# Patient Record
Sex: FEMALE | Race: WHITE | NOT HISPANIC OR LATINO | Employment: OTHER | URBAN - NONMETROPOLITAN AREA
[De-identification: names, ages, dates, MRNs, and addresses within clinical notes are randomized per-mention and may not be internally consistent; named-entity substitution may affect disease eponyms.]

---

## 2017-04-12 ENCOUNTER — TRANSCRIBE ORDERS (OUTPATIENT)
Dept: LAB | Facility: CLINIC | Age: 72
End: 2017-04-12

## 2017-04-12 ENCOUNTER — LAB (OUTPATIENT)
Dept: LAB | Facility: CLINIC | Age: 72
End: 2017-04-12
Payer: MEDICARE

## 2017-04-12 DIAGNOSIS — E55.9 UNSPECIFIED VITAMIN D DEFICIENCY: ICD-10-CM

## 2017-04-12 DIAGNOSIS — I25.10 UNSPECIFIED CARDIOVASCULAR DISEASE: ICD-10-CM

## 2017-04-12 DIAGNOSIS — I25.10 UNSPECIFIED CARDIOVASCULAR DISEASE: Primary | ICD-10-CM

## 2017-04-12 LAB
25(OH)D3 SERPL-MCNC: 7.6 NG/ML (ref 30–100)
ALBUMIN SERPL BCP-MCNC: 3.9 G/DL (ref 3.5–5)
ALP SERPL-CCNC: 88 U/L (ref 46–116)
ALT SERPL W P-5'-P-CCNC: 17 U/L (ref 12–78)
ANION GAP SERPL CALCULATED.3IONS-SCNC: 8 MMOL/L (ref 4–13)
AST SERPL W P-5'-P-CCNC: 14 U/L (ref 5–45)
BILIRUB SERPL-MCNC: 0.51 MG/DL (ref 0.2–1)
BUN SERPL-MCNC: 12 MG/DL (ref 5–25)
CALCIUM SERPL-MCNC: 9.2 MG/DL (ref 8.3–10.1)
CHLORIDE SERPL-SCNC: 107 MMOL/L (ref 100–108)
CHOLEST SERPL-MCNC: 185 MG/DL (ref 50–200)
CO2 SERPL-SCNC: 25 MMOL/L (ref 21–32)
CREAT SERPL-MCNC: 0.88 MG/DL (ref 0.6–1.3)
ERYTHROCYTE [DISTWIDTH] IN BLOOD BY AUTOMATED COUNT: 13.5 % (ref 11.6–15.1)
GFR SERPL CREATININE-BSD FRML MDRD: >60 ML/MIN/1.73SQ M
GLUCOSE P FAST SERPL-MCNC: 121 MG/DL (ref 65–99)
HCT VFR BLD AUTO: 40.9 % (ref 34.8–46.1)
HDLC SERPL-MCNC: 44 MG/DL (ref 40–60)
HGB BLD-MCNC: 13.5 G/DL (ref 11.5–15.4)
LDLC SERPL CALC-MCNC: 111 MG/DL (ref 0–100)
LDLC SERPL DIRECT ASSAY-MCNC: 117 MG/DL (ref 0–100)
MCH RBC QN AUTO: 29.2 PG (ref 26.8–34.3)
MCHC RBC AUTO-ENTMCNC: 33 G/DL (ref 31.4–37.4)
MCV RBC AUTO: 88 FL (ref 82–98)
PLATELET # BLD AUTO: 117 THOUSANDS/UL (ref 149–390)
PMV BLD AUTO: 11.7 FL (ref 8.9–12.7)
POTASSIUM SERPL-SCNC: 4.3 MMOL/L (ref 3.5–5.3)
PROT SERPL-MCNC: 7 G/DL (ref 6.4–8.2)
RBC # BLD AUTO: 4.63 MILLION/UL (ref 3.81–5.12)
SODIUM SERPL-SCNC: 140 MMOL/L (ref 136–145)
TRIGL SERPL-MCNC: 151 MG/DL
WBC # BLD AUTO: 4.96 THOUSAND/UL (ref 4.31–10.16)

## 2017-04-12 PROCEDURE — 82306 VITAMIN D 25 HYDROXY: CPT

## 2017-04-12 PROCEDURE — 80053 COMPREHEN METABOLIC PANEL: CPT

## 2017-04-12 PROCEDURE — 80061 LIPID PANEL: CPT

## 2017-04-12 PROCEDURE — 83036 HEMOGLOBIN GLYCOSYLATED A1C: CPT

## 2017-04-12 PROCEDURE — 36415 COLL VENOUS BLD VENIPUNCTURE: CPT

## 2017-04-12 PROCEDURE — 85027 COMPLETE CBC AUTOMATED: CPT

## 2017-04-12 PROCEDURE — 83721 ASSAY OF BLOOD LIPOPROTEIN: CPT

## 2017-04-13 LAB
EST. AVERAGE GLUCOSE BLD GHB EST-MCNC: 143 MG/DL
HBA1C MFR BLD: 6.6 % (ref 4.2–6.3)

## 2017-08-30 ENCOUNTER — APPOINTMENT (OUTPATIENT)
Dept: LAB | Facility: CLINIC | Age: 72
End: 2017-08-30
Payer: MEDICARE

## 2017-08-30 ENCOUNTER — TRANSCRIBE ORDERS (OUTPATIENT)
Dept: LAB | Facility: CLINIC | Age: 72
End: 2017-08-30

## 2017-08-30 DIAGNOSIS — E55.9 UNSPECIFIED VITAMIN D DEFICIENCY: ICD-10-CM

## 2017-08-30 DIAGNOSIS — E55.9 UNSPECIFIED VITAMIN D DEFICIENCY: Primary | ICD-10-CM

## 2017-08-30 PROCEDURE — 36415 COLL VENOUS BLD VENIPUNCTURE: CPT

## 2017-09-01 ENCOUNTER — APPOINTMENT (OUTPATIENT)
Dept: LAB | Facility: CLINIC | Age: 72
End: 2017-09-01
Payer: MEDICARE

## 2017-09-01 ENCOUNTER — TRANSCRIBE ORDERS (OUTPATIENT)
Dept: LAB | Facility: CLINIC | Age: 72
End: 2017-09-01

## 2017-09-01 DIAGNOSIS — E55.9 UNSPECIFIED VITAMIN D DEFICIENCY: ICD-10-CM

## 2017-09-01 DIAGNOSIS — E55.9 UNSPECIFIED VITAMIN D DEFICIENCY: Primary | ICD-10-CM

## 2017-09-01 LAB — 25(OH)D3 SERPL-MCNC: 91.8 NG/ML (ref 30–100)

## 2017-09-01 PROCEDURE — 36415 COLL VENOUS BLD VENIPUNCTURE: CPT

## 2017-09-01 PROCEDURE — 82306 VITAMIN D 25 HYDROXY: CPT

## 2017-10-20 ENCOUNTER — HOSPITAL ENCOUNTER (EMERGENCY)
Facility: HOSPITAL | Age: 72
Discharge: HOME | End: 2017-10-20
Admitting: EMERGENCY MEDICINE
Payer: MEDICARE

## 2017-10-20 ENCOUNTER — TRANSCRIBE ORDERS (OUTPATIENT)
Dept: URGENT CARE | Facility: CLINIC | Age: 72
End: 2017-10-20

## 2017-10-20 ENCOUNTER — APPOINTMENT (EMERGENCY)
Dept: CT IMAGING | Facility: HOSPITAL | Age: 72
End: 2017-10-20
Payer: MEDICARE

## 2017-10-20 ENCOUNTER — OFFICE VISIT (OUTPATIENT)
Dept: URGENT CARE | Facility: CLINIC | Age: 72
End: 2017-10-20
Payer: MEDICARE

## 2017-10-20 ENCOUNTER — APPOINTMENT (EMERGENCY)
Dept: RADIOLOGY | Facility: HOSPITAL | Age: 72
End: 2017-10-20
Payer: MEDICARE

## 2017-10-20 VITALS
OXYGEN SATURATION: 96 % | HEART RATE: 96 BPM | RESPIRATION RATE: 20 BRPM | DIASTOLIC BLOOD PRESSURE: 68 MMHG | WEIGHT: 115.9 LBS | TEMPERATURE: 98.8 F | SYSTOLIC BLOOD PRESSURE: 161 MMHG

## 2017-10-20 DIAGNOSIS — J21.9 BRONCHIOLITIS, ACUTE: Primary | ICD-10-CM

## 2017-10-20 DIAGNOSIS — J18.9 COMMUNITY ACQUIRED PNEUMONIA OF LEFT LOWER LOBE OF LUNG: ICD-10-CM

## 2017-10-20 DIAGNOSIS — R07.9 CHEST PAIN, UNSPECIFIED TYPE: ICD-10-CM

## 2017-10-20 DIAGNOSIS — R07.9 CHEST PAIN, UNSPECIFIED TYPE: Primary | ICD-10-CM

## 2017-10-20 LAB
ALBUMIN SERPL BCP-MCNC: 3.8 G/DL (ref 3.5–5)
ALP SERPL-CCNC: 86 U/L (ref 46–116)
ALT SERPL W P-5'-P-CCNC: 18 U/L (ref 12–78)
ANION GAP SERPL CALCULATED.3IONS-SCNC: 13 MMOL/L (ref 4–13)
APTT PPP: 28 SECONDS (ref 23–35)
AST SERPL W P-5'-P-CCNC: 14 U/L (ref 5–45)
BASOPHILS # BLD AUTO: 0.01 THOUSANDS/ΜL (ref 0–0.1)
BASOPHILS NFR BLD AUTO: 0 % (ref 0–1)
BILIRUB SERPL-MCNC: 0.7 MG/DL (ref 0.2–1)
BUN SERPL-MCNC: 7 MG/DL (ref 5–25)
CALCIUM SERPL-MCNC: 9.3 MG/DL (ref 8.3–10.1)
CHLORIDE SERPL-SCNC: 99 MMOL/L (ref 100–108)
CO2 SERPL-SCNC: 25 MMOL/L (ref 21–32)
CREAT SERPL-MCNC: 0.86 MG/DL (ref 0.6–1.3)
EOSINOPHIL # BLD AUTO: 0.05 THOUSAND/ΜL (ref 0–0.61)
EOSINOPHIL NFR BLD AUTO: 1 % (ref 0–6)
ERYTHROCYTE [DISTWIDTH] IN BLOOD BY AUTOMATED COUNT: 13.2 % (ref 11.6–15.1)
GFR SERPL CREATININE-BSD FRML MDRD: 67 ML/MIN/1.73SQ M
GLUCOSE SERPL-MCNC: 161 MG/DL (ref 65–140)
HCT VFR BLD AUTO: 39.7 % (ref 34.8–46.1)
HGB BLD-MCNC: 13.9 G/DL (ref 11.5–15.4)
INR PPP: 1.04 (ref 0.86–1.16)
LIPASE SERPL-CCNC: 183 U/L (ref 73–393)
LYMPHOCYTES # BLD AUTO: 1.03 THOUSANDS/ΜL (ref 0.6–4.47)
LYMPHOCYTES NFR BLD AUTO: 15 % (ref 14–44)
MAGNESIUM SERPL-MCNC: 1.5 MG/DL (ref 1.6–2.6)
MCH RBC QN AUTO: 29.5 PG (ref 26.8–34.3)
MCHC RBC AUTO-ENTMCNC: 35 G/DL (ref 31.4–37.4)
MCV RBC AUTO: 84 FL (ref 82–98)
MONOCYTES # BLD AUTO: 0.72 THOUSAND/ΜL (ref 0.17–1.22)
MONOCYTES NFR BLD AUTO: 10 % (ref 4–12)
NEUTROPHILS # BLD AUTO: 5.31 THOUSANDS/ΜL (ref 1.85–7.62)
NEUTS SEG NFR BLD AUTO: 74 % (ref 43–75)
NT-PROBNP SERPL-MCNC: 502 PG/ML
PLATELET # BLD AUTO: 131 THOUSANDS/UL (ref 149–390)
PMV BLD AUTO: 10.6 FL (ref 8.9–12.7)
POTASSIUM SERPL-SCNC: 3.7 MMOL/L (ref 3.5–5.3)
PROT SERPL-MCNC: 7.5 G/DL (ref 6.4–8.2)
PROTHROMBIN TIME: 13.5 SECONDS (ref 12.1–14.4)
RBC # BLD AUTO: 4.71 MILLION/UL (ref 3.81–5.12)
SODIUM SERPL-SCNC: 137 MMOL/L (ref 136–145)
TROPONIN I SERPL-MCNC: <0.02 NG/ML
WBC # BLD AUTO: 7.12 THOUSAND/UL (ref 4.31–10.16)

## 2017-10-20 PROCEDURE — 85025 COMPLETE CBC W/AUTO DIFF WBC: CPT | Performed by: PHYSICIAN ASSISTANT

## 2017-10-20 PROCEDURE — 85610 PROTHROMBIN TIME: CPT | Performed by: PHYSICIAN ASSISTANT

## 2017-10-20 PROCEDURE — 85730 THROMBOPLASTIN TIME PARTIAL: CPT | Performed by: PHYSICIAN ASSISTANT

## 2017-10-20 PROCEDURE — 83690 ASSAY OF LIPASE: CPT | Performed by: PHYSICIAN ASSISTANT

## 2017-10-20 PROCEDURE — 93005 ELECTROCARDIOGRAM TRACING: CPT

## 2017-10-20 PROCEDURE — 83880 ASSAY OF NATRIURETIC PEPTIDE: CPT | Performed by: PHYSICIAN ASSISTANT

## 2017-10-20 PROCEDURE — 36415 COLL VENOUS BLD VENIPUNCTURE: CPT | Performed by: PHYSICIAN ASSISTANT

## 2017-10-20 PROCEDURE — 71275 CT ANGIOGRAPHY CHEST: CPT

## 2017-10-20 PROCEDURE — 94640 AIRWAY INHALATION TREATMENT: CPT

## 2017-10-20 PROCEDURE — 80053 COMPREHEN METABOLIC PANEL: CPT | Performed by: PHYSICIAN ASSISTANT

## 2017-10-20 PROCEDURE — 99285 EMERGENCY DEPT VISIT HI MDM: CPT

## 2017-10-20 PROCEDURE — 99214 OFFICE O/P EST MOD 30 MIN: CPT

## 2017-10-20 PROCEDURE — 83735 ASSAY OF MAGNESIUM: CPT | Performed by: PHYSICIAN ASSISTANT

## 2017-10-20 PROCEDURE — 71010 HB CHEST X-RAY 1 VIEW FRONTAL (PORTABLE): CPT

## 2017-10-20 PROCEDURE — 84484 ASSAY OF TROPONIN QUANT: CPT | Performed by: PHYSICIAN ASSISTANT

## 2017-10-20 PROCEDURE — G0463 HOSPITAL OUTPT CLINIC VISIT: HCPCS

## 2017-10-20 RX ORDER — LISINOPRIL 10 MG/1
10 TABLET ORAL DAILY
COMMUNITY
End: 2022-03-18 | Stop reason: SDUPTHER

## 2017-10-20 RX ORDER — IPRATROPIUM BROMIDE AND ALBUTEROL SULFATE 2.5; .5 MG/3ML; MG/3ML
3 SOLUTION RESPIRATORY (INHALATION) ONCE
Status: COMPLETED | OUTPATIENT
Start: 2017-10-20 | End: 2017-10-20

## 2017-10-20 RX ORDER — ASPIRIN 81 MG/1
81 TABLET ORAL DAILY
Status: ON HOLD | COMMUNITY
End: 2022-03-08 | Stop reason: SDUPTHER

## 2017-10-20 RX ORDER — ALBUTEROL SULFATE 90 UG/1
2 AEROSOL, METERED RESPIRATORY (INHALATION) EVERY 6 HOURS PRN
Qty: 1 INHALER | Refills: 0 | Status: SHIPPED | OUTPATIENT
Start: 2017-10-20 | End: 2022-03-08 | Stop reason: HOSPADM

## 2017-10-20 RX ORDER — DOXYCYCLINE 100 MG/1
100 CAPSULE ORAL 2 TIMES DAILY
Qty: 20 CAPSULE | Refills: 0 | Status: SHIPPED | OUTPATIENT
Start: 2017-10-20 | End: 2017-10-30

## 2017-10-20 RX ORDER — SIMVASTATIN 10 MG
10 TABLET ORAL
COMMUNITY
End: 2022-03-08 | Stop reason: HOSPADM

## 2017-10-20 RX ADMIN — IOHEXOL 85 ML: 350 INJECTION, SOLUTION INTRAVENOUS at 16:16

## 2017-10-20 RX ADMIN — IPRATROPIUM BROMIDE AND ALBUTEROL SULFATE 3 ML: .5; 3 SOLUTION RESPIRATORY (INHALATION) at 15:38

## 2017-10-20 NOTE — ED PROVIDER NOTES
History  Chief Complaint   Patient presents with    Shortness of Breath     SOB,cough, weakness, and chest "burning" x3 weeks      74 yr female multiple complaints was sent from urgent care for sob/cough/palpitations/htn/cp  Prior to arrival /87  Just returned from [place] (via airplane) 3 weeks ago shortly after developed burning and aching in both feet, went away with walking and has since resolved, then 2 day s of diarrhea resolved with immodium  On third day developed chest burning "on fire" and coughing clear phlegm  Hurts to breath, initially thick yellow mucus is now white and feels like it is "settling in my trachea"  Exertion makes her very generally weak and tired  She takes allegra-d which lessens the mucus production  Worse to breathe with laying flat- sleeps on side and uses 3 pillows  Also decreased appetite and 15 lb weight loss in 1 year  No f/c  No n/v/d  No abdominal pain  Normal urination  No swelling in legs  Prior smoker, quit years ago  Has PMH htn, hld, cad s/p PCI  Allergic to flu vaccine  History provided by:  Patient      Prior to Admission Medications   Prescriptions Last Dose Informant Patient Reported? Taking?   aspirin (ECOTRIN LOW STRENGTH) 81 mg EC tablet   Yes Yes   Sig: Take 81 mg by mouth daily   lisinopril (ZESTRIL) 10 mg tablet   Yes Yes   Sig: Take 10 mg by mouth daily   simvastatin (ZOCOR) 10 mg tablet   Yes Yes   Sig: Take 10 mg by mouth daily at bedtime      Facility-Administered Medications: None       Past Medical History:   Diagnosis Date    Cardiac disease     Hypercholesteremia     Hyperlipidemia     Hypertension     Renal disorder        Past Surgical History:   Procedure Laterality Date    CORONARY ANGIOPLASTY WITH STENT PLACEMENT      WRIST SURGERY         History reviewed  No pertinent family history  I have reviewed and agree with the history as documented      Social History   Substance Use Topics    Smoking status: Former Smoker    Smokeless tobacco: Never Used    Alcohol use No        Review of Systems   Constitutional: Positive for activity change and fatigue  Negative for appetite change, chills, diaphoresis, fever and unexpected weight change  HENT: Negative for congestion, ear pain, postnasal drip, rhinorrhea, sinus pressure and sore throat  Eyes: Negative for pain, discharge and redness  Respiratory: Positive for cough and shortness of breath  Negative for chest tightness  Cardiovascular: Positive for chest pain  Negative for palpitations and leg swelling  Gastrointestinal: Negative for abdominal pain, constipation, diarrhea, nausea and vomiting  Genitourinary: Negative for difficulty urinating, dysuria, flank pain, frequency, hematuria and urgency  Musculoskeletal: Negative for arthralgias, back pain and myalgias  Skin: Negative for color change, rash and wound  Allergic/Immunologic: Negative for immunocompromised state  Neurological: Negative for dizziness, tremors, syncope, weakness, numbness and headaches  Physical Exam  ED Triage Vitals   Temperature Pulse Respirations Blood Pressure SpO2   10/20/17 1447 10/20/17 1445 10/20/17 1447 10/20/17 1445 10/20/17 1445   98 8 °F (37 1 °C) (!) 106 20 (!) 182/91 95 %      Temp Source Heart Rate Source Patient Position - Orthostatic VS BP Location FiO2 (%)   10/20/17 1447 10/20/17 1447 10/20/17 1447 10/20/17 1447 --   Temporal Monitor Lying Left arm       Pain Score       10/20/17 1447       No Pain           Physical Exam   Constitutional: Vital signs are normal  She appears well-developed and well-nourished  Non-toxic appearance  No distress  HENT:   Head: Normocephalic and atraumatic  Right Ear: Tympanic membrane and external ear normal    Left Ear: Tympanic membrane and external ear normal    Nose: No rhinorrhea     Mouth/Throat: Uvula is midline and oropharynx is clear and moist    Eyes: Conjunctivae and lids are normal  Pupils are equal, round, and reactive to light  Neck: Full passive range of motion without pain  Neck supple  Cardiovascular: Normal rate, regular rhythm, normal heart sounds and intact distal pulses  Pulmonary/Chest: Effort normal and breath sounds normal  No accessory muscle usage  No tachypnea  No respiratory distress  Abdominal: Soft  Normal appearance and bowel sounds are normal  There is no hepatosplenomegaly  There is no tenderness  There is no CVA tenderness  No hernia  Musculoskeletal: Normal range of motion  Neurological: She is alert  Skin: Skin is warm, dry and intact  No rash noted  She is not diaphoretic  Psychiatric: She has a normal mood and affect  Her speech is normal and behavior is normal    Nursing note and vitals reviewed        ED Medications  Medications   ipratropium-albuterol (DUO-NEB) 0 5-2 5 mg/mL inhalation solution 3 mL (3 mL Nebulization Given 10/20/17 1538)   iohexol (OMNIPAQUE) 350 MG/ML injection (SINGLE-DOSE) 85 mL (85 mL Intravenous Given 10/20/17 1616)       Diagnostic Studies  Labs Reviewed   CBC AND DIFFERENTIAL - Abnormal        Result Value Ref Range Status    Platelets 257 (*) 295 - 390 Thousands/uL Final    WBC 7 12  4 31 - 10 16 Thousand/uL Final    RBC 4 71  3 81 - 5 12 Million/uL Final    Hemoglobin 13 9  11 5 - 15 4 g/dL Final    Hematocrit 39 7  34 8 - 46 1 % Final    MCV 84  82 - 98 fL Final    MCH 29 5  26 8 - 34 3 pg Final    MCHC 35 0  31 4 - 37 4 g/dL Final    RDW 13 2  11 6 - 15 1 % Final    MPV 10 6  8 9 - 12 7 fL Final    Neutrophils Relative 74  43 - 75 % Final    Lymphocytes Relative 15  14 - 44 % Final    Monocytes Relative 10  4 - 12 % Final    Eosinophils Relative 1  0 - 6 % Final    Basophils Relative 0  0 - 1 % Final    Neutrophils Absolute 5 31  1 85 - 7 62 Thousands/µL Final    Lymphocytes Absolute 1 03  0 60 - 4 47 Thousands/µL Final    Monocytes Absolute 0 72  0 17 - 1 22 Thousand/µL Final    Eosinophils Absolute 0 05  0 00 - 0 61 Thousand/µL Final Basophils Absolute 0 01  0 00 - 0 10 Thousands/µL Final   COMPREHENSIVE METABOLIC PANEL - Abnormal     Chloride 99 (*) 100 - 108 mmol/L Final    Glucose 161 (*) 65 - 140 mg/dL Final    Comment:   If the patient is fasting, the ADA then defines impaired fasting glucose as > 100 mg/dL and diabetes as > or equal to 123 mg/dL  Specimen collection should occur prior to Sulfasalazine administration due to the potential for falsely depressed results  Specimen collection should occur prior to Sulfapyridine administration due to the potential for falsely elevated results  Sodium 137  136 - 145 mmol/L Final    Potassium 3 7  3 5 - 5 3 mmol/L Final    CO2 25  21 - 32 mmol/L Final    Anion Gap 13  4 - 13 mmol/L Final    BUN 7  5 - 25 mg/dL Final    Creatinine 0 86  0 60 - 1 30 mg/dL Final    Comment: Standardized to IDMS reference method    Calcium 9 3  8 3 - 10 1 mg/dL Final    AST 14  5 - 45 U/L Final    Comment:   Specimen collection should occur prior to Sulfasalazine administration due to the potential for falsely depressed results  ALT 18  12 - 78 U/L Final    Comment:   Specimen collection should occur prior to Sulfasalazine administration due to the potential for falsely depressed results  Alkaline Phosphatase 86  46 - 116 U/L Final    Total Protein 7 5  6 4 - 8 2 g/dL Final    Albumin 3 8  3 5 - 5 0 g/dL Final    Total Bilirubin 0 70  0 20 - 1 00 mg/dL Final    eGFR 67  ml/min/1 73sq m Final    Narrative:     National Kidney Disease Education Program recommendations are as follows:  GFR calculation is accurate only with a steady state creatinine  Chronic Kidney disease less than 60 ml/min/1 73 sq  meters  Kidney failure less than 15 ml/min/1 73 sq  meters     MAGNESIUM - Abnormal     Magnesium 1 5 (*) 1 6 - 2 6 mg/dL Final   NT-BNP PRO (BRAIN NATRIURETIC PEPTIDE) - Abnormal     NT-proBNP 502 (*) <125 pg/mL Final   PROTIME-INR - Normal    Protime 13 5  12 1 - 14 4 seconds Final    INR 1 04  0 86 - 1 16 Final APTT - Normal    PTT 28  23 - 35 seconds Final    Narrative: Therapeutic Heparin Range = 60-90 seconds   LIPASE - Normal    Lipase 183  73 - 393 u/L Final   TROPONIN I - Normal    Troponin I <0 02  <=0 04 ng/mL Final    Comment: 3Autovalidation override    Narrative:     [employer] Chemistry analyzer 99% cutoff is > 0 04 ng/mL in network labs    o cTnI 99% cutoff is useful only when applied to patients in the clinical setting of myocardial ischemia  o cTnI 99% cutoff should be interpreted in the context of clinical history, ECG findings and possibly cardiac imaging to establish correct diagnosis  o cTnI 99% cutoff may be suggestive but clearly not indicative of a coronary event without the clinical setting of myocardial ischemia  UA W REFLEX TO MICROSCOPIC WITH REFLEX TO CULTURE       CTA ED chest PE study   Final Result      No intraluminal filling defect to suggest a pulmonary embolus  Left lower lobe peribronchial thickening and tree-in-bud opacities suspicious for an infectious or inflammatory bronchiolitis  Follow-up could be performed to assess for complete resolution  Mild diffuse emphysematous lung changes  Partially visualized moderately atrophic right kidney  Workstation performed: [id number]         XR chest 1 view portable   Final Result      No active pulmonary disease           Workstation performed: [id number]             Procedures  ECG 12 Lead Documentation  Date/Time: 10/20/2017 2:52 PM  Performed by: Gary Wbeb  Authorized by: Gary Webb     Indications / Diagnosis:  Sob  ECG reviewed by me, the ED Provider: yes    Patient location:  ED  Rate:     ECG rate:  104  Rhythm:     Rhythm: sinus tachycardia    Ectopy:     Ectopy: none    QRS:     QRS axis:  Normal  Conduction:     Conduction: normal    ST segments:     ST segments:  Normal  T waves:     T waves: inverted      Inverted:  V3, V4 and V5  Other findings:     Other findings: LAE Phone Contacts  ED Phone Contact    ED Course  ED Course as of Oct 20 1712   Fri Oct 20, 2017   1516 WBC: 7 12   1516 Hemoglobin: 13 9   1516 Hematocrit: 39 7   1516 Platelets: (!) 161   1533 NT-proBNP: (!) 502   1533 Magnesium: (!) 1 5   1533 Sodium: 137   1533 Potassium: 3 7   1533 Chloride: (!) 99   1533 CO2: 25   1533 Anion Gap: 13   1533 Creatinine: 0 86   1533 Glucose: (!) 161   1533 AST: 14   1533 Alkaline Phosphatase: 86   1533 Albumin: 3 8   1533 eGFR: 67   1533 Troponin I: <0 02   1533 Lipase: 183   1534 PTT: 28   1534 Protime: 13 5   1542 nad XR chest 1 view portable   1705 Reassess patient resting comfortably- reviewed all results with her at bedside including labs, ekg, and ct findings  No sirs/sepsis or evidence of overwhelming infection or respiratory compromise  Is feeling well and agreeable to outpatient treatment- doxy, mucinex, albuterol  Has appt with pcp on 10/22/17 will keep as f/u  MDM  Number of Diagnoses or Management Options  Bronchiolitis, acute: new and requires workup  Community acquired pneumonia of left lower lobe of lung [facility]): new and requires workup  Diagnosis management comments: 74 yr female with respiratory symptoms upon return from a vacation 3 weeks ago  Productive cough chest burning malaise sob x 2 weeks, sx gradually improving but not resolved  Will evaluate with labs, cxr, cta to evaluate ddx to include not limited to: bronchitis, pneumonia, pneumonitis (viral/fungal/inflammatory), pulmonary embolism, atypical acs, metabolic derangement  Treat supportively and according to results         Amount and/or Complexity of Data Reviewed  Clinical lab tests: ordered and reviewed  Tests in the radiology section of CPT®: ordered and reviewed  Discuss the patient with other providers: yes  Independent visualization of images, tracings, or specimens: yes    Patient Progress  Patient progress: stable    CritCare Time    Disposition  Final diagnoses:   Bronchiolitis, acute   Community acquired pneumonia of left lower lobe of lung [facility])     ED Disposition     ED Disposition Condition Comment    Discharge  Senait Savage discharge to home/self care  Condition at discharge: Good        Follow-up Information     Follow up With Specialties Details Why [address] Emergency Department Emergency Medicine Go to As needed, If symptoms worsen [address] [place] [facility], [place] [address], [place], [place], [address] [facility], [facility] Medicine  ER followup as scheduled for 10/22/17 [address] [facility] [address]  [phone]           Patient's Medications   Discharge Prescriptions    ALBUTEROL (PROVENTIL HFA,VENTOLIN HFA) 90 MCG/ACT INHALER    Inhale 2 puffs every 6 (six) hours as needed for wheezing       Start Date: 10/20/2017End Date: --       Order Dose: 2 puffs       Quantity: 1 Inhaler    Refills: 0    DEXTROMETHORPHAN-GUAIFENESIN (MUCINEX DM)  MG PER 12 HR TABLET    Take 1 tablet by mouth every 12 (twelve) hours for 7 days       Start Date: 10/20/2017End Date: 10/27/2017       Order Dose: 1 tablet       Quantity: 14 tablet    Refills: 0    DOXYCYCLINE MONOHYDRATE (MONODOX) 100 MG CAPSULE    Take 1 capsule by mouth 2 (two) times a day for 10 days       Start Date: 10/20/2017End Date: 10/30/2017       Order Dose: 100 mg       Quantity: 20 capsule    Refills: 0     No discharge procedures on file      ED Provider  Electronically Signed by       Mattihas Doll PA-C  10/20/17 8427

## 2017-10-20 NOTE — DISCHARGE INSTRUCTIONS
Community Acquired Pneumonia   WHAT YOU NEED TO KNOW:   Community-acquired pneumonia (CAP) is a lung infection that you get outside of a hospital or nursing home setting  Your lungs become inflamed and cannot work well  CAP may be caused by bacteria, viruses, or fungi  DISCHARGE INSTRUCTIONS:   Return to the emergency department if:   · You are confused and cannot think clearly  · You have increased trouble breathing  · Your lips or fingernails turn gray or blue  Contact your healthcare provider if:   · Your symptoms do not get better, or they get worse  · You are urinating less, or not at all  · You have questions or concerns about your condition or care  Medicines:   · Medicines  may be given to treat a bacterial, viral, or fungal infection  You may also be given medicines to dilate your bronchial tubes to help you breathe more easily  · Take your medicine as directed  Contact your healthcare provider if you think your medicine is not helping or if you have side effects  Tell him or her if you are allergic to any medicine  Keep a list of the medicines, vitamins, and herbs you take  Include the amounts, and when and why you take them  Bring the list or the pill bottles to follow-up visits  Carry your medicine list with you in case of an emergency  Follow up with your healthcare provider within 3 days or as directed: You may need another x-ray  Write down your questions so you remember to ask them during your visits  Deep breathing and coughing:  Deep breathing helps open the air passages in your lungs  Coughing helps bring up mucus from your lungs  Take a deep breath and hold the breath as long as you can  Then push the air out of your lungs with a deep, strong cough  Spit out any mucus you have coughed up  Take 10 deep breaths in a row every hour that you are awake  Remember to follow each deep breath with a cough     Do not smoke or allow others to smoke around you:  Nicotine and other chemicals in cigarettes and cigars can cause lung damage  Ask your healthcare provider for information if you currently smoke and need help to quit  E-cigarettes or smokeless tobacco still contain nicotine  Talk to your healthcare provider before you use these products  Manage CAP at home:   · Breathe warm, moist air  This helps loosen mucus  Loosely place a warm, wet washcloth over your nose and mouth  A room humidifier may also help make the air moist     · Drink liquids as directed  Ask your healthcare provider how much liquid to drink each day and which liquids to drink  Liquids help make mucus thin and easier to get out of your body  · Gently tap your chest   This helps loosen mucus so it is easier to cough  Lie with your head lower than your chest several times a day and tap your chest      · Get plenty of rest   Rest helps your body heal   Prevent CAP:   · Wash your hands often with soap and water  Carry germ-killing hand gel with you  You can use the gel to clean your hands when soap and water are not available  Do not touch your eyes, nose, or mouth unless you have washed your hands first      · Clean surfaces often  Clean doorknobs, countertops, cell phones, and other surfaces that are touched often  · Always cover your mouth when you cough  Cough into a tissue or your shirtsleeve so you do not spread germs from your hands  · Try to avoid people who have a cold or the flu  If you are sick, stay away from others as much as possible  · Ask about vaccines  You may need a vaccine to help prevent pneumonia  Get an influenza (flu) vaccine every year as soon as it becomes available  © 2017 [address] [employer] Information is for End User's use only and may not be sold, redistributed or otherwise used for commercial purposes  All illustrations and images included in CareNotes® are the copyrighted property of [employer]  or [employer]    The above information is an  only  It is not intended as medical advice for individual conditions or treatments  Talk to your doctor, nurse or pharmacist before following any medical regimen to see if it is safe and effective for you  How Your Lungs Work   WHAT YOU NEED TO KNOW:   Your lungs are part of the respiratory system  The respiratory system contains organs and tissues that help you breathe  When you breathe in (inhale), your lungs remove oxygen from the air  The oxygen is moved into your blood and goes into your heart  Your heart then pumps the oxygen to the rest of your body  When you breathe out (exhale), your lungs remove waste gas (carbon dioxide) from your body  DISCHARGE INSTRUCTIONS:   Parts of the lung:   · Bronchial tubes  branch from your windpipe into your left and right lungs  Bronchial tubes branch into smaller tubes (bronchioles) and end as alveoli  · Alveoli  are small air sacs that have capillaries within their walls  Capillaries are small blood vessels where the exchange of oxygen and carbon dioxide happen  · Lobes  are the sections of your lungs  Your right lung has 3 lobes and your left lung has 2 lobes  Your left lung is smaller to make room for your heart  · Pleura  is the membrane that covers your lungs and keeps them from touching your chest wall  · Cilia  are very small hair-like tissues that line the bronchial tubes  They wave back and forth and carry mucus up out of your lungs into your throat  Once in your throat, the mucus can be coughed out or swallowed  Other organs and tissues used in breathing:   · Air comes into your body through your nose or mouth and travels down your windpipe  · The muscles between your ribs allow your ribs to expand and contract slightly  This gives your lungs room to fill with air and deflate  · Your diaphragm is a muscle that separates your chest and abdominal cavity  Your diaphragm is just below your lungs   As your diaphragm moves down, your lungs expand  · Abdominal muscles help you breathe out when you are breathing fast  During exercise, your abdominal muscles push your diaphragm against your lungs more often  This pushes air out of your lungs faster and more often  · Neck and shoulder muscles may help expand the lungs if you have a lung condition, such as emphysema  Other muscles have to help in emphysema because the diaphragm does not work properly  Decrease your risk for lung problems:   · Do not smoke  Smoking can make your airways narrow  Narrow airways can make breathing difficult  Smoking can cause long-term swelling of your lungs and destroy lung tissue  Smoking also increases your risk for cancer  If you smoke, it is never too late to quit  Ask for information if you need help quitting  E-cigarettes or smokeless tobacco still contain nicotine  Talk to your healthcare provider before you use these products  · Avoid risks of toxins in the air  Toxins include secondhand smoke, air pollution, chemicals, and radon  Toxins can cause lung disease or make your lung disease worse  Test your house for radon  Make your house and car smoke-free areas  Do not exercise outside on bad air days  · Prevent infection  Respiratory infections and colds may become serious for a person who has a lung condition  Wash your hands often with soap and water  Avoid crowds during cold and flu seasons  Get a yearly flu vaccine  Ask your healthcare provider if you need a pneumonia vaccine  · Follow up with your healthcare provider regularly  Your healthcare provider will listen to your lungs  Regular follow-up visits can help your healthcare provider find a lung disease before it becomes serious  © 2017 [address] [employer] Information is for End User's use only and may not be sold, redistributed or otherwise used for commercial purposes   All illustrations and images included in CareNotes® are the copyrighted property of FELISA PEREA [employer]  or [employer]  The above information is an  only  It is not intended as medical advice for individual conditions or treatments  Talk to your doctor, nurse or pharmacist before following any medical regimen to see if it is safe and effective for you

## 2017-10-20 NOTE — ED NOTES
Pt provided with water per request and approval from [facility]     Karin Osei, [address] [facility]  10/20/17 7074

## 2017-10-23 LAB
ATRIAL RATE: 104 BPM
ATRIAL RATE: 95 BPM
P AXIS: 78 DEGREES
P AXIS: 78 DEGREES
PR INTERVAL: 108 MS
PR INTERVAL: 126 MS
QRS AXIS: 57 DEGREES
QRS AXIS: 61 DEGREES
QRSD INTERVAL: 78 MS
QRSD INTERVAL: 82 MS
QT INTERVAL: 350 MS
QT INTERVAL: 356 MS
QTC INTERVAL: 447 MS
QTC INTERVAL: 460 MS
T WAVE AXIS: 40 DEGREES
T WAVE AXIS: 58 DEGREES
VENTRICULAR RATE: 104 BPM
VENTRICULAR RATE: 95 BPM

## 2017-10-24 NOTE — PROGRESS NOTES
Assessment  1  Chest pain (786 50) (R07 9)    Plan  Palpitations    · EKG/ECG- POC; Status:Active - Perform Order,Retrospective By Protocol Authorization; Requested OCY:[id number];     Discussion/Summary  Discussion Summary:   Discussed with patient due to chest pain with palpations elevate BP recommend further eval at ER, patient is refusing ambulance signed AMA but states she will go to ER at [facility] called report to ER at this time  Medication Side Effects Reviewed: Possible side effects of new medications were reviewed with the patient/guardian today  Understands and agrees with treatment plan: The treatment plan was reviewed with the patient/guardian  The patient/guardian understands and agrees with the treatment plan   Counseling Documentation With Imm: The patient was counseled regarding instructions for management,-- patient and family education,-- importance of compliance with treatment  total time of encounter was 25 minutes-- and-- 10 minutes was spent counseling  Follow Up Instructions:   proceed to er for eval    If your symptoms worsen, go to the nearest [place] [address] Emergency Department  Chief Complaint  1  Cold Symptoms  Chief Complaint Free Text Note Form: C/o productive cough for 3 weeks and body aches  History of Present Illness  HPI: 74year old female at urgent care today with chief complaint of cough SOB, then also states chest pain palpation elevated BP generalized weakness and poor appetitie  She has not used any OTC medications at this time  Hospital Based Practices Required Assessment:   Pain Assessment   the patient states they have pain  The pain is located in the chest  The patient describes the pain as aching  (on a scale of 0 to 10, the patient rates the pain at 2 )   Abuse And Domestic Violence Screen    Yes, the patient is safe at home  -- The patient states no one is hurting them  Depression And Suicide Screen   No, the patient has not had thoughts of hurting themself  No, the patient has not felt depressed in the past 7 days  Prefered Language is  [place]  Primary Language is  English  Readiness To Learn: denial    Barriers To Learning: none  Preferred Learning: verbal   Education Completed: disease/condition,-- medications-- and-- further treatment/follow-up   Teaching Method: verbal   Person Taught: patient   Evaluation Of Learning: verbalized/demonstrated understanding   Cold Symptoms: Magalie Barrow presents with complaints of cold symptoms  Associated symptoms include dry cough,-- wheezing,-- shortness of breath,-- fatigue-- and-- weakness, but-- no sneezing,-- no nasal congestion,-- no runny nose,-- no post nasal drainage,-- no scratchy throat,-- no sore throat,-- no hoarseness,-- no productive cough,-- no facial pressure,-- no facial pain,-- no headache,-- no plugged ear(s),-- no ear pain,-- no swollen lymph nodes,-- no vomiting,-- no diarrhea,-- no fever-- and-- no chills  Review of Systems  Focused-Female:   Constitutional: feeling poorly-- and-- feeling tired, but-- as noted in HPI  Cardiovascular: chest pain,-- fast heart rate-- and-- palpitations, but-- as noted in HPI  Respiratory: shortness of breath,-- wheezing-- and-- shortness of breath during exertion, but-- as noted in HPI  Breasts: no complaints of breast pain, breast lump or nipple discharge  Gastrointestinal: no complaints of abdominal pain, no constipation, no nausea or diarrhea, no vomiting, no bloody stools  Genitourinary: no complaints of dysuria, no incontinence, no pelvic pain, no dysmenorrhea, no vaginal discharge or abnormal vaginal bleeding  Musculoskeletal: no complaints of arthralgia, no myalgia, no joint swelling or stiffness, no limb pain or swelling  Integumentary: no complaints of skin rash or lesion, no itching or dry skin, no skin wounds  Neurological: no complaints of headache, no confusion, no numbness or tingling, no dizziness or fainting     ROS Reviewed:   ROS reviewed  Active Problems  1  Urinary tract infection (599 0) (N39 0)    Past Medical History  1  History of Coronary Artery Disease (V12 59)   2  History of hyperlipidemia (V12 29) (Z86 39)   3  History of hypertension (V12 59) (Z86 79)  Active Problems And Past Medical History Reviewed: The active problems and past medical history were reviewed and updated today  Family History  Mother    1  Family history of Colon Cancer (V16 0)   2  Family history of Diabetes Mellitus (V18 0)   3  Family history of Heart Disease (V17 49)  Brother    4  Family history of Diabetes Mellitus (V18 0)   5  Family history of Gout (V18 19)  Family History Reviewed: The family history was reviewed and updated today  Social History   · Former smoker (F59 79) (S28 205)   · Marital History - Currently   Social History Reviewed: The social history was reviewed and updated today  The social history was reviewed and is unchanged  Surgical History  1  History of Cath Placement Of Stent 1   2  History of Hysterectomy  Surgical History Reviewed: The surgical history was reviewed and updated today  Current Meds   1  Amlodipine Besy-Benazepril HCl - 5-10 MG Oral Capsule; Therapy: (Recorded:[id number]) to Recorded   2  Aspirin 81 MG TABS Recorded   3  Tim-Citrate Plus Vitamin D TABS Recorded   4  Simvastatin 80 MG Oral Tablet; Therapy: [id number] to (Last Rx:13Lid1573)  Requested for: [id number] Ordered  Medication List Reviewed: The medication list was reviewed and updated today  Allergies  1  No Known Drug Allergies  2  No Known Environmental Allergies   3   No Known Food Allergies    Vitals  Signs   Recorded: 65SPJ4282 01:46PM   Temperature: 99 1 F  Heart Rate: 88  Respiration: 20  Systolic: 375  Diastolic: 88  Height: 5 ft 3 in  Weight: 114 lb 2 oz  BMI Calculated: 20 22  BSA Calculated: 1 52  O2 Saturation: 96    Physical Exam    Constitutional   General appearance: No acute distress, well appearing and well nourished  Eyes   Conjunctiva and lids: No swelling, erythema or discharge  Pupils and irises: Equal, round and reactive to light  Ears, Nose, Mouth, and Throat   External inspection of ears and nose: Normal     Otoscopic examination: Tympanic membranes translucent with normal light reflex  Canals patent without erythema  Nasal mucosa, septum, and turbinates: Normal without edema or erythema  Oropharynx: Normal with no erythema, edema, exudate or lesions  Pulmonary   Respiratory effort: No increased work of breathing or signs of respiratory distress  Auscultation of lungs: Abnormal   Auscultation of the lungs revealed expiratory wheezing  no rales or crackles were heard bilaterally  no rhonchi  no friction rub  diffuse wheezing bilaterally  Cardiovascular   Palpation of heart: Normal PMI, no thrills  Auscultation of heart: Normal rate and rhythm, normal S1 and S2, without murmurs  Examination of extremities for edema and/or varicosities: Normal     Lymphatic   Palpation of lymph nodes in neck: No lymphadenopathy  Musculoskeletal   Gait and station: Normal     Psychiatric   Orientation to person, place, and time: Normal     Mood and affect: Normal        Results/Data  ECG:   Rhythm and rate: sinus tachycardia        Signatures   Electronically signed by : Leny Varner NP; Oct 20 2017  2:08PM EST                       (Author)    Electronically signed by : ZAIRA Campbell ; Oct 23 2017  4:52PM EST                       (Co-author)

## 2017-11-30 ENCOUNTER — APPOINTMENT (EMERGENCY)
Dept: RADIOLOGY | Facility: HOSPITAL | Age: 72
End: 2017-11-30
Payer: MEDICARE

## 2017-11-30 ENCOUNTER — HOSPITAL ENCOUNTER (EMERGENCY)
Facility: HOSPITAL | Age: 72
Discharge: HOME | End: 2017-11-30
Attending: EMERGENCY MEDICINE | Admitting: EMERGENCY MEDICINE
Payer: MEDICARE

## 2017-11-30 VITALS
WEIGHT: 100.4 LBS | HEART RATE: 77 BPM | RESPIRATION RATE: 18 BRPM | HEIGHT: 63 IN | DIASTOLIC BLOOD PRESSURE: 75 MMHG | SYSTOLIC BLOOD PRESSURE: 176 MMHG | OXYGEN SATURATION: 95 % | TEMPERATURE: 98.9 F | BODY MASS INDEX: 17.79 KG/M2

## 2017-11-30 DIAGNOSIS — S82.63XA: Primary | ICD-10-CM

## 2017-11-30 PROCEDURE — 73610 X-RAY EXAM OF ANKLE: CPT

## 2017-11-30 PROCEDURE — 97116 GAIT TRAINING THERAPY: CPT | Performed by: PHYSICAL THERAPIST

## 2017-11-30 PROCEDURE — 73630 X-RAY EXAM OF FOOT: CPT

## 2017-11-30 PROCEDURE — 73564 X-RAY EXAM KNEE 4 OR MORE: CPT

## 2017-11-30 PROCEDURE — 97163 PT EVAL HIGH COMPLEX 45 MIN: CPT | Performed by: PHYSICAL THERAPIST

## 2017-11-30 PROCEDURE — G8979 MOBILITY GOAL STATUS: HCPCS | Performed by: PHYSICAL THERAPIST

## 2017-11-30 PROCEDURE — 99284 EMERGENCY DEPT VISIT MOD MDM: CPT

## 2017-11-30 PROCEDURE — G8978 MOBILITY CURRENT STATUS: HCPCS | Performed by: PHYSICAL THERAPIST

## 2017-11-30 PROCEDURE — G8980 MOBILITY D/C STATUS: HCPCS | Performed by: PHYSICAL THERAPIST

## 2017-11-30 RX ORDER — IBUPROFEN 400 MG/1
400 TABLET ORAL EVERY 6 HOURS PRN
Qty: 30 TABLET | Refills: 0 | Status: SHIPPED | OUTPATIENT
Start: 2017-11-30 | End: 2022-03-08 | Stop reason: HOSPADM

## 2017-11-30 RX ORDER — LISINOPRIL 5 MG/1
10 TABLET ORAL ONCE
Status: COMPLETED | OUTPATIENT
Start: 2017-11-30 | End: 2017-11-30

## 2017-11-30 RX ORDER — IBUPROFEN 400 MG/1
400 TABLET ORAL ONCE
Status: COMPLETED | OUTPATIENT
Start: 2017-11-30 | End: 2017-11-30

## 2017-11-30 RX ORDER — ACETAMINOPHEN 325 MG/1
650 TABLET ORAL EVERY 6 HOURS PRN
Qty: 1 BOTTLE | Refills: 0 | Status: SHIPPED | OUTPATIENT
Start: 2017-11-30 | End: 2022-03-08 | Stop reason: HOSPADM

## 2017-11-30 RX ORDER — ACETAMINOPHEN 325 MG/1
650 TABLET ORAL ONCE
Status: COMPLETED | OUTPATIENT
Start: 2017-11-30 | End: 2017-11-30

## 2017-11-30 RX ADMIN — ACETAMINOPHEN 650 MG: 325 TABLET, FILM COATED ORAL at 09:04

## 2017-11-30 RX ADMIN — IBUPROFEN 400 MG: 400 TABLET, FILM COATED ORAL at 09:04

## 2017-11-30 RX ADMIN — LISINOPRIL 10 MG: 5 TABLET ORAL at 09:04

## 2017-11-30 NOTE — DISCHARGE INSTRUCTIONS
Ankle Fracture   WHAT YOU NEED TO KNOW:   An ankle fracture is a break in 1 or more of the bones in your ankle  DISCHARGE INSTRUCTIONS:   Call 911 for any of the following:   · You feel lightheaded, short of breath, and have chest pain  · You cough up blood  Return to the emergency department if:   · Your leg feels warm, tender, and painful  It may look swollen and red  · Blood soaks through your bandage  · You have severe pain in your ankle  · Your cast feels too tight  · Your foot or toes are cold or numb  · Your foot or toenails turn blue or gray  Contact your healthcare provider if:   · Your splint feels too tight  · Your swelling has increased or returned  · You have a fever  · Your pain does not go away, even after treatment  · You have questions or concerns about your condition or care  Medicines: You may need any of the following:  · Acetaminophen  decreases pain and fever  It is available without a doctor's order  Ask how much to take and how often to take it  Follow directions  Acetaminophen can cause liver damage if not taken correctly  · NSAIDs , such as ibuprofen, help decrease swelling, pain, and fever  This medicine is available with or without a doctor's order  NSAIDs can cause stomach bleeding or kidney problems in certain people  If you take blood thinner medicine, always ask your healthcare provider if NSAIDs are safe for you  Always read the medicine label and follow directions  · Prescription pain medicine  may be given  Ask your healthcare provider how to take this medicine safely  · Take your medicine as directed  Contact your healthcare provider if you think your medicine is not helping or if you have side effects  Tell him or her if you are allergic to any medicine  Keep a list of the medicines, vitamins, and herbs you take  Include the amounts, and when and why you take them  Bring the list or the pill bottles to follow-up visits   Carry your medicine list with you in case of an emergency  Follow up with your healthcare provider in 1 to 2 days: Your fracture may need to be reduced (bones pushed back into place) or you may need surgery  Write down your questions so you remember to ask them during your visits  Support devices: You will be given a brace, cast, or splint to limit your movement and protect your ankle  You may need to use crutches to protect your ankle and decrease your pain as you move around  Do not remove your device and do not put weight on your injured ankle  Splint and cast care:  Cover the splint or cast before you bathe so it does not get wet  Tape 2 plastic trash bags to your skin above the cast  Try to keep your ankle out of the water as much as possible  Rest:  Rest your ankle so that it can heal  Return to normal activities as directed  Ice:  Apply ice on your ankle for 15 to 20 minutes every hour or as directed  Use an ice pack, or put crushed ice in a plastic bag  Cover it with a towel  Ice helps prevent tissue damage and decreases swelling and pain  Elevate:  Elevate your ankle above the level of your heart as often as you can  This will help decrease swelling and pain  Prop your ankle on pillows or blankets to keep it elevated comfortably  © 2017 [employer]  Information is for End User's use only and may not be sold, redistributed or otherwise used for commercial purposes  All illustrations and images included in CareNotes® are the copyrighted property of A D A M , Inc  or [employer]  The above information is an  only  It is not intended as medical advice for individual conditions or treatments  Talk to your doctor, nurse or pharmacist before following any medical regimen to see if it is safe and effective for you

## 2017-11-30 NOTE — PHYSICAL THERAPY NOTE
PT Evaluation     11/30/17 2436   Note Type   Note type Eval only   Pain Assessment   Pain Score 7  (no pain at rest until attempting to weightbear)   Pain Location Ankle   Pain Orientation Right   Home Living   Type of [address] One level;Stairs to enter without rails; Able to live on main level with bedroom/bathroom; Performs ADLs on one level  (1 ADONIS)   Bathroom Shower/Tub Tub/shower unit   Bathroom Toilet Standard   Bathroom Accessibility Accessible   Prior Function   Level of [place] Independent with ADLs and functional mobility  ((I) ambulation no A  D )   Lives With Spouse   ADL Assistance Independent   IADLs Independent   Comments (I) with driving   Restrictions/Precautions   Other Precautions Fall Risk   General   Family/Caregiver Present Yes  (spouse)   Cognition   Arousal/Participation Alert   Orientation Level Oriented X4   Following Commands Follows all commands and directions without difficulty   RLE Assessment   RLE Assessment X  (hip and knee WFL, ankle not tested in air cast)   LLE Assessment   LLE Assessment WFL  (4 to 4+/5)   Coordination   Sensation WFL   Light Touch   RLE Light Touch Grossly intact   LLE Light Touch Grossly intact   Bed Mobility   Supine to Sit 5  Supervision   Sit to Supine 5  Supervision   Additional Comments Instructed pt and spouse in donning/doffing of air cast  Pt verbalized understanding  Pt unable to weightbear through R LE during transfers and ambulation  Transfers   Sit to Stand 5  Supervision   Additional items (with RW)   Stand to Sit 5  Supervision   Additional items (with RW)   Stand pivot 5  Supervision   Additional items (with RW)   Toilet transfer 5  Supervision   Additional items (with RW, CGA for balance during clothing management)   Additional Comments Pt with fair static standing balance with RW  Pt unable to put weight on R LE in standing or during ambulation   Pt impulsive at times with 2 episodes of near LOB due to pt rushing while in bathroom and managing clothing  Pt will require assist from spouse at home for balance and safety  Ambulation/Elevation   Gait pattern (unable to bear weight on R LE)   Gait Assistance (CGA)   Assistive Device Rolling walker   Distance 20ft with RW NWB R LE hopping on L LE with fair stability  No LOB with straight plance ambulation  Pt unsteady with near LOB when reaching for objects and attempting clothing management in standing   Number of Stairs (instructed pt and spouse in step negotiation w/ RW)   Ramp Technique (pt and spouse verbalized understanding)   Balance   Static Sitting Good   Dynamic Sitting Good   Static Standing Fair +  (with RW)   Dynamic Standing Fair -  (with RW)   Ambulatory Fair -  (with RW)   Higher level balance (decreased balance in L SLS)   Endurance Deficit   Endurance Deficit Yes   Endurance Deficit Description limited ambulation tolerance due to fatigue pain and NWB R LE   Activity Tolerance   Activity Tolerance Patient limited by pain; Patient limited by fatigue   Assessment   Prognosis Good   Problem List Decreased strength;Decreased range of motion;Decreased endurance; Impaired balance;Decreased mobility;Pain;Decreased safety awareness   Assessment Pt is a 74year old female s/p fall presenting with R lateral malleolus fx with inability to tolerate weight R LE  Pt able to hop with RW on L LE at a CGA to (S) level  Pt will require assistance from spouse at home for ADL's and IADL's due to unsteadiness and near LOB with dynamic standing activities  Pt was provided with a RW adjusted to proper height for home use  Pt would benefit from home health care PT to improve strength balance endurance safety ambulation and stair negotiation  Pt will be safe to return home with assist of spouse  Goals   Patient Goals To return home   Plan   Treatment/Interventions Functional transfer training;Elevations; Endurance training;Patient/family training;Bed mobility;Gait training   PT Frequency One time visit   Recommendation   Recommendation Home PT   PT - OK to Discharge Yes   pt seen in emergency department and being discharged home   Recommend home health care PT

## 2017-12-01 NOTE — ED PROVIDER NOTES
History  Chief Complaint   Patient presents with    Fall     Mechanical fall yesterday around 1800, no complaints of right leg pain and swelling  Denies LOC denies hitting head     Patient: Luis Fernando Mccabe  20 y o /female  YOB: 1945  MRN: [id number]  PCP: Mike Nye DO  Date of evaluation: 12/01/17    (N B  Voice-recognition software may have been used in the preparation of this document )    Chief complaint:  Right ankle pain  She fell the previous evening at about 18:00  It was a mechanical fall  Since then she has had pain on the lateral aspect of her ankle, associated with bruising, swelling, and difficulty bearing weight  She denied other injuries  Specifically she denied head impact loss of consciousness neck pain back pain chest pain abdominal pain other extremity pain  History provided by:  Patient and relative  Fall   Associated symptoms: no abdominal pain, no back pain, no chest pain, no hearing loss, no nausea, no neck pain, no seizures and no vomiting        Prior to Admission Medications   Prescriptions Last Dose Informant Patient Reported? Taking? albuterol (PROVENTIL HFA,VENTOLIN HFA) 90 mcg/act inhaler   No No   Sig: Inhale 2 puffs every 6 (six) hours as needed for wheezing   aspirin (ECOTRIN LOW STRENGTH) 81 mg EC tablet   Yes No   Sig: Take 81 mg by mouth daily   lisinopril (ZESTRIL) 10 mg tablet   Yes No   Sig: Take 10 mg by mouth daily   simvastatin (ZOCOR) 10 mg tablet   Yes No   Sig: Take 10 mg by mouth daily at bedtime      Facility-Administered Medications: None       Past Medical History:   Diagnosis Date    CAD (coronary artery disease)     Cardiac disease     Hypercholesteremia     Hyperlipidemia     Hypertension     Hypertension     Renal disorder        Past Surgical History:   Procedure Laterality Date    CORONARY ANGIOPLASTY WITH STENT PLACEMENT      WRIST SURGERY         History reviewed  No pertinent family history    I have reviewed and agree with the history as documented  Social History   Substance Use Topics    Smoking status: Former Smoker    Smokeless tobacco: Never Used    Alcohol use No        Review of Systems   Constitutional: Negative for chills and fever  HENT: Negative for hearing loss, trouble swallowing and voice change  Eyes: Negative for pain, redness and visual disturbance  Respiratory: Negative for cough, chest tightness and shortness of breath  Cardiovascular: Negative for chest pain, palpitations and leg swelling  Gastrointestinal: Negative for abdominal pain, constipation, diarrhea, nausea and vomiting  Genitourinary: Negative for dysuria, hematuria, vaginal bleeding and vaginal discharge  Musculoskeletal: Negative for back pain, gait problem and neck pain  Skin: Negative for color change and rash  Neurological: Negative for dizziness, tremors, seizures, syncope, speech difficulty, weakness, light-headedness and numbness  Psychiatric/Behavioral: Negative for confusion and decreased concentration  The patient is not nervous/anxious  All other systems reviewed and are negative  Physical Exam  ED Triage Vitals [11/30/17 0853]   Temperature Pulse Respirations Blood Pressure SpO2   98 9 °F (37 2 °C) 104 18 (!) 213/86 97 %      Temp Source Heart Rate Source Patient Position - Orthostatic VS BP Location FiO2 (%)   Temporal Monitor Sitting Left arm --      Pain Score       Worst Possible Pain           Orthostatic Vital Signs  Vitals:    11/30/17 1045 11/30/17 1100 11/30/17 1115 11/30/17 1130   BP: (!) 171/72 164/72 (!) 176/72 (!) 176/75   Pulse: 79 78 88 77   Patient Position - Orthostatic VS:           Physical Exam   Constitutional: She is oriented to person, place, and time  She appears well-developed and well-nourished  HENT:   Mouth/Throat: Oropharynx is clear and moist and mucous membranes are normal    Voice normal   Eyes: EOM are normal  Pupils are equal, round, and reactive to light  Cardiovascular: Normal rate and regular rhythm  Pulmonary/Chest: Effort normal    Abdominal: Soft  Bowel sounds are normal    Musculoskeletal:        Legs:       Right foot: There is tenderness and swelling  There is no deformity and no laceration  Feet:    Normal DP pulse, cap refill, mvt and sensation distally   Neurological: She is alert and oriented to person, place, and time  GCS eye subscore is 4  GCS verbal subscore is 5  GCS motor subscore is 6  Skin: Skin is warm and dry  Psychiatric: She has a normal mood and affect  Her speech is normal and behavior is normal    Nursing note and vitals reviewed  ED Medications  Medications   acetaminophen (TYLENOL) tablet 650 mg (650 mg Oral Given 11/30/17 0904)   ibuprofen (MOTRIN) tablet 400 mg (400 mg Oral Given 11/30/17 0904)   lisinopril (ZESTRIL) tablet 10 mg (10 mg Oral Given 11/30/17 0904)       Diagnostic Studies  Results Reviewed     None                 XR knee 4+ vw right injury   Final Result by Aquiles Aly DO (11/30 8648)      No acute osseous abnormality  Workstation performed: [id number]         XR ankle 3+ vw right   Final Result by Aquiles Aly DO (11/30 7844)      Nondisplaced fracture lateral malleolus  Small tibiotalar joint effusion  Workstation performed: [id number]         XR foot 3+ vw right   Final Result by Aquiles Aly DO (11/30 6034)      No convincing fracture  Suspected os naviculare  If the patient has point tenderness over the navicular, CT imaging may be considered           Workstation performed: [id number]                    Procedures  Procedures       Phone Contacts  ED Phone Contact    ED Course  ED Course as of Dec 01 1832   Thu Nov 30, 2017   1147 PT to bebo                                [facility]  CritCare Time    Disposition  Final diagnoses:   Fractured lateral malleolus     Time reflects when diagnosis was documented in both MDM as applicable and the Disposition within this note Time User Action Codes Description Comment    11/30/2017  1:21 PM Keegan Lester Add [M02 09IB] Fractured lateral malleolus       ED Disposition     ED Disposition Condition Comment    Discharge  Betty Fede discharge to home/self care  Condition at discharge: Good        Follow-up Information     Follow up With Specialties Details Why Contact Info    Sadie Martin MD Orthopedic Surgery Call in 1 day Say you are following up from an ER visit  [address] [place] [address]  [phone]          Discharge Medication List as of 11/30/2017  1:24 PM      START taking these medications    Details   acetaminophen (TYLENOL) 325 mg tablet Take 2 tablets by mouth every 6 (six) hours as needed (pain), Starting Thu 11/30/2017, Print      ibuprofen (MOTRIN) 400 mg tablet Take 1 tablet by mouth every 6 (six) hours as needed (pain), Starting Thu 11/30/2017, Print         CONTINUE these medications which have NOT CHANGED    Details   albuterol (PROVENTIL HFA,VENTOLIN HFA) 90 mcg/act inhaler Inhale 2 puffs every 6 (six) hours as needed for wheezing, Starting Fri 10/20/2017, Print      aspirin (ECOTRIN LOW STRENGTH) 81 mg EC tablet Take 81 mg by mouth daily, Historical Med      lisinopril (ZESTRIL) 10 mg tablet Take 10 mg by mouth daily, Historical Med      simvastatin (ZOCOR) 10 mg tablet Take 10 mg by mouth daily at bedtime, Historical Med             Outpatient Discharge Orders  [place] Raisin         ED Provider  Electronically Signed by           Volodymyr Nicole MD  12/01/17 1832       Volodymyr Nicole MD  12/01/17 3659

## 2017-12-06 ENCOUNTER — ALLSCRIPTS OFFICE VISIT (OUTPATIENT)
Dept: OTHER | Facility: OTHER | Age: 72
End: 2017-12-06

## 2017-12-06 DIAGNOSIS — S82.301D: ICD-10-CM

## 2017-12-06 DIAGNOSIS — S82.831D OTHER FRACTURE OF UPPER AND LOWER END OF RIGHT FIBULA, SUBSEQUENT ENCOUNTER FOR CLOSED FRACTURE WITH ROUTINE HEALING: ICD-10-CM

## 2017-12-07 NOTE — PROGRESS NOTES
Assessment    1  Closed nondisplaced fracture of lateral malleolus of right fibula, initial encounter (824 2) (S82 [id number])    Plan  Closed fracture of distal end of right fibula and tibia with routine healing    · Follow-up Visit in 4 Weeks Evaluation and Treatment  Follow-up  Status: Hold For -Scheduling  Requested for: [id number]   · *1 - [facility]  *  Status: Active  Requested for: [id number]  Care Summary provided  : Yes   · Gustavo Kitchen, CAM Style; Status:Complete;   Done: [id number] 01:44PM    Discussion/Summary    Right ankle pain swelling and discoloration due to nondisplaced distal fibular fracture  No distal deficits  Calf soft nontender  No evidence of compartment syndrome  Discussed treatment with the patient and her son  Right ankle placed in a Cam boot weight-bearing as tolerated  Patient will start physical therapy for modalities and T job to walk with a Cam boot with a walker  Follow-up in 4 weeks  If the redness and discoloration spreads patient is aware that she needs to come back to the office or go to the emergency room for possible antibiotics if neededright ankle at next visit  Chief Complaint    1  Ankle Pain  Right ankle pain      History of Present Illness  HPI: [de-identified] year lady slipped and fell on the steps at home  Patient twisted her right ankle  The injury happened November 29, 2017  Patient developed pain and swelling in the right ankle on the lateral aspect  Patient was seen and treated in the emergency room by Dr David Griffin  Patient has diagnosed her nondisplaced right lateral fibular fracture distal  Patient is treated in an echo asked splint  Patient presents today for evaluation  Pain has decreased  Patient complains of swelling and discoloration of the sole of the foot and the toes      Review of Systems   Constitutional: No fever, no chills, feels well, no tiredness, no recent weight gain or loss  Eyes: No complaints of eyesight problems, no red eyes  ENT: no loss of hearing, no nosebleeds, no sore throat  Cardiovascular: No complaints of chest pain, no palpitations, no leg claudication or lower extremity edema  Respiratory: no compliants of shortness of breath, no wheezing, no cough  Gastrointestinal: heartburn  Genitourinary: no complaints of dysuria, no incontinence  Musculoskeletal: as noted in HPI  Integumentary: no complaints of skin rash or lesion, no itching or dry skin, no skin wounds  Neurological: no complaints of headache, no confusion, no numbness or tingling, no dizziness  Endocrine: No complaints of muscle weakness, no feelings of weakness, no frequent urination, no excessive thirst   Psychiatric: No suicidal thoughts, no anxiety, no feelings of depression  Active Problems  1  Chest pain (786 50) (R07 9)   2  Palpitations (785 1) (R00 2)   3  Urinary tract infection (599 0) (N39 0)    Past Medical History   · History of Coronary Artery Disease (V12 59)   · History of hyperlipidemia (V12 29) (Z86 39)   · History of hypertension (V12 59) (Z86 79)    Surgical History   · History of Cath Placement Of Stent 1   · History of Hysterectomy    Family History  Mother    · Family history of Colon Cancer (V16 0)   · Family history of Diabetes Mellitus (V18 0)   · Family history of Heart Disease (V17 49)  Brother    · Family history of Diabetes Mellitus (V18 0)   · Family history of Gout (V18 19)    Social History     · Former smoker (V15 82) (J46 977)   · Marital History - Currently     Current Meds   1  Aspirin 81 MG TABS Recorded   2  Benazepril HCl - 10 MG Oral Tablet; Therapy: 14CCU8820 to (Evaluate:06Mar2018) Recorded   3  Tim-Citrate Plus Vitamin D TABS Recorded   4  Ibuprofen 200 MG Oral Capsule; TAKE 1 CAPSULE EVERY 4 TO 6 HOURS Recorded   5  Simvastatin 80 MG Oral Tablet; Therapy: [id number] to (Last Rx:[id number])  Requested for: 20WMX8253 Ordered    Allergies  1  No Known Drug Allergies  2  No Known Environmental Allergies   3   No Known Food Allergies    Vitals  Signs   Heart Rate: 87  Systolic: 211  Diastolic: 93    Height: 5 ft 3 in  Weight: 114 lb 2 08 oz  BMI Calculated: 20 22  BSA Calculated: 1 53    Physical Exam    Right Ankle: Appearance: Normal except ecchymosis,-- swelling laterally-- and-- swelling medially  Tenderness: None except the lateral malleolus  ROM: Full except as noted: Motor: Normal except as noted: Special Tests: Negative except        Signatures   Electronically signed by : ELIAZAR Salinas ; Dec  6 2017  1:45PM EST                       (Author)

## 2017-12-11 ENCOUNTER — APPOINTMENT (OUTPATIENT)
Dept: PHYSICAL THERAPY | Facility: HOME HEALTHCARE | Age: 72
End: 2017-12-11
Payer: MEDICARE

## 2017-12-11 ENCOUNTER — GENERIC CONVERSION - ENCOUNTER (OUTPATIENT)
Dept: OBGYN CLINIC | Facility: CLINIC | Age: 72
End: 2017-12-11

## 2017-12-11 PROCEDURE — 97535 SELF CARE MNGMENT TRAINING: CPT

## 2017-12-11 PROCEDURE — 97161 PT EVAL LOW COMPLEX 20 MIN: CPT

## 2017-12-11 PROCEDURE — G8979 MOBILITY GOAL STATUS: HCPCS

## 2017-12-11 PROCEDURE — G8978 MOBILITY CURRENT STATUS: HCPCS

## 2017-12-11 PROCEDURE — 97110 THERAPEUTIC EXERCISES: CPT

## 2017-12-14 ENCOUNTER — APPOINTMENT (OUTPATIENT)
Dept: PHYSICAL THERAPY | Facility: HOME HEALTHCARE | Age: 72
End: 2017-12-14
Payer: MEDICARE

## 2017-12-14 PROCEDURE — 97140 MANUAL THERAPY 1/> REGIONS: CPT

## 2017-12-14 PROCEDURE — 97110 THERAPEUTIC EXERCISES: CPT

## 2017-12-19 ENCOUNTER — APPOINTMENT (OUTPATIENT)
Dept: PHYSICAL THERAPY | Facility: HOME HEALTHCARE | Age: 72
End: 2017-12-19
Payer: MEDICARE

## 2017-12-19 PROCEDURE — 97110 THERAPEUTIC EXERCISES: CPT

## 2017-12-19 PROCEDURE — 97140 MANUAL THERAPY 1/> REGIONS: CPT

## 2017-12-21 ENCOUNTER — APPOINTMENT (OUTPATIENT)
Dept: PHYSICAL THERAPY | Facility: HOME HEALTHCARE | Age: 72
End: 2017-12-21
Payer: MEDICARE

## 2017-12-21 PROCEDURE — 97150 GROUP THERAPEUTIC PROCEDURES: CPT

## 2017-12-21 PROCEDURE — 97140 MANUAL THERAPY 1/> REGIONS: CPT

## 2017-12-27 ENCOUNTER — APPOINTMENT (OUTPATIENT)
Dept: PHYSICAL THERAPY | Facility: HOME HEALTHCARE | Age: 72
End: 2017-12-27
Payer: MEDICARE

## 2017-12-27 PROCEDURE — 97110 THERAPEUTIC EXERCISES: CPT

## 2017-12-27 PROCEDURE — 97140 MANUAL THERAPY 1/> REGIONS: CPT

## 2017-12-28 ENCOUNTER — APPOINTMENT (OUTPATIENT)
Dept: PHYSICAL THERAPY | Facility: HOME HEALTHCARE | Age: 72
End: 2017-12-28
Payer: MEDICARE

## 2017-12-28 PROCEDURE — 97140 MANUAL THERAPY 1/> REGIONS: CPT

## 2017-12-28 PROCEDURE — 97150 GROUP THERAPEUTIC PROCEDURES: CPT

## 2018-01-02 ENCOUNTER — GENERIC CONVERSION - ENCOUNTER (OUTPATIENT)
Dept: OTHER | Facility: OTHER | Age: 73
End: 2018-01-02

## 2018-01-02 ENCOUNTER — APPOINTMENT (OUTPATIENT)
Dept: RADIOLOGY | Facility: MEDICAL CENTER | Age: 73
End: 2018-01-02
Payer: MEDICARE

## 2018-01-02 DIAGNOSIS — S82.831D OTHER FRACTURE OF UPPER AND LOWER END OF RIGHT FIBULA, SUBSEQUENT ENCOUNTER FOR CLOSED FRACTURE WITH ROUTINE HEALING: ICD-10-CM

## 2018-01-02 DIAGNOSIS — S82.301D: ICD-10-CM

## 2018-01-02 PROCEDURE — 73610 X-RAY EXAM OF ANKLE: CPT

## 2018-01-03 ENCOUNTER — APPOINTMENT (OUTPATIENT)
Dept: PHYSICAL THERAPY | Facility: HOME HEALTHCARE | Age: 73
End: 2018-01-03
Payer: MEDICARE

## 2018-01-03 PROCEDURE — 97110 THERAPEUTIC EXERCISES: CPT

## 2018-01-03 PROCEDURE — 97140 MANUAL THERAPY 1/> REGIONS: CPT

## 2018-01-05 ENCOUNTER — APPOINTMENT (OUTPATIENT)
Dept: PHYSICAL THERAPY | Facility: HOME HEALTHCARE | Age: 73
End: 2018-01-05
Payer: MEDICARE

## 2018-01-05 PROCEDURE — 97110 THERAPEUTIC EXERCISES: CPT

## 2018-01-05 PROCEDURE — 97140 MANUAL THERAPY 1/> REGIONS: CPT

## 2018-01-09 ENCOUNTER — APPOINTMENT (OUTPATIENT)
Dept: PHYSICAL THERAPY | Facility: HOME HEALTHCARE | Age: 73
End: 2018-01-09
Payer: MEDICARE

## 2018-01-09 PROCEDURE — 97150 GROUP THERAPEUTIC PROCEDURES: CPT

## 2018-01-09 PROCEDURE — 97140 MANUAL THERAPY 1/> REGIONS: CPT

## 2018-01-12 ENCOUNTER — APPOINTMENT (OUTPATIENT)
Dept: PHYSICAL THERAPY | Facility: HOME HEALTHCARE | Age: 73
End: 2018-01-12
Payer: MEDICARE

## 2018-01-12 ENCOUNTER — GENERIC CONVERSION - ENCOUNTER (OUTPATIENT)
Dept: OTHER | Facility: OTHER | Age: 73
End: 2018-01-12

## 2018-01-12 PROCEDURE — 97164 PT RE-EVAL EST PLAN CARE: CPT

## 2018-01-12 PROCEDURE — G8979 MOBILITY GOAL STATUS: HCPCS

## 2018-01-12 PROCEDURE — 97110 THERAPEUTIC EXERCISES: CPT

## 2018-01-12 PROCEDURE — G8980 MOBILITY D/C STATUS: HCPCS

## 2018-01-22 VITALS
BODY MASS INDEX: 20.22 KG/M2 | DIASTOLIC BLOOD PRESSURE: 93 MMHG | HEIGHT: 63 IN | WEIGHT: 114.13 LBS | HEART RATE: 87 BPM | SYSTOLIC BLOOD PRESSURE: 174 MMHG

## 2018-01-24 VITALS
DIASTOLIC BLOOD PRESSURE: 83 MMHG | BODY MASS INDEX: 20.22 KG/M2 | HEIGHT: 63 IN | WEIGHT: 114.13 LBS | RESPIRATION RATE: 18 BRPM | HEART RATE: 79 BPM | SYSTOLIC BLOOD PRESSURE: 147 MMHG

## 2018-02-13 ENCOUNTER — APPOINTMENT (OUTPATIENT)
Dept: RADIOLOGY | Facility: MEDICAL CENTER | Age: 73
End: 2018-02-13
Payer: MEDICARE

## 2018-02-13 ENCOUNTER — OFFICE VISIT (OUTPATIENT)
Dept: OBGYN CLINIC | Facility: CLINIC | Age: 73
End: 2018-02-13

## 2018-02-13 VITALS
HEART RATE: 71 BPM | RESPIRATION RATE: 14 BRPM | SYSTOLIC BLOOD PRESSURE: 166 MMHG | WEIGHT: 114.5 LBS | HEIGHT: 62 IN | BODY MASS INDEX: 21.07 KG/M2 | DIASTOLIC BLOOD PRESSURE: 87 MMHG

## 2018-02-13 DIAGNOSIS — S82.64XD CLOSED NONDISP FX OF RIGHT LATERAL MALLEOLUS WITH ROUTINE HEALING: ICD-10-CM

## 2018-02-13 PROCEDURE — 99024 POSTOP FOLLOW-UP VISIT: CPT | Performed by: ORTHOPAEDIC SURGERY

## 2018-02-13 PROCEDURE — 73610 X-RAY EXAM OF ANKLE: CPT

## 2018-02-13 RX ORDER — FERROUS SULFATE 325(65) MG
325 TABLET ORAL
COMMUNITY
End: 2022-03-23

## 2018-02-13 RX ORDER — PHENOL 1.4 %
600 AEROSOL, SPRAY (ML) MUCOUS MEMBRANE DAILY
COMMUNITY
End: 2022-07-19

## 2018-02-13 NOTE — PATIENT INSTRUCTIONS
Calcium and Osteoporosis   WHAT YOU NEED TO KNOW:   Calcium is important for osteoporosis because calcium helps build bone mass  Osteoporosis is a long-term medical condition that causes your body to break down more bone than it makes  Your bones become weak, brittle, and more likely to fracture  DISCHARGE INSTRUCTIONS:   Follow up with your healthcare provider or dietitian as directed:  Write down your questions so you remember to ask them during your visits  Your calcium needs:   · Women:      ¨ 19 to 50 years: 1,000 mg    ¨ Over 50: 1,200 mg    ¨ Pregnant or breastfeeding, 19 years to 50 years: 1,000 mg    · Men:      ¨ 19 to 70: 1,000 mg    ¨ Over 70: 1,200 mg  Foods that are high in calcium: The following list shows the number of calcium milligrams (mg) per serving  Your dietitian or healthcare provider can help you create a balanced meal plan for your calcium needs  · Dairy:      ¨ 1 cup of low-fat plain yogurt (415 mg) or low-fat fruit yogurt (245 to 384 mg)    ¨ 1½ ounces of shredded cheddar cheese (306 mg) or part skim mozzarella cheese (275 mg)    ¨ 1 cup of skim, 2%, or whole milk (300 mg)    ¨ 1 cup of cottage cheese made with 2% milk fat (138 mg)    ¨ ½ cup of frozen yogurt (103 mg)    · Other foods:      ¨ 1 cup of calcium-fortified orange juice (300 mg)    ¨ ½ cup of cooked thania greens (220 mg)    ¨ 4 canned sardines, with bones (242 mg)    ¨ ½ cup of tofu (with added calcium) (204 mg)  How to get extra calcium:   · Add powdered milk to puddings, cocoa, custard, or hot cereal     · Sift powdered milk into flour when you make cakes, cookies, or breads  · Use low-fat or fat-free milk instead of water in pancake mix, mashed potatoes, pudding, or hot breakfast cereal     · Add low-fat or fat-free cheese to salad, soup, or pasta  · Add tofu (with added calcium) to vegetable stir-orozco  · Take calcium supplements if you cannot get enough calcium from the foods you eat   Your body can absorb the most calcium from supplements when you take 500 mg or less at one time  Do not take more than 2,500 mg of calcium supplements each day  © 2017 [address] Information is for End User's use only and may not be sold, redistributed or otherwise used for commercial purposes  All illustrations and images included in CareNotes® are the copyrighted property of [employer]  or [employer]  The above information is an  only  It is not intended as medical advice for individual conditions or treatments  Talk to your doctor, nurse or pharmacist before following any medical regimen to see if it is safe and effective for you

## 2018-02-13 NOTE — PROGRESS NOTES
Chief Complaint  Status post right distal fibula fracture November 2017    History Of Presenting Illness  Patient sustained a nondisplaced fracture of her right distal fibula  This was treated non operatively  Patient presents for evaluation with radiographs  Right ankle is asymptomatic      Current Medications  Current Outpatient Prescriptions   Medication Sig Dispense Refill    aspirin (ECOTRIN LOW STRENGTH) 81 mg EC tablet Take 81 mg by mouth daily      calcium carbonate (OS-MIGUELINA) 600 MG tablet Take 600 mg by mouth daily      ferrous sulfate 325 (65 Fe) mg tablet Take 325 mg by mouth daily with breakfast      lisinopril (ZESTRIL) 10 mg tablet Take 10 mg by mouth daily      simvastatin (ZOCOR) 10 mg tablet Take 10 mg by mouth daily at bedtime      acetaminophen (TYLENOL) 325 mg tablet Take 2 tablets by mouth every 6 (six) hours as needed (pain) 1 Bottle 0    albuterol (PROVENTIL HFA,VENTOLIN HFA) 90 mcg/act inhaler Inhale 2 puffs every 6 (six) hours as needed for wheezing 1 Inhaler 0    ibuprofen (MOTRIN) 400 mg tablet Take 1 tablet by mouth every 6 (six) hours as needed (pain) 30 tablet 0     No current facility-administered medications for this visit  Current Problems    There is no problem list on file for this patient  Problems Updated:  No problems updated  Extended with your Priority: There are no active problems to display for this patient            Review Of Systems  Review Of Systems:   · Skin: Normal  · Neuro: See HPI  · Musculoskeletal: See HPI  · 14 point review of systems negative except as stated above     Past Medical History:   Past Medical History:   Diagnosis Date    CAD (coronary artery disease)     Cardiac disease     Hypercholesteremia     Hyperlipidemia     Hypertension     Hypertension     Renal disorder        Past Surgical History:   Past Surgical History:   Procedure Laterality Date    CORONARY ANGIOPLASTY WITH STENT PLACEMENT      WRIST SURGERY         Family History:  Family history reviewed and non-contributory  History reviewed  No pertinent family history  Social History:  Social History     Social History    Marital status: /Civil Union     Spouse name: N/A    Number of children: N/A    Years of education: N/A     Social History Main Topics    Smoking status: Former Smoker    Smokeless tobacco: Never Used    Alcohol use No    Drug use: No    Sexual activity: Not Asked     Other Topics Concern    None     Social History Narrative    None       Allergies: Allergies   Allergen Reactions    Flu Virus Vaccine            Physical ExaminationBP 166/87 (BP Location: Right arm, Patient Position: Sitting, Cuff Size: Standard)   Pulse 71   Resp 14   Ht 5' 2" (1 575 m)   Wt 51 9 kg (114 lb 8 oz)   BMI 20 94 kg/m²   Gen: Alert and oriented to person, place, time  HEENT: EOMI, eyes clear, moist mucus membranes, hearing intact  Respiratory: Bilateral chest rise  No audible wheezing found  Cardiovascular: Regular Rate and Rhythm  Abdomen: soft nontender/nondistended    Orthopedic Exam  Right ankle  Mild swelling laterally  No tenderness  Excellent range of pain-free motion  Radiographs show fracture consolidation          Impression    Healed fracture right distal fibula clinically and radiologically      Patient allowed all activities as tolerated with no restrictions  Follow-up p r n  Plan    Weightbear as tolerated  Follow-up p r n

## 2018-04-30 ENCOUNTER — APPOINTMENT (OUTPATIENT)
Dept: LAB | Facility: MEDICAL CENTER | Age: 73
End: 2018-04-30
Payer: MEDICARE

## 2018-04-30 ENCOUNTER — TRANSCRIBE ORDERS (OUTPATIENT)
Dept: LAB | Facility: MEDICAL CENTER | Age: 73
End: 2018-04-30

## 2018-04-30 DIAGNOSIS — I10 ESSENTIAL HYPERTENSION, BENIGN: ICD-10-CM

## 2018-04-30 DIAGNOSIS — I25.10 DISEASE OF CARDIOVASCULAR SYSTEM: ICD-10-CM

## 2018-04-30 DIAGNOSIS — E55.9 VITAMIN D DEFICIENCY: Primary | ICD-10-CM

## 2018-04-30 DIAGNOSIS — E55.9 VITAMIN D DEFICIENCY: ICD-10-CM

## 2018-04-30 LAB
25(OH)D3 SERPL-MCNC: 16.9 NG/ML (ref 30–100)
ALBUMIN SERPL BCP-MCNC: 4 G/DL (ref 3.5–5)
ALP SERPL-CCNC: 91 U/L (ref 46–116)
ALT SERPL W P-5'-P-CCNC: 12 U/L (ref 12–78)
ANION GAP SERPL CALCULATED.3IONS-SCNC: 8 MMOL/L (ref 4–13)
AST SERPL W P-5'-P-CCNC: 12 U/L (ref 5–45)
BILIRUB SERPL-MCNC: 0.57 MG/DL (ref 0.2–1)
BUN SERPL-MCNC: 12 MG/DL (ref 5–25)
CALCIUM SERPL-MCNC: 9.2 MG/DL (ref 8.3–10.1)
CHLORIDE SERPL-SCNC: 105 MMOL/L (ref 100–108)
CHOLEST SERPL-MCNC: 164 MG/DL (ref 50–200)
CO2 SERPL-SCNC: 28 MMOL/L (ref 21–32)
CREAT SERPL-MCNC: 0.91 MG/DL (ref 0.6–1.3)
ERYTHROCYTE [DISTWIDTH] IN BLOOD BY AUTOMATED COUNT: 13.7 % (ref 11.6–15.1)
FERRITIN SERPL-MCNC: 38 NG/ML (ref 8–388)
GFR SERPL CREATININE-BSD FRML MDRD: 62 ML/MIN/1.73SQ M
GLUCOSE P FAST SERPL-MCNC: 117 MG/DL (ref 65–99)
HCT VFR BLD AUTO: 40.6 % (ref 34.8–46.1)
HDLC SERPL-MCNC: 44 MG/DL (ref 40–60)
HGB BLD-MCNC: 13.9 G/DL (ref 11.5–15.4)
IRON SATN MFR SERPL: 21 %
IRON SERPL-MCNC: 81 UG/DL (ref 50–170)
LDLC SERPL CALC-MCNC: 90 MG/DL (ref 0–100)
LDLC SERPL DIRECT ASSAY-MCNC: 111 MG/DL (ref 0–100)
MCH RBC QN AUTO: 29.5 PG (ref 26.8–34.3)
MCHC RBC AUTO-ENTMCNC: 34.2 G/DL (ref 31.4–37.4)
MCV RBC AUTO: 86 FL (ref 82–98)
NONHDLC SERPL-MCNC: 120 MG/DL
PLATELET # BLD AUTO: 115 THOUSANDS/UL (ref 149–390)
PMV BLD AUTO: 11.5 FL (ref 8.9–12.7)
POTASSIUM SERPL-SCNC: 3.8 MMOL/L (ref 3.5–5.3)
PROT SERPL-MCNC: 7.5 G/DL (ref 6.4–8.2)
RBC # BLD AUTO: 4.71 MILLION/UL (ref 3.81–5.12)
SODIUM SERPL-SCNC: 141 MMOL/L (ref 136–145)
TIBC SERPL-MCNC: 388 UG/DL (ref 250–450)
TRIGL SERPL-MCNC: 149 MG/DL
WBC # BLD AUTO: 5.77 THOUSAND/UL (ref 4.31–10.16)

## 2018-04-30 PROCEDURE — 36415 COLL VENOUS BLD VENIPUNCTURE: CPT

## 2018-04-30 PROCEDURE — 83550 IRON BINDING TEST: CPT

## 2018-04-30 PROCEDURE — 80053 COMPREHEN METABOLIC PANEL: CPT

## 2018-04-30 PROCEDURE — 83721 ASSAY OF BLOOD LIPOPROTEIN: CPT

## 2018-04-30 PROCEDURE — 83540 ASSAY OF IRON: CPT

## 2018-04-30 PROCEDURE — 82306 VITAMIN D 25 HYDROXY: CPT

## 2018-04-30 PROCEDURE — 82728 ASSAY OF FERRITIN: CPT

## 2018-04-30 PROCEDURE — 80061 LIPID PANEL: CPT

## 2018-04-30 PROCEDURE — 85027 COMPLETE CBC AUTOMATED: CPT

## 2018-08-27 ENCOUNTER — APPOINTMENT (OUTPATIENT)
Dept: LAB | Facility: CLINIC | Age: 73
End: 2018-08-27
Payer: MEDICARE

## 2018-08-27 ENCOUNTER — TRANSCRIBE ORDERS (OUTPATIENT)
Dept: LAB | Facility: CLINIC | Age: 73
End: 2018-08-27

## 2018-08-27 DIAGNOSIS — E55.9 VITAMIN D DEFICIENCY: ICD-10-CM

## 2018-08-27 DIAGNOSIS — R79.9 ABNORMAL BLOOD CHEMISTRY: Primary | ICD-10-CM

## 2018-08-27 DIAGNOSIS — R79.9 ABNORMAL BLOOD CHEMISTRY: ICD-10-CM

## 2018-08-27 LAB
25(OH)D3 SERPL-MCNC: 48.7 NG/ML (ref 30–100)
ERYTHROCYTE [DISTWIDTH] IN BLOOD BY AUTOMATED COUNT: 13.1 % (ref 11.6–15.1)
HCT VFR BLD AUTO: 40.4 % (ref 34.8–46.1)
HGB BLD-MCNC: 13.8 G/DL (ref 11.5–15.4)
MCH RBC QN AUTO: 30.1 PG (ref 26.8–34.3)
MCHC RBC AUTO-ENTMCNC: 34.2 G/DL (ref 31.4–37.4)
MCV RBC AUTO: 88 FL (ref 82–98)
PLATELET # BLD AUTO: 101 THOUSANDS/UL (ref 149–390)
PMV BLD AUTO: 11 FL (ref 8.9–12.7)
RBC # BLD AUTO: 4.59 MILLION/UL (ref 3.81–5.12)
WBC # BLD AUTO: 4.63 THOUSAND/UL (ref 4.31–10.16)

## 2018-08-27 PROCEDURE — 36415 COLL VENOUS BLD VENIPUNCTURE: CPT

## 2018-08-27 PROCEDURE — 82306 VITAMIN D 25 HYDROXY: CPT

## 2018-08-27 PROCEDURE — 85027 COMPLETE CBC AUTOMATED: CPT

## 2019-10-15 ENCOUNTER — APPOINTMENT (OUTPATIENT)
Dept: LAB | Facility: CLINIC | Age: 74
End: 2019-10-15
Payer: MEDICARE

## 2019-10-15 ENCOUNTER — TRANSCRIBE ORDERS (OUTPATIENT)
Dept: LAB | Facility: CLINIC | Age: 74
End: 2019-10-15

## 2019-10-15 DIAGNOSIS — E55.9 VITAMIN D DEFICIENCY: ICD-10-CM

## 2019-10-15 DIAGNOSIS — I25.119 ATHEROSCLEROSIS OF NATIVE CORONARY ARTERY WITH ANGINA PECTORIS, UNSPECIFIED WHETHER NATIVE OR TRANSPLANTED HEART (HCC): ICD-10-CM

## 2019-10-15 DIAGNOSIS — I25.119 ATHEROSCLEROSIS OF NATIVE CORONARY ARTERY WITH ANGINA PECTORIS, UNSPECIFIED WHETHER NATIVE OR TRANSPLANTED HEART (HCC): Primary | ICD-10-CM

## 2019-10-15 LAB
25(OH)D3 SERPL-MCNC: 24.3 NG/ML (ref 30–100)
ALBUMIN SERPL BCP-MCNC: 4 G/DL (ref 3.5–5)
ALP SERPL-CCNC: 87 U/L (ref 46–116)
ALT SERPL W P-5'-P-CCNC: 13 U/L (ref 12–78)
ANION GAP SERPL CALCULATED.3IONS-SCNC: 6 MMOL/L (ref 4–13)
AST SERPL W P-5'-P-CCNC: 8 U/L (ref 5–45)
BILIRUB SERPL-MCNC: 0.55 MG/DL (ref 0.2–1)
BUN SERPL-MCNC: 12 MG/DL (ref 5–25)
CALCIUM SERPL-MCNC: 9.2 MG/DL (ref 8.3–10.1)
CHLORIDE SERPL-SCNC: 109 MMOL/L (ref 100–108)
CHOLEST SERPL-MCNC: 172 MG/DL (ref 50–200)
CO2 SERPL-SCNC: 27 MMOL/L (ref 21–32)
CREAT SERPL-MCNC: 0.88 MG/DL (ref 0.6–1.3)
ERYTHROCYTE [DISTWIDTH] IN BLOOD BY AUTOMATED COUNT: 13 % (ref 11.6–15.1)
GFR SERPL CREATININE-BSD FRML MDRD: 64 ML/MIN/1.73SQ M
GLUCOSE P FAST SERPL-MCNC: 111 MG/DL (ref 65–99)
HCT VFR BLD AUTO: 41 % (ref 34.8–46.1)
HDLC SERPL-MCNC: 43 MG/DL (ref 40–60)
HGB BLD-MCNC: 13.6 G/DL (ref 11.5–15.4)
LDLC SERPL CALC-MCNC: 95 MG/DL (ref 0–100)
MCH RBC QN AUTO: 29.5 PG (ref 26.8–34.3)
MCHC RBC AUTO-ENTMCNC: 33.2 G/DL (ref 31.4–37.4)
MCV RBC AUTO: 89 FL (ref 82–98)
PLATELET # BLD AUTO: 110 THOUSANDS/UL (ref 149–390)
PMV BLD AUTO: 11.8 FL (ref 8.9–12.7)
POTASSIUM SERPL-SCNC: 3.7 MMOL/L (ref 3.5–5.3)
PROT SERPL-MCNC: 7.1 G/DL (ref 6.4–8.2)
RBC # BLD AUTO: 4.61 MILLION/UL (ref 3.81–5.12)
SODIUM SERPL-SCNC: 142 MMOL/L (ref 136–145)
TRIGL SERPL-MCNC: 169 MG/DL
WBC # BLD AUTO: 5.3 THOUSAND/UL (ref 4.31–10.16)

## 2019-10-15 PROCEDURE — 85027 COMPLETE CBC AUTOMATED: CPT

## 2019-10-15 PROCEDURE — 80061 LIPID PANEL: CPT

## 2019-10-15 PROCEDURE — 80053 COMPREHEN METABOLIC PANEL: CPT

## 2019-10-15 PROCEDURE — 36415 COLL VENOUS BLD VENIPUNCTURE: CPT

## 2019-10-15 PROCEDURE — 82306 VITAMIN D 25 HYDROXY: CPT

## 2019-10-25 ENCOUNTER — TRANSCRIBE ORDERS (OUTPATIENT)
Dept: ADMINISTRATIVE | Facility: HOSPITAL | Age: 74
End: 2019-10-25

## 2019-10-25 ENCOUNTER — APPOINTMENT (OUTPATIENT)
Dept: LAB | Facility: MEDICAL CENTER | Age: 74
End: 2019-10-25
Payer: MEDICARE

## 2019-10-25 DIAGNOSIS — R63.4 LOSS OF WEIGHT: ICD-10-CM

## 2019-10-25 DIAGNOSIS — R63.4 LOSS OF WEIGHT: Primary | ICD-10-CM

## 2019-10-25 LAB
CEA SERPL-MCNC: 0.9 NG/ML (ref 0–3)
TSH SERPL DL<=0.05 MIU/L-ACNC: 1.14 UIU/ML (ref 0.36–3.74)

## 2019-10-25 PROCEDURE — 36415 COLL VENOUS BLD VENIPUNCTURE: CPT

## 2019-10-25 PROCEDURE — 82378 CARCINOEMBRYONIC ANTIGEN: CPT

## 2019-10-25 PROCEDURE — 84443 ASSAY THYROID STIM HORMONE: CPT

## 2020-05-06 ENCOUNTER — APPOINTMENT (OUTPATIENT)
Dept: LAB | Facility: HOSPITAL | Age: 75
End: 2020-05-06
Payer: MEDICARE

## 2020-05-06 ENCOUNTER — TRANSCRIBE ORDERS (OUTPATIENT)
Dept: ADMINISTRATIVE | Facility: HOSPITAL | Age: 75
End: 2020-05-06

## 2020-05-06 DIAGNOSIS — E78.5 HYPERLIPIDEMIA, UNSPECIFIED HYPERLIPIDEMIA TYPE: ICD-10-CM

## 2020-05-06 DIAGNOSIS — I25.119 ATHEROSCLEROSIS OF NATIVE CORONARY ARTERY WITH ANGINA PECTORIS, UNSPECIFIED WHETHER NATIVE OR TRANSPLANTED HEART (HCC): Primary | ICD-10-CM

## 2020-05-06 DIAGNOSIS — I25.119 ATHEROSCLEROSIS OF NATIVE CORONARY ARTERY WITH ANGINA PECTORIS, UNSPECIFIED WHETHER NATIVE OR TRANSPLANTED HEART (HCC): ICD-10-CM

## 2020-05-06 LAB
CHOLEST SERPL-MCNC: 133 MG/DL (ref 50–200)
CK SERPL-CCNC: 17 U/L (ref 26–192)
HDLC SERPL-MCNC: 43 MG/DL
LDLC SERPL CALC-MCNC: 66 MG/DL (ref 0–100)
NONHDLC SERPL-MCNC: 90 MG/DL
TRIGL SERPL-MCNC: 122 MG/DL

## 2020-05-06 PROCEDURE — 82550 ASSAY OF CK (CPK): CPT

## 2020-05-06 PROCEDURE — 36415 COLL VENOUS BLD VENIPUNCTURE: CPT

## 2020-05-06 PROCEDURE — 80061 LIPID PANEL: CPT

## 2021-01-22 ENCOUNTER — IMMUNIZATIONS (OUTPATIENT)
Dept: FAMILY MEDICINE CLINIC | Facility: HOSPITAL | Age: 76
End: 2021-01-22

## 2021-01-22 DIAGNOSIS — Z23 ENCOUNTER FOR IMMUNIZATION: Primary | ICD-10-CM

## 2021-01-22 PROCEDURE — 91301 SARS-COV-2 / COVID-19 MRNA VACCINE (MODERNA) 100 MCG: CPT

## 2021-01-22 PROCEDURE — 0011A SARS-COV-2 / COVID-19 MRNA VACCINE (MODERNA) 100 MCG: CPT

## 2021-02-24 ENCOUNTER — IMMUNIZATIONS (OUTPATIENT)
Dept: FAMILY MEDICINE CLINIC | Facility: HOSPITAL | Age: 76
End: 2021-02-24

## 2021-02-24 DIAGNOSIS — Z23 ENCOUNTER FOR IMMUNIZATION: Primary | ICD-10-CM

## 2021-02-24 PROCEDURE — 91301 SARS-COV-2 / COVID-19 MRNA VACCINE (MODERNA) 100 MCG: CPT

## 2021-02-24 PROCEDURE — 0012A SARS-COV-2 / COVID-19 MRNA VACCINE (MODERNA) 100 MCG: CPT

## 2021-04-15 ENCOUNTER — TRANSCRIBE ORDERS (OUTPATIENT)
Dept: ADMINISTRATIVE | Facility: HOSPITAL | Age: 76
End: 2021-04-15

## 2021-04-15 ENCOUNTER — HOSPITAL ENCOUNTER (OUTPATIENT)
Dept: RADIOLOGY | Facility: HOSPITAL | Age: 76
Discharge: HOME | End: 2021-04-15
Attending: FAMILY MEDICINE
Payer: MEDICARE

## 2021-04-15 DIAGNOSIS — R07.9 CHEST PAIN, UNSPECIFIED TYPE: Primary | ICD-10-CM

## 2021-04-15 DIAGNOSIS — R06.02 SOB (SHORTNESS OF BREATH): ICD-10-CM

## 2021-04-15 DIAGNOSIS — R07.9 CHEST PAIN, UNSPECIFIED TYPE: ICD-10-CM

## 2021-04-15 PROCEDURE — 71100 X-RAY EXAM RIBS UNI 2 VIEWS: CPT

## 2021-04-15 PROCEDURE — 71046 X-RAY EXAM CHEST 2 VIEWS: CPT

## 2021-05-28 ENCOUNTER — APPOINTMENT (OUTPATIENT)
Dept: LAB | Facility: MEDICAL CENTER | Age: 76
End: 2021-05-28
Payer: MEDICARE

## 2021-05-28 ENCOUNTER — TRANSCRIBE ORDERS (OUTPATIENT)
Dept: LAB | Facility: MEDICAL CENTER | Age: 76
End: 2021-05-28

## 2021-05-28 DIAGNOSIS — Z13.220 SCREENING FOR LIPOID DISORDERS: ICD-10-CM

## 2021-05-28 DIAGNOSIS — E55.9 AVITAMINOSIS D: ICD-10-CM

## 2021-05-28 DIAGNOSIS — I10 ESSENTIAL HYPERTENSION, MALIGNANT: Primary | ICD-10-CM

## 2021-05-28 DIAGNOSIS — I10 ESSENTIAL HYPERTENSION, MALIGNANT: ICD-10-CM

## 2021-05-28 LAB
25(OH)D3 SERPL-MCNC: 13.1 NG/ML (ref 30–100)
ALBUMIN SERPL BCP-MCNC: 4 G/DL (ref 3.5–5)
ALP SERPL-CCNC: 100 U/L (ref 46–116)
ALT SERPL W P-5'-P-CCNC: 13 U/L (ref 12–78)
ANION GAP SERPL CALCULATED.3IONS-SCNC: 4 MMOL/L (ref 4–13)
AST SERPL W P-5'-P-CCNC: 12 U/L (ref 5–45)
BILIRUB SERPL-MCNC: 0.57 MG/DL (ref 0.2–1)
BUN SERPL-MCNC: 11 MG/DL (ref 5–25)
CALCIUM SERPL-MCNC: 9.1 MG/DL (ref 8.3–10.1)
CHLORIDE SERPL-SCNC: 109 MMOL/L (ref 100–108)
CHOLEST SERPL-MCNC: 169 MG/DL (ref 50–200)
CO2 SERPL-SCNC: 28 MMOL/L (ref 21–32)
CREAT SERPL-MCNC: 0.92 MG/DL (ref 0.6–1.3)
ERYTHROCYTE [DISTWIDTH] IN BLOOD BY AUTOMATED COUNT: 13 % (ref 11.6–15.1)
GFR SERPL CREATININE-BSD FRML MDRD: 60 ML/MIN/1.73SQ M
GLUCOSE P FAST SERPL-MCNC: 120 MG/DL (ref 65–99)
HCT VFR BLD AUTO: 42.6 % (ref 34.8–46.1)
HDLC SERPL-MCNC: 50 MG/DL
HGB BLD-MCNC: 14.3 G/DL (ref 11.5–15.4)
LDLC SERPL CALC-MCNC: 91 MG/DL (ref 0–100)
MCH RBC QN AUTO: 29.8 PG (ref 26.8–34.3)
MCHC RBC AUTO-ENTMCNC: 33.6 G/DL (ref 31.4–37.4)
MCV RBC AUTO: 89 FL (ref 82–98)
NONHDLC SERPL-MCNC: 119 MG/DL
PLATELET # BLD AUTO: 127 THOUSANDS/UL (ref 149–390)
PMV BLD AUTO: 10.8 FL (ref 8.9–12.7)
POTASSIUM SERPL-SCNC: 3.7 MMOL/L (ref 3.5–5.3)
PROT SERPL-MCNC: 7.5 G/DL (ref 6.4–8.2)
RBC # BLD AUTO: 4.8 MILLION/UL (ref 3.81–5.12)
SODIUM SERPL-SCNC: 141 MMOL/L (ref 136–145)
TRIGL SERPL-MCNC: 138 MG/DL
WBC # BLD AUTO: 4.54 THOUSAND/UL (ref 4.31–10.16)

## 2021-05-28 PROCEDURE — 80053 COMPREHEN METABOLIC PANEL: CPT

## 2021-05-28 PROCEDURE — 82306 VITAMIN D 25 HYDROXY: CPT

## 2021-05-28 PROCEDURE — 80061 LIPID PANEL: CPT

## 2021-05-28 PROCEDURE — 85027 COMPLETE CBC AUTOMATED: CPT

## 2021-05-28 PROCEDURE — 36415 COLL VENOUS BLD VENIPUNCTURE: CPT

## 2021-08-25 ENCOUNTER — APPOINTMENT (OUTPATIENT)
Dept: LAB | Facility: MEDICAL CENTER | Age: 76
End: 2021-08-25
Payer: MEDICARE

## 2021-08-25 DIAGNOSIS — Z13.220 SCREENING FOR LIPOID DISORDERS: ICD-10-CM

## 2021-08-25 DIAGNOSIS — R35.89 POLYURIA: ICD-10-CM

## 2021-08-25 LAB
ALBUMIN SERPL BCP-MCNC: 3.8 G/DL (ref 3.5–5)
ALP SERPL-CCNC: 90 U/L (ref 46–116)
ALT SERPL W P-5'-P-CCNC: 14 U/L (ref 12–78)
ANION GAP SERPL CALCULATED.3IONS-SCNC: 5 MMOL/L (ref 4–13)
AST SERPL W P-5'-P-CCNC: 14 U/L (ref 5–45)
BILIRUB SERPL-MCNC: 0.59 MG/DL (ref 0.2–1)
BUN SERPL-MCNC: 12 MG/DL (ref 5–25)
CALCIUM SERPL-MCNC: 9.2 MG/DL (ref 8.3–10.1)
CHLORIDE SERPL-SCNC: 107 MMOL/L (ref 100–108)
CHOLEST SERPL-MCNC: 180 MG/DL (ref 50–200)
CO2 SERPL-SCNC: 27 MMOL/L (ref 21–32)
CREAT SERPL-MCNC: 0.95 MG/DL (ref 0.6–1.3)
ERYTHROCYTE [DISTWIDTH] IN BLOOD BY AUTOMATED COUNT: 13 % (ref 11.6–15.1)
GFR SERPL CREATININE-BSD FRML MDRD: 58 ML/MIN/1.73SQ M
GLUCOSE P FAST SERPL-MCNC: 113 MG/DL (ref 65–99)
HCT VFR BLD AUTO: 40.9 % (ref 34.8–46.1)
HDLC SERPL-MCNC: 43 MG/DL
HGB BLD-MCNC: 13.8 G/DL (ref 11.5–15.4)
LDLC SERPL CALC-MCNC: 113 MG/DL (ref 0–100)
MCH RBC QN AUTO: 29.9 PG (ref 26.8–34.3)
MCHC RBC AUTO-ENTMCNC: 33.7 G/DL (ref 31.4–37.4)
MCV RBC AUTO: 89 FL (ref 82–98)
NONHDLC SERPL-MCNC: 137 MG/DL
PLATELET # BLD AUTO: 142 THOUSANDS/UL (ref 149–390)
PMV BLD AUTO: 11.4 FL (ref 8.9–12.7)
POTASSIUM SERPL-SCNC: 3.7 MMOL/L (ref 3.5–5.3)
PROT SERPL-MCNC: 7.1 G/DL (ref 6.4–8.2)
RBC # BLD AUTO: 4.61 MILLION/UL (ref 3.81–5.12)
SODIUM SERPL-SCNC: 139 MMOL/L (ref 136–145)
TRIGL SERPL-MCNC: 122 MG/DL
WBC # BLD AUTO: 4.16 THOUSAND/UL (ref 4.31–10.16)

## 2021-08-25 PROCEDURE — 80061 LIPID PANEL: CPT

## 2021-08-25 PROCEDURE — 80053 COMPREHEN METABOLIC PANEL: CPT

## 2021-08-25 PROCEDURE — 85027 COMPLETE CBC AUTOMATED: CPT

## 2021-08-25 PROCEDURE — 36415 COLL VENOUS BLD VENIPUNCTURE: CPT

## 2021-11-08 ENCOUNTER — IMMUNIZATIONS (OUTPATIENT)
Dept: FAMILY MEDICINE CLINIC | Facility: HOSPITAL | Age: 76
End: 2021-11-08

## 2021-11-08 DIAGNOSIS — Z23 ENCOUNTER FOR IMMUNIZATION: Primary | ICD-10-CM

## 2021-11-08 PROCEDURE — 0013A COVID-19 MODERNA VACC 0.25 ML BOOSTER: CPT

## 2021-11-08 PROCEDURE — 91306 COVID-19 MODERNA VACC 0.25 ML BOOSTER: CPT

## 2021-12-10 ENCOUNTER — APPOINTMENT (OUTPATIENT)
Dept: LAB | Facility: MEDICAL CENTER | Age: 76
End: 2021-12-10
Payer: MEDICARE

## 2021-12-10 DIAGNOSIS — Z13.220 SCREENING FOR LIPOID DISORDERS: ICD-10-CM

## 2021-12-10 DIAGNOSIS — E55.9 AVITAMINOSIS D: ICD-10-CM

## 2021-12-10 DIAGNOSIS — E73.9 INTESTINAL DISACCHARIDASE DEFICIENCIES AND DISACCHARIDE MALABSORPTION: ICD-10-CM

## 2021-12-10 LAB
25(OH)D3 SERPL-MCNC: 19.6 NG/ML (ref 30–100)
ALBUMIN SERPL BCP-MCNC: 3.9 G/DL (ref 3.5–5)
ALP SERPL-CCNC: 82 U/L (ref 46–116)
ALT SERPL W P-5'-P-CCNC: 14 U/L (ref 12–78)
ANION GAP SERPL CALCULATED.3IONS-SCNC: 8 MMOL/L (ref 4–13)
AST SERPL W P-5'-P-CCNC: 13 U/L (ref 5–45)
BILIRUB SERPL-MCNC: 0.85 MG/DL (ref 0.2–1)
BUN SERPL-MCNC: 15 MG/DL (ref 5–25)
CALCIUM SERPL-MCNC: 9.4 MG/DL (ref 8.3–10.1)
CHLORIDE SERPL-SCNC: 108 MMOL/L (ref 100–108)
CHOLEST SERPL-MCNC: 143 MG/DL
CO2 SERPL-SCNC: 24 MMOL/L (ref 21–32)
CREAT SERPL-MCNC: 1.04 MG/DL (ref 0.6–1.3)
ERYTHROCYTE [DISTWIDTH] IN BLOOD BY AUTOMATED COUNT: 13.2 % (ref 11.6–15.1)
GFR SERPL CREATININE-BSD FRML MDRD: 52 ML/MIN/1.73SQ M
GLUCOSE P FAST SERPL-MCNC: 113 MG/DL (ref 65–99)
HCT VFR BLD AUTO: 42 % (ref 34.8–46.1)
HDLC SERPL-MCNC: 50 MG/DL
HGB BLD-MCNC: 13.9 G/DL (ref 11.5–15.4)
LDLC SERPL CALC-MCNC: 71 MG/DL (ref 0–100)
MCH RBC QN AUTO: 29.5 PG (ref 26.8–34.3)
MCHC RBC AUTO-ENTMCNC: 33.1 G/DL (ref 31.4–37.4)
MCV RBC AUTO: 89 FL (ref 82–98)
NONHDLC SERPL-MCNC: 93 MG/DL
PLATELET # BLD AUTO: 122 THOUSANDS/UL (ref 149–390)
PMV BLD AUTO: 11.5 FL (ref 8.9–12.7)
POTASSIUM SERPL-SCNC: 3.9 MMOL/L (ref 3.5–5.3)
PROT SERPL-MCNC: 6.9 G/DL (ref 6.4–8.2)
RBC # BLD AUTO: 4.71 MILLION/UL (ref 3.81–5.12)
SODIUM SERPL-SCNC: 140 MMOL/L (ref 136–145)
TRIGL SERPL-MCNC: 111 MG/DL
WBC # BLD AUTO: 4.45 THOUSAND/UL (ref 4.31–10.16)

## 2021-12-10 PROCEDURE — 82306 VITAMIN D 25 HYDROXY: CPT

## 2021-12-10 PROCEDURE — 36415 COLL VENOUS BLD VENIPUNCTURE: CPT

## 2021-12-10 PROCEDURE — 85027 COMPLETE CBC AUTOMATED: CPT

## 2021-12-10 PROCEDURE — 80053 COMPREHEN METABOLIC PANEL: CPT

## 2021-12-10 PROCEDURE — 80061 LIPID PANEL: CPT

## 2022-03-07 ENCOUNTER — HOSPITAL ENCOUNTER (INPATIENT)
Facility: HOSPITAL | Age: 77
LOS: 1 days | Discharge: HOME | DRG: 065 | End: 2022-03-08
Attending: EMERGENCY MEDICINE | Admitting: INTERNAL MEDICINE
Payer: MEDICARE

## 2022-03-07 ENCOUNTER — APPOINTMENT (EMERGENCY)
Dept: RADIOLOGY | Facility: HOSPITAL | Age: 77
DRG: 065 | End: 2022-03-07
Payer: MEDICARE

## 2022-03-07 ENCOUNTER — OFFICE VISIT (OUTPATIENT)
Dept: URGENT CARE | Facility: MEDICAL CENTER | Age: 77
End: 2022-03-07
Payer: MEDICARE

## 2022-03-07 ENCOUNTER — APPOINTMENT (EMERGENCY)
Dept: CT IMAGING | Facility: HOSPITAL | Age: 77
DRG: 065 | End: 2022-03-07
Payer: MEDICARE

## 2022-03-07 VITALS
HEIGHT: 60 IN | RESPIRATION RATE: 20 BRPM | TEMPERATURE: 97.4 F | SYSTOLIC BLOOD PRESSURE: 213 MMHG | DIASTOLIC BLOOD PRESSURE: 120 MMHG | OXYGEN SATURATION: 95 % | BODY MASS INDEX: 21.6 KG/M2 | WEIGHT: 110 LBS | HEART RATE: 67 BPM

## 2022-03-07 DIAGNOSIS — I67.2 INTRACRANIAL ATHEROSCLEROSIS: ICD-10-CM

## 2022-03-07 DIAGNOSIS — I63.9 ACUTE CVA (CEREBROVASCULAR ACCIDENT) (HCC): ICD-10-CM

## 2022-03-07 DIAGNOSIS — E46 MALNUTRITION, UNSPECIFIED TYPE (HCC): ICD-10-CM

## 2022-03-07 DIAGNOSIS — D72.819 LEUKOPENIA, UNSPECIFIED TYPE: ICD-10-CM

## 2022-03-07 DIAGNOSIS — G47.34 NOCTURNAL HYPOXIA: ICD-10-CM

## 2022-03-07 DIAGNOSIS — I63.9 CVA (CEREBRAL VASCULAR ACCIDENT) (HCC): Primary | ICD-10-CM

## 2022-03-07 DIAGNOSIS — R20.0 NUMBNESS: ICD-10-CM

## 2022-03-07 DIAGNOSIS — I16.1 HYPERTENSIVE EMERGENCY: ICD-10-CM

## 2022-03-07 DIAGNOSIS — E83.39 HYPERPHOSPHATEMIA: ICD-10-CM

## 2022-03-07 DIAGNOSIS — I65.29 STENOSIS OF CAROTID ARTERY, UNSPECIFIED LATERALITY: ICD-10-CM

## 2022-03-07 DIAGNOSIS — D70.9 NEUTROPENIA, UNSPECIFIED TYPE (HCC): ICD-10-CM

## 2022-03-07 DIAGNOSIS — R53.1 WEAKNESS: Primary | ICD-10-CM

## 2022-03-07 DIAGNOSIS — I49.1 PREMATURE ATRIAL COMPLEXES: ICD-10-CM

## 2022-03-07 DIAGNOSIS — N30.00 ACUTE CYSTITIS: ICD-10-CM

## 2022-03-07 DIAGNOSIS — D69.6 THROMBOCYTOPENIA (HCC): ICD-10-CM

## 2022-03-07 PROBLEM — Z91.81: Status: ACTIVE | Noted: 2022-03-07

## 2022-03-07 LAB
2HR DELTA HS TROPONIN: 0 NG/L
ALBUMIN SERPL BCP-MCNC: 3.7 G/DL (ref 3.5–5)
ALP SERPL-CCNC: 91 U/L (ref 46–116)
ALT SERPL W P-5'-P-CCNC: 9 U/L (ref 12–78)
ANION GAP SERPL CALCULATED.3IONS-SCNC: 9 MMOL/L (ref 4–13)
APTT PPP: 24 SECONDS (ref 23–37)
AST SERPL W P-5'-P-CCNC: 12 U/L (ref 5–45)
BACTERIA UR QL AUTO: NORMAL /HPF
BASOPHILS # BLD AUTO: 0.04 THOUSANDS/ΜL (ref 0–0.1)
BASOPHILS NFR BLD AUTO: 1 % (ref 0–1)
BILIRUB SERPL-MCNC: 0.41 MG/DL (ref 0.2–1)
BILIRUB UR QL STRIP: NEGATIVE
BUN SERPL-MCNC: 14 MG/DL (ref 5–25)
CALCIUM SERPL-MCNC: 8.9 MG/DL (ref 8.3–10.1)
CARDIAC TROPONIN I PNL SERPL HS: 6 NG/L
CARDIAC TROPONIN I PNL SERPL HS: 6 NG/L
CHLORIDE SERPL-SCNC: 105 MMOL/L (ref 100–108)
CLARITY UR: CLEAR
CO2 SERPL-SCNC: 26 MMOL/L (ref 21–32)
COLOR UR: YELLOW
CREAT SERPL-MCNC: 1.13 MG/DL (ref 0.6–1.3)
EOSINOPHIL # BLD AUTO: 0.08 THOUSAND/ΜL (ref 0–0.61)
EOSINOPHIL NFR BLD AUTO: 2 % (ref 0–6)
ERYTHROCYTE [DISTWIDTH] IN BLOOD BY AUTOMATED COUNT: 12.6 % (ref 11.6–15.1)
FLUAV RNA RESP QL NAA+PROBE: NEGATIVE
FLUBV RNA RESP QL NAA+PROBE: NEGATIVE
GFR SERPL CREATININE-BSD FRML MDRD: 46 ML/MIN/1.73SQ M
GLUCOSE SERPL-MCNC: 133 MG/DL (ref 65–140)
GLUCOSE UR STRIP-MCNC: NEGATIVE MG/DL
HCT VFR BLD AUTO: 39.8 % (ref 34.8–46.1)
HGB BLD-MCNC: 14 G/DL (ref 11.5–15.4)
HGB UR QL STRIP.AUTO: NEGATIVE
IMM GRANULOCYTES # BLD AUTO: 0.01 THOUSAND/UL (ref 0–0.2)
IMM GRANULOCYTES NFR BLD AUTO: 0 % (ref 0–2)
INR PPP: 0.95 (ref 0.84–1.19)
KETONES UR STRIP-MCNC: NEGATIVE MG/DL
LEUKOCYTE ESTERASE UR QL STRIP: ABNORMAL
LYMPHOCYTES # BLD AUTO: 1.17 THOUSANDS/ΜL (ref 0.6–4.47)
LYMPHOCYTES NFR BLD AUTO: 25 % (ref 14–44)
MCH RBC QN AUTO: 29.5 PG (ref 26.8–34.3)
MCHC RBC AUTO-ENTMCNC: 35.2 G/DL (ref 31.4–37.4)
MCV RBC AUTO: 84 FL (ref 82–98)
MONOCYTES # BLD AUTO: 0.41 THOUSAND/ΜL (ref 0.17–1.22)
MONOCYTES NFR BLD AUTO: 9 % (ref 4–12)
NEUTROPHILS # BLD AUTO: 3.03 THOUSANDS/ΜL (ref 1.85–7.62)
NEUTS SEG NFR BLD AUTO: 63 % (ref 43–75)
NITRITE UR QL STRIP: NEGATIVE
NON-SQ EPI CELLS URNS QL MICRO: NORMAL /HPF
NRBC BLD AUTO-RTO: 0 /100 WBCS
NT-PROBNP SERPL-MCNC: 1545 PG/ML
PH UR STRIP.AUTO: 6.5 [PH]
PLATELET # BLD AUTO: 121 THOUSANDS/UL (ref 149–390)
PMV BLD AUTO: 10.9 FL (ref 8.9–12.7)
POTASSIUM SERPL-SCNC: 3.7 MMOL/L (ref 3.5–5.3)
PROT SERPL-MCNC: 7.1 G/DL (ref 6.4–8.2)
PROT UR STRIP-MCNC: NEGATIVE MG/DL
PROTHROMBIN TIME: 12.2 SECONDS (ref 11.6–14.5)
RBC # BLD AUTO: 4.74 MILLION/UL (ref 3.81–5.12)
RBC #/AREA URNS AUTO: NORMAL /HPF
RSV RNA RESP QL NAA+PROBE: NEGATIVE
SARS-COV-2 RNA RESP QL NAA+PROBE: NEGATIVE
SL AMB POCT GLUCOSE BLD: 154
SODIUM SERPL-SCNC: 140 MMOL/L (ref 136–145)
SP GR UR STRIP.AUTO: 1.01 (ref 1–1.03)
TSH SERPL DL<=0.05 MIU/L-ACNC: 1.52 UIU/ML (ref 0.36–3.74)
UROBILINOGEN UR QL STRIP.AUTO: 0.2 E.U./DL
WBC # BLD AUTO: 4.74 THOUSAND/UL (ref 4.31–10.16)
WBC #/AREA URNS AUTO: NORMAL /HPF

## 2022-03-07 PROCEDURE — 85025 COMPLETE CBC W/AUTO DIFF WBC: CPT | Performed by: EMERGENCY MEDICINE

## 2022-03-07 PROCEDURE — 99285 EMERGENCY DEPT VISIT HI MDM: CPT

## 2022-03-07 PROCEDURE — 80053 COMPREHEN METABOLIC PANEL: CPT | Performed by: EMERGENCY MEDICINE

## 2022-03-07 PROCEDURE — 87086 URINE CULTURE/COLONY COUNT: CPT

## 2022-03-07 PROCEDURE — 84443 ASSAY THYROID STIM HORMONE: CPT

## 2022-03-07 PROCEDURE — 81001 URINALYSIS AUTO W/SCOPE: CPT | Performed by: EMERGENCY MEDICINE

## 2022-03-07 PROCEDURE — 0241U HB NFCT DS VIR RESP RNA 4 TRGT: CPT

## 2022-03-07 PROCEDURE — G0463 HOSPITAL OUTPT CLINIC VISIT: HCPCS | Performed by: PHYSICIAN ASSISTANT

## 2022-03-07 PROCEDURE — 85610 PROTHROMBIN TIME: CPT | Performed by: EMERGENCY MEDICINE

## 2022-03-07 PROCEDURE — 36415 COLL VENOUS BLD VENIPUNCTURE: CPT | Performed by: EMERGENCY MEDICINE

## 2022-03-07 PROCEDURE — 99291 CRITICAL CARE FIRST HOUR: CPT | Performed by: EMERGENCY MEDICINE

## 2022-03-07 PROCEDURE — G0426 INPT/ED TELECONSULT50: HCPCS | Performed by: PSYCHIATRY & NEUROLOGY

## 2022-03-07 PROCEDURE — 83880 ASSAY OF NATRIURETIC PEPTIDE: CPT | Performed by: EMERGENCY MEDICINE

## 2022-03-07 PROCEDURE — 85730 THROMBOPLASTIN TIME PARTIAL: CPT | Performed by: EMERGENCY MEDICINE

## 2022-03-07 PROCEDURE — 82948 REAGENT STRIP/BLOOD GLUCOSE: CPT | Performed by: PHYSICIAN ASSISTANT

## 2022-03-07 PROCEDURE — 70498 CT ANGIOGRAPHY NECK: CPT

## 2022-03-07 PROCEDURE — 71045 X-RAY EXAM CHEST 1 VIEW: CPT

## 2022-03-07 PROCEDURE — 70496 CT ANGIOGRAPHY HEAD: CPT

## 2022-03-07 PROCEDURE — NC001 PR NO CHARGE: Performed by: PSYCHIATRY & NEUROLOGY

## 2022-03-07 PROCEDURE — 99223 1ST HOSP IP/OBS HIGH 75: CPT | Performed by: INTERNAL MEDICINE

## 2022-03-07 PROCEDURE — 93005 ELECTROCARDIOGRAM TRACING: CPT

## 2022-03-07 PROCEDURE — 84484 ASSAY OF TROPONIN QUANT: CPT

## 2022-03-07 PROCEDURE — 99214 OFFICE O/P EST MOD 30 MIN: CPT | Performed by: PHYSICIAN ASSISTANT

## 2022-03-07 PROCEDURE — 96374 THER/PROPH/DIAG INJ IV PUSH: CPT

## 2022-03-07 PROCEDURE — 84484 ASSAY OF TROPONIN QUANT: CPT | Performed by: EMERGENCY MEDICINE

## 2022-03-07 PROCEDURE — 1123F ACP DISCUSS/DSCN MKR DOCD: CPT | Performed by: PSYCHIATRY & NEUROLOGY

## 2022-03-07 PROCEDURE — 87147 CULTURE TYPE IMMUNOLOGIC: CPT

## 2022-03-07 RX ORDER — ACETAMINOPHEN 325 MG/1
650 TABLET ORAL EVERY 4 HOURS PRN
Status: DISCONTINUED | OUTPATIENT
Start: 2022-03-07 | End: 2022-03-07

## 2022-03-07 RX ORDER — CLOPIDOGREL BISULFATE 75 MG/1
300 TABLET ORAL ONCE
Status: COMPLETED | OUTPATIENT
Start: 2022-03-07 | End: 2022-03-07

## 2022-03-07 RX ORDER — DOCUSATE SODIUM 100 MG/1
100 CAPSULE, LIQUID FILLED ORAL 2 TIMES DAILY
Status: DISCONTINUED | OUTPATIENT
Start: 2022-03-07 | End: 2022-03-08 | Stop reason: HOSPADM

## 2022-03-07 RX ORDER — ASPIRIN 81 MG/1
81 TABLET ORAL DAILY
Status: DISCONTINUED | OUTPATIENT
Start: 2022-03-07 | End: 2022-03-08 | Stop reason: HOSPADM

## 2022-03-07 RX ORDER — LABETALOL 20 MG/4 ML (5 MG/ML) INTRAVENOUS SYRINGE
20 ONCE
Status: DISCONTINUED | OUTPATIENT
Start: 2022-03-07 | End: 2022-03-07

## 2022-03-07 RX ORDER — CLOPIDOGREL BISULFATE 75 MG/1
75 TABLET ORAL DAILY
Status: DISCONTINUED | OUTPATIENT
Start: 2022-03-08 | End: 2022-03-08 | Stop reason: HOSPADM

## 2022-03-07 RX ORDER — CLOPIDOGREL BISULFATE 75 MG/1
300 TABLET ORAL ONCE
Status: DISCONTINUED | OUTPATIENT
Start: 2022-03-07 | End: 2022-03-07

## 2022-03-07 RX ORDER — LABETALOL 20 MG/4 ML (5 MG/ML) INTRAVENOUS SYRINGE
20 ONCE
Status: COMPLETED | OUTPATIENT
Start: 2022-03-07 | End: 2022-03-07

## 2022-03-07 RX ORDER — LABETALOL 20 MG/4 ML (5 MG/ML) INTRAVENOUS SYRINGE
10 EVERY 4 HOURS PRN
Status: DISCONTINUED | OUTPATIENT
Start: 2022-03-07 | End: 2022-03-08 | Stop reason: HOSPADM

## 2022-03-07 RX ORDER — ATORVASTATIN CALCIUM 40 MG/1
40 TABLET, FILM COATED ORAL
Status: DISCONTINUED | OUTPATIENT
Start: 2022-03-07 | End: 2022-03-07

## 2022-03-07 RX ORDER — ONDANSETRON 2 MG/ML
4 INJECTION INTRAMUSCULAR; INTRAVENOUS EVERY 6 HOURS PRN
Status: DISCONTINUED | OUTPATIENT
Start: 2022-03-07 | End: 2022-03-08 | Stop reason: HOSPADM

## 2022-03-07 RX ORDER — ALBUTEROL SULFATE 90 UG/1
2 AEROSOL, METERED RESPIRATORY (INHALATION) EVERY 6 HOURS PRN
Status: DISCONTINUED | OUTPATIENT
Start: 2022-03-07 | End: 2022-03-08 | Stop reason: HOSPADM

## 2022-03-07 RX ORDER — LISINOPRIL 10 MG/1
10 TABLET ORAL DAILY
Status: DISCONTINUED | OUTPATIENT
Start: 2022-03-07 | End: 2022-03-08 | Stop reason: HOSPADM

## 2022-03-07 RX ORDER — HEPARIN SODIUM 5000 [USP'U]/ML
5000 INJECTION, SOLUTION INTRAVENOUS; SUBCUTANEOUS EVERY 8 HOURS SCHEDULED
Status: DISCONTINUED | OUTPATIENT
Start: 2022-03-07 | End: 2022-03-08 | Stop reason: HOSPADM

## 2022-03-07 RX ORDER — MAGNESIUM HYDROXIDE/ALUMINUM HYDROXICE/SIMETHICONE 120; 1200; 1200 MG/30ML; MG/30ML; MG/30ML
30 SUSPENSION ORAL EVERY 6 HOURS PRN
Status: DISCONTINUED | OUTPATIENT
Start: 2022-03-07 | End: 2022-03-08 | Stop reason: HOSPADM

## 2022-03-07 RX ORDER — ATORVASTATIN CALCIUM 40 MG/1
40 TABLET, FILM COATED ORAL EVERY EVENING
Status: DISCONTINUED | OUTPATIENT
Start: 2022-03-07 | End: 2022-03-08 | Stop reason: HOSPADM

## 2022-03-07 RX ORDER — ACETAMINOPHEN 325 MG/1
650 TABLET ORAL EVERY 6 HOURS PRN
Status: DISCONTINUED | OUTPATIENT
Start: 2022-03-07 | End: 2022-03-08 | Stop reason: HOSPADM

## 2022-03-07 RX ADMIN — HEPARIN SODIUM 5000 UNITS: 5000 INJECTION INTRAVENOUS; SUBCUTANEOUS at 21:24

## 2022-03-07 RX ADMIN — IOHEXOL 100 ML: 350 INJECTION, SOLUTION INTRAVENOUS at 13:46

## 2022-03-07 RX ADMIN — CLOPIDOGREL BISULFATE 300 MG: 75 TABLET ORAL at 14:25

## 2022-03-07 RX ADMIN — LISINOPRIL 10 MG: 10 TABLET ORAL at 15:51

## 2022-03-07 RX ADMIN — ASPIRIN 81 MG: 81 TABLET, COATED ORAL at 15:51

## 2022-03-07 RX ADMIN — HEPARIN SODIUM 5000 UNITS: 5000 INJECTION INTRAVENOUS; SUBCUTANEOUS at 15:53

## 2022-03-07 RX ADMIN — LABETALOL HYDROCHLORIDE 20 MG: 5 INJECTION, SOLUTION INTRAVENOUS at 13:46

## 2022-03-07 RX ADMIN — ATORVASTATIN CALCIUM 40 MG: 40 TABLET, FILM COATED ORAL at 17:14

## 2022-03-07 NOTE — ASSESSMENT & PLAN NOTE
· Patient presented to ED with elevated blood pressure 237/107mmHg with symptoms concerning for stroke  · Home medication, lisinopril 10 mg  · Received a dose of labetalol 20 mg in the ED on arrival  · Neurology on consult    Plan:  · Permissive HTN up to 220/110 for now; if MRI negative or symptoms resolve can make goal normotension and gradually reduce  · Continue lisinopril 10 mg  · Labetalol 10 mg p r n   For SBP >200 or DBP >110  · Continue to follow blood pressure trend  · Continue clinical monitoring

## 2022-03-07 NOTE — ASSESSMENT & PLAN NOTE
· Patient presented to ED with numbness in left forearm &hand and left leg and foot associated with dizziness    · Reported imbalance to walk  · See above for plan

## 2022-03-07 NOTE — PLAN OF CARE
Problem: MOBILITY - ADULT  Goal: Maintain or return to baseline ADL function  Description: INTERVENTIONS:  -  Assess patient's ability to carry out ADLs; assess patient's baseline for ADL function and identify physical deficits which impact ability to perform ADLs (bathing, care of mouth/teeth, toileting, grooming, dressing, etc )  - Assess/evaluate cause of self-care deficits   - Assess range of motion  - Assess patient's mobility; develop plan if impaired  - Assess patient's need for assistive devices and provide as appropriate  - Encourage maximum independence but intervene and supervise when necessary  - Involve family in performance of ADLs  - Assess for home care needs following discharge   - Consider OT consult to assist with ADL evaluation and planning for discharge  - Provide patient education as appropriate  Outcome: Progressing  Goal: Maintains/Returns to pre admission functional level  Description: INTERVENTIONS:  - Perform BMAT or MOVE assessment daily    - Set and communicate daily mobility goal to care team and patient/family/caregiver  - Collaborate with rehabilitation services on mobility goals if consulted  - Perform Range of Motion 3 times a day    - Dangle patient 3 times a day  - Stand patient 3 times a day  - Ambulate patient 3 times a day  - Out of bed to chair 3 times a day   - Out of bed for meals 3 times a day  - Out of bed for toileting  - Record patient progress and toleration of activity level   Outcome: Progressing     Problem: Potential for Falls  Goal: Patient will remain free of falls  Description: INTERVENTIONS:  - Educate patient/family on patient safety including physical limitations  - Instruct patient to call for assistance with activity   - Consult OT/PT to assist with strengthening/mobility   - Keep Call bell within reach  - Keep bed low and locked with side rails adjusted as appropriate  - Keep care items and personal belongings within reach  - Initiate and maintain comfort rounds  - Make Fall Risk Sign visible to staff  - Offer Toileting every 2 Hours, in advance of need  - Initiate/Maintain bed alarm  - Apply yellow socks and bracelet for high fall risk patients  - Consider moving patient to room near nurses station  Outcome: Progressing     Problem: Nutrition/Hydration-ADULT  Goal: Nutrient/Hydration intake appropriate for improving, restoring or maintaining nutritional needs  Description: Monitor and assess patient's nutrition/hydration status for malnutrition  Collaborate with interdisciplinary team and initiate plan and interventions as ordered  Monitor patient's weight and dietary intake as ordered or per policy  Utilize nutrition screening tool and intervene as necessary  Determine patient's food preferences and provide high-protein, high-caloric foods as appropriate       INTERVENTIONS:  - Monitor oral intake, urinary output, labs, and treatment plans  - Assess nutrition and hydration status and recommend course of action  - Evaluate amount of meals eaten  - Assist patient with eating if necessary   - Allow adequate time for meals  - Recommend/ encourage appropriate diets, oral nutritional supplements, and vitamin/mineral supplements  - Order, calculate, and assess calorie counts as needed  - Recommend, monitor, and adjust tube feedings and TPN/PPN based on assessed needs  - Assess need for intravenous fluids  - Provide specific nutrition/hydration education as appropriate  - Include patient/family/caregiver in decisions related to nutrition  Outcome: Progressing     Problem: NEUROSENSORY - ADULT  Goal: Achieves stable or improved neurological status  Description: INTERVENTIONS  - Monitor and report changes in neurological status  - Monitor vital signs such as temperature, blood pressure, glucose, and any other labs ordered   - Initiate measures to prevent increased intracranial pressure  - Monitor for seizure activity and implement precautions if appropriate Outcome: Progressing     Problem: CARDIOVASCULAR - ADULT  Goal: Maintains optimal cardiac output and hemodynamic stability  Description: INTERVENTIONS:  - Monitor I/O, vital signs and rhythm  - Monitor for S/S and trends of decreased cardiac output  - Administer and titrate ordered vasoactive medications to optimize hemodynamic stability  - Assess quality of pulses, skin color and temperature  - Assess for signs of decreased coronary artery perfusion  - Instruct patient to report change in severity of symptoms  Outcome: Progressing

## 2022-03-07 NOTE — ED PROVIDER NOTES
History  Chief Complaint   Patient presents with    Numbness     pt states lastnight at 2100 she started with left arm numbness and tingling, weakness, left leg tingling radiating down into foot     Patient is a 49-year-old female  She has a history of coronary artery disease  She is a former smoker  She has hypercholesterolemia, hypertension and kidney disease  Last night around 9:00 p m  She developed left-sided numbness  She reports that it feels as if that side fell asleep in will wake up  She does feel slightly improved this morning but the symptoms persist   In addition she is experiencing disequilibrium  With ambulation she feels she needs to grab onto things  She denies any vertigo  She is nauseated  In addition she feels that she is slower to answer questions than usual   No dysarthria  No visual complaints  No headache  Symptoms are moderate in severity  Again they are improving spontaneously  No aggravating factors  Prior to Admission Medications   Prescriptions Last Dose Informant Patient Reported?  Taking?   acetaminophen (TYLENOL) 325 mg tablet   No No   Sig: Take 2 tablets by mouth every 6 (six) hours as needed (pain)   albuterol (PROVENTIL HFA,VENTOLIN HFA) 90 mcg/act inhaler   No No   Sig: Inhale 2 puffs every 6 (six) hours as needed for wheezing   aspirin (ECOTRIN LOW STRENGTH) 81 mg EC tablet   Yes No   Sig: Take 81 mg by mouth daily   calcium carbonate (OS-MIGUELINA) 600 MG tablet  Self Yes No   Sig: Take 600 mg by mouth daily   ferrous sulfate 325 (65 Fe) mg tablet  Self Yes No   Sig: Take 325 mg by mouth daily with breakfast   ibuprofen (MOTRIN) 400 mg tablet   No No   Sig: Take 1 tablet by mouth every 6 (six) hours as needed (pain)   lisinopril (ZESTRIL) 10 mg tablet   Yes No   Sig: Take 10 mg by mouth daily   simvastatin (ZOCOR) 10 mg tablet   Yes No   Sig: Take 10 mg by mouth daily at bedtime      Facility-Administered Medications: None       Past Medical History: Diagnosis Date    CAD (coronary artery disease)     Cardiac disease     Hypercholesteremia     Hyperlipidemia     Hypertension     Hypertension     Renal disorder        Past Surgical History:   Procedure Laterality Date    CORONARY ANGIOPLASTY WITH STENT PLACEMENT      WRIST SURGERY         History reviewed  No pertinent family history  I have reviewed and agree with the history as documented  E-Cigarette/Vaping    E-Cigarette Use Never User      E-Cigarette/Vaping Substances    Nicotine No     THC No     CBD No     Flavoring No      Social History     Tobacco Use    Smoking status: Former Smoker    Smokeless tobacco: Never Used   Vaping Use    Vaping Use: Never used   Substance Use Topics    Alcohol use: No    Drug use: No       Review of Systems   Constitutional: Negative for chills and fever  HENT: Negative for rhinorrhea and sore throat  Eyes: Negative for pain, redness and visual disturbance  Respiratory: Negative for cough and shortness of breath  Cardiovascular: Negative for chest pain and leg swelling  Gastrointestinal: Negative for abdominal pain, diarrhea and vomiting  Endocrine: Negative for polydipsia and polyuria  Genitourinary: Negative for dysuria, frequency, hematuria, vaginal bleeding and vaginal discharge  Musculoskeletal: Negative for back pain and neck pain  Skin: Negative for rash and wound  Allergic/Immunologic: Negative for immunocompromised state  Neurological: Positive for dizziness and numbness  Negative for weakness and headaches  Hematological: Does not bruise/bleed easily  Psychiatric/Behavioral: Negative for hallucinations and suicidal ideas  All other systems reviewed and are negative  Physical Exam  Physical Exam  Vitals reviewed  Constitutional:       General: She is not in acute distress  HENT:      Head: Normocephalic and atraumatic        Nose: Nose normal       Mouth/Throat:      Mouth: Mucous membranes are moist  Eyes:      General:         Right eye: No discharge  Left eye: No discharge  Conjunctiva/sclera: Conjunctivae normal    Cardiovascular:      Rate and Rhythm: Normal rate and regular rhythm  Pulses: Normal pulses  Heart sounds: Normal heart sounds  No murmur heard  No friction rub  No gallop  Pulmonary:      Effort: Pulmonary effort is normal  No respiratory distress  Breath sounds: Normal breath sounds  No stridor  No wheezing, rhonchi or rales  Abdominal:      General: Bowel sounds are normal  There is no distension  Palpations: Abdomen is soft  Tenderness: There is no abdominal tenderness  There is no right CVA tenderness, left CVA tenderness, guarding or rebound  Musculoskeletal:         General: No swelling, tenderness, deformity or signs of injury  Normal range of motion  Cervical back: Normal range of motion and neck supple  No rigidity  Right lower leg: No edema  Left lower leg: No edema  Comments: No calf tenderness or unilateral leg swelling  Skin:     General: Skin is warm and dry  Coloration: Skin is not jaundiced  Findings: No rash  Neurological:      General: No focal deficit present  Mental Status: She is alert and oriented to person, place, and time  GCS: GCS eye subscore is 4  GCS verbal subscore is 5  GCS motor subscore is 6  Cranial Nerves: Cranial nerves are intact  No cranial nerve deficit  Sensory: Sensation is intact  No sensory deficit  Motor: Motor function is intact  No weakness  Coordination: Coordination is intact   Finger-Nose-Finger Test and Heel to [facility] Test normal    Psychiatric:         Mood and Affect: Mood normal          Behavior: Behavior normal          Vital Signs  ED Triage Vitals   Temperature Pulse Respirations Blood Pressure SpO2   03/07/22 1314 03/07/22 1312 03/07/22 1312 03/07/22 1312 03/07/22 1312   (!) 97 1 °F (36 2 °C) 69 18 (!) 237/107 95 %      Temp Source Heart Rate Source Patient Position - Orthostatic VS BP Location FiO2 (%)   03/07/22 1314 03/07/22 1312 03/07/22 1312 03/07/22 1312 --   Tympanic Monitor Sitting Right arm       Pain Score       03/07/22 1310       No Pain           Vitals:    03/07/22 1312 03/07/22 1320 03/07/22 1335 03/07/22 1350   BP: (!) 237/107 (!) 237/107 (!) 236/101 (!) 176/75   Pulse: 69 69 86 60   Patient Position - Orthostatic VS: Sitting Lying Lying Lying         Visual Acuity  Visual Acuity      Most Recent Value   L Pupil Size (mm) 2   R Pupil Size (mm) 3          ED Medications  Medications   clopidogrel (PLAVIX) tablet 300 mg (has no administration in time range)   Labetalol HCl (NORMODYNE) injection 20 mg (20 mg Intravenous Given 3/7/22 1346)   iohexol (OMNIPAQUE) 350 MG/ML injection (SINGLE-DOSE) 100 mL (100 mL Intravenous Given 3/7/22 1346)       Diagnostic Studies  Results Reviewed     Procedure Component Value Units Date/Time    Urine Microscopic [[id number]]  (Normal) Collected: 03/07/22 1346    Lab Status: Final result Specimen: Urine, Clean Catch Updated: 03/07/22 1417     RBC, UA 1-2 /hpf      WBC, UA 1-2 /hpf      Epithelial Cells Occasional /hpf      Bacteria, UA Occasional /hpf     UA (URINE) with reflex to Scope [[id number]]  (Abnormal) Collected: 03/07/22 1346    Lab Status: Final result Specimen: Urine, Clean Catch Updated: 03/07/22 1359     Color, UA Yellow     Clarity, UA Clear     Specific Gravity, UA 1 010     pH, UA 6 5     Leukocytes, UA Small     Nitrite, UA Negative     Protein, UA Negative mg/dl      Glucose, UA Negative mg/dl      Ketones, UA Negative mg/dl      Urobilinogen, UA 0 2 E U /dl      Bilirubin, UA Negative     Blood, UA Negative    NT-BNP PRO [[id number]]  (Abnormal) Collected: 03/07/22 1321    Lab Status: Final result Specimen: Blood from Arm, Right Updated: 03/07/22 1358     NT-proBNP 1,545 pg/mL     Comprehensive metabolic panel [[id number]]  (Abnormal) Collected: 03/07/22 1321    Lab Status: Final result Specimen: Blood from Arm, Right Updated: 03/07/22 1354     Sodium 140 mmol/L      Potassium 3 7 mmol/L      Chloride 105 mmol/L      CO2 26 mmol/L      ANION GAP 9 mmol/L      BUN 14 mg/dL      Creatinine 1 13 mg/dL      Glucose 133 mg/dL      Calcium 8 9 mg/dL      AST 12 U/L      ALT 9 U/L      Alkaline Phosphatase 91 U/L      Total Protein 7 1 g/dL      Albumin 3 7 g/dL      Total Bilirubin 0 41 mg/dL      eGFR 46 ml/min/1 73sq m     Narrative:      [place] guidelines for Chronic Kidney Disease (CKD):     Stage 1 with normal or high GFR (GFR > 90 mL/min/1 73 square meters)    Stage 2 Mild CKD (GFR = 60-89 mL/min/1 73 square meters)    Stage 3A Moderate CKD (GFR = 45-59 mL/min/1 73 square meters)    Stage 3B Moderate CKD (GFR = 30-44 mL/min/1 73 square meters)    Stage 4 Severe CKD (GFR = 15-29 mL/min/1 73 square meters)    Stage 5 End Stage CKD (GFR <15 mL/min/1 73 square meters)  Note: GFR calculation is accurate only with a steady state creatinine    HS Troponin I 2hr [[id number]]     Lab Status: No result Specimen: Blood     HS Troponin 0hr (reflex protocol) [[id number]]  (Normal) Collected: 03/07/22 1321    Lab Status: Final result Specimen: Blood from Arm, Right Updated: 03/07/22 1353     hs TnI 0hr 6 ng/L     Protime-INR [[id number]]  (Normal) Collected: 03/07/22 1321    Lab Status: Final result Specimen: Blood from Arm, Right Updated: 03/07/22 1341     Protime 12 2 seconds      INR 0 95    APTT [[id number]]  (Normal) Collected: 03/07/22 1321    Lab Status: Final result Specimen: Blood from Arm, Right Updated: 03/07/22 1341     PTT 24 seconds     CBC and differential [[id number]]  (Abnormal) Collected: 03/07/22 1321    Lab Status: Final result Specimen: Blood from Arm, Right Updated: 03/07/22 1334     WBC 4 74 Thousand/uL      RBC 4 74 Million/uL      Hemoglobin 14 0 g/dL      Hematocrit 39 8 %      MCV 84 fL      MCH 29 5 pg      MCHC 35 2 g/dL      RDW 12 6 %      MPV 10 9 fL      Platelets 887 Thousands/uL      nRBC 0 /100 WBCs      Neutrophils Relative 63 %      Immat GRANS % 0 %      Lymphocytes Relative 25 %      Monocytes Relative 9 %      Eosinophils Relative 2 %      Basophils Relative 1 %      Neutrophils Absolute 3 03 Thousands/µL      Immature Grans Absolute 0 01 Thousand/uL      Lymphocytes Absolute 1 17 Thousands/µL      Monocytes Absolute 0 41 Thousand/µL      Eosinophils Absolute 0 08 Thousand/µL      Basophils Absolute 0 04 Thousands/µL                  XR chest 1 view portable   ED Interpretation by Macrina Quiros MD (03/07 1416)   No infiltrates  No CHF  No widened mediastinum  CTA stroke alert (head/neck)   Final Result by [facility] (03/07 1403)      No large vessel occlusion  Posterior calcification along the aortic arch with atheromatous plaque at the origin of left subclavian artery results in mild left subclavian stenosis  There is mild stenosis origin of the right vertebral artery with    additional smaller areas of mild to moderate vertebral artery stenosis without occlusion  3 mm outpouching communicating segment of the left internal carotid artery potentially infundibular origin of a small left posterior communicating artery versus small P-comm aneurysm  Moderate to severe stenosis origin of the left PICA  Atherosclerotic calcification results in mild cavernous ICA stenosis bilaterally  Findings were directly discussed with [employer] at 1:47 PM                         Workstation performed: [id number]         CT stroke alert brain   Final Result by [facility] (03/07 1346)      No acute intracranial abnormality  Microangiopathic changes  Chronic lacunar infarct left thalamus        Findings were directly discussed with [employer] at 1:44PM       Workstation performed: [id number]                    Procedures  ECG 12 Lead Documentation Only    Date/Time: 3/7/2022 1:23 PM  Performed by: Leydi Brunner MD  Authorized by: Leydi Brunner MD     ECG reviewed by me, the ED Provider: yes    Patient location:  ED  Interpretation:     Interpretation: abnormal    Comments:      Normal sinus rhythm with premature atrial contraction  Nonspecific ST wave and T-wave abnormality  CriticalCare Time  Performed by: Leydi Brunner MD  Authorized by: Leydi Brunner MD     Critical care provider statement:     Critical care time (minutes):  60    Critical care time was exclusive of:  Separately billable procedures and treating other patients    Critical care was necessary to treat or prevent imminent or life-threatening deterioration of the following conditions:  CNS failure or compromise    Critical care was time spent personally by me on the following activities:  Obtaining history from patient or surrogate, development of treatment plan with patient or surrogate, discussions with consultants, evaluation of patient's response to treatment, examination of patient, ordering and performing treatments and interventions, ordering and review of laboratory studies, ordering and review of radiographic studies and re-evaluation of patient's condition    I assumed direction of critical care for this patient from another provider in my specialty: no               ED Course                  Stroke Assessment     Row Name 03/07/22 1323             NIH Stroke Scale    Interval Baseline      Level of Consciousness (1a ) 0      LOC Questions (1b ) 0      LOC Commands (1c ) 0      Best Gaze (2 ) 0      Visual (3 ) 0      Facial Palsy (4 ) 0      Motor Arm, Left (5a ) 0      Motor Arm, Right (5b ) 0      Motor Leg, Left (6a ) 0      Motor Leg, Right (6b ) 0      Limb Ataxia (7 ) 0      Sensory (8 ) 0      Best Language (9 ) 0      Dysarthria (10 ) 0      Extinction and Inattention (11 ) (Formerly Neglect) 0      Total 0                Most Recent Value   TPA Decision Options    TPA Decision Patient not a TPA candidate  Patient is not a candidate options Unclear time of onset outside appropriate time window  MDM  Number of Diagnoses or Management Options  CVA (cerebral vascular accident) [facility])  Diagnosis management comments: Patient was not a tPA candidate  Patient was markedly hypertensive  She was outside of the tPA window  Furthermore on her examination or NIH stroke scale was 0  Did consult with Neurology  A stroke alert was called  CT head was negative for intracranial hemorrhage  There was a chronic lacunar infarct of the thalamus  There was small vessel disease  CTA of the head neck showed nonspecific atherosclerotic changes  No retrievable clot  Blood pressure did respond to labetalol  Neurology is proceeding with a tele neurology consult  In the meantime they recommended loading patient with Plavix  Consulted with hospitalist for admission  Amount and/or Complexity of Data Reviewed  Clinical lab tests: ordered and reviewed  Tests in the radiology section of CPT®: reviewed and ordered  Discuss the patient with other providers: yes  Independent visualization of images, tracings, or specimens: yes        Disposition  Final diagnoses:   CVA (cerebral vascular accident) [facility])   Hypertensive emergency     Time reflects when diagnosis was documented in both MDM as applicable and the Disposition within this note     Time User Action Codes Description Comment    3/7/2022  2:02 PM Kenna León Add [I63 9] CVA (cerebral vascular accident) ([facility] [address] )     3/7/2022  2:20 PM Kenna León Add [I16 1] Hypertensive emergency       ED Disposition     ED Disposition Condition Date/Time Comment    Admit Stable Mon Mar 7, 2022  2:20 PM         Follow-up Information    None         Patient's Medications   Discharge Prescriptions    No medications on file       No discharge procedures on file      PDMP Review     None          ED Provider  Electronically Signed by           Darrell Banegas Timi Richards MD  03/07/22 7809

## 2022-03-07 NOTE — PROGRESS NOTES
[facility]        NAME: Angela Braswell is a 76 y o  female  : 1945    MRN: [id number]  DATE: 2022  TIME: 2:08 PM    Assessment and Plan   Weakness [R53 1]  1  Weakness  Transfer to other facility    POCT blood glucose   2  Numbness  Transfer to other facility    POCT blood glucose     Patient declined ambulance  Discussed risk including worsening of condition that may lead to death  Patient verbalized understanding  She is being driven by family member to ER  B    Patient Instructions       Follow up with PCP in 3-5 days  Proceed to  ER     Chief Complaint     Chief Complaint   Patient presents with    Numbness     numbness to left arm and leg since  last evening, "off balance", no slurred speech noted, no faical droop, GCS 15, FSBS 154 in triage, no prior hx of CA, denies headache despite /120, deneis chest pain, denies blurred vision, decreased sensation to left arm and leg         History of Present Illness       Patient reports onset of L sided UE and LE tingling, relative weakness, and dizziness worse with head movement x 9:30pm last night  Denies facial droop, slurred speech, CP, heart palpations, nausea, vomiting, abdomina pain, HA, or blurred vision  No injury  Denies PMH of CVA or vertigo  Patient took her ASA and lisinopril this morning  Review of Systems   Review of Systems   Eyes: Negative for visual disturbance  Cardiovascular: Negative for chest pain and palpitations  Gastrointestinal: Negative for abdominal pain, nausea and vomiting  Musculoskeletal: Negative for gait problem  Neurological: Positive for dizziness, weakness and numbness  Negative for headaches  Psychiatric/Behavioral: Negative for confusion  Current Medications     No current facility-administered medications for this visit      Current Outpatient Medications:     aspirin (ECOTRIN LOW STRENGTH) 81 mg EC tablet, Take 81 mg by mouth daily, Disp: , Rfl:    lisinopril (ZESTRIL) 10 mg tablet, Take 10 mg by mouth daily, Disp: , Rfl:     acetaminophen (TYLENOL) 325 mg tablet, Take 2 tablets by mouth every 6 (six) hours as needed (pain), Disp: 1 Bottle, Rfl: 0    albuterol (PROVENTIL HFA,VENTOLIN HFA) 90 mcg/act inhaler, Inhale 2 puffs every 6 (six) hours as needed for wheezing, Disp: 1 Inhaler, Rfl: 0    calcium carbonate (OS-MIGUELINA) 600 MG tablet, Take 600 mg by mouth daily, Disp: , Rfl:     ferrous sulfate 325 (65 Fe) mg tablet, Take 325 mg by mouth daily with breakfast, Disp: , Rfl:     ibuprofen (MOTRIN) 400 mg tablet, Take 1 tablet by mouth every 6 (six) hours as needed (pain), Disp: 30 tablet, Rfl: 0    simvastatin (ZOCOR) 10 mg tablet, Take 10 mg by mouth daily at bedtime, Disp: , Rfl:     Facility-Administered Medications Ordered in Other Visits:     clopidogrel (PLAVIX) tablet 300 mg, 300 mg, Oral, Once, Abdelrahman Hunter MD    Current Allergies     Allergies as of 03/07/2022 - Reviewed 03/07/2022   Allergen Reaction Noted    Influenza virus vaccine  10/20/2017            The following portions of the patient's history were reviewed and updated as appropriate: allergies, current medications, past family history, past medical history, past social history, past surgical history and problem list      Past Medical History:   Diagnosis Date    CAD (coronary artery disease)     Cardiac disease     Hypercholesteremia     Hyperlipidemia     Hypertension     Hypertension     Renal disorder        Past Surgical History:   Procedure Laterality Date    CORONARY ANGIOPLASTY WITH STENT PLACEMENT      WRIST SURGERY         History reviewed  No pertinent family history  Medications have been verified  Objective   BP (!) 213/120   Pulse 67   Temp (!) 97 4 °F (36 3 °C)   Resp 20   Ht 5' (1 524 m)   Wt 49 9 kg (110 lb)   SpO2 95%   BMI 21 48 kg/m²   No LMP recorded  Patient has had a hysterectomy         Physical Exam     Physical Exam  Constitutional:       General: She is not in acute distress  Appearance: She is well-developed  HENT:      Head: Normocephalic and atraumatic  Mouth/Throat:      Mouth: Mucous membranes are moist    Eyes:      Extraocular Movements: Extraocular movements intact  Pupils: Pupils are equal, round, and reactive to light  Neck:      Comments: Negative Kernig's and Brudzinski  Cardiovascular:      Rate and Rhythm: Normal rate and regular rhythm  Heart sounds: Normal heart sounds  No murmur heard  No friction rub  No gallop  Pulmonary:      Effort: Pulmonary effort is normal  No respiratory distress  Breath sounds: Normal breath sounds  No wheezing or rales  Chest:      Chest wall: No tenderness  Musculoskeletal:         General: Normal range of motion  Cervical back: Normal range of motion  Comments: L sided UE and LE MS 4/5  R sided UE/LE MS 5/5  Skin:     General: Skin is warm and dry  Neurological:      Mental Status: She is alert and oriented to person, place, and time  Cranial Nerves: No cranial nerve deficit  Sensory: Sensory deficit present  Motor: No pronator drift  Comments: Decreased L sided UE sharp sensation  UE/LE light touch sensation intact b/l  Psychiatric:         Behavior: Behavior normal          Thought Content:  Thought content normal          Judgment: Judgment normal

## 2022-03-07 NOTE — ASSESSMENT & PLAN NOTE
Malnutrition Findings:      · Thin Cachectic appearance, looks older than her age  BMI Findings:     · Body mass index is 19 2 kg/m²       Plan:  · Nutrition consult

## 2022-03-07 NOTE — TELEMEDICINE
TeleConsultation - Stroke   Josue Francois 76 y o  female MRN: [id number]  Unit/Bed#: RM07 Encounter: [id number]    REQUIRED DOCUMENTATION:     1  This service was provided via Telemedicine  2  Provider located at [facility]  3  TeleMed provider: Pj Sheikh DO   4  Identify all parties in room with patient during tele consult:  None  5  Patient was then informed that this was a Telemedicine visit and that the exam was being conducted confidentially over secure lines  My office door was closed  No one else was in the room  Patient acknowledged consent and understanding of privacy and security of the Telemedicine visit, and gave us permission to have the assistant stay in the room in order to assist with the history and to conduct the exam   I informed the patient that I have reviewed their record in Epic and presented the opportunity for them to ask any questions regarding the visit today  The patient agreed to participate  Assessment/Plan   Assessment:   Acute onset of L sided numbness described as pins and needles in LUE and LLE and imbalance, now improved  Higher suspicion for minor stroke with perhaps component of hypertensive encephalopathy  TPA Decision: Patient not a TPA candidate  Unclear time of onset outside appropriate time window      Plan:   -continue neurochecks; notify with changes  -HCT reviewed - no acute ischemia/hemorrhage; chronic L thalamic infarct  -CTA reviewed - diffuse atherosclerosis however no significant vascular stenosis  -obtain MRI brain w/o contrast  -check TTE  -telemetry  -check lipid panel/A1c  -continue home ASA 81mg daily  -load with 300mg Plavix; continue 75mg daily  -plan for DAPT x 3 weeks - if MRI is negative, can remain on ASA monotherapy  -Atorvastatin 40mg daily  -permissive HTN up to 220/110 for now; if MRI negative or symptoms resolve can make goal normotension and gradually reduce  -PT/OT evals  -will follow results    Josue Francois will need follow up in in 6 weeks with neurovascular attending or advance practitioner  She will not require outpatient neurological testing  Reason for Consult / Principal Problem: stroke alert  Hx and PE limited by: N/A  Patient last known well: 9pm  Stroke alert called: 1:22pm  Neurology time of arrival: immediate, by phone    HPI: Angela Braswell is a 76 y  o female with HTN, HLD, and CAD who presents with onset of imbalance and L sided numbness  Pt was in her usual state of health until 9pm the day prior when she began to feel numbness on the L and was having difficulty ambulating  This improved overnight but she still felt it this morning and decided to come to an urgent care for evaluation  She was then sent to the ED, where a stroke alert was called  NIHSS 0 on exam with no deficits noted  HCT and CTA were personally reviewed and were both unremarkable for any significant acute abnormalities  She was not a tPA candidate as she was outside of the window  She was subsequently admitted to the stroke pathway  Consult to neurology  Consult performed by: Kimo Street DO  Consult ordered by: Josue Humphreys MD          Review of Systems   Musculoskeletal: Positive for gait problem  Neurological: Positive for light-headedness and numbness  All other systems reviewed and are negative        Historical Information   Past Medical History:   Diagnosis Date    CAD (coronary artery disease)     Cardiac disease     Hypercholesteremia     Hyperlipidemia     Hypertension     Hypertension     Renal disorder      Past Surgical History:   Procedure Laterality Date    CORONARY ANGIOPLASTY WITH STENT PLACEMENT      WRIST SURGERY       Social History   Social History     Substance and Sexual Activity   Alcohol Use No     Social History     Substance and Sexual Activity   Drug Use No     E-Cigarette/Vaping    E-Cigarette Use Never User      E-Cigarette/Vaping Substances    Nicotine No     THC No     CBD No     Flavoring No Social History     Tobacco Use   Smoking Status Former Smoker   Smokeless Tobacco Never Used     Family History: History reviewed  No pertinent family history  Review of previous medical records was completed  Meds/Allergies   all current active meds have been reviewed, current meds:   Current Facility-Administered Medications   Medication Dose Route Frequency    clopidogrel (PLAVIX) tablet 300 mg  300 mg Oral Once    and PTA meds:   Prior to Admission Medications   Prescriptions Last Dose Informant Patient Reported? Taking?   acetaminophen (TYLENOL) 325 mg tablet   No No   Sig: Take 2 tablets by mouth every 6 (six) hours as needed (pain)   albuterol (PROVENTIL HFA,VENTOLIN HFA) 90 mcg/act inhaler   No No   Sig: Inhale 2 puffs every 6 (six) hours as needed for wheezing   aspirin (ECOTRIN LOW STRENGTH) 81 mg EC tablet   Yes No   Sig: Take 81 mg by mouth daily   calcium carbonate (OS-MIGUELINA) 600 MG tablet  Self Yes No   Sig: Take 600 mg by mouth daily   ferrous sulfate 325 (65 Fe) mg tablet  Self Yes No   Sig: Take 325 mg by mouth daily with breakfast   ibuprofen (MOTRIN) 400 mg tablet   No No   Sig: Take 1 tablet by mouth every 6 (six) hours as needed (pain)   lisinopril (ZESTRIL) 10 mg tablet   Yes No   Sig: Take 10 mg by mouth daily   simvastatin (ZOCOR) 10 mg tablet   Yes No   Sig: Take 10 mg by mouth daily at bedtime      Facility-Administered Medications: None       Allergies   Allergen Reactions    Influenza Virus Vaccine        Objective   Vitals:Blood pressure (!) 237/107, pulse 69, temperature (!) 97 1 °F (36 2 °C), temperature source Tympanic, resp  rate 18, SpO2 95 %  ,There is no height or weight on file to calculate BMI  No intake or output data in the 24 hours ending 03/07/22 1179    Invasive Devices:    Invasive Devices  Report    Peripheral Intravenous Line            Peripheral IV 03/07/22 Right Antecubital <1 day                Physical Exam  Constitutional:       Appearance: She is well-developed  HENT:      Head: Normocephalic and atraumatic  Eyes:      Extraocular Movements: EOM normal       Conjunctiva/sclera: Conjunctivae normal    Pulmonary:      Effort: No respiratory distress  Abdominal:      General: There is no distension  Musculoskeletal:         General: No swelling  Cervical back: No rigidity  Skin:     Coloration: Skin is not jaundiced  Neurological:      Mental Status: She is alert and oriented to person, place, and time  Coordination: Finger-Nose-Finger Test and Heel to NIX [facility] Test normal       Deep Tendon Reflexes: Strength normal    Psychiatric:         Speech: Speech normal        Neurologic Exam     Mental Status   Oriented to person, place, and time  Attention: normal  Concentration: normal    Speech: speech is normal   Level of consciousness: alert  Able to name object  Able to repeat  Normal comprehension  Cranial Nerves     CN III, IV, VI   Extraocular motions are normal      CN V   Facial sensation intact  CN VII   Facial expression full, symmetric  CN VIII   Hearing: intact    CN XII   Tongue deviation: none    Motor Exam     Strength   Strength 5/5 throughout  Sensory Exam   Light touch normal    Reports pins and needles sensation in LUE and LLE but reports normal sensation to LT     Gait, Coordination, and Reflexes     Coordination   Finger to nose coordination: normal  Heel to shin coordination: normal      NIHSS:  1a Level of Consciousness: 0 = Alert   1b  LOC Questions: 0 = Answers both correctly   1c  LOC Commands: 0 = Obeys both correctly   2  Best Gaze: 0 = Normal   3  Visual: 0 = No visual field loss   4  Facial Palsy: 0=Normal symmetric movement   5a  Motor Right Arm: 0=No drift, limb holds 90 (or 45) degrees for full 10 seconds   5b  Motor Left Arm: 0=No drift, limb holds 90 (or 45) degrees for full 10 seconds   6a  Motor Right Le=No drift, limb holds 90 (or 45) degrees for full 10 seconds   6b   Motor Left Le=No drift, limb holds 90 (or 45) degrees for full 10 seconds   7  Limb Ataxia:  0=Absent   8  Sensory: 0=Normal; no sensory loss   9  Best Language:  0=No aphasia, normal   10  Dysarthria: 0=Normal articulation   11  Extinction and Inattention (formerly Neglect): 0=No abnormality   Total Score: 0     Time NIHSS was completed: 3/7 2:15pm    Modified Schley Score:  1 (No significant disability  Able to carry out all usual activities, despite some symptoms)    Lab Results:   CBC:   Results from last 7 days   Lab Units 03/07/22  1321   WBC Thousand/uL 4 74   RBC Million/uL 4 74   HEMOGLOBIN g/dL 14 0   HEMATOCRIT % 39 8   MCV fL 84   PLATELETS Thousands/uL 121*   , BMP/CMP:   Results from last 7 days   Lab Units 03/07/22  1321   SODIUM mmol/L 140   POTASSIUM mmol/L 3 7   CHLORIDE mmol/L 105   CO2 mmol/L 26   BUN mg/dL 14   CREATININE mg/dL 1 13   CALCIUM mg/dL 8 9   AST U/L 12   ALT U/L 9*   ALK PHOS U/L 91   EGFR ml/min/1 73sq m 46   , Coagulation:   Results from last 7 days   Lab Units 03/07/22  1321   INR  0 95     Imaging Studies: I have personally reviewed pertinent films in PACS with a Radiologist   EKG, Pathology, and Other Studies: I have personally reviewed pertinent reports  VTE Prophylaxis: Sequential compression device (Venodyne)     Counseling / Coordination of Care  Total Critical Care time spent 30 minutes excluding procedures, teaching and family updates

## 2022-03-07 NOTE — ASSESSMENT & PLAN NOTE
Patient presented to ED with numbness in the left forearm and hand along with left leg and foot  Started last night  Associated with nausea, dizziness  No associated syncope, headache, change in mental status, speech, vision  No weakness  Symptoms are most likely secondary to stroke vs   · Neurology consulted, initiated stroke pathway  · CTA head and neck: No large vessel occlusion  Posterior calcification along the aortic arch with atheromatous plaque at the origin of left subclavian artery results in mild left subclavian stenosis  There is mild stenosis origin of the right vertebral artery with additional smaller areas of mild to moderate vertebral artery stenosis without occlusion         -3 mm outpouching communicating segment of the left internal carotid artery potentially infundibular origin of a      small left posterior communicating artery versus small P-comm aneurysm  -Moderate to severe stenosis origin of the left PICA  - Atherosclerotic calcification results in mild cavernous ICA stenosis bilaterally  · CT brain: No acute intracranial abnormality  Microangiopathic changes  Chronic lacunar infarct left thalamus  · Patient is hypertensive at 237/107 on admission  · Patient is not a candidate for tPA therapy because of the timeline    · Started heparin Plavix in the ED with lowering dose of 300 mg      Plan  · MRA brain without contrast  · TTE  · Neurology consulted: appreciate recommendations  · 48 hour telemetry monitoring  · Plavix 70 mg daily x 3 weeks  · Aspirin 81 mg daily x 3 weeks  · If MRI negative patient will remain on aspirin monotherapy  · Permissive hypertension up to 220/110mmHg  · Atorvastatin 40 mg  · Lipid panel  · HbA1c  · PT/OT eval

## 2022-03-07 NOTE — H&P
[address] [place] [address] 1945, 76 y o  female MRN: [id number]  Unit/Bed#: 610-93 Encounter: [id number]  Primary Care Provider: HYACINTH Krause DO   Date and time admitted to hospital: 3/7/2022  1:07 PM    * Numbness of left hand and leg  Assessment & Plan  Patient presented to ED with numbness in the left forearm and hand along with left leg and foot  Started last night  Associated with nausea, dizziness  No associated syncope, headache, change in mental status, speech, vision  No weakness  Symptoms are most likely secondary to stroke vs   · Neurology consulted, initiated stroke pathway  · CTA head and neck: No large vessel occlusion  Posterior calcification along the aortic arch with atheromatous plaque at the origin of left subclavian artery results in mild left subclavian stenosis  There is mild stenosis origin of the right vertebral artery with additional smaller areas of mild to moderate vertebral artery stenosis without occlusion         -3 mm outpouching communicating segment of the left internal carotid artery potentially infundibular origin of a      small left posterior communicating artery versus small P-comm aneurysm  -Moderate to severe stenosis origin of the left PICA  - Atherosclerotic calcification results in mild cavernous ICA stenosis bilaterally  · CT brain: No acute intracranial abnormality  Microangiopathic changes  Chronic lacunar infarct left thalamus  · Patient is hypertensive at 237/107 on admission  · Patient is not a candidate for tPA therapy because of the timeline    · Started heparin Plavix in the ED with lowering dose of 300 mg      Plan  · MRA brain without contrast  · TTE  · Neurology consulted: appreciate recommendations  · 48 hour telemetry monitoring  · Plavix 70 mg daily x 3 weeks  · Aspirin 81 mg daily x 3 weeks  · If MRI negative patient will remain on aspirin monotherapy  · Permissive hypertension up to 220/110mmHg  · Atorvastatin 40 mg  · Lipid panel  · HbA1c  · PT/OT eval    Hypertensive emergency  Assessment & Plan  · Patient presented to ED with elevated blood pressure 237/107mmHg with symptoms concerning for stroke  · Home medication, lisinopril 10 mg  · Received a dose of labetalol 20 mg in the ED on arrival  · Neurology on consult    Plan:  · Permissive HTN up to 220/110 for now; if MRI negative or symptoms resolve can make goal normotension and gradually reduce  · Continue lisinopril 10 mg  · Labetalol 10 mg p r n  For SBP >200 or DBP >110  · Continue to follow blood pressure trend  · Continue clinical monitoring    Potential for falls  Assessment & Plan  · Patient presented to ED with numbness in left forearm &hand and left leg and foot associated with dizziness  · Reported imbalance to walk  · See above for plan      Malnutrition [facility])  Assessment & Plan  Malnutrition Findings:      · Thin Cachectic appearance, looks older than her age  BMI Findings:     · Body mass index is 19 2 kg/m²  Plan:  · Nutrition consult      VTE Prophylaxis: Heparin  / sequential compression device   Code Status:  Level 1 full code  POLST: There is no POLST form on file for this patient (pre-hospital)  Discussion with family:  Called and updated family  Anticipated Length of Stay:  Patient will be admitted on an Inpatient basis with an anticipated length of stay of  not more than 2 midnights  Justification for Hospital Stay:  Patient presented with left upper and lower extremity numbness and being evaluated for stroke  Total Time for Visit, including Counseling / Coordination of Care: 60 minutes  Greater than 50% of this total time spent on direct patient counseling and coordination of care  Chief Complaint:   Numbness in left arm lobe    History of Present Illness:    Velia Buerger is a 76 y o  female who presents with left-sided numbness in both upper and lower extremities    Symptoms started last night, patient slept on it, persistent symptoms in the morning frontal and her to visit ED  Patient did complain of associated dizziness/lightheadedness, nausea  Denies weakness, history of fall, syncope, headache, dysarthria, change in speech, vision, hearing, mental status  No change in memory  Review of Systems:    Review of Systems   Constitutional: Positive for activity change  Negative for chills, fatigue and fever  HENT: Negative for congestion, tinnitus, trouble swallowing and voice change  Eyes: Negative for visual disturbance  Respiratory: Positive for cough  Negative for chest tightness, shortness of breath and wheezing  Cardiovascular: Negative for chest pain, palpitations and leg swelling  Gastrointestinal: Negative for abdominal pain  Genitourinary: Negative for difficulty urinating  Skin: Negative for color change  Neurological: Positive for dizziness, light-headedness and numbness  Negative for syncope, facial asymmetry, speech difficulty, weakness and headaches  Psychiatric/Behavioral: Negative for agitation and confusion  The patient is not nervous/anxious  All other systems reviewed and are negative  Past Medical and Surgical History:     Past Medical History:   Diagnosis Date    CAD (coronary artery disease)     Cardiac disease     Hypercholesteremia     Hyperlipidemia     Hypertension     Hypertension     Renal disorder        Past Surgical History:   Procedure Laterality Date    CORONARY ANGIOPLASTY WITH STENT PLACEMENT      WRIST SURGERY         Meds/Allergies:    Prior to Admission medications    Medication Sig Start Date End Date Taking?  Authorizing Provider   aspirin (ECOTRIN LOW STRENGTH) 81 mg EC tablet Take 81 mg by mouth daily   Yes Historical Provider, MD   calcium carbonate (OS-MIGUELINA) 600 MG tablet Take 600 mg by mouth daily   Yes Historical Provider, MD   ferrous sulfate 325 (65 Fe) mg tablet Take 325 mg by mouth daily with breakfast   Yes Historical Provider, MD   lisinopril (ZESTRIL) 10 mg tablet Take 10 mg by mouth daily   Yes Historical Provider, MD   acetaminophen (TYLENOL) 325 mg tablet Take 2 tablets by mouth every 6 (six) hours as needed (pain)  Patient not taking: Reported on 3/7/2022  11/30/17   Lucia Dumont MD   albuterol (PROVENTIL HFA,VENTOLIN HFA) 90 mcg/act inhaler Inhale 2 puffs every 6 (six) hours as needed for wheezing  Patient not taking: Reported on 3/7/2022  10/20/17   Lucia Dumont MD   ibuprofen (MOTRIN) 400 mg tablet Take 1 tablet by mouth every 6 (six) hours as needed (pain)  Patient not taking: Reported on 3/7/2022  11/30/17   Lucia Dumont MD   simvastatin (ZOCOR) 10 mg tablet Take 10 mg by mouth daily at bedtime  Patient not taking: Reported on 3/7/2022     Historical Provider, MD     I have reviewed home medications with patient personally  Allergies: Allergies   Allergen Reactions    Influenza Virus Vaccine        Social History:     Marital Status: /Civil Union   Occupation:   Patient Pre-hospital Living Situation:  Home  Patient Pre-hospital Level of Mobility:  Ambulating  Patient Pre-hospital Diet Restrictions:  None  Substance Use History:   Social History     Substance and Sexual Activity   Alcohol Use No     Social History     Tobacco Use   Smoking Status Former Smoker   Smokeless Tobacco Never Used     Social History     Substance and Sexual Activity   Drug Use No       Family History:    History reviewed  No pertinent family history  Physical Exam:     Vitals:   Blood Pressure: (!) 190/89 (03/07/22 1605)  Pulse: 63 (03/07/22 1605)  Temperature: 98 1 °F (36 7 °C) (03/07/22 1605)  Temp Source: Tympanic (03/07/22 1320)  Respirations: 16 (03/07/22 1440)  Height: 5' (152 4 cm) (03/07/22 1510)  Weight - Scale: 44 6 kg (98 lb 5 2 oz) (03/07/22 1510)  SpO2: 94 % (03/07/22 1605)    Physical Exam  Vitals and nursing note reviewed  Constitutional:       General: She is not in acute distress    HENT: Head: Normocephalic and atraumatic  Right Ear: External ear normal       Left Ear: External ear normal       Nose: Nose normal       Mouth/Throat:      Mouth: Mucous membranes are moist       Pharynx: Oropharynx is clear  Eyes:      General: No visual field deficit or scleral icterus  Extraocular Movements: Extraocular movements intact  Conjunctiva/sclera: Conjunctivae normal       Pupils: Pupils are equal, round, and reactive to light  Cardiovascular:      Rate and Rhythm: Normal rate  Rhythm irregular  Heart sounds: Normal heart sounds  Pulmonary:      Effort: Pulmonary effort is normal       Breath sounds: Normal breath sounds  Abdominal:      General: Bowel sounds are normal       Palpations: Abdomen is soft  Tenderness: There is no abdominal tenderness  Musculoskeletal:         General: Normal range of motion  Cervical back: Neck supple  Right lower leg: No edema  Left lower leg: No edema  Skin:     General: Skin is warm  Capillary Refill: Capillary refill takes less than 2 seconds  Neurological:      General: No focal deficit present  Mental Status: She is alert and oriented to person, place, and time  Mental status is at baseline  Cranial Nerves: No cranial nerve deficit, dysarthria or facial asymmetry  Sensory: Sensation is intact  Motor: Motor function is intact  No weakness  Coordination: Coordination is intact  Coordination normal  Heel to Shin Test normal       Comments: Strength normal bilaterally in both upper and lower extremities   Psychiatric:         Behavior: Behavior normal          Additional Data:     Lab Results: I have personally reviewed pertinent reports        Results from last 7 days   Lab Units 03/07/22  1321   WBC Thousand/uL 4 74   HEMOGLOBIN g/dL 14 0   HEMATOCRIT % 39 8   PLATELETS Thousands/uL 121*   NEUTROS PCT % 63   LYMPHS PCT % 25   MONOS PCT % 9   EOS PCT % 2     Results from last 7 days Lab Units 03/07/22  1408 03/07/22  1321   SODIUM mmol/L  --  140   POTASSIUM mmol/L  --  3 7   CHLORIDE mmol/L  --  105   CO2 mmol/L  --  26   BUN mg/dL  --  14   CREATININE mg/dL  --  1 13   ANION GAP mmol/L  --  9   CALCIUM mg/dL  --  8 9   ALBUMIN g/dL  --  3 7   TOTAL BILIRUBIN mg/dL  --  0 41   ALK PHOS U/L  --  91   ALT U/L  --  9*   AST U/L  --  12   GLUCOSE RANDOM  154 133     Results from last 7 days   Lab Units 03/07/22  1321   INR  0 95                   Imaging: I have personally reviewed pertinent reports  XR chest 1 view portable   ED Interpretation by Grzegorz Strong MD (03/07 1416)   No infiltrates  No CHF  No widened mediastinum  Final Result by Ariadne Shelton MD (03/07 1548)      No acute cardiopulmonary disease  Findings are stable            Workstation performed: [id number]         CTA stroke alert (head/neck)   Final Result by Edison Ferguson DO (03/07 1403)      No large vessel occlusion  Posterior calcification along the aortic arch with atheromatous plaque at the origin of left subclavian artery results in mild left subclavian stenosis  There is mild stenosis origin of the right vertebral artery with    additional smaller areas of mild to moderate vertebral artery stenosis without occlusion  3 mm outpouching communicating segment of the left internal carotid artery potentially infundibular origin of a small left posterior communicating artery versus small P-comm aneurysm  Moderate to severe stenosis origin of the left PICA  Atherosclerotic calcification results in mild cavernous ICA stenosis bilaterally  Findings were directly discussed with Alesia Landeros at 1:47 PM                         Workstation performed: [id number]         CT stroke alert brain   Final Result by Edison Ferguson DO (03/07 1346)      No acute intracranial abnormality  Microangiopathic changes  Chronic lacunar infarct left thalamus        Findings were directly discussed with Shiela Benz at 1:44PM       Workstation performed: [id number]         MRI inpatient order    (Results Pending)       EKG, Pathology, and Other Studies Reviewed on Admission:   · EKG:  PACs with nonspecific ST-T changes    Allscripts / Epic Records Reviewed: Yes     ** Please Note: This note has been constructed using a voice recognition system   **

## 2022-03-08 ENCOUNTER — EPISODE CHANGES (OUTPATIENT)
Dept: CASE MANAGEMENT | Facility: OTHER | Age: 77
End: 2022-03-08

## 2022-03-08 ENCOUNTER — APPOINTMENT (INPATIENT)
Dept: NON INVASIVE DIAGNOSTICS | Facility: HOSPITAL | Age: 77
DRG: 065 | End: 2022-03-08
Payer: MEDICARE

## 2022-03-08 ENCOUNTER — APPOINTMENT (INPATIENT)
Dept: MRI IMAGING | Facility: HOSPITAL | Age: 77
DRG: 065 | End: 2022-03-08
Payer: MEDICARE

## 2022-03-08 VITALS
WEIGHT: 97 LBS | HEIGHT: 60 IN | SYSTOLIC BLOOD PRESSURE: 174 MMHG | DIASTOLIC BLOOD PRESSURE: 85 MMHG | OXYGEN SATURATION: 94 % | TEMPERATURE: 97.9 F | RESPIRATION RATE: 18 BRPM | HEART RATE: 67 BPM | BODY MASS INDEX: 19.04 KG/M2

## 2022-03-08 PROBLEM — Z91.81: Status: RESOLVED | Noted: 2022-03-07 | Resolved: 2022-03-08

## 2022-03-08 PROBLEM — D72.819 LEUKOPENIA: Status: ACTIVE | Noted: 2022-03-08

## 2022-03-08 PROBLEM — I63.9 ACUTE CVA (CEREBROVASCULAR ACCIDENT) (HCC): Status: ACTIVE | Noted: 2022-03-07

## 2022-03-08 PROBLEM — E78.00 HYPERCHOLESTEROLEMIA: Status: ACTIVE | Noted: 2022-03-08

## 2022-03-08 PROBLEM — I65.29 CAROTID ARTERY STENOSIS: Status: ACTIVE | Noted: 2022-03-08

## 2022-03-08 PROBLEM — E83.39 HYPERPHOSPHATEMIA: Status: ACTIVE | Noted: 2022-03-08

## 2022-03-08 PROBLEM — G47.34 NOCTURNAL HYPOXIA: Status: ACTIVE | Noted: 2022-03-08

## 2022-03-08 PROBLEM — D69.6 THROMBOCYTOPENIA (HCC): Status: ACTIVE | Noted: 2022-03-08

## 2022-03-08 PROBLEM — D70.9 NEUTROPENIA (HCC): Status: ACTIVE | Noted: 2022-03-08

## 2022-03-08 LAB
ALBUMIN SERPL BCP-MCNC: 3.4 G/DL (ref 3.5–5)
ALP SERPL-CCNC: 77 U/L (ref 46–116)
ALT SERPL W P-5'-P-CCNC: 6 U/L (ref 12–78)
ANION GAP SERPL CALCULATED.3IONS-SCNC: 11 MMOL/L (ref 4–13)
AORTIC ROOT: 2.8 CM
APICAL FOUR CHAMBER EJECTION FRACTION: 53 %
AST SERPL W P-5'-P-CCNC: 12 U/L (ref 5–45)
ATRIAL RATE: 76 BPM
BACTERIA UR CULT: ABNORMAL
BACTERIA UR CULT: ABNORMAL
BASOPHILS # BLD AUTO: 0.03 THOUSANDS/ΜL (ref 0–0.1)
BASOPHILS NFR BLD AUTO: 1 % (ref 0–1)
BILIRUB SERPL-MCNC: 0.46 MG/DL (ref 0.2–1)
BUN SERPL-MCNC: 12 MG/DL (ref 5–25)
CALCIUM ALBUM COR SERPL-MCNC: 9.1 MG/DL (ref 8.3–10.1)
CALCIUM SERPL-MCNC: 8.6 MG/DL (ref 8.3–10.1)
CARDIAC TROPONIN I PNL SERPL HS: 10 NG/L (ref 8–18)
CHLORIDE SERPL-SCNC: 106 MMOL/L (ref 100–108)
CHOLEST SERPL-MCNC: 211 MG/DL
CK SERPL-CCNC: 24 U/L (ref 26–192)
CO2 SERPL-SCNC: 23 MMOL/L (ref 21–32)
CREAT SERPL-MCNC: 0.95 MG/DL (ref 0.6–1.3)
DOP CALC LVOT AREA: 3.14 CM2
DOP CALC LVOT DIAMETER: 2 CM
E WAVE DECELERATION TIME: 275 MS
EOSINOPHIL # BLD AUTO: 0.09 THOUSAND/ΜL (ref 0–0.61)
EOSINOPHIL NFR BLD AUTO: 2 % (ref 0–6)
ERYTHROCYTE [DISTWIDTH] IN BLOOD BY AUTOMATED COUNT: 12.7 % (ref 11.6–15.1)
EST. AVERAGE GLUCOSE BLD GHB EST-MCNC: 131 MG/DL
FRACTIONAL SHORTENING: 39 % (ref 28–44)
GFR SERPL CREATININE-BSD FRML MDRD: 57 ML/MIN/1.73SQ M
GLUCOSE SERPL-MCNC: 154 MG/DL (ref 65–140)
GLUCOSE SERPL-MCNC: 95 MG/DL (ref 65–140)
HBA1C MFR BLD: 6.2 %
HCT VFR BLD AUTO: 34.8 % (ref 34.8–46.1)
HDLC SERPL-MCNC: 45 MG/DL
HGB BLD-MCNC: 12.5 G/DL (ref 11.5–15.4)
IMM GRANULOCYTES # BLD AUTO: 0.01 THOUSAND/UL (ref 0–0.2)
IMM GRANULOCYTES NFR BLD AUTO: 0 % (ref 0–2)
INTERVENTRICULAR SEPTUM IN DIASTOLE (PARASTERNAL SHORT AXIS VIEW): 1.4 CM
INTERVENTRICULAR SEPTUM: 1.4 CM (ref 0.46–0.85)
LACTATE SERPL-SCNC: 1 MMOL/L (ref 0.5–2)
LDLC SERPL CALC-MCNC: 144 MG/DL (ref 0–100)
LEFT ATRIUM AREA SYSTOLE SINGLE PLANE A4C: 11.8 CM2
LEFT ATRIUM SIZE: 3.3 CM
LEFT INTERNAL DIMENSION IN SYSTOLE: 2.5 CM (ref 2.18–3.3)
LEFT VENTRICULAR INTERNAL DIMENSION IN DIASTOLE: 4.1 CM (ref 3.54–5.27)
LEFT VENTRICULAR POSTERIOR WALL IN END DIASTOLE: 1.1 CM (ref 0.45–0.84)
LEFT VENTRICULAR STROKE VOLUME: 54 ML
LVSV (TEICH): 54 ML
LYMPHOCYTES # BLD AUTO: 1.44 THOUSANDS/ΜL (ref 0.6–4.47)
LYMPHOCYTES NFR BLD AUTO: 38 % (ref 14–44)
MAGNESIUM SERPL-MCNC: 2 MG/DL (ref 1.6–2.6)
MCH RBC QN AUTO: 29.8 PG (ref 26.8–34.3)
MCHC RBC AUTO-ENTMCNC: 35.9 G/DL (ref 31.4–37.4)
MCV RBC AUTO: 83 FL (ref 82–98)
MONOCYTES # BLD AUTO: 0.36 THOUSAND/ΜL (ref 0.17–1.22)
MONOCYTES NFR BLD AUTO: 10 % (ref 4–12)
MV E'TISSUE VEL-SEP: 6 CM/S
MV PEAK A VEL: 0.87 M/S
MV PEAK E VEL: 56 CM/S
NEUTROPHILS # BLD AUTO: 1.83 THOUSANDS/ΜL (ref 1.85–7.62)
NEUTS SEG NFR BLD AUTO: 49 % (ref 43–75)
NRBC BLD AUTO-RTO: 0 /100 WBCS
P AXIS: 84 DEGREES
PHOSPHATE SERPL-MCNC: 4.2 MG/DL (ref 2.3–4.1)
PLATELET # BLD AUTO: 109 THOUSANDS/UL (ref 149–390)
PMV BLD AUTO: 11.5 FL (ref 8.9–12.7)
POTASSIUM SERPL-SCNC: 3.8 MMOL/L (ref 3.5–5.3)
PR INTERVAL: 116 MS
PROCALCITONIN SERPL-MCNC: <0.05 NG/ML
PROT SERPL-MCNC: 6.3 G/DL (ref 6.4–8.2)
QRS AXIS: 69 DEGREES
QRSD INTERVAL: 80 MS
QT INTERVAL: 386 MS
QTC INTERVAL: 434 MS
RBC # BLD AUTO: 4.2 MILLION/UL (ref 3.81–5.12)
RIGHT ATRIUM AREA SYSTOLE A4C: 10.8 CM2
RIGHT VENTRICLE ID DIMENSION: 2 CM
SL CV LV EF: 60
SL CV PED ECHO LEFT VENTRICLE DIASTOLIC VOLUME (MOD BIPLANE) 2D: 75 ML
SL CV PED ECHO LEFT VENTRICLE SYSTOLIC VOLUME (MOD BIPLANE) 2D: 21 ML
SODIUM SERPL-SCNC: 140 MMOL/L (ref 136–145)
T WAVE AXIS: 61 DEGREES
TRICUSPID ANNULAR PLANE SYSTOLIC EXCURSION: 1.9 CM
TRICUSPID VALVE PEAK REGURGITATION VELOCITY: 1.91 M/S
TRIGL SERPL-MCNC: 109 MG/DL
VENTRICULAR RATE: 76 BPM
WBC # BLD AUTO: 3.76 THOUSAND/UL (ref 4.31–10.16)
Z-SCORE OF INTERVENTRICULAR SEPTUM IN END DIASTOLE: 7.31
Z-SCORE OF LEFT VENTRICULAR DIMENSION IN END DIASTOLE: -0.51
Z-SCORE OF LEFT VENTRICULAR DIMENSION IN END SYSTOLE: -0.55
Z-SCORE OF LEFT VENTRICULAR POSTERIOR WALL IN END DIASTOLE: 4.47

## 2022-03-08 PROCEDURE — 84100 ASSAY OF PHOSPHORUS: CPT

## 2022-03-08 PROCEDURE — 80053 COMPREHEN METABOLIC PANEL: CPT | Performed by: INTERNAL MEDICINE

## 2022-03-08 PROCEDURE — 93306 TTE W/DOPPLER COMPLETE: CPT | Performed by: INTERNAL MEDICINE

## 2022-03-08 PROCEDURE — 80061 LIPID PANEL: CPT | Performed by: INTERNAL MEDICINE

## 2022-03-08 PROCEDURE — G1004 CDSM NDSC: HCPCS

## 2022-03-08 PROCEDURE — 93010 ELECTROCARDIOGRAM REPORT: CPT | Performed by: INTERNAL MEDICINE

## 2022-03-08 PROCEDURE — 82550 ASSAY OF CK (CPK): CPT | Performed by: INTERNAL MEDICINE

## 2022-03-08 PROCEDURE — 85025 COMPLETE CBC W/AUTO DIFF WBC: CPT | Performed by: INTERNAL MEDICINE

## 2022-03-08 PROCEDURE — 84484 ASSAY OF TROPONIN QUANT: CPT | Performed by: INTERNAL MEDICINE

## 2022-03-08 PROCEDURE — 83735 ASSAY OF MAGNESIUM: CPT

## 2022-03-08 PROCEDURE — 93306 TTE W/DOPPLER COMPLETE: CPT

## 2022-03-08 PROCEDURE — 84145 PROCALCITONIN (PCT): CPT | Performed by: INTERNAL MEDICINE

## 2022-03-08 PROCEDURE — 83605 ASSAY OF LACTIC ACID: CPT | Performed by: INTERNAL MEDICINE

## 2022-03-08 PROCEDURE — 70551 MRI BRAIN STEM W/O DYE: CPT

## 2022-03-08 PROCEDURE — 97162 PT EVAL MOD COMPLEX 30 MIN: CPT

## 2022-03-08 PROCEDURE — 83036 HEMOGLOBIN GLYCOSYLATED A1C: CPT | Performed by: INTERNAL MEDICINE

## 2022-03-08 PROCEDURE — 97166 OT EVAL MOD COMPLEX 45 MIN: CPT

## 2022-03-08 PROCEDURE — 99239 HOSP IP/OBS DSCHRG MGMT >30: CPT | Performed by: INTERNAL MEDICINE

## 2022-03-08 RX ORDER — ASPIRIN 81 MG/1
81 TABLET ORAL DAILY
Refills: 0
Start: 2022-03-08

## 2022-03-08 RX ORDER — AMLODIPINE BESYLATE 5 MG/1
5 TABLET ORAL DAILY
Qty: 30 TABLET | Refills: 0 | Status: SHIPPED | OUTPATIENT
Start: 2022-03-08 | End: 2022-03-18 | Stop reason: SDUPTHER

## 2022-03-08 RX ORDER — CLOPIDOGREL BISULFATE 75 MG/1
75 TABLET ORAL DAILY
Qty: 30 TABLET | Refills: 0 | Status: SHIPPED | OUTPATIENT
Start: 2022-03-09 | End: 2022-04-04 | Stop reason: SDUPTHER

## 2022-03-08 RX ORDER — DOCUSATE SODIUM 100 MG/1
100 CAPSULE, LIQUID FILLED ORAL 2 TIMES DAILY PRN
Refills: 0
Start: 2022-03-08 | End: 2022-03-23

## 2022-03-08 RX ORDER — AMLODIPINE BESYLATE 5 MG/1
5 TABLET ORAL DAILY
Status: DISCONTINUED | OUTPATIENT
Start: 2022-03-08 | End: 2022-03-08 | Stop reason: HOSPADM

## 2022-03-08 RX ORDER — POTASSIUM CHLORIDE 20 MEQ/1
20 TABLET, EXTENDED RELEASE ORAL ONCE
Status: COMPLETED | OUTPATIENT
Start: 2022-03-08 | End: 2022-03-08

## 2022-03-08 RX ORDER — ATORVASTATIN CALCIUM 40 MG/1
40 TABLET, FILM COATED ORAL EVERY EVENING
Qty: 30 TABLET | Refills: 0 | Status: SHIPPED | OUTPATIENT
Start: 2022-03-08 | End: 2022-06-24 | Stop reason: SDUPTHER

## 2022-03-08 RX ADMIN — DOCUSATE SODIUM 100 MG: 100 CAPSULE ORAL at 08:03

## 2022-03-08 RX ADMIN — LISINOPRIL 10 MG: 10 TABLET ORAL at 08:03

## 2022-03-08 RX ADMIN — AMLODIPINE BESYLATE 5 MG: 5 TABLET ORAL at 11:30

## 2022-03-08 RX ADMIN — POTASSIUM CHLORIDE 20 MEQ: 1500 TABLET, EXTENDED RELEASE ORAL at 09:54

## 2022-03-08 RX ADMIN — HEPARIN SODIUM 5000 UNITS: 5000 INJECTION INTRAVENOUS; SUBCUTANEOUS at 05:43

## 2022-03-08 RX ADMIN — ASPIRIN 81 MG: 81 TABLET, COATED ORAL at 08:03

## 2022-03-08 RX ADMIN — CLOPIDOGREL BISULFATE 75 MG: 75 TABLET ORAL at 08:03

## 2022-03-08 NOTE — ASSESSMENT & PLAN NOTE
· Recheck a CBC with differential as an outpatient with her PCP  · If the neutropenia persists as an outpatient, she will need an outpatient Hematology/Oncology evaluation

## 2022-03-08 NOTE — ASSESSMENT & PLAN NOTE
Malnutrition Findings:      · Thin Cachectic appearance, looks older than her age  BMI Findings:     · Body mass index is 18 94 kg/m²       Plan:  · Nutrition consult

## 2022-03-08 NOTE — PLAN OF CARE
Problem: MOBILITY - ADULT  Goal: Maintain or return to baseline ADL function  Description: INTERVENTIONS:  -  Assess patient's ability to carry out ADLs; assess patient's baseline for ADL function and identify physical deficits which impact ability to perform ADLs (bathing, care of mouth/teeth, toileting, grooming, dressing, etc )  - Assess/evaluate cause of self-care deficits   - Assess range of motion  - Assess patient's mobility; develop plan if impaired  - Assess patient's need for assistive devices and provide as appropriate  - Encourage maximum independence but intervene and supervise when necessary  - Involve family in performance of ADLs  - Assess for home care needs following discharge   - Consider OT consult to assist with ADL evaluation and planning for discharge  - Provide patient education as appropriate  3/8/2022 1419 by Arun Combs RN  Outcome: Adequate for Discharge  3/8/2022 1054 by Arun Combs RN  Outcome: Progressing  Goal: Maintains/Returns to pre admission functional level  Description: INTERVENTIONS:  - Perform BMAT or MOVE assessment daily    - Set and communicate daily mobility goal to care team and patient/family/caregiver  - Collaborate with rehabilitation services on mobility goals if consulted  - Perform Range of Motion 3-4 times a day  - Reposition patient every 1-2 hours    - Dangle patient 3-4 times a day  - Stand patient 3-4 times a day  - Ambulate patient 3-4 times a day  - Out of bed to chair 3-4 times a day   - Out of bed for meals 3-4 times a day  - Out of bed for toileting  - Record patient progress and toleration of activity level   3/8/2022 1419 by Arun Combs RN  Outcome: Adequate for Discharge  3/8/2022 1054 by Arun Combs RN  Outcome: Progressing     Problem: Potential for Falls  Goal: Patient will remain free of falls  Description: INTERVENTIONS:  - Educate patient/family on patient safety including physical limitations  - Instruct patient to call for assistance with activity   - Consult OT/PT to assist with strengthening/mobility   - Keep Call bell within reach  - Keep bed low and locked with side rails adjusted as appropriate  - Keep care items and personal belongings within reach  - Initiate and maintain comfort rounds  - Make Fall Risk Sign visible to staff  - Offer Toileting every 1-2 Hours, in advance of need  - Initiate/Maintain bed and chair alarm  - Obtain necessary fall risk management equipment: fall socks  - Apply yellow socks and bracelet for high fall risk patients  - Consider moving patient to room near nurses station  3/8/2022 1419 by Teo Justice, RN  Outcome: Adequate for Discharge  3/8/2022 1054 by Teo Justice, RN  Outcome: Progressing     Problem: Nutrition/Hydration-ADULT  Goal: Nutrient/Hydration intake appropriate for improving, restoring or maintaining nutritional needs  Description: Monitor and assess patient's nutrition/hydration status for malnutrition  Collaborate with interdisciplinary team and initiate plan and interventions as ordered  Monitor patient's weight and dietary intake as ordered or per policy  Utilize nutrition screening tool and intervene as necessary  Determine patient's food preferences and provide high-protein, high-caloric foods as appropriate       INTERVENTIONS:  - Monitor oral intake, urinary output, labs, and treatment plans  - Assess nutrition and hydration status and recommend course of action  - Evaluate amount of meals eaten  - Assist patient with eating if necessary   - Allow adequate time for meals  - Recommend/ encourage appropriate diets, oral nutritional supplements, and vitamin/mineral supplements  - Order, calculate, and assess calorie counts as needed  - Recommend, monitor, and adjust tube feedings and TPN/PPN based on assessed needs  - Assess need for intravenous fluids  - Provide specific nutrition/hydration education as appropriate  - Include patient/family/caregiver in decisions related to nutrition  3/8/2022 1419 by Chary Tadeo RN  Outcome: Adequate for Discharge  3/8/2022 1054 by Chary Tadeo RN  Outcome: Progressing     Problem: NEUROSENSORY - ADULT  Goal: Achieves stable or improved neurological status  Description: INTERVENTIONS  - Monitor and report changes in neurological status  - Monitor vital signs such as temperature, blood pressure, glucose, and any other labs ordered   - Initiate measures to prevent increased intracranial pressure  - Monitor for seizure activity and implement precautions if appropriate      3/8/2022 1419 by Chary Tadeo RN  Outcome: Adequate for Discharge  3/8/2022 1054 by Chary Tadeo RN  Outcome: Progressing     Problem: CARDIOVASCULAR - ADULT  Goal: Maintains optimal cardiac output and hemodynamic stability  Description: INTERVENTIONS:  - Monitor I/O, vital signs and rhythm  - Monitor for S/S and trends of decreased cardiac output  - Administer and titrate ordered vasoactive medications to optimize hemodynamic stability  - Assess quality of pulses, skin color and temperature  - Assess for signs of decreased coronary artery perfusion  - Instruct patient to report change in severity of symptoms  3/8/2022 1419 by Chary Tadeo RN  Outcome: Adequate for Discharge  3/8/2022 1054 by Chary Tadeo RN  Outcome: Progressing     Problem: PAIN - ADULT  Goal: Verbalizes/displays adequate comfort level or baseline comfort level  Description: Interventions:  - Encourage patient to monitor pain and request assistance  - Assess pain using appropriate pain scale  - Administer analgesics based on type and severity of pain and evaluate response  - Implement non-pharmacological measures as appropriate and evaluate response  - Consider cultural and social influences on pain and pain management  - Notify physician/advanced practitioner if interventions unsuccessful or patient reports new pain  3/8/2022 1419 by Chary Tadeo RN  Outcome: Adequate for Discharge  3/8/2022 1054 by Kenyatta Bragg RN  Outcome: Progressing     Problem: INFECTION - ADULT  Goal: Absence or prevention of progression during hospitalization  Description: INTERVENTIONS:  - Assess and monitor for signs and symptoms of infection  - Monitor lab/diagnostic results  - Monitor all insertion sites, i e  indwelling lines, tubes, and drains  - Monitor endotracheal if appropriate and nasal secretions for changes in amount and color  - [place] appropriate cooling/warming therapies per order  - Administer medications as ordered  - Instruct and encourage patient and family to use good hand hygiene technique  - Identify and instruct in appropriate isolation precautions for identified infection/condition  3/8/2022 1419 by Kenyatta Bragg RN  Outcome: Adequate for Discharge  3/8/2022 1054 by Kenyatta Bragg RN  Outcome: Progressing     Problem: SAFETY ADULT  Goal: Maintain or return to baseline ADL function  Description: INTERVENTIONS:  -  Assess patient's ability to carry out ADLs; assess patient's baseline for ADL function and identify physical deficits which impact ability to perform ADLs (bathing, care of mouth/teeth, toileting, grooming, dressing, etc )  - Assess/evaluate cause of self-care deficits   - Assess range of motion  - Assess patient's mobility; develop plan if impaired  - Assess patient's need for assistive devices and provide as appropriate  - Encourage maximum independence but intervene and supervise when necessary  - Involve family in performance of ADLs  - Assess for home care needs following discharge   - Consider OT consult to assist with ADL evaluation and planning for discharge  - Provide patient education as appropriate  3/8/2022 1419 by Kenyatta Bragg RN  Outcome: Adequate for Discharge  3/8/2022 1054 by Kenyatta Bragg RN  Outcome: Progressing  Goal: Maintains/Returns to pre admission functional level  Description: INTERVENTIONS:  - Perform BMAT or MOVE assessment daily    - Set and communicate daily mobility goal to care team and patient/family/caregiver  - Collaborate with rehabilitation services on mobility goals if consulted  - Perform Range of Motion 3-4 times a day  - Reposition patient every 1-2 hours    - Dangle patient 3-4 times a day  - Stand patient 3-4 times a day  - Ambulate patient 3-4 times a day  - Out of bed to chair 3-4 times a day   - Out of bed for meals 3-4 times a day  - Out of bed for toileting  - Record patient progress and toleration of activity level   3/8/2022 1419 by Aline Ugarte RN  Outcome: Adequate for Discharge  3/8/2022 1054 by Aline Ugarte RN  Outcome: Progressing  Goal: Patient will remain free of falls  Description: INTERVENTIONS:  - Educate patient/family on patient safety including physical limitations  - Instruct patient to call for assistance with activity   - Consult OT/PT to assist with strengthening/mobility   - Keep Call bell within reach  - Keep bed low and locked with side rails adjusted as appropriate  - Keep care items and personal belongings within reach  - Initiate and maintain comfort rounds  - Make Fall Risk Sign visible to staff  - Offer Toileting every 1-2 Hours, in advance of need  - Initiate/Maintain bed and chair alarm  - Obtain necessary fall risk management equipment: fall socks  - Apply yellow socks and bracelet for high fall risk patients  - Consider moving patient to room near nurses station  3/8/2022 1419 by Aline Ugarte RN  Outcome: Adequate for Discharge  3/8/2022 1054 by Aline Ugarte RN  Outcome: Progressing     Problem: DISCHARGE PLANNING  Goal: Discharge to home or other facility with appropriate resources  Description: INTERVENTIONS:  - Identify barriers to discharge w/patient and caregiver  - Arrange for needed discharge resources and transportation as appropriate  - Identify discharge learning needs (meds, wound care, etc )  - Arrange for interpretive services to assist at discharge as needed  - Refer to Case Management Department for coordinating discharge planning if the patient needs post-hospital services based on physician/advanced practitioner order or complex needs related to functional status, cognitive ability, or social support system  3/8/2022 1419 by Sonia Pierce RN  Outcome: Adequate for Discharge  3/8/2022 1054 by Sonia Pierce RN  Outcome: Progressing     Problem: Knowledge Deficit  Goal: Patient/family/caregiver demonstrates understanding of disease process, treatment plan, medications, and discharge instructions  Description: Complete learning assessment and assess knowledge base    Interventions:  - Provide teaching at level of understanding  - Provide teaching via preferred learning methods  3/8/2022 1419 by Sonia Pierce RN  Outcome: Adequate for Discharge  3/8/2022 1054 by Sonia Pierce RN  Outcome: Progressing

## 2022-03-08 NOTE — PLAN OF CARE
Problem: OCCUPATIONAL THERAPY ADULT  Goal: Performs self-care activities at highest level of function for planned discharge setting  See evaluation for individualized goals  Description: Treatment Interventions: ADL retraining,Functional transfer training,UE strengthening/ROM,Endurance training,Patient/family training,Compensatory technique education,Energy conservation,Activityengagement          See flowsheet documentation for full assessment, interventions and recommendations  Note: Limitation: Decreased ADL status,Decreased UE strength,Decreased Safe judgement during ADL,Decreased cognition,Decreased endurance,Decreased self-care trans,Decreased high-level ADLs     Assessment: Pt is a 76 y o  female seen for OT evaluation s/p admit to [facility] on 3/7/2022 w/ Numbness  Comorbidities affecting pt's functional performance at time of assessment include: HTN, hyperlipidemia, cardiac disease, hypercholesteremia, renal disorder, CAD,  Personal factors affecting pt at time of IE include:steps to enter environment, difficulty performing ADLS, difficulty performing IADLS , limited insight into deficits, decreased initiation and engagement  and health management   Prior to admission, pt was I with ADLS and IADLS  Upon evaluation: Pt presents supine  Pt requires S- Min A, 2* the following deficits impacting occupational performance: weakness, decreased strength, decreased balance, decreased tolerance, impaired attention, impaired sequencing, impaired problem solving and decreased safety awareness  Pt resting in chair at end of session with all needs in reach, alarm on, all lines in place and SCD's on  Pt to benefit from continued skilled OT tx while in the hospital to address deficits as defined above and maximize level of functional independence w ADL's and functional mobility   Occupational Performance areas to address include: grooming, bathing/shower, toilet hygiene, dressing, health maintenance, functional mobility, community mobility and clothing management  The patient's raw score on the AM-PAC Daily Activity inpatient short form is 23, standardized score is 51 12, greater than 39 4  Patients at this level are likely to benefit from discharge to home  Please refer to the recommendation of the Occupational Therapist for safe discharge planning       OT Discharge Recommendation: Home with outpatient rehabilitation

## 2022-03-08 NOTE — PLAN OF CARE
Problem: MOBILITY - ADULT  Goal: Maintain or return to baseline ADL function  Description: INTERVENTIONS:  -  Assess patient's ability to carry out ADLs; assess patient's baseline for ADL function and identify physical deficits which impact ability to perform ADLs (bathing, care of mouth/teeth, toileting, grooming, dressing, etc )  - Assess/evaluate cause of self-care deficits   - Assess range of motion  - Assess patient's mobility; develop plan if impaired  - Assess patient's need for assistive devices and provide as appropriate  - Encourage maximum independence but intervene and supervise when necessary  - Involve family in performance of ADLs  - Assess for home care needs following discharge   - Consider OT consult to assist with ADL evaluation and planning for discharge  - Provide patient education as appropriate  Outcome: Progressing  Goal: Maintains/Returns to pre admission functional level  Description: INTERVENTIONS:  - Perform BMAT or MOVE assessment daily    - Set and communicate daily mobility goal to care team and patient/family/caregiver  - Collaborate with rehabilitation services on mobility goals if consulted  - Perform Range of Motion 3-4 times a day  - Reposition patient every 1-2 hours    - Dangle patient 3-4 times a day  - Stand patient 3-4 times a day  - Ambulate patient 3-4 times a day  - Out of bed to chair 3-4 times a day   - Out of bed for meals 3-4 times a day  - Out of bed for toileting  - Record patient progress and toleration of activity level   Outcome: Progressing     Problem: Potential for Falls  Goal: Patient will remain free of falls  Description: INTERVENTIONS:  - Educate patient/family on patient safety including physical limitations  - Instruct patient to call for assistance with activity   - Consult OT/PT to assist with strengthening/mobility   - Keep Call bell within reach  - Keep bed low and locked with side rails adjusted as appropriate  - Keep care items and personal belongings within reach  - Initiate and maintain comfort rounds  - Make Fall Risk Sign visible to staff  - Offer Toileting every 1-2 Hours, in advance of need  - Initiate/Maintain bed and chair alarm  - Obtain necessary fall risk management equipment: fall socks  - Apply yellow socks and bracelet for high fall risk patients  - Consider moving patient to room near nurses station  Outcome: Progressing     Problem: Nutrition/Hydration-ADULT  Goal: Nutrient/Hydration intake appropriate for improving, restoring or maintaining nutritional needs  Description: Monitor and assess patient's nutrition/hydration status for malnutrition  Collaborate with interdisciplinary team and initiate plan and interventions as ordered  Monitor patient's weight and dietary intake as ordered or per policy  Utilize nutrition screening tool and intervene as necessary  Determine patient's food preferences and provide high-protein, high-caloric foods as appropriate       INTERVENTIONS:  - Monitor oral intake, urinary output, labs, and treatment plans  - Assess nutrition and hydration status and recommend course of action  - Evaluate amount of meals eaten  - Assist patient with eating if necessary   - Allow adequate time for meals  - Recommend/ encourage appropriate diets, oral nutritional supplements, and vitamin/mineral supplements  - Order, calculate, and assess calorie counts as needed  - Recommend, monitor, and adjust tube feedings and TPN/PPN based on assessed needs  - Assess need for intravenous fluids  - Provide specific nutrition/hydration education as appropriate  - Include patient/family/caregiver in decisions related to nutrition  Outcome: Progressing     Problem: NEUROSENSORY - ADULT  Goal: Achieves stable or improved neurological status  Description: INTERVENTIONS  - Monitor and report changes in neurological status  - Monitor vital signs such as temperature, blood pressure, glucose, and any other labs ordered   - Initiate measures to prevent increased intracranial pressure  - Monitor for seizure activity and implement precautions if appropriate      Outcome: Progressing     Problem: CARDIOVASCULAR - ADULT  Goal: Maintains optimal cardiac output and hemodynamic stability  Description: INTERVENTIONS:  - Monitor I/O, vital signs and rhythm  - Monitor for S/S and trends of decreased cardiac output  - Administer and titrate ordered vasoactive medications to optimize hemodynamic stability  - Assess quality of pulses, skin color and temperature  - Assess for signs of decreased coronary artery perfusion  - Instruct patient to report change in severity of symptoms  Outcome: Progressing     Problem: PAIN - ADULT  Goal: Verbalizes/displays adequate comfort level or baseline comfort level  Description: Interventions:  - Encourage patient to monitor pain and request assistance  - Assess pain using appropriate pain scale  - Administer analgesics based on type and severity of pain and evaluate response  - Implement non-pharmacological measures as appropriate and evaluate response  - Consider cultural and social influences on pain and pain management  - Notify physician/advanced practitioner if interventions unsuccessful or patient reports new pain  Outcome: Progressing     Problem: INFECTION - ADULT  Goal: Absence or prevention of progression during hospitalization  Description: INTERVENTIONS:  - Assess and monitor for signs and symptoms of infection  - Monitor lab/diagnostic results  - Monitor all insertion sites, i e  indwelling lines, tubes, and drains  - Monitor endotracheal if appropriate and nasal secretions for changes in amount and color  - [place] appropriate cooling/warming therapies per order  - Administer medications as ordered  - Instruct and encourage patient and family to use good hand hygiene technique  - Identify and instruct in appropriate isolation precautions for identified infection/condition  Outcome: Progressing     Problem: SAFETY ADULT  Goal: Maintain or return to baseline ADL function  Description: INTERVENTIONS:  -  Assess patient's ability to carry out ADLs; assess patient's baseline for ADL function and identify physical deficits which impact ability to perform ADLs (bathing, care of mouth/teeth, toileting, grooming, dressing, etc )  - Assess/evaluate cause of self-care deficits   - Assess range of motion  - Assess patient's mobility; develop plan if impaired  - Assess patient's need for assistive devices and provide as appropriate  - Encourage maximum independence but intervene and supervise when necessary  - Involve family in performance of ADLs  - Assess for home care needs following discharge   - Consider OT consult to assist with ADL evaluation and planning for discharge  - Provide patient education as appropriate  Outcome: Progressing  Goal: Maintains/Returns to pre admission functional level  Description: INTERVENTIONS:  - Perform BMAT or MOVE assessment daily    - Set and communicate daily mobility goal to care team and patient/family/caregiver  - Collaborate with rehabilitation services on mobility goals if consulted  - Perform Range of Motion 3-4 times a day  - Reposition patient every 1-2 hours    - Dangle patient 3-4 times a day  - Stand patient 3-4 times a day  - Ambulate patient 3-4 times a day  - Out of bed to chair 3-4 times a day   - Out of bed for meals 3-4 times a day  - Out of bed for toileting  - Record patient progress and toleration of activity level   Outcome: Progressing  Goal: Patient will remain free of falls  Description: INTERVENTIONS:  - Educate patient/family on patient safety including physical limitations  - Instruct patient to call for assistance with activity   - Consult OT/PT to assist with strengthening/mobility   - Keep Call bell within reach  - Keep bed low and locked with side rails adjusted as appropriate  - Keep care items and personal belongings within reach  - Initiate and maintain comfort rounds  - Make Fall Risk Sign visible to staff  - Offer Toileting every 1-2 Hours, in advance of need  - Initiate/Maintain bed and chair alarm  - Obtain necessary fall risk management equipment: fall socks  - Apply yellow socks and bracelet for high fall risk patients  - Consider moving patient to room near nurses station  Outcome: Progressing     Problem: DISCHARGE PLANNING  Goal: Discharge to home or other facility with appropriate resources  Description: INTERVENTIONS:  - Identify barriers to discharge w/patient and caregiver  - Arrange for needed discharge resources and transportation as appropriate  - Identify discharge learning needs (meds, wound care, etc )  - Arrange for interpretive services to assist at discharge as needed  - Refer to Case Management Department for coordinating discharge planning if the patient needs post-hospital services based on physician/advanced practitioner order or complex needs related to functional status, cognitive ability, or social support system  Outcome: Progressing     Problem: Knowledge Deficit  Goal: Patient/family/caregiver demonstrates understanding of disease process, treatment plan, medications, and discharge instructions  Description: Complete learning assessment and assess knowledge base    Interventions:  - Provide teaching at level of understanding  - Provide teaching via preferred learning methods  Outcome: Progressing

## 2022-03-08 NOTE — OCCUPATIONAL THERAPY NOTE
Occupational Therapy Evaluation     Patient Name: Reinier Chairez  KSESW'P Date: 3/8/2022  Problem List  Principal Problem:    Numbness of left hand and leg  Active Problems:    Hypertensive emergency    Potential for falls    Malnutrition ([address] )    Premature atrial complexes    Thrombocytopenia (HCC)    Leukopenia    Neutropenia (HCC)    Hyperphosphatemia    Hypercholesterolemia    Past Medical History  Past Medical History:   Diagnosis Date    CAD (coronary artery disease)     Cardiac disease     Hypercholesteremia     Hyperlipidemia     Hypertension     Hypertension     Renal disorder      Past Surgical History  Past Surgical History:   Procedure Laterality Date    CORONARY ANGIOPLASTY WITH STENT PLACEMENT      WRIST SURGERY          03/08/22 1045   OT Last Visit   OT Visit Date 03/08/22   Note Type   Note type Evaluation   Restrictions/Precautions   Weight Bearing Precautions Per Order No   Other Precautions Fall Risk   Pain Assessment   Pain Score No Pain   Home Living   Type of [address] One level;Performs ADLs on one level;Stairs to enter without rails   Bathroom Shower/Tub Tub/shower unit   Bathroom Toilet Standard   Bathroom Accessibility Accessible   Additional Comments Pt has 1 ADONIS, denies use of DME PTA   Prior Function   Level of [place] Independent with ADLs and functional mobility   Lives With Spouse   Receives Help From Family   ADL Assistance Independent   IADLs Independent   Falls in the last 6 months 1 to 4   Vocational Retired   Comments Pt initially reports no falls however then reports " more like silly things" and states she has fallen while outside pulling weeds as well as in the kitchen from not turning on the light, denies injury with same  +    Psychosocial   Psychosocial (WDL) WDL   Subjective   Subjective "I'm not senile"    ADL   Where Assessed Other (Comment)  (toilet)   Grooming Assistance 5  Supervision/Setup   Grooming Deficit Wash/dry hands LB Dressing Assistance 4  Minimal Assistance   LB Dressing Deficit Pull up over hips   [address]  5  Supervision/Setup   Toileting Deficit Perineal hygiene   Additional Comments When washing hands pt states " theres no soap" when the location of the hand soap was pointed out pt states "oh well" and declines use following toileting  Pt also requiring assist to complete [facility] following toileting as pants were not pulled up all the way and pt was unaware of same    Bed Mobility   Supine to Sit 5  Supervision   Additional items Increased time required   Additional Comments OOB at end of session   Transfers   Sit to Stand 5  Supervision   Additional items Increased time required   Stand to Sit 5  Supervision   Additional items Increased time required   Stand pivot 5  Supervision   Additional items Increased time required   Toilet transfer 5  Supervision   Additional items Increased time required   Additional Comments no AD   Functional Mobility   Functional Mobility 5  Supervision   Additional Comments ~250 ft very close supervision no AD   Balance   Static Sitting Good   Dynamic Sitting Good   Static Standing Fair +   Dynamic Standing Fair   Ambulatory Fair   Activity Tolerance   Activity Tolerance Patient limited by fatigue   RUE Assessment   RUE Assessment WFL   LUE Assessment   LUE Assessment WFL   Hand Function   Gross Motor Coordination Functional   Fine Motor Coordination Functional   Sensation   Light Touch No apparent deficits   Sharp/Dull No apparent deficits   Cognition   Overall Cognitive Status Impaired   Arousal/Participation Alert   Attention Attends with cues to redirect   Orientation Level Oriented X4   Memory Decreased short term memory   Following Commands Follows multistep commands with increased time or repetition   Comments Questionable insight, judgment, and safety awareness  Assessment   Limitation Decreased ADL status; Decreased UE strength;Decreased Safe judgement during ADL;Decreased cognition;Decreased endurance;Decreased self-care trans;Decreased high-level ADLs   Assessment Pt is a 76 y o  female seen for OT evaluation s/p admit to [facility] on 3/7/2022 w/ Numbness  Comorbidities affecting pt's functional performance at time of assessment include: HTN, hyperlipidemia, cardiac disease, hypercholesteremia, renal disorder, CAD,  Personal factors affecting pt at time of IE include:steps to enter environment, difficulty performing ADLS, difficulty performing IADLS , limited insight into deficits, decreased initiation and engagement  and health management   Prior to admission, pt was I with ADLS and IADLS  Upon evaluation: Pt presents supine  Pt requires S- Min A, 2* the following deficits impacting occupational performance: weakness, decreased strength, decreased balance, decreased tolerance, impaired attention, impaired sequencing, impaired problem solving and decreased safety awareness  Pt resting in chair at end of session with all needs in reach, alarm on, all lines in place and SCD's on  Pt to benefit from continued skilled OT tx while in the hospital to address deficits as defined above and maximize level of functional independence w ADL's and functional mobility  Occupational Performance areas to address include: grooming, bathing/shower, toilet hygiene, dressing, health maintenance, functional mobility, community mobility and clothing management  The patient's raw score on the AM-PAC Daily Activity inpatient short form is 23, standardized score is 51 12, greater than 39 4  Patients at this level are likely to benefit from discharge to home  Please refer to the recommendation of the Occupational Therapist for safe discharge planning     Goals   Patient Goals to go home   Short Term Goal #1 Pt will increase activity tolerance to G for 30 min txment sessions   Short Term Goal #2 Pt will complete UB TE to increase strength in order to improve overall functional abilities    Short Term Goal  Pt will complete UB/LB self cares at MOD I level with AE PRN  Long Term Goal #1 Pt will be MOD I  with toileting with AE PRN   Long Term Goal #2 Pt will complete transfers/functional mobility at MOD I level with DME PRN and good safety awareness  Long Term Goal Pt will engage in ongoing cognitive assessment w/ G participation to assist w/ safe d/c planning   Plan   Treatment Interventions ADL retraining;Functional transfer training;UE strengthening/ROM; Endurance training;Patient/family training; Compensatory technique education; Energy conservation; Activityengagement   Goal Expiration Date 03/22/22   OT Frequency 3-5x/wk   Recommendation   OT Discharge Recommendation Home with outpatient rehabilitation   AM-PAC Daily Activity Inpatient   Lower Body Dressing 3   Bathing 4   Toileting 4   Upper Body Dressing 4   Grooming 4   Eating 4   Daily Activity Raw Score 23   Daily Activity Standardized Score (Calc for Raw Score >=11) 51 12   AM-PAC Applied Cognition Inpatient   Following a Speech/Presentation 4   Understanding Ordinary Conversation 4   Taking Medications 4   Remembering Where Things Are Placed or Put Away 3   Remembering List of 4-5 Errands 3   Taking Care of Complicated Tasks 2   Applied Cognition Raw Score 20   Applied Cognition Standardized Score 41 76

## 2022-03-08 NOTE — SPEECH THERAPY NOTE
Speech Language/Pathology    REQUIRED DOCUMENTATION:     1  This service was provided via Telemedicine  2  Provider located at [address] [place] [address]  3  TeleMed provider: ANDREW Cortez   4  Identify all parties in room with patient during tele consult:  Nura RN  5  Patient was then informed that this was a Telemedicine visit and that the exam was being conducted confidentially over secure lines  My office door was closed  No one else was in the room  Patient acknowledged consent and understanding of privacy and security of the Telemedicine visit, and gave us permission to have the assistant stay in the room in order to assist with the history and to conduct the exam   I informed the patient that I have reviewed their record in Epic and presented the opportunity for them to ask any questions regarding the visit today  The patient agreed to participate  Consult received  Records reviewed  Pt admitted c symptoms concerning for CVA  Pt passed RN Dysphagia Assessment and tolerated a regular texture breakfast with thin liquids  Communication deficits denied  CT negative,  MRI results pending  No additional inpatient Speech Pathology evaluation appears indicated at this time  Please re-consult if additional concerns arise  Thank you      Heidi Cortez Isaac 85 CCC-SLP  3/8/2022

## 2022-03-08 NOTE — ASSESSMENT & PLAN NOTE
· Patient presented to ED with elevated blood pressure 237/107mmHg with symptoms concerning for stroke  · Home medication, lisinopril 10 mg  · Received a dose of labetalol 20 mg in the ED on arrival  · Neurology on consult  · SBP ranging from 168 - 181, will bring back to goal bp with negative MRI  · Permissive hypertension 220/110 because of the ischemic stroke    Plan:  · Continue lisinopril 10 mg  · Amlodipine 5 mg  · Out patient follow up with Nephrology for blood pressure management

## 2022-03-08 NOTE — ASSESSMENT & PLAN NOTE
Patient presented to ED with numbness in the left forearm and hand along with left leg and foot  Started last night  Associated with nausea, dizziness  No associated syncope, headache, change in mental status, speech, vision  No weakness  Symptoms are most likely secondary to stroke vs   · Neurology consulted, initiated stroke pathway  · CTA head and neck: No large vessel occlusion  Posterior calcification along the aortic arch with atheromatous plaque at the origin of left subclavian artery results in mild left subclavian stenosis  There is mild stenosis origin of the right vertebral artery with additional smaller areas of mild to moderate vertebral artery stenosis without occlusion         -3 mm outpouching communicating segment of the left internal carotid artery potentially infundibular origin of a      small left posterior communicating artery versus small P-comm aneurysm  -Moderate to severe stenosis origin of the left PICA  - Atherosclerotic calcification results in mild cavernous ICA stenosis bilaterally  · CT brain: No acute intracranial abnormality  Microangiopathic changes  Chronic lacunar infarct left thalamus  · Patient is hypertensive at 237/107 on admission  · Patient is not a candidate for tPA therapy because of the timeline  · Started heparin Plavix in the ED with lowering dose of 300 mg  · Lipid panel abnormal with elevated LDL and cholesterol  · TTE:   Left Ventricle: Left ventricular cavity size is normal  Wall thickness is mildly increased  The left ventricular ejection fraction is 60%  Systolic function is normal  Diastolic function is mildly abnormal, consistent with grade I (abnormal) relaxation  There is moderate concentric hypertrophy  There is severe hypokinesis of the basal inferior, inferoseptal and basal to mid inferolateral walls    Right Ventricle: Right ventricular cavity size is normal  Systolic function is normal     · Telemetry monitor shows occasional PACs and PVCs  · MRI brain shows:   1  Acute posterior right pontine infarct  No significant edema  No hemorrhagic transformation  2   Chronic microangiopathic change  3   Old left thalamic infarct  Plan  · Outpatient follow-up with Neurology with in 3 weeks  · Plavix 70 mg daily x 3 weeks  · Aspirin 81 mg daily x 3 weeks  · Plavix monotherapy after 3 weeks  · Atorvastatin 40 mg  · Outpatient follow up with Neurovascular team for IC calcifications  · Out patient follow up with cardiology for zio patch monitoring

## 2022-03-08 NOTE — ASSESSMENT & PLAN NOTE
CTA brain on on 03/07/2022 shows: Atherosclerotic calcification results in mild cavernous ICA stenosis bilaterally      Out patient follow up with neurovascular team

## 2022-03-08 NOTE — PLAN OF CARE
Problem: PHYSICAL THERAPY ADULT  Goal: Performs mobility at highest level of function for planned discharge setting  See evaluation for individualized goals  Description: Treatment/Interventions: Functional transfer training,Elevations,Endurance training,Bed mobility,Gait training          See flowsheet documentation for full assessment, interventions and recommendations  Note: Prognosis: Good  Problem List: Decreased endurance,Impaired balance,Decreased mobility  Assessment: Patient is a 76 y o  female evaluated by Physical Therapy s/p admit to [address] [place] [address],[address] on 3/7/2022 with admitting diagnosis of: Numbness, CVA (cerebral vascular accident), Hypertensive emergency, and principal problem of: Numbness  PT was consulted to assess patient's functional mobility and discharge needs  Ordered are PT Evaluation and treatment with activity level of: up with assistance  Comorbidities affecting patient's physical performance at time of assessment include: HTN, HLD, CAD, HTN  Personal factors affecting the patient at time of IE include: step(s) to enter home, history of fall(s) and inability/difficulty performing IADLs  Please locate objective findings from PT assessment regarding body systems outlined above  Upon evaluation, pt able to perform all functional mobility with SUP, increased time, and occasional verbal cuing  Pt able to ambulate 250' before taking seated rest break  Moderate postural sway demonstrated throughout session, however no true LOB experienced  HR and SpO2 remained WFL on RA throughout  The patient's AM-PAC Basic Mobility Inpatient Short Form Raw Score is 20  A Raw score of greater than 16 suggests the patient may benefit from discharge to home  Please also refer to the recommendation of the Physical Therapist for safe discharge planning   Co treatment with OT secondary to complex medical condition of pt, possible A of 2 required to achieve and maintain transitional movements, requiring the need of skilled therapeutic intervention of 2 therapists to achieve delivery of services  Pt will benefit from continued PT intervention during LOS to address current deficits, increase LOF, and facilitate safe d/c to next level of care when medically appropriate  D/c recommendation at this time is home with outpatient rehabilitation  PT Discharge Recommendation: Home with outpatient rehabilitation          See flowsheet documentation for full assessment

## 2022-03-08 NOTE — ASSESSMENT & PLAN NOTE
· Monitor for any signs of bleeding  · Recheck a CBC with differential as an outpatient with her PCP  · If the thrombocytopenia persists as an outpatient, she will need an outpatient Hematology/Oncology evaluation

## 2022-03-08 NOTE — ASSESSMENT & PLAN NOTE
WBC count at below normal range on admission  Patient denies any complaints  Continue clinical monitoring

## 2022-03-08 NOTE — PHYSICAL THERAPY NOTE
Physical Therapy Evaluation    Patient Name: Minerva Whyte    XTLMG'M Date: 3/8/2022     Problem List  Principal Problem:    Numbness of left hand and leg  Active Problems:    Hypertensive emergency    Potential for falls    Malnutrition (HCC)    Premature atrial complexes    Thrombocytopenia (HCC)    Leukopenia    Neutropenia (HCC)    Hyperphosphatemia       Past Medical History  Past Medical History:   Diagnosis Date    CAD (coronary artery disease)     Cardiac disease     Hypercholesteremia     Hyperlipidemia     Hypertension     Hypertension     Renal disorder         Past Surgical History  Past Surgical History:   Procedure Laterality Date    CORONARY ANGIOPLASTY WITH STENT PLACEMENT      WRIST SURGERY             03/08/22 0946   PT Last Visit   PT Visit Date 03/08/22   Note Type   Note type Evaluation   Pain Assessment   Pain Assessment Tool 0-10   Pain Score No Pain   Restrictions/Precautions   Weight Bearing Precautions Per Order No   Other Precautions Fall Risk;Multiple lines;Telemetry   Home Living   Type of [address] [place] [address] One level;Stairs to enter without rails  (1 ADONIS)   Bathroom Shower/Tub Tub/shower unit   Bathroom Toilet Standard   Bathroom Accessibility Accessible   Home Equipment [employer]  (pt states it is from an elderly relative)   Additional Comments pt denies use of DME at baseline   Prior Function   Level of [place] Independent with ADLs and functional mobility   Lives With Spouse   ADL Assistance Independent   IADLs Independent   Falls in the last 6 months 1 to 4   Vocational Retired   Comments (+) driving   General   Family/Caregiver Present No   Cognition   Overall Cognitive Status WFL   Arousal/Participation Alert   Orientation Level Oriented X4   Following Commands Follows all commands and directions without difficulty   Subjective   Subjective "I'm not senile"   RLE Assessment   RLE Assessment WFL   LLE Assessment   LLE Assessment WFL   Bed Mobility   Supine to Sit 5  Supervision   Sit to Supine 5  Supervision   Transfers   Sit to Stand 5  Supervision   Stand to Sit 5  Supervision   Stand pivot 5  Supervision   Toilet transfer 5  Supervision   Additional items Standard toilet   Additional Comments no device used   Ambulation/Elevation   Gait pattern Short stride;Decreased foot clearance;Narrow ELA   Gait Assistance 5  Supervision   Additional items Verbal cues   Assistive Device None   Distance 250'   Balance   Static Sitting Good   Dynamic Sitting Good   Static Standing Fair +   Dynamic Standing Fair +   Ambulatory Fair   Endurance Deficit   Endurance Deficit Yes   Endurance Deficit Description pt becoming SOB with increased ambulation   Activity Tolerance   Activity Tolerance Patient limited by fatigue   Assessment   Prognosis Good   Problem List Decreased endurance; Impaired balance;Decreased mobility   Assessment Patient is a 76 y o  female evaluated by Physical Therapy s/p admit to [address] [place] [address],[address] on 3/7/2022 with admitting diagnosis of: Numbness, CVA (cerebral vascular accident), Hypertensive emergency, and principal problem of: Numbness  PT was consulted to assess patient's functional mobility and discharge needs  Ordered are PT Evaluation and treatment with activity level of: up with assistance  Comorbidities affecting patient's physical performance at time of assessment include: HTN, HLD, CAD, HTN  Personal factors affecting the patient at time of IE include: step(s) to enter home, history of fall(s) and inability/difficulty performing IADLs  Please locate objective findings from PT assessment regarding body systems outlined above  Upon evaluation, pt able to perform all functional mobility with SUP, increased time, and occasional verbal cuing  Pt able to ambulate 250' before taking seated rest break  Moderate postural sway demonstrated throughout session, however no true LOB experienced   HR and SpO2 remained WFL on RA throughout  The patient's AM-PAC Basic Mobility Inpatient Short Form Raw Score is 20  A Raw score of greater than 16 suggests the patient may benefit from discharge to home  Please also refer to the recommendation of the Physical Therapist for safe discharge planning  Co treatment with OT secondary to complex medical condition of pt, possible A of 2 required to achieve and maintain transitional movements, requiring the need of skilled therapeutic intervention of 2 therapists to achieve delivery of services  Pt will benefit from continued PT intervention during LOS to address current deficits, increase LOF, and facilitate safe d/c to next level of care when medically appropriate  D/c recommendation at this time is home with outpatient rehabilitation  Goals   Patient Goals to go home   LTG Expiration Date 03/22/22   Long Term Goal #1 Pt will perform all functional transfers and bed mobility mod(I) with good safety awareness  Pt will ambulate 450' mod(I) while maintaining good functional dynamic balance  Pt will ascend/descend 1 stair with SUP to reflect the ability to safely navigate her home   Plan   Treatment/Interventions Functional transfer training;Elevations; Endurance training;Bed mobility;Gait training   PT Frequency 3-5x/wk   Recommendation   PT Discharge Recommendation Home with outpatient rehabilitation   AM-PAC Basic Mobility Inpatient   Turning in Bed Without Bedrails 4   Lying on Back to Sitting on Edge of Flat Bed 4   Moving Bed to Chair 3   Standing Up From Chair 3   Walk in Room 3   Climb 3-5 Stairs 3   Basic Mobility Inpatient Raw Score 20   Basic Mobility Standardized Score 43 99   Highest Level Of Mobility   JH-HLM Goal 6: Walk 10 steps or more   JH-HLM Highest Level of Mobility 7: Walk 25 feet or more   JH-HLM Goal Achieved Yes   End of Consult   Patient Position at End of Consult Bedside chair; All needs within reach

## 2022-03-08 NOTE — ASSESSMENT & PLAN NOTE
· Recheck a CBC with differential as an outpatient with her PCP  · If the leukopenia persists as an outpatient, she will need an outpatient Hematology/Oncology evaluation      Results from last 7 days   Lab Units 03/08/22  0443 03/07/22  1321   WBC Thousand/uL 3 76* 4 74   HEMOGLOBIN g/dL 12 5 14 0   HEMATOCRIT % 34 8 39 8   PLATELETS Thousands/uL 109* 121*

## 2022-03-08 NOTE — PLAN OF CARE
Problem: MOBILITY - ADULT  Goal: Maintain or return to baseline ADL function  Description: INTERVENTIONS:  -  Assess patient's ability to carry out ADLs; assess patient's baseline for ADL function and identify physical deficits which impact ability to perform ADLs (bathing, care of mouth/teeth, toileting, grooming, dressing, etc )  - Assess/evaluate cause of self-care deficits   - Assess range of motion  - Assess patient's mobility; develop plan if impaired  - Assess patient's need for assistive devices and provide as appropriate  - Encourage maximum independence but intervene and supervise when necessary  - Involve family in performance of ADLs  - Assess for home care needs following discharge   - Consider OT consult to assist with ADL evaluation and planning for discharge  - Provide patient education as appropriate  Outcome: Progressing  Goal: Maintains/Returns to pre admission functional level  Description: INTERVENTIONS:  - Perform BMAT or MOVE assessment daily    - Set and communicate daily mobility goal to care team and patient/family/caregiver  - Collaborate with rehabilitation services on mobility goals if consulted  - Perform Range of Motion 2 times a day  - Reposition patient every 2 hours    - Dangle patient 2 times a day  - Stand patient 2 times a day  - Ambulate patient 2 times a day  - Out of bed to chair 2 times a day   - Out of bed for meals 2 times a day  - Out of bed for toileting  - Record patient progress and toleration of activity level   Outcome: Progressing     Problem: Potential for Falls  Goal: Patient will remain free of falls  Description: INTERVENTIONS:  - Educate patient/family on patient safety including physical limitations  - Instruct patient to call for assistance with activity   - Consult OT/PT to assist with strengthening/mobility   - Keep Call bell within reach  - Keep bed low and locked with side rails adjusted as appropriate  - Keep care items and personal belongings within reach  - Initiate and maintain comfort rounds  - Make Fall Risk Sign visible to staff  - Offer Toileting every 4 Hours, in advance of need  - Obtain necessary fall risk management equipment  - Apply yellow socks and bracelet for high fall risk patients  - Consider moving patient to room near nurses station  Outcome: Progressing     Problem: Nutrition/Hydration-ADULT  Goal: Nutrient/Hydration intake appropriate for improving, restoring or maintaining nutritional needs  Description: Monitor and assess patient's nutrition/hydration status for malnutrition  Collaborate with interdisciplinary team and initiate plan and interventions as ordered  Monitor patient's weight and dietary intake as ordered or per policy  Utilize nutrition screening tool and intervene as necessary  Determine patient's food preferences and provide high-protein, high-caloric foods as appropriate       INTERVENTIONS:  - Monitor oral intake, urinary output, labs, and treatment plans  - Assess nutrition and hydration status and recommend course of action  - Evaluate amount of meals eaten  - Assist patient with eating if necessary   - Allow adequate time for meals  - Recommend/ encourage appropriate diets, oral nutritional supplements, and vitamin/mineral supplements  - Order, calculate, and assess calorie counts as needed  - Recommend, monitor, and adjust tube feedings and TPN/PPN based on assessed needs  - Assess need for intravenous fluids  - Provide specific nutrition/hydration education as appropriate  - Include patient/family/caregiver in decisions related to nutrition  Outcome: Progressing     Problem: NEUROSENSORY - ADULT  Goal: Achieves stable or improved neurological status  Description: INTERVENTIONS  - Monitor and report changes in neurological status  - Monitor vital signs such as temperature, blood pressure, glucose, and any other labs ordered   - Initiate measures to prevent increased intracranial pressure  - Monitor for seizure activity and implement precautions if appropriate      Outcome: Progressing     Problem: CARDIOVASCULAR - ADULT  Goal: Maintains optimal cardiac output and hemodynamic stability  Description: INTERVENTIONS:  - Monitor I/O, vital signs and rhythm  - Monitor for S/S and trends of decreased cardiac output  - Administer and titrate ordered vasoactive medications to optimize hemodynamic stability  - Assess quality of pulses, skin color and temperature  - Assess for signs of decreased coronary artery perfusion  - Instruct patient to report change in severity of symptoms  Outcome: Progressing

## 2022-03-08 NOTE — ASSESSMENT & PLAN NOTE
· Patient presented to ED with elevated blood pressure 237/107mmHg with symptoms concerning for stroke  · Home medication, lisinopril 10 mg  · Received a dose of labetalol 20 mg in the ED on arrival  · Neurology on consult  · SBP ranging from 168 - 181, will bring back to goal bp with negative MRI  Plan:  · Permissive HTN up to 220/110 for now; if MRI negative or symptoms resolve can make goal normotension and gradually reduce  · Continue lisinopril 10 mg  · Labetalol 10 mg p r n   For SBP >200 or DBP >110  · Continue to follow blood pressure trend  · Continue clinical monitoring

## 2022-03-08 NOTE — DISCHARGE SUMMARY
[facility] [address]  Discharge- Padmini Dye 1945, 76 y o  female MRN: [id number]  Unit/Bed#: 157-69 Encounter: [id number]  Primary Care Provider: Lizzette Erazo DO   Date and time admitted to hospital: 3/7/2022  1:07 PM    * Acute CVA (cerebrovascular accident) [facility])  Assessment & Plan  Patient presented to ED with numbness in the left forearm and hand along with left leg and foot  Started last night  Associated with nausea, dizziness  No associated syncope, headache, change in mental status, speech, vision  No weakness  Symptoms are most likely secondary to stroke vs   · Neurology consulted, initiated stroke pathway  · CTA head and neck: No large vessel occlusion  Posterior calcification along the aortic arch with atheromatous plaque at the origin of left subclavian artery results in mild left subclavian stenosis  There is mild stenosis origin of the right vertebral artery with additional smaller areas of mild to moderate vertebral artery stenosis without occlusion         -3 mm outpouching communicating segment of the left internal carotid artery potentially infundibular origin of a      small left posterior communicating artery versus small P-comm aneurysm  -Moderate to severe stenosis origin of the left PICA  - Atherosclerotic calcification results in mild cavernous ICA stenosis bilaterally  · CT brain: No acute intracranial abnormality  Microangiopathic changes  Chronic lacunar infarct left thalamus  · Patient is hypertensive at 237/107 on admission  · Patient is not a candidate for tPA therapy because of the timeline  · Started heparin Plavix in the ED with lowering dose of 300 mg  · Lipid panel abnormal with elevated LDL and cholesterol  · TTE:   Left Ventricle: Left ventricular cavity size is normal  Wall thickness is mildly increased  The left ventricular ejection fraction is 60%   Systolic function is normal  Diastolic function is mildly abnormal, consistent with grade I (abnormal) relaxation  There is moderate concentric hypertrophy  There is severe hypokinesis of the basal inferior, inferoseptal and basal to mid inferolateral walls  Right Ventricle: Right ventricular cavity size is normal  Systolic function is normal     · Telemetry monitor shows occasional PACs and PVCs  · MRI brain shows:   1  Acute posterior right pontine infarct  No significant edema  No hemorrhagic transformation  2   Chronic microangiopathic change  3   Old left thalamic infarct  Plan  · Outpatient follow-up with Neurology with in 3 weeks  · Plavix 70 mg daily x 3 weeks  · Aspirin 81 mg daily x 3 weeks  · Plavix monotherapy after 3 weeks  · Atorvastatin 40 mg  · Outpatient follow up with Neurovascular team for IC calcifications  · Out patient follow up with cardiology for zio patch monitoring  Hypertensive emergency  Assessment & Plan  · Patient presented to ED with elevated blood pressure 237/107mmHg with symptoms concerning for stroke  · Home medication, lisinopril 10 mg  · Received a dose of labetalol 20 mg in the ED on arrival  · Neurology on consult  · SBP ranging from 168 - 181, will bring back to goal bp with negative MRI  · Permissive hypertension 220/110 because of the ischemic stroke    Plan:  · Continue lisinopril 10 mg  · Amlodipine 5 mg  · Out patient follow up with Nephrology for blood pressure management  Potential for falls-resolved as of 3/8/2022  Assessment & Plan  · Patient presented to ED with numbness in left forearm &hand and left leg and foot associated with dizziness  · Reported imbalance to walk        Nocturnal hypoxia  Assessment & Plan  Outpatient sleep study to check for obstructive sleep apnea    Carotid artery stenosis  Assessment & Plan  CTA brain on on 03/07/2022 shows: Atherosclerotic calcification results in mild cavernous ICA stenosis bilaterally      Out patient follow up with neurovascular team     Hypercholesterolemia  Assessment & Plan  High LDL on lipid profile  Continue Atorvastatin 40mg    Hyperphosphatemia  Assessment & Plan  Sr  Phosphate at 4 2  Clincially asymptomatic  Paln: Follow Sr  Phosphorous        Neutropenia ([address] )  Assessment & Plan  · Recheck a CBC with differential as an outpatient with her PCP  · If the neutropenia persists as an outpatient, she will need an outpatient Hematology/Oncology evaluation    Leukopenia  Assessment & Plan  · Recheck a CBC with differential as an outpatient with her PCP  · If the leukopenia persists as an outpatient, she will need an outpatient Hematology/Oncology evaluation  Results from last 7 days   Lab Units 03/08/22  0443 03/07/22  1321   WBC Thousand/uL 3 76* 4 74   HEMOGLOBIN g/dL 12 5 14 0   HEMATOCRIT % 34 8 39 8   PLATELETS Thousands/uL 109* 121*         Thrombocytopenia (HCC)  Assessment & Plan  · Monitor for any signs of bleeding  · Recheck a CBC with differential as an outpatient with her PCP  · If the thrombocytopenia persists as an outpatient, she will need an outpatient Hematology/Oncology evaluation  Premature atrial complexes  Assessment & Plan  · Telemetry monitoring  · Outpatient Cardiology evaluation for Zio patch monitoring    Malnutrition [facility])  Assessment & Plan  Malnutrition Findings:      · Thin Cachectic appearance, looks older than her age  BMI Findings:     · Body mass index is 18 94 kg/m²       Plan:  · Nutrition consult          Discharging Physician / Practitioner: Rekha Chang MD  PCP: Zoila Garcia DO  Admission Date:   Admission Orders (From admission, onward)     Ordered        03/07/22 1420  Inpatient Admission  Once                      Discharge Date: 03/08/22    Medical Problems             Resolved Problems  Never Reviewed          Resolved    Potential for falls 3/8/2022     Resolved by  Rekha Chang MD                [facility] Stay:  · neurology    Procedures Performed:   · none    Significant Findings / Test Results:   · CTA head and neck: No large vessel occlusion  Posterior calcification along the aortic arch with atheromatous plaque at the origin of left subclavian artery results in mild left subclavian stenosis  There is mild stenosis origin of the right vertebral artery with additional smaller areas of mild to moderate vertebral artery stenosis without occlusion         -3 mm outpouching communicating segment of the left internal carotid artery potentially infundibular origin of a      small left posterior communicating artery versus small P-comm aneurysm  -Moderate to severe stenosis origin of the left PICA  - Atherosclerotic calcification results in mild cavernous ICA stenosis bilaterally     · Lipid panel abnormal with elevated LDL and cholesterol  · MRI brain :   1  Acute posterior right pontine infarct  No significant edema  No hemorrhagic transformation  2   Chronic microangiopathic change  3   Old left thalamic infarct  · TTE:   Left Ventricle: Left ventricular cavity size is normal  Wall thickness is mildly increased  The left ventricular ejection fraction is 60%  Systolic function is normal  Diastolic function is mildly abnormal, consistent with grade I (abnormal) relaxation  There is moderate concentric hypertrophy  There is severe hypokinesis of the basal inferior, inferoseptal and basal to mid inferolateral walls  Right Ventricle: Right ventricular cavity size is normal  Systolic function is normal      Incidental Findings:   · CTA head and neck: No large vessel occlusion  Posterior calcification along the aortic arch with atheromatous plaque at the origin of left subclavian artery results in mild left subclavian stenosis    There is mild stenosis origin of the right vertebral artery with additional smaller areas of mild to moderate vertebral artery stenosis without occlusion         -3 mm outpouching communicating segment of the left internal carotid artery potentially infundibular origin of a      small left posterior communicating artery versus small P-comm aneurysm  -Moderate to severe stenosis origin of the left PICA  - Atherosclerotic calcification results in mild cavernous ICA stenosis bilaterally  ·     Test Results Pending at Discharge (will require follow up):   · none     Outpatient Tests Requested:  · none    Complications:  none    Reason for Admission: none    Hospital Course:     Padmini Brown is a 76 y o  female patient who originally presented to the hospital on 3/7/2022 due to left sided numbness x 1 day and admitted under stroke pathway  Neurology consulted  She was under telemetry monitoring which shows PACs and PVCs  She was hypertensive which was permissible according to neurology recommendations  MRI brain show acute right posterior pontine stroke with old left thalamic infarct and chronic microangiopathic changes  TTE shows wall motion abnormalities in left ventricle  She will follow up with Neurology, cardiology  Nephrology, Neurovascular and PCP after discharge  Patient does not have any residual numbness today  Denies any new complaints  She is clinically and hemodynamically stable for discharge  The patient, initially admitted to the hospital as inpatient, was discharged earlier than expected given the following: recovered from stroke and denies any residual symptoms  she will follow up with neurology on outpatient basis       Please see above list of diagnoses and related plan for additional information  Condition at Discharge: stable     Discharge Day Visit / Exam:     Subjective:  Feeling better  Denies numbness in left upper and lower extremity  No weakness       Vitals: Blood Pressure: (!) 174/85 (03/08/22 1156)  Pulse: 67 (03/08/22 1156)  Temperature: 97 9 °F (36 6 °C) (03/08/22 1156)  Temp Source: Oral (03/07/22 2156)  Respirations: 18 (03/08/22 1156)  Height: 5' (152 4 cm) (03/08/22 0636)  Weight - Scale: 44 kg (97 lb) (03/08/22 0636)  SpO2: 94 % (03/08/22 1156)  Exam:   Physical Exam  Constitutional:       General: She is not in acute distress  Appearance: Normal appearance  HENT:      Head: Normocephalic and atraumatic  Right Ear: External ear normal       Left Ear: External ear normal       Nose: Nose normal       Mouth/Throat:      Mouth: Mucous membranes are moist       Pharynx: Oropharynx is clear  Eyes:      General: No scleral icterus  Extraocular Movements: Extraocular movements intact  Conjunctiva/sclera: Conjunctivae normal       Pupils: Pupils are equal, round, and reactive to light  Cardiovascular:      Rate and Rhythm: Normal rate  Rhythm irregular  Pulses: Normal pulses  Heart sounds: Normal heart sounds  Pulmonary:      Effort: Pulmonary effort is normal       Breath sounds: Normal breath sounds  No wheezing or rales  Abdominal:      General: Bowel sounds are normal       Palpations: Abdomen is soft  Tenderness: There is no abdominal tenderness  Musculoskeletal:      Right lower leg: No edema  Left lower leg: No edema  Skin:     General: Skin is warm  Capillary Refill: Capillary refill takes less than 2 seconds  Findings: No rash  Neurological:      General: No focal deficit present  Mental Status: She is alert and oriented to person, place, and time  Cranial Nerves: No cranial nerve deficit  Sensory: No sensory deficit  Motor: No weakness  Psychiatric:         Mood and Affect: Mood normal          Discussion with Family: called spouse, did not answer the call, left voicemail with updates  Discharge instructions/Information to patient and family:   See after visit summary for information provided to patient and family        Provisions for Follow-Up Care:  See after visit summary for information related to follow-up care and any pertinent home health orders  Disposition:     Home    For Discharges to [facility]F:   · Not Applicable to this Patient - Not Applicable to this Patient    Planned Readmission: no     Discharge Statement:  I spent 60 minutes discharging the patient  This time was spent on the day of discharge  I had direct contact with the patient on the day of discharge  Greater than 50% of the total time was spent examining patient, answering all patient questions, arranging and discussing plan of care with patient as well as directly providing post-discharge instructions  Additional time then spent on discharge activities  Discharge Medications:  See after visit summary for reconciled discharge medications provided to patient and family        ** Please Note: This note has been constructed using a voice recognition system **

## 2022-03-08 NOTE — ASSESSMENT & PLAN NOTE
· Patient presented to ED with numbness in left forearm &hand and left leg and foot associated with dizziness    · Reported imbalance to walk

## 2022-03-08 NOTE — PROGRESS NOTES
[facility] [address]  Progress Note - Gearldean Grammes 1945, 76 y o  female MRN: [id number]  Unit/Bed#: 421-01 Encounter: [id number]  Primary Care Provider: Chantelle Jones DO   Date and time admitted to hospital: 3/7/2022  1:07 PM    * Numbness of left hand and leg  Assessment & Plan  Patient presented to ED with numbness in the left forearm and hand along with left leg and foot  Started last night  Associated with nausea, dizziness  No associated syncope, headache, change in mental status, speech, vision  No weakness  Symptoms are most likely secondary to stroke vs   · Neurology consulted, initiated stroke pathway  · CTA head and neck: No large vessel occlusion  Posterior calcification along the aortic arch with atheromatous plaque at the origin of left subclavian artery results in mild left subclavian stenosis  There is mild stenosis origin of the right vertebral artery with additional smaller areas of mild to moderate vertebral artery stenosis without occlusion         -3 mm outpouching communicating segment of the left internal carotid artery potentially infundibular origin of a      small left posterior communicating artery versus small P-comm aneurysm  -Moderate to severe stenosis origin of the left PICA  - Atherosclerotic calcification results in mild cavernous ICA stenosis bilaterally  · CT brain: No acute intracranial abnormality  Microangiopathic changes  Chronic lacunar infarct left thalamus  · Patient is hypertensive at 237/107 on admission  · Patient is not a candidate for tPA therapy because of the timeline  · Started heparin Plavix in the ED with lowering dose of 300 mg  · Lipid panel abnormal with elevated LDL and cholesterol  · TTE:   Left Ventricle: Left ventricular cavity size is normal  Wall thickness is mildly increased  The left ventricular ejection fraction is 60%   Systolic function is normal  Diastolic function is mildly abnormal, consistent with grade I (abnormal) relaxation  There is moderate concentric hypertrophy  There is severe hypokinesis of the basal inferior, inferoseptal and basal to mid inferolateral walls  Right Ventricle: Right ventricular cavity size is normal  Systolic function is normal     · Telemetry monitor shows occasional PACs and PVCs       Plan  · MRI brain without contrast  · Neurology consulted: appreciate recommendations  · Continue 48 hour telemetry monitoring  · Plavix 70 mg daily x 3 weeks  · Aspirin 81 mg daily x 3 weeks  · If MRI negative patient will remain on aspirin monotherapy  · Permissive hypertension up to 220/110mmHg  · Atorvastatin 40 mg  · HbA1c  · PT/OT eval  · Outpatient follow up with Neurovascular team for IC calcifications  Hypertensive emergency  Assessment & Plan  · Patient presented to ED with elevated blood pressure 237/107mmHg with symptoms concerning for stroke  · Home medication, lisinopril 10 mg  · Received a dose of labetalol 20 mg in the ED on arrival  · Neurology on consult  · SBP ranging from 168 - 181, will bring back to goal bp with negative MRI  Plan:  · Permissive HTN up to 220/110 for now; if MRI negative or symptoms resolve can make goal normotension and gradually reduce  · Continue lisinopril 10 mg  · Labetalol 10 mg p r n  For SBP >200 or DBP >110  · Continue to follow blood pressure trend  · Continue clinical monitoring    Potential for falls  Assessment & Plan  · Patient presented to ED with numbness in left forearm &hand and left leg and foot associated with dizziness  · Reported imbalance to walk  · See above for plan      Hyperphosphatemia  Assessment & Plan  Sr  Phosphate at 4 2  Clincially asymptomatic  Paln: Follow Sr  Phosphorous        Leukopenia  Assessment & Plan  WBC count at below normal range on admission  Patient denies any complaints  Continue clinical monitoring  Thrombocytopenia (HCC)  Assessment & Plan  Low platelets noted on admission     Clinically asymptomatic  Continue to monitor  Premature atrial complexes  Assessment & Plan  · Telemetry monitoring  · Outpatient Cardiology evaluation for Zio patch monitoring    Malnutrition [facility])  Assessment & Plan  Malnutrition Findings:      · Thin Cachectic appearance, looks older than her age  BMI Findings:     · Body mass index is 18 94 kg/m²  Plan:  · Nutrition consult        VTE Pharmacologic Prophylaxis:   Pharmacologic: Heparin  Mechanical VTE Prophylaxis in Place: Yes    Patient Centered Rounds: I have performed bedside rounds with nursing staff today  Discussions with Specialists or Other Care Team Provider: yes    Education and Discussions with Family / Patient: called and updated family    Time Spent for Care: 20 minutes  More than 50% of total time spent on counseling and coordination of care as described above  Current Length of Stay: 1 day(s)    Current Patient Status: Inpatient   Certification Statement: The patient will continue to require additional inpatient hospital stay due to on going care    Discharge Plan: to home    Code Status: Level 1 - Full Code      Subjective:   Seen and examined  The patient at bedside today  Not in distress  Feeling better  Her numbness improved  Able to walk with out dizziness  Denied gait problems  Objective:     Vitals:   Temp (24hrs), Av 5 °F (36 4 °C), Min:97 1 °F (36 2 °C), Max:98 1 °F (36 7 °C)    Temp:  [97 1 °F (36 2 °C)-98 1 °F (36 7 °C)] 97 5 °F (36 4 °C)  HR:  [58-86] 68  Resp:  [16-20] 18  BP: (119-237)/() 163/73  SpO2:  [87 %-96 %] 94 %  Body mass index is 18 94 kg/m²  Input and Output Summary (last 24 hours): Intake/Output Summary (Last 24 hours) at 3/8/2022 0920  Last data filed at 3/7/2022 1932  Gross per 24 hour   Intake 120 ml   Output 250 ml   Net -130 ml       Physical Exam:     Physical Exam  Vitals and nursing note reviewed  Constitutional:       Appearance: Normal appearance     HENT:      Head: Normocephalic and atraumatic  Right Ear: External ear normal       Left Ear: External ear normal       Nose: Nose normal       Mouth/Throat:      Mouth: Mucous membranes are moist       Pharynx: Oropharynx is clear  Eyes:      General: No scleral icterus  Extraocular Movements: Extraocular movements intact  Conjunctiva/sclera: Conjunctivae normal       Pupils: Pupils are equal, round, and reactive to light  Cardiovascular:      Rate and Rhythm: Normal rate  Rhythm irregular  Pulses: Normal pulses  Heart sounds: Normal heart sounds  Pulmonary:      Effort: Pulmonary effort is normal       Breath sounds: Normal breath sounds  Abdominal:      General: Bowel sounds are normal       Palpations: Abdomen is soft  Tenderness: There is no abdominal tenderness  Musculoskeletal:      Right lower leg: No edema  Left lower leg: No edema  Skin:     General: Skin is warm  Capillary Refill: Capillary refill takes less than 2 seconds  Neurological:      Mental Status: She is alert and oriented to person, place, and time  Cranial Nerves: No cranial nerve deficit  Motor: No weakness        Gait: Gait normal          Additional Data:     Labs:    Results from last 7 days   Lab Units 03/08/22  0443   WBC Thousand/uL 3 76*   HEMOGLOBIN g/dL 12 5   HEMATOCRIT % 34 8   PLATELETS Thousands/uL 109*   NEUTROS PCT % 49   LYMPHS PCT % 38   MONOS PCT % 10   EOS PCT % 2     Results from last 7 days   Lab Units 03/08/22  0443   SODIUM mmol/L 140   POTASSIUM mmol/L 3 8   CHLORIDE mmol/L 106   CO2 mmol/L 23   BUN mg/dL 12   CREATININE mg/dL 0 95   ANION GAP mmol/L 11   CALCIUM mg/dL 8 6   ALBUMIN g/dL 3 4*   TOTAL BILIRUBIN mg/dL 0 46   ALK PHOS U/L 77   ALT U/L 6*   AST U/L 12   GLUCOSE RANDOM mg/dL 95     Results from last 7 days   Lab Units 03/07/22  1321   INR  0 95     Results from last 7 days   Lab Units 03/07/22  1242   POC GLUCOSE mg/dl 154*         Results from last 7 days   Lab Units 03/08/22  0445 03/08/22  0443   LACTIC ACID mmol/L  --  1 0   PROCALCITONIN ng/ml <0 05  --          * I Have Reviewed All Lab Data Listed Above  * Additional Pertinent Lab Tests Reviewed: Suzy 64 Admission Reviewed    Imaging:    Imaging Reports Reviewed Today Include:   TTE: Left Ventricle: Left ventricular cavity size is normal  Wall thickness is mildly increased  The left ventricular ejection fraction is 60%  Systolic function is normal  Diastolic function is mildly abnormal, consistent with grade I (abnormal) relaxation  There is moderate concentric hypertrophy  There is severe hypokinesis of the basal inferior, inferoseptal and basal to mid inferolateral walls    Right Ventricle: Right ventricular cavity size is normal  Systolic function is normal      Imaging Personally Reviewed by Myself Includes:  none    Recent Cultures (last 7 days):           Last 24 Hours Medication List:   Current Facility-Administered Medications   Medication Dose Route Frequency Provider Last Rate    acetaminophen  650 mg Oral Q6H PRN Dayanna Rubio,       albuterol  2 puff Inhalation Q6H PRN Liz Allen MD      aluminum-magnesium hydroxide-simethicone  30 mL Oral Q6H PRN Liz Allen MD      aspirin  81 mg Oral Daily Liz Allen MD      atorvastatin  40 mg Oral QPM Dayanna Rubio,       clopidogrel  75 mg Oral Daily Sammy Rubio DO      docusate sodium  100 mg Oral BID Liz Allen MD      heparin (porcine)  5,000 Units Subcutaneous Q8H [address] Liz Allen MD      Labetalol HCl  10 mg Intravenous Q4H PRN Dayanna Rubio,       lisinopril  10 mg Oral Daily Liz Allen MD      ondansetron  4 mg Intravenous Q6H PRN Liz Allen MD      potassium chloride  20 mEq Oral Once Lawernce Lesches, DO          Today, Patient Was Seen By: Liz Allen MD    ** Please Note: Dictation voice to text software may have been used in the creation of this document   **

## 2022-03-08 NOTE — CASE MANAGEMENT
Case Management Assessment & Discharge Planning Note    Patient name Rohan Brain  Location /[id number] MRN [id number]  : 1945 Date 3/8/2022       Current Admission Date: 3/7/2022  Current Admission Diagnosis:Acute CVA (cerebrovascular accident) [facility])   Patient Active Problem List    Diagnosis Date Noted    Thrombocytopenia ([facility] ) 2022    Leukopenia 2022    Neutropenia ([facility] ) 2022    Hyperphosphatemia 2022    Hypercholesterolemia 2022    Carotid artery stenosis 2022    Nocturnal hypoxia 2022    Acute CVA (cerebrovascular accident) ([facility] [facility] ) 2022    Hypertensive emergency 2022    [facility] 2022    Premature atrial complexes 2022      LOS (days): 1  Geometric Mean LOS (GMLOS) (days): 2 90  Days to GMLOS:1 9     OBJECTIVE:    Risk of Unplanned Readmission Score: 7         Current admission status: Inpatient       [facility]acy:   [facility] [id number]  Phone: [phone] Fax: [phone]    Primary Care Provider: Brent Hoyos DO    Primary Insurance: MEDICARE  [facility]    ASSESSMENT:  Jodee Kim Proxies    There are no active Health Care Proxies on file  Readmission Root Cause  30 Day Readmission: No    Patient Information  Admitted from[de-identified] Home  Mental Status: Alert  During Assessment patient was accompanied by: Not accompanied during assessment  Assessment information provided by[de-identified] Patient  Primary Caregiver: Self  Support Systems: Spouse/significant other  [place] of Residence: [facility] do you live in?: [address] entry access options  Select all that apply : Stairs  Number of steps to enter home  : 1  Do the steps have railings?: No  Type of Current Residence: [place] (Pt has 13 steps to Basement )  In the last 12 months, was there a time when you were not able to pay the mortgage or rent on time?: No  In the last 12 months, how many places have you lived?: 1  In the last 12 months, was there a time when you did not have a steady place to sleep or slept in a shelter (including now)?: No  Homeless/housing insecurity resource given?: N/A  Living Arrangements: Lives w/ Spouse/significant other  Is patient a ?: No    Activities of Daily Living Prior to Admission  Functional Status: Independent  Completes ADLs independently?: Yes  Ambulates independently?: Yes  Does patient use assisted devices?: Yes  Assisted Devices (DME) used: Straight Cane,Walker  Does patient currently own DME?: Yes  What DME does the patient currently own?: Straight Cane,Walker (Pt does not use any device )  Does patient have a history of Outpatient Therapy (PT/OT)?: Yes ([facility]  )  Does the patient have a history of Short-Term Rehab?: No         Patient Information Continued  Income Source: Pension/correction  Does patient have prescription coverage?: Yes  Within the past 12 months, you worried that your food would run out before you got the money to buy more : Never true  Within the past 12 months, the food you bought just didnt last and you didnt have money to get more : Never true  Food insecurity resource given?: N/A  Does patient receive dialysis treatments?: No  Does patient have a history of substance abuse?: No  Does patient have a history of Mental Health Diagnosis?: No         Means of Transportation  Means of Transport to Appts[de-identified] Drives Self (Pt and her  both drive   Pt's  will give the patient a ride home  )  In the past 12 months, has lack of transportation kept you from medical appointments or from getting medications?: No  In the past 12 months, has lack of transportation kept you from meetings, work, or from getting things needed for daily living?: No  Was application for public transport provided?: N/A        DISCHARGE DETAILS:    Discharge planning discussed with[de-identified] Pt  Freedom of Choice: Yes     CM contacted family/caregiver?: No- see comments (Pt is alert and oriented x3 )     Did patient/caregiver verbalize understanding of patient care needs?: Yes  Were patient/caregiver advised of the risks associated with not following Treatment Team discharge recommendations?: Yes       Other Referral/Resources/Interventions Provided:  Interventions: Outpatient PT          I will continue to follow for any Case Management needs

## 2022-03-08 NOTE — CASE MANAGEMENT
Case Management Discharge Planning Note    Patient name Linda Gunter  Location /603-67 MRN [id number]  : 1945 Date 3/8/2022       Current Admission Date: 3/7/2022  Current Admission Diagnosis:Acute CVA (cerebrovascular accident) [facility])   Patient Active Problem List    Diagnosis Date Noted    Thrombocytopenia ([facility] [facility] ) 2022    Leukopenia 2022    Neutropenia ([facility] [address] [facility] ) 2022    Hyperphosphatemia 2022    Hypercholesterolemia 2022    Carotid artery stenosis 2022    Nocturnal hypoxia 2022    Acute CVA [facility]ce[facility] accident) ([facility] ) 2022    Hypertensive emergency 2022    Malnutrition ([place] [address] ) 2022    Premature atrial complexes 2022      LOS (days): 1  Geometric Mean LOS (GMLOS) (days): 2 90  Days to GMLOS:1 9     OBJECTIVE:  Risk of Unplanned Readmission Score: 7         Current admission status: Inpatient   Pref[facility] Pharmacy:   [facility] [id number]  Phone: [phone] Fax: [phone]    Primary Care Provider: Lili Madrid DO    Primary Insurance: MEDICARE  Secondary Insurance: [facility]    DISCHARGE DETAILS:    Discharge planning discussed with[de-identified] Pt  Freedom of Choice: Yes     CM contacted family/caregiver?: No- see comments (Pt is alert and oriented x3 )     Did patient/caregiver verbalize understanding of patient care needs?: Yes  Were patient/caregiver advised of the risks associated with not following Treatment Team discharge recommendations?: Yes                   Other Referral/Resources/Interventions Provided:  Interventions: Outpatient PT         Treatment Team Recommendation: Home  Discharge Destination Plan[de-identified] Home  Transport at Discharge : Automobile,Family      Pt will need outpatient PT and OT  Pt has been to [facility] and will go there again   I in basket messaged neurology and Cardiology to call patient with a follow up appointment

## 2022-03-08 NOTE — ASSESSMENT & PLAN NOTE
Patient presented to ED with numbness in the left forearm and hand along with left leg and foot  Started last night  Associated with nausea, dizziness  No associated syncope, headache, change in mental status, speech, vision  No weakness  Symptoms are most likely secondary to stroke vs   · Neurology consulted, initiated stroke pathway  · CTA head and neck: No large vessel occlusion  Posterior calcification along the aortic arch with atheromatous plaque at the origin of left subclavian artery results in mild left subclavian stenosis  There is mild stenosis origin of the right vertebral artery with additional smaller areas of mild to moderate vertebral artery stenosis without occlusion         -3 mm outpouching communicating segment of the left internal carotid artery potentially infundibular origin of a      small left posterior communicating artery versus small P-comm aneurysm  -Moderate to severe stenosis origin of the left PICA  - Atherosclerotic calcification results in mild cavernous ICA stenosis bilaterally  · CT brain: No acute intracranial abnormality  Microangiopathic changes  Chronic lacunar infarct left thalamus  · Patient is hypertensive at 237/107 on admission  · Patient is not a candidate for tPA therapy because of the timeline  · Started heparin Plavix in the ED with lowering dose of 300 mg  · Lipid panel abnormal with elevated LDL and cholesterol  · TTE:   Left Ventricle: Left ventricular cavity size is normal  Wall thickness is mildly increased  The left ventricular ejection fraction is 60%  Systolic function is normal  Diastolic function is mildly abnormal, consistent with grade I (abnormal) relaxation  There is moderate concentric hypertrophy  There is severe hypokinesis of the basal inferior, inferoseptal and basal to mid inferolateral walls    Right Ventricle: Right ventricular cavity size is normal  Systolic function is normal     · Telemetry monitor shows occasional PACs and PVCs       Plan  · MRA brain without contrast  · Neurology consulted: appreciate recommendations  · Continue 48 hour telemetry monitoring  · Plavix 70 mg daily x 3 weeks  · Aspirin 81 mg daily x 3 weeks  · If MRI negative patient will remain on aspirin monotherapy  · Permissive hypertension up to 220/110mmHg  · Atorvastatin 40 mg  · HbA1c  · PT/OT eval  · Outpatient follow up with Neurovascular team for IC calcifications

## 2022-03-09 ENCOUNTER — PATIENT OUTREACH (OUTPATIENT)
Dept: CASE MANAGEMENT | Facility: OTHER | Age: 77
End: 2022-03-09

## 2022-03-09 ENCOUNTER — TELEPHONE (OUTPATIENT)
Dept: NEUROLOGY | Facility: CLINIC | Age: 77
End: 2022-03-09

## 2022-03-09 NOTE — PROGRESS NOTES
Outreach TC to patient for initial care management assessment  Patient reports that she is feeling well  Patient denies any  s/sx of CVA including unilateral weakness, confusion, changes to vision, severe dizziness, headaches or speech/swallowing difficulties  Patient does report that she continues to have some numbness in the L forearm and hand as well as L lateral leg and foot  Patient reports that she has normal function in the LUE and the LLE  Patient reports that she is independent with  ADL's and with  IADLs  Patient resides with spouse who assists as needed  Patient did complete her BALDO appointment but has scheduled a f/u with neurology  Patient also states that she will not be following up with outpatient PT/OT as she feels that she does not need skilled services  I reviewed medications including dose, schedule, purpose & side effects of clopidogrel, amlodipine, low dose ASA, atorvastatin, Colace, calcium carbonate, iron and lisinopril with patient who verbalizes understanding  Reviewed future appointments, s/sx to report and how/when to report symptoms to provider vs  911  Patient verbalizes understanding  Patient educated on role of CM, contact information for CM and BPCI program  Patient declines  additional calls

## 2022-03-09 NOTE — TELEPHONE ENCOUNTER
Spoke with Pt's  sched HFU appt with Robel Viera on 4/22/22 at 12:30 noon  HFU/SLMI/ATT/AP/Left sided numbness/DC3/8    Note from Chart:        Star Pavon will need follow up in in 6 weeks with neurovascular attending or advance practitioner  She will not require outpatient neurological testing     ----- Message from William Yanez RN sent at 3/8/2022  2:45 PM EST -----  Regarding: FW: follow up appt    ----- Message -----  From: Yusuf Flores RN  Sent: 3/8/2022   2:27 PM EST  To: Neurology [place] [address] Team 2  Subject: follow up appt                                   Pt is being discharged from [facility] today  Please call patient to schedule a follow up appointment                                                Thanks                                           Case Management

## 2022-03-10 ENCOUNTER — DOCUMENTATION (OUTPATIENT)
Dept: OTHER | Facility: HOSPITAL | Age: 77
End: 2022-03-10

## 2022-03-10 RX ORDER — CEPHALEXIN 500 MG/1
500 CAPSULE ORAL EVERY 12 HOURS SCHEDULED
Qty: 6 CAPSULE | Refills: 0 | Status: SHIPPED | OUTPATIENT
Start: 2022-03-10 | End: 2022-03-13

## 2022-03-10 NOTE — PROGRESS NOTES
Patient's urine culture positive for beta hemolytic streptococci  Called patient and discussed the results  Patient is afebrile and denies any symptoms  Antibiotic scripts sent to the pharmacy

## 2022-03-18 ENCOUNTER — CONSULT (OUTPATIENT)
Dept: CARDIOLOGY CLINIC | Facility: HOSPITAL | Age: 77
End: 2022-03-18
Attending: INTERNAL MEDICINE
Payer: MEDICARE

## 2022-03-18 VITALS
WEIGHT: 99.4 LBS | BODY MASS INDEX: 19.52 KG/M2 | DIASTOLIC BLOOD PRESSURE: 84 MMHG | HEART RATE: 80 BPM | HEIGHT: 60 IN | SYSTOLIC BLOOD PRESSURE: 162 MMHG

## 2022-03-18 DIAGNOSIS — R06.00 DOE (DYSPNEA ON EXERTION): ICD-10-CM

## 2022-03-18 DIAGNOSIS — I16.1 HYPERTENSIVE EMERGENCY: ICD-10-CM

## 2022-03-18 DIAGNOSIS — I63.9 CVA (CEREBRAL VASCULAR ACCIDENT) (HCC): ICD-10-CM

## 2022-03-18 DIAGNOSIS — I49.1 PREMATURE ATRIAL COMPLEXES: ICD-10-CM

## 2022-03-18 DIAGNOSIS — E78.5 HYPERLIPIDEMIA, UNSPECIFIED HYPERLIPIDEMIA TYPE: Primary | ICD-10-CM

## 2022-03-18 DIAGNOSIS — E78.00 HYPERCHOLESTEROLEMIA: ICD-10-CM

## 2022-03-18 DIAGNOSIS — R93.1 ABNORMAL ECHOCARDIOGRAM: ICD-10-CM

## 2022-03-18 PROBLEM — R06.09 DOE (DYSPNEA ON EXERTION): Status: ACTIVE | Noted: 2022-03-18

## 2022-03-18 PROCEDURE — 93000 ELECTROCARDIOGRAM COMPLETE: CPT | Performed by: INTERNAL MEDICINE

## 2022-03-18 PROCEDURE — 99214 OFFICE O/P EST MOD 30 MIN: CPT | Performed by: INTERNAL MEDICINE

## 2022-03-18 RX ORDER — LISINOPRIL 10 MG/1
10 TABLET ORAL DAILY
Qty: 90 TABLET | Refills: 3 | Status: SHIPPED | OUTPATIENT
Start: 2022-03-18

## 2022-03-18 RX ORDER — AMLODIPINE BESYLATE 10 MG/1
10 TABLET ORAL DAILY
Qty: 90 TABLET | Refills: 3 | Status: SHIPPED | OUTPATIENT
Start: 2022-03-18 | End: 2022-07-19

## 2022-03-18 NOTE — PROGRESS NOTES
Cardiology Consultation     Anthony Tellez  [id number]  1945  [address] [place] [address] [facility] [address] [address]      Diagnoses and all orders for this visit:    CVA (cerebral vascular accident) [facility])  -     Ambulatory Referral to Cardiology    Premature atrial complexes  -     Ambulatory Referral to Cardiology    I had the pleasure of seeing Anthony Tellez for a consultation regarding abnormal echo requested by Dr Rubio, DO    History of the Presenting Illness, Discussion/Summary and my Plan are as follows:::    Ema is a pleasant 58-year-old lady with history of hypertension, dyslipidemia, prior heavy tobacco use, STEMI in 2008, status post RCA stenting, admitted with stroke-like symptoms-left hemiparesis-in March 2022 with a right pontine infarct on brain imaging, with moderate to severe stenosis of the origin of the left PICA, mild left subclavian origin, mild right vertebral artery stenosis and mild-to-moderate vertebral artery stenosis without occlusion  She is also going to be seeing  neurology as an outpatient  In addition, she was also advised to be evaluated for obstructive sleep apnea  She underwent an echocardiogram that showed inferior/inferolateral wall motion abnormalities prompting this consultation  In the past she had followed at [facility] with [address] and was last evaluated in September 2021 at which time she was stable  Cardiac exam is unremarkable except for irregular heart rates  Plan:    History of coronary artery disease:  STEMI-inferior -2008, status post RCA stenting, subsequent nuclear stress test-2011 without any infarction or ischemia  Considering the wall motion abnormalities, will check a pharmacologic stress test to rule out infarct/ischemia    Continue current medications with changes-see below    Dyslipidemia:  Went off of her simvastatin plus ezetimibe about a month prior to her recent admission, now on atorvastatin, recheck in 3 months  Hypertension:  Markedly [id number] systolic at admission, now on amlodipine-was not sent to the pharmacy-will increase to 10 mg and prescribed, as well as prescribe lisinopril 10 mg, was on benazepril at baseline  Might benefit from evaluation for sleep apnea  Stroke:  No large vessel vascular disease to explain her stroke, await Neurology evaluation, will defer to them as to any candidacy for implantable loop recorder  ECG today shows    Follow-up in about three months  Results for Damian Haney (MRN [id number]) as of 3/18/2022 13:41   Ref  Range 12/10/2021 09:26 3/8/2022 04:43   Cholesterol Latest Ref Range: See Comment mg/dL 143 211 (H)   Triglycerides Latest Ref Range: See Comment mg/dL 111 109   HDL Latest Ref Range: >=50 mg/dL 50 45 (L)   Non-HDL Cholesterol Latest Units: mg/dl 93    LDL Calculated Latest Ref Range: 0 - 100 mg/dL 71 144 (H)       Results for Damian Haney (MRN [id number]) as of 3/18/2022 13:41   Ref  Range 3/8/2022 04:43   Hemoglobin A1C  6 2 (H)   eAG, EST AVG Glucose Latest Units: mg/dl 131     [facility] records:    STEMI 06/09/2008  Cardiac cath 06/09/2008: LM ok, LAD OK, LCX mild CAD and %  Taxus 2 5X16 placed to RCA  Stress echocardiogram 01/21/2009: Within normal limits  Stress echocardiogram 01/21/2009: Within normal limits  MIBI 10/18/2011: No scar  No ischemia  EF 74%  1  CVA (cerebral vascular accident) [facility])  Ambulatory Referral to Cardiology   2   Premature atrial complexes  Ambulatory Referral to Cardiology     Patient Active Problem List   Diagnosis    [facility]bro[facility] [address] [facility]    Hypertensive emergency    Malnutrition ([facility] [address] )    Premature atrial complexes    Thrombocytopenia (HCC)    Leukopenia    Neutropenia (HCC)    Hyperphosphatemia    Hypercholesterolemia    Carotid artery stenosis    Nocturnal hypoxia     Past Medical History:   Diagnosis Date    CAD (coronary artery disease)     Cardiac disease     Hypercholesteremia     Hyperlipidemia     Hypertension     Hypertension     Renal disorder      Social History     Socioeconomic History    Marital status: /Civil Union     Spouse name: Not on file    Number of children: Not on file    Years of education: Not on file    Highest education level: Not on file   Occupational History    Not on file   Tobacco Use    Smoking status: Former Smoker    Smokeless tobacco: Never Used   Vaping Use    Vaping Use: Never used   Substance and Sexual Activity    Alcohol use: Never    Drug use: No    Sexual activity: Not on file   Other Topics Concern    Not on file   Social History Narrative    Not on file     Social Determinants of Health     Financial Resource Strain: Not on file   Food Insecurity: No Food Insecurity    Worried About [address] in the Last Year: Never true    [address] in the Last Year: Never true   Transportation Needs: No Transportation Needs    Lack of Transportation (Medical): No    Lack of Transportation (Non-Medical): No   Physical Activity: Not on file   Stress: Not on file   Social Connections: Not on file   Intimate Partner Violence: Not on file   Housing Stability: Low Risk     Unable to Pay for Housing in the Last Year: No    Number of Places Lived in the Last Year: 1    Unstable Housing in the Last Year: No      History reviewed  No pertinent family history    Past Surgical History:   Procedure Laterality Date    CORONARY ANGIOPLASTY WITH STENT PLACEMENT      WRIST SURGERY         Current Outpatient Medications:     amLODIPine (NORVASC) 5 mg tablet, Take 1 tablet (5 mg total) by mouth daily, Disp: 30 tablet, Rfl: 0    aspirin (ECOTRIN LOW STRENGTH) 81 mg EC tablet, Take 1 tablet (81 mg total) by mouth daily Please take this for 3 weeks with plavix then just do plavix alone , Disp: , Rfl: 0    atorvastatin (LIPITOR) 40 mg tablet, Take 1 tablet (40 mg total) by mouth every evening Take this in place of simvastatin  It will better prevent heart attack and stroke, Disp: 30 tablet, Rfl: 0    calcium carbonate (OS-MIGUELINA) 600 MG tablet, Take 600 mg by mouth daily, Disp: , Rfl:     clopidogrel (PLAVIX) 75 mg tablet, Take 1 tablet (75 mg total) by mouth daily To prevent stroke and heart attack, Disp: 30 tablet, Rfl: 0    docusate sodium (COLACE) 100 mg capsule, Take 1 capsule (100 mg total) by mouth 2 (two) times a day as needed for constipation (Patient not taking: Reported on 3/18/2022 ), Disp: , Rfl: 0    ferrous sulfate 325 (65 Fe) mg tablet, Take 325 mg by mouth daily with breakfast (Patient not taking: Reported on 3/18/2022 ), Disp: , Rfl:     lisinopril (ZESTRIL) 10 mg tablet, Take 10 mg by mouth daily (Patient not taking: Reported on 3/18/2022 ), Disp: , Rfl:   Allergies   Allergen Reactions    Influenza Virus Vaccine      Vitals:    03/18/22 1300   BP: 162/84   Pulse: 80   Weight: 45 1 kg (99 lb 6 4 oz)   Height: 5' (1 524 m)         Imaging: XR chest 1 view portable    Result Date: 3/7/2022  Narrative: CHEST INDICATION:   CVA  COMPARISON:  4/15/2021 EXAM PERFORMED/VIEWS:  XR CHEST PORTABLE Single view FINDINGS: Cardiomediastinal silhouette appears unremarkable  The lungs are clear  No pneumothorax or pleural effusion  Osseous structures appear within normal limits for patient age  Impression: No acute cardiopulmonary disease  Findings are stable Workstation performed: [id number]     MRI brain wo contrast    Result Date: 3/8/2022  Narrative: MRI BRAIN WITHOUT CONTRAST INDICATION: stroke  COMPARISON:   CTA head and neck 3/7/2022 TECHNIQUE:  Sagittal T1, axial T2, axial FLAIR, axial T1, axial [place] and axial diffusion imaging  IMAGE QUALITY:  Diagnostic  FINDINGS: BRAIN PARENCHYMA: Focus of acute infarct in the posterior right harvey  Associated FLAIR hyperintensity without significant edema or mass effect   Cerebral atrophy  Nonspecific  foci of T2/FLAIR hyperintensities involving periventricular and subcortical white matter, most compatible with moderate to advanced microangiopathic change  Old left thalamic infarct  There is no discrete mass, mass effect or midline shift  There is no intracranial hemorrhage  VENTRICLES:  Normal for the patient's age  SELLA AND PITUITARY GLAND:  Normal  ORBITS:  Normal  PARANASAL SINUSES:  Normal  VASCULATURE:  Evaluation of the major intracranial vasculature demonstrates appropriate flow voids  CALVARIUM AND SKULL BASE:  Normal  EXTRACRANIAL SOFT TISSUES:  Normal      Impression: 1  Acute posterior right pontine infarct  No significant edema  No hemorrhagic transformation  2   Chronic microangiopathic change  3   Old left thalamic infarct  Workstation performed: [id number]     CT stroke alert brain    Result Date: 3/7/2022  Narrative: CT BRAIN - STROKE ALERT PROTOCOL INDICATION:   cva l sided weakness  Imbalance  COMPARISON:  None  TECHNIQUE:  CT examination of the brain was performed  In addition to axial images, coronal reformatted images were created and submitted for interpretation  Radiation dose length product (DLP) for this visit:  835 17 mGy-cm   This examination, like all CT scans performed in the [facility], was performed utilizing techniques to minimize radiation dose exposure, including the use of iterative  reconstruction and automated exposure control  IMAGE QUALITY:  Diagnostic  FINDINGS:  PARENCHYMA: Decreased attenuation is noted in periventricular and subcortical white matter demonstrating an appearance that is statistically most likely to represent moderate microangiopathic change  Chronic lacunar infarct left thalamus  No CT signs of acute infarction  No intracranial mass, mass effect or midline shift  No acute parenchymal hemorrhage  Normal intracranial vasculature  VENTRICLES AND EXTRA-AXIAL SPACES:  Normal for patient's age   VISUALIZED ORBITS AND PARANASAL SINUSES:  Unremarkable  CALVARIUM AND EXTRACRANIAL SOFT TISSUES:   Normal      Impression: No acute intracranial abnormality  Microangiopathic changes  Chronic lacunar infarct left thalamus  Findings were directly discussed with Danae Ralph at 1:44PM  Workstation performed: [id number]     CTA stroke alert (head/neck)    Result Date: 3/7/2022  Narrative: CTA NECK AND BRAIN WITH CONTRAST INDICATION: CVA l sided numbness COMPARISON:   None  TECHNIQUE:   Post contrast imaging was performed after administration of iodinated contrast through the neck and brain  Post contrast axial 0 625 mm images timed to opacify the arterial system  3D rendering was performed on an independent workstation  MIP reconstructions performed  Coronal reconstructions were performed of the noncontrast portion of the brain  Radiation dose length product (DLP) for this visit:  346 74 mGy-cm   This examination, like all CT scans performed in the [facility], was performed utilizing techniques to minimize radiation dose exposure, including the use of iterative  reconstruction and automated exposure control  IV Contrast:  100 mL of iohexol (OMNIPAQUE)  IMAGE QUALITY:   Diagnostic FINDINGS: CERVICAL VASCULATURE AORTIC ARCH AND GREAT VESSELS:  Mild atherosclerotic disease of the arch, proximal great vessels and visualized subclavian vessels  No significant stenosis  RIGHT VERTEBRAL ARTERY CERVICAL SEGMENT:  Mild stenosis at the origin  The right vertebral artery is patent with areas of mild to moderate stenosis along its course in the neck  LEFT VERTEBRAL ARTERY CERVICAL SEGMENT:  Normal origin  The vessel is normal in caliber throughout the neck  RIGHT EXTRACRANIAL CAROTID SEGMENT:  Mild atherosclerotic disease of the distal common carotid artery and proximal cervical internal carotid artery without significant stenosis compared to the more distal ICA   LEFT EXTRACRANIAL CAROTID SEGMENT:  Mild atherosclerotic disease of the distal common carotid artery and proximal cervical internal carotid artery without significant stenosis compared to the more distal ICA  NASCET criteria was used to determine the degree of internal carotid artery diameter stenosis  INTRACRANIAL VASCULATURE INTERNAL CAROTID ARTERIES:  Atherosclerotic calcification along the cavernous portion of the internal carotid artery bilaterally with mild stenosis  ANTERIOR CIRCULATION:  Symmetric A1 segments and anterior cerebral arteries with normal enhancement  Normal anterior communicating artery  MIDDLE CEREBRAL ARTERY CIRCULATION:  M1 segment and middle cerebral artery branches demonstrate normal enhancement bilaterally  DISTAL VERTEBRAL ARTERIES:  There is moderate stenosis of the left PICA just beyond its origin  Mild stenosis V4 segment of the right vertebral artery  BASILAR ARTERY:  Basilar artery is normal in caliber  Normal superior cerebellar arteries  POSTERIOR CEREBRAL ARTERIES: Both posterior cerebral arteries arises from the basilar tip  Both arteries demonstrate normal enhancement  Focal outpouching communicating segment of the left internal carotid artery potentially a 3 mm P-comm aneurysm versus infundibular origin of small vessel  VENOUS STRUCTURES:  Normal  NON VASCULAR ANATOMY BONY STRUCTURES:  No acute osseous abnormality  SOFT TISSUES OF THE NECK:  Normal  THORACIC INLET:  Unremarkable  Impression: No large vessel occlusion  Posterior calcification along the aortic arch with atheromatous plaque at the origin of left subclavian artery results in mild left subclavian stenosis  There is mild stenosis origin of the right vertebral artery with additional smaller areas of mild to moderate vertebral artery stenosis without occlusion  3 mm outpouching communicating segment of the left internal carotid artery potentially infundibular origin of a small left posterior communicating artery versus small P-comm aneurysm  Moderate to severe stenosis origin of the left PICA  Atherosclerotic calcification results in mild cavernous ICA stenosis bilaterally  Findings were directly discussed with Serafin Gardner at 1:47 PM  Workstation performed: [id number]     Echo complete w/ contrast if indicated    Result Date: 3/8/2022  Narrative: Sadie Quiroga  Left Ventricle: Left ventricular cavity size is normal  Wall thickness is mildly increased  The left ventricular ejection fraction is 60%  Systolic function is normal  Diastolic function is mildly abnormal, consistent with grade I (abnormal) relaxation  There is moderate concentric hypertrophy  There is severe hypokinesis of the basal inferior, inferoseptal and basal to mid inferolateral walls    Right Ventricle: Right ventricular cavity size is normal  Systolic function is normal        Review of Systems:  Review of Systems   Constitutional: Negative  HENT: Negative  Eyes: Negative  Respiratory: Negative  Cardiovascular: Negative  Endocrine: Negative  Musculoskeletal: Negative  Allergic/Immunologic: Negative  Neurological: Positive for weakness  Physical Exam:    /84   Pulse 80   Ht 5' (1 524 m)   Wt 45 1 kg (99 lb 6 4 oz)   BMI 19 41 kg/m²   Physical Exam  Constitutional:       General: She is not in acute distress  Appearance: Normal appearance  She is not ill-appearing  HENT:      Nose: No congestion or rhinorrhea  Mouth/Throat:      Mouth: Mucous membranes are moist       Pharynx: Oropharynx is clear  No oropharyngeal exudate or posterior oropharyngeal erythema  Eyes:      General:         Right eye: No discharge  Left eye: No discharge  Conjunctiva/sclera: Conjunctivae normal       Pupils: Pupils are equal, round, and reactive to light  Neck:      Vascular: No carotid bruit  Cardiovascular:      Rate and Rhythm: Regular rhythm  Tachycardia present  Pulses: Normal pulses  Heart sounds: No murmur heard  No friction rub   No gallop  Pulmonary:      Effort: Pulmonary effort is normal  No respiratory distress  Breath sounds: No stridor  Abdominal:      General: Abdomen is flat  Bowel sounds are normal  There is no distension  Palpations: There is no mass  Tenderness: There is no abdominal tenderness  Hernia: No hernia is present  Musculoskeletal:         General: No swelling, tenderness, deformity or signs of injury  Normal range of motion  Cervical back: Normal range of motion  No rigidity or tenderness  Lymphadenopathy:      Cervical: No cervical adenopathy  Skin:     General: Skin is warm  Coloration: Skin is not jaundiced or pale  Findings: No bruising or erythema  Neurological:      Mental Status: She is alert  This note was completed in part utilizing [employer] direct voice recognition software  Grammatical errors, random word insertion, spelling mistakes, occasional wrong word or "sound-alike" substitutions and incomplete sentences may be an occasional consequence of the system secondary to software limitations, ambient noise and hardware issues  At the time of dictation, efforts were made to edit, clarify and /or correct errors  Please read the chart carefully and recognize, using context, where substitutions have occurred  If you have any questions or concerns about the context, text or information contained within the body of this dictation, please contact myself, the provider, for further clarification

## 2022-03-22 ENCOUNTER — TELEPHONE (OUTPATIENT)
Dept: NEPHROLOGY | Facility: CLINIC | Age: 77
End: 2022-03-22

## 2022-03-22 NOTE — TELEPHONE ENCOUNTER
Appointment Confirmation   Person confirmed appointment with  If not patient, name of the person Patient    Date and time of appointment 03/23/22 @ 2pm    Patient acknowledged and will be at appointment? yes    Did you advise the patient that they will need a urine sample if they are a new patient?  Yes    Did you advise the patient to bring their current medications for verification? (including any OTC) Yes    Additional Information

## 2022-03-23 ENCOUNTER — CONSULT (OUTPATIENT)
Dept: NEPHROLOGY | Facility: CLINIC | Age: 77
End: 2022-03-23
Payer: MEDICARE

## 2022-03-23 ENCOUNTER — APPOINTMENT (OUTPATIENT)
Dept: LAB | Facility: MEDICAL CENTER | Age: 77
End: 2022-03-23
Payer: MEDICARE

## 2022-03-23 VITALS
WEIGHT: 100.2 LBS | HEART RATE: 80 BPM | SYSTOLIC BLOOD PRESSURE: 140 MMHG | BODY MASS INDEX: 19.57 KG/M2 | DIASTOLIC BLOOD PRESSURE: 80 MMHG

## 2022-03-23 DIAGNOSIS — I16.1 HYPERTENSIVE EMERGENCY: ICD-10-CM

## 2022-03-23 DIAGNOSIS — N39.0 RECURRENT URINARY TRACT INFECTION: ICD-10-CM

## 2022-03-23 DIAGNOSIS — N18.31 STAGE 3A CHRONIC KIDNEY DISEASE (HCC): Primary | ICD-10-CM

## 2022-03-23 DIAGNOSIS — N18.31 STAGE 3A CHRONIC KIDNEY DISEASE (HCC): ICD-10-CM

## 2022-03-23 DIAGNOSIS — N28.89 RENAL VASCULAR DISEASE: ICD-10-CM

## 2022-03-23 DIAGNOSIS — N26.1 ATROPHY OF RIGHT KIDNEY: ICD-10-CM

## 2022-03-23 DIAGNOSIS — E55.9 VITAMIN D DEFICIENCY: ICD-10-CM

## 2022-03-23 LAB
MAGNESIUM SERPL-MCNC: 2.3 MG/DL (ref 1.6–2.6)
PHOSPHATE SERPL-MCNC: 3.3 MG/DL (ref 2.3–4.1)
PTH-INTACT SERPL-MCNC: 81.4 PG/ML (ref 18.4–80.1)

## 2022-03-23 PROCEDURE — 36415 COLL VENOUS BLD VENIPUNCTURE: CPT

## 2022-03-23 PROCEDURE — 83970 ASSAY OF PARATHORMONE: CPT

## 2022-03-23 PROCEDURE — 83735 ASSAY OF MAGNESIUM: CPT

## 2022-03-23 PROCEDURE — 84100 ASSAY OF PHOSPHORUS: CPT

## 2022-03-23 PROCEDURE — 99204 OFFICE O/P NEW MOD 45 MIN: CPT | Performed by: INTERNAL MEDICINE

## 2022-03-23 RX ORDER — ERGOCALCIFEROL (VITAMIN D2) 1250 MCG
50000 CAPSULE ORAL WEEKLY
Qty: 12 CAPSULE | Refills: 1 | Status: SHIPPED | OUTPATIENT
Start: 2022-03-23 | End: 2022-07-19

## 2022-03-23 NOTE — PHYSICIAN ADVISOR
Current patient class: Inpatient  The patient is currently on Hospital Day: 2 at [address]      The patient was admitted to the hospital at  2:20 PM on 3/7/22 for the following diagnosis:  Numbness [R20 0]  CVA (cerebral vascular accident) [facility]) [I63 9]  Hypertensive emergency [I16 1]       There was documentation in the medical record of an expected length of stay of at least 2 midnights  The patient was therefore expected to satisfy the 2 midnight benchmark and given the 2 midnight presumption was appropriate for INPATIENT ADMISSION  Given this expectation of a satisfying stay, CMS instructs us that the patient is most often appropriate for inpatient admission under part A provided medical necessity is documented in the chart  After review of the relevant documentation, labs, vital signs and test results, the patient is appropriate for INPATIENT ADMISSION  Admission to the hospital as an inpatient is a complex decision making process which requires the practitioner to consider the patients presenting complaint, history and physical examination and all relevant testing  With this in mind, in this case, the patient was deemed appropriate for INPATIENT ADMISSION  After review of the documentation and testing available at the time of the admission, I concur with this clinical determination of medical necessity  For the reason noted, the patient was discharged before reaching 2 midnights as an inpatient  Rationale is as follows: The patient is a 76 yrs old Female who presented to the ED at 3/7/2022  1:07 PM with a chief complaint of Numbness (pt states lastnight at 2100 she started with left arm numbness and tingling, weakness, left leg tingling radiating down into foot)  Patient had significant hypertensive urgency on admission  Patient was put on the stroke pathway    MRI was checked and the patient was put on aspirin as well as a high intensity statin with telemetry monitoring  Patient was also given a loading dose of Plavix at 300 mg and continued with Plavix 75 mg daily  Given the overall presenting symptoms for the patient patient was recently placed in inpatient status with expectation of greater than 2 midnights  Patient was also noted on further workup to have an acute stroke on MRI  However the next day it was noted that she had improvement in her stroke symptoms without any residual affects and earlier than anticipated recovery and was discharged sooner than anticipated  This would be an unexpected early recovery  The patients vitals on arrival were   ED Triage Vitals   Temperature Pulse Respirations Blood Pressure SpO2   03/07/22 1314 03/07/22 1312 03/07/22 1312 03/07/22 1312 03/07/22 1312   (!) 97 1 °F (36 2 °C) 69 18 (!) 237/107 95 %      Temp Source Heart Rate Source Patient Position - Orthostatic VS BP Location FiO2 (%)   03/07/22 1314 03/07/22 1312 03/07/22 1312 03/07/22 1312 --   Tympanic Monitor Sitting Right arm       Pain Score       03/07/22 1310       No Pain           Past Medical History:   Diagnosis Date    CAD (coronary artery disease)     Cardiac disease     Hypercholesteremia     Hyperlipidemia     Hypertension     Hypertension     Renal disorder      Past Surgical History:   Procedure Laterality Date    CORONARY ANGIOPLASTY WITH STENT PLACEMENT      WRIST SURGERY             Consults have been placed to:   IP CONSULT TO NEUROLOGY  IP CONSULT TO NEUROLOGY    Vitals:    03/08/22 0807 03/08/22 1130 03/08/22 1147 03/08/22 1156   BP:  (!) 172/85 (!) 172/85 (!) 174/85   Pulse:   68 67   Resp:    18   Temp:    97 9 °F (36 6 °C)   TempSrc:       SpO2: 94%  94% 94%   Weight:       Height:           Most recent labs:    No results for input(s): WBC, HGB, HCT, PLT, K, NA, CALCIUM, BUN, CREATININE, LIPASE, AMYLASE, INR, TROPONINI, CKTOTAL, AST, ALT, ALKPHOS, BILITOT in the last 72 hours      Scheduled Meds:  Continuous Infusions:No current facility-administered medications for this encounter  PRN Meds:      Surgical procedures (if appropriate):

## 2022-03-23 NOTE — PROGRESS NOTES
[facility] [place], [place]    Name: Padmini Brown  YOB: 1945      Assessment/Plan:    Stage 3a chronic kidney disease 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  Patient also has a history of recurrent urinary tract infections, unclear if this may also be check imaging as well as other labs which are part of the regular workup in care for the patient  Renal vascular disease  CT scan of the chest reviewed personally, kidneys are not specifically identified in the scan as it was for the chest, however I was able to identify the right kidney which is significantly atrophied, and there appears to be a proximal vascular narrowing  Will confirm with a renal artery Doppler  Atrophy of right kidney  Most likely due to renovascular disease  Will confirm with renal artery Doppler  Recurrent urinary tract infection  Patient has a history of recurring urinary tract infections  He has been several years since she has had her last episode  She was treated with nitrofurantoin for 3 months which appears to have improved this issue  Patient has known renal bladder prolapse which is likely contributing to this predisposition  Problem List Items Addressed This Visit        Cardiovascular and Mediastinum    Hypertensive emergency    Renal vascular disease     CT scan of the chest reviewed personally, kidneys are not specifically identified in the scan as it was for the chest, however I was able to identify the right kidney which is significantly atrophied, and there appears to be a proximal vascular narrowing  Will confirm with a renal artery Doppler  Genitourinary    Recurrent urinary tract infection     Patient has a history of recurring urinary tract infections  He has been several years since she has had her last episode  She was treated with nitrofurantoin for 3 months which appears to have improved this issue  Patient has known renal bladder prolapse which is likely contributing to this predisposition  Atrophy of right kidney     Most likely due to renovascular disease  Will confirm with renal artery Doppler  Relevant Orders    VAS renal artery complete    Stage 3a chronic kidney disease ([facility] [address] ) - Primary     Lab Results   Component Value Date    EGFR 57 03/08/2022    EGFR 46 03/07/2022    EGFR 52 12/10/2021    CREATININE 0 95 03/08/2022    CREATININE 1 13 03/07/2022    CREATININE 1 04 12/10/2021     Etiology of chronic kidney disease likely multifactorial including renovascular disease with component of hypertensive nephrosclerosis  Patient also has a history of recurrent urinary tract infections, unclear if this may also be check imaging as well as other labs which are part of the regular workup in care for the patient  Relevant Orders    Microalbumin / creatinine urine ratio    Urinalysis with microscopic    Magnesium    Phosphorus    PTH, intact      Other Visit Diagnoses     Vitamin D deficiency        Relevant Medications    ergocalciferol (ERGOCALCIFEROL) 1 25 MG (33665 UT) capsule          Thank you for allowing us to participate in the care of your patient  At this point, blood pressures are only slightly elevated and I asked her to keep an eye on them at home  Will continue the diagnostic process to identify etiology of the patient's underlying chronic kidney disease and hypertension  Most likely etiology is renal vascular disease  Continue medical management at this time, continue to focus on low-sodium diet  Subjective:      Patient ID: Yuniel Sierra is a 76 y o  female      Patient presents for evaluation regarding HTN  She has had a history of elevated BPs for about 15 years  She has been mostly well controlled  We were able to find a CTA of her chest from several years ago to review kidney anatomy  The patient claims that she has an atrophic right kidney, I was able to confirm this with a CT scan, it also appears that she has a proximal right renal artery stenosis  She also has a history of recurrent UTIs, was placed on a 3 month course of nitrofurantoin which seemed to have resolved this issue  Hypertension  This is a chronic problem  The current episode started more than 1 year ago  The problem is unchanged  The problem is controlled  Pertinent negatives include no chest pain, orthopnea or peripheral edema  There are no associated agents to hypertension  Risk factors for coronary artery disease include post-menopausal state  Past treatments include calcium channel blockers, lifestyle changes, ACE inhibitors and diuretics  Compliance problems include diet  Hypertensive end-organ damage includes kidney disease and CAD/MI  There is no history of heart failure  Identifiable causes of hypertension include chronic renal disease and renovascular disease  The following portions of the patient's history were reviewed and updated as appropriate: allergies, current medications, past family history, past medical history, past social history, past surgical history and problem list     Review of Systems   Cardiovascular: Negative for chest pain and orthopnea  All other systems reviewed and are negative          Social History     Socioeconomic History    Marital status: /Civil Union     Spouse name: None    Number of children: None    Years of education: None    Highest education level: None   Occupational History    None   Tobacco Use    Smoking status: Former Smoker    Smokeless tobacco: Never Used   Vaping Use    Vaping Use: Never used   Substance and Sexual Activity    Alcohol use: Never    Drug use: No    Sexual activity: None   Other Topics Concern    None   Social History Narrative    None     Social Determinants of Health     Financial Resource Strain: Not on file   Food Insecurity: No Food Insecurity    Worried About Running Out of Food in the Last Year: Never true    Nidia of Food in the Last Year: Never true   Transportation Needs: No Transportation Needs    Lack of Transportation (Medical): No    Lack of Transportation (Non-Medical): No   Physical Activity: Not on file   Stress: Not on file   Social Connections: Not on file   Intimate Partner Violence: Not on file   Housing Stability: Low Risk     Unable to Pay for Housing in the Last Year: No    Number of Places Lived in the Last Year: 1    Unstable Housing in the Last Year: No     Past Medical History:   Diagnosis Date    CAD (coronary artery disease)     Cardiac disease     Hypercholesteremia     Hyperlipidemia     Hypertension     Hypertension     Renal disorder      Past Surgical History:   Procedure Laterality Date    CORONARY ANGIOPLASTY WITH STENT PLACEMENT      WRIST SURGERY         Current Outpatient Medications:     amLODIPine (NORVASC) 10 mg tablet, Take 1 tablet (10 mg total) by mouth daily, Disp: 90 tablet, Rfl: 3    aspirin (ECOTRIN LOW STRENGTH) 81 mg EC tablet, Take 1 tablet (81 mg total) by mouth daily Please take this for 3 weeks with plavix then just do plavix alone , Disp: , Rfl: 0    atorvastatin (LIPITOR) 40 mg tablet, Take 1 tablet (40 mg total) by mouth every evening Take this in place of simvastatin    It will better prevent heart attack and stroke, Disp: 30 tablet, Rfl: 0    calcium carbonate (OS-MIGUELINA) 600 MG tablet, Take 600 mg by mouth daily, Disp: , Rfl:     clopidogrel (PLAVIX) 75 mg tablet, Take 1 tablet (75 mg total) by mouth daily To prevent stroke and heart attack, Disp: 30 tablet, Rfl: 0    lisinopril (ZESTRIL) 10 mg tablet, Take 1 tablet (10 mg total) by mouth daily, Disp: 90 tablet, Rfl: 3    ergocalciferol (ERGOCALCIFEROL) 1 25 MG (30000 UT) capsule, Take 1 capsule (50,000 Units total) by mouth once a week, Disp: 12 capsule, Rfl: 1    Lab Results   Component Value Date    SODIUM 140 03/08/2022    K 3 8 03/08/2022     03/08/2022    CO2 23 03/08/2022    AGAP 11 03/08/2022    BUN 12 03/08/2022    CREATININE 0 95 03/08/2022    GLUC 95 03/08/2022    GLUF 113 (H) 12/10/2021    CALCIUM 8 6 03/08/2022    AST 12 03/08/2022    ALT 6 (L) 03/08/2022    ALKPHOS 77 03/08/2022    TP 6 3 (L) 03/08/2022    TBILI 0 46 03/08/2022    EGFR 57 03/08/2022     Lab Results   Component Value Date    WBC 3 76 (L) 03/08/2022    HGB 12 5 03/08/2022    HCT 34 8 03/08/2022    MCV 83 03/08/2022     (L) 03/08/2022     Lab Results   Component Value Date    CHOLESTEROL 211 (H) 03/08/2022    CHOLESTEROL 143 12/10/2021    CHOLESTEROL 180 08/25/2021     Lab Results   Component Value Date    HDL 45 (L) 03/08/2022    HDL 50 12/10/2021    HDL 43 08/25/2021     Lab Results   Component Value Date    LDLCALC 144 (H) 03/08/2022    LDLCALC 71 12/10/2021    LDLCALC 113 (H) 08/25/2021     Lab Results   Component Value Date    TRIG 109 03/08/2022    TRIG 111 12/10/2021    TRIG 122 08/25/2021     No results found for: [place], [place]  Lab Results   Component Value Date    PBA5DMIMBTHH 1 521 03/07/2022     Lab Results   Component Value Date    CALCIUM 8 6 03/08/2022    PHOS 4 2 (H) 03/08/2022     No results found for: SPEP, UPEP  No results found for: KRISTIEALBUR, [id number]        Objective:      /80   Pulse 80   Wt 45 5 kg (100 lb 3 2 oz)   BMI 19 57 kg/m²          Physical Exam  Vitals reviewed  Constitutional:       General: She is not in acute distress  Appearance: She is well-developed  HENT:      Head: Normocephalic and atraumatic  Eyes:      Conjunctiva/sclera: Conjunctivae normal    Cardiovascular:      Rate and Rhythm: Normal rate and regular rhythm     Pulmonary:      Effort: Pulmonary effort is normal       Breath sounds: Wheezing and rhonchi present  Abdominal:      Palpations: Abdomen is soft  Musculoskeletal:      Cervical back: Neck supple  Skin:     General: Skin is warm  Findings: No rash  Neurological:      Mental Status: She is alert and oriented to person, place, and time  Cranial Nerves: No cranial nerve deficit     Psychiatric:         Behavior: Behavior normal

## 2022-03-23 NOTE — ASSESSMENT & PLAN NOTE
Patient has a history of recurring urinary tract infections  He has been several years since she has had her last episode  She was treated with nitrofurantoin for 3 months which appears to have improved this issue  Patient has known renal bladder prolapse which is likely contributing to this predisposition

## 2022-03-23 NOTE — ASSESSMENT & PLAN NOTE
CT scan of the chest reviewed personally, kidneys are not specifically identified in the scan as it was for the chest, however I was able to identify the right kidney which is significantly atrophied, and there appears to be a proximal vascular narrowing  Will confirm with a renal artery Doppler

## 2022-03-23 NOTE — ASSESSMENT & PLAN NOTE
Lab Results   Component Value Date    EGFR 57 03/08/2022    EGFR 46 03/07/2022    EGFR 52 12/10/2021    CREATININE 0 95 03/08/2022    CREATININE 1 13 03/07/2022    CREATININE 1 04 12/10/2021     Etiology of chronic kidney disease likely multifactorial including renovascular disease with component of hypertensive nephrosclerosis  Patient also has a history of recurrent urinary tract infections, unclear if this may also be check imaging as well as other labs which are part of the regular workup in care for the patient

## 2022-03-24 ENCOUNTER — EVALUATION (OUTPATIENT)
Dept: PHYSICAL THERAPY | Facility: CLINIC | Age: 77
End: 2022-03-24
Payer: MEDICARE

## 2022-03-24 ENCOUNTER — APPOINTMENT (OUTPATIENT)
Dept: LAB | Facility: MEDICAL CENTER | Age: 77
End: 2022-03-24
Payer: MEDICARE

## 2022-03-24 DIAGNOSIS — I63.9 CVA (CEREBRAL VASCULAR ACCIDENT) (HCC): ICD-10-CM

## 2022-03-24 DIAGNOSIS — R53.1 WEAKNESS: Primary | ICD-10-CM

## 2022-03-24 LAB
BACTERIA UR QL AUTO: ABNORMAL /HPF
BILIRUB UR QL STRIP: NEGATIVE
CLARITY UR: CLEAR
COLOR UR: ABNORMAL
CREAT UR-MCNC: 54.6 MG/DL
GLUCOSE UR STRIP-MCNC: NEGATIVE MG/DL
HGB UR QL STRIP.AUTO: NEGATIVE
KETONES UR STRIP-MCNC: NEGATIVE MG/DL
LEUKOCYTE ESTERASE UR QL STRIP: ABNORMAL
MICROALBUMIN UR-MCNC: 70.8 MG/L (ref 0–20)
MICROALBUMIN/CREAT 24H UR: 130 MG/G CREATININE (ref 0–30)
NITRITE UR QL STRIP: NEGATIVE
NON-SQ EPI CELLS URNS QL MICRO: ABNORMAL /HPF
PH UR STRIP.AUTO: 7 [PH]
PROT UR STRIP-MCNC: ABNORMAL MG/DL
RBC #/AREA URNS AUTO: ABNORMAL /HPF
SP GR UR STRIP.AUTO: 1.01 (ref 1–1.03)
TRANS CELLS #/AREA URNS HPF: PRESENT /[HPF]
UROBILINOGEN UR STRIP-ACNC: <2 MG/DL
WBC #/AREA URNS AUTO: ABNORMAL /HPF

## 2022-03-24 PROCEDURE — 97161 PT EVAL LOW COMPLEX 20 MIN: CPT | Performed by: PHYSICAL THERAPIST

## 2022-03-24 PROCEDURE — 81001 URINALYSIS AUTO W/SCOPE: CPT

## 2022-03-24 PROCEDURE — 82570 ASSAY OF URINE CREATININE: CPT

## 2022-03-24 PROCEDURE — 82043 UR ALBUMIN QUANTITATIVE: CPT

## 2022-03-24 NOTE — PROGRESS NOTES
PT Evaluation     Today's date: 3/24/2022  Patient name: Dilcia Boston  : 1945  MRN: [id number]  Referring provider: Salvador Lira DO  Dx:   Encounter Diagnosis     ICD-10-CM    1  Weakness  R53 1    2  CVA (cerebral vascular accident) [facility])  I63 9 Ambulatory Referral to Physical Therapy       Start Time: 2551  Stop Time: 1350  Total time in clinic (min): 45 minutes    Assessment  Assessment details: Patient presents to PT s/p CVFELISA 3/7  She has no noted limitations now, just feels numbness and tingling into her left hand with activity  She tested good with balance and strength overall except slight weakness left dorsiflexion and left shoulder  She does not want therapy for these as she notes she is active with housework, stairs, yardwork and is only concerned about her hand  As she does not want to work her strength/balance, I spoke to her about basic balance at home and will refer her to our hand specialist  She is in agreement  Impairments: impaired physical strength  Functional limitations: none  Symptom irritability: lowUnderstanding of Dx/Px/POC: good   Prognosis: fair    Goals  Will not doing formal therapy  Referred to hand specialist    Plan  Plan details: Patient referred to hand therapist  Treatment plan discussed with: patient        Subjective Evaluation    History of Present Illness  Mechanism of injury: Pt presents to PT s/p CVA 3/7  She was sleeping and woke with tingling/numbness in the L forearm and hand and knee to ankle   took her to ER where they found very high BP and stroke  Currently she has no limitations but complaints of residual numbness/tingling in both the left forearm and leg  She can walk normally, do stairs, dress herself but continues to have this numbness     Pain  Current pain ratin          Objective     General Comments:      Lumbar Comments  Upper body    Cervical ROM functional    Shoulder mobility: symmetrical, functional, pain free    strnegth  L shld flex/abd: 3+/5  L shld ER 3+/5  L Bicep/tricep: 4/5    Wrist/hand intrinsics: symmetrical    Tinel (-) ulnar at cubital tunnel and median    Lower body    Lumbar screen: mobility pain free    LE strength  L DF: 4/5  R hip abd: 3-/5, L hip abd 3+/5  All other testing strong    Gait: occasional cross over but no issues of balance noted and good speed  Turns: under 4 seconds, no LOB    Tandem and NBOS EO/EC: all 30+ seconds without assistances    Sit-stand: no UE required    5x sit-stand: 12 seconds             Precautions: recent CVA

## 2022-03-28 NOTE — PROGRESS NOTES
PT Evaluation     Today's date: 3/28/2022  Patient name: Cherlyn Sicard  : 1945  MRN: [id number]  Referring provider: Annie Burk DO  Dx:   Encounter Diagnosis     ICD-10-CM    1  Weakness status post cerebrovascular accident  I69 398     R53 1                   Assessment  Assessment details: Pt is a 75 YO female presenting to PT with pain, decreased AROM, strength and tolerance to activity  Pt would benefit from skilled intervention to address these issues and maximize overall function  Occupation- homemaker  Dominant- right; Involved- left      Goals  ST  Increase AROM to NET[facility]Weekend-[facility]-gogo [facility] in 6-8 weeks            2  Sensory re-education for safety  LT  Increase functional motion and strength for independence with ADL and self care by DC            2  Ability to RT recreational activity by DC    Plan  Patient would benefit from: skilled physical therapy  Planned therapy interventions: activity modification, manual therapy, neuromuscular re-education, strengthening, stretching, therapeutic activities, therapeutic exercise and home exercise program  Frequency: 2x week  Duration in weeks: 4  Treatment plan discussed with: patient        Subjective Evaluation    History of Present Illness  Mechanism of injury: Pt presents to PT s/p CVA 3/7  She was sleeping and woke with tingling/numbness in the L forearm and hand and knee to ankle   took her to ER where they found very high BP and stroke       Currently she has no limitations but complaints of residual numbness/tingling in the left forearm   Pain  No pain reported    Hand dominance: right    Treatments  Current treatment: physical therapy  Patient Goals  Patient goals for therapy: increased motion, increased strength, return to sport/leisure activities and independence with ADLs/IADLs          Objective     General Comments:      Wrist/Hand Comments  AROM left wrist E/F- 75/50   II- L/R- 25/40; 3 SHAWANDA- 2/6; Key-   [place]- 3 61 all  Pt notes sensory deficit with FMS and general loss of strength left hand             Precautions: monitor activities for safety in relationship to strength/sensation      Manuals 3/29                                      HEP TP Griffith                         Neuro Re-Ed                                       Ther Ex             TP  T 2'            TP 5 finger T 2/10            TP nibs T 12            Tim 20 2/10            Blue IV 2/10            Flexbar Y 10/10                                                                             Modalities

## 2022-03-29 ENCOUNTER — EVALUATION (OUTPATIENT)
Dept: PHYSICAL THERAPY | Facility: CLINIC | Age: 77
End: 2022-03-29
Payer: MEDICARE

## 2022-03-29 DIAGNOSIS — I69.398 WEAKNESS STATUS POST CEREBROVASCULAR ACCIDENT: Primary | ICD-10-CM

## 2022-03-29 DIAGNOSIS — R53.1 WEAKNESS STATUS POST CEREBROVASCULAR ACCIDENT: Primary | ICD-10-CM

## 2022-03-29 PROCEDURE — 97161 PT EVAL LOW COMPLEX 20 MIN: CPT | Performed by: PHYSICAL THERAPIST

## 2022-03-29 PROCEDURE — 97110 THERAPEUTIC EXERCISES: CPT | Performed by: PHYSICAL THERAPIST

## 2022-03-30 NOTE — PROGRESS NOTES
PT Discharge    Today's date: 3/30/2022   Patient name: Berta Young   : 1945   MRN: [id number]   Referring provider: Fay Dumont DO   Dx:         Encounter Diagnosis     ICD-10-CM    1  Weakness  R53 1    2  CVA (cerebral vascular accident) [facility])  I63 9 Ambulatory Referral to Physical Therapy         Assessment   Assessment details: Patient presents to PT s/p CVA 3/7  She has no noted limitations now, just feels numbness and tingling into her left hand with activity  She tested good with balance and strength overall except slight weakness left dorsiflexion and left shoulder  She does not want therapy for these as she notes she is active with housework, stairs, yardwork and is only concerned about her hand  As she does not want to work her strength/balance, I spoke to her about basic balance at home and will refer her to our hand specialist  She is in agreement  Impairments: impaired physical strength  Functional limitations: none  Symptom irritability: lowUnderstanding of Dx/Px/POC: good   Prognosis: fair  Goals   Will not doing formal therapy  Referred to hand specialist  Plan   Plan details: Patient referred to hand therapist  Treatment plan discussed with: patient    Subjective Evaluation   History of Present Illness   Mechanism of injury: Pt presents to PT s/p CVA 3/7  She was sleeping and woke with tingling/numbness in the L forearm and hand and knee to ankle   took her to ER where they found very high BP and stroke  Currently she has no limitations but complaints of residual numbness/tingling in both the left forearm and leg  She can walk normally, do stairs, dress herself but continues to have this numbness     Pain   Current pain ratin    Objective   General Comments:   Lumbar Comments   Upper body   Cervical ROM functional   Shoulder mobility: symmetrical, functional, pain free   strnegth   L shld flex/abd: 3+/5   L shld ER 3+/5   L Bicep/tricep: 4/5   Wrist/hand intrinsics: symmetrical   Tinel (-) ulnar at cubital tunnel and median   Lower body   Lumbar screen: mobility pain free   LE strength   L DF: 4/5   R hip abd: 3-/5, L hip abd 3+/5   All other testing strong   Gait: occasional cross over but no issues of balance noted and good speed   Turns: under 4 seconds, no LOB   Tandem and NBOS EO/EC: all 30+ seconds without assistances   Sit-stand: no UE required   5x sit-stand: 12 seconds         Precautions: recent CVA

## 2022-03-31 ENCOUNTER — OFFICE VISIT (OUTPATIENT)
Dept: PHYSICAL THERAPY | Facility: CLINIC | Age: 77
End: 2022-03-31
Payer: MEDICARE

## 2022-03-31 DIAGNOSIS — R53.1 WEAKNESS STATUS POST CEREBROVASCULAR ACCIDENT: Primary | ICD-10-CM

## 2022-03-31 DIAGNOSIS — I69.398 WEAKNESS STATUS POST CEREBROVASCULAR ACCIDENT: Primary | ICD-10-CM

## 2022-03-31 PROCEDURE — 97110 THERAPEUTIC EXERCISES: CPT | Performed by: PHYSICAL THERAPIST

## 2022-03-31 PROCEDURE — 97140 MANUAL THERAPY 1/> REGIONS: CPT | Performed by: PHYSICAL THERAPIST

## 2022-03-31 NOTE — PROGRESS NOTES
Daily Note     Today's date: 3/31/2022  Patient name: Angely Valverde  : 1945  MRN: [id number]  Referring provider: Malia Sharpe DO  Dx:   Encounter Diagnosis     ICD-10-CM    1  Weakness status post cerebrovascular accident  I69 398     R53 1                   Subjective: pt able to move more freely with less hypersensitivity in her palm      Objective: See treatment diary below    AROM left wrist E/F- 75/50   II- L/R- 25/40; 3 SHAWANDA- ; Key-   [place]- 3 61 all  Pt notes sensory deficit with FMS and general loss of strength left hand    Assessment: Tolerated treatment well  Patient would benefit from continued PT      Plan: Continue per plan of care        Precautions: monitor activities for safety in relationship to strength/sensation      Manuals 3/29 3/31           STM/palmar stretch  15                        HEP TP Griffith                         Neuro Re-Ed                                       Ther Ex             TP  T 2' T 2'           TP 5 finger T 2/10 T 2'           TP nibs T 12 ---->           Tim 20 2/10 20 2/10           Blue IV 2/10 IV 2/10           Flexbar Y 10/10 R 10 all           Digiflex  Y 1'           DB E/F  4 2/10                                                  Modalities             HP post  10

## 2022-04-04 DIAGNOSIS — I63.9 CVA (CEREBRAL VASCULAR ACCIDENT) (HCC): ICD-10-CM

## 2022-04-04 DIAGNOSIS — I67.2 INTRACRANIAL ATHEROSCLEROSIS: ICD-10-CM

## 2022-04-04 RX ORDER — CLOPIDOGREL BISULFATE 75 MG/1
75 TABLET ORAL DAILY
Qty: 90 TABLET | Refills: 0 | Status: SHIPPED | OUTPATIENT
Start: 2022-04-04 | End: 2022-06-24 | Stop reason: SDUPTHER

## 2022-04-04 NOTE — TELEPHONE ENCOUNTER
Patient called in regarding Plavix script and if she should be continuing this  I reviewed the following with her from hospitalization (when the Plavix was started) -     · Outpatient follow-up with Neurology with in 3 weeks  · Plavix 70 mg daily x 3 weeks  · Aspirin 81 mg daily x 3 weeks  · Plavix monotherapy after 3 weeks    Patient verbalizes understanding  She has indeed discontinued the aspirin appropriately and would like a refill of Plavix to go to [facility] in [place] - she is almost out of this  Chucho Ramsey, patient scheduled with you 4/22 - confirmed appt - she would like this filled prior, please  Script pended for review and signature  Thanks!

## 2022-04-04 NOTE — TELEPHONE ENCOUNTER
Called patient and informed her of Angeles's message  She verbalizes understanding  She will follow up with PCP for future refills

## 2022-04-05 ENCOUNTER — OFFICE VISIT (OUTPATIENT)
Dept: PHYSICAL THERAPY | Facility: CLINIC | Age: 77
End: 2022-04-05
Payer: MEDICARE

## 2022-04-05 DIAGNOSIS — I69.398 WEAKNESS STATUS POST CEREBROVASCULAR ACCIDENT: ICD-10-CM

## 2022-04-05 DIAGNOSIS — R53.1 WEAKNESS: Primary | ICD-10-CM

## 2022-04-05 DIAGNOSIS — R53.1 WEAKNESS STATUS POST CEREBROVASCULAR ACCIDENT: ICD-10-CM

## 2022-04-05 PROCEDURE — 97140 MANUAL THERAPY 1/> REGIONS: CPT

## 2022-04-05 PROCEDURE — 97110 THERAPEUTIC EXERCISES: CPT

## 2022-04-05 NOTE — PROGRESS NOTES
Daily Note     Today's date: 2022  Patient name: Atul Osborne  : 1945  MRN: [id number]  Referring provider: Celestine Bundy DO  Dx:   Encounter Diagnosis     ICD-10-CM    1  Weakness  R53 1    2  Weakness status post cerebrovascular accident  I69 398     R53 1                   Subjective: Pt reports she gets "pins and needles less than she used too  "I am doing more with my hand "    Objective: See treatment diary below    AROM left wrist E/F- 75/50   II- L/R- ; 3 SHAWANDA- ; Key-   [place]- 3 61 all  Pt notes sensory deficit with FMS and general loss of strength left hand    Assessment: Tolerated treatment well today  Continues to progress as tolerated  Plan: Progress treatment as tolerated         Precautions: monitor activities for safety in relationship to strength/sensation      Manuals 3/29 3/31 4/5          STM/palmar stretch  15 15                       HEP TP Griffith                         Neuro Re-Ed                                       Ther Ex             TP  T 2' T 2' T 2'          TP 5 finger T /10 T 2' T 2'          TP nibs T 12 ----> T12x          Tim 20 2/10 20 2/10 20 2/10          Blue IV 2/10 IV 2/10 IV 2/10          Flexbar Y 10/10 R 10 all R 2/10          Digiflex  Y 1' Y1'          DB E/F  4 2/10 4 2/10          S/P    0" 2/10                                    Modalities             HP post  10 10

## 2022-04-06 ENCOUNTER — HOSPITAL ENCOUNTER (OUTPATIENT)
Dept: NUCLEAR MEDICINE | Facility: HOSPITAL | Age: 77
Discharge: HOME | End: 2022-04-06
Attending: INTERNAL MEDICINE
Payer: MEDICARE

## 2022-04-06 DIAGNOSIS — R06.00 DOE (DYSPNEA ON EXERTION): ICD-10-CM

## 2022-04-06 DIAGNOSIS — R93.1 ABNORMAL ECHOCARDIOGRAM: ICD-10-CM

## 2022-04-06 LAB
MAX HR: 141 BPM
RATE PRESSURE PRODUCT: NORMAL
SL CV REST NUCLEAR ISOTOPE DOSE: 10.8 MCI
SL CV STRESS NUCLEAR ISOTOPE DOSE: 32.1 MCI
STRESS BASELINE HR: 76 BPM
STRESS PEAK HR: 137 BPM
STRESS POST PEAK BP: 138 MMHG
STRESS/REST PERFUSION RATIO: 0.72

## 2022-04-06 PROCEDURE — 78452 HT MUSCLE IMAGE SPECT MULT: CPT

## 2022-04-06 PROCEDURE — 93016 CV STRESS TEST SUPVJ ONLY: CPT | Performed by: INTERNAL MEDICINE

## 2022-04-06 PROCEDURE — 93017 CV STRESS TEST TRACING ONLY: CPT

## 2022-04-06 PROCEDURE — A9502 TC99M TETROFOSMIN: HCPCS

## 2022-04-06 PROCEDURE — 93018 CV STRESS TEST I&R ONLY: CPT | Performed by: INTERNAL MEDICINE

## 2022-04-06 PROCEDURE — 78452 HT MUSCLE IMAGE SPECT MULT: CPT | Performed by: INTERNAL MEDICINE

## 2022-04-06 RX ORDER — ALBUTEROL SULFATE 2.5 MG/3ML
2.5 SOLUTION RESPIRATORY (INHALATION) ONCE
Status: CANCELLED | OUTPATIENT
Start: 2022-04-06

## 2022-04-06 RX ADMIN — REGADENOSON 0.4 MG: 0.08 INJECTION, SOLUTION INTRAVENOUS at 10:25

## 2022-04-07 ENCOUNTER — OFFICE VISIT (OUTPATIENT)
Dept: PHYSICAL THERAPY | Facility: CLINIC | Age: 77
End: 2022-04-07
Payer: MEDICARE

## 2022-04-07 DIAGNOSIS — I69.398 WEAKNESS STATUS POST CEREBROVASCULAR ACCIDENT: ICD-10-CM

## 2022-04-07 DIAGNOSIS — R53.1 WEAKNESS: Primary | ICD-10-CM

## 2022-04-07 DIAGNOSIS — R53.1 WEAKNESS STATUS POST CEREBROVASCULAR ACCIDENT: ICD-10-CM

## 2022-04-07 PROCEDURE — 97140 MANUAL THERAPY 1/> REGIONS: CPT

## 2022-04-07 PROCEDURE — 97535 SELF CARE MNGMENT TRAINING: CPT

## 2022-04-07 PROCEDURE — 97110 THERAPEUTIC EXERCISES: CPT

## 2022-04-07 NOTE — PROGRESS NOTES
Daily Note     Today's date: 2022  Patient name: Bhaskar Sherman  : 1945  MRN: [id number]  Referring provider: Khoi Madrigal DO  Dx:   Encounter Diagnosis     ICD-10-CM    1  Weakness  R53 1    2  Weakness status post cerebrovascular accident  I69 398     R53 1                   Subjective: Pt reports she feels confident to transition to HEP  "I am getting stronger but still have pins and needles in my hand/fingers  Objective: See treatment diary below    AROM left wrist E/F- /   II- L/R- ; 3 SHAWANDA- ; Key-   [place]- 3 61 all  Pt notes sensory deficit with FMS and general loss of strength left hand    Assessment: Tolerated treatment well today  Pt to transition to HEP  Pt responding well to program as strength gains observed  Plan: Progress treatment as tolerated         Precautions: monitor activities for safety in relationship to strength/sensation      Manuals 3/29 3/31 4/5 4/7         STM/palmar stretch  15 15 15                      HEP TP Griffith                         Neuro Re-Ed                                       Ther Ex             TP  T 2' T 2' T 2' T 2'         TP 5 finger T /10 T 2' T 2' T2'         TP nibs T 12 ----> T12x T12x         Tim 20 2/10 20 2/10 20 2/10 20 2/10         Blue IV 2/10 IV 2/10 IV 2/10 IV 2/10         Flexbar Y 10/10 R 10 all R 2/10 R 2/10         Digiflex  Y 1' Y1' Y1'         DB E/F  4 2/10 4 2/10 4 2/10         S/P    0" /10 0# 2/10                                   Modalities             HP post  10 10 DEF

## 2022-04-08 ENCOUNTER — PATIENT OUTREACH (OUTPATIENT)
Dept: CASE MANAGEMENT | Facility: OTHER | Age: 77
End: 2022-04-08

## 2022-04-08 NOTE — PROGRESS NOTES
Chart review reveals that patient saw cardiology on 3/18/22  Patient was prescribed amlodipine 10 mg po daily and lisinopril 10 mg po daily for HTN  BP in the office was 162/84  Patient is to RTO in 3 months  Patient also saw nephrology on 3/23/22  Patient was prescribed ergocalciferol 1 25 mg daily for a Vitamin D deficiency Patient was also ordered a renal artery doppler exam which has not been scheduled  It was noted incidentally on a CT of the chest that patient's R kidney was atrophied and a vascular narrowing was visualized  Patient did complete her nuclear stress test as ordered by cardiology  Patient was noted to have wheezing in all lung fields prior to start of the test  Patient received an albuterol neb by respiratory therapy  The stress EKG did note ischemia  Patient was also symptomatic during the stress test with dizziness, nausea, headache and  chest tightness  Symptoms resolved during recovery phase  Patient is continuing her PT as an outpatient s/p CVA in March  Patient has declined additional calls from RN care manager so chart review will continue until end of episode

## 2022-04-12 NOTE — PROGRESS NOTES
PT Discharge    Today's date: 2022  Patient name: Denver Crofts  : 1945  MRN: [id number]  Referring provider: Mike Gil DO  Dx:   Encounter Diagnosis     ICD-10-CM    1  Weakness  R53 1    2  Weakness status post cerebrovascular accident  I69 398     R53 1        Start Time: 1400  Stop Time: 1515  Total time in clinic (min): 75 minutes    Assessment  Assessment details: Pt is a 75 YO female presenting to PT with pain, decreased AROM, strength and tolerance to activity  Pt would benefit from skilled intervention to address these issues and maximize overall function  Occupation- homemaker  Dominant- right; Involved- left  Pt feels she has gain more control and strength in therapy and will cont on a HEP      Goals  ST  Increase AROM to [employer] [facility] in 6-8 weeks            2  Sensory re-education for safety  LT  Increase functional motion and strength for independence with ADL and self care by DC            2  Ability to RT recreational activity by DC  Goals met    Plan  Patient would benefit from: skilled physical therapy  Planned therapy interventions: activity modification, manual therapy, neuromuscular re-education, strengthening, stretching, therapeutic activities, therapeutic exercise and home exercise program  Frequency: 2x week  Duration in weeks: 4  Treatment plan discussed with: patient        Subjective Evaluation    History of Present Illness  Mechanism of injury: Pt presents to PT s/p CVA 3/7  She was sleeping and woke with tingling/numbness in the L forearm and hand and knee to ankle   took her to ER where they found very high BP and stroke       Currently she has no limitations but complaints of residual numbness/tingling in the left forearm   Pain  No pain reported    Hand dominance: right    Treatments  Current treatment: physical therapy  Patient Goals  Patient goals for therapy: increased motion, increased strength, return to sport/leisure activities and independence with ADLs/IADLs          Objective     General Comments:      Wrist/Hand Comments  AROM left wrist E/F- 75/50   II- L/R- 25/40; 3 [place]- 2/6; [place]- 6/8  [place]- 3 61 all  Pt notes sensory deficit with FMS and general loss of strength left hand      Flowsheet Rows      Most Recent Value   PT/OT G-Codes    Current Score 67   Projected Score 63             Precautions: monitor activities for safety in relationship to strength/sensation      Manuals 3/29 3/31 4/5 4/7               STM/palmar stretch   15 15 15                                       HEP TP Griffith                                             Neuro Re-Ed                                                                       Ther Ex                       TP  T 2' T 2' T 2' T 2'               TP 5 finger T 2/10 T 2' T 2' T2'               TP nibs T 12 ----> T12x T12x               Tim 20 2/10 20 2/10 20 2/10 20 2/10               Blue IV 2/10 IV 2/10 IV 2/10 IV 2/10               Flexbar Y 10/10 R 10 all R 2/10 R 2/10               Digiflex   Y 1' Y1' Y1'               DB E/F   4 2/10 4 2/10 4 2/10               S/P      0" 2/10 0# 2/10                                                               Modalities                       HP post   10 10 DEF

## 2022-04-19 LAB
CHEST PAIN STATEMENT: NORMAL
MAX DIASTOLIC BP: 76 MMHG
MAX HEART RATE: 137 BPM
MAX PREDICTED HEART RATE: 141 BPM
MAX. SYSTOLIC BP: 138 MMHG
PROTOCOL NAME: NORMAL
REASON FOR TERMINATION: NORMAL
TARGET HR FORMULA: NORMAL
TEST INDICATION: NORMAL
TIME IN EXERCISE PHASE: NORMAL

## 2022-04-21 NOTE — PROGRESS NOTES
[address] [facility]  PATIENT:  Hammad Taylor  MRN:  [id number]  :  1945  DATE OF SERVICE:  2022      Assessment/Plan:        Problem List Items Addressed This Visit        Respiratory    Nocturnal hypoxia     Discussed that untreated sleep apnea is risk factor for future strokes  Men with moderate-severe sleep apnea are 3x greater risk compared to men without or mild sleep apnea  · Overtime untreated sleep apnea can lead to systemic problems with uncontrolled blood pressure and atrial fibrillation  · Devin Hobson does not feel she needs to see a sleep specialist              Cardiovascular and Mediastinum    Carotid artery stenosis     · Following with vascular          CVA (cerebral vascular accident) ([facility] [address] ) - Primary    Hypertension     · Has a wrist BP cuff, monitoring once a week  · At home cuff generally reads 160's  · Goal <140/80 avoiding hypotension         Intracranial atherosclerosis       Other    History of CVA (cerebrovascular accident)     · Presented with tingling in left arm/leg  · CTA showed no LVO, dissections  Noted posterior calcification along the aortic arch, mild stenosis of right vertebral artery and moderate-severe stenosis of the left PICA  · MRI showed acute right pontine infarct  · Etiology most consistent with small vessel  · Reviewed vascular risk factors  · At the time of the event was on aspirin 81mg  She completed 21 days of DAPT then transitioned to plavix 75mg monotherapy   · BP goal <140/80; avoiding hypotension  · LDL goal <70, currently on atorvastatin 40mg daily  Recommend repeat lipid panel in 2-3 months  If it remains elevated would defer to PCP about zetia or repatha   · Low salt diet or mediterranean diet have been studied in secondary stroke prevention  · Reviewed stroke symptoms for which she should immediately seek medication attention      Follow up in 6 months              Hypercholesterolemia     · LDL goal <70 due to intracranial stenosis  · Most recent lipid panel with   · Recommend repeat in 2-3 months  If it still remains elevated would defer to PCP about addition of Repatha versus Zetia  · Continue atorvastatin 40mg at this time  History of Present Illness:   Denver Crofts is a 76 y o  female PMH HTN, HLD, CAD /p RCA stent, tobacco use who presents for follow up in regard to her recent stroke  Jennifer Smith presented to [facility] on 03/07/22 with symptoms of acute onset of left sided numbness, described as pins and needled in left upper from elbow down to wrist and lower extremities from knee down to ankle, imbalance  BP on arrival 237/107  NIHSS of 0  CTH showed chronic lacunar infarct in the left thalamus, no acute intracranial abnormality  CTA H/N LVO  Noted posterior calcification along the aortic arch with atheromatous plaque at the origin of the left subclavian resulting in mild stenosis  Mild stenosis of the right vertebral artery  Moderate-severe stenosis of the left PICA, atherosclerotic calcification in bilateral ICA  3mm outpouching communicating segment of the left ICA potentially infundibular versus aneurysms  MRI brain demonstrated acute posterior right pontine infarct  Chronic microangiopathic changes  Old left thalamic infarct  Etiology for stroke felt to be: small vessel  Residual symptoms of: with certain movements of hand or foot will have tingling  She denies any pain  Therapies: went to 4 physical therapy but then stopped  Jennifer Smith was on DAPT for 21 days with plans to transition to aspirin monotherapy  has been on plavix and atorvastatin 40mg for secondary stroke prevention  She is compliant and tolerating the medication without significant side effects  No reported new TIA/stroke like symptoms  Sleep:  Get up around 10am then will nap in the afternoon 1pm, napping from hours  She will again take another nap later in the evening until 10pm off and on   She will be up from 10pm-1am  She no longer shares a bedroom with her , unknown     Social:  Former smoker - quit after her heart attack  Smoked since she was 13years old  ETOH - none  Illicit drug use - no    Results:     Stroke risk factors were evaluated including:   Lab Results   Component Value Date/Time    CHOLESTEROL 211 (H) 03/08/2022 04:43 AM     Lab Results   Component Value Date/Time    TRIG 109 03/08/2022 04:43 AM     Lab Results   Component Value Date/Time    HDL 45 (L) 03/08/2022 04:43 AM     Lab Results   Component Value Date/Time    LDLCALC 144 (H) 03/08/2022 04:43 AM       Lab Results   Component Value Date/Time    HGBA1C 6 2 (H) 03/08/2022 04:43 AM     Lab Results   Component Value Date/Time     03/08/2022 04:43 AM       Imaging:     Results for orders placed during the hospital encounter of 03/07/22    MRI brain wo contrast    Narrative  MRI BRAIN WITHOUT CONTRAST    INDICATION: stroke  COMPARISON:   CTA head and neck 3/7/2022    TECHNIQUE:  Sagittal T1, axial T2, axial FLAIR, axial T1, axial [place] and axial diffusion imaging  IMAGE QUALITY:  Diagnostic  FINDINGS:    BRAIN PARENCHYMA:    Focus of acute infarct in the posterior right harvey  Associated FLAIR hyperintensity without significant edema or mass effect  Cerebral atrophy  Nonspecific  foci of T2/FLAIR hyperintensities involving periventricular and subcortical white matter, most compatible with moderate to advanced microangiopathic change  Old left thalamic infarct  There is no discrete mass, mass effect or midline shift  There is no intracranial hemorrhage  VENTRICLES:  Normal for the patient's age  SELLA AND PITUITARY GLAND:  Normal     ORBITS:  Normal     PARANASAL SINUSES:  Normal     VASCULATURE:  Evaluation of the major intracranial vasculature demonstrates appropriate flow voids  CALVARIUM AND SKULL BASE:  Normal     EXTRACRANIAL SOFT TISSUES:  Normal     Impression  1  Acute posterior right pontine infarct    No significant edema  No hemorrhagic transformation  2   Chronic microangiopathic change  3   Old left thalamic infarct  Workstation performed: [id number]    Results for orders placed during the hospital encounter of 03/07/22    CTA stroke alert (head/neck)    Narrative  CTA NECK AND BRAIN WITH CONTRAST    INDICATION: CVA l sided numbness    COMPARISON:   None  TECHNIQUE:   Post contrast imaging was performed after administration of iodinated contrast through the neck and brain  Post contrast axial 0 625 mm images timed to opacify the arterial system  3D rendering was performed on an independent workstation  MIP reconstructions performed  Coronal reconstructions were performed of the noncontrast portion of the brain  Radiation dose length product (DLP) for this visit:  346 74 mGy-cm   This examination, like all CT scans performed in the [facility], was performed utilizing techniques to minimize radiation dose exposure, including the use of iterative  reconstruction and automated exposure control  IV Contrast:  100 mL of iohexol (OMNIPAQUE)    IMAGE QUALITY:   Diagnostic    FINDINGS:    CERVICAL VASCULATURE  AORTIC ARCH AND GREAT VESSELS:  Mild atherosclerotic disease of the arch, proximal great vessels and visualized subclavian vessels  No significant stenosis  RIGHT VERTEBRAL ARTERY CERVICAL SEGMENT:  Mild stenosis at the origin  The right vertebral artery is patent with areas of mild to moderate stenosis along its course in the neck  LEFT VERTEBRAL ARTERY CERVICAL SEGMENT:  Normal origin  The vessel is normal in caliber throughout the neck  RIGHT EXTRACRANIAL CAROTID SEGMENT:  Mild atherosclerotic disease of the distal common carotid artery and proximal cervical internal carotid artery without significant stenosis compared to the more distal ICA      LEFT EXTRACRANIAL CAROTID SEGMENT:  Mild atherosclerotic disease of the distal common carotid artery and proximal cervical internal carotid artery without significant stenosis compared to the more distal ICA  NASCET criteria was used to determine the degree of internal carotid artery diameter stenosis  INTRACRANIAL VASCULATURE  INTERNAL CAROTID ARTERIES:  Atherosclerotic calcification along the cavernous portion of the internal carotid artery bilaterally with mild stenosis  ANTERIOR CIRCULATION:  Symmetric A1 segments and anterior cerebral arteries with normal enhancement  Normal anterior communicating artery  MIDDLE CEREBRAL ARTERY CIRCULATION:  M1 segment and middle cerebral artery branches demonstrate normal enhancement bilaterally  DISTAL VERTEBRAL ARTERIES:  There is moderate stenosis of the left PICA just beyond its origin  Mild stenosis V4 segment of the right vertebral artery  BASILAR ARTERY:  Basilar artery is normal in caliber  Normal superior cerebellar arteries  POSTERIOR CEREBRAL ARTERIES: Both posterior cerebral arteries arises from the basilar tip  Both arteries demonstrate normal enhancement  Focal outpouching communicating segment of the left internal carotid artery potentially a 3 mm P-comm aneurysm  versus infundibular origin of small vessel  VENOUS STRUCTURES:  Normal       NON VASCULAR ANATOMY  BONY STRUCTURES:  No acute osseous abnormality  SOFT TISSUES OF THE NECK:  Normal     THORACIC INLET:  Unremarkable  Impression  No large vessel occlusion  Posterior calcification along the aortic arch with atheromatous plaque at the origin of left subclavian artery results in mild left subclavian stenosis  There is mild stenosis origin of the right vertebral artery with  additional smaller areas of mild to moderate vertebral artery stenosis without occlusion  3 mm outpouching communicating segment of the left internal carotid artery potentially infundibular origin of a small left posterior communicating artery versus small P-comm aneurysm      Moderate to severe stenosis origin of the left PICA  Atherosclerotic calcification results in mild cavernous ICA stenosis bilaterally  Findings were directly discussed with Ashley Nguyễn at 1:47 PM                 Workstation performed: [id number]    No results found for this or any previous visit  No results found for this or any previous visit  No results found for this or any previous visit  No results found for this or any previous visit  Past Medical History:     Past Medical History:   Diagnosis Date    CAD (coronary artery disease)     Cardiac disease     Hypercholesteremia     Hyperlipidemia     Hypertension     Hypertension     Renal disorder        Patient Active Problem List   Diagnosis    Acute CVA (cerebrovascular accident) ([place] [address] )    Hypertensive emergency    Malnutrition ([place] [address] )    Premature atrial complexes    Thrombocytopenia (HCC)    Leukopenia    Neutropenia (HCC)    Hyperphosphatemia    Hypercholesterolemia    Carotid artery stenosis    Nocturnal hypoxia    PINZON (dyspnea on exertion)    Abnormal echocardiogram    CVA (cerebral vascular accident) ([place] [address] )    Recurrent urinary tract infection    Atrophy of right kidney    Stage 3a chronic kidney disease (HCC)    Renal vascular disease    Hypertension       Medications:      Current Outpatient Medications   Medication Sig Dispense Refill    amLODIPine (NORVASC) 10 mg tablet Take 1 tablet (10 mg total) by mouth daily 90 tablet 3    atorvastatin (LIPITOR) 40 mg tablet Take 1 tablet (40 mg total) by mouth every evening Take this in place of simvastatin    It will better prevent heart attack and stroke 30 tablet 0    clopidogrel (PLAVIX) 75 mg tablet Take 1 tablet (75 mg total) by mouth daily To prevent stroke and heart attack 90 tablet 0    ergocalciferol (ERGOCALCIFEROL) 1 25 MG (96552 UT) capsule Take 1 capsule (50,000 Units total) by mouth once a week 12 capsule 1    lisinopril (ZESTRIL) 10 mg tablet Take 1 tablet (10 mg total) by mouth daily 90 tablet 3    aspirin (ECOTRIN LOW STRENGTH) 81 mg EC tablet Take 1 tablet (81 mg total) by mouth daily Please take this for 3 weeks with plavix then just do plavix alone  (Patient not taking: Reported on 4/22/2022 )  0    calcium carbonate (OS-MIGUELINA) 600 MG tablet Take 600 mg by mouth daily (Patient not taking: Reported on 4/22/2022 )       No current facility-administered medications for this visit  Allergies: Allergies   Allergen Reactions    Influenza Virus Vaccine        Family History:     History reviewed  No pertinent family history  Social History:     Social History     Socioeconomic History    Marital status: /Civil Union     Spouse name: Not on file    Number of children: Not on file    Years of education: Not on file    Highest education level: Not on file   Occupational History    Not on file   Tobacco Use    Smoking status: Former Smoker    Smokeless tobacco: Never Used   Vaping Use    Vaping Use: Never used   Substance and Sexual Activity    Alcohol use: Never    Drug use: No    Sexual activity: Not on file   Other Topics Concern    Not on file   Social History Narrative    Not on file     Social Determinants of Health     Financial Resource Strain: Not on file   Food Insecurity: No Food Insecurity    Worried About [address] in the Last Year: Never true    [address] in the Last Year: Never true   Transportation Needs: No Transportation Needs    Lack of Transportation (Medical): No    Lack of Transportation (Non-Medical): No   Physical Activity: Not on file   Stress: Not on file   Social Connections: Not on file   Intimate Partner Violence: Not on file   Housing Stability: Low Risk     Unable to Pay for Housing in the Last Year: No    Number of Places Lived in the Last Year: 1    Unstable Housing in the Last Year: No         ROS:     Review of Systems   Constitutional: Positive for fatigue (Constant fatigue and always wants to sleep)  Negative for appetite change and fever  HENT: Negative  Negative for hearing loss, tinnitus, trouble swallowing and voice change  Eyes: Negative  Negative for photophobia and pain  Respiratory: Positive for cough  Negative for shortness of breath  Cardiovascular: Negative  Negative for palpitations  Gastrointestinal: Negative  Negative for nausea and vomiting  Endocrine: Negative  Negative for cold intolerance  Genitourinary: Negative  Negative for dysuria, frequency and urgency  Musculoskeletal: Negative for myalgias and neck pain  Sometimes feels off balance if standing up too quick     Skin: Negative  Negative for rash  Allergic/Immunologic: Negative  Neurological: Positive for weakness, light-headedness (after meds) and numbness (From elbow down to hand and knee down to foot on Left side  )  Negative for dizziness, tremors, seizures, syncope, facial asymmetry, speech difficulty and headaches  Hematological: Negative  Does not bruise/bleed easily  Psychiatric/Behavioral: Negative  Negative for confusion, hallucinations and sleep disturbance  All other systems reviewed and are negative      ROS reviewed personally and edited as needed  Objective:   Physical Exam:    /62 (BP Location: Right arm, Patient Position: Sitting, Cuff Size: Standard)   Pulse 70   Wt 45 4 kg (100 lb)   BMI 19 53 kg/m²     Physical Exam  Constitutional:       Appearance: She is underweight  HENT:      Head: Normocephalic  Eyes:      Extraocular Movements: EOM normal       Pupils: Pupils are equal, round, and reactive to light  Neurological:      Mental Status: She is alert and oriented to person, place, and time  Coordination: Finger-Nose-Finger Test normal       Gait: Gait is intact  Deep Tendon Reflexes: Strength normal       Reflex Scores:       Bicep reflexes are 1+ on the right side and 1+ on the left side         Brachioradialis reflexes are 1+ on the right side and 1+ on the left side  Patellar reflexes are 1+ on the right side and 1+ on the left side  Achilles reflexes are 1+ on the right side and 1+ on the left side  Psychiatric:         Speech: Speech normal         Neurologic Exam     Mental Status   Oriented to person, place, and time  Follows 2 step commands  Attention: normal  Concentration: normal    Speech: speech is normal   Level of consciousness: alert  Knowledge: good  Normal comprehension  Cranial Nerves     CN III, IV, VI   Pupils are equal, round, and reactive to light  Extraocular motions are normal    Right pupil: Size: 3 mm  Shape: regular  Reactivity: brisk  Left pupil: Size: 3 mm  Shape: regular  Reactivity: brisk  CN III: no CN III palsy  CN VI: no CN VI palsy  Nystagmus: none   Diplopia: none  Ophthalmoparesis: none  Upgaze: normal  Downgaze: normal  Conjugate gaze: present    CN V   Facial sensation intact  CN VII   Facial expression full, symmetric  CN VIII   Hearing: intact    CN IX, X   Palate: symmetric    CN XI   Right trapezius strength: normal  Left trapezius strength: normal    CN XII   Tongue: not atrophic  Fasciculations: absent  Tongue deviation: none    Motor Exam   Right arm pronator drift: absent  Left arm pronator drift: absent    Strength   Strength 5/5 throughout  Sensory Exam   Light touch normal    Vibration normal      Increased temperature sensation overlying left upper and lower extremities compared to right        Gait, Coordination, and Reflexes     Gait  Gait: normal    Coordination   Finger to nose coordination: normal    Reflexes   Right brachioradialis: 1+  Left brachioradialis: 1+  Right biceps: 1+  Left biceps: 1+  Right patellar: 1+  Left patellar: 1+  Right achilles: 1+  Left achilles: 1+  Right ankle clonus: absent  Left ankle clonus: absent        I have spent 45 minutes with Patient  today in which greater than 50% of this time was spent in counseling/coordination of care regarding Diagnostic results, Prognosis, Risks and benefits of tx options, Intructions for management, Patient and family education, Importance of tx compliance, Risk factor reductions, Impressions and Plan of care as above

## 2022-04-22 ENCOUNTER — OFFICE VISIT (OUTPATIENT)
Dept: NEUROLOGY | Facility: CLINIC | Age: 77
End: 2022-04-22
Payer: MEDICARE

## 2022-04-22 VITALS
BODY MASS INDEX: 19.53 KG/M2 | DIASTOLIC BLOOD PRESSURE: 62 MMHG | WEIGHT: 100 LBS | SYSTOLIC BLOOD PRESSURE: 132 MMHG | HEART RATE: 70 BPM

## 2022-04-22 DIAGNOSIS — I63.9 CVA (CEREBRAL VASCULAR ACCIDENT) (HCC): Primary | ICD-10-CM

## 2022-04-22 DIAGNOSIS — I10 HYPERTENSION: ICD-10-CM

## 2022-04-22 DIAGNOSIS — G47.34 NOCTURNAL HYPOXIA: ICD-10-CM

## 2022-04-22 DIAGNOSIS — I67.2 INTRACRANIAL ATHEROSCLEROSIS: ICD-10-CM

## 2022-04-22 DIAGNOSIS — E78.00 HYPERCHOLESTEROLEMIA: ICD-10-CM

## 2022-04-22 DIAGNOSIS — I65.29 STENOSIS OF CAROTID ARTERY, UNSPECIFIED LATERALITY: ICD-10-CM

## 2022-04-22 DIAGNOSIS — Z86.73 HISTORY OF CVA (CEREBROVASCULAR ACCIDENT): ICD-10-CM

## 2022-04-22 PROCEDURE — 99215 OFFICE O/P EST HI 40 MIN: CPT | Performed by: PHYSICIAN ASSISTANT

## 2022-04-22 NOTE — ASSESSMENT & PLAN NOTE
· LDL goal <70 due to intracranial stenosis  · Most recent lipid panel with   · Recommend repeat in 2-3 months  If it still remains elevated would defer to PCP about addition of Repatha versus Zetia  · Continue atorvastatin 40mg at this time

## 2022-04-22 NOTE — ASSESSMENT & PLAN NOTE
Discussed that untreated sleep apnea is risk factor for future strokes  Men with moderate-severe sleep apnea are 3x greater risk compared to men without or mild sleep apnea  · Overtime untreated sleep apnea can lead to systemic problems with uncontrolled blood pressure and atrial fibrillation     · Miranda Redmond does not feel she needs to see a sleep specialist

## 2022-04-22 NOTE — ASSESSMENT & PLAN NOTE
· Has a wrist BP cuff, monitoring once a week  · At home cuff generally reads 160's    · Goal <140/80 avoiding hypotension

## 2022-04-22 NOTE — PROGRESS NOTES
Review of Systems   Constitutional: Positive for fatigue (Constant fatigue and always wants to sleep)  Negative for appetite change and fever  HENT: Negative  Negative for hearing loss, tinnitus, trouble swallowing and voice change  Eyes: Negative  Negative for photophobia and pain  Respiratory: Positive for cough  Negative for shortness of breath  Cardiovascular: Negative  Negative for palpitations  Gastrointestinal: Negative  Negative for nausea and vomiting  Endocrine: Negative  Negative for cold intolerance  Genitourinary: Negative  Negative for dysuria, frequency and urgency  Musculoskeletal: Negative for myalgias and neck pain  Sometimes feels off balance if standing up too quick     Skin: Negative  Negative for rash  Allergic/Immunologic: Negative  Neurological: Positive for weakness, light-headedness (after meds) and numbness (From elbow down to hand and knee down to foot on Left side  )  Negative for dizziness, tremors, seizures, syncope, facial asymmetry, speech difficulty and headaches  Hematological: Negative  Does not bruise/bleed easily  Psychiatric/Behavioral: Negative  Negative for confusion, hallucinations and sleep disturbance  All other systems reviewed and are negative

## 2022-04-22 NOTE — ASSESSMENT & PLAN NOTE
· Presented with tingling in left arm/leg  · CTA showed no LVO, dissections  Noted posterior calcification along the aortic arch, mild stenosis of right vertebral artery and moderate-severe stenosis of the left PICA  · MRI showed acute right pontine infarct  · Etiology most consistent with small vessel  · Reviewed vascular risk factors  · At the time of the event was on aspirin 81mg  She completed 21 days of DAPT then transitioned to plavix 75mg monotherapy   · BP goal <140/80; avoiding hypotension  · LDL goal <70, currently on atorvastatin 40mg daily  Recommend repeat lipid panel in 2-3 months  If it remains elevated would defer to PCP about zetia or repatha   · Low salt diet or mediterranean diet have been studied in secondary stroke prevention  · Reviewed stroke symptoms for which she should immediately seek medication attention      Follow up in 6 months

## 2022-04-22 NOTE — PATIENT INSTRUCTIONS
 Continue with combination of plavix 75mg daily and atorvastatin 40mg daily for secondary stroke prevention   BP goal < 140/80   LDL goal <70 ()   Defer to PCP regarding glycemic and blood pressure management   Recommend speaking to your PCP about your fatigue, may be related to your medications  Counseling   Discussed that untreated sleep apnea is risk factor for future strokes  Men with moderate-severe sleep apnea are 3x greater risk compared to men without or mild sleep apnea   Overtime untreated sleep apnea can lead to systemic problems with uncontrolled blood pressure and atrial fibrillation   I advised patient to avoid using NSAIDs for headaches or other pain and to stick to tylenol if needed   Recommend mediterranean diet & regular exercise regimen atleast 4-5 times a week for 20-30 minutes   Educated patient/family regarding medication compliance    Recommend follow up 6 months  I would be happy to see the patient sooner if any new questions/concerns arise  Patient/Guardian was advised to the call the office if they have any questions and concerns in the meantime  Patient/Guardian does understand that if they have any new stroke like symptoms such as facial droop on one side, weakness/paralysis on either side, speech trouble, numbness on one side, balance issues, any vision changes, extreme dizziness or any new headache, to call 9-1-1 immediately or to proceed to the nearest ER immediately

## 2022-04-25 ENCOUNTER — CONSULT (OUTPATIENT)
Dept: VASCULAR SURGERY | Facility: HOSPITAL | Age: 77
End: 2022-04-25
Attending: INTERNAL MEDICINE
Payer: MEDICARE

## 2022-04-25 VITALS
HEIGHT: 60 IN | TEMPERATURE: 98.3 F | DIASTOLIC BLOOD PRESSURE: 70 MMHG | SYSTOLIC BLOOD PRESSURE: 136 MMHG | WEIGHT: 99 LBS | HEART RATE: 73 BPM | BODY MASS INDEX: 19.44 KG/M2

## 2022-04-25 DIAGNOSIS — I67.2 INTRACRANIAL ATHEROSCLEROSIS: Primary | ICD-10-CM

## 2022-04-25 DIAGNOSIS — I65.29 STENOSIS OF CAROTID ARTERY, UNSPECIFIED LATERALITY: ICD-10-CM

## 2022-04-25 DIAGNOSIS — I63.9 CEREBROVASCULAR ACCIDENT (CVA), UNSPECIFIED MECHANISM (HCC): ICD-10-CM

## 2022-04-25 PROCEDURE — 99203 OFFICE O/P NEW LOW 30 MIN: CPT | Performed by: SURGERY

## 2022-04-25 NOTE — PROGRESS NOTES
Assessment/Plan:    CVA (cerebral vascular accident) ([facility] [address] )  Recent right pontine infarct  Patient on Plavix and atorvastatin    patient ultimately discharged with instructions to follow-up vascular surgery  Patient with no evidence of hemodynamically significant extracranial carotid artery stenosis  No additional vascular workup/interventions warranted/indicated at this time  Patient may follow-up PCP and Neurology as needed  Diagnoses and all orders for this visit:    Intracranial atherosclerosis    Stenosis of carotid artery, unspecified laterality  -     Ambulatory Referral to Vascular Surgery    Cerebrovascular accident (CVA), unspecified mechanism ([facility] [address] )          Subjective:      Patient ID: Kianna Smith is a 76 y o  female  Pt is [facility] f/u for CVA on 3/7/22  Pt is here to rev CTA of stroke alert done on 3/7/22  Pt reports L sided tingling but denies other TIA or stroke like symptoms  Pt is taking atorvastatin and Plavix  Pt is a former smoker  Ema is a pleasant 64-year-old former smoker who occasionally sneaks 1-2 cigarettes who recently woke up with left arm and lower leg feeling asleep  She was noted to have extremely elevated blood pressure  She presented to hospital for further workup  She was worked up and noted to have a right posterior pontine infarct  Her imaging studies have been reviewed  There is no evidence of significant extracranial carotid artery occlusive disease  She does have intracranial disease  The numbness of the left forearm and lower leg persists  She does not appear to have any gross motor deficits  The following portions of the patient's history were reviewed and updated as appropriate: allergies, current medications, past family history, past medical history, past social history, past surgical history and problem list     Review of Systems   Constitutional: Positive for fatigue  HENT: Negative  Eyes: Negative      Respiratory: Negative  Cardiovascular: Negative  Gastrointestinal: Negative  Endocrine: Negative  Genitourinary: Negative  Musculoskeletal: Negative  Skin: Negative  Allergic/Immunologic: Negative  Neurological: Negative  Hematological: Negative  Psychiatric/Behavioral: Negative  I have personally reviewed the ROS entered by MA and agree as documented  Objective:      /70 (BP Location: Left arm, Patient Position: Sitting, Cuff Size: Standard)   Pulse 73   Temp 98 3 °F (36 8 °C) (Tympanic)   Ht 5' (1 524 m)   Wt 44 9 kg (99 lb)   BMI 19 33 kg/m²          Physical Exam  Constitutional:       General: She is not in acute distress  Appearance: She is well-developed  HENT:      Head: Normocephalic and atraumatic  Eyes:      General: No scleral icterus  Conjunctiva/sclera: Conjunctivae normal    Neck:      Trachea: No tracheal deviation  Cardiovascular:      Rate and Rhythm: Normal rate and regular rhythm  Heart sounds: Normal heart sounds  Pulmonary:      Effort: Pulmonary effort is normal       Breath sounds: Normal breath sounds  Abdominal:      General: There is no distension  Palpations: Abdomen is soft  There is no mass (no appreciable aortic pulsation/aneurysm)  Tenderness: There is no abdominal tenderness  There is no guarding or rebound  Musculoskeletal:         General: Normal range of motion  Cervical back: Normal range of motion and neck supple  Skin:     General: Skin is warm and dry  Neurological:      Mental Status: She is alert and oriented to person, place, and time  Psychiatric:         Mood and Affect: Mood normal          Behavior: Behavior normal          Thought Content: Thought content normal          Judgment: Judgment normal        CTA neck:     IMPRESSION:     No large vessel occlusion    Posterior calcification along the aortic arch with atheromatous plaque at the origin of left subclavian artery results in mild left subclavian stenosis  There is mild stenosis origin of the right vertebral artery with   additional smaller areas of mild to moderate vertebral artery stenosis without occlusion      3 mm outpouching communicating segment of the left internal carotid artery potentially infundibular origin of a small left posterior communicating artery versus small P-comm aneurysm      Moderate to severe stenosis origin of the left PICA      Atherosclerotic calcification results in mild cavernous ICA stenosis bilaterally

## 2022-04-25 NOTE — ASSESSMENT & PLAN NOTE
Recent right pontine infarct  Patient on Plavix and atorvastatin    patient ultimately discharged with instructions to follow-up vascular surgery  Patient with no evidence of hemodynamically significant extracranial carotid artery stenosis  No additional vascular workup/interventions warranted/indicated at this time  Patient may follow-up PCP and Neurology as needed

## 2022-05-05 ENCOUNTER — PATIENT OUTREACH (OUTPATIENT)
Dept: CASE MANAGEMENT | Facility: OTHER | Age: 77
End: 2022-05-05

## 2022-05-05 NOTE — PROGRESS NOTES
Chart review reveals that patient did complete an OV with neurology on 4/22/22  No changes to medications or to plan of care  Patient is to RTO in 6 months  Patient was counseled on treating her sleep apnea  Patient also had a consult visit with vascular surgery on 4/25/22  Vascular surgery felt that patient had no significant carotid artery stenosis and so vascular surgery interventions were warranted  Patient is scheduled for a renal artery vascular study on 5/6/22

## 2022-05-06 ENCOUNTER — HOSPITAL ENCOUNTER (OUTPATIENT)
Dept: NON INVASIVE DIAGNOSTICS | Facility: HOSPITAL | Age: 77
Discharge: HOME | End: 2022-05-06
Attending: INTERNAL MEDICINE
Payer: MEDICARE

## 2022-05-06 DIAGNOSIS — N26.1 ATROPHY OF RIGHT KIDNEY: ICD-10-CM

## 2022-05-06 PROCEDURE — 93975 VASCULAR STUDY: CPT

## 2022-05-07 PROCEDURE — 93975 VASCULAR STUDY: CPT | Performed by: SURGERY

## 2022-05-09 ENCOUNTER — TELEPHONE (OUTPATIENT)
Dept: OTHER | Facility: OTHER | Age: 77
End: 2022-05-09

## 2022-05-09 NOTE — TELEPHONE ENCOUNTER
Dante Tapia called saying that she is returning a phone that she had got for the office regarding her teste result and is asking if the office can give her back a call

## 2022-05-23 ENCOUNTER — HOSPITAL ENCOUNTER (OUTPATIENT)
Dept: MRI IMAGING | Facility: HOSPITAL | Age: 77
Discharge: HOME | End: 2022-05-23
Attending: INTERNAL MEDICINE
Payer: MEDICARE

## 2022-05-23 DIAGNOSIS — I77.1 CELIAC ARTERY STENOSIS (HCC): ICD-10-CM

## 2022-05-23 DIAGNOSIS — N28.89 RENAL VASCULAR DISEASE: ICD-10-CM

## 2022-05-23 DIAGNOSIS — K55.1 SUPERIOR MESENTERIC ARTERY STENOSIS (HCC): ICD-10-CM

## 2022-05-23 PROCEDURE — G1004 CDSM NDSC: HCPCS

## 2022-05-23 PROCEDURE — A9585 GADOBUTROL INJECTION: HCPCS | Performed by: INTERNAL MEDICINE

## 2022-05-23 PROCEDURE — C8902 MRA W/O FOL W/CONT, ABD: HCPCS

## 2022-05-23 RX ADMIN — GADOBUTROL 5 ML: 604.72 INJECTION INTRAVENOUS at 17:56

## 2022-06-06 ENCOUNTER — PATIENT OUTREACH (OUTPATIENT)
Dept: CASE MANAGEMENT | Facility: OTHER | Age: 77
End: 2022-06-06

## 2022-06-06 NOTE — PROGRESS NOTES
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

## 2022-06-20 ENCOUNTER — APPOINTMENT (OUTPATIENT)
Dept: LAB | Facility: MEDICAL CENTER | Age: 77
End: 2022-06-20
Payer: MEDICARE

## 2022-06-20 DIAGNOSIS — E46 MALNUTRITION, UNSPECIFIED TYPE (HCC): ICD-10-CM

## 2022-06-20 DIAGNOSIS — D70.9 NEUTROPENIA, UNSPECIFIED TYPE (HCC): ICD-10-CM

## 2022-06-20 DIAGNOSIS — E83.39 HYPERPHOSPHATEMIA: ICD-10-CM

## 2022-06-20 DIAGNOSIS — I63.9 ACUTE CVA (CEREBROVASCULAR ACCIDENT) (HCC): ICD-10-CM

## 2022-06-20 DIAGNOSIS — D69.6 THROMBOCYTOPENIA (HCC): ICD-10-CM

## 2022-06-20 DIAGNOSIS — D72.819 LEUKOPENIA, UNSPECIFIED TYPE: ICD-10-CM

## 2022-06-20 DIAGNOSIS — E78.5 HYPERLIPIDEMIA, UNSPECIFIED HYPERLIPIDEMIA TYPE: ICD-10-CM

## 2022-06-20 LAB
ALBUMIN SERPL BCP-MCNC: 3.9 G/DL (ref 3.5–5)
ALP SERPL-CCNC: 91 U/L (ref 46–116)
ALT SERPL W P-5'-P-CCNC: 16 U/L (ref 12–78)
ANION GAP SERPL CALCULATED.3IONS-SCNC: 7 MMOL/L (ref 4–13)
AST SERPL W P-5'-P-CCNC: 18 U/L (ref 5–45)
BASOPHILS # BLD AUTO: 0.03 THOUSANDS/ΜL (ref 0–0.1)
BASOPHILS NFR BLD AUTO: 1 % (ref 0–1)
BILIRUB SERPL-MCNC: 0.54 MG/DL (ref 0.2–1)
BUN SERPL-MCNC: 8 MG/DL (ref 5–25)
CALCIUM SERPL-MCNC: 9.3 MG/DL (ref 8.3–10.1)
CHLORIDE SERPL-SCNC: 109 MMOL/L (ref 100–108)
CHOLEST SERPL-MCNC: 124 MG/DL
CO2 SERPL-SCNC: 23 MMOL/L (ref 21–32)
CREAT SERPL-MCNC: 0.93 MG/DL (ref 0.6–1.3)
EOSINOPHIL # BLD AUTO: 0.1 THOUSAND/ΜL (ref 0–0.61)
EOSINOPHIL NFR BLD AUTO: 2 % (ref 0–6)
ERYTHROCYTE [DISTWIDTH] IN BLOOD BY AUTOMATED COUNT: 13 % (ref 11.6–15.1)
GFR SERPL CREATININE-BSD FRML MDRD: 58 ML/MIN/1.73SQ M
GLUCOSE P FAST SERPL-MCNC: 104 MG/DL (ref 65–99)
HCT VFR BLD AUTO: 41.2 % (ref 34.8–46.1)
HDLC SERPL-MCNC: 40 MG/DL
HGB BLD-MCNC: 14 G/DL (ref 11.5–15.4)
IMM GRANULOCYTES # BLD AUTO: 0.02 THOUSAND/UL (ref 0–0.2)
IMM GRANULOCYTES NFR BLD AUTO: 1 % (ref 0–2)
LDLC SERPL CALC-MCNC: 61 MG/DL (ref 0–100)
LYMPHOCYTES # BLD AUTO: 1.19 THOUSANDS/ΜL (ref 0.6–4.47)
LYMPHOCYTES NFR BLD AUTO: 28 % (ref 14–44)
MAGNESIUM SERPL-MCNC: 1.9 MG/DL (ref 1.6–2.6)
MCH RBC QN AUTO: 30.1 PG (ref 26.8–34.3)
MCHC RBC AUTO-ENTMCNC: 34 G/DL (ref 31.4–37.4)
MCV RBC AUTO: 89 FL (ref 82–98)
MONOCYTES # BLD AUTO: 0.37 THOUSAND/ΜL (ref 0.17–1.22)
MONOCYTES NFR BLD AUTO: 9 % (ref 4–12)
NEUTROPHILS # BLD AUTO: 2.54 THOUSANDS/ΜL (ref 1.85–7.62)
NEUTS SEG NFR BLD AUTO: 59 % (ref 43–75)
NRBC BLD AUTO-RTO: 0 /100 WBCS
PHOSPHATE SERPL-MCNC: 3 MG/DL (ref 2.3–4.1)
PLATELET # BLD AUTO: 144 THOUSANDS/UL (ref 149–390)
PMV BLD AUTO: 11.2 FL (ref 8.9–12.7)
POTASSIUM SERPL-SCNC: 4 MMOL/L (ref 3.5–5.3)
PROT SERPL-MCNC: 7.1 G/DL (ref 6.4–8.2)
RBC # BLD AUTO: 4.65 MILLION/UL (ref 3.81–5.12)
SODIUM SERPL-SCNC: 139 MMOL/L (ref 136–145)
TRIGL SERPL-MCNC: 113 MG/DL
WBC # BLD AUTO: 4.25 THOUSAND/UL (ref 4.31–10.16)

## 2022-06-20 PROCEDURE — 83735 ASSAY OF MAGNESIUM: CPT

## 2022-06-20 PROCEDURE — 80053 COMPREHEN METABOLIC PANEL: CPT

## 2022-06-20 PROCEDURE — 36415 COLL VENOUS BLD VENIPUNCTURE: CPT

## 2022-06-20 PROCEDURE — 80061 LIPID PANEL: CPT

## 2022-06-20 PROCEDURE — 85025 COMPLETE CBC W/AUTO DIFF WBC: CPT

## 2022-06-20 PROCEDURE — 84100 ASSAY OF PHOSPHORUS: CPT

## 2022-06-24 DIAGNOSIS — I67.2 INTRACRANIAL ATHEROSCLEROSIS: ICD-10-CM

## 2022-06-24 DIAGNOSIS — I63.9 CVA (CEREBRAL VASCULAR ACCIDENT) (HCC): ICD-10-CM

## 2022-06-24 RX ORDER — ATORVASTATIN CALCIUM 40 MG/1
40 TABLET, FILM COATED ORAL EVERY EVENING
Qty: 90 TABLET | Refills: 3 | Status: SHIPPED | OUTPATIENT
Start: 2022-06-24

## 2022-06-24 RX ORDER — CLOPIDOGREL BISULFATE 75 MG/1
75 TABLET ORAL DAILY
Qty: 90 TABLET | Refills: 3 | Status: SHIPPED | OUTPATIENT
Start: 2022-06-24

## 2022-06-24 NOTE — TELEPHONE ENCOUNTER
Requested medication(s) are due for refill today: yes  Patient has already received a courtesy refill: no   Other reason request has been forwarded to provider: pt is on the wait list

## 2022-07-12 ENCOUNTER — APPOINTMENT (OUTPATIENT)
Dept: LAB | Facility: MEDICAL CENTER | Age: 77
End: 2022-07-12
Payer: MEDICARE

## 2022-07-12 DIAGNOSIS — E55.9 AVITAMINOSIS D: ICD-10-CM

## 2022-07-12 DIAGNOSIS — N18.31 STAGE 3A CHRONIC KIDNEY DISEASE (HCC): ICD-10-CM

## 2022-07-12 LAB
25(OH)D3 SERPL-MCNC: 85.3 NG/ML (ref 30–100)
ALBUMIN SERPL BCP-MCNC: 3.8 G/DL (ref 3.5–5)
ALP SERPL-CCNC: 92 U/L (ref 46–116)
ALT SERPL W P-5'-P-CCNC: 16 U/L (ref 12–78)
ANION GAP SERPL CALCULATED.3IONS-SCNC: 7 MMOL/L (ref 4–13)
AST SERPL W P-5'-P-CCNC: 14 U/L (ref 5–45)
BACTERIA UR QL AUTO: ABNORMAL /HPF
BASOPHILS # BLD AUTO: 0.02 THOUSANDS/ΜL (ref 0–0.1)
BASOPHILS NFR BLD AUTO: 1 % (ref 0–1)
BILIRUB SERPL-MCNC: 0.61 MG/DL (ref 0.2–1)
BILIRUB UR QL STRIP: NEGATIVE
BUN SERPL-MCNC: 9 MG/DL (ref 5–25)
CALCIUM SERPL-MCNC: 9.2 MG/DL (ref 8.3–10.1)
CHLORIDE SERPL-SCNC: 106 MMOL/L (ref 100–108)
CLARITY UR: CLEAR
CO2 SERPL-SCNC: 24 MMOL/L (ref 21–32)
COLOR UR: YELLOW
CREAT SERPL-MCNC: 0.96 MG/DL (ref 0.6–1.3)
CREAT UR-MCNC: 119 MG/DL
EOSINOPHIL # BLD AUTO: 0.1 THOUSAND/ΜL (ref 0–0.61)
EOSINOPHIL NFR BLD AUTO: 3 % (ref 0–6)
ERYTHROCYTE [DISTWIDTH] IN BLOOD BY AUTOMATED COUNT: 12.9 % (ref 11.6–15.1)
GFR SERPL CREATININE-BSD FRML MDRD: 56 ML/MIN/1.73SQ M
GLUCOSE P FAST SERPL-MCNC: 143 MG/DL (ref 65–99)
GLUCOSE UR STRIP-MCNC: NEGATIVE MG/DL
HCT VFR BLD AUTO: 38.9 % (ref 34.8–46.1)
HGB BLD-MCNC: 13.3 G/DL (ref 11.5–15.4)
HGB UR QL STRIP.AUTO: NEGATIVE
IMM GRANULOCYTES # BLD AUTO: 0.01 THOUSAND/UL (ref 0–0.2)
IMM GRANULOCYTES NFR BLD AUTO: 0 % (ref 0–2)
KETONES UR STRIP-MCNC: NEGATIVE MG/DL
LEUKOCYTE ESTERASE UR QL STRIP: ABNORMAL
LYMPHOCYTES # BLD AUTO: 1.12 THOUSANDS/ΜL (ref 0.6–4.47)
LYMPHOCYTES NFR BLD AUTO: 28 % (ref 14–44)
MCH RBC QN AUTO: 30.3 PG (ref 26.8–34.3)
MCHC RBC AUTO-ENTMCNC: 34.2 G/DL (ref 31.4–37.4)
MCV RBC AUTO: 89 FL (ref 82–98)
MICROALBUMIN UR-MCNC: 61 MG/L (ref 0–20)
MICROALBUMIN/CREAT 24H UR: 51 MG/G CREATININE (ref 0–30)
MONOCYTES # BLD AUTO: 0.41 THOUSAND/ΜL (ref 0.17–1.22)
MONOCYTES NFR BLD AUTO: 10 % (ref 4–12)
NEUTROPHILS # BLD AUTO: 2.41 THOUSANDS/ΜL (ref 1.85–7.62)
NEUTS SEG NFR BLD AUTO: 58 % (ref 43–75)
NITRITE UR QL STRIP: NEGATIVE
NON-SQ EPI CELLS URNS QL MICRO: ABNORMAL /HPF
NRBC BLD AUTO-RTO: 0 /100 WBCS
PH UR STRIP.AUTO: 6 [PH]
PLATELET # BLD AUTO: 156 THOUSANDS/UL (ref 149–390)
PMV BLD AUTO: 11.2 FL (ref 8.9–12.7)
POTASSIUM SERPL-SCNC: 4.2 MMOL/L (ref 3.5–5.3)
PROT SERPL-MCNC: 7 G/DL (ref 6.4–8.2)
PROT UR STRIP-MCNC: ABNORMAL MG/DL
PTH-INTACT SERPL-MCNC: 63.2 PG/ML (ref 18.4–80.1)
RBC # BLD AUTO: 4.39 MILLION/UL (ref 3.81–5.12)
RBC #/AREA URNS AUTO: ABNORMAL /HPF
SODIUM SERPL-SCNC: 137 MMOL/L (ref 136–145)
SP GR UR STRIP.AUTO: 1.01 (ref 1–1.03)
UROBILINOGEN UR STRIP-ACNC: <2 MG/DL
WBC # BLD AUTO: 4.07 THOUSAND/UL (ref 4.31–10.16)
WBC #/AREA URNS AUTO: ABNORMAL /HPF

## 2022-07-12 PROCEDURE — 84165 PROTEIN E-PHORESIS SERUM: CPT | Performed by: PATHOLOGY

## 2022-07-12 PROCEDURE — 82043 UR ALBUMIN QUANTITATIVE: CPT

## 2022-07-12 PROCEDURE — 82306 VITAMIN D 25 HYDROXY: CPT

## 2022-07-12 PROCEDURE — 84165 PROTEIN E-PHORESIS SERUM: CPT

## 2022-07-12 PROCEDURE — 80053 COMPREHEN METABOLIC PANEL: CPT

## 2022-07-12 PROCEDURE — 36415 COLL VENOUS BLD VENIPUNCTURE: CPT

## 2022-07-12 PROCEDURE — 85025 COMPLETE CBC W/AUTO DIFF WBC: CPT

## 2022-07-12 PROCEDURE — 83970 ASSAY OF PARATHORMONE: CPT

## 2022-07-12 PROCEDURE — 82570 ASSAY OF URINE CREATININE: CPT

## 2022-07-12 PROCEDURE — 81001 URINALYSIS AUTO W/SCOPE: CPT

## 2022-07-12 PROCEDURE — 84166 PROTEIN E-PHORESIS/URINE/CSF: CPT

## 2022-07-12 PROCEDURE — 84166 PROTEIN E-PHORESIS/URINE/CSF: CPT | Performed by: PATHOLOGY

## 2022-07-14 LAB
ALBUMIN SERPL ELPH-MCNC: 4.14 G/DL (ref 3.5–5)
ALBUMIN SERPL ELPH-MCNC: 60 % (ref 52–65)
ALBUMIN UR ELPH-MCNC: 100 %
ALPHA1 GLOB MFR UR ELPH: 0 %
ALPHA1 GLOB SERPL ELPH-MCNC: 0.37 G/DL (ref 0.1–0.4)
ALPHA1 GLOB SERPL ELPH-MCNC: 5.4 % (ref 2.5–5)
ALPHA2 GLOB MFR UR ELPH: 0 %
ALPHA2 GLOB SERPL ELPH-MCNC: 0.81 G/DL (ref 0.4–1.2)
ALPHA2 GLOB SERPL ELPH-MCNC: 11.8 % (ref 7–13)
B-GLOBULIN MFR UR ELPH: 0 %
BETA GLOB ABNORMAL SERPL ELPH-MCNC: 0.45 G/DL (ref 0.4–0.8)
BETA1 GLOB SERPL ELPH-MCNC: 6.5 % (ref 5–13)
BETA2 GLOB SERPL ELPH-MCNC: 4.5 % (ref 2–8)
BETA2+GAMMA GLOB SERPL ELPH-MCNC: 0.31 G/DL (ref 0.2–0.5)
GAMMA GLOB ABNORMAL SERPL ELPH-MCNC: 0.81 G/DL (ref 0.5–1.6)
GAMMA GLOB MFR UR ELPH: 0 %
GAMMA GLOB SERPL ELPH-MCNC: 11.8 % (ref 12–22)
IGG/ALB SER: 1.5 {RATIO} (ref 1.1–1.8)
PROT PATTERN SERPL ELPH-IMP: ABNORMAL
PROT PATTERN UR ELPH-IMP: NORMAL
PROT SERPL-MCNC: 6.9 G/DL (ref 6.4–8.2)
PROT UR-MCNC: 16 MG/DL

## 2022-07-19 ENCOUNTER — OFFICE VISIT (OUTPATIENT)
Dept: NEPHROLOGY | Facility: CLINIC | Age: 77
End: 2022-07-19
Payer: MEDICARE

## 2022-07-19 VITALS
HEIGHT: 60 IN | DIASTOLIC BLOOD PRESSURE: 68 MMHG | WEIGHT: 95 LBS | HEART RATE: 95 BPM | SYSTOLIC BLOOD PRESSURE: 122 MMHG | OXYGEN SATURATION: 98 % | BODY MASS INDEX: 18.65 KG/M2

## 2022-07-19 DIAGNOSIS — N28.89 RENAL VASCULAR DISEASE: ICD-10-CM

## 2022-07-19 DIAGNOSIS — I63.9 CEREBROVASCULAR ACCIDENT (CVA), UNSPECIFIED MECHANISM (HCC): ICD-10-CM

## 2022-07-19 DIAGNOSIS — N18.31 STAGE 3A CHRONIC KIDNEY DISEASE (HCC): Primary | ICD-10-CM

## 2022-07-19 DIAGNOSIS — E67.3 HYPERVITAMINOSIS D: ICD-10-CM

## 2022-07-19 DIAGNOSIS — R63.4 WEIGHT LOSS, NON-INTENTIONAL: ICD-10-CM

## 2022-07-19 DIAGNOSIS — N26.1 ATROPHY OF RIGHT KIDNEY: ICD-10-CM

## 2022-07-19 DIAGNOSIS — I10 HYPERTENSION, UNSPECIFIED TYPE: ICD-10-CM

## 2022-07-19 PROCEDURE — 99214 OFFICE O/P EST MOD 30 MIN: CPT | Performed by: PHYSICIAN ASSISTANT

## 2022-07-19 RX ORDER — AMLODIPINE BESYLATE 5 MG/1
5 TABLET ORAL DAILY
Qty: 90 TABLET | Refills: 0 | Status: SHIPPED | OUTPATIENT
Start: 2022-07-19 | End: 2022-10-19 | Stop reason: SDUPTHER

## 2022-07-19 NOTE — ASSESSMENT & PLAN NOTE
Has had about 25 lb weight loss in perhaps the last year  A company with fatigue  She admits to being a picky eater and having a poor appetite  She will eat only intermittently only sweets or fast food  She reports she rarely eats meat  In addition, she smokes cigarettes  She does not wish to start an appetite stimulant  There was some SMA stenosis noted on imaging on 5/23/22  Consider GI evaluation

## 2022-07-19 NOTE — PROGRESS NOTES
Assessment & Plan:    1  Stage 3a chronic kidney disease [facility])  Assessment & Plan:  Lab Results   Component Value Date    EGFR 56 07/12/2022    EGFR 58 06/20/2022    EGFR 57 03/08/2022    CREATININE 0 96 07/12/2022    CREATININE 0 93 06/20/2022    CREATININE 0 95 03/08/2022   Renal function is stable  Continue lisinopril 10 mg daily  Would recommend discontinuation of cigarettes  Her weight loss is problematic  She has declined appetite stimulant and nutritional supplement  Recommend further workup per primary care physician  Will insure blood pressure is not overly well controlled given body mass loss  Return to clinic in 4 months with labs prior  Orders:  -     CBC and differential; Future; Expected date: 11/14/2022  -     Comprehensive metabolic panel; Future; Expected date: 11/14/2022  -     Magnesium; Future; Expected date: 11/14/2022  -     Microalbumin / creatinine urine ratio; Future; Expected date: 11/14/2022  -     Urinalysis with microscopic; Future; Expected date: 11/14/2022  -     PTH, intact; Future; Expected date: 11/14/2022  -     Protein / creatinine ratio, urine; Future; Expected date: 11/14/2022  -     Phosphorus; Future; Expected date: 11/14/2022  -     Vitamin D 25 hydroxy; Future; Expected date: 11/14/2022    2  Atrophy of right kidney    3  Hypertension, unspecified type  Assessment & Plan:  Blood pressure at goal in office, however given symptomatology, will reduce antihypertensive regimen  Reduce amlodipine from 10 mg daily to 5 mg daily  Continue lisinopril 10 mg daily  Orders:  -     amLODIPine (NORVASC) 5 mg tablet; Take 1 tablet (5 mg total) by mouth daily    4  Renal vascular disease    5  Cerebrovascular accident (CVA), unspecified mechanism ([facility] [address] )  Assessment & Plan:  Patient reports she has not been feeling well since her stroke      6  Hypervitaminosis D  Assessment & Plan:  Discontinue ergocalciferol    Orders:  -     PTH, intact;  Future; Expected date: 11/14/2022  - Phosphorus; Future; Expected date: 11/14/2022  -     Vitamin D 25 hydroxy; Future; Expected date: 11/14/2022    7  Weight loss, non-intentional  Assessment & Plan:  Has had about 25 lb weight loss in perhaps the last year  A company with fatigue  She admits to being a picky eater and having a poor appetite  She will eat only intermittently only sweets or fast food  She reports she rarely eats meat  In addition, she smokes cigarettes  She does not wish to start an appetite stimulant  There was some SMA stenosis noted on imaging on 5/23/22  Consider GI evaluation  The benefits, risks and alternatives to the treatment plan were discussed at this visit  Patient was advised of common adverse effects of any medical therapies prescribed  All questions were answered and discussed with the patient and any accompanying family members or caretakers  Subjective:      Patient ID: Nicole Daniel is a 76 y o  female seen in the [place] office  Patient is followed by Dr Bipin Mir for management 3A chronic kidney disease  She was last seen 03/23/2022, which time patient was asked to focus on low-sodium diet  HPI     Today, patient presents for routine follow-up of stage 3 chronic kidney disease  She feels "lousy, very weak, can't breathe " She believes it started after her stroke four months ago  She sleeps a lot  She has had about 25 lb weight loss  Has poor appetite, eats sweets, rarely meat  She does not wish to start an appetite stimulant  Blood pressure is 122/68 with a heart rate of 95  She feels tired after taking antihypertensives at home  Amlodipine 10 mg daily and lisinopril 10 mg daily  124/68 standing    Discuss reviewed the results of labs performed 07/12/2022 which revealed that renal function stable with creatinine 0 96 mg/dL estimated GFR 56 mL/min  She had no monoclonal bands to serum and urine electrophoresis      PTH 63 2 pg/mL, twenty-five hydroxy vitamin-D is 85 3 ng/mL, calcium 9 2  History is obtained from patient  The following portions of the patient's history were reviewed and updated as appropriate: allergies, current medications, past family history, past medical history, past social history, past surgical history, and problem list     Review of Systems   Constitutional: Positive for activity change, appetite change (poor), fatigue and unexpected weight change (weight loss)  Negative for chills and fever  Respiratory: Positive for shortness of breath  Negative for apnea and cough  Cardiovascular: Negative for chest pain, palpitations and leg swelling  Gastrointestinal: Positive for diarrhea (once weekly)  Negative for abdominal pain, blood in stool, constipation, nausea and vomiting  Genitourinary: Positive for frequency  Negative for decreased urine volume, difficulty urinating, dysuria, flank pain, hematuria and urgency  Musculoskeletal: Positive for back pain  Negative for arthralgias, joint swelling and myalgias  Skin: Negative for color change and rash  Neurological: Negative for dizziness, seizures, syncope, weakness, light-headedness, numbness and headaches  Hematological: Negative for adenopathy  Does not bruise/bleed easily  All other systems reviewed and are negative  Objective:      /68   Pulse 95   Ht 5' (1 524 m)   Wt 43 1 kg (95 lb)   SpO2 98%   BMI 18 55 kg/m²          Physical Exam  Vitals and nursing note reviewed  Exam conducted with a chaperone present  Constitutional:       General: She is not in acute distress  Appearance: Normal appearance  She is underweight  She is not ill-appearing or toxic-appearing  HENT:      Head: Normocephalic and atraumatic  Nose: Nose normal  No congestion or rhinorrhea  Mouth/Throat:      Mouth: Mucous membranes are moist       Pharynx: Oropharynx is clear  Eyes:      General: No scleral icterus  Right eye: No discharge           Left eye: No discharge  Extraocular Movements: Extraocular movements intact  Conjunctiva/sclera: Conjunctivae normal       Pupils: Pupils are equal, round, and reactive to light  Cardiovascular:      Rate and Rhythm: Normal rate and regular rhythm  Pulses: Normal pulses  Heart sounds: Normal heart sounds  No murmur heard  Pulmonary:      Effort: Pulmonary effort is normal  No respiratory distress  Breath sounds: Normal breath sounds  Decreased air movement present  No wheezing  Comments: Coarse breath sounds  Abdominal:      General: Abdomen is flat  Bowel sounds are normal  There is no distension  Palpations: Abdomen is soft  There is no mass  Tenderness: There is no abdominal tenderness  Musculoskeletal:         General: No swelling or deformity  Normal range of motion  Cervical back: Normal range of motion and neck supple  No rigidity or tenderness  Skin:     General: Skin is warm and dry  Coloration: Skin is not jaundiced or pale  Findings: No bruising  Neurological:      General: No focal deficit present  Mental Status: She is alert and oriented to person, place, and time  Mental status is at baseline  Psychiatric:         Mood and Affect: Mood normal          Behavior: Behavior normal          Thought Content:  Thought content normal          Judgment: Judgment normal              Lab Results   Component Value Date    SODIUM 137 07/12/2022    K 4 2 07/12/2022     07/12/2022    CO2 24 07/12/2022    AGAP 7 07/12/2022    BUN 9 07/12/2022    CREATININE 0 96 07/12/2022    GLUC 95 03/08/2022    GLUF 143 (H) 07/12/2022    CALCIUM 9 2 07/12/2022    AST 14 07/12/2022    ALT 16 07/12/2022    ALKPHOS 92 07/12/2022    TP 7 0 07/12/2022    TP 6 9 07/12/2022    TBILI 0 61 07/12/2022    EGFR 56 07/12/2022      Lab Results   Component Value Date    CREATININE 0 96 07/12/2022    CREATININE 0 93 06/20/2022    CREATININE 0 95 03/08/2022    CREATININE 1 13 03/07/2022 CREATININE 1 04 12/10/2021    CREATININE 0 95 08/25/2021    CREATININE 0 92 05/28/2021    CREATININE 0 88 10/15/2019    CREATININE 0 91 04/30/2018    CREATININE 0 86 10/20/2017    CREATININE 0 88 04/12/2017      Lab Results   Component Value Date    COLORU Yellow 07/12/2022    CLARITYU Clear 07/12/2022    SPECGRAV 1 012 07/12/2022    PHUR 6 0 07/12/2022    LEUKOCYTESUR Trace (A) 07/12/2022    NITRITE Negative 07/12/2022    PROTEIN UA Trace (A) 07/12/2022    GLUCOSEU Negative 07/12/2022    KETONESU Negative 07/12/2022    UROBILINOGEN <2 0 07/12/2022    BILIRUBINUR Negative 07/12/2022    BLOODU Negative 07/12/2022    RBCUA 1-2 07/12/2022    WBCUA 1-2 07/12/2022    EPIS Occasional 07/12/2022    BACTERIA Occasional 07/12/2022      No results found for: LABPROT  No results found for: Karina Belch Results   Component Value Date    WBC 4 07 (L) 07/12/2022    HGB 13 3 07/12/2022    HCT 38 9 07/12/2022    MCV 89 07/12/2022     07/12/2022      Lab Results   Component Value Date    HGB 13 3 07/12/2022    HGB 14 0 06/20/2022    HGB 12 5 03/08/2022    HGB 14 0 03/07/2022    HGB 13 9 12/10/2021      Lab Results   Component Value Date    IRON 81 04/30/2018    TIBC 388 04/30/2018    FERRITIN 38 04/30/2018      No results found for: PTHCALCIUM, [id number], PHOSPHORUS   Lab Results   Component Value Date    CHOLESTEROL 124 06/20/2022    HDL 40 (L) 06/20/2022    LDLCALC 61 06/20/2022    TRIG 113 06/20/2022      No results found for: URICACID   Lab Results   Component Value Date    HGBA1C 6 2 (H) 03/08/2022      No results found for: TSHANTIBODY, K0UGKEB, FREET4   No results found for: KIRSTY, DSDNAAB, RFIGM   Lab Results   Component Value Date    UPEP See Comment 07/12/2022        Portions of the record may have been created with voice recognition software  Occasional wrong word or "sound a like" substitutions may have occurred due to the inherent limitations of voice recognition software   Read the chart carefully and recognize, using context, where substitutions have occurred  If you have any questions, please contact the dictating provider

## 2022-07-19 NOTE — ASSESSMENT & PLAN NOTE
Lab Results   Component Value Date    EGFR 56 07/12/2022    EGFR 58 06/20/2022    EGFR 57 03/08/2022    CREATININE 0 96 07/12/2022    CREATININE 0 93 06/20/2022    CREATININE 0 95 03/08/2022   Renal function is stable  Continue lisinopril 10 mg daily  Would recommend discontinuation of cigarettes  Her weight loss is problematic  She has declined appetite stimulant and nutritional supplement  Recommend further workup per primary care physician  Will insure blood pressure is not overly well controlled given body mass loss  Return to clinic in 4 months with labs prior

## 2022-07-19 NOTE — ASSESSMENT & PLAN NOTE
Blood pressure at goal in office, however given symptomatology, will reduce antihypertensive regimen  Reduce amlodipine from 10 mg daily to 5 mg daily  Continue lisinopril 10 mg daily

## 2022-07-19 NOTE — PATIENT INSTRUCTIONS
Stop ergocalciferol  Reduce amlodipine to 5 mg daily  Follow up with primary care regarding the weight loss

## 2022-08-10 ENCOUNTER — OFFICE VISIT (OUTPATIENT)
Dept: CARDIOLOGY CLINIC | Facility: HOSPITAL | Age: 77
End: 2022-08-10
Payer: MEDICARE

## 2022-08-10 VITALS
HEART RATE: 66 BPM | BODY MASS INDEX: 18.61 KG/M2 | SYSTOLIC BLOOD PRESSURE: 140 MMHG | WEIGHT: 94.8 LBS | HEIGHT: 60 IN | DIASTOLIC BLOOD PRESSURE: 67 MMHG

## 2022-08-10 DIAGNOSIS — I63.9 CEREBROVASCULAR ACCIDENT (CVA), UNSPECIFIED MECHANISM (HCC): ICD-10-CM

## 2022-08-10 DIAGNOSIS — R93.1 ABNORMAL ECHOCARDIOGRAM: ICD-10-CM

## 2022-08-10 DIAGNOSIS — I25.10 CAD (CORONARY ARTERY DISEASE): ICD-10-CM

## 2022-08-10 DIAGNOSIS — I10 HYPERTENSION, UNSPECIFIED TYPE: Primary | ICD-10-CM

## 2022-08-10 DIAGNOSIS — E78.00 HYPERCHOLESTEROLEMIA: ICD-10-CM

## 2022-08-10 PROCEDURE — 99214 OFFICE O/P EST MOD 30 MIN: CPT | Performed by: INTERNAL MEDICINE

## 2022-08-10 NOTE — PROGRESS NOTES
Cardiology Follow Up    Star Pavon  1945  [id number]  CARDIOVASC PHYSICIAN  [address] [address]  [phone]  [phone]    No diagnosis found  There are no diagnoses linked to this encounter  I had the pleasure of seeing Star Pavon for a follow up visit  INTERVAL HISTORY: see below    History of the presenting illness, Discussion/Summary and My Plan are as follows:::    Ema is a pleasant 64-year-old lady with history of hypertension, dyslipidemia, prior heavy tobacco use, STEMI in 2008, status post RCA stenting, admitted with stroke-like symptoms-left hemiparesis-in March 2022 with a right pontine infarct on brain imaging, with moderate to severe stenosis of the origin of the left PICA, mild left subclavian origin, mild right vertebral artery stenosis and mild-to-moderate vertebral artery stenosis without occlusion      She is seeing neurology as an outpatient  In addition, she was also advised to be evaluated for obstructive sleep apnea  She underwent an echocardiogram that showed inferior/inferolateral wall motion abnormalities prompting her initial consultation followed by a pharmacologic stress test that did not show any ischemia-2022      In the past she had followed at [facility] with [address] and was last evaluated in September 2021 at which time she was stable      Cardiac exam is unremarkable except for irregular heart rates  Recently, her amlodipine dose was decreased to 5 mg due to profound fatigue as well as orthostatics symptoms as per the patient  Blood pressure is mildly elevated now but recently was well controlled        Plan:     History of coronary artery disease:  STEMI-inferior -2008, status post RCA stenting, subsequent nuclear stress test-2011 without any infarction or ischemia  Negative pharmacologic stress test for infarct/ischemia April 2022    Continue current medications      Dyslipidemia:  Jose off of her simvastatin plus ezetimibe about a month prior to her recent admission, now on atorvastatin, now controlled, see below     Hypertension: Mildly elevated, she will continue to follow at home, will not make any changes considering her recent symptoms leading to decrease in dose of amlodipine to 5 mg with which she feels great  Might benefit from evaluation for sleep apnea      Stroke:  No large vessel vascular disease to explain her stroke, also sees Neurology, will defer to them as to any candidacy for implantable loop recorder    ECG at the last visit showed sinus rhythm with PACs    Smoking cessation    Follow-up in 6 months     Latest Reference Range & Units Most Recent   Cholesterol See Comment mg/dL 124  06/20/22 09:42   Triglycerides See Comment mg/dL 113  06/20/22 09:42   HDL >=50 mg/dL 40 (L)  06/20/22 09:42   LDL Calculated 0 - 100 mg/dL 61  06/20/22 09:42   (L): Data is abnormally low    Patient Active Problem List   Diagnosis    History of CVA (cerebrovascular accident)    Hypertensive emergency    Malnutrition ([facility] [address] )    Premature atrial complexes    Thrombocytopenia (HCC)    Leukopenia    Neutropenia (HCC)    Hyperphosphatemia    Hypercholesterolemia    Carotid artery stenosis    Nocturnal hypoxia    PINZON (dyspnea on exertion)    Abnormal echocardiogram    CVA (cerebral vascular accident) ([facility] [address] )    Recurrent urinary tract infection    Atrophy of right kidney    Stage 3a chronic kidney disease ([facility] [address] )    Renal vascular disease    Hypertension    Intracranial atherosclerosis    Weight loss, non-intentional    Hypervitaminosis D     Past Medical History:   Diagnosis Date    CAD (coronary artery disease)     Cardiac disease     Hypercholesteremia     Hyperlipidemia     Hypertension     Hypertension     Renal disorder      Social History     Socioeconomic History    Marital status: /Civil Union     Spouse name: Not on file    Number of children: Not on file    Years of education: Not on file    Highest education level: Not on file   Occupational History    Not on file   Tobacco Use    Smoking status: Former Smoker    Smokeless tobacco: Never Used   Vaping Use    Vaping Use: Never used   Substance and Sexual Activity    Alcohol use: Never    Drug use: No    Sexual activity: Not on file   Other Topics Concern    Not on file   Social History Narrative    Not on file     Social Determinants of Health     Financial Resource Strain: Not on file   Food Insecurity: No Food Insecurity    Worried About Running Out of Food in the Last Year: Never true    Nidia of Food in the Last Year: Never true   Transportation Needs: No Transportation Needs    Lack of Transportation (Medical): No    Lack of Transportation (Non-Medical): No   Physical Activity: Not on file   Stress: Not on file   Social Connections: Not on file   Intimate Partner Violence: Not on file   Housing Stability: Low Risk     Unable to Pay for Housing in the Last Year: No    Number of Places Lived in the Last Year: 1    Unstable Housing in the Last Year: No      History reviewed  No pertinent family history  Past Surgical History:   Procedure Laterality Date    CORONARY ANGIOPLASTY WITH STENT PLACEMENT      WRIST SURGERY         Current Outpatient Medications:     amLODIPine (NORVASC) 5 mg tablet, Take 1 tablet (5 mg total) by mouth daily, Disp: 90 tablet, Rfl: 0    atorvastatin (LIPITOR) 40 mg tablet, Take 1 tablet (40 mg total) by mouth every evening Take this in place of simvastatin    It will better prevent heart attack and stroke, Disp: 90 tablet, Rfl: 3    clopidogrel (PLAVIX) 75 mg tablet, Take 1 tablet (75 mg total) by mouth daily To prevent stroke and heart attack, Disp: 90 tablet, Rfl: 3    lisinopril (ZESTRIL) 10 mg tablet, Take 1 tablet (10 mg total) by mouth daily, Disp: 90 tablet, Rfl: 3    aspirin (ECOTRIN LOW STRENGTH) 81 mg EC tablet, Take 1 tablet (81 mg total) by mouth daily Please take this for 3 weeks with plavix then just do plavix alone  (Patient not taking: No sig reported), Disp: , Rfl: 0  Allergies   Allergen Reactions    Influenza Virus Vaccine        Imaging: No results found  Review of Systems:  Review of Systems   Constitutional: Negative  HENT: Negative  Eyes: Negative  Respiratory: Negative  Cardiovascular: Negative  Endocrine: Negative  Musculoskeletal: Negative  Physical Exam:  /67   Pulse 66   Ht 5' (1 524 m)   Wt 43 kg (94 lb 12 8 oz)   BMI 18 51 kg/m²   Physical Exam  Constitutional:       General: She is not in acute distress  Appearance: Normal appearance  She is not ill-appearing  HENT:      Nose: Nose normal  No congestion or rhinorrhea  Neck:      Vascular: No carotid bruit  Cardiovascular:      Rate and Rhythm: Normal rate  Heart sounds: No murmur heard  No friction rub  No gallop  Pulmonary:      Effort: Pulmonary effort is normal  No respiratory distress  Breath sounds: No stridor  No wheezing or rhonchi  Musculoskeletal:         General: No swelling, tenderness, deformity or signs of injury  Normal range of motion  Cervical back: Normal range of motion  No rigidity or tenderness  Lymphadenopathy:      Cervical: No cervical adenopathy  Skin:     General: Skin is warm  Coloration: Skin is not jaundiced or pale  Findings: No bruising or erythema  Neurological:      Mental Status: She is alert  This note was completed in part utilizing Music[employer] direct voice recognition software  Grammatical errors, random word insertion, spelling mistakes, occasional wrong word or "sound-alike" substitutions and incomplete sentences may be an occasional consequence of the system secondary to software limitations, ambient noise and hardware issues  At the time of dictation, efforts were made to edit, clarify and /or correct errors    Please read the chart carefully and recognize, using context, where substitutions have occurred  If you have any questions or concerns about the context, text or information contained within the body of this dictation, please contact myself, the provider, for further clarification

## 2022-10-19 ENCOUNTER — OFFICE VISIT (OUTPATIENT)
Dept: NEUROLOGY | Facility: CLINIC | Age: 77
End: 2022-10-19

## 2022-10-19 VITALS
WEIGHT: 95.6 LBS | DIASTOLIC BLOOD PRESSURE: 70 MMHG | SYSTOLIC BLOOD PRESSURE: 140 MMHG | OXYGEN SATURATION: 97 % | HEART RATE: 72 BPM | TEMPERATURE: 97.3 F | BODY MASS INDEX: 18.77 KG/M2 | HEIGHT: 60 IN

## 2022-10-19 DIAGNOSIS — I10 HYPERTENSION, UNSPECIFIED TYPE: ICD-10-CM

## 2022-10-19 DIAGNOSIS — I63.9 CEREBROVASCULAR ACCIDENT (CVA), UNSPECIFIED MECHANISM (HCC): Primary | ICD-10-CM

## 2022-10-19 RX ORDER — AMLODIPINE BESYLATE 5 MG/1
5 TABLET ORAL DAILY
Qty: 90 TABLET | Refills: 0 | Status: SHIPPED | OUTPATIENT
Start: 2022-10-19

## 2022-10-19 NOTE — PROGRESS NOTES
Patient ID: Velia Buerger is a 76 y o  female  Assessment/Plan:    CVA (cerebral vascular accident) [facility])   Patient with posterior right pontine stroke in 3/2022  CTA revealed moderate to severe stenosis of the origin of the left PICA, mild left subclavian origin, mild right vertebral artery stenosis and mild-to-moderate vertebral artery stenosis without occlusion  Although she does have intracranial stenosis this is most prominent in the left PICA which would not explain her right sided stroke  At the time of her stroke she was on ASA and is now on Plavix monotherapy  She takes her medications as prescribed  No issues with bleeding or bruising  She had been on simvastatin plus ezetimibe a few months prior to the stroke before stopping, she is now on atorvastatin  Stroke preventions discussed including:  - Remaining on Plavix and Lipitor  - BP goal < 130/80  BP was a little elevated in the office today however she states it is normally 120s/70s  - LDL goal less then 70  Most recent LDL was 61    - Will defer to PCP regarding management and monitoring of blood pressure, cholesterol, diabetes  - Patient does smoke an occasional cigarette  Discussed the importance of continued smoking cessation    - Patient and significant other deny any history of snoring  No apneic episodes  She has no concerns for sleep apnea  It was recommended that she have a sleep study in the past however she declined  She is aware that if she has sleep apnea that is untreated it can put her at risk for further strokes  - She has never had any monitoring for atrial fibrillation  She is adamant that she does NOT wish to have any monitoring at this time including a holter or loop recorder  She is aware that if she were to have atrial fibrillation which is not treated this would put her at risk for further stroke as well     - Advised patient to avoid using NSAIDs (Motrin, Ibuprofen) for headaches or other pain mild pain and to use Tylenol instead  - Recommend mediterranean diet & regular exercise at least 4-5 times a week for 20-30 minutes  If the patient experiences any new stroke like symptoms such as facial droop on one side, weakness/paralysis on one side, speech trouble, numbness on one side, balance issues, any vision changes, extreme dizziness or any new headache, to call 9-1-1 immediately or to proceed to the nearest ER immediately  She would prefer to follow up with her PCP and Nephrologist at this time  She does not wish to follow with up with our office at this time  If needed we would be happy to see her back  Subjective:    Otis Almanza is a 76 y o  female with HTN, HLD, CAD s/p RCA stent, tobacco use who presents for follow up in regards to her prior stroke  To review, she presented to the [address] [place] [address] Avenue on 03/07/22 with symptoms of acute onset of left sided numbness, described as pins and needled in left upper from elbow down to wrist and lower extremities from knee down to ankle, along with imbalance  BP on arrival 237/107  NIHSS of 0  CTH showed chronic lacunar infarct in the left thalamus, no acute intracranial abnormality  CTA H/N LVO  Noted posterior calcification along the aortic arch with atheromatous plaque at the origin of the left subclavian resulting in mild stenosis  Mild stenosis of the right vertebral artery  Moderate-severe stenosis of the left PICA, atherosclerotic calcification in bilateral ICA  3mm outpouching communicating segment of the left ICA potentially infundibular versus aneurysms  MRI brain demonstrated acute posterior right pontine infarct  Chronic microangiopathic changes  Old left thalamic infarct  Etiology for stroke felt to be small vessel disease  She was placed on DAPT for 21 days and then Plavix monotherapy  She followed with Vascular outpatient and per their note "Patient with no evidence of hemodynamically significant extracranial carotid artery stenosis    No additional vascular workup/interventions warranted/indicated at this time "     At her last visit no changes were made   She was overall doing well  INTERVAL HISTORY:  She feels that she is overall doing well  For the past few weeks she feels like she is doing better, she had been feeling very weak in the months prior   She felt better once they changed her Amolodpine from 10mg to 5mg   She resides with    She can perform all of her ADLs on her own   She has lost weight, she has a very small appetite   She feels that she sleeps very well, she always feel "lousy" in the AM however she perks back up in the afternoon   She does not snore at night, no apneic events     Former smoker - quit after her heart attack  Will still have one every week or so  Smoked since she was 13years old  ETOH - none  Illicit drug use - no      I personally reviewed and updated the ROS  Objective:    Blood pressure 140/70, pulse 72, temperature (!) 97 3 °F (36 3 °C), temperature source Temporal, height 5' (1 524 m), weight 43 4 kg (95 lb 9 6 oz), SpO2 97 %  Physical Exam  Constitutional:       Appearance: Normal appearance  HENT:      Right Ear: Hearing normal       Left Ear: Hearing normal    Eyes:      General: Lids are normal       Extraocular Movements: Extraocular movements intact  Pupils: Pupils are equal, round, and reactive to light  Pulmonary:      Effort: Pulmonary effort is normal    Neurological:      Mental Status: She is alert  Deep Tendon Reflexes: Strength normal    Psychiatric:         Speech: Speech normal          Neurological Exam  Mental Status  Alert  Oriented to person, place and time  Speech is normal     Cranial Nerves  CN III, IV, VI: Extraocular movements intact bilaterally  Normal lids and orbits bilaterally  Pupils equal round and reactive to light bilaterally  CN V:  Right: Facial sensation is normal   Left: Facial sensation is normal on the left    CN VII: Full and symmetric facial movement  CN VIII:  Right: Hearing is normal   Left: Hearing is normal   CN IX, X: Palate elevates symmetrically  CN XI: Shoulder shrug strength is normal   CN XII: Tongue midline without atrophy or fasciculations  Motor   Strength is 5/5 throughout all four extremities  Sensory  Light touch is normal in upper and lower extremities  Coordination  Right: Finger-to-nose normal Left: Finger-to-nose normal     Gait  Casual gait is normal including stance, stride, and arm swing  ROS:    Review of Systems   Constitutional: Positive for fatigue  Negative for appetite change and fever  HENT: Negative  Negative for hearing loss, tinnitus, trouble swallowing and voice change  Eyes: Negative  Negative for photophobia, pain and visual disturbance  Respiratory: Negative  Negative for shortness of breath  Cardiovascular: Negative  Negative for palpitations  Gastrointestinal: Negative  Negative for nausea and vomiting  Endocrine: Negative  Negative for cold intolerance  Genitourinary: Negative  Negative for dysuria, frequency and urgency  Musculoskeletal: Negative  Negative for gait problem, myalgias and neck pain  Skin: Negative  Negative for rash  Allergic/Immunologic: Negative  Neurological: Positive for weakness  Negative for dizziness, tremors, seizures, syncope, facial asymmetry, speech difficulty, light-headedness, numbness and headaches  Hematological: Negative  Does not bruise/bleed easily  Psychiatric/Behavioral: Negative  Negative for confusion, hallucinations and sleep disturbance

## 2022-10-19 NOTE — PATIENT INSTRUCTIONS
Patient with posterior right pontine stroke in 3/2022  CTA revealed moderate to severe stenosis of the origin of the left PICA, mild left subclavian origin, mild right vertebral artery stenosis and mild-to-moderate vertebral artery stenosis without occlusion  Although she does have intracranial stenosis this is most prominent in the left PICA which would not explain her right sided stroke  At the time of her stroke she was on ASA and is now on Plavix monotherapy  She takes her medications as prescribed  No issues with bleeding or bruising  She had been on simvastatin plus ezetimibe a few months prior to the stroke before stopping, she is now on atorvastatin  Stroke preventions discussed including:  - Remaining on Plavix and Lipitor  - BP goal < 130/80  BP was a little elevated in the office today however she states it is normally 120s/70s  - LDL goal less then 70  Most recent LDL was 61    - Will defer to PCP regarding management and monitoring of blood pressure, cholesterol, diabetes  - Patient does smoke an occasional cigarette  Discussed the importance of continued smoking cessation    - Patient and significant other deny any history of snoring  No apneic episodes  She has no concerns for sleep apnea  It was recommended that she have a sleep study in the past however she declined  She is aware that if she has sleep apnea that is untreated it can put her at risk for further strokes  - She has never had any monitoring for atrial fibrillation  She is adamant that she does NOT wish to have any monitoring at this time including a holter or loop recorder  She is aware that if she were to have atrial fibrillation which is not treated this would put her at risk for further stroke as well  - Advised patient to avoid using NSAIDs (Motrin, Ibuprofen) for headaches or other pain mild pain and to use Tylenol instead     - Recommend mediterranean diet & regular exercise at least 4-5 times a week for 20-30 minutes  If the patient experiences any new stroke like symptoms such as facial droop on one side, weakness/paralysis on one side, speech trouble, numbness on one side, balance issues, any vision changes, extreme dizziness or any new headache, to call 9-1-1 immediately or to proceed to the nearest ER immediately  She would prefer to follow up with her PCP and Nephrologist at this time  She does not wish to follow with up with our office at this time  If needed we would be happy to see her back

## 2022-11-14 ENCOUNTER — APPOINTMENT (OUTPATIENT)
Dept: LAB | Facility: HOSPITAL | Age: 77
End: 2022-11-14

## 2022-11-14 DIAGNOSIS — R63.4 LOSS OF WEIGHT: ICD-10-CM

## 2022-11-14 DIAGNOSIS — N18.31 STAGE 3A CHRONIC KIDNEY DISEASE (HCC): ICD-10-CM

## 2022-11-14 DIAGNOSIS — R79.9 ABNORMAL BLOOD FINDINGS: ICD-10-CM

## 2022-11-14 DIAGNOSIS — E67.3 HYPERVITAMINOSIS D: ICD-10-CM

## 2022-11-14 LAB
25(OH)D3 SERPL-MCNC: 31.5 NG/ML (ref 30–100)
ALBUMIN SERPL BCP-MCNC: 3.7 G/DL (ref 3.5–5)
ALP SERPL-CCNC: 105 U/L (ref 46–116)
ALT SERPL W P-5'-P-CCNC: 13 U/L (ref 12–78)
ANION GAP SERPL CALCULATED.3IONS-SCNC: 6 MMOL/L (ref 4–13)
AST SERPL W P-5'-P-CCNC: 12 U/L (ref 5–45)
BACTERIA UR QL AUTO: ABNORMAL /HPF
BASOPHILS # BLD AUTO: 0.04 THOUSANDS/ÂΜL (ref 0–0.1)
BASOPHILS NFR BLD AUTO: 1 % (ref 0–1)
BILIRUB SERPL-MCNC: 0.62 MG/DL (ref 0.2–1)
BILIRUB UR QL STRIP: NEGATIVE
BUN SERPL-MCNC: 10 MG/DL (ref 5–25)
CALCIUM SERPL-MCNC: 9.4 MG/DL (ref 8.3–10.1)
CHLORIDE SERPL-SCNC: 107 MMOL/L (ref 96–108)
CLARITY UR: CLEAR
CO2 SERPL-SCNC: 26 MMOL/L (ref 21–32)
COLOR UR: ABNORMAL
CREAT SERPL-MCNC: 0.92 MG/DL (ref 0.6–1.3)
CREAT UR-MCNC: 52.6 MG/DL
CREAT UR-MCNC: 52.6 MG/DL
EOSINOPHIL # BLD AUTO: 0.12 THOUSAND/ÂΜL (ref 0–0.61)
EOSINOPHIL NFR BLD AUTO: 3 % (ref 0–6)
ERYTHROCYTE [DISTWIDTH] IN BLOOD BY AUTOMATED COUNT: 12.4 % (ref 11.6–15.1)
EST. AVERAGE GLUCOSE BLD GHB EST-MCNC: 126 MG/DL
GFR SERPL CREATININE-BSD FRML MDRD: 59 ML/MIN/1.73SQ M
GLUCOSE P FAST SERPL-MCNC: 135 MG/DL (ref 65–99)
GLUCOSE UR STRIP-MCNC: NEGATIVE MG/DL
HBA1C MFR BLD: 6 %
HCT VFR BLD AUTO: 42.4 % (ref 34.8–46.1)
HGB BLD-MCNC: 14.2 G/DL (ref 11.5–15.4)
HGB UR QL STRIP.AUTO: NEGATIVE
IMM GRANULOCYTES # BLD AUTO: 0.01 THOUSAND/UL (ref 0–0.2)
IMM GRANULOCYTES NFR BLD AUTO: 0 % (ref 0–2)
KETONES UR STRIP-MCNC: NEGATIVE MG/DL
LEUKOCYTE ESTERASE UR QL STRIP: NEGATIVE
LYMPHOCYTES # BLD AUTO: 1.4 THOUSANDS/ÂΜL (ref 0.6–4.47)
LYMPHOCYTES NFR BLD AUTO: 30 % (ref 14–44)
MAGNESIUM SERPL-MCNC: 1.9 MG/DL (ref 1.6–2.6)
MCH RBC QN AUTO: 29.5 PG (ref 26.8–34.3)
MCHC RBC AUTO-ENTMCNC: 33.5 G/DL (ref 31.4–37.4)
MCV RBC AUTO: 88 FL (ref 82–98)
MICROALBUMIN UR-MCNC: 52.7 MG/L (ref 0–20)
MICROALBUMIN/CREAT 24H UR: 100 MG/G CREATININE (ref 0–30)
MONOCYTES # BLD AUTO: 0.39 THOUSAND/ÂΜL (ref 0.17–1.22)
MONOCYTES NFR BLD AUTO: 8 % (ref 4–12)
NEUTROPHILS # BLD AUTO: 2.69 THOUSANDS/ÂΜL (ref 1.85–7.62)
NEUTS SEG NFR BLD AUTO: 58 % (ref 43–75)
NITRITE UR QL STRIP: NEGATIVE
NON-SQ EPI CELLS URNS QL MICRO: ABNORMAL /HPF
NRBC BLD AUTO-RTO: 0 /100 WBCS
PH UR STRIP.AUTO: 6 [PH]
PHOSPHATE SERPL-MCNC: 3.8 MG/DL (ref 2.3–4.1)
PLATELET # BLD AUTO: 152 THOUSANDS/UL (ref 149–390)
PMV BLD AUTO: 10.4 FL (ref 8.9–12.7)
POTASSIUM SERPL-SCNC: 3.8 MMOL/L (ref 3.5–5.3)
PROT SERPL-MCNC: 7.4 G/DL (ref 6.4–8.4)
PROT UR STRIP-MCNC: NEGATIVE MG/DL
PROT UR-MCNC: 14 MG/DL
PROT/CREAT UR: 0.27 MG/G{CREAT} (ref 0–0.1)
PTH-INTACT SERPL-MCNC: 79.6 PG/ML (ref 18.4–80.1)
RBC # BLD AUTO: 4.82 MILLION/UL (ref 3.81–5.12)
RBC #/AREA URNS AUTO: ABNORMAL /HPF
SODIUM SERPL-SCNC: 139 MMOL/L (ref 135–147)
SP GR UR STRIP.AUTO: 1.01 (ref 1–1.03)
TSH SERPL DL<=0.05 MIU/L-ACNC: 2.87 UIU/ML (ref 0.45–4.5)
UROBILINOGEN UR QL STRIP.AUTO: 0.2 E.U./DL
WBC # BLD AUTO: 4.65 THOUSAND/UL (ref 4.31–10.16)
WBC #/AREA URNS AUTO: ABNORMAL /HPF

## 2023-01-31 ENCOUNTER — TELEPHONE (OUTPATIENT)
Dept: NEPHROLOGY | Facility: CLINIC | Age: 78
End: 2023-01-31

## 2023-01-31 DIAGNOSIS — N18.31 STAGE 3A CHRONIC KIDNEY DISEASE (HCC): Primary | ICD-10-CM

## 2023-02-01 ENCOUNTER — APPOINTMENT (OUTPATIENT)
Dept: LAB | Facility: MEDICAL CENTER | Age: 78
End: 2023-02-01

## 2023-02-01 DIAGNOSIS — N18.31 STAGE 3A CHRONIC KIDNEY DISEASE (HCC): ICD-10-CM

## 2023-02-01 LAB
ALBUMIN SERPL BCP-MCNC: 3.6 G/DL (ref 3.5–5)
ALP SERPL-CCNC: 92 U/L (ref 46–116)
ALT SERPL W P-5'-P-CCNC: 13 U/L (ref 12–78)
ANION GAP SERPL CALCULATED.3IONS-SCNC: 6 MMOL/L (ref 4–13)
AST SERPL W P-5'-P-CCNC: 10 U/L (ref 5–45)
BILIRUB SERPL-MCNC: 0.57 MG/DL (ref 0.2–1)
BUN SERPL-MCNC: 13 MG/DL (ref 5–25)
CALCIUM SERPL-MCNC: 9.3 MG/DL (ref 8.3–10.1)
CHLORIDE SERPL-SCNC: 107 MMOL/L (ref 96–108)
CO2 SERPL-SCNC: 25 MMOL/L (ref 21–32)
CREAT SERPL-MCNC: 0.89 MG/DL (ref 0.6–1.3)
GFR SERPL CREATININE-BSD FRML MDRD: 61 ML/MIN/1.73SQ M
GLUCOSE P FAST SERPL-MCNC: 128 MG/DL (ref 65–99)
POTASSIUM SERPL-SCNC: 3.5 MMOL/L (ref 3.5–5.3)
PROT SERPL-MCNC: 6.8 G/DL (ref 6.4–8.4)
SODIUM SERPL-SCNC: 138 MMOL/L (ref 135–147)

## 2023-02-06 ENCOUNTER — OFFICE VISIT (OUTPATIENT)
Dept: NEPHROLOGY | Facility: CLINIC | Age: 78
End: 2023-02-06

## 2023-02-06 VITALS
WEIGHT: 94 LBS | DIASTOLIC BLOOD PRESSURE: 72 MMHG | HEART RATE: 76 BPM | OXYGEN SATURATION: 96 % | SYSTOLIC BLOOD PRESSURE: 126 MMHG | BODY MASS INDEX: 18.46 KG/M2 | HEIGHT: 60 IN

## 2023-02-06 DIAGNOSIS — I10 HYPERTENSION, UNSPECIFIED TYPE: ICD-10-CM

## 2023-02-06 DIAGNOSIS — N26.1 ATROPHY OF RIGHT KIDNEY: ICD-10-CM

## 2023-02-06 DIAGNOSIS — E46 MALNUTRITION, UNSPECIFIED TYPE (HCC): ICD-10-CM

## 2023-02-06 DIAGNOSIS — N18.31 STAGE 3A CHRONIC KIDNEY DISEASE (HCC): Primary | ICD-10-CM

## 2023-02-06 NOTE — ASSESSMENT & PLAN NOTE
Well-controlled  Continue 2 g dietary sodium restriction lisinopril 10 mg daily and amlodipine 5 mg daily

## 2023-02-06 NOTE — PATIENT INSTRUCTIONS
Your kidney function is excellent around 61%  This puts you actually in stage II chronic kidney disease  Borderline stage III chronic kidney disease  The kidney function has been getting better when compared with March around the time of your stroke when your kidney function was down to 46%  To put this into perspective, people do not need dialysis until that number drops below 10, so the kidneys are doing very well  Your blood pressure looks well controlled  We can continue lisinopril and amlodipine  Keep an eye on it and if you notice the top number gets less than 110 or you feel lightheaded or dizzy, we will plan to stop the amlodipine  You might try to have a protein shake if you do not enjoy eating meat  We will plan to have you follow-up with Dr Luis Angel Red about 6 months with labs a week I her to keep an eye on the kidney function  Please do not hesitate to call us if you have any problems or concerns prior to that time

## 2023-02-06 NOTE — ASSESSMENT & PLAN NOTE
Lab Results   Component Value Date    EGFR 61 02/01/2023    EGFR 59 11/14/2022    EGFR 56 07/12/2022    CREATININE 0 89 02/01/2023    CREATININE 0 92 11/14/2022    CREATININE 0 96 07/12/2022   Renal function continuing to improve from creatinine 1 1 mg/dL estimated GFR 46 mL/min on 3/7/2022 needs at time of her CVA  We will continue lisinopril 10 mg daily for renal protection  She denies any hypotension  Her renal function may be overestimated given her reduced muscle mass  If the more accurate measurement is required, may need Cystatin C ±24-hour urine creatinine collection  Continue to avoid nephrotoxins, overall optimize blood pressure control, would recommend smoking cessation and improved dietary intake  Return to clinic with Dr Linda Card in 6 months with labs prior

## 2023-02-06 NOTE — PROGRESS NOTES
Assessment & Plan:    1  Stage 3a chronic kidney disease [facility])  Assessment & Plan:  Lab Results   Component Value Date    EGFR 61 02/01/2023    EGFR 59 11/14/2022    EGFR 56 07/12/2022    CREATININE 0 89 02/01/2023    CREATININE 0 92 11/14/2022    CREATININE 0 96 07/12/2022   Renal function continuing to improve from creatinine 1 1 mg/dL estimated GFR 46 mL/min on 3/7/2022 needs at time of her CVA  We will continue lisinopril 10 mg daily for renal protection  She denies any hypotension  Her renal function may be overestimated given her reduced muscle mass  If the more accurate measurement is required, may need Cystatin C ±24-hour urine creatinine collection  Continue to avoid nephrotoxins, overall optimize blood pressure control, would recommend smoking cessation and improved dietary intake  Return to clinic with Dr Edwige Headley in 6 months with labs prior  Orders:  -     CBC and differential; Future; Expected date: 08/01/2023  -     Comprehensive metabolic panel; Future; Expected date: 08/01/2023  -     Magnesium; Future; Expected date: 08/01/2023  -     Microalbumin / creatinine urine ratio; Future; Expected date: 08/01/2023  -     Phosphorus; Future; Expected date: 08/01/2023  -     Protein / creatinine ratio, urine; Future; Expected date: 08/01/2023  -     PTH, intact; Future; Expected date: 08/01/2023  -     Urinalysis with microscopic; Future; Expected date: 08/01/2023    2  Atrophy of right kidney  Assessment & Plan:  Aware  3  Hypertension, unspecified type  Assessment & Plan:  Well-controlled  Continue 2 g dietary sodium restriction lisinopril 10 mg daily and amlodipine 5 mg daily  4  Malnutrition, unspecified type [facility])  Assessment & Plan:  Malnutrition Findings:                                 BMI Findings: Body mass index is 18 36 kg/m²  She reports that her appetite may have improved slightly, but she reports being very picky with foods             The benefits, risks and alternatives to the treatment plan were discussed at this visit  Patient was advised of common adverse effects of any medical therapies prescribed  All questions were answered and discussed with the patient and any accompanying family members or caretakers  Subjective:      Patient ID: Meg Boudreaux is a [de-identified] y o  female [facility]  Patient is followed by Dr Stacey Sarmiento for management of stage IIIa chronic kidney disease  I last saw patient on 7/19/2022, at which time renal function was stable  She reported nonintentional weight loss  She declined appetite stimulant and nutritional supplement  Recommended further work-up per primary care physician and evaluation of SMA stenosis per PCP, consideration of GI referral     In the interim since her last visit, she was seen by cardiology Dr Bruce Jiménez on 8/10/2022, at which time was noted that her amlodipine was recently decreased to 5 mg due to fatigue and orthostatic symptoms  Patient was seen by neurology Mauricio Varner PA-C, for evaluation of 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  At that time, was noted that patient declined loop recorder and Holter monitor  She also declined sleep study  She was advised regarding stroke risk of untreated sleep apnea or possible atrial fibrillation  HPI     Today, patient presents for routine follow-up without acute complaints relating to blood pressure, edema or nephrological concerns  She has been feeling tired lately after her stroke March 2022  She believes her appetite has gotten a bit better  Her weight has remained stable at 94 pounds  Blood pressure is 126/72 HR 76  Denies headaches, lightheadedness, dizziness  Patient reports adherence with antihypertensive regimen and denies adverse effects: Lisinopril 10 mg daily, amlodipine 5 mg daily  Patient denies lower extremity swelling      Reviewed and discussed the results of labs performed 2/1/2023 which revealed that renal function maintained with a creatinine of 0 89 mg/dL with eGFR 61 mL/min  Improved prior creatinine 1 1 mg/dL estimated GFR 46 mL/min on 3/7/2022 near time of her CVA  History is obtained from patient  The following portions of the patient's history were reviewed and updated as appropriate: allergies, current medications, past family history, past medical history, past social history, past surgical history, and problem list     Review of Systems   Constitutional: Negative for activity change, appetite change, chills, fatigue, fever and unexpected weight change  Respiratory: Positive for shortness of breath (smoking hx, cardiac hx)  Negative for apnea and cough  Cardiovascular: Negative for chest pain, palpitations and leg swelling  Gastrointestinal: Negative for abdominal pain, blood in stool, constipation, diarrhea, nausea and vomiting  Genitourinary: Negative for decreased urine volume, difficulty urinating, dysuria, flank pain, frequency, hematuria and urgency  Musculoskeletal: Negative for arthralgias, back pain, joint swelling and myalgias  Skin: Negative for color change and rash  Allergic/Immunologic: Negative for immunocompromised state  Neurological: Negative for dizziness, seizures, syncope, weakness, light-headedness, numbness and headaches  Hematological: Negative for adenopathy  Does not bruise/bleed easily  Psychiatric/Behavioral: Negative for agitation, behavioral problems, confusion, decreased concentration, dysphoric mood and hallucinations  The patient is not nervous/anxious and is not hyperactive  All other systems reviewed and are negative  Objective:      /72 (BP Location: Left arm, Patient Position: Sitting, Cuff Size: Standard)   Pulse 76   Ht 5' (1 524 m)   Wt 42 6 kg (94 lb)   SpO2 96%   BMI 18 36 kg/m²          Physical Exam  Vitals and nursing note reviewed  Exam conducted with a chaperone present  Constitutional:       General: She is not in acute distress  Appearance: Normal appearance  She is normal weight  She is not ill-appearing or toxic-appearing  Comments: Smells of cigarette smoke   HENT:      Head: Normocephalic and atraumatic  Nose: Nose normal  No congestion or rhinorrhea  Mouth/Throat:      Mouth: Mucous membranes are moist       Pharynx: Oropharynx is clear  Eyes:      General: No scleral icterus  Right eye: No discharge  Left eye: No discharge  Extraocular Movements: Extraocular movements intact  Conjunctiva/sclera: Conjunctivae normal       Pupils: Pupils are equal, round, and reactive to light  Cardiovascular:      Rate and Rhythm: Normal rate and regular rhythm  Pulses: Normal pulses  Heart sounds: Normal heart sounds  No murmur heard  Pulmonary:      Effort: Pulmonary effort is normal  No respiratory distress  Breath sounds: Normal breath sounds  No wheezing  Abdominal:      General: Abdomen is flat  Bowel sounds are normal  There is no distension  Palpations: Abdomen is soft  There is no mass  Tenderness: There is no abdominal tenderness  Musculoskeletal:         General: No swelling or deformity  Normal range of motion  Cervical back: Normal range of motion and neck supple  No rigidity or tenderness  Skin:     General: Skin is warm and dry  Coloration: Skin is not jaundiced or pale  Findings: No bruising  Comments: Significant rhytids of face   Neurological:      General: No focal deficit present  Mental Status: She is alert and oriented to person, place, and time  Mental status is at baseline  Psychiatric:         Mood and Affect: Mood normal          Behavior: Behavior normal          Thought Content:  Thought content normal          Judgment: Judgment normal              Lab Results   Component Value Date    SODIUM 138 02/01/2023    K 3 5 02/01/2023     02/01/2023    CO2 25 02/01/2023    AGAP 6 02/01/2023    BUN 13 02/01/2023 CREATININE 0 89 02/01/2023    GLUC 95 03/08/2022    GLUF 128 (H) 02/01/2023    CALCIUM 9 3 02/01/2023    AST 10 02/01/2023    ALT 13 02/01/2023    ALKPHOS 92 02/01/2023    TP 6 8 02/01/2023    TBILI 0 57 02/01/2023    EGFR 61 02/01/2023      Lab Results   Component Value Date    CREATININE 0 89 02/01/2023    CREATININE 0 92 11/14/2022    CREATININE 0 96 07/12/2022    CREATININE 0 93 06/20/2022    CREATININE 0 95 03/08/2022    CREATININE 1 13 03/07/2022    CREATININE 1 04 12/10/2021    CREATININE 0 95 08/25/2021    CREATININE 0 92 05/28/2021    CREATININE 0 88 10/15/2019    CREATININE 0 91 04/30/2018    CREATININE 0 86 10/20/2017    CREATININE 0 88 04/12/2017      Lab Results   Component Value Date    COLORU Light Yellow 11/14/2022    CLARITYU Clear 11/14/2022    SPECGRAV 1 010 11/14/2022    PHUR 6 0 11/14/2022    LEUKOCYTESUR Negative 11/14/2022    NITRITE Negative 11/14/2022    PROTEIN UA Negative 11/14/2022    GLUCOSEU Negative 11/14/2022    KETONESU Negative 11/14/2022    UROBILINOGEN 0 2 11/14/2022    BILIRUBINUR Negative 11/14/2022    BLOODU Negative 11/14/2022    RBCUA None Seen 11/14/2022    WBCUA 0-1 (A) 11/14/2022    EPIS Occasional 11/14/2022    BACTERIA Occasional 11/14/2022      No results found for: LABPROT  No results found for: Dayanara Irby  Lab Results   Component Value Date    WBC 4 65 11/14/2022    HGB 14 2 11/14/2022    HCT 42 4 11/14/2022    MCV 88 11/14/2022     11/14/2022      Lab Results   Component Value Date    HGB 14 2 11/14/2022    HGB 13 3 07/12/2022    HGB 14 0 06/20/2022    HGB 12 5 03/08/2022    HGB 14 0 03/07/2022      Lab Results   Component Value Date    IRON 81 04/30/2018    TIBC 388 04/30/2018    FERRITIN 38 04/30/2018      No results found for: PTHCALCIUM, [id number], PHOSPHORUS   Lab Results   Component Value Date    CHOLESTEROL 124 06/20/2022    HDL 40 (L) 06/20/2022    LDLCALC 61 06/20/2022    TRIG 113 06/20/2022      No results found for: URICACID   Lab Results   Component Value Date    HGBA1C 6 0 (H) 11/14/2022      No results found for: TSHANTIBODY, [id number], FREET4   No results found for: KIRSTY, DSDNAAB, RFIGM   Lab Results   Component Value Date    UPEP See Comment 07/12/2022        Portions of the record may have been created with voice recognition software  Occasional wrong word or "sound a like" substitutions may have occurred due to the inherent limitations of voice recognition software  Read the chart carefully and recognize, using context, where substitutions have occurred  If you have any questions, please contact the dictating provider  Visit summary provided: Your kidney function is excellent around 61%  This puts you actually in stage II chronic kidney disease  Borderline stage III chronic kidney disease  The kidney function has been getting better when compared with March around the time of your stroke when your kidney function was down to 46%  To put this into perspective, people do not need dialysis until that number drops below 10, so the kidneys are doing very well  Your blood pressure looks well controlled  We can continue lisinopril and amlodipine  Keep an eye on it and if you notice the top number gets less than 110 or you feel lightheaded or dizzy, we will plan to stop the amlodipine  You might try to have a protein shake if you do not enjoy eating meat  We will plan to have you follow-up with Dr Willa Orozco about 6 months with labs a week I her to keep an eye on the kidney function  Please do not hesitate to call us if you have any problems or concerns prior to that time

## 2023-02-06 NOTE — ASSESSMENT & PLAN NOTE
Malnutrition Findings:                                 BMI Findings: Body mass index is 18 36 kg/m²  She reports that her appetite may have improved slightly, but she reports being very picky with foods

## 2023-03-23 ENCOUNTER — OFFICE VISIT (OUTPATIENT)
Dept: CARDIOLOGY CLINIC | Facility: HOSPITAL | Age: 78
End: 2023-03-23

## 2023-03-23 VITALS
HEIGHT: 60 IN | SYSTOLIC BLOOD PRESSURE: 140 MMHG | WEIGHT: 95 LBS | DIASTOLIC BLOOD PRESSURE: 86 MMHG | BODY MASS INDEX: 18.65 KG/M2 | HEART RATE: 71 BPM

## 2023-03-23 DIAGNOSIS — I77.1 CELIAC ARTERY STENOSIS (HCC): ICD-10-CM

## 2023-03-23 DIAGNOSIS — R53.83 OTHER FATIGUE: ICD-10-CM

## 2023-03-23 DIAGNOSIS — I10 HYPERTENSION, UNSPECIFIED TYPE: ICD-10-CM

## 2023-03-23 DIAGNOSIS — I65.29 STENOSIS OF CAROTID ARTERY, UNSPECIFIED LATERALITY: ICD-10-CM

## 2023-03-23 DIAGNOSIS — I25.10 CORONARY ARTERY DISEASE INVOLVING NATIVE HEART WITHOUT ANGINA PECTORIS, UNSPECIFIED VESSEL OR LESION TYPE: Primary | ICD-10-CM

## 2023-03-23 PROBLEM — I77.4 CELIAC ARTERY STENOSIS (HCC): Status: ACTIVE | Noted: 2023-03-23

## 2023-03-23 NOTE — PROGRESS NOTES
Cardiology Follow Up    Jamal Chase  1945  [id number]  [address] [id number]  [phone]  [phone]    1  Coronary artery disease involving native heart without angina pectoris, unspecified vessel or lesion type        2  Stenosis of carotid artery, unspecified laterality        3  Hypertension, unspecified type            Diagnoses and all orders for this visit:    Coronary artery disease involving native heart without angina pectoris, unspecified vessel or lesion type    Stenosis of carotid artery, unspecified laterality    Hypertension, unspecified type      I had the pleasure of seeing Jamal Chase for a follow up visit  INTERVAL HISTORY: see below    History of the presenting illness, Discussion/Summary and My Plan are as follows:::    Ema is a pleasant 66-year-old lady with history of hypertension, dyslipidemia, prior heavy tobacco use, STEMI in 2008, status post RCA stenting, admitted with stroke-like symptoms-left hemiparesis-in March 2022 with a right pontine infarct on brain imaging, with moderate to severe stenosis of the origin of the left PICA, mild left subclavian origin, mild right vertebral artery origin stenosis and further mild-to-moderate vertebral artery stenosis without occlusion  Normal left vertebral      She continues to see neurology as an outpatient    In addition, she was also advised to be evaluated for obstructive sleep apnea,  Had refused consideration for loop recorder  She underwent an echocardiogram that showed inferior/inferolateral wall motion abnormalities prompting her initial consultation followed by a pharmacologic stress test that did not show any ischemia or infarction--April 2022      In the past she had followed at [facility] with [address] and was last evaluated in September 2021 at which time she was stable      Cardiac exam is unremarkable except for irregular heart rates     Her only complaint is profound fatigue during the morning hours when she wakes up around 9:30 AM, the only good time she has is from 9:30 PM to 2 AM when she falls asleep  Catnaps during the day          Plan:     History of coronary artery disease:  STEMI-inferior -2008, status post RCA stenting, subsequent nuclear stress test-2011 without any infarction or ischemia  Negative pharmacologic stress test for infarct/ischemia April 2022  Continue current medications      Dyslipidemia:  Went off of her simvastatin plus ezetimibe about a month prior to her recent admission, now on atorvastatin, now controlled, see below     Hypertension: Mildly elevated, she will continue to follow at home, will not make any changes considering her recent symptoms leading to decrease in dose of amlodipine to 5 mg with which she feels great  Might benefit from evaluation for sleep apnea  Has bilateral renal artery disease - May 2022     Stroke:  No large vessel vascular disease to explain her stroke, also sees Neurology, will defer to them as to any candidacy for implantable loop recorder  She is currently on Plavix 70 mg daily monotherapy, was on aspirin prior to the stroke  ECGs have shown sinus rhythm with PACs  Smoking cessation - 'multi' vascular disease    Fatigue: Started after her stroke  Atypical in that she feels well from 9 PM-2 AM and fatigued even after she wakes up  During her pharmacologic stress test in April 2022, heart rate peaked at 120 bpm - 85% of max and adequate heart rate, unlikely to be chronotropic incompetence, normal hemoglobin, TSH, renal function, preserved EF on echo-March 2022  Not sure if she snores, does not eval for CARINE, no recent colonoscopy  Weight loss of 5 lb in 1 year only  ?  Age  Advised to check BP bid and send log    Follow-up in 6 months     Latest Reference Range & Units 12/10/21 09:26 03/08/22 04:43 06/20/22 09:42   Cholesterol See Comment mg/dL 143 211 (H) 124 Triglycerides See Comment mg/dL 111 109 113   HDL >=50 mg/dL 50 45 (L) 40 (L)   Non-HDL Cholesterol mg/dl 93     LDL Calculated 0 - 100 mg/dL 71 144 (H) 61   (H): Data is abnormally high  (L): Data is abnormally low     Latest Reference Range & Units Most Recent   CO2 21 - 32 mmol/L 25  2/1/23 10:21   BUN 5 - 25 mg/dL 13  2/1/23 10:21   Creatinine 0 60 - 1 30 mg/dL 0 89  2/1/23 10:21   Hemoglobin 11 5 - 15 4 g/dL 14 2  11/14/22 09:03   TSH 3RD GENERATON 0 450 - 4 500 uIU/mL 2 869  11/14/22 09:03       Patient Active Problem List   Diagnosis   • History of CVA (cerebrovascular accident)   • Hypertensive emergency   • Malnutrition ([place] [address] )   • Premature atrial complexes   • Thrombocytopenia (HCC)   • Leukopenia   • Neutropenia (HCC)   • Hyperphosphatemia   • Hypercholesterolemia   • Carotid artery stenosis   • Nocturnal hypoxia   • PINZON (dyspnea on exertion)   • Abnormal echocardiogram   • CVA (cerebral vascular accident) ([facility] )   • Recurrent urinary tract infection   • Atrophy of right kidney   • Stage 3a chronic kidney disease ([place] [address] )   • Renal vascular disease   • Hypertension   • Intracranial atherosclerosis   • Weight loss, non-intentional   • Hypervitaminosis D   • CAD (coronary artery disease)     Past Medical History:   Diagnosis Date   • CAD (coronary artery disease)    • Cardiac disease    • Hypercholesteremia    • Hyperlipidemia    • Hypertension    • Hypertension    • Renal disorder      Social History     Socioeconomic History   • Marital status: /Civil Union     Spouse name: Not on file   • Number of children: Not on file   • Years of education: Not on file   • Highest education level: Not on file   Occupational History   • Not on file   Tobacco Use   • Smoking status: Former   • Smokeless tobacco: Never   Vaping Use   • Vaping Use: Never used   Substance and Sexual Activity   • Alcohol use: Never   • Drug use: No   • Sexual activity: Not on file   Other Topics Concern   • Not on file   Social History Narrative   • Not on file     Social Determinants of Health     Financial Resource Strain: Not on file   Food Insecurity: Not on file   Transportation Needs: Not on file   Physical Activity: Not on file   Stress: Not on file   Social Connections: Not on file   Intimate Partner Violence: Not on file   Housing Stability: Not on file      History reviewed  No pertinent family history  Past Surgical History:   Procedure Laterality Date   • CORONARY ANGIOPLASTY WITH STENT PLACEMENT     • WRIST SURGERY         Current Outpatient Medications:   •  amLODIPine (NORVASC) 5 mg tablet, Take 1 tablet (5 mg total) by mouth daily, Disp: 90 tablet, Rfl: 0  •  atorvastatin (LIPITOR) 40 mg tablet, Take 1 tablet (40 mg total) by mouth every evening Take this in place of simvastatin  It will better prevent heart attack and stroke, Disp: 90 tablet, Rfl: 3  •  clopidogrel (PLAVIX) 75 mg tablet, Take 1 tablet (75 mg total) by mouth daily To prevent stroke and heart attack, Disp: 90 tablet, Rfl: 3  •  lisinopril (ZESTRIL) 10 mg tablet, Take 1 tablet (10 mg total) by mouth daily, Disp: 90 tablet, Rfl: 3  Allergies   Allergen Reactions   • Influenza Virus Vaccine        Imaging: No results found  Review of Systems:  Review of Systems   Constitutional: Negative  HENT: Negative  Eyes: Negative  Respiratory: Negative  Cardiovascular: Negative  Endocrine: Negative  Musculoskeletal: Negative  Physical Exam:  /86   Pulse 71   Ht 5' (1 524 m)   Wt 43 1 kg (95 lb)   BMI 18 55 kg/m²   Physical Exam  Constitutional:       General: She is not in acute distress  Appearance: Normal appearance  She is not ill-appearing  HENT:      Nose: Nose normal  No congestion or rhinorrhea  Neck:      Vascular: No carotid bruit  Cardiovascular:      Rate and Rhythm: Normal rate  Heart sounds: No murmur heard  No friction rub  No gallop     Pulmonary:      Effort: Pulmonary effort is normal  No respiratory distress  Breath sounds: No stridor  No wheezing or rhonchi  Musculoskeletal:         General: No swelling, tenderness, deformity or signs of injury  Normal range of motion  Cervical back: Normal range of motion  No rigidity or tenderness  Lymphadenopathy:      Cervical: No cervical adenopathy  Skin:     General: Skin is warm  Coloration: Skin is not jaundiced or pale  Findings: No bruising or erythema  Neurological:      Mental Status: She is alert  This note was completed in part utilizing [employer] direct voice recognition software  Grammatical errors, random word insertion, spelling mistakes, occasional wrong word or "sound-alike" substitutions and incomplete sentences may be an occasional consequence of the system secondary to software limitations, ambient noise and hardware issues  At the time of dictation, efforts were made to edit, clarify and /or correct errors  Please read the chart carefully and recognize, using context, where substitutions have occurred  If you have any questions or concerns about the context, text or information contained within the body of this dictation, please contact myself, the provider, for further clarification

## 2023-03-27 ENCOUNTER — APPOINTMENT (EMERGENCY)
Dept: RADIOLOGY | Facility: HOSPITAL | Age: 78
End: 2023-03-27

## 2023-03-27 ENCOUNTER — APPOINTMENT (EMERGENCY)
Dept: CT IMAGING | Facility: HOSPITAL | Age: 78
End: 2023-03-27

## 2023-03-27 ENCOUNTER — HOSPITAL ENCOUNTER (EMERGENCY)
Facility: HOSPITAL | Age: 78
Discharge: HOME | End: 2023-03-28
Attending: EMERGENCY MEDICINE

## 2023-03-27 VITALS
RESPIRATION RATE: 18 BRPM | HEIGHT: 60 IN | BODY MASS INDEX: 18.65 KG/M2 | OXYGEN SATURATION: 92 % | SYSTOLIC BLOOD PRESSURE: 150 MMHG | DIASTOLIC BLOOD PRESSURE: 70 MMHG | WEIGHT: 95 LBS | TEMPERATURE: 97.6 F | HEART RATE: 77 BPM

## 2023-03-27 DIAGNOSIS — M79.672 ACUTE FOOT PAIN, LEFT: ICD-10-CM
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
## 2023-03-27 RX ORDER — ACETAMINOPHEN 325 MG/1
650 TABLET ORAL ONCE
Status: COMPLETED | OUTPATIENT
Start: 2023-03-27 | End: 2023-03-27

## 2023-03-27 RX ADMIN — Medication 2.5 MG: at 22:09

## 2023-03-27 RX ADMIN — ACETAMINOPHEN 325MG 650 MG: 325 TABLET ORAL at 22:09

## 2023-03-28 RX ORDER — OXYCODONE AND ACETAMINOPHEN 2.5; 325 MG/1; MG/1
1 TABLET ORAL EVERY 6 HOURS PRN
Qty: 10 TABLET | Refills: 0 | Status: SHIPPED | OUTPATIENT
Start: 2023-03-28

## 2023-03-28 RX ORDER — OXYCODONE AND ACETAMINOPHEN 2.5; 325 MG/1; MG/1
1 TABLET ORAL EVERY 6 HOURS PRN
Qty: 10 TABLET | Refills: 0 | Status: SHIPPED | OUTPATIENT
Start: 2023-03-28 | End: 2023-03-28 | Stop reason: SDUPTHER

## 2023-03-28 RX ADMIN — Medication 2.5 MG: at 00:26

## 2023-03-28 NOTE — ED PROVIDER NOTES
History  Chief Complaint   Patient presents with   • Fall     Patient reports falling around 4pm today and hurting her left foot  Patient states that her pain since migrated up her leg to her hip  Patient did not hit her head but is on thinners  HPI  This is an 78-year-old female with a past medical history significant for CAD, HTN, HLD, is on aspirin and Plavix she fell earlier today mechanical trip and fall after walking outside on wet leaves  She landed on her right side did not strike her head not lose consciousness when not having any prodrome or any severe symptoms prior to the injury  She noted cute onset of pain to the left dorsal midfoot region with some intermittent radiation up the leg  Denies any low back pain denies any numbness tingling or weakness  Denies any deformity  Denies any significant soft tissue swelling  She reports difficulty moving at the foot and ankle secondary to pain reports intact sensation  Denies history of prior significant injury to the area  She presented here several hours after the incident as her symptoms not improve and she was concerned about the pain in the foot  Prior to Admission Medications   Prescriptions Last Dose Informant Patient Reported? Taking? amLODIPine (NORVASC) 5 mg tablet   No No   Sig: Take 1 tablet (5 mg total) by mouth daily   atorvastatin (LIPITOR) 40 mg tablet   No No   Sig: Take 1 tablet (40 mg total) by mouth every evening Take this in place of simvastatin    It will better prevent heart attack and stroke   clopidogrel (PLAVIX) 75 mg tablet   No No   Sig: Take 1 tablet (75 mg total) by mouth daily To prevent stroke and heart attack   lisinopril (ZESTRIL) 10 mg tablet   No No   Sig: Take 1 tablet (10 mg total) by mouth daily      Facility-Administered Medications: None       Past Medical History:   Diagnosis Date   • CAD (coronary artery disease)    • Cardiac disease    • Hypercholesteremia    • Hyperlipidemia    • Hypertension    • Hypertension    • Renal disorder        Past Surgical History:   Procedure Laterality Date   • CORONARY ANGIOPLASTY WITH STENT PLACEMENT     • WRIST SURGERY         History reviewed  No pertinent family history  I have reviewed and agree with the history as documented  E-Cigarette/Vaping   • E-Cigarette Use Never User      E-Cigarette/Vaping Substances   • Nicotine No    • THC No    • CBD No    • Flavoring No      Social History     Tobacco Use   • Smoking status: Former   • Smokeless tobacco: Never   Vaping Use   • Vaping Use: Never used   Substance Use Topics   • Alcohol use: Never   • Drug use: No       Review of Systems   Constitutional: Negative for chills and fever  HENT: Negative for ear pain and sore throat  Eyes: Negative for pain and visual disturbance  Respiratory: Negative for cough and shortness of breath  Cardiovascular: Negative for chest pain, palpitations and leg swelling  Gastrointestinal: Negative for abdominal pain, nausea and vomiting  Genitourinary: Negative for dysuria and hematuria  Musculoskeletal: Positive for gait problem  Negative for arthralgias, back pain, joint swelling, myalgias, neck pain and neck stiffness  Left foot pain   Skin: Negative for color change and rash  Neurological: Negative for dizziness, seizures, syncope, weakness, light-headedness, numbness and headaches  All other systems reviewed and are negative  Physical Exam  Physical Exam  Vitals and nursing note reviewed  Exam conducted with a chaperone present  Constitutional:       General: She is not in acute distress  Appearance: Normal appearance  She is well-developed  Comments: Patient appears uncomfortable due to pain but is not in distress  HENT:      Head: Normocephalic and atraumatic        Right Ear: External ear normal       Left Ear: External ear normal       Nose: Nose normal       Mouth/Throat:      Mouth: Mucous membranes are moist       Pharynx: Oropharynx is clear    Eyes:      Conjunctiva/sclera: Conjunctivae normal       Pupils: Pupils are equal, round, and reactive to light  Cardiovascular:      Rate and Rhythm: Normal rate and regular rhythm  Heart sounds: No murmur heard  Pulmonary:      Effort: Pulmonary effort is normal  No respiratory distress  Breath sounds: Normal breath sounds  Abdominal:      Palpations: Abdomen is soft  Tenderness: There is no abdominal tenderness  There is no guarding or rebound  Musculoskeletal:         General: Tenderness present  No swelling  Cervical back: Neck supple  Feet:       Comments: No midline posterior spinal tenderness step-offs or deformities to palpation  All major joints palpated without any tenderness or range of motion difficulties normal range of motion of both hips with FLORIAN technique  Normal hip roll  No trochanteric tenderness  Patient is able to push down with both wrists on the bed lifting her own body weight with no apparent pain in the upper extremities  Chest no tenderness  Pelvis stable  No tenderness  No focal tenderness along the entire length of the femur or the tib-fib region patient has focal pain over the dorsal lateral midfoot on the left no other findings  There is a chronic bunion and varus deformity of the first great toe which is chronic and unchanged today  Left foot no tenderness over the base of the fifth metatarsal   No MTP tenderness  No medial or lateral malleoli or tenderness  Skin:     General: Skin is warm and dry  Capillary Refill: Capillary refill takes less than 2 seconds  Findings: Bruising present  Comments: Some very mild ecchymosis/bruising over the dorsal left foot  No significant soft tissue swelling in the left lower extremity  Neurological:      General: No focal deficit present  Mental Status: She is alert  Mental status is at baseline  Comments: Sensation intact throughout the left lower extremity    No foot drop  Intact dorsi and plantarflexion  Intact movement of all toes  Normal muscle movement at the ankle knee hip  Psychiatric:         Mood and Affect: Mood normal          Vital Signs  ED Triage Vitals [03/27/23 2123]   Temperature Pulse Respirations Blood Pressure SpO2   97 6 °F (36 4 °C) 94 18 (!) 193/79 94 %      Temp Source Heart Rate Source Patient Position - Orthostatic VS BP Location FiO2 (%)   Temporal Monitor Lying Left arm --      Pain Score       9           Vitals:    03/27/23 2123 03/27/23 2230 03/27/23 2300   BP: (!) 193/79 (!) 176/78 150/70   Pulse: 94 87 77   Patient Position - Orthostatic VS: Lying           Visual Acuity  Visual Acuity    Flowsheet Row Most Recent Value   L Pupil Size (mm) 3   R Pupil Size (mm) 3          ED Medications  Medications   oxyCODONE (ROXICODONE) split tablet 2 5 mg (has no administration in time range)   oxyCODONE (ROXICODONE) split tablet 2 5 mg (2 5 mg Oral Given 3/27/23 2209)   acetaminophen (TYLENOL) tablet 650 mg (650 mg Oral Given 3/27/23 2209)       Diagnostic Studies  Results Reviewed     None                 CT lower extremity wo contrast left   Final Result by Nate De La Torre DO (03/27 2347)      No acute osseous abnormality identified  If symptoms persist, MRI can be offered  Workstation performed: [id number]         XR foot 3+ views LEFT    (Results Pending)   XR tibia fibula 2 views LEFT    (Results Pending)              Procedures  Procedures         ED Course  ED Course as of 03/28/23 0017   Mon Mar 27, 2023   2302 Blood Pressure(!): 176/78   2302 Blood Pressure(!): 193/79  Due to acute pain   Tue Mar 28, 2023   0003 CT scan results cussed with patient    She is offered admission for pain control, MRI, PT also discussed other treatment options including discharge with splint/immobilization she does have a walker at home and could provide pain medication for discharge and follow-up with podiatry and she can return here if his recent home does not work out  After discussing risk and benefits of both options patient is agreeable to go home and this is her preference  She feels improved with the pain medication and is able at this time to move the foot and ankle much more significantly than she was on my initial assessment and does note improvement  Patient agrees to return if inability to care for self at home severe pain inability to follow-up or other concerns  Patient was provided follow-up with podiatry in [facility]  Prescription sent to pharmacy  Will place in posterior short leg static splint  46  can help care for patient at home she has advised nonweightbearing status due to severe pain but can increase weightbearing as tolerated  SBIRT 22yo+    Flowsheet Row Most Recent Value   SBIRT (25 yo +)    In order to provide better care to our patients, we are screening all of our patients for alcohol and drug use  Would it be okay to ask you these screening questions? Yes Filed at: 03/27/2023 2146   Initial Alcohol Screen: US AUDIT-C     1  How often do you have a drink containing alcohol? 0 Filed at: 03/27/2023 2146   2  How many drinks containing alcohol do you have on a typical day you are drinking? 0 Filed at: 03/27/2023 2146   3a  Male UNDER 65: How often do you have five or more drinks on one occasion? 0 Filed at: 03/27/2023 2146   3b  FEMALE Any Age, or MALE 65+: How often do you have 4 or more drinks on one occassion? 0 Filed at: 03/27/2023 2146   Audit-C Score 0 Filed at: 03/27/2023 2146   JIA: How many times in the past year have you    Used an illegal drug or used a prescription medication for non-medical reasons? Never Filed at: 03/27/2023 2146                    Medical Decision Making  59-year-old female presenting for acute left foot pain after fall several hours ago  No other significant injuries  No concerns for back injury or spinal cord injury    Patient without any physical exam findings suggest pain or injury or neurologic deficits with the exception of pain and reproducible tenderness and pain with motion involving the left dorsal midfoot region  Screening x-rays do not reveal any evidence of fracture  Given concern for stress fracture or occult fracture and a CT scan of the distal left lower extremity was performed and the patient was frequently reassessed with attention paid to the possibility of an occult hip pelvis or low back injury which did not clinically present itself  CT scan was unremarkable and the patient was reassessed she was having improvement with time and pain management she strongly wished to go home  She was offered admission, MRI, PT, pain control but elected for outpatient management with trial of pain medication at home follow-up with podiatry Will place in posterior splint she has a walker at home  cannot care for her at home for the time being  They will return here at any signs of difficulty trouble or new symptoms she is counseled on other etiologies strict strict return to ER precautions he is agreeable to plan  Referral placed for podiatry  Acute foot pain, left: acute illness or injury  Fall from standing, initial encounter: acute illness or injury  Amount and/or Complexity of Data Reviewed  Radiology: ordered  Risk  OTC drugs  Prescription drug management  Disposition  Final diagnoses:   Fall from standing, initial encounter   Acute foot pain, left     Time reflects when diagnosis was documented in both MDM as applicable and the Disposition within this note     Time User Action Codes Description Comment    3/27/2023 11:07 PM Melissa Marie  XXXA] Fall from standing, initial encounter     3/27/2023 11:07 PM Melissa Lopes [[id number]] Acute foot pain, left       ED Disposition     ED Disposition   Discharge    Condition   Stable    Date/Time   Tue Mar 28, 2023 12:05 AM    Comment   Ted Harry discharge to home/self care                 Follow-up Information     Follow up With Specialties Details Why Contact Info    Lucio Michaud DPM Podiatry, Wound Care Schedule an appointment as soon as possible for a visit   [address] [facility] [address] [phone]            Patient's Medications   Discharge Prescriptions    OXYCODONE-ACETAMINOPHEN (PERCOCET) 2 5-325 MG PER TABLET    Take 1 tablet by mouth every 6 (six) hours as needed for moderate pain or severe pain for up to 10 doses Max Daily Amount: 4 tablets       Start Date: 3/28/2023 End Date: --       Order Dose: 1 tablet       Quantity: 10 tablet    Refills: 0           PDMP Review     None          ED Provider  Electronically Signed by           German Choudhary DO  03/28/23 0018

## 2023-03-28 NOTE — DISCHARGE INSTRUCTIONS
No fracture seen  May be bad sprain, occult fracture (stress fracture, bone bruise)  Do not suspect primary issue with blood vessels or nerves  We are going to place in a splint you should only bear weight as tolerated  Use a walker  Follow-up with podiatry if symptoms do not improve over the next day  Take pain medication as directed  Do not drive or drink alcohol while taking this medication  Ice frequently  Return here for severe symptoms inability to care for self at home severe pain numbness tingling weakness severe swelling new rash pain in other locations or any other concerns

## 2023-06-01 DIAGNOSIS — I63.9 CVA (CEREBRAL VASCULAR ACCIDENT) (HCC): ICD-10-CM

## 2023-06-01 DIAGNOSIS — I67.2 INTRACRANIAL ATHEROSCLEROSIS: ICD-10-CM

## 2023-06-01 RX ORDER — ATORVASTATIN CALCIUM 40 MG/1
TABLET, FILM COATED ORAL
Qty: 90 TABLET | Refills: 0 | Status: SHIPPED | OUTPATIENT
Start: 2023-06-01

## 2023-08-01 ENCOUNTER — APPOINTMENT (OUTPATIENT)
Dept: LAB | Facility: MEDICAL CENTER | Age: 78
End: 2023-08-01
Payer: MEDICARE

## 2023-08-01 DIAGNOSIS — N18.31 STAGE 3A CHRONIC KIDNEY DISEASE (HCC): ICD-10-CM

## 2023-08-01 LAB
ALBUMIN SERPL BCP-MCNC: 3.9 G/DL (ref 3.5–5)
ALP SERPL-CCNC: 115 U/L (ref 46–116)
ALT SERPL W P-5'-P-CCNC: 16 U/L (ref 12–78)
ANION GAP SERPL CALCULATED.3IONS-SCNC: 5 MMOL/L
AST SERPL W P-5'-P-CCNC: 15 U/L (ref 5–45)
BACTERIA UR QL AUTO: NORMAL /HPF
BASOPHILS # BLD AUTO: 0.03 THOUSANDS/ÂΜL (ref 0–0.1)
BASOPHILS NFR BLD AUTO: 1 % (ref 0–1)
BILIRUB SERPL-MCNC: 0.61 MG/DL (ref 0.2–1)
BILIRUB UR QL STRIP: NEGATIVE
BUN SERPL-MCNC: 10 MG/DL (ref 5–25)
CALCIUM SERPL-MCNC: 9.3 MG/DL (ref 8.3–10.1)
CHLORIDE SERPL-SCNC: 109 MMOL/L (ref 96–108)
CLARITY UR: CLEAR
CO2 SERPL-SCNC: 24 MMOL/L (ref 21–32)
COLOR UR: NORMAL
CREAT SERPL-MCNC: 0.98 MG/DL (ref 0.6–1.3)
EOSINOPHIL # BLD AUTO: 0.08 THOUSAND/ÂΜL (ref 0–0.61)
EOSINOPHIL NFR BLD AUTO: 2 % (ref 0–6)
ERYTHROCYTE [DISTWIDTH] IN BLOOD BY AUTOMATED COUNT: 12.8 % (ref 11.6–15.1)
GFR SERPL CREATININE-BSD FRML MDRD: 54 ML/MIN/1.73SQ M
GLUCOSE P FAST SERPL-MCNC: 115 MG/DL (ref 65–99)
GLUCOSE UR STRIP-MCNC: NEGATIVE MG/DL
HCT VFR BLD AUTO: 41.6 % (ref 34.8–46.1)
HGB BLD-MCNC: 14 G/DL (ref 11.5–15.4)
HGB UR QL STRIP.AUTO: NEGATIVE
IMM GRANULOCYTES # BLD AUTO: 0.01 THOUSAND/UL (ref 0–0.2)
IMM GRANULOCYTES NFR BLD AUTO: 0 % (ref 0–2)
KETONES UR STRIP-MCNC: NEGATIVE MG/DL
LEUKOCYTE ESTERASE UR QL STRIP: NEGATIVE
LYMPHOCYTES # BLD AUTO: 1.18 THOUSANDS/ÂΜL (ref 0.6–4.47)
LYMPHOCYTES NFR BLD AUTO: 32 % (ref 14–44)
MAGNESIUM SERPL-MCNC: 2.3 MG/DL (ref 1.6–2.6)
MCH RBC QN AUTO: 29.8 PG (ref 26.8–34.3)
MCHC RBC AUTO-ENTMCNC: 33.7 G/DL (ref 31.4–37.4)
MCV RBC AUTO: 89 FL (ref 82–98)
MONOCYTES # BLD AUTO: 0.34 THOUSAND/ÂΜL (ref 0.17–1.22)
MONOCYTES NFR BLD AUTO: 9 % (ref 4–12)
NEUTROPHILS # BLD AUTO: 2.02 THOUSANDS/ÂΜL (ref 1.85–7.62)
NEUTS SEG NFR BLD AUTO: 56 % (ref 43–75)
NITRITE UR QL STRIP: NEGATIVE
NON-SQ EPI CELLS URNS QL MICRO: NORMAL /HPF
NRBC BLD AUTO-RTO: 0 /100 WBCS
PH UR STRIP.AUTO: 6 [PH]
PHOSPHATE SERPL-MCNC: 3.8 MG/DL (ref 2.3–4.1)
PLATELET # BLD AUTO: 138 THOUSANDS/UL (ref 149–390)
PMV BLD AUTO: 11.2 FL (ref 8.9–12.7)
POTASSIUM SERPL-SCNC: 3.7 MMOL/L (ref 3.5–5.3)
PROT SERPL-MCNC: 7.2 G/DL (ref 6.4–8.4)
PROT UR STRIP-MCNC: NEGATIVE MG/DL
PTH-INTACT SERPL-MCNC: 98.1 PG/ML (ref 12–88)
RBC # BLD AUTO: 4.7 MILLION/UL (ref 3.81–5.12)
RBC #/AREA URNS AUTO: NORMAL /HPF
SODIUM SERPL-SCNC: 138 MMOL/L (ref 135–147)
SP GR UR STRIP.AUTO: 1.01 (ref 1–1.03)
UROBILINOGEN UR STRIP-ACNC: <2 MG/DL
WBC # BLD AUTO: 3.66 THOUSAND/UL (ref 4.31–10.16)
WBC #/AREA URNS AUTO: NORMAL /HPF

## 2023-08-01 PROCEDURE — 84100 ASSAY OF PHOSPHORUS: CPT

## 2023-08-01 PROCEDURE — 83970 ASSAY OF PARATHORMONE: CPT

## 2023-08-01 PROCEDURE — 81001 URINALYSIS AUTO W/SCOPE: CPT

## 2023-08-01 PROCEDURE — 82043 UR ALBUMIN QUANTITATIVE: CPT

## 2023-08-01 PROCEDURE — 85025 COMPLETE CBC W/AUTO DIFF WBC: CPT

## 2023-08-01 PROCEDURE — 36415 COLL VENOUS BLD VENIPUNCTURE: CPT

## 2023-08-01 PROCEDURE — 82570 ASSAY OF URINE CREATININE: CPT

## 2023-08-01 PROCEDURE — 83735 ASSAY OF MAGNESIUM: CPT

## 2023-08-01 PROCEDURE — 84156 ASSAY OF PROTEIN URINE: CPT

## 2023-08-01 PROCEDURE — 80053 COMPREHEN METABOLIC PANEL: CPT

## 2023-08-02 LAB
CREAT UR-MCNC: 38.5 MG/DL
PROT UR-MCNC: 7 MG/DL
PROT/CREAT UR: 0.18 MG/G{CREAT} (ref 0–0.1)

## 2023-08-08 ENCOUNTER — OFFICE VISIT (OUTPATIENT)
Dept: NEPHROLOGY | Facility: CLINIC | Age: 78
End: 2023-08-08

## 2023-08-08 VITALS
BODY MASS INDEX: 18.53 KG/M2 | DIASTOLIC BLOOD PRESSURE: 58 MMHG | SYSTOLIC BLOOD PRESSURE: 122 MMHG | OXYGEN SATURATION: 94 % | HEIGHT: 60 IN | WEIGHT: 94.4 LBS | HEART RATE: 76 BPM

## 2023-08-08 DIAGNOSIS — I63.9 CVA (CEREBRAL VASCULAR ACCIDENT) (HCC): ICD-10-CM

## 2023-08-08 DIAGNOSIS — I49.1 PREMATURE ATRIAL COMPLEXES: ICD-10-CM

## 2023-08-08 DIAGNOSIS — I10 PRIMARY HYPERTENSION: ICD-10-CM

## 2023-08-08 DIAGNOSIS — I67.2 INTRACRANIAL ATHEROSCLEROSIS: ICD-10-CM

## 2023-08-08 DIAGNOSIS — N18.31 STAGE 3A CHRONIC KIDNEY DISEASE (HCC): Primary | ICD-10-CM

## 2023-08-08 DIAGNOSIS — I16.1 HYPERTENSIVE EMERGENCY: ICD-10-CM

## 2023-08-08 DIAGNOSIS — N26.1 ATROPHY OF RIGHT KIDNEY: ICD-10-CM

## 2023-08-08 RX ORDER — CLOPIDOGREL BISULFATE 75 MG/1
75 TABLET ORAL DAILY
Qty: 90 TABLET | Refills: 3 | Status: SHIPPED | OUTPATIENT
Start: 2023-08-08

## 2023-08-08 RX ORDER — LISINOPRIL 10 MG/1
10 TABLET ORAL DAILY
Qty: 90 TABLET | Refills: 3 | Status: SHIPPED | OUTPATIENT
Start: 2023-08-08

## 2023-08-08 NOTE — ASSESSMENT & PLAN NOTE
Patient most likely has renovascular disease. No further work-up indicated at this time due to stable blood pressures.

## 2023-08-08 NOTE — PROGRESS NOTES
[facility] of [address] [place] [address]    Name: Nelta Hodgkins  YOB: 1945      Assessment/Plan:    Stage 3a chronic kidney disease [facility])  Lab Results   Component Value Date    EGFR 54 08/01/2023    EGFR 61 02/01/2023    EGFR 59 11/14/2022    CREATININE 0.98 08/01/2023    CREATININE 0.89 02/01/2023    CREATININE 0.92 11/14/2022     Kidney function slightly lower than typical, continue to monitor for now. Continue to avoid potential nephrotoxins. Potentially due to low blood pressure, please refer below. Hypertension  We will discontinue amlodipine at this time, continue with lisinopril for now. Patient to keep an eye on her blood pressures at home, goal blood pressure is 130/70 mmHg. Continue to encourage low sodium diet. Atrophy of right kidney  Patient most likely has renovascular disease. No further work-up indicated at this time due to stable blood pressures. Problem List Items Addressed This Visit        Cardiovascular and Mediastinum    Hypertensive emergency    Relevant Medications    lisinopril (ZESTRIL) 10 mg tablet    Premature atrial complexes    Relevant Medications    lisinopril (ZESTRIL) 10 mg tablet    clopidogrel (PLAVIX) 75 mg tablet    CVA (cerebral vascular accident) ([address])    Relevant Medications    lisinopril (ZESTRIL) 10 mg tablet    clopidogrel (PLAVIX) 75 mg tablet    Hypertension     We will discontinue amlodipine at this time, continue with lisinopril for now. Patient to keep an eye on her blood pressures at home, goal blood pressure is 130/70 mmHg. Continue to encourage low sodium diet. Relevant Medications    lisinopril (ZESTRIL) 10 mg tablet    Intracranial atherosclerosis    Relevant Medications    clopidogrel (PLAVIX) 75 mg tablet       Genitourinary    Atrophy of right kidney     Patient most likely has renovascular disease. No further work-up indicated at this time due to stable blood pressures. Stage 3a chronic kidney disease [facility]) - Primary     Lab Results   Component Value Date    EGFR 54 08/01/2023    EGFR 61 02/01/2023    EGFR 59 11/14/2022    CREATININE 0.98 08/01/2023    CREATININE 0.89 02/01/2023    CREATININE 0.92 11/14/2022     Kidney function slightly lower than typical, continue to monitor for now. Continue to avoid potential nephrotoxins. Potentially due to low blood pressure, please refer below. Relevant Orders    Albumin / creatinine urine ratio    Urinalysis with microscopic    Comprehensive metabolic panel    CBC and differential    Magnesium    Phosphorus    PTH, intact    Vitamin D 25 hydroxy       Patient is stable from renal standpoint, we will see her back for regular appointment in about 6 months with blood work to be done prior to that appointment provided during this visit. Medications discontinued during this visit included amlodipine. Patient check blood pressures at home and then give us a call with update. If not at goal, will likely increase lisinopril as opposed to reinitiating amlodipine. Subjective:      Patient ID: Messi Field is a 78 y.o. female. Patient presents for follow up. Labs reviewed, creatinine slightly higher than usual at around 1 mg/dL, there were no significant electrolyte abnormalities noted. Patient denies use of nonsteroid anti-inflammatory medications. She is complaining of lightheadedness especially with changing position. She does not have blood pressures at home, however blood pressure in the office was 122/58 mmHg. She is taking all medications as prescribed. Hypertension  This is a chronic problem. The current episode started more than 1 year ago. The problem is unchanged. The problem is controlled. Pertinent negatives include no chest pain, orthopnea or peripheral edema. There are no associated agents to hypertension.  Risk factors for coronary artery disease include post-menopausal state, sedentary lifestyle and smoking/tobacco exposure. Past treatments include lifestyle changes, calcium channel blockers and ACE inhibitors. There are no compliance problems. Hypertensive end-organ damage includes kidney disease. Identifiable causes of hypertension include chronic renal disease. The following portions of the patient's history were reviewed and updated as appropriate: allergies, current medications, past family history, past medical history, past social history, past surgical history and problem list.    Review of Systems   Cardiovascular: Negative for chest pain and orthopnea. All other systems reviewed and are negative. Social History     Socioeconomic History   • Marital status: /Civil Union     Spouse name: None   • Number of children: None   • Years of education: None   • Highest education level: None   Occupational History   • None   Tobacco Use   • Smoking status: Former   • Smokeless tobacco: Never   Vaping Use   • Vaping Use: Never used   Substance and Sexual Activity   • Alcohol use: Never   • Drug use: No   • Sexual activity: None   Other Topics Concern   • None   Social History Narrative   • None     Social Determinants of Health     Financial Resource Strain: Not on file   Food Insecurity: No Food Insecurity (3/8/2022)    Hunger Vital Sign    • Worried About Running Out of Food in the Last Year: Never true    • Ran Out of Food in the Last Year: Never true   Transportation Needs: No Transportation Needs (3/8/2022)    PRAPARE - Transportation    • Lack of Transportation (Medical): No    • Lack of Transportation (Non-Medical):  No   Physical Activity: Not on file   Stress: Not on file   Social Connections: Not on file   Intimate Partner Violence: Not on file   Housing Stability: Low Risk  (3/8/2022)    Housing Stability Vital Sign    • Unable to Pay for Housing in the Last Year: No    • Number of Places Lived in the Last Year: 1    • Unstable Housing in the Last Year: No     Past Medical History:   Diagnosis Date   • CAD (coronary artery disease)    • Cardiac disease    • Hypercholesteremia    • Hyperlipidemia    • Hypertension    • Hypertension    • Renal disorder      Past Surgical History:   Procedure Laterality Date   • CORONARY ANGIOPLASTY WITH STENT PLACEMENT     • WRIST SURGERY         Current Outpatient Medications:   •  atorvastatin (LIPITOR) 40 mg tablet, TAKE 1 TABLET (40MG TOTAL) BY MOUTH EVERY EVENING.  TAKE IN PLACE OF SIMVASTATIN, IT WILL BETTER PREVENT HEART ATTACK AND STROKE, Disp: 90 tablet, Rfl: 0  •  clopidogrel (PLAVIX) 75 mg tablet, Take 1 tablet (75 mg total) by mouth daily To prevent stroke and heart attack, Disp: 90 tablet, Rfl: 3  •  lisinopril (ZESTRIL) 10 mg tablet, Take 1 tablet (10 mg total) by mouth daily, Disp: 90 tablet, Rfl: 3    Lab Results   Component Value Date    SODIUM 138 08/01/2023    K 3.7 08/01/2023     (H) 08/01/2023    CO2 24 08/01/2023    AGAP 5 08/01/2023    BUN 10 08/01/2023    CREATININE 0.98 08/01/2023    GLUC 95 03/08/2022    GLUF 115 (H) 08/01/2023    CALCIUM 9.3 08/01/2023    AST 15 08/01/2023    ALT 16 08/01/2023    ALKPHOS 115 08/01/2023    TP 7.2 08/01/2023    TBILI 0.61 08/01/2023    EGFR 54 08/01/2023     Lab Results   Component Value Date    WBC 3.66 (L) 08/01/2023    HGB 14.0 08/01/2023    HCT 41.6 08/01/2023    MCV 89 08/01/2023     (L) 08/01/2023     Lab Results   Component Value Date    CHOLESTEROL 124 06/20/2022    CHOLESTEROL 211 (H) 03/08/2022    CHOLESTEROL 143 12/10/2021     Lab Results   Component Value Date    HDL 40 (L) 06/20/2022    HDL 45 (L) 03/08/2022    HDL 50 12/10/2021     Lab Results   Component Value Date    LDLCALC 61 06/20/2022    LDLCALC 144 (H) 03/08/2022    LDLCALC 71 12/10/2021     Lab Results   Component Value Date    TRIG 113 06/20/2022    TRIG 109 03/08/2022    TRIG 111 12/10/2021     No results found for: "CHOLHDL"  Lab Results   Component Value Date    ZEY6UHIHVIOA 2.869 11/14/2022     Lab Results Component Value Date    PTH 98.1 (H) 08/01/2023    CALCIUM 9.3 08/01/2023    PHOS 3.8 08/01/2023     Lab Results   Component Value Date    SPEP See Comment 07/12/2022    UPEP See Comment 07/12/2022     No results found for: "Lottie Patch", "[id number]"        Objective:      /58 (BP Location: Left arm, Patient Position: Sitting, Cuff Size: Standard)   Pulse 76   Ht 5' (1.524 m)   Wt 42.8 kg (94 lb 6.4 oz)   SpO2 94%   BMI 18.44 kg/m²          Physical Exam  Vitals reviewed. Constitutional:       General: She is not in acute distress. Appearance: She is well-developed. HENT:      Head: Normocephalic and atraumatic. Eyes:      Conjunctiva/sclera: Conjunctivae normal.   Cardiovascular:      Rate and Rhythm: Normal rate and regular rhythm. Pulmonary:      Effort: Pulmonary effort is normal.      Breath sounds: Rhonchi present. Abdominal:      Palpations: Abdomen is soft. Musculoskeletal:      Cervical back: Neck supple. Skin:     General: Skin is warm. Findings: No rash. Neurological:      Mental Status: She is alert and oriented to person, place, and time. Cranial Nerves: No cranial nerve deficit.    Psychiatric:         Behavior: Behavior normal.

## 2023-08-08 NOTE — ASSESSMENT & PLAN NOTE
We will discontinue amlodipine at this time, continue with lisinopril for now. Patient to keep an eye on her blood pressures at home, goal blood pressure is 130/70 mmHg. Continue to encourage low sodium diet.

## 2023-08-08 NOTE — ASSESSMENT & PLAN NOTE
Lab Results   Component Value Date    EGFR 54 08/01/2023    EGFR 61 02/01/2023    EGFR 59 11/14/2022    CREATININE 0.98 08/01/2023    CREATININE 0.89 02/01/2023    CREATININE 0.92 11/14/2022     Kidney function slightly lower than typical, continue to monitor for now. Continue to avoid potential nephrotoxins. Potentially due to low blood pressure, please refer below.

## 2023-09-27 ENCOUNTER — OFFICE VISIT (OUTPATIENT)
Dept: CARDIOLOGY CLINIC | Facility: HOSPITAL | Age: 78
End: 2023-09-27
Payer: MEDICARE

## 2023-09-27 ENCOUNTER — HOSPITAL ENCOUNTER (OUTPATIENT)
Dept: NON INVASIVE DIAGNOSTICS | Facility: HOSPITAL | Age: 78
Discharge: HOME | End: 2023-09-27
Payer: MEDICARE

## 2023-09-27 VITALS
HEART RATE: 82 BPM | BODY MASS INDEX: 18.26 KG/M2 | OXYGEN SATURATION: 89 % | DIASTOLIC BLOOD PRESSURE: 88 MMHG | WEIGHT: 93 LBS | SYSTOLIC BLOOD PRESSURE: 132 MMHG | HEIGHT: 60 IN

## 2023-09-27 DIAGNOSIS — I63.9 CEREBRAL INFARCTION, UNSPECIFIED MECHANISM (HCC): ICD-10-CM

## 2023-09-27 DIAGNOSIS — R53.83 OTHER FATIGUE: ICD-10-CM

## 2023-09-27 DIAGNOSIS — I10 PRIMARY HYPERTENSION: ICD-10-CM

## 2023-09-27 DIAGNOSIS — Z86.73 HISTORY OF CVA (CEREBROVASCULAR ACCIDENT): ICD-10-CM

## 2023-09-27 DIAGNOSIS — I63.9 CEREBROVASCULAR ACCIDENT (CVA), UNSPECIFIED MECHANISM (HCC): ICD-10-CM

## 2023-09-27 DIAGNOSIS — I25.10 CORONARY ARTERY DISEASE INVOLVING NATIVE HEART WITHOUT ANGINA PECTORIS, UNSPECIFIED VESSEL OR LESION TYPE: Primary | ICD-10-CM

## 2023-09-27 PROCEDURE — 93225 XTRNL ECG REC<48 HRS REC: CPT

## 2023-09-27 PROCEDURE — 93226 XTRNL ECG REC<48 HR SCAN A/R: CPT

## 2023-09-27 PROCEDURE — 99214 OFFICE O/P EST MOD 30 MIN: CPT | Performed by: INTERNAL MEDICINE

## 2023-09-27 NOTE — PROGRESS NOTES
Cardiology Follow Up    Charlene Villagran  1945  [id number]  CARDIOVASC PHYSICIAN  [place] [address]  [phone]  [phone]    No diagnosis found. There are no diagnoses linked to this encounter. I had the pleasure of seeing Charlene Villagran for a follow up visit. INTERVAL HISTORY: see below    History of the presenting illness, Discussion/Summary and My Plan are as follows:::    Ema is a pleasant 78-year-old lady with history of hypertension, dyslipidemia, prior heavy tobacco use, STEMI in 2008, status post RCA stenting, admitted with stroke-like symptoms-left hemiparesis-in March 2022 with a right pontine infarct on brain imaging, with moderate to severe stenosis of the origin of the left PICA, mild left subclavian origin, mild right vertebral artery origin stenosis and further mild-to-moderate vertebral artery stenosis without occlusion. Normal left vertebral.     She continues to see neurology as an outpatient (last in Oct 2022). In addition, she was also advised to be evaluated for obstructive sleep apnea- refused,  Had refused consideration for loop recorder  She underwent an echocardiogram that showed inferior/inferolateral wall motion abnormalities prompting her initial consultation followed by a pharmacologic stress test that did not show any ischemia or infarction--April 2022.     In the past she had [facility] and was last evaluated in September 2021 at which time she was stable.     Cardiac exam is unremarkable except for irregular heart rates. Her only persistent complaint has been profound fatigue during the morning hours when she wakes up around 9:30 AM (sleeps 2:30-9:30 and naps), she feels better in the evenings between 7 PM to 12 MN. when she falls asleep.   See below       Plan:     History of coronary artery disease:  STEMI-inferior -2008, status post RCA stenting, subsequent nuclear stress test-2011 without any infarction or ischemia. Negative pharmacologic stress test for infarct/ischemia April 2022. Continue current medications      Dyslipidemia:  Went off of her simvastatin plus ezetimibe about a month prior to her recent admission, now on atorvastatin, now controlled, see below     Hypertension: Mildly elevated, she will continue to follow at home, will not make any changes   Amlodipine DC'd recently by her Nephrologist.  Might benefit from evaluation for sleep apnea. Has bilateral renal artery disease - May 2022     Stroke:  No large vessel vascular disease to explain her stroke, also sees Neurology, will defer to them as to any candidacy for implantable loop recorder. She is currently on Plavix 70 mg daily monotherapy, was on aspirin prior to the stroke. ECGs have shown sinus rhythm with PACs. Smoking cessation - 'multi' vascular disease    Fatigue: Started after her stroke  Atypical in that she feels well in the evening from 7 PM-12 midnight and fatigued even after she wakes up in the morning. Does not drink water, drinks a lot of soda and ice tea, urine is clear to light yellow  During her pharmacologic stress test in April 2022, heart rate peaked at 120 bpm - 85% of max   normal hemoglobin, TSH, renal function, preserved EF on echo-March 2022. Does not want eval for CARINE, does not snore,  has sleep apnea and sleeps in separate, no recent colonoscopy. Weight loss of 8 lb in 2 years    48 hr holter to assess HRs and treadmill stress test to assess chronotropy if holter is inconclusive    ?  Age  Might benefit from evaluation for sleep apnea if all evaluation is negative    Follow-up in 6 months       Latest Reference Range & Units 12/10/21 09:26 03/08/22 04:43 06/20/22 09:42   Cholesterol See Comment mg/dL 143 211 (H) 124   Triglycerides See Comment mg/dL 111 109 113   HDL >=50 mg/dL 50 45 (L) 40 (L)   Non-HDL Cholesterol mg/dl 93     LDL Calculated 0 - 100 mg/dL 71 144 (H) 61 (H): Data is abnormally high  (L): Data is abnormally low       Latest Reference Range & Units 08/01/23 09:38   BUN 5 - 25 mg/dL 10   Creatinine 0.60 - 1.30 mg/dL 0.98   AST 5 - 45 U/L 15   ALT 12 - 78 U/L 16   WBC 4.31 - 10.16 Thousand/uL 3.66 (L)   Hemoglobin 11.5 - 15.4 g/dL 14.0   (L): Data is abnormally low     Latest Reference Range & Units 03/07/22 13:21 11/14/22 09:03   TSH 3RD GENERATON 0.450 - 4.500 uIU/mL 1.521 2.869             Patient Active Problem List   Diagnosis   • History of CVA (cerebrovascular accident)   • Hypertensive emergency   • Malnutrition ([address])   • Premature atrial complexes   • Thrombocytopenia (HCC)   • Leukopenia   • Neutropenia (HCC)   • Hyperphosphatemia   • Hypercholesterolemia   • Carotid artery stenosis   • Nocturnal hypoxia   • PINZON (dyspnea on exertion)   • Abnormal echocardiogram   • CVA (cerebral vascular accident) ([address])   • Recurrent urinary tract infection   • Atrophy of right kidney   • Stage 3a chronic kidney disease (HCC)   • Renal vascular disease   • Hypertension   • Intracranial atherosclerosis   • Weight loss, non-intentional   • Hypervitaminosis D   • CAD (coronary artery disease)   • Celiac artery stenosis (HCC)     Past Medical History:   Diagnosis Date   • CAD (coronary artery disease)    • Cardiac disease    • Hypercholesteremia    • Hyperlipidemia    • Hypertension    • Hypertension    • Renal disorder      Social History     Socioeconomic History   • Marital status: /Civil Union     Spouse name: Not on file   • Number of children: Not on file   • Years of education: Not on file   • Highest education level: Not on file   Occupational History   • Not on file   Tobacco Use   • Smoking status: Former   • Smokeless tobacco: Never   Vaping Use   • Vaping Use: Never used   Substance and Sexual Activity   • Alcohol use: Never   • Drug use: No   • Sexual activity: Not on file   Other Topics Concern   • Not on file   Social History Narrative   • Not on file Social Determinants of Health     Financial Resource Strain: Not on file   Food Insecurity: No Food Insecurity (3/8/2022)    Hunger Vital Sign    • Worried About Running Out of Food in the Last Year: Never true    • Ran Out of Food in the Last Year: Never true   Transportation Needs: No Transportation Needs (3/8/2022)    PRAPARE - Transportation    • Lack of Transportation (Medical): No    • Lack of Transportation (Non-Medical): No   Physical Activity: Not on file   Stress: Not on file   Social Connections: Not on file   Intimate Partner Violence: Not on file   Housing Stability: Low Risk  (3/8/2022)    Housing Stability Vital Sign    • Unable to Pay for Housing in the Last Year: No    • Number of Places Lived in the Last Year: 1    • Unstable Housing in the Last Year: No      History reviewed. No pertinent family history. Past Surgical History:   Procedure Laterality Date   • CORONARY ANGIOPLASTY WITH STENT PLACEMENT     • WRIST SURGERY         Current Outpatient Medications:   •  atorvastatin (LIPITOR) 40 mg tablet, TAKE 1 TABLET (40MG TOTAL) BY MOUTH EVERY EVENING. TAKE IN PLACE OF SIMVASTATIN, IT WILL BETTER PREVENT HEART ATTACK AND STROKE, Disp: 90 tablet, Rfl: 0  •  clopidogrel (PLAVIX) 75 mg tablet, Take 1 tablet (75 mg total) by mouth daily To prevent stroke and heart attack, Disp: 90 tablet, Rfl: 3  •  lisinopril (ZESTRIL) 10 mg tablet, Take 1 tablet (10 mg total) by mouth daily, Disp: 90 tablet, Rfl: 3  Allergies   Allergen Reactions   • Influenza Virus Vaccine        Imaging: No results found. Review of Systems:  Review of Systems   Constitutional: Negative. HENT: Negative. Eyes: Negative. Respiratory: Negative. Cardiovascular: Negative. Endocrine: Negative. Musculoskeletal: Negative.         Physical Exam:  /88   Pulse 82   Ht 5' (1.524 m)   Wt 42.2 kg (93 lb)   SpO2 (!) 89%   BMI 18.16 kg/m²   Physical Exam  Constitutional:       General: She is not in acute distress. Appearance: Normal appearance. She is not ill-appearing. HENT:      Nose: Nose normal. No congestion or rhinorrhea. Neck:      Vascular: No carotid bruit. Cardiovascular:      Rate and Rhythm: Normal rate. Rhythm irregular. Heart sounds: No murmur heard. No friction rub. No gallop. Pulmonary:      Effort: Pulmonary effort is normal. No respiratory distress. Breath sounds: No stridor. No wheezing or rhonchi. Musculoskeletal:         General: No swelling, tenderness, deformity or signs of injury. Normal range of motion. Cervical back: Normal range of motion. No rigidity or tenderness. Lymphadenopathy:      Cervical: No cervical adenopathy. Skin:     General: Skin is warm. Coloration: Skin is not jaundiced or pale. Findings: No bruising or erythema. Neurological:      Mental Status: She is alert. This note was completed in part utilizing Easel direct voice recognition software. Grammatical errors, random word insertion, spelling mistakes, occasional wrong word or "sound-alike" substitutions and incomplete sentences may be an occasional consequence of the system secondary to software limitations, ambient noise and hardware issues. At the time of dictation, efforts were made to edit, clarify and /or correct errors. Please read the chart carefully and recognize, using context, where substitutions have occurred. If you have any questions or concerns about the context, text or information contained within the body of this dictation, please contact myself, the provider, for further clarification.

## 2023-10-02 DIAGNOSIS — I63.9 CVA (CEREBRAL VASCULAR ACCIDENT) (HCC): ICD-10-CM

## 2023-10-02 DIAGNOSIS — I67.2 INTRACRANIAL ATHEROSCLEROSIS: ICD-10-CM

## 2023-10-02 PROCEDURE — 93227 XTRNL ECG REC<48 HR R&I: CPT | Performed by: INTERNAL MEDICINE

## 2023-10-02 RX ORDER — ATORVASTATIN CALCIUM 40 MG/1
TABLET, FILM COATED ORAL
Qty: 90 TABLET | Refills: 3 | Status: SHIPPED | OUTPATIENT
Start: 2023-10-02

## 2023-10-03 NOTE — RESULT ENCOUNTER NOTE
Holter results-unremarkable except for high burden of PACs/supraventricular ectopy  Surprisingly PACs persisted during her usual good period from 7 P to 12 MN, hence might not even be symptomatic  No chronotropic incompetence  To complete stress test as well      Please let the patient know that  Holter results were okay,     She did have some extra beats (PACs) while she was awake  I would not add any medications which can make her feel even more fatigued   To complete the stress test as ordered

## 2023-10-05 DIAGNOSIS — R93.1 ABNORMAL FINDINGS ON DIAGNOSTIC IMAGING OF HEART AND CORONARY CIRCULATION: ICD-10-CM

## 2023-10-05 DIAGNOSIS — R53.83 OTHER FATIGUE: Primary | ICD-10-CM

## 2023-10-17 ENCOUNTER — HOSPITAL ENCOUNTER (OUTPATIENT)
Dept: NON INVASIVE DIAGNOSTICS | Facility: HOSPITAL | Age: 78
Discharge: HOME | End: 2023-10-17
Attending: INTERNAL MEDICINE
Payer: MEDICARE

## 2023-10-17 VITALS
SYSTOLIC BLOOD PRESSURE: 180 MMHG | DIASTOLIC BLOOD PRESSURE: 88 MMHG | HEART RATE: 78 BPM | BODY MASS INDEX: 18.26 KG/M2 | HEIGHT: 60 IN | WEIGHT: 93 LBS

## 2023-10-17 DIAGNOSIS — R93.1 ABNORMAL FINDINGS ON DIAGNOSTIC IMAGING OF HEART AND CORONARY CIRCULATION: ICD-10-CM

## 2023-10-17 DIAGNOSIS — R53.83 OTHER FATIGUE: ICD-10-CM

## 2023-10-17 LAB
CHEST PAIN STATEMENT: NORMAL
MAX DIASTOLIC BP: 90 MMHG
MAX HEART RATE: 103 BPM
MAX HR PERCENT: 73 %
MAX HR: 103 BPM
MAX PREDICTED HEART RATE: 140 BPM
MAX. SYSTOLIC BP: 190 MMHG
PROTOCOL NAME: NORMAL
RATE PRESSURE PRODUCT: NORMAL
REASON FOR TERMINATION: NORMAL
SL CV STRESS RECOVERY BP: NORMAL MMHG
SL CV STRESS RECOVERY HR: 77 BPM
SL CV STRESS RECOVERY O2 SAT: 98 %
SL CV STRESS STAGE REACHED: 2
STRESS ANGINA INDEX: 0
STRESS BASELINE BP: NORMAL MMHG
STRESS BASELINE HR: 78 BPM
STRESS O2 SAT REST: 78 %
STRESS PEAK HR: 103 BPM
STRESS POST ESTIMATED WORKLOAD: 2.6 METS
STRESS POST EXERCISE DUR MIN: 4 MIN
STRESS POST EXERCISE DUR SEC: 4 SEC
STRESS POST O2 SAT PEAK: 98 %
STRESS POST PEAK BP: 190 MMHG
TARGET HR FORMULA: NORMAL
TEST INDICATION: NORMAL
TIME IN EXERCISE PHASE: NORMAL

## 2023-10-17 PROCEDURE — 93018 CV STRESS TEST I&R ONLY: CPT | Performed by: INTERNAL MEDICINE

## 2023-10-17 PROCEDURE — 93017 CV STRESS TEST TRACING ONLY: CPT

## 2023-10-17 PROCEDURE — 93016 CV STRESS TEST SUPVJ ONLY: CPT | Performed by: INTERNAL MEDICINE

## 2023-10-18 NOTE — RESULT ENCOUNTER NOTE
Test results are okay, unlikely that she has a blockage, no change to management, please let her know      Stress test does not suggest any coronary artery disease-while she did not achieve target heart rate-only achieved 73%-on Holter monitoring September 2023 her sinus tachycardia up to 132 bpm indicates NO chronotropic incompetence  In addition her nuclear stress test in April 2022 did not suggest any ischemia    Her only complaint is fatigue but she is also 78 and not in great health.   No further changes to management at this time, no testing at this time

## 2023-10-23 LAB
CHEST PAIN STATEMENT: NORMAL
MAX DIASTOLIC BP: 90 MMHG
MAX HEART RATE: 103 BPM
MAX PREDICTED HEART RATE: 140 BPM
MAX. SYSTOLIC BP: 190 MMHG
PROTOCOL NAME: NORMAL
REASON FOR TERMINATION: NORMAL
TARGET HR FORMULA: NORMAL
TEST INDICATION: NORMAL
TIME IN EXERCISE PHASE: NORMAL

## 2024-01-01 ENCOUNTER — APPOINTMENT (EMERGENCY)
Dept: RADIOLOGY | Facility: HOSPITAL | Age: 79
DRG: 064 | End: 2024-01-01
Payer: MEDICARE

## 2024-01-01 ENCOUNTER — APPOINTMENT (INPATIENT)
Dept: RADIOLOGY | Facility: HOSPITAL | Age: 79
DRG: 064 | End: 2024-01-01
Payer: MEDICARE

## 2024-01-01 ENCOUNTER — HOSPITAL ENCOUNTER (INPATIENT)
Facility: HOSPITAL | Age: 79
LOS: 1 days | DRG: 064 | End: 2024-12-08
Attending: EMERGENCY MEDICINE | Admitting: ANESTHESIOLOGY
Payer: MEDICARE

## 2024-01-01 VITALS
HEART RATE: 96 BPM | WEIGHT: 103.62 LBS | DIASTOLIC BLOOD PRESSURE: 64 MMHG | BODY MASS INDEX: 21.75 KG/M2 | TEMPERATURE: 102.6 F | HEIGHT: 58 IN | SYSTOLIC BLOOD PRESSURE: 136 MMHG | OXYGEN SATURATION: 99 %

## 2024-01-01 DIAGNOSIS — R29.90 STROKE-LIKE SYMPTOMS: ICD-10-CM

## 2024-01-01 DIAGNOSIS — I61.9 INTRACEREBRAL HEMORRHAGE (HCC): Primary | ICD-10-CM

## 2024-01-01 LAB
2HR DELTA HS TROPONIN: 310 NG/L
4HR DELTA HS TROPONIN: 475 NG/L
ABO GROUP BLD: NORMAL
ALBUMIN SERPL BCG-MCNC: 3.4 G/DL (ref 3.5–5)
ALBUMIN SERPL BCG-MCNC: 3.6 G/DL (ref 3.5–5)
ALP SERPL-CCNC: 72 U/L (ref 34–104)
ALP SERPL-CCNC: 79 U/L (ref 34–104)
ALT SERPL W P-5'-P-CCNC: 7 U/L (ref 7–52)
ALT SERPL W P-5'-P-CCNC: 7 U/L (ref 7–52)
ANION GAP SERPL CALCULATED.3IONS-SCNC: 10 MMOL/L (ref 4–13)
ANION GAP SERPL CALCULATED.3IONS-SCNC: 9 MMOL/L (ref 4–13)
ANION GAP SERPL CALCULATED.3IONS-SCNC: 9 MMOL/L (ref 4–13)
APTT PPP: 23 SECONDS (ref 23–34)
ARTERIAL PATENCY WRIST A: YES
AST SERPL W P-5'-P-CCNC: 23 U/L (ref 13–39)
AST SERPL W P-5'-P-CCNC: 29 U/L (ref 13–39)
ATRIAL RATE: 62 BPM
ATRIAL RATE: 75 BPM
ATRIAL RATE: 93 BPM
ATRIAL RATE: 97 BPM
BASE EXCESS BLDA CALC-SCNC: -1 MMOL/L (ref -2–3)
BASE EXCESS BLDA CALC-SCNC: -2 MMOL/L (ref -2–3)
BASE EXCESS BLDA CALC-SCNC: -3 MMOL/L
BASE EXCESS BLDA CALC-SCNC: -4 MMOL/L
BASE EXCESS BLDA CALC-SCNC: -4 MMOL/L (ref -2–3)
BASOPHILS # BLD AUTO: 0.04 THOUSANDS/ÂΜL (ref 0–0.1)
BASOPHILS # BLD AUTO: 0.05 THOUSANDS/ÂΜL (ref 0–0.1)
BASOPHILS NFR BLD AUTO: 0 % (ref 0–1)
BASOPHILS NFR BLD AUTO: 0 % (ref 0–1)
BILIRUB DIRECT SERPL-MCNC: 0.15 MG/DL (ref 0–0.2)
BILIRUB DIRECT SERPL-MCNC: 0.35 MG/DL (ref 0–0.2)
BILIRUB SERPL-MCNC: 0.84 MG/DL (ref 0.2–1)
BILIRUB SERPL-MCNC: 1.04 MG/DL (ref 0.2–1)
BLD GP AB SCN SERPL QL: NEGATIVE
BODY TEMPERATURE: 102.6 DEGREES FEHRENHEIT
BUN SERPL-MCNC: 17 MG/DL (ref 5–25)
BUN SERPL-MCNC: 23 MG/DL (ref 5–25)
BUN SERPL-MCNC: 24 MG/DL (ref 5–25)
CA-I BLD-SCNC: 1.03 MMOL/L (ref 1.12–1.32)
CA-I BLD-SCNC: 1.13 MMOL/L (ref 1.12–1.32)
CA-I BLD-SCNC: 1.15 MMOL/L (ref 1.12–1.32)
CA-I BLD-SCNC: 1.21 MMOL/L (ref 1.12–1.32)
CALCIUM SERPL-MCNC: 7.9 MG/DL (ref 8.4–10.2)
CALCIUM SERPL-MCNC: 7.9 MG/DL (ref 8.4–10.2)
CALCIUM SERPL-MCNC: 8.6 MG/DL (ref 8.4–10.2)
CARDIAC TROPONIN I PNL SERPL HS: 378 NG/L (ref ?–50)
CARDIAC TROPONIN I PNL SERPL HS: 543 NG/L (ref ?–50)
CARDIAC TROPONIN I PNL SERPL HS: 68 NG/L (ref ?–50)
CHLORIDE SERPL-SCNC: 106 MMOL/L (ref 96–108)
CHLORIDE SERPL-SCNC: 109 MMOL/L (ref 96–108)
CHLORIDE SERPL-SCNC: 110 MMOL/L (ref 96–108)
CO2 SERPL-SCNC: 22 MMOL/L (ref 21–32)
CO2 SERPL-SCNC: 24 MMOL/L (ref 21–32)
CO2 SERPL-SCNC: 25 MMOL/L (ref 21–32)
CREAT SERPL-MCNC: 0.92 MG/DL (ref 0.6–1.3)
CREAT SERPL-MCNC: 1.48 MG/DL (ref 0.6–1.3)
CREAT SERPL-MCNC: 1.57 MG/DL (ref 0.6–1.3)
EOSINOPHIL # BLD AUTO: 0.02 THOUSAND/ÂΜL (ref 0–0.61)
EOSINOPHIL # BLD AUTO: 0.04 THOUSAND/ÂΜL (ref 0–0.61)
EOSINOPHIL NFR BLD AUTO: 0 % (ref 0–6)
EOSINOPHIL NFR BLD AUTO: 0 % (ref 0–6)
ERYTHROCYTE [DISTWIDTH] IN BLOOD BY AUTOMATED COUNT: 13.2 % (ref 11.6–15.1)
ERYTHROCYTE [DISTWIDTH] IN BLOOD BY AUTOMATED COUNT: 13.4 % (ref 11.6–15.1)
ERYTHROCYTE [DISTWIDTH] IN BLOOD BY AUTOMATED COUNT: 13.5 % (ref 11.6–15.1)
FIO2 GAS DIL.REBREATH: 100 L
GFR SERPL CREATININE-BSD FRML MDRD: 30 ML/MIN/1.73SQ M
GFR SERPL CREATININE-BSD FRML MDRD: 32 ML/MIN/1.73SQ M
GFR SERPL CREATININE-BSD FRML MDRD: 58 ML/MIN/1.73SQ M
GLUCOSE SERPL-MCNC: 157 MG/DL (ref 65–140)
GLUCOSE SERPL-MCNC: 163 MG/DL (ref 65–140)
GLUCOSE SERPL-MCNC: 168 MG/DL (ref 65–140)
GLUCOSE SERPL-MCNC: 171 MG/DL (ref 65–140)
GLUCOSE SERPL-MCNC: 190 MG/DL (ref 65–140)
GLUCOSE SERPL-MCNC: 257 MG/DL (ref 65–140)
GLUCOSE SERPL-MCNC: 265 MG/DL (ref 65–140)
HCO3 BLDA-SCNC: 20 MMOL/L (ref 22–28)
HCO3 BLDA-SCNC: 23.6 MMOL/L (ref 22–28)
HCO3 BLDA-SCNC: 24.6 MMOL/L (ref 22–28)
HCO3 BLDA-SCNC: 25.4 MMOL/L (ref 22–28)
HCO3 BLDA-SCNC: 26.9 MMOL/L (ref 22–28)
HCT VFR BLD AUTO: 30.8 % (ref 34.8–46.1)
HCT VFR BLD AUTO: 31 % (ref 34.8–46.1)
HCT VFR BLD AUTO: 37.5 % (ref 34.8–46.1)
HCT VFR BLD CALC: 28 % (ref 34.8–46.1)
HCT VFR BLD CALC: 30 % (ref 34.8–46.1)
HCT VFR BLD CALC: 30 % (ref 34.8–46.1)
HGB BLD-MCNC: 10.4 G/DL (ref 11.5–15.4)
HGB BLD-MCNC: 10.6 G/DL (ref 11.5–15.4)
HGB BLD-MCNC: 12.6 G/DL (ref 11.5–15.4)
HGB BLDA-MCNC: 10.2 G/DL (ref 11.5–15.4)
HGB BLDA-MCNC: 10.2 G/DL (ref 11.5–15.4)
HGB BLDA-MCNC: 9.5 G/DL (ref 11.5–15.4)
HOROWITZ INDEX BLDA+IHG-RTO: 100 MM[HG]
IMM GRANULOCYTES # BLD AUTO: 0.05 THOUSAND/UL (ref 0–0.2)
IMM GRANULOCYTES # BLD AUTO: 0.08 THOUSAND/UL (ref 0–0.2)
IMM GRANULOCYTES NFR BLD AUTO: 0 % (ref 0–2)
IMM GRANULOCYTES NFR BLD AUTO: 1 % (ref 0–2)
INR PPP: 1.04 (ref 0.85–1.19)
INR PPP: 1.24 (ref 0.85–1.19)
LYMPHOCYTES # BLD AUTO: 2.07 THOUSANDS/ÂΜL (ref 0.6–4.47)
LYMPHOCYTES # BLD AUTO: 2.16 THOUSANDS/ÂΜL (ref 0.6–4.47)
LYMPHOCYTES NFR BLD AUTO: 15 % (ref 14–44)
LYMPHOCYTES NFR BLD AUTO: 16 % (ref 14–44)
MAGNESIUM SERPL-MCNC: 1.6 MG/DL (ref 1.9–2.7)
MAGNESIUM SERPL-MCNC: 4.6 MG/DL (ref 1.9–2.7)
MCH RBC QN AUTO: 29.9 PG (ref 26.8–34.3)
MCH RBC QN AUTO: 30 PG (ref 26.8–34.3)
MCH RBC QN AUTO: 30.2 PG (ref 26.8–34.3)
MCHC RBC AUTO-ENTMCNC: 33.6 G/DL (ref 31.4–37.4)
MCHC RBC AUTO-ENTMCNC: 33.8 G/DL (ref 31.4–37.4)
MCHC RBC AUTO-ENTMCNC: 34.2 G/DL (ref 31.4–37.4)
MCV RBC AUTO: 88 FL (ref 82–98)
MCV RBC AUTO: 89 FL (ref 82–98)
MCV RBC AUTO: 90 FL (ref 82–98)
MONOCYTES # BLD AUTO: 1.84 THOUSAND/ÂΜL (ref 0.17–1.22)
MONOCYTES # BLD AUTO: 1.85 THOUSAND/ÂΜL (ref 0.17–1.22)
MONOCYTES NFR BLD AUTO: 13 % (ref 4–12)
MONOCYTES NFR BLD AUTO: 14 % (ref 4–12)
NEUTROPHILS # BLD AUTO: 10.45 THOUSANDS/ÂΜL (ref 1.85–7.62)
NEUTROPHILS # BLD AUTO: 8.94 THOUSANDS/ÂΜL (ref 1.85–7.62)
NEUTS SEG NFR BLD AUTO: 70 % (ref 43–75)
NEUTS SEG NFR BLD AUTO: 71 % (ref 43–75)
NRBC BLD AUTO-RTO: 0 /100 WBCS
NRBC BLD AUTO-RTO: 0 /100 WBCS
O2 CT BLDA-SCNC: 15.3 ML/DL (ref 16–23)
O2 CT BLDA-SCNC: 15.7 ML/DL (ref 16–23)
OXYHGB MFR BLDA: 97.8 % (ref 94–97)
OXYHGB MFR BLDA: 98.5 % (ref 94–97)
P AXIS: 270 DEGREES
P AXIS: 65 DEGREES
P AXIS: 80 DEGREES
P AXIS: 81 DEGREES
PCO2 BLD: 25 MMOL/L (ref 21–32)
PCO2 BLD: 27 MMOL/L (ref 21–32)
PCO2 BLD: 29 MMOL/L (ref 21–32)
PCO2 BLD: 42.5 MM HG (ref 36–44)
PCO2 BLD: 48.9 MM HG (ref 36–44)
PCO2 BLD: 85.9 MM HG (ref 36–44)
PCO2 BLDA: 32.9 MM HG (ref 36–44)
PCO2 BLDA: 56.9 MM HG (ref 36–44)
PCO2 TEMP ADJ BLDA: 62.6 MM HG (ref 36–44)
PEEP RESPIRATORY: 6 CM[H2O]
PH BLD: 7.1 [PH] (ref 7.35–7.45)
PH BLD: 7.22 [PH] (ref 7.35–7.45)
PH BLD: 7.32 [PH] (ref 7.35–7.45)
PH BLD: 7.35 [PH] (ref 7.35–7.45)
PH BLDA: 7.25 [PH] (ref 7.35–7.45)
PH BLDA: 7.4 [PH] (ref 7.35–7.45)
PHOSPHATE SERPL-MCNC: 3.6 MG/DL (ref 2.3–4.1)
PHOSPHATE SERPL-MCNC: 4.5 MG/DL (ref 2.3–4.1)
PLATELET # BLD AUTO: 167 THOUSANDS/UL (ref 149–390)
PLATELET # BLD AUTO: 169 THOUSANDS/UL (ref 149–390)
PLATELET # BLD AUTO: 200 THOUSANDS/UL (ref 149–390)
PMV BLD AUTO: 10.9 FL (ref 8.9–12.7)
PMV BLD AUTO: 11.3 FL (ref 8.9–12.7)
PMV BLD AUTO: 11.4 FL (ref 8.9–12.7)
PO2 BLD: 220 MM HG (ref 75–129)
PO2 BLD: 366 MM HG (ref 75–129)
PO2 BLD: >400 MM HG (ref 75–129)
PO2 BLD: >400 MM HG (ref 75–129)
PO2 BLDA: 162.5 MM HG (ref 75–129)
PO2 BLDA: 208.6 MM HG (ref 75–129)
POTASSIUM BLD-SCNC: 4.1 MMOL/L (ref 3.5–5.3)
POTASSIUM BLD-SCNC: 4.3 MMOL/L (ref 3.5–5.3)
POTASSIUM BLD-SCNC: 4.4 MMOL/L (ref 3.5–5.3)
POTASSIUM SERPL-SCNC: 3.7 MMOL/L (ref 3.5–5.3)
POTASSIUM SERPL-SCNC: 3.9 MMOL/L (ref 3.5–5.3)
POTASSIUM SERPL-SCNC: 4.2 MMOL/L (ref 3.5–5.3)
PR INTERVAL: 106 MS
PR INTERVAL: 125 MS
PR INTERVAL: 88 MS
PR INTERVAL: 96 MS
PROT SERPL-MCNC: 4.8 G/DL (ref 6.4–8.4)
PROT SERPL-MCNC: 5.1 G/DL (ref 6.4–8.4)
PROTHROMBIN TIME: 13.9 SECONDS (ref 12.3–15)
PROTHROMBIN TIME: 15.9 SECONDS (ref 12.3–15)
QRS AXIS: 69 DEGREES
QRS AXIS: 75 DEGREES
QRS AXIS: 76 DEGREES
QRS AXIS: 77 DEGREES
QRSD INTERVAL: 67 MS
QRSD INTERVAL: 71 MS
QRSD INTERVAL: 71 MS
QRSD INTERVAL: 96 MS
QT INTERVAL: 350 MS
QT INTERVAL: 379 MS
QT INTERVAL: 429 MS
QT INTERVAL: 458 MS
QTC INTERVAL: 436 MS
QTC INTERVAL: 464 MS
QTC INTERVAL: 480 MS
QTC INTERVAL: 482 MS
RBC # BLD AUTO: 3.44 MILLION/UL (ref 3.81–5.12)
RBC # BLD AUTO: 3.53 MILLION/UL (ref 3.81–5.12)
RBC # BLD AUTO: 4.22 MILLION/UL (ref 3.81–5.12)
RH BLD: NEGATIVE
SAO2 % BLD FROM PO2: 100 % (ref 60–85)
SODIUM BLD-SCNC: 142 MMOL/L (ref 136–145)
SODIUM BLD-SCNC: 143 MMOL/L (ref 136–145)
SODIUM BLD-SCNC: 143 MMOL/L (ref 136–145)
SODIUM SERPL-SCNC: 138 MMOL/L (ref 135–147)
SODIUM SERPL-SCNC: 143 MMOL/L (ref 135–147)
SODIUM SERPL-SCNC: 143 MMOL/L (ref 135–147)
SPECIMEN EXPIRATION DATE: NORMAL
SPECIMEN SOURCE: ABNORMAL
T WAVE AXIS: 20 DEGREES
T WAVE AXIS: 21 DEGREES
T WAVE AXIS: 71 DEGREES
T WAVE AXIS: 9 DEGREES
VENT- AC: AC
VENTRICULAR RATE: 62 BPM
VENTRICULAR RATE: 75 BPM
VENTRICULAR RATE: 93 BPM
VENTRICULAR RATE: 97 BPM
VT SETTING VENT: 350 ML
WBC # BLD AUTO: 12.96 THOUSAND/UL (ref 4.31–10.16)
WBC # BLD AUTO: 14.63 THOUSAND/UL (ref 4.31–10.16)
WBC # BLD AUTO: 15.31 THOUSAND/UL (ref 4.31–10.16)

## 2024-01-01 PROCEDURE — 36556 INSERT NON-TUNNEL CV CATH: CPT | Performed by: EMERGENCY MEDICINE

## 2024-01-01 PROCEDURE — 85027 COMPLETE CBC AUTOMATED: CPT | Performed by: EMERGENCY MEDICINE

## 2024-01-01 PROCEDURE — 83735 ASSAY OF MAGNESIUM: CPT | Performed by: REGISTERED NURSE

## 2024-01-01 PROCEDURE — 99291 CRITICAL CARE FIRST HOUR: CPT | Performed by: ANESTHESIOLOGY

## 2024-01-01 PROCEDURE — 82948 REAGENT STRIP/BLOOD GLUCOSE: CPT

## 2024-01-01 PROCEDURE — 93005 ELECTROCARDIOGRAM TRACING: CPT

## 2024-01-01 PROCEDURE — 36620 INSERTION CATHETER ARTERY: CPT | Performed by: EMERGENCY MEDICINE

## 2024-01-01 PROCEDURE — 4A133J1 MONITORING OF ARTERIAL PULSE, PERIPHERAL, PERCUTANEOUS APPROACH: ICD-10-PCS | Performed by: EMERGENCY MEDICINE

## 2024-01-01 PROCEDURE — 82805 BLOOD GASES W/O2 SATURATION: CPT

## 2024-01-01 PROCEDURE — 71045 X-RAY EXAM CHEST 1 VIEW: CPT

## 2024-01-01 PROCEDURE — 85610 PROTHROMBIN TIME: CPT | Performed by: EMERGENCY MEDICINE

## 2024-01-01 PROCEDURE — 82803 BLOOD GASES ANY COMBINATION: CPT

## 2024-01-01 PROCEDURE — NC001 PR NO CHARGE

## 2024-01-01 PROCEDURE — 84295 ASSAY OF SERUM SODIUM: CPT

## 2024-01-01 PROCEDURE — 84100 ASSAY OF PHOSPHORUS: CPT

## 2024-01-01 PROCEDURE — 02HV33Z INSERTION OF INFUSION DEVICE INTO SUPERIOR VENA CAVA, PERCUTANEOUS APPROACH: ICD-10-PCS | Performed by: EMERGENCY MEDICINE

## 2024-01-01 PROCEDURE — 80076 HEPATIC FUNCTION PANEL: CPT | Performed by: REGISTERED NURSE

## 2024-01-01 PROCEDURE — 93010 ELECTROCARDIOGRAM REPORT: CPT | Performed by: INTERNAL MEDICINE

## 2024-01-01 PROCEDURE — 94002 VENT MGMT INPAT INIT DAY: CPT

## 2024-01-01 PROCEDURE — 99291 CRITICAL CARE FIRST HOUR: CPT | Performed by: PSYCHIATRY & NEUROLOGY

## 2024-01-01 PROCEDURE — 36415 COLL VENOUS BLD VENIPUNCTURE: CPT | Performed by: EMERGENCY MEDICINE

## 2024-01-01 PROCEDURE — 86900 BLOOD TYPING SEROLOGIC ABO: CPT | Performed by: REGISTERED NURSE

## 2024-01-01 PROCEDURE — 82947 ASSAY GLUCOSE BLOOD QUANT: CPT

## 2024-01-01 PROCEDURE — 85610 PROTHROMBIN TIME: CPT | Performed by: REGISTERED NURSE

## 2024-01-01 PROCEDURE — 84484 ASSAY OF TROPONIN QUANT: CPT | Performed by: EMERGENCY MEDICINE

## 2024-01-01 PROCEDURE — 80048 BASIC METABOLIC PNL TOTAL CA: CPT | Performed by: REGISTERED NURSE

## 2024-01-01 PROCEDURE — 94760 N-INVAS EAR/PLS OXIMETRY 1: CPT

## 2024-01-01 PROCEDURE — 85025 COMPLETE CBC W/AUTO DIFF WBC: CPT

## 2024-01-01 PROCEDURE — 96374 THER/PROPH/DIAG INJ IV PUSH: CPT

## 2024-01-01 PROCEDURE — 80048 BASIC METABOLIC PNL TOTAL CA: CPT | Performed by: EMERGENCY MEDICINE

## 2024-01-01 PROCEDURE — 84132 ASSAY OF SERUM POTASSIUM: CPT

## 2024-01-01 PROCEDURE — 80048 BASIC METABOLIC PNL TOTAL CA: CPT

## 2024-01-01 PROCEDURE — 82330 ASSAY OF CALCIUM: CPT | Performed by: REGISTERED NURSE

## 2024-01-01 PROCEDURE — 94664 DEMO&/EVAL PT USE INHALER: CPT

## 2024-01-01 PROCEDURE — 03HY32Z INSERTION OF MONITORING DEVICE INTO UPPER ARTERY, PERCUTANEOUS APPROACH: ICD-10-PCS | Performed by: EMERGENCY MEDICINE

## 2024-01-01 PROCEDURE — 84100 ASSAY OF PHOSPHORUS: CPT | Performed by: REGISTERED NURSE

## 2024-01-01 PROCEDURE — 86901 BLOOD TYPING SEROLOGIC RH(D): CPT | Performed by: REGISTERED NURSE

## 2024-01-01 PROCEDURE — 85730 THROMBOPLASTIN TIME PARTIAL: CPT | Performed by: EMERGENCY MEDICINE

## 2024-01-01 PROCEDURE — NC001 PR NO CHARGE: Performed by: EMERGENCY MEDICINE

## 2024-01-01 PROCEDURE — 82330 ASSAY OF CALCIUM: CPT

## 2024-01-01 PROCEDURE — 85014 HEMATOCRIT: CPT

## 2024-01-01 PROCEDURE — 4A133B1 MONITORING OF ARTERIAL PRESSURE, PERIPHERAL, PERCUTANEOUS APPROACH: ICD-10-PCS | Performed by: EMERGENCY MEDICINE

## 2024-01-01 PROCEDURE — 85025 COMPLETE CBC W/AUTO DIFF WBC: CPT | Performed by: REGISTERED NURSE

## 2024-01-01 PROCEDURE — 80076 HEPATIC FUNCTION PANEL: CPT

## 2024-01-01 PROCEDURE — 99291 CRITICAL CARE FIRST HOUR: CPT | Performed by: EMERGENCY MEDICINE

## 2024-01-01 PROCEDURE — 86850 RBC ANTIBODY SCREEN: CPT | Performed by: REGISTERED NURSE

## 2024-01-01 PROCEDURE — 83735 ASSAY OF MAGNESIUM: CPT

## 2024-01-01 PROCEDURE — 99285 EMERGENCY DEPT VISIT HI MDM: CPT

## 2024-01-01 RX ORDER — PROPOFOL 10 MG/ML
5-50 INJECTION, EMULSION INTRAVENOUS
Status: DISCONTINUED | OUTPATIENT
Start: 2024-01-01 | End: 2024-01-01

## 2024-01-01 RX ORDER — NOREPINEPHRINE BITARTRATE 1 MG/ML
INJECTION, SOLUTION INTRAVENOUS
Status: COMPLETED
Start: 2024-01-01 | End: 2024-01-01

## 2024-01-01 RX ORDER — KETAMINE HCL IN NACL, ISO-OSM 100MG/10ML
SYRINGE (ML) INJECTION
Status: DISCONTINUED
Start: 2024-01-01 | End: 2024-01-01 | Stop reason: HOSPADM

## 2024-01-01 RX ORDER — FENTANYL CITRATE 50 UG/ML
25 INJECTION, SOLUTION INTRAMUSCULAR; INTRAVENOUS
Refills: 0 | Status: DISCONTINUED | OUTPATIENT
Start: 2024-01-01 | End: 2024-01-01 | Stop reason: HOSPADM

## 2024-01-01 RX ORDER — MAGNESIUM SULFATE HEPTAHYDRATE 40 MG/ML
2 INJECTION, SOLUTION INTRAVENOUS ONCE
Status: COMPLETED | OUTPATIENT
Start: 2024-01-01 | End: 2024-01-01

## 2024-01-01 RX ORDER — CHLORHEXIDINE GLUCONATE ORAL RINSE 1.2 MG/ML
15 SOLUTION DENTAL EVERY 12 HOURS SCHEDULED
Status: DISCONTINUED | OUTPATIENT
Start: 2024-01-01 | End: 2024-01-01

## 2024-01-01 RX ORDER — ACETAMINOPHEN 10 MG/ML
1000 INJECTION, SOLUTION INTRAVENOUS ONCE
Status: COMPLETED | OUTPATIENT
Start: 2024-01-01 | End: 2024-01-01

## 2024-01-01 RX ORDER — LABETALOL HYDROCHLORIDE 5 MG/ML
10 INJECTION, SOLUTION INTRAVENOUS ONCE
Status: COMPLETED | OUTPATIENT
Start: 2024-01-01 | End: 2024-01-01

## 2024-01-01 RX ORDER — SODIUM CHLORIDE, SODIUM GLUCONATE, SODIUM ACETATE, POTASSIUM CHLORIDE, MAGNESIUM CHLORIDE, SODIUM PHOSPHATE, DIBASIC, AND POTASSIUM PHOSPHATE .53; .5; .37; .037; .03; .012; .00082 G/100ML; G/100ML; G/100ML; G/100ML; G/100ML; G/100ML; G/100ML
75 INJECTION, SOLUTION INTRAVENOUS CONTINUOUS
Status: DISCONTINUED | OUTPATIENT
Start: 2024-01-01 | End: 2024-01-01 | Stop reason: HOSPADM

## 2024-01-01 RX ORDER — CHLORHEXIDINE GLUCONATE ORAL RINSE 1.2 MG/ML
15 SOLUTION DENTAL EVERY 12 HOURS SCHEDULED
Status: DISCONTINUED | OUTPATIENT
Start: 2024-01-01 | End: 2024-01-01 | Stop reason: HOSPADM

## 2024-01-01 RX ADMIN — NOREPINEPHRINE BITARTRATE 22 MCG/MIN: 1 INJECTION, SOLUTION, CONCENTRATE INTRAVENOUS at 06:44

## 2024-01-01 RX ADMIN — PROPOFOL 20 MCG/KG/MIN: 10 INJECTION, EMULSION INTRAVENOUS at 13:26

## 2024-01-01 RX ADMIN — CHLORHEXIDINE GLUCONATE 0.12% ORAL RINSE 15 ML: 1.2 LIQUID ORAL at 08:58

## 2024-01-01 RX ADMIN — ACETAMINOPHEN 1000 MG: 10 INJECTION INTRAVENOUS at 13:36

## 2024-01-01 RX ADMIN — VASOPRESSIN 0.04 UNITS/MIN: 20 INJECTION INTRAVENOUS at 13:37

## 2024-01-01 RX ADMIN — SODIUM CHLORIDE 500 ML: 0.9 INJECTION, SOLUTION INTRAVENOUS at 02:43

## 2024-01-01 RX ADMIN — VASOPRESSIN 0.04 UNITS/MIN: 20 INJECTION INTRAVENOUS at 07:37

## 2024-01-01 RX ADMIN — NICARDIPINE HYDROCHLORIDE 2.5 MG/HR: 25 INJECTION, SOLUTION INTRAVENOUS at 15:04

## 2024-01-01 RX ADMIN — LABETALOL HYDROCHLORIDE 10 MG: 5 INJECTION, SOLUTION INTRAVENOUS at 13:59

## 2024-01-01 RX ADMIN — NICARDIPINE HYDROCHLORIDE 5 MG/HR: 25 INJECTION, SOLUTION INTRAVENOUS at 13:47

## 2024-01-01 RX ADMIN — SODIUM CHLORIDE 1000 ML: 0.9 INJECTION, SOLUTION INTRAVENOUS at 21:59

## 2024-01-01 RX ADMIN — NOREPINEPHRINE BITARTRATE 19 MCG/MIN: 1 INJECTION, SOLUTION, CONCENTRATE INTRAVENOUS at 09:50

## 2024-01-01 RX ADMIN — CHLORHEXIDINE GLUCONATE 0.12% ORAL RINSE 15 ML: 1.2 LIQUID ORAL at 20:25

## 2024-01-01 RX ADMIN — NICARDIPINE HYDROCHLORIDE 10 MG/HR: 25 INJECTION, SOLUTION INTRAVENOUS at 17:11

## 2024-01-01 RX ADMIN — NOREPINEPHRINE BITARTRATE 4 MG: 1 INJECTION INTRAVENOUS at 22:13

## 2024-01-01 RX ADMIN — NOREPINEPHRINE BITARTRATE 4 MCG/MIN: 1 INJECTION, SOLUTION, CONCENTRATE INTRAVENOUS at 22:00

## 2024-01-01 RX ADMIN — NOREPINEPHRINE BITARTRATE 22 MCG/MIN: 1 INJECTION, SOLUTION, CONCENTRATE INTRAVENOUS at 12:49

## 2024-01-01 RX ADMIN — SODIUM CHLORIDE, SODIUM GLUCONATE, SODIUM ACETATE, POTASSIUM CHLORIDE, MAGNESIUM CHLORIDE, SODIUM PHOSPHATE, DIBASIC, AND POTASSIUM PHOSPHATE 75 ML/HR: .53; .5; .37; .037; .03; .012; .00082 INJECTION, SOLUTION INTRAVENOUS at 03:11

## 2024-01-01 RX ADMIN — CHLORHEXIDINE GLUCONATE 0.12% ORAL RINSE 15 ML: 1.2 LIQUID ORAL at 16:50

## 2024-01-01 RX ADMIN — MAGNESIUM SULFATE HEPTAHYDRATE 2 G: 40 INJECTION, SOLUTION INTRAVENOUS at 03:09

## 2024-01-01 RX ADMIN — FENTANYL CITRATE 25 MCG: 50 INJECTION INTRAMUSCULAR; INTRAVENOUS at 15:37

## 2024-01-09 PROBLEM — Z95.5 S/P RIGHT CORONARY ARTERY (RCA) STENT PLACEMENT: Status: ACTIVE | Noted: 2024-01-09

## 2024-01-09 NOTE — PROGRESS NOTES
Cardiology Follow Up    Laya Brooks  1945  [id number]  [facility] [address] [id number] [phone]    1. Coronary artery disease involving native coronary artery of native heart without angina pectoris  POCT ECG      2. S/P right coronary artery (RCA) stent placement        3. History of CVA (cerebrovascular accident)        4. Primary hypertension  amLODIPine (NORVASC) 5 mg tablet      5. Dyslipidemia        6. Tobacco use  Complete PFT with post bronchodilator      7. Wheezing  Complete PFT with post bronchodilator      8. Dyspnea on exertion  Complete PFT with post bronchodilator      9. Celiac artery stenosis (HCC)        10. Stage 3a chronic kidney disease (HCC)            Discussion/Summary:  Fatigue, dyspnea:  Patient has undergone extensive evaluation.  Recent Holter monitor was notable for frequent PACs-however, these extended throughout the entire day even during the evening hours which is the period of the day patient states she feels the best.  Exercise stress test did not show any chronotropic incompetence.  She does have a longstanding smoking history and has a daily cough consistent with COPD/emphysema.  I have ordered PFTs for further evaluation.  She is due for labs as ordered by her nephrologist.  A vitamin D level will be checked in that panel.  Of note: She has not seen her family doctor in approximately 2 years.  Discussed the importance of regular PCP follow-up at her age.    Coronary artery disease:  History of STEMI in 2008 status post stenting to the RCA.  Most recent pharmacologic nuclear stress test was negative for infarct/ischemia in April 2022.  On aspirin and statin.    Hypertension:  Blood pressure significantly elevated in the office today.   amlodipine was discontinued in the past by her nephrologist.  She does not report any change in symptomatology off  of the medication.  Thus I will resume it.  I have asked her to try to check her blood pressure daily at home.  We are unsure of her cuff accuracy.  She will return to the office in 1 to 2 weeks for a blood pressure check.  A low-sodium diet was discussed with the patient.  She will continue to follow with her nephrologist for her CKD/atrophic kidney which has been stable.    Dyslipidemia:  Most recent LDL 61, continue statin    Tobacco use:  Smoking 4-5 cigarettes daily, complete cessation encouraged.    She will return to the office in 4 months with Dr. Rivas or sooner if necessary.  She will call the office with any concerns in the interim.    Interval History:   Laya Brooks is a 78 y.o. female with coronary artery disease - prior STEMI in 2008 s/p RCA stenting, prior CVA, hypertension, dyslipidemia who presents to the office today for routine follow up.    After her last office visit she did undergo a Holter monitor which showed high burden PAC's but otherwise was unremarkable. She also had a stress test which did not suggest any chronotropic incompentence.     She reports feeling the same since her last office visit.  She continues to have extreme fatigue/weakness especially during the morning hours.  During the evening, especially after 6 PM, she states she feels great.  She feels extremely weak in the morning even prior to taking her a.m. medications.  She does have ongoing dyspnea on exertion.  She reports some morning cough productive of thin/clear mucus.  This has been ongoing for years.  She reportedly quit smoking after her MI in 2008 but states she has been smoking about 4 cigarettes daily still since then.  She denies any chest pain/pressure/discomfort.  Denies lower extremity edema.  Denies any falls or syncope.  She denies palpitations.    Medical Problems       Problem List       History of CVA (cerebrovascular accident)    Hypertensive emergency    Malnutrition (HCC)    Malnutrition Findings:                                  BMI Findings:           Body mass index is 17.97 kg/m².         Premature atrial complexes    Thrombocytopenia (HCC)    Leukopenia    Neutropenia (HCC)    Hyperphosphatemia    Hypercholesterolemia    Carotid artery stenosis    Nocturnal hypoxia    PINZON (dyspnea on exertion)    Abnormal echocardiogram    CVA (cerebral vascular accident) (HCC)    Recurrent urinary tract infection    Atrophy of right kidney    Stage 3a chronic kidney disease (HCC)    Lab Results   Component Value Date    EGFR 54 08/01/2023    EGFR 61 02/01/2023    EGFR 59 11/14/2022    CREATININE 0.98 08/01/2023    CREATININE 0.89 02/01/2023    CREATININE 0.92 11/14/2022         Renal vascular disease    Hypertension    Intracranial atherosclerosis    Weight loss, non-intentional    Hypervitaminosis D    CAD (coronary artery disease)    Celiac artery stenosis (HCC)        Past Medical History:   Diagnosis Date    CAD (coronary artery disease)     Cardiac disease     Hypercholesteremia     Hyperlipidemia     Hypertension     Hypertension     Renal disorder      Social History     Socioeconomic History    Marital status: /Civil Union     Spouse name: Not on file    Number of children: Not on file    Years of education: Not on file    Highest education level: Not on file   Occupational History    Not on file   Tobacco Use    Smoking status: Former    Smokeless tobacco: Never   Vaping Use    Vaping status: Never Used   Substance and Sexual Activity    Alcohol use: Never    Drug use: No    Sexual activity: Not on file   Other Topics Concern    Not on file   Social History Narrative    Not on file     Social Determinants of Health     Financial Resource Strain: Not on file   Food Insecurity: No Food Insecurity (3/8/2022)    Hunger Vital Sign     Worried About Running Out of Food in the Last Year: Never true     Ran Out of Food in the Last Year: Never true   Transportation Needs: No Transportation Needs (3/8/2022)     "PRAPARE - Transportation     Lack of Transportation (Medical): No     Lack of Transportation (Non-Medical): No   Physical Activity: Not on file   Stress: Not on file   Social Connections: Not on file   Intimate Partner Violence: Not on file   Housing Stability: Low Risk  (3/8/2022)    Housing Stability Vital Sign     Unable to Pay for Housing in the Last Year: No     Number of Places Lived in the Last Year: 1     Unstable Housing in the Last Year: No      History reviewed. No pertinent family history.  Past Surgical History:   Procedure Laterality Date    CORONARY ANGIOPLASTY WITH STENT PLACEMENT      WRIST SURGERY         Current Outpatient Medications:     amLODIPine (NORVASC) 5 mg tablet, Take 1 tablet (5 mg total) by mouth daily, Disp: 90 tablet, Rfl: 3    atorvastatin (LIPITOR) 40 mg tablet, TAKE 1 TABLET BY MOUTH ONCE DAILY IN THE EVENING. TAKE IN PLACE OF SIMVASTATIN, Disp: 90 tablet, Rfl: 3    clopidogrel (PLAVIX) 75 mg tablet, Take 1 tablet (75 mg total) by mouth daily To prevent stroke and heart attack, Disp: 90 tablet, Rfl: 3    lisinopril (ZESTRIL) 10 mg tablet, Take 1 tablet (10 mg total) by mouth daily, Disp: 90 tablet, Rfl: 3  Allergies   Allergen Reactions    Influenza Virus Vaccine        Labs:     Chemistry        Component Value Date/Time    K 3.7 08/01/2023 0938     (H) 08/01/2023 0938    CO2 24 08/01/2023 0938    BUN 10 08/01/2023 0938    CREATININE 0.98 08/01/2023 0938        Component Value Date/Time    CALCIUM 9.3 08/01/2023 0938    ALKPHOS 115 08/01/2023 0938    AST 15 08/01/2023 0938    ALT 16 08/01/2023 0938            No results found for: \"CHOL\"  Lab Results   Component Value Date    HDL 40 (L) 06/20/2022    HDL 45 (L) 03/08/2022    HDL 50 12/10/2021     Lab Results   Component Value Date    LDLCALC 61 06/20/2022    LDLCALC 144 (H) 03/08/2022    LDLCALC 71 12/10/2021     Lab Results   Component Value Date    TRIG 113 06/20/2022    TRIG 109 03/08/2022    TRIG 111 12/10/2021     No " "results found for: \"CHOLHDL\"    Imaging: No results found.    ECG: sinus rhythm with PAC's  LVH  Nonspecific T wave abnormality     Review of Systems   Constitutional: Positive for malaise/fatigue.   Cardiovascular:  Positive for dyspnea on exertion.   All other systems reviewed and are negative.      Vitals:    01/10/24 1417   BP: 170/90   Pulse: 81     Vitals:    01/10/24 1417   Weight: 41.7 kg (92 lb)     Height: 5' (152.4 cm)   Body mass index is 17.97 kg/m².    Physical Exam:  Physical Exam  Vitals reviewed.   Constitutional:       General: She is not in acute distress.     Appearance: She is underweight. She is not diaphoretic.   HENT:      Head: Normocephalic and atraumatic.   Eyes:      Pupils: Pupils are equal, round, and reactive to light.   Neck:      Vascular: No carotid bruit.   Cardiovascular:      Rate and Rhythm: Normal rate and regular rhythm.      Pulses:           Radial pulses are 2+ on the right side and 2+ on the left side.      Heart sounds: S1 normal and S2 normal. No murmur heard.  Pulmonary:      Effort: Pulmonary effort is normal. No respiratory distress.      Breath sounds: Normal breath sounds. No wheezing or rales.   Abdominal:      General: There is no distension.      Palpations: Abdomen is soft.      Tenderness: There is no abdominal tenderness.   Musculoskeletal:         General: No deformity. Normal range of motion.      Cervical back: Normal range of motion.      Right lower leg: No edema.      Left lower leg: No edema.   Skin:     General: Skin is warm and dry.      Findings: No erythema.   Neurological:      General: No focal deficit present.      Mental Status: She is alert and oriented to person, place, and time.   Psychiatric:         Mood and Affect: Mood normal.         Behavior: Behavior normal.         "

## 2024-01-10 ENCOUNTER — OFFICE VISIT (OUTPATIENT)
Dept: CARDIOLOGY CLINIC | Facility: HOSPITAL | Age: 79
End: 2024-01-10
Payer: MEDICARE

## 2024-01-10 VITALS
WEIGHT: 92 LBS | HEART RATE: 81 BPM | HEIGHT: 60 IN | DIASTOLIC BLOOD PRESSURE: 90 MMHG | BODY MASS INDEX: 18.06 KG/M2 | SYSTOLIC BLOOD PRESSURE: 170 MMHG

## 2024-01-10 DIAGNOSIS — E78.5 DYSLIPIDEMIA: ICD-10-CM

## 2024-01-10 DIAGNOSIS — Z72.0 TOBACCO USE: ICD-10-CM

## 2024-01-10 DIAGNOSIS — I25.10 CORONARY ARTERY DISEASE INVOLVING NATIVE CORONARY ARTERY OF NATIVE HEART WITHOUT ANGINA PECTORIS: Primary | ICD-10-CM

## 2024-01-10 DIAGNOSIS — R06.09 DYSPNEA ON EXERTION: ICD-10-CM

## 2024-01-10 DIAGNOSIS — N18.31 STAGE 3A CHRONIC KIDNEY DISEASE (HCC): ICD-10-CM

## 2024-01-10 DIAGNOSIS — I77.1 CELIAC ARTERY STENOSIS (HCC): ICD-10-CM

## 2024-01-10 DIAGNOSIS — Z95.5 S/P RIGHT CORONARY ARTERY (RCA) STENT PLACEMENT: ICD-10-CM

## 2024-01-10 DIAGNOSIS — I10 PRIMARY HYPERTENSION: ICD-10-CM

## 2024-01-10 DIAGNOSIS — Z86.73 HISTORY OF CVA (CEREBROVASCULAR ACCIDENT): ICD-10-CM

## 2024-01-10 DIAGNOSIS — R06.2 WHEEZING: ICD-10-CM

## 2024-01-10 PROCEDURE — 99214 OFFICE O/P EST MOD 30 MIN: CPT | Performed by: PHYSICIAN ASSISTANT

## 2024-01-10 PROCEDURE — 93000 ELECTROCARDIOGRAM COMPLETE: CPT | Performed by: PHYSICIAN ASSISTANT

## 2024-01-10 RX ORDER — AMLODIPINE BESYLATE 5 MG/1
5 TABLET ORAL DAILY
Qty: 90 TABLET | Refills: 3 | Status: SHIPPED | OUTPATIENT
Start: 2024-01-10

## 2024-01-10 NOTE — PATIENT INSTRUCTIONS
You will start a blood pressure medication called amlodipine (norvasc) 5 mg. You will take one tablet every morning. Continue your other medications.  2.   Start checking your blood pressure once daily. The best time to check your blood pressure is at least one hour after you take your blood pressure medication. Keep a log of your blood pressures. Return in 1-2 weeks for a blood pressure check.  3.   Try to cut back on any salt/sodium intake.   4.   I have ordered lung function tests for you (PFT's) to check for asthma or COPD.

## 2024-01-19 ENCOUNTER — CLINICAL SUPPORT (OUTPATIENT)
Dept: CARDIOLOGY CLINIC | Facility: HOSPITAL | Age: 79
End: 2024-01-19
Payer: MEDICARE

## 2024-01-19 VITALS
HEART RATE: 76 BPM | DIASTOLIC BLOOD PRESSURE: 82 MMHG | WEIGHT: 92 LBS | SYSTOLIC BLOOD PRESSURE: 140 MMHG | HEIGHT: 60 IN | BODY MASS INDEX: 18.06 KG/M2

## 2024-01-19 DIAGNOSIS — I10 HYPERTENSION, UNSPECIFIED TYPE: Primary | ICD-10-CM

## 2024-01-19 PROCEDURE — 99211 OFF/OP EST MAY X REQ PHY/QHP: CPT | Performed by: PHYSICIAN ASSISTANT

## 2024-01-19 NOTE — PROGRESS NOTES
Laya is here today under the direction of Ailyn Aranda. At her last visit on 1/10 her BP was elevated. Tia started her on amlodipine 5mg daily (she takes it in the am). Francia is also taking lisinopril 10mg daily which she also takes in the am. She states she feels better since being on the medication. She does check her BP at home with a wrist cuff but she states it is inaccurate. She did not bring the cuff with her today. Today in office her BP was 140/82  and her HR was 76

## 2024-01-29 ENCOUNTER — APPOINTMENT (OUTPATIENT)
Dept: RADIOLOGY | Facility: MEDICAL CENTER | Age: 79
End: 2024-01-29
Payer: MEDICARE

## 2024-01-29 ENCOUNTER — APPOINTMENT (OUTPATIENT)
Dept: LAB | Facility: MEDICAL CENTER | Age: 79
End: 2024-01-29
Payer: MEDICARE

## 2024-01-29 ENCOUNTER — TRANSCRIBE ORDERS (OUTPATIENT)
Dept: LAB | Facility: MEDICAL CENTER | Age: 79
End: 2024-01-29

## 2024-01-29 DIAGNOSIS — R05.9 COUGH, UNSPECIFIED TYPE: ICD-10-CM

## 2024-01-29 DIAGNOSIS — E78.5 HYPERLIPIDEMIA, UNSPECIFIED HYPERLIPIDEMIA TYPE: ICD-10-CM

## 2024-01-29 DIAGNOSIS — E55.9 AVITAMINOSIS D: ICD-10-CM

## 2024-01-29 DIAGNOSIS — N18.31 STAGE 3A CHRONIC KIDNEY DISEASE (HCC): ICD-10-CM

## 2024-01-29 DIAGNOSIS — R05.9 COUGH, UNSPECIFIED TYPE: Primary | ICD-10-CM

## 2024-01-29 LAB
25(OH)D3 SERPL-MCNC: 21.1 NG/ML (ref 30–100)
ALBUMIN SERPL BCP-MCNC: 3.9 G/DL (ref 3.5–5)
ALP SERPL-CCNC: 81 U/L (ref 34–104)
ALT SERPL W P-5'-P-CCNC: 7 U/L (ref 7–52)
ANION GAP SERPL CALCULATED.3IONS-SCNC: 11 MMOL/L
AST SERPL W P-5'-P-CCNC: 13 U/L (ref 13–39)
BACTERIA UR QL AUTO: ABNORMAL /HPF
BASOPHILS # BLD AUTO: 0.02 THOUSANDS/ÂΜL (ref 0–0.1)
BASOPHILS NFR BLD AUTO: 0 % (ref 0–1)
BILIRUB SERPL-MCNC: 0.89 MG/DL (ref 0.2–1)
BILIRUB UR QL STRIP: NEGATIVE
BNP SERPL-MCNC: 105 PG/ML (ref 0–100)
BUN SERPL-MCNC: 11 MG/DL (ref 5–25)
CALCIUM SERPL-MCNC: 8.6 MG/DL (ref 8.4–10.2)
CHLORIDE SERPL-SCNC: 101 MMOL/L (ref 96–108)
CHOLEST SERPL-MCNC: 123 MG/DL
CLARITY UR: CLEAR
CO2 SERPL-SCNC: 24 MMOL/L (ref 21–32)
COLOR UR: YELLOW
CREAT SERPL-MCNC: 0.96 MG/DL (ref 0.6–1.3)
CREAT UR-MCNC: 183 MG/DL
EOSINOPHIL # BLD AUTO: 0.02 THOUSAND/ÂΜL (ref 0–0.61)
EOSINOPHIL NFR BLD AUTO: 0 % (ref 0–6)
ERYTHROCYTE [DISTWIDTH] IN BLOOD BY AUTOMATED COUNT: 12.9 % (ref 11.6–15.1)
GFR SERPL CREATININE-BSD FRML MDRD: 55 ML/MIN/1.73SQ M
GLUCOSE P FAST SERPL-MCNC: 155 MG/DL (ref 65–99)
GLUCOSE UR STRIP-MCNC: NEGATIVE MG/DL
HCT VFR BLD AUTO: 40.4 % (ref 34.8–46.1)
HDLC SERPL-MCNC: 43 MG/DL
HGB BLD-MCNC: 13.6 G/DL (ref 11.5–15.4)
HGB UR QL STRIP.AUTO: NEGATIVE
HYALINE CASTS #/AREA URNS LPF: ABNORMAL /LPF
IMM GRANULOCYTES # BLD AUTO: 0.04 THOUSAND/UL (ref 0–0.2)
IMM GRANULOCYTES NFR BLD AUTO: 1 % (ref 0–2)
KETONES UR STRIP-MCNC: NEGATIVE MG/DL
LDLC SERPL CALC-MCNC: 62 MG/DL (ref 0–100)
LEUKOCYTE ESTERASE UR QL STRIP: ABNORMAL
LYMPHOCYTES # BLD AUTO: 0.98 THOUSANDS/ÂΜL (ref 0.6–4.47)
LYMPHOCYTES NFR BLD AUTO: 17 % (ref 14–44)
MAGNESIUM SERPL-MCNC: 1.9 MG/DL (ref 1.9–2.7)
MCH RBC QN AUTO: 29.6 PG (ref 26.8–34.3)
MCHC RBC AUTO-ENTMCNC: 33.7 G/DL (ref 31.4–37.4)
MCV RBC AUTO: 88 FL (ref 82–98)
MICROALBUMIN UR-MCNC: 183.7 MG/L
MICROALBUMIN/CREAT 24H UR: 100 MG/G CREATININE (ref 0–30)
MONOCYTES # BLD AUTO: 0.52 THOUSAND/ÂΜL (ref 0.17–1.22)
MONOCYTES NFR BLD AUTO: 9 % (ref 4–12)
MUCOUS THREADS UR QL AUTO: ABNORMAL
NEUTROPHILS # BLD AUTO: 4.27 THOUSANDS/ÂΜL (ref 1.85–7.62)
NEUTS SEG NFR BLD AUTO: 73 % (ref 43–75)
NITRITE UR QL STRIP: NEGATIVE
NON-SQ EPI CELLS URNS QL MICRO: ABNORMAL /HPF
NONHDLC SERPL-MCNC: 80 MG/DL
NRBC BLD AUTO-RTO: 0 /100 WBCS
PH UR STRIP.AUTO: 5.5 [PH]
PHOSPHATE SERPL-MCNC: 3.5 MG/DL (ref 2.3–4.1)
PLATELET # BLD AUTO: 118 THOUSANDS/UL (ref 149–390)
PMV BLD AUTO: 10.9 FL (ref 8.9–12.7)
POTASSIUM SERPL-SCNC: 3.3 MMOL/L (ref 3.5–5.3)
PROT SERPL-MCNC: 6.5 G/DL (ref 6.4–8.4)
PROT UR STRIP-MCNC: ABNORMAL MG/DL
PTH-INTACT SERPL-MCNC: 126.2 PG/ML (ref 12–88)
RBC # BLD AUTO: 4.59 MILLION/UL (ref 3.81–5.12)
RBC #/AREA URNS AUTO: ABNORMAL /HPF
SODIUM SERPL-SCNC: 136 MMOL/L (ref 135–147)
SP GR UR STRIP.AUTO: 1.02 (ref 1–1.03)
TRIGL SERPL-MCNC: 90 MG/DL
UROBILINOGEN UR STRIP-ACNC: <2 MG/DL
WBC # BLD AUTO: 5.85 THOUSAND/UL (ref 4.31–10.16)
WBC #/AREA URNS AUTO: ABNORMAL /HPF

## 2024-01-29 PROCEDURE — 85025 COMPLETE CBC W/AUTO DIFF WBC: CPT

## 2024-01-29 PROCEDURE — 80053 COMPREHEN METABOLIC PANEL: CPT

## 2024-01-29 PROCEDURE — 82043 UR ALBUMIN QUANTITATIVE: CPT

## 2024-01-29 PROCEDURE — 81001 URINALYSIS AUTO W/SCOPE: CPT

## 2024-01-29 PROCEDURE — 84100 ASSAY OF PHOSPHORUS: CPT

## 2024-01-29 PROCEDURE — 82570 ASSAY OF URINE CREATININE: CPT

## 2024-01-29 PROCEDURE — 71046 X-RAY EXAM CHEST 2 VIEWS: CPT

## 2024-01-29 PROCEDURE — 36415 COLL VENOUS BLD VENIPUNCTURE: CPT

## 2024-01-29 PROCEDURE — 83970 ASSAY OF PARATHORMONE: CPT

## 2024-01-29 PROCEDURE — 83735 ASSAY OF MAGNESIUM: CPT

## 2024-01-29 PROCEDURE — 82306 VITAMIN D 25 HYDROXY: CPT

## 2024-01-29 PROCEDURE — 83880 ASSAY OF NATRIURETIC PEPTIDE: CPT

## 2024-01-29 PROCEDURE — 80061 LIPID PANEL: CPT

## 2024-01-30 ENCOUNTER — TELEPHONE (OUTPATIENT)
Dept: NEPHROLOGY | Facility: CLINIC | Age: 79
End: 2024-01-30

## 2024-01-30 DIAGNOSIS — E55.9 VITAMIN D DEFICIENCY: Primary | ICD-10-CM

## 2024-01-30 RX ORDER — ERGOCALCIFEROL 1.25 MG/1
50000 CAPSULE ORAL WEEKLY
Qty: 12 CAPSULE | Refills: 1 | Status: SHIPPED | OUTPATIENT
Start: 2024-01-30

## 2024-01-30 NOTE — TELEPHONE ENCOUNTER
----- Message from Damian Lynne DO sent at 1/30/2024  7:37 AM EST -----  I called and left a message for patient to call back. Labs in general are ok, just 2 values to discuss.  Her VitD level is low,  I recommend starting a once weekly high-dose supplement, ergocalciferol, which she will stay on for the next 12 months.  Alternatively, she can take 10,000 units of vitamin D   Monday through Friday for total 50,000 units over the course of the week.    Potassium level is also slightly reduced, we will simply reassess this after her next visit with us in about 4-5 weeks

## 2024-01-30 NOTE — TELEPHONE ENCOUNTER
Patient returned call.  Aware of results, Labs in general are ok, just 2 values to discuss.     Her VitD level is low,  I recommend starting a once weekly high-dose supplement, ergocalciferol, which she will stay on for the next 12 months. Alternatively, she can take 10,000 units of vitamin D   Monday through Friday for total 50,000 units over the course of the week.     Potassium level is also slightly reduced, we will simply reassess this after her next visit with us in about 4-5 weeks.      Patient wishes to take once a week dose of ergocalciferol.  Please send rx to [facility] in [place].

## 2024-02-02 ENCOUNTER — HOSPITAL ENCOUNTER (OUTPATIENT)
Dept: PULMONOLOGY | Facility: HOSPITAL | Age: 79
End: 2024-02-02
Payer: MEDICARE

## 2024-02-02 DIAGNOSIS — R06.09 DYSPNEA ON EXERTION: ICD-10-CM

## 2024-02-02 DIAGNOSIS — Z72.0 TOBACCO USE: ICD-10-CM

## 2024-02-02 DIAGNOSIS — R06.2 WHEEZING: ICD-10-CM

## 2024-02-02 PROCEDURE — 94060 EVALUATION OF WHEEZING: CPT | Performed by: INTERNAL MEDICINE

## 2024-02-02 PROCEDURE — 94729 DIFFUSING CAPACITY: CPT

## 2024-02-02 PROCEDURE — 94726 PLETHYSMOGRAPHY LUNG VOLUMES: CPT | Performed by: INTERNAL MEDICINE

## 2024-02-02 PROCEDURE — 94060 EVALUATION OF WHEEZING: CPT

## 2024-02-02 PROCEDURE — 94726 PLETHYSMOGRAPHY LUNG VOLUMES: CPT

## 2024-02-02 PROCEDURE — 94760 N-INVAS EAR/PLS OXIMETRY 1: CPT

## 2024-02-02 PROCEDURE — 94729 DIFFUSING CAPACITY: CPT | Performed by: INTERNAL MEDICINE

## 2024-02-02 RX ORDER — ALBUTEROL SULFATE 2.5 MG/3ML
2.5 SOLUTION RESPIRATORY (INHALATION) ONCE AS NEEDED
Status: COMPLETED | OUTPATIENT
Start: 2024-02-02 | End: 2024-02-02

## 2024-02-02 RX ADMIN — ALBUTEROL SULFATE 2.5 MG: 2.5 SOLUTION RESPIRATORY (INHALATION) at 15:19

## 2024-02-05 DIAGNOSIS — J44.9 MODERATE COPD (CHRONIC OBSTRUCTIVE PULMONARY DISEASE) (HCC): Primary | ICD-10-CM

## 2024-02-21 PROBLEM — N39.0 RECURRENT URINARY TRACT INFECTION: Status: RESOLVED | Noted: 2022-03-23 | Resolved: 2024-02-21

## 2024-02-22 ENCOUNTER — CONSULT (OUTPATIENT)
Dept: PULMONOLOGY | Facility: CLINIC | Age: 79
End: 2024-02-22
Payer: MEDICARE

## 2024-02-22 VITALS
OXYGEN SATURATION: 94 % | WEIGHT: 90 LBS | BODY MASS INDEX: 17.67 KG/M2 | HEIGHT: 60 IN | DIASTOLIC BLOOD PRESSURE: 75 MMHG | HEART RATE: 79 BPM | TEMPERATURE: 97.9 F | SYSTOLIC BLOOD PRESSURE: 185 MMHG

## 2024-02-22 DIAGNOSIS — J44.9 MODERATE COPD (CHRONIC OBSTRUCTIVE PULMONARY DISEASE) (HCC): Primary | ICD-10-CM

## 2024-02-22 DIAGNOSIS — I10 PRIMARY HYPERTENSION: ICD-10-CM

## 2024-02-22 DIAGNOSIS — F17.200 TOBACCO USE DISORDER: ICD-10-CM

## 2024-02-22 DIAGNOSIS — R91.8 PULMONARY INFILTRATE: ICD-10-CM

## 2024-02-22 PROCEDURE — 99205 OFFICE O/P NEW HI 60 MIN: CPT

## 2024-02-22 PROCEDURE — 94664 DEMO&/EVAL PT USE INHALER: CPT

## 2024-02-22 RX ORDER — TIOTROPIUM BROMIDE AND OLODATEROL 3.124; 2.736 UG/1; UG/1
2 SPRAY, METERED RESPIRATORY (INHALATION) DAILY
Qty: 4 G | Refills: 2 | Status: SHIPPED | OUTPATIENT
Start: 2024-02-22

## 2024-02-22 RX ORDER — ALBUTEROL SULFATE 90 UG/1
2 AEROSOL, METERED RESPIRATORY (INHALATION) EVERY 6 HOURS PRN
COMMUNITY

## 2024-02-22 NOTE — Clinical Note
Helrisa, I saw Ema Brooks in the pulmonary office today.  Her blood pressure was elevated despite her denying missed doses or dietary indiscretion.  She was asymptomatic as well, thanks.

## 2024-02-22 NOTE — PATIENT INSTRUCTIONS
- Sample of stiolto to start taking 2 puffs once daily  - Prescription for stiolto sent to your pharmacy  - Continue taking albuterol as needed  - Chest x-ray  - Follow up in 10 weeks

## 2024-02-22 NOTE — PROGRESS NOTES
Consultation - Pulmonary Medicine   Laya Brooks 79 y.o. female MRN: [id number]    Physician Requesting Consult: Ailyn Aranda PA-C  Reason for Consult: COPD  Laya Brooks is a 79 y.o. female with PMHx of COPD, HTN, CAD, CVA, CKD 3 , HLD and tobacco use disorder who presents for pulmonary evaluation.    Moderate COPD (Chronic Obstructive Pulmonary Disease)  Pulmonary Infiltrate  - The patient has a significant smoking history, and most recently sent for PFTs due to dyspnea and wheezing.  PFTs showed moderate obstructive deficit, moderate DLCO reduction and evidence of hyperinflation and air trapping on lung volumes without a significant bronchodilator response.  This is a new diagnosis for the patient, she has never been on controller inhaler therapy before.  - Will start the patient on LAMA/LABA inhaler, provided with a Stiolto sample in the office today and a prescription was sent to her pharmacy.  We spent 10 minutes reviewing proper inhaler technique on a demo inhaler, and then the patient took her first dose on the sample.  She initially had some difficulty with the Respimat mechanism; however, she was able to do the last puff all on her own and reported feeling benefit.  - Continue with albuterol HFA as needed, refill prescribed  - Given question of LLL infiltrate on CXR from 1/29/2024 we will plan to repeat to assure no development of pneumonia.  She was treated with azithromycin, and does not note any infectious symptoms at this time.  - We discussed the importance of daily activity and maintaining exercise tolerance.  - Could consider referral to pulmonary rehab in the future.  - Follow-up in the office in 8 weeks.  -     Ambulatory referral to Pulmonology  -     tiotropium-olodaterol (Stiolto Respimat) 2.5-2.5 MCG/ACT inhaler; Inhale 2 puffs daily  -     XR chest pa & lateral; Future  -     albuterol (PROVENTIL HFA,VENTOLIN HFA) 90 mcg/act inhaler; Inhale 2 puffs every 6 (six) hours as needed for  wheezing    Essential Hypertension  - The patient's blood pressure was elevated to the 180s systolic during today's visit, repeat blood pressure was still at this level.  She denies missing medications for her blood pressure as well as symptoms.  Asked her to reach out to her cardiologist about her hypertension.  Will also send chart message.    Tobacco Use Disorder  - The patient is currently smoking 2 to 3 cigarettes a day down from 1 PPD max, and has a 56-year pack history.  - She is above the age limit for LDCT.    Thank you for allowing me to participate in the care of your patient.  If there are any questions regarding evaluation please feel free to reach out.     Return in about 8 weeks (around 4/18/2024).  ______________________________________________________________________    HPI:    Laya Brooks is a 79 y.o. female with PMHx of COPD, HTN, CAD, CVA, CKD 3 , HLD and tobacco use disorder who presents for pulmonary evaluation.  The patient was referred by her cardiologist due to PFTs which showed moderate obstruction with moderate DLCO reduction and evidence of hyperinflation and air trapping.  The patient reports she used to smoke ~1 PPD until 10 years ago when she dropped to a few cigarettes a day.  Currently she is smoking 2 to 3 cigarettes a day, and reports she is not interested in quitting at this time.  She has had dyspnea on exertion for few years, but notes she mostly ignored it.  More recently on 1/29/2024 she had a chest x-ray due to increased cough which questioned an early LLL infiltrate, was given azithromycin, Tessalon Perles and albuterol inhaler by her PCP on 2/1/2024.  She reports she finished the azithromycin, and the albuterol does not give her much relief with her dyspnea.  She currently feels at baseline with her breathing, but notes she can only walk 1-2 blocks or less than 1 flight of steps for needing to stop due to dyspnea.  She will note wheezing with activity at times, and has  an intermittent cough.  The cough is improved from end of January, but she still occasionally brings up clear sputum.  She reports overall she feels generally weaker these days than she has in the past, but denies jovanny chest pain, palpitations, pedal edema, hemoptysis and night sweats.  She does note she forgets to eat at times, and has lost some weight unintentionally.  She denies any hospitalizations or intubations for respiratory purposes, reports she was well from a respiratory standpoint as a child.    She denies any prior history of asthma or seasonal allergies, and states she is never been on a controller inhaler in the past.  She also denies snoring, witnessed apneas and states she usually sleeps through the night, but does have daytime sleepiness with napping throughout the day for up to 2 hours.  Her serum bicarb over the last year has been 24-25.    Her blood pressure in the office is elevated today, she denies headache, lightheadedness, dizziness and syncope in addition to the negative ROS as above.  She does states she took her amlodipine today, denies any dietary indiscretion.    Tobacco/Vaping: started ~11yo, max was 1PPD, currently smoking 2-3 cigarettes a day, she is not ready to quit completely at this time.  Work Hx: worked for [employer] then [employer]  Exposure Hx: none  Pets: none  Reflux Symptoms: not currently, has had in the past though  Travel Hx: has been to over 35 countries in the past, last was 3 years ago ([place]/multiple places in [place])  Family Pulmonary Hx: father had coal worker's pneumoconiosis    Review of Systems:  Review of Systems  10-point system review completed, all of which are negative except as mentioned above.    Current Medications:    Current Outpatient Medications:     albuterol (PROVENTIL HFA,VENTOLIN HFA) 90 mcg/act inhaler, Inhale 2 puffs every 6 (six) hours as needed for wheezing, Disp: , Rfl:     amLODIPine (NORVASC) 5 mg tablet, Take 1 tablet (5  mg total) by mouth daily, Disp: 90 tablet, Rfl: 3    atorvastatin (LIPITOR) 40 mg tablet, TAKE 1 TABLET BY MOUTH ONCE DAILY IN THE EVENING. TAKE IN PLACE OF SIMVASTATIN, Disp: 90 tablet, Rfl: 3    clopidogrel (PLAVIX) 75 mg tablet, Take 1 tablet (75 mg total) by mouth daily To prevent stroke and heart attack, Disp: 90 tablet, Rfl: 3    ergocalciferol (ERGOCALCIFEROL) 1.25 MG (21083 UT) capsule, Take 1 capsule (50,000 Units total) by mouth once a week, Disp: 12 capsule, Rfl: 1    lisinopril (ZESTRIL) 10 mg tablet, Take 1 tablet (10 mg total) by mouth daily, Disp: 90 tablet, Rfl: 3    tiotropium-olodaterol (Stiolto Respimat) 2.5-2.5 MCG/ACT inhaler, Inhale 2 puffs daily, Disp: 4 g, Rfl: 2    Historical Information   Past Medical History:   Diagnosis Date    CAD (coronary artery disease)     Cardiac disease     Hypercholesteremia     Hyperlipidemia     Hypertension     Hypertension     Renal disorder      Past Surgical History:   Procedure Laterality Date    CORONARY ANGIOPLASTY WITH STENT PLACEMENT      WRIST SURGERY     Social History   Social History     Tobacco Use   Smoking Status Former   Smokeless Tobacco Never     Family History:   History reviewed. No pertinent family history.    PhysicalExamination:  Vitals:   BP (!) 185/75   Pulse 79   Temp 97.9 °F (36.6 °C) (Tympanic)   Ht 5' (1.524 m)   Wt 40.8 kg (90 lb)   SpO2 94%   BMI 17.58 kg/m²   Body mass index is 17.58 kg/m².    Constitutional: NAD, frail appearing, no conversational dyspnea   Skin: Warm, dry, no rashes noted   Eyes: PERRL, normal conjunctiva  ENT: Nasal congestion absent, moist mucus membranes.  Neck: No JVD, trachea is midline, no adenopathy.  Resp: Distant breath sounds B/L, no W/R/R   Cardiac: RRR, +S1/S2, no M/R/G  Abdomen: Soft, NT/ND  Extremities: No digital clubbing or pedal edema  Neuro: AAOx3    Diagnostic Data:  Labs:  I personally reviewed the most recent laboratory data pertinent to today's visit    Lab Results   Component Value  "Date    WBC 5.85 01/29/2024    HGB 13.6 01/29/2024    HCT 40.4 01/29/2024    MCV 88 01/29/2024     (L) 01/29/2024     Lab Results   Component Value Date    CALCIUM 8.6 01/29/2024    K 3.3 (L) 01/29/2024    CO2 24 01/29/2024     01/29/2024    BUN 11 01/29/2024    CREATININE 0.96 01/29/2024     No results found for: \"IGE\"  Lab Results   Component Value Date    ALT 7 01/29/2024    AST 13 01/29/2024    ALKPHOS 81 01/29/2024     PFT results:  The most recent pulmonary function tests were reviewed.  PFTs -- 1/10/2024  FEV1/FVC Ratio: 56 %  FEV1: 1.02 L   61 % predicted  Forced Vital Capacity: 1.81 L           83 % predicted  After administration of bronchodilator: No change  Lung volumes: Total Lung Capacity 121 % predicted Residual volume 167 % predicted  DLCO corrected for patients hemoglobin level: 54 % predicted  Flow volume loop: Consistent with obstruction    Imaging:  I personally reviewed the images in PACS pertinent to today's visit:  CXR 2 View -- 1/29/2024  Question very subtle early infiltrate in the left lower lobe, although not well seen on the lateral projection. Recommend follow-up chest radiograph if clinical symptoms persist to evaluate for early pneumonia.  Hyperinflated lungs otherwise, which could indicate emphysema.    CTA PE Study -- 10/20/2017  No intraluminal filling defect to suggest a pulmonary embolus.  Left lower lobe peribronchial thickening and tree-in-bud opacities suspicious for an infectious or inflammatory bronchiolitis.  Follow-up could be performed to assess for complete resolution.  Mild diffuse emphysematous lung changes.  Partially visualized moderately atrophic right kidney.    Other studies:  2D Echo Complete -- 3/8/2022    Left Ventricle: Left ventricular cavity size is normal. Wall thickness is mildly increased. The left ventricular ejection fraction is 60%. Systolic function is normal. Diastolic function is mildly abnormal, consistent with grade I (abnormal) " "relaxation. There is moderate concentric hypertrophy. There is severe hypokinesis of the basal inferior, inferoseptal and basal to mid inferolateral walls.    Right Ventricle: Right ventricular cavity size is normal. Systolic function is normal.    I have spent a total time of 45 minutes on 02/22/24 in caring for this patient including Instructions for management, Patient and family education, Importance of tx compliance, Counseling / Coordination of care, Documenting in the medical record, Reviewing / ordering tests, medicine, procedures  , and Obtaining or reviewing history  .    Lisa Kidd MD  Pulmonary-Critical Care and Sleep Medicine  02/22/24    Portions of the record may have been created with voice recognition software. Occasional wrong word or \"sound a like\" substitutions may have occurred due to the inherent limitations of voice recognition software. Please read the chart carefully and recognize, using context, where substitutions have occurred.  "

## 2024-03-07 ENCOUNTER — APPOINTMENT (OUTPATIENT)
Dept: RADIOLOGY | Facility: MEDICAL CENTER | Age: 79
End: 2024-03-07
Payer: MEDICARE

## 2024-03-07 DIAGNOSIS — J44.9 MODERATE COPD (CHRONIC OBSTRUCTIVE PULMONARY DISEASE) (HCC): ICD-10-CM

## 2024-03-07 PROCEDURE — 71046 X-RAY EXAM CHEST 2 VIEWS: CPT

## 2024-03-11 ENCOUNTER — TELEPHONE (OUTPATIENT)
Dept: NEPHROLOGY | Facility: CLINIC | Age: 79
End: 2024-03-11

## 2024-03-11 DIAGNOSIS — N18.31 STAGE 3A CHRONIC KIDNEY DISEASE (HCC): Primary | ICD-10-CM

## 2024-03-11 NOTE — TELEPHONE ENCOUNTER
I called and spoke with Ema. She is aware to complete labs prior to her upcoming appt on 03/18/2024.

## 2024-03-13 ENCOUNTER — APPOINTMENT (OUTPATIENT)
Dept: LAB | Facility: MEDICAL CENTER | Age: 79
End: 2024-03-13
Payer: MEDICARE

## 2024-03-13 DIAGNOSIS — N18.31 STAGE 3A CHRONIC KIDNEY DISEASE (HCC): ICD-10-CM

## 2024-03-13 LAB
ANION GAP SERPL CALCULATED.3IONS-SCNC: 10 MMOL/L (ref 4–13)
BUN SERPL-MCNC: 14 MG/DL (ref 5–25)
CALCIUM SERPL-MCNC: 8.9 MG/DL (ref 8.4–10.2)
CHLORIDE SERPL-SCNC: 103 MMOL/L (ref 96–108)
CO2 SERPL-SCNC: 22 MMOL/L (ref 21–32)
CREAT SERPL-MCNC: 1.17 MG/DL (ref 0.6–1.3)
GFR SERPL CREATININE-BSD FRML MDRD: 43 ML/MIN/1.73SQ M
GLUCOSE P FAST SERPL-MCNC: 102 MG/DL (ref 65–99)
POTASSIUM SERPL-SCNC: 4 MMOL/L (ref 3.5–5.3)
SODIUM SERPL-SCNC: 135 MMOL/L (ref 135–147)

## 2024-03-13 PROCEDURE — 80048 BASIC METABOLIC PNL TOTAL CA: CPT

## 2024-03-13 PROCEDURE — 36415 COLL VENOUS BLD VENIPUNCTURE: CPT

## 2024-03-18 ENCOUNTER — OFFICE VISIT (OUTPATIENT)
Dept: NEPHROLOGY | Facility: CLINIC | Age: 79
End: 2024-03-18
Payer: MEDICARE

## 2024-03-18 VITALS
OXYGEN SATURATION: 97 % | WEIGHT: 95 LBS | DIASTOLIC BLOOD PRESSURE: 86 MMHG | HEIGHT: 60 IN | SYSTOLIC BLOOD PRESSURE: 138 MMHG | BODY MASS INDEX: 18.65 KG/M2 | HEART RATE: 84 BPM

## 2024-03-18 DIAGNOSIS — N25.81 SECONDARY HYPERPARATHYROIDISM (HCC): ICD-10-CM

## 2024-03-18 DIAGNOSIS — I10 PRIMARY HYPERTENSION: ICD-10-CM

## 2024-03-18 DIAGNOSIS — N18.31 STAGE 3A CHRONIC KIDNEY DISEASE (HCC): Primary | ICD-10-CM

## 2024-03-18 DIAGNOSIS — E55.9 VITAMIN D DEFICIENCY: ICD-10-CM

## 2024-03-18 DIAGNOSIS — N26.1 ATROPHY OF RIGHT KIDNEY: ICD-10-CM

## 2024-03-18 PROCEDURE — 99214 OFFICE O/P EST MOD 30 MIN: CPT | Performed by: NURSE PRACTITIONER

## 2024-03-18 NOTE — PATIENT INSTRUCTIONS
"As nice to see you today. Please drink more fluids throughout the day. Try adding water to your daily intake of fluids. Repeat labs in 4 months   You have swelling in your right foot and ankle. Recommend reducing salty foods throughout the day such as cheese, breads, rice, pasta, lunch meat, take out, etc  Add fresh fruits and vegetables to your diet. \"Farm to table\" is best   "

## 2024-03-18 NOTE — PROGRESS NOTES
"Nephrology   Office Follow-Up  Laya Brooks 79 y.o. female MRN: [id number]    Encounter: [id number]        Assessment & Plan    Laya Brooks was seen in the [place] office today. All diagnoses and orders for visit:     1. Stage 3a chronic kidney disease (HCC)  Baseline creatinine 0.9-1.0 mg/dL. Most recent creatinine 1.17 mg/dL. Etiology of CKD presumed HTN nephrosclerosis, renal senescence, right renal atrophy. 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 Blood pressure is well controlled. She continues to smoke. Enjoys a high sodium diet which will be reduced. Repeat labs in July  -     Comprehensive metabolic panel; Future; Expected date: 07/15/2024  -     CBC and differential; Future; Expected date: 07/15/2024  -     Magnesium; Future; Expected date: 07/15/2024  -     Urinalysis with microscopic; Future; Expected date: 07/15/2024  -     Albumin / creatinine urine ratio; Future; Expected date: 07/15/2024  -     Phosphorus; Future; Expected date: 07/15/2024  2. Primary hypertension  Right renal atrophy with right renal artery occlusion. Blood pressure 138/86 mmHg. No changes made today  3. Atrophy of right kidney  4. Vitamin D deficiency  -     Vitamin D 25 hydroxy; Future; Expected date: 07/15/2024  5. Secondary hyperparathyroidism (HCC)  Check vitamin D and PTH next labs  -     PTH, intact; Future; Expected date: 07/15/2024          HPI: Laya Brooks is a 79 y.o. female who is here for scheduled follow-up     Pertinent problems include CKD 3a, HTN, right renal atrophy, CAD s/p MAYE, CVA, HLD, 56 pack year smoker, moderate COPD, vocal cord polyp removal    Kidney function slightly decreased from typical. Has mild right foot edema. Does enjoy a high sodium diet particularly cheese and breads. Recommend reducing sodium intake. Repeat labs in July      ROS:   Review of Systems   Constitutional:  Negative for chills and fever.        \"Panting at night and nose feels like concrete\"   HENT:  " Negative for ear pain and sore throat.    Eyes:  Negative for pain and visual disturbance.   Respiratory:  Negative for cough and shortness of breath.         PINZON   Cardiovascular:  Positive for leg swelling. Negative for chest pain and palpitations.        Right foot swelling   Gastrointestinal:  Negative for abdominal pain and vomiting.   Genitourinary:  Negative for dysuria and hematuria.   Musculoskeletal:  Negative for arthralgias and back pain.   Skin:  Negative for color change and rash.   Neurological:  Negative for seizures and syncope.   All other systems reviewed and are negative.      Allergies: Influenza virus vaccine    Medications:   Current Outpatient Medications:     albuterol (PROVENTIL HFA,VENTOLIN HFA) 90 mcg/act inhaler, Inhale 2 puffs every 6 (six) hours as needed for wheezing, Disp: , Rfl:     amLODIPine (NORVASC) 5 mg tablet, Take 1 tablet (5 mg total) by mouth daily, Disp: 90 tablet, Rfl: 3    atorvastatin (LIPITOR) 40 mg tablet, TAKE 1 TABLET BY MOUTH ONCE DAILY IN THE EVENING. TAKE IN PLACE OF SIMVASTATIN, Disp: 90 tablet, Rfl: 3    clopidogrel (PLAVIX) 75 mg tablet, Take 1 tablet (75 mg total) by mouth daily To prevent stroke and heart attack, Disp: 90 tablet, Rfl: 3    ergocalciferol (ERGOCALCIFEROL) 1.25 MG (32243 UT) capsule, Take 1 capsule (50,000 Units total) by mouth once a week, Disp: 12 capsule, Rfl: 1    lisinopril (ZESTRIL) 10 mg tablet, Take 1 tablet (10 mg total) by mouth daily, Disp: 90 tablet, Rfl: 3    tiotropium-olodaterol (Stiolto Respimat) 2.5-2.5 MCG/ACT inhaler, Inhale 2 puffs daily, Disp: 4 g, Rfl: 2    Past Medical History:   Diagnosis Date    CAD (coronary artery disease)     Cardiac disease     Hypercholesteremia     Hyperlipidemia     Hypertension     Hypertension     Renal disorder      Past Surgical History:   Procedure Laterality Date    CORONARY ANGIOPLASTY WITH STENT PLACEMENT      WRIST SURGERY       History reviewed. No pertinent family history.   reports  "that she has quit smoking. She has never used smokeless tobacco. She reports that she does not drink alcohol and does not use drugs.      Physical Exam:   Vitals:    03/18/24 1303   BP: 138/86   BP Location: Left arm   Patient Position: Sitting   Pulse: 84   SpO2: 97%   Weight: 43.1 kg (95 lb)   Height: 5' (1.524 m)     Body mass index is 18.55 kg/m².    General: conscious, cooperative, in no acute distress, appears stated age  Eyes: conjunctivae pale, anicteric sclerae  ENT: lips and mucous membranes moist  Neck: supple, no JVD, no masses  Chest:  diminished breath sounds bilaterally, no crackles, ronchus or wheezings  CVS: S1 & S2, normal rate, regular rhythm  Abdomen: soft, non-tender, non-distended, normoactive bowel sounds, rounded  Extremities: right foot swelling  Skin: no rash   Neuro: awake, alert, oriented       Diagnostic Data:  Lab: I have personally reviewed pertinent lab results.,   CBC:       CMP: No results found for: \"SODIUM\", \"K\", \"CL\", \"CO2\", \"ANIONGAP\", \"BUN\", \"CREATININE\", \"GLUCOSE\", \"CALCIUM\", \"AST\", \"ALT\", \"ALKPHOS\", \"PROT\", \"BILITOT\", \"EGFR\",   PT/INR: No results found for: \"PT\", \"INR\",   Magnesium: No components found for: \"MAG\",  Phosphorous: No results found for: \"PHOS\"    Patient Instructions   As nice to see you today. Please drink more fluids throughout the day. Try adding water to your daily intake of fluids. Repeat labs in 4 months   You have swelling in your right foot and ankle. Recommend reducing salty foods throughout the day such as cheese, breads, rice, pasta, lunch meat, take out, etc  Add fresh fruits and vegetables to your diet. \"Farm to table\" is best     Portions of the record may have been created with voice recognition software. Occasional wrong word or \"sound a like\" substitutions may have occurred due to the inherent limitations of voice recognition software. Read the chart carefully and recognize, using context, where substitutions have occurred.    If you have any " questions, please contact the dictating provider.

## 2024-03-23 ENCOUNTER — APPOINTMENT (EMERGENCY)
Dept: RADIOLOGY | Facility: HOSPITAL | Age: 79
DRG: 682 | End: 2024-03-23
Payer: MEDICARE

## 2024-03-23 ENCOUNTER — HOSPITAL ENCOUNTER (INPATIENT)
Facility: HOSPITAL | Age: 79
LOS: 10 days | DRG: 682 | End: 2024-04-02
Attending: EMERGENCY MEDICINE | Admitting: INTERNAL MEDICINE
Payer: MEDICARE

## 2024-03-23 DIAGNOSIS — I10 HYPERTENSION: ICD-10-CM

## 2024-03-23 DIAGNOSIS — I50.9 OTHER CONGESTIVE HEART FAILURE (HCC): ICD-10-CM

## 2024-03-23 DIAGNOSIS — I50.9 CHF (CONGESTIVE HEART FAILURE) (HCC): ICD-10-CM

## 2024-03-23 DIAGNOSIS — N28.89 RENAL VASCULAR DISEASE: ICD-10-CM

## 2024-03-23 DIAGNOSIS — E46 MALNUTRITION, UNSPECIFIED TYPE (HCC): ICD-10-CM

## 2024-03-23 DIAGNOSIS — E87.1 HYPONATREMIA: ICD-10-CM

## 2024-03-23 DIAGNOSIS — D72.810 LYMPHOPENIA: ICD-10-CM

## 2024-03-23 DIAGNOSIS — N17.9 ACUTE RENAL FAILURE SUPERIMPOSED ON CHRONIC KIDNEY DISEASE, UNSPECIFIED ACUTE RENAL FAILURE TYPE, UNSPECIFIED CKD STAGE  (HCC): Primary | ICD-10-CM

## 2024-03-23 DIAGNOSIS — N18.9 ACUTE RENAL FAILURE SUPERIMPOSED ON CHRONIC KIDNEY DISEASE, UNSPECIFIED ACUTE RENAL FAILURE TYPE, UNSPECIFIED CKD STAGE  (HCC): Primary | ICD-10-CM

## 2024-03-23 DIAGNOSIS — J90 PLEURAL EFFUSION ON LEFT: ICD-10-CM

## 2024-03-23 DIAGNOSIS — N17.9 AKI (ACUTE KIDNEY INJURY) (HCC): ICD-10-CM

## 2024-03-23 DIAGNOSIS — R06.02 SHORTNESS OF BREATH: ICD-10-CM

## 2024-03-23 DIAGNOSIS — N18.31 STAGE 3A CHRONIC KIDNEY DISEASE (HCC): ICD-10-CM

## 2024-03-23 DIAGNOSIS — I50.31 ACUTE DIASTOLIC CONGESTIVE HEART FAILURE (HCC): ICD-10-CM

## 2024-03-23 DIAGNOSIS — I70.1 RENAL ARTERY STENOSIS (HCC): ICD-10-CM

## 2024-03-23 LAB
ALBUMIN SERPL BCP-MCNC: 4.5 G/DL (ref 3.5–5)
ALP SERPL-CCNC: 68 U/L (ref 34–104)
ALT SERPL W P-5'-P-CCNC: 19 U/L (ref 7–52)
ANION GAP SERPL CALCULATED.3IONS-SCNC: 15 MMOL/L (ref 4–13)
ANISOCYTOSIS BLD QL SMEAR: PRESENT
APTT PPP: 25 SECONDS (ref 23–37)
AST SERPL W P-5'-P-CCNC: 24 U/L (ref 13–39)
ATRIAL RATE: 92 BPM
BASOPHILS # BLD MANUAL: 0 THOUSAND/UL (ref 0–0.1)
BASOPHILS NFR MAR MANUAL: 0 % (ref 0–1)
BILIRUB SERPL-MCNC: 0.64 MG/DL (ref 0.2–1)
BNP SERPL-MCNC: 1126 PG/ML (ref 0–100)
BUN SERPL-MCNC: 54 MG/DL (ref 5–25)
BURR CELLS BLD QL SMEAR: PRESENT
CALCIUM SERPL-MCNC: 9.2 MG/DL (ref 8.4–10.2)
CARDIAC TROPONIN I PNL SERPL HS: 34 NG/L
CHLORIDE SERPL-SCNC: 96 MMOL/L (ref 96–108)
CO2 SERPL-SCNC: 15 MMOL/L (ref 21–32)
CREAT SERPL-MCNC: 2.86 MG/DL (ref 0.6–1.3)
EOSINOPHIL # BLD MANUAL: 0 THOUSAND/UL (ref 0–0.4)
EOSINOPHIL NFR BLD MANUAL: 0 % (ref 0–6)
ERYTHROCYTE [DISTWIDTH] IN BLOOD BY AUTOMATED COUNT: 14.2 % (ref 11.6–15.1)
FLUAV RNA RESP QL NAA+PROBE: NEGATIVE
FLUBV RNA RESP QL NAA+PROBE: NEGATIVE
GFR SERPL CREATININE-BSD FRML MDRD: 14 ML/MIN/1.73SQ M
GLUCOSE SERPL-MCNC: 179 MG/DL (ref 65–140)
HCT VFR BLD AUTO: 34 % (ref 34.8–46.1)
HGB BLD-MCNC: 11.5 G/DL (ref 11.5–15.4)
INR PPP: 1.04 (ref 0.84–1.19)
LACTATE SERPL-SCNC: 1.5 MMOL/L (ref 0.5–2)
LG PLATELETS BLD QL SMEAR: PRESENT
LYMPHOCYTES # BLD AUTO: 0.24 THOUSAND/UL (ref 0.6–4.47)
LYMPHOCYTES # BLD AUTO: 5 % (ref 14–44)
MCH RBC QN AUTO: 29 PG (ref 26.8–34.3)
MCHC RBC AUTO-ENTMCNC: 33.8 G/DL (ref 31.4–37.4)
MCV RBC AUTO: 86 FL (ref 82–98)
MONOCYTES # BLD AUTO: 0.05 THOUSAND/UL (ref 0–1.22)
MONOCYTES NFR BLD: 1 % (ref 4–12)
NEUTROPHILS # BLD MANUAL: 4.49 THOUSAND/UL (ref 1.85–7.62)
NEUTS HYPERSEG BLD QL SMEAR: PRESENT
NEUTS SEG NFR BLD AUTO: 94 % (ref 43–75)
P AXIS: 82 DEGREES
PLATELET # BLD AUTO: 196 THOUSANDS/UL (ref 149–390)
PLATELET BLD QL SMEAR: ADEQUATE
PMV BLD AUTO: 10.5 FL (ref 8.9–12.7)
POTASSIUM SERPL-SCNC: 5.2 MMOL/L (ref 3.5–5.3)
PR INTERVAL: 84 MS
PROCALCITONIN SERPL-MCNC: 0.12 NG/ML
PROT SERPL-MCNC: 6.7 G/DL (ref 6.4–8.4)
PROTHROMBIN TIME: 13.5 SECONDS (ref 11.6–14.5)
QRS AXIS: 85 DEGREES
QRSD INTERVAL: 80 MS
QT INTERVAL: 370 MS
QTC INTERVAL: 457 MS
RBC # BLD AUTO: 3.97 MILLION/UL (ref 3.81–5.12)
RBC MORPH BLD: PRESENT
RSV RNA RESP QL NAA+PROBE: NEGATIVE
SARS-COV-2 RNA RESP QL NAA+PROBE: NEGATIVE
SODIUM SERPL-SCNC: 126 MMOL/L (ref 135–147)
T WAVE AXIS: 27 DEGREES
VENTRICULAR RATE: 92 BPM
WBC # BLD AUTO: 4.78 THOUSAND/UL (ref 4.31–10.16)

## 2024-03-23 PROCEDURE — 85730 THROMBOPLASTIN TIME PARTIAL: CPT | Performed by: EMERGENCY MEDICINE

## 2024-03-23 PROCEDURE — 84484 ASSAY OF TROPONIN QUANT: CPT | Performed by: EMERGENCY MEDICINE

## 2024-03-23 PROCEDURE — 84145 PROCALCITONIN (PCT): CPT | Performed by: EMERGENCY MEDICINE

## 2024-03-23 PROCEDURE — 85610 PROTHROMBIN TIME: CPT | Performed by: EMERGENCY MEDICINE

## 2024-03-23 PROCEDURE — 71045 X-RAY EXAM CHEST 1 VIEW: CPT

## 2024-03-23 PROCEDURE — 94640 AIRWAY INHALATION TREATMENT: CPT

## 2024-03-23 PROCEDURE — 94760 N-INVAS EAR/PLS OXIMETRY 1: CPT

## 2024-03-23 PROCEDURE — 94664 DEMO&/EVAL PT USE INHALER: CPT

## 2024-03-23 PROCEDURE — 94644 CONT INHLJ TX 1ST HOUR: CPT

## 2024-03-23 PROCEDURE — 94002 VENT MGMT INPAT INIT DAY: CPT

## 2024-03-23 PROCEDURE — 83605 ASSAY OF LACTIC ACID: CPT | Performed by: EMERGENCY MEDICINE

## 2024-03-23 PROCEDURE — 99291 CRITICAL CARE FIRST HOUR: CPT | Performed by: EMERGENCY MEDICINE

## 2024-03-23 PROCEDURE — 80053 COMPREHEN METABOLIC PANEL: CPT | Performed by: EMERGENCY MEDICINE

## 2024-03-23 PROCEDURE — 85027 COMPLETE CBC AUTOMATED: CPT | Performed by: EMERGENCY MEDICINE

## 2024-03-23 PROCEDURE — 96374 THER/PROPH/DIAG INJ IV PUSH: CPT

## 2024-03-23 PROCEDURE — 0241U HB NFCT DS VIR RESP RNA 4 TRGT: CPT | Performed by: EMERGENCY MEDICINE

## 2024-03-23 PROCEDURE — 99285 EMERGENCY DEPT VISIT HI MDM: CPT

## 2024-03-23 PROCEDURE — 85007 BL SMEAR W/DIFF WBC COUNT: CPT | Performed by: EMERGENCY MEDICINE

## 2024-03-23 PROCEDURE — 36415 COLL VENOUS BLD VENIPUNCTURE: CPT | Performed by: EMERGENCY MEDICINE

## 2024-03-23 PROCEDURE — 93005 ELECTROCARDIOGRAM TRACING: CPT

## 2024-03-23 PROCEDURE — 83880 ASSAY OF NATRIURETIC PEPTIDE: CPT | Performed by: EMERGENCY MEDICINE

## 2024-03-23 RX ORDER — FUROSEMIDE 10 MG/ML
40 INJECTION INTRAMUSCULAR; INTRAVENOUS ONCE
Status: COMPLETED | OUTPATIENT
Start: 2024-03-23 | End: 2024-03-23

## 2024-03-23 RX ORDER — SODIUM CHLORIDE FOR INHALATION 0.9 %
12 VIAL, NEBULIZER (ML) INHALATION ONCE
Status: COMPLETED | OUTPATIENT
Start: 2024-03-23 | End: 2024-03-23

## 2024-03-23 RX ORDER — IPRATROPIUM BROMIDE AND ALBUTEROL SULFATE .5; 3 MG/3ML; MG/3ML
1 SOLUTION RESPIRATORY (INHALATION) ONCE
Status: COMPLETED | OUTPATIENT
Start: 2024-03-23 | End: 2024-03-23

## 2024-03-23 RX ORDER — CEFTRIAXONE 1 G/50ML
1000 INJECTION, SOLUTION INTRAVENOUS ONCE
Status: COMPLETED | OUTPATIENT
Start: 2024-03-23 | End: 2024-03-24

## 2024-03-23 RX ORDER — NITROGLYCERIN 0.4 MG/1
0.4 TABLET SUBLINGUAL ONCE
Status: COMPLETED | OUTPATIENT
Start: 2024-03-23 | End: 2024-03-23

## 2024-03-23 RX ADMIN — IPRATROPIUM BROMIDE 1 MG: 0.5 SOLUTION RESPIRATORY (INHALATION) at 22:03

## 2024-03-23 RX ADMIN — NITROGLYCERIN 0.4 MG: 0.4 TABLET SUBLINGUAL at 22:27

## 2024-03-23 RX ADMIN — CEFTRIAXONE 1000 MG: 1 INJECTION, SOLUTION INTRAVENOUS at 23:58

## 2024-03-23 RX ADMIN — ISODIUM CHLORIDE 12 ML: 0.03 SOLUTION RESPIRATORY (INHALATION) at 22:03

## 2024-03-23 RX ADMIN — FUROSEMIDE 40 MG: 10 INJECTION, SOLUTION INTRAMUSCULAR; INTRAVENOUS at 22:56

## 2024-03-23 RX ADMIN — ALBUTEROL SULFATE 10 MG: 2.5 SOLUTION RESPIRATORY (INHALATION) at 22:03

## 2024-03-24 ENCOUNTER — APPOINTMENT (INPATIENT)
Dept: CT IMAGING | Facility: HOSPITAL | Age: 79
DRG: 682 | End: 2024-03-24
Payer: MEDICARE

## 2024-03-24 PROBLEM — N17.9 AKI (ACUTE KIDNEY INJURY) (HCC): Status: ACTIVE | Noted: 2024-03-24

## 2024-03-24 PROBLEM — I50.9 CHF (CONGESTIVE HEART FAILURE) (HCC): Status: ACTIVE | Noted: 2024-03-24

## 2024-03-24 PROBLEM — E87.1 HYPONATREMIA: Status: ACTIVE | Noted: 2024-03-24

## 2024-03-24 PROBLEM — J44.1 CHRONIC OBSTRUCTIVE PULMONARY DISEASE WITH ACUTE EXACERBATION (HCC): Status: ACTIVE | Noted: 2024-03-24

## 2024-03-24 LAB
2HR DELTA HS TROPONIN: 4 NG/L
4HR DELTA HS TROPONIN: -2 NG/L
ARTERIAL PATENCY WRIST A: YES
B PARAP IS1001 DNA NPH QL NAA+NON-PROBE: NOT DETECTED
B PERT.PT PRMT NPH QL NAA+NON-PROBE: NOT DETECTED
BASE EXCESS BLDA CALC-SCNC: -7.3 MMOL/L
C PNEUM DNA NPH QL NAA+NON-PROBE: NOT DETECTED
CARDIAC TROPONIN I PNL SERPL HS: 32 NG/L
CARDIAC TROPONIN I PNL SERPL HS: 38 NG/L
CREAT UR-MCNC: 114.6 MG/DL
ERYTHROCYTE [DISTWIDTH] IN BLOOD BY AUTOMATED COUNT: 14.1 % (ref 11.6–15.1)
FLUAV RNA NPH QL NAA+NON-PROBE: NOT DETECTED
FLUBV RNA NPH QL NAA+NON-PROBE: NOT DETECTED
HADV DNA NPH QL NAA+NON-PROBE: NOT DETECTED
HCO3 BLDA-SCNC: 16.4 MMOL/L (ref 22–28)
HCOV 229E RNA NPH QL NAA+NON-PROBE: NOT DETECTED
HCOV HKU1 RNA NPH QL NAA+NON-PROBE: NOT DETECTED
HCOV NL63 RNA NPH QL NAA+NON-PROBE: NOT DETECTED
HCOV OC43 RNA NPH QL NAA+NON-PROBE: NOT DETECTED
HCT VFR BLD AUTO: 30.3 % (ref 34.8–46.1)
HGB BLD-MCNC: 10.1 G/DL (ref 11.5–15.4)
HMPV RNA NPH QL NAA+NON-PROBE: NOT DETECTED
HPIV1 RNA NPH QL NAA+NON-PROBE: NOT DETECTED
HPIV2 RNA NPH QL NAA+NON-PROBE: NOT DETECTED
HPIV3 RNA NPH QL NAA+NON-PROBE: NOT DETECTED
HPIV4 RNA NPH QL NAA+NON-PROBE: NOT DETECTED
IPAP: 16
M PNEUMO DNA NPH QL NAA+NON-PROBE: NOT DETECTED
MCH RBC QN AUTO: 29.1 PG (ref 26.8–34.3)
MCHC RBC AUTO-ENTMCNC: 33.3 G/DL (ref 31.4–37.4)
MCV RBC AUTO: 87 FL (ref 82–98)
NON VENT- BIPAP: ABNORMAL
NRBC BLD AUTO-RTO: 0 /100 WBCS
O2 CT BLDA-SCNC: 16 ML/DL (ref 16–23)
OXYHGB MFR BLDA: 97.2 % (ref 94–97)
PCO2 BLDA: 27.7 MM HG (ref 36–44)
PEEP MAX SETTING VENT: 6 CM[H2O]
PH BLDA: 7.39 [PH] (ref 7.35–7.45)
PLATELET # BLD AUTO: 122 THOUSANDS/UL (ref 149–390)
PMV BLD AUTO: 11.1 FL (ref 8.9–12.7)
PO2 BLDA: 115.9 MM HG (ref 75–129)
RBC # BLD AUTO: 3.47 MILLION/UL (ref 3.81–5.12)
RSV RNA NPH QL NAA+NON-PROBE: NOT DETECTED
RV+EV RNA NPH QL NAA+NON-PROBE: NOT DETECTED
SARS-COV-2 RNA NPH QL NAA+NON-PROBE: NOT DETECTED
SODIUM 24H UR-SCNC: 43 MOL/L
SPECIMEN SOURCE: ABNORMAL
UUN 24H UR-MCNC: 235 MG/DL
VENT BIPAP FIO2: 30 %
WBC # BLD AUTO: 3.84 THOUSAND/UL (ref 4.31–10.16)

## 2024-03-24 PROCEDURE — 82570 ASSAY OF URINE CREATININE: CPT | Performed by: FAMILY MEDICINE

## 2024-03-24 PROCEDURE — 0202U NFCT DS 22 TRGT SARS-COV-2: CPT | Performed by: FAMILY MEDICINE

## 2024-03-24 PROCEDURE — 82805 BLOOD GASES W/O2 SATURATION: CPT | Performed by: INTERNAL MEDICINE

## 2024-03-24 PROCEDURE — 99291 CRITICAL CARE FIRST HOUR: CPT | Performed by: INTERNAL MEDICINE

## 2024-03-24 PROCEDURE — 84300 ASSAY OF URINE SODIUM: CPT | Performed by: FAMILY MEDICINE

## 2024-03-24 PROCEDURE — 36600 WITHDRAWAL OF ARTERIAL BLOOD: CPT

## 2024-03-24 PROCEDURE — 94664 DEMO&/EVAL PT USE INHALER: CPT

## 2024-03-24 PROCEDURE — 85027 COMPLETE CBC AUTOMATED: CPT | Performed by: INTERNAL MEDICINE

## 2024-03-24 PROCEDURE — 94760 N-INVAS EAR/PLS OXIMETRY 1: CPT

## 2024-03-24 PROCEDURE — 71250 CT THORAX DX C-: CPT

## 2024-03-24 PROCEDURE — 84484 ASSAY OF TROPONIN QUANT: CPT | Performed by: EMERGENCY MEDICINE

## 2024-03-24 PROCEDURE — 94640 AIRWAY INHALATION TREATMENT: CPT

## 2024-03-24 PROCEDURE — 84540 ASSAY OF URINE/UREA-N: CPT | Performed by: FAMILY MEDICINE

## 2024-03-24 RX ORDER — HYDRALAZINE HYDROCHLORIDE 20 MG/ML
10 INJECTION INTRAMUSCULAR; INTRAVENOUS EVERY 6 HOURS SCHEDULED
Status: DISCONTINUED | OUTPATIENT
Start: 2024-03-24 | End: 2024-03-29

## 2024-03-24 RX ORDER — CLOPIDOGREL BISULFATE 75 MG/1
75 TABLET ORAL DAILY
Status: DISCONTINUED | OUTPATIENT
Start: 2024-03-24 | End: 2024-04-02 | Stop reason: HOSPADM

## 2024-03-24 RX ORDER — IPRATROPIUM BROMIDE AND ALBUTEROL SULFATE 2.5; .5 MG/3ML; MG/3ML
3 SOLUTION RESPIRATORY (INHALATION)
Status: DISCONTINUED | OUTPATIENT
Start: 2024-03-24 | End: 2024-03-24

## 2024-03-24 RX ORDER — LEVALBUTEROL INHALATION SOLUTION 1.25 MG/3ML
1.25 SOLUTION RESPIRATORY (INHALATION)
Status: DISCONTINUED | OUTPATIENT
Start: 2024-03-24 | End: 2024-04-02 | Stop reason: HOSPADM

## 2024-03-24 RX ORDER — HEPARIN SODIUM 5000 [USP'U]/ML
5000 INJECTION, SOLUTION INTRAVENOUS; SUBCUTANEOUS EVERY 8 HOURS SCHEDULED
Status: DISCONTINUED | OUTPATIENT
Start: 2024-03-24 | End: 2024-04-02 | Stop reason: HOSPADM

## 2024-03-24 RX ORDER — AMLODIPINE BESYLATE 5 MG/1
5 TABLET ORAL DAILY
Status: DISCONTINUED | OUTPATIENT
Start: 2024-03-24 | End: 2024-03-25

## 2024-03-24 RX ORDER — LORAZEPAM 2 MG/ML
1 INJECTION INTRAMUSCULAR EVERY 6 HOURS PRN
Status: DISCONTINUED | OUTPATIENT
Start: 2024-03-24 | End: 2024-03-30

## 2024-03-24 RX ORDER — HYDROMORPHONE HCL/PF 1 MG/ML
0.5 SYRINGE (ML) INJECTION ONCE
Status: COMPLETED | OUTPATIENT
Start: 2024-03-24 | End: 2024-03-24

## 2024-03-24 RX ORDER — LORAZEPAM 2 MG/ML
0.5 INJECTION INTRAMUSCULAR ONCE
Status: COMPLETED | OUTPATIENT
Start: 2024-03-24 | End: 2024-03-24

## 2024-03-24 RX ORDER — ATORVASTATIN CALCIUM 40 MG/1
40 TABLET, FILM COATED ORAL
Status: DISCONTINUED | OUTPATIENT
Start: 2024-03-24 | End: 2024-04-02 | Stop reason: HOSPADM

## 2024-03-24 RX ORDER — ACETAMINOPHEN 325 MG/1
650 TABLET ORAL EVERY 4 HOURS PRN
Status: DISCONTINUED | OUTPATIENT
Start: 2024-03-24 | End: 2024-04-02 | Stop reason: HOSPADM

## 2024-03-24 RX ORDER — FUROSEMIDE 10 MG/ML
40 INJECTION INTRAMUSCULAR; INTRAVENOUS 2 TIMES DAILY
Status: DISCONTINUED | OUTPATIENT
Start: 2024-03-24 | End: 2024-03-25

## 2024-03-24 RX ORDER — LORAZEPAM 2 MG/ML
0.5 INJECTION INTRAMUSCULAR EVERY 6 HOURS PRN
Status: DISCONTINUED | OUTPATIENT
Start: 2024-03-24 | End: 2024-03-24

## 2024-03-24 RX ORDER — METHYLPREDNISOLONE SODIUM SUCCINATE 125 MG/2ML
125 INJECTION, POWDER, LYOPHILIZED, FOR SOLUTION INTRAMUSCULAR; INTRAVENOUS ONCE
Status: COMPLETED | OUTPATIENT
Start: 2024-03-24 | End: 2024-03-24

## 2024-03-24 RX ORDER — METHYLPREDNISOLONE SODIUM SUCCINATE 40 MG/ML
40 INJECTION, POWDER, LYOPHILIZED, FOR SOLUTION INTRAMUSCULAR; INTRAVENOUS EVERY 6 HOURS SCHEDULED
Status: DISCONTINUED | OUTPATIENT
Start: 2024-03-24 | End: 2024-03-26

## 2024-03-24 RX ORDER — ONDANSETRON 2 MG/ML
4 INJECTION INTRAMUSCULAR; INTRAVENOUS EVERY 6 HOURS PRN
Status: DISCONTINUED | OUTPATIENT
Start: 2024-03-24 | End: 2024-04-02 | Stop reason: HOSPADM

## 2024-03-24 RX ORDER — ALBUTEROL SULFATE 90 UG/1
2 AEROSOL, METERED RESPIRATORY (INHALATION) EVERY 6 HOURS PRN
Status: DISCONTINUED | OUTPATIENT
Start: 2024-03-24 | End: 2024-03-24

## 2024-03-24 RX ORDER — ALBUTEROL SULFATE 2.5 MG/3ML
2.5 SOLUTION RESPIRATORY (INHALATION) EVERY 4 HOURS PRN
Status: DISCONTINUED | OUTPATIENT
Start: 2024-03-24 | End: 2024-04-02 | Stop reason: HOSPADM

## 2024-03-24 RX ADMIN — METHYLPREDNISOLONE SODIUM SUCCINATE 40 MG: 40 INJECTION, POWDER, FOR SOLUTION INTRAMUSCULAR; INTRAVENOUS at 16:10

## 2024-03-24 RX ADMIN — IPRATROPIUM BROMIDE 0.5 MG: 0.5 SOLUTION RESPIRATORY (INHALATION) at 19:25

## 2024-03-24 RX ADMIN — METHYLPREDNISOLONE SODIUM SUCCINATE 40 MG: 40 INJECTION, POWDER, FOR SOLUTION INTRAMUSCULAR; INTRAVENOUS at 09:50

## 2024-03-24 RX ADMIN — LEVALBUTEROL HYDROCHLORIDE 1.25 MG: 1.25 SOLUTION RESPIRATORY (INHALATION) at 06:57

## 2024-03-24 RX ADMIN — LEVALBUTEROL HYDROCHLORIDE 1.25 MG: 1.25 SOLUTION RESPIRATORY (INHALATION) at 19:25

## 2024-03-24 RX ADMIN — LEVALBUTEROL HYDROCHLORIDE 1.25 MG: 1.25 SOLUTION RESPIRATORY (INHALATION) at 14:04

## 2024-03-24 RX ADMIN — METHYLPREDNISOLONE SODIUM SUCCINATE 40 MG: 40 INJECTION, POWDER, FOR SOLUTION INTRAMUSCULAR; INTRAVENOUS at 21:21

## 2024-03-24 RX ADMIN — LORAZEPAM 1 MG: 2 INJECTION INTRAMUSCULAR; INTRAVENOUS at 16:09

## 2024-03-24 RX ADMIN — FUROSEMIDE 40 MG: 10 INJECTION, SOLUTION INTRAMUSCULAR; INTRAVENOUS at 17:46

## 2024-03-24 RX ADMIN — HEPARIN SODIUM 5000 UNITS: 5000 INJECTION, SOLUTION INTRAVENOUS; SUBCUTANEOUS at 21:21

## 2024-03-24 RX ADMIN — HYDROMORPHONE HYDROCHLORIDE 0.5 MG: 1 INJECTION, SOLUTION INTRAMUSCULAR; INTRAVENOUS; SUBCUTANEOUS at 11:42

## 2024-03-24 RX ADMIN — METHYLPREDNISOLONE SODIUM SUCCINATE 125 MG: 125 INJECTION, POWDER, FOR SOLUTION INTRAMUSCULAR; INTRAVENOUS at 01:52

## 2024-03-24 RX ADMIN — HYDRALAZINE HYDROCHLORIDE 10 MG: 20 INJECTION INTRAMUSCULAR; INTRAVENOUS at 11:09

## 2024-03-24 RX ADMIN — LORAZEPAM 0.5 MG: 2 INJECTION INTRAMUSCULAR; INTRAVENOUS at 11:11

## 2024-03-24 RX ADMIN — HYDRALAZINE HYDROCHLORIDE 10 MG: 20 INJECTION INTRAMUSCULAR; INTRAVENOUS at 17:46

## 2024-03-24 RX ADMIN — HEPARIN SODIUM 5000 UNITS: 5000 INJECTION, SOLUTION INTRAVENOUS; SUBCUTANEOUS at 09:50

## 2024-03-24 RX ADMIN — FUROSEMIDE 40 MG: 10 INJECTION, SOLUTION INTRAMUSCULAR; INTRAVENOUS at 09:50

## 2024-03-24 RX ADMIN — IPRATROPIUM BROMIDE 0.5 MG: 0.5 SOLUTION RESPIRATORY (INHALATION) at 14:04

## 2024-03-24 RX ADMIN — HEPARIN SODIUM 5000 UNITS: 5000 INJECTION, SOLUTION INTRAVENOUS; SUBCUTANEOUS at 13:59

## 2024-03-24 RX ADMIN — IPRATROPIUM BROMIDE 0.5 MG: 0.5 SOLUTION RESPIRATORY (INHALATION) at 06:57

## 2024-03-24 RX ADMIN — LORAZEPAM 0.5 MG: 2 INJECTION INTRAMUSCULAR; INTRAVENOUS at 01:56

## 2024-03-24 RX ADMIN — LORAZEPAM 1 MG: 2 INJECTION INTRAMUSCULAR; INTRAVENOUS at 23:03

## 2024-03-24 RX ADMIN — HEPARIN SODIUM 5000 UNITS: 5000 INJECTION, SOLUTION INTRAVENOUS; SUBCUTANEOUS at 01:52

## 2024-03-24 NOTE — ASSESSMENT & PLAN NOTE
Lab Results   Component Value Date    EGFR 14 03/23/2024    EGFR 43 03/13/2024    EGFR 55 01/29/2024    CREATININE 2.86 (H) 03/23/2024    CREATININE 1.17 03/13/2024    CREATININE 0.96 01/29/2024   Patient with acute on chronic kidney disease stage III  Continue to monitor I's and O's closely.  Will consult nephrology in the a.m. if no improvement

## 2024-03-24 NOTE — H&P
[facility]  H&P  Name: Laya Brooks 79 y.o. female I MRN: [id number]  Unit/Bed#:  I Date of Admission: 3/23/2024   Date of Service: 3/24/2024 I Hospital Day: 1      Assessment/Plan   * Chronic obstructive pulmonary disease with acute exacerbation (HCC)  Assessment & Plan  When patient presented to the ED she was noted to have significant audible wheezing with shortness of breath and had to be placed on BiPAP  Patient also recently had a PFT done which showed moderate obstructive deficit, moderate DLCO reduction and evidence of hyperinflation and air trapping on lung volumes without a significant bronchodilator response. This is said to be a new diagnosis for the patient, has she has never been on controller inhaler therapy before.  She was placed on LAMA/LABA inhalers  Patient states she has been using her bronchodilators with no significant improvement  In the ER she was placed on BiPAP and received an hour-long neb  However she still has significant shortness of breath with use of accessory muscles and conversational dyspnea  Have proceeded to start her on IV steroids.  She got initial dose of IV Solu-Medrol 125 mg and will continue with 40 mg every 6 hours.  ABG done showed she is not retaining CO2  Continue with bronchodilators with DuoNebs every 4 hours  No evidence of any superimposed bacterial infection.  Will hold off on antibiotic at this time  Procalcitonin level was 0.12.  WBCs 4.7    CHF (congestive heart failure) ([facility])  Assessment & Plan  Wt Readings from Last 3 Encounters:   03/24/24 45.5 kg (100 lb 5 oz)   03/18/24 43.1 kg (95 lb)   02/22/24 40.8 kg (90 lb)     Patient may also have superimposed CHF or possibly right heart failure  Will obtain a 2D echo in the a.m.  Continue with IV diuretics with Lasix  We will get cardiology consultation in the a.m.      SEEMA (acute kidney injury) ([facility])  Assessment & Plan  Patient noted with SEEMA.  Possible due to hypoperfusion from  CHF  Continue with IV diuretics for now.    If no improvement will consult nephrology   Will need to monitor I's and closely  Avoid nephrotoxic medication.  Hold her lisinopril      Stage 3a chronic kidney disease (HCC)  Assessment & Plan  Lab Results   Component Value Date    EGFR 14 03/23/2024    EGFR 43 03/13/2024    EGFR 55 01/29/2024    CREATININE 2.86 (H) 03/23/2024    CREATININE 1.17 03/13/2024    CREATININE 0.96 01/29/2024   Patient with acute on chronic kidney disease stage III  Continue to monitor I's and O's closely.  Will consult nephrology in the a.m. if no improvement    CAD (coronary artery disease)  Assessment & Plan  Patient with history of STEMI in 2008 status post RCA stenting  Continue with Plavix and statin    Hyponatremia  Assessment & Plan  Patient will be on fluid restriction  Monitor I's and O's closely    Hypercholesterolemia  Assessment & Plan  Continue with statin, Lipitor 40 mg nightly    CVA (cerebral vascular accident) (HCC)  Assessment & Plan  Continue with Plavix and statin    Hypertension  Assessment & Plan  Continue with Norvasc 5 mg daily  Holding lisinopril in view of the SEEMA  Continue with Lasix 40 mg every 12 hours         History of Present Illness     HPI:  Laya Brooks is a 79 y.o. female with pmhx of COPD, CAD prior STEMI s/p RCA stenting in 2008, CVA, CKD stage 3, HLD, who presents to the ED with worsening shortness of breath, wheezing for the last 3 days.  Patient also reports some leg swelling as well as orthopnea.  She was recently seen last month by the pulmonologist where she was prescribed  LAMA/LABA inhalers.  Her PFT showed moderate obstructive deficit, moderate DLCO reduction and evidence of hyperinflation and air trapping on lung volumes without a significant bronchodilator response. This is said to be a new diagnosis for the patient, has she has never been on controller inhaler therapy before.  Patient states she had used her home inhalers and nebulizers with  no improvement.  She states she is no longer smoking.  In the ER she was noted to have significant wheezing audibly.  She denies any chest pain.  No fevers or chills.  Her chest x-ray does show some left pleural effusion.  Her BNP was about 1000.  She was also noted to have SEEMA.  She was placed on BiPAP and received sublingual nitro and IV Lasix.   Initial troponin was 34 repeated 38 with a delta of 4.  Her lactic acid was 1.5.  Procalcitonin 0.12 and WBC 4.7.  COVID/flu/RSV screen was negative    Historical Information   Past Medical History:   Diagnosis Date    CAD (coronary artery disease)     Cardiac disease     Hypercholesteremia     Hyperlipidemia     Hypertension     Hypertension     Renal disorder      Patient Active Problem List   Diagnosis    History of CVA (cerebrovascular accident)    Hypertensive emergency    Malnutrition (HCC)    Premature atrial complexes    Thrombocytopenia (HCC)    Leukopenia    Neutropenia (HCC)    Hyperphosphatemia    Hypercholesterolemia    Carotid artery stenosis    Nocturnal hypoxia    Dyspnea on exertion    Abnormal echocardiogram    CVA (cerebral vascular accident) (HCC)    Atrophy of right kidney    Stage 3a chronic kidney disease (HCC)    Renal vascular disease    Hypertension    Intracranial atherosclerosis    Weight loss, non-intentional    Hypervitaminosis D    CAD (coronary artery disease)    Celiac artery stenosis (HCC)    S/P right coronary artery (RCA) stent placement    Tobacco use    Wheezing    Dyslipidemia    SEEMA (acute kidney injury) (HCC)    CHF (congestive heart failure) (HCC)    Hyponatremia    Chronic obstructive pulmonary disease with acute exacerbation (HCC)     Past Surgical History:   Procedure Laterality Date    CORONARY ANGIOPLASTY WITH STENT PLACEMENT      WRIST SURGERY         Social History   Social History     Substance and Sexual Activity   Alcohol Use Never     Social History     Substance and Sexual Activity   Drug Use No     Social History      Tobacco Use   Smoking Status Former   Smokeless Tobacco Never       Family History: History reviewed. No pertinent family history.    Meds/Allergies       Current Facility-Administered Medications:     acetaminophen (TYLENOL) tablet 650 mg, 650 mg, Oral, Q4H PRN, Eleanor Rodriguez MD    albuterol inhalation solution 2.5 mg, 2.5 mg, Nebulization, Q4H PRN, Eleanor Rodriguez MD    amLODIPine (NORVASC) tablet 5 mg, 5 mg, Oral, Daily, Eleanor Rodriguez MD    atorvastatin (LIPITOR) tablet 40 mg, 40 mg, Oral, Daily With Dinner, Eleanor Rodriguez MD    clopidogrel (PLAVIX) tablet 75 mg, 75 mg, Oral, Daily, Eleanor Rodriguez MD    furosemide (LASIX) injection 40 mg, 40 mg, Intravenous, BID, Eleanor Rodriguez MD    heparin (porcine) subcutaneous injection 5,000 Units, 5,000 Units, Subcutaneous, Q8H JHON, 5,000 Units at 03/24/24 0152 **AND** Platelet count, , , Once, Eleanor Rodriguez MD    ipratropium (ATROVENT) 0.02 % inhalation solution 0.5 mg, 0.5 mg, Nebulization, TID, Eleanor Rodriguez MD    levalbuterol (XOPENEX) inhalation solution 1.25 mg, 1.25 mg, Nebulization, TID, Eleanor Rodriguez MD    methylPREDNISolone sodium succinate (Solu-MEDROL) injection 40 mg, 40 mg, Intravenous, Q6H JHON, Eleanor Rodriguez MD    ondansetron (ZOFRAN) injection 4 mg, 4 mg, Intravenous, Q6H PRN, Eleanor Rodriguez MD    Allergies   Allergen Reactions    Influenza Virus Vaccine        Review of Systems  Pt on BIPAP, difficult to obtain      Objective   Vitals: Blood pressure (!) 184/86, pulse 88, temperature 97.7 °F (36.5 °C), temperature source Temporal, resp. rate 17, height 5' (1.524 m), weight 45.5 kg (100 lb 5 oz), SpO2 97%.    Physical Exam   General- Awake and alert, on BIPAP with noted respiratory distress and use of accessory muscles. Conversational dyspnea  HEENT: PERRLA, EOMI, sclera anicteric. Pt on BIPAP  Neck: supple, no JVD, lymphadenopathy, thyromegaly.  Heart: Regular rate and rhythm, S1S2 present.  No  "murmur, rub or gallop.    Lungs; Use of accessory muscles of respiration, pt in mod resp distress. On BIPAP with conversational dyspnea.Lungs with diminished BS and scattered rhonchi. No rales.   Abdomen: soft, non-tender, non-distended, NABS.  No guarding or rebound. No peritoneal sound or mass.  Extremities: Pt with +1 edema but no clubbing or cyanosis.  2+ pedal pulses bilaterally.  Full range of motion.  Neurologic:  Cranial nerves II-XII intact.  Strength and sensation globally intact. Speech fluent and goal directed.  Awake, alert and oriented x 3.  Skin: warm and dry. No petechiae, purpura or rash.    Lab Results:   Results from last 7 days   Lab Units 03/24/24  0434 03/23/24  2209   WBC Thousand/uL 3.84* 4.78   HEMOGLOBIN g/dL 10.1* 11.5   HEMATOCRIT % 30.3* 34.0*   PLATELETS Thousands/uL 122* 196     Results from last 7 days   Lab Units 03/23/24  2209   POTASSIUM mmol/L 5.2   CHLORIDE mmol/L 96   CO2 mmol/L 15*   BUN mg/dL 54*   CREATININE mg/dL 2.86*   CALCIUM mg/dL 9.2         Imaging:  XR chest portable   ED Interpretation by Garth Juarez MD (03/23 2303)   Left pleural effusion      CT chest wo contrast    (Results Pending)        EKG, Pathology, and Other Studies:      Code Status: Level 1 - Full Code      Counseling / Coordination of Care  Total floor / unit time spent today 75 minutes.  Greater than 50% of total time was spent with the patient and / or family counseling and / or coordination of care.     Portions of the record may have been created with voice recognition software. Occasional wrong word or \"sound a like\" substitutions may have occurred due to the inherent limitations of voice recognition software. Read the chart carefully and recognize, using context, where substitutions have occurred.  "

## 2024-03-24 NOTE — RESPIRATORY THERAPY NOTE
RT Protocol Note  Laya Brooks 79 y.o. female MRN: [id number]  Unit/Bed#:  Encounter: [id number]    Assessment    Principal Problem:    CHF (congestive heart failure) ([facility])  Active Problems:    Hypercholesterolemia    CVA (cerebral vascular accident) ([facility])    Stage 3a chronic kidney disease (HCC)    Hypertension    CAD (coronary artery disease)    SEEMA (acute kidney injury) ([facility])    Hyponatremia    Chronic obstructive pulmonary disease with acute exacerbation (HCC)      Home Pulmonary Medications:    Home Devices/Therapy: Other (Comment) (Patient denies PAP therapy for CARINE, denies any home O2.  She did state that she has Albuterol at home that she uses when she needs it.)    Past Medical History:   Diagnosis Date    CAD (coronary artery disease)     Cardiac disease     Hypercholesteremia     Hyperlipidemia     Hypertension     Hypertension     Renal disorder      Social History     Socioeconomic History    Marital status: /Civil Union     Spouse name: None    Number of children: None    Years of education: None    Highest education level: None   Occupational History    None   Tobacco Use    Smoking status: Former    Smokeless tobacco: Never   Vaping Use    Vaping status: Never Used   Substance and Sexual Activity    Alcohol use: Never    Drug use: No    Sexual activity: None   Other Topics Concern    None   Social History Narrative    None     Social Determinants of Health     Financial Resource Strain: Not on file   Food Insecurity: No Food Insecurity (3/8/2022)    Hunger Vital Sign     Worried About Running Out of Food in the Last Year: Never true     Ran Out of Food in the Last Year: Never true   Transportation Needs: No Transportation Needs (3/8/2022)    PRAPARE - Transportation     Lack of Transportation (Medical): No     Lack of Transportation (Non-Medical): No   Physical Activity: Not on file   Stress: Not on file   Social Connections: Not on file   Intimate Partner Violence: Not on file  "  Housing Stability: Low Risk  (3/8/2022)    Housing Stability Vital Sign     Unable to Pay for Housing in the Last Year: No     Number of Places Lived in the Last Year: 1     Unstable Housing in the Last Year: No       Subjective     Patient assessed per RT protocol.  The patient had come into the ER late last evening with increasing shortness of breath, in which she stated to me was noticeable over the past week, but became more noticeable over the last 3 days.  She does have a recent diagnosis of COPD this past February, confirmed by PFT testing at this facility.  Her breathing was labored and tachypneic when she came into the ER.  She did receive a nebulizer treatment en route to the hospital, and I also gave her an hour long when she arrived.  She stated that the treatment provide her some relief, but not very much. Her extremities also showed some swelling and elevated BNP, in which the physician was suspecting volume overload.  The decision was made to place her on BiPAP therapy.  CXR revealed pleural effusions as well.  She did have an episode upon arriving to the ICU, in which she was becoming restless and tachypneic again, and began to pull and pry at the BiPAP maske requesting to me \"please take this thing off, I don't want it anymore, and cannot breathe with this thing on!\"  Patient was agreeable to wear the machine until I obtained an ABG.  Abg was stable, and showed that the patient was hyperventilating herself.  I spoke with the provider, and I took her off the BiPAP therapy.  She was placed on 2 LPM NC, given Salumederol/Ativan by nursing, and the patient's breathing is now in a normal state, with no respiratory distress at this time.  The patient's lung sounds revealed diminished with coarseness and a scant wheeze.  She does have an Albuterol prescribed at home for when she needs it.  She denies being dependent on home O2.  Will move forward with TID Xopenex and Atrovent for wheezing and shortness " of breath, as well as weaning the O2 as the patient is able to tolerate.  Also monitor for any further acute changes and work of breathing that could possible result in further need for BiPAP therapy.      Objective    Physical Exam:   Assessment Type: Assess only  General Appearance: Sleeping, Eyes open/responds to voice, Awake  Respiratory Pattern: Normal  Chest Assessment: Chest expansion symmetrical  Bilateral Breath Sounds: Diminished, Coarse, Expiratory wheezes    Vitals:  Blood pressure 146/92, pulse 88, temperature 97.7 °F (36.5 °C), temperature source Temporal, resp. rate (!) 26, height 5' (1.524 m), weight 45.5 kg (100 lb 5 oz), SpO2 96%.    Results from last 7 days   Lab Units 03/24/24  0157   PH ART  7.389   PCO2 ART mm Hg 27.7*   PO2 ART mm Hg 115.9   HCO3 ART mmol/L 16.4*   BASE EXC ART mmol/L -7.3   O2 CONTENT ART mL/dL 16.0   O2 HGB, ARTERIAL % 97.2*   ABG SOURCE  Brachial, Right   MILES TEST  Yes       Imaging and other studies: I have personally reviewed pertinent reports.            Plan     TID Xopenex/Atrovent for wheezing and shortness of breath, monitor for any further acute changes in work of breathing, and wean the O2 as the patient is able to tolerate.         Resp Comments: Patient assessed per RT protocol

## 2024-03-24 NOTE — PLAN OF CARE
Problem: PAIN - ADULT  Goal: Verbalizes/displays adequate comfort level or baseline comfort level  Description: Interventions:  - Encourage patient to monitor pain and request assistance  - Assess pain using appropriate pain scale  - Administer analgesics based on type and severity of pain and evaluate response  - Implement non-pharmacological measures as appropriate and evaluate response  - Consider cultural and social influences on pain and pain management  - Notify physician/advanced practitioner if interventions unsuccessful or patient reports new pain  Outcome: Progressing     Problem: SAFETY ADULT  Goal: Patient will remain free of falls  Description: INTERVENTIONS:  - Educate patient/family on patient safety including physical limitations  - Instruct patient to call for assistance with activity   - Consult OT/PT to assist with strengthening/mobility   - Keep Call bell within reach  - Keep bed low and locked with side rails adjusted as appropriate  - Keep care items and personal belongings within reach  - Initiate and maintain comfort rounds  - Make Fall Risk Sign visible to staff  - Offer Toileting every 2 Hours, in advance of need  - Initiate/Maintain fall alarm  - Obtain necessary fall risk management equipment: alarm, nonskid footwear, yellow wristband  - Apply yellow socks and bracelet for high fall risk patients  - Consider moving patient to room near nurses station  Outcome: Progressing

## 2024-03-24 NOTE — ASSESSMENT & PLAN NOTE
Wt Readings from Last 3 Encounters:   03/24/24 45.5 kg (100 lb 5 oz)   03/18/24 43.1 kg (95 lb)   02/22/24 40.8 kg (90 lb)     Patient may also have superimposed CHF or possibly right heart failure  Will obtain a 2D echo in the a.m.  Continue with IV diuretics with Lasix  We will get cardiology consultation in the a.m.

## 2024-03-24 NOTE — ASSESSMENT & PLAN NOTE
When patient presented to the ED she was noted to have significant audible wheezing with shortness of breath and had to be placed on BiPAP  Patient also recently had a PFT done which showed moderate obstructive deficit, moderate DLCO reduction and evidence of hyperinflation and air trapping on lung volumes without a significant bronchodilator response. This is said to be a new diagnosis for the patient, has she has never been on controller inhaler therapy before.  She was placed on LAMA/LABA inhalers  Patient states she has been using her bronchodilators with no significant improvement  In the ER she was placed on BiPAP and received an hour-long neb  However she still has significant shortness of breath with use of accessory muscles and conversational dyspnea  Have proceeded to start her on IV steroids.  She got initial dose of IV Solu-Medrol 125 mg and will continue with 40 mg every 6 hours.  ABG done showed she is not retaining CO2  Continue with bronchodilators with DuoNebs every 4 hours  No evidence of any superimposed bacterial infection.  Will hold off on antibiotic at this time  Procalcitonin level was 0.12.  WBCs 4.7

## 2024-03-24 NOTE — ED PROVIDER NOTES
History  Chief Complaint   Patient presents with    Shortness of Breath     EMS arrival, sob, wheezing x 3 days, hx of COPD     79-year-old female with a past medical history of COPD, prior echo demonstrating grade 1 diastolic dysfunction, who presents for shortness of breath.  She reports that for the past.  She has had worsening shortness of breath, describes that she has noticed some minor leg swelling bilaterally in the past week or so, and does report orthopnea, worsening shortness of breath when lying flat.  She did receive a nebulizer treatment on the way here by ambulance, however is unsure if this helps.  She does have significant wheezing audible when talking with her.  She denies chest pain.  No fevers or chills.  Review of systems otherwise negative.        Prior to Admission Medications   Prescriptions Last Dose Informant Patient Reported? Taking?   albuterol (PROVENTIL HFA,VENTOLIN HFA) 90 mcg/act inhaler Unknown  Yes No   Sig: Inhale 2 puffs every 6 (six) hours as needed for wheezing   amLODIPine (NORVASC) 5 mg tablet Unknown  No No   Sig: Take 1 tablet (5 mg total) by mouth daily   atorvastatin (LIPITOR) 40 mg tablet Unknown  No No   Sig: TAKE 1 TABLET BY MOUTH ONCE DAILY IN THE EVENING. TAKE IN PLACE OF SIMVASTATIN   clopidogrel (PLAVIX) 75 mg tablet Unknown  No No   Sig: Take 1 tablet (75 mg total) by mouth daily To prevent stroke and heart attack   ergocalciferol (ERGOCALCIFEROL) 1.25 MG (30297 UT) capsule Unknown  No No   Sig: Take 1 capsule (50,000 Units total) by mouth once a week   lisinopril (ZESTRIL) 10 mg tablet Unknown  No No   Sig: Take 1 tablet (10 mg total) by mouth daily   tiotropium-olodaterol (Stiolto Respimat) 2.5-2.5 MCG/ACT inhaler Unknown  No No   Sig: Inhale 2 puffs daily      Facility-Administered Medications: None       Past Medical History:   Diagnosis Date    CAD (coronary artery disease)     Cardiac disease     Hypercholesteremia     Hyperlipidemia     Hypertension      Hypertension     Renal disorder        Past Surgical History:   Procedure Laterality Date    CORONARY ANGIOPLASTY WITH STENT PLACEMENT      IR TEMPORARY DIALYSIS CATHETER PLACEMENT  3/26/2024    WRIST SURGERY         History reviewed. No pertinent family history.  I have reviewed and agree with the history as documented.    E-Cigarette/Vaping    E-Cigarette Use Never User      E-Cigarette/Vaping Substances    Nicotine No     THC No     CBD No     Flavoring No      Social History     Tobacco Use    Smoking status: Former    Smokeless tobacco: Never   Vaping Use    Vaping status: Never Used   Substance Use Topics    Alcohol use: Never    Drug use: No       Review of Systems   Constitutional:  Negative for chills and fever.   HENT:  Negative for congestion, rhinorrhea and sore throat.    Respiratory:  Positive for shortness of breath. Negative for cough.    Cardiovascular:  Positive for leg swelling. Negative for chest pain and palpitations.   Gastrointestinal:  Negative for abdominal pain, constipation, diarrhea, nausea and vomiting.   Genitourinary:  Negative for difficulty urinating and flank pain.   Musculoskeletal:  Negative for arthralgias.   Neurological:  Negative for dizziness, weakness, light-headedness and headaches.   Psychiatric/Behavioral:  Negative for agitation, behavioral problems and confusion.    All other systems reviewed and are negative.      Physical Exam  Physical Exam  Constitutional:       Appearance: She is well-developed.   HENT:      Head: Normocephalic and atraumatic.   Cardiovascular:      Rate and Rhythm: Normal rate and regular rhythm.      Heart sounds: Normal heart sounds. No murmur heard.     No friction rub.   Pulmonary:      Effort: Tachypnea and accessory muscle usage present. No respiratory distress.      Breath sounds: Examination of the right-upper field reveals rhonchi. Examination of the left-upper field reveals rhonchi. Examination of the right-middle field reveals rhonchi.  Examination of the left-middle field reveals rhonchi. Examination of the right-lower field reveals rhonchi. Examination of the left-lower field reveals rhonchi. Rhonchi present. No decreased breath sounds, wheezing or rales.   Abdominal:      General: Bowel sounds are normal. There is no distension.      Palpations: Abdomen is soft.      Tenderness: There is no abdominal tenderness.   Musculoskeletal:         General: Normal range of motion.      Cervical back: Normal range of motion and neck supple.   Skin:     General: Skin is warm.   Neurological:      Mental Status: She is alert and oriented to person, place, and time.      Coordination: Coordination normal.   Psychiatric:         Behavior: Behavior normal.         Thought Content: Thought content normal.         Judgment: Judgment normal.         Vital Signs  ED Triage Vitals   Temperature Pulse Respirations Blood Pressure SpO2   03/23/24 2140 03/23/24 2140 03/23/24 2140 03/23/24 2140 03/23/24 2137   97.9 °F (36.6 °C) 98 (!) 31 (!) 187/85 97 %      Temp Source Heart Rate Source Patient Position - Orthostatic VS BP Location FiO2 (%)   03/23/24 2140 03/23/24 2140 03/23/24 2140 03/23/24 2140 03/24/24 0700   Temporal Monitor Sitting Left arm 30      Pain Score       03/23/24 2140       No Pain           Vitals:    04/02/24 0516 04/02/24 0735 04/02/24 1232 04/02/24 1504   BP: 159/67 (!) 190/84  167/72   Pulse: 74 74 83    Patient Position - Orthostatic VS:  Lying           Visual Acuity  Visual Acuity      Flowsheet Row Most Recent Value   L Pupil Size (mm) 2   R Pupil Size (mm) 2   L Pupil Shape Round   R Pupil Shape Round            ED Medications  Medications   lactated ringers infusion (75 mL/hr Intravenous New Bag 3/31/24 0010)   lactated ringers infusion (0 mL/hr Intravenous Stopped 4/1/24 1800)   ipratropium-albuterol (FOR EMS ONLY) (DUO-NEB) 0.5-2.5 mg/3 mL inhalation solution 3 mL (0 mL Does not apply Given to EMS 3/23/24 2145)   albuterol inhalation  solution 10 mg (10 mg Nebulization Given 3/23/24 2203)   ipratropium (ATROVENT) 0.02 % inhalation solution 1 mg (1 mg Nebulization Given 3/23/24 2203)   sodium chloride 0.9 % inhalation solution 12 mL (12 mL Nebulization Given 3/23/24 2203)   nitroglycerin (NITROSTAT) SL tablet 0.4 mg (0.4 mg Sublingual Given 3/23/24 2227)   furosemide (LASIX) injection 40 mg (40 mg Intravenous Given 3/23/24 2256)   cefTRIAXone (ROCEPHIN) IVPB (premix in dextrose) 1,000 mg 50 mL (0 mg Intravenous Stopped 3/24/24 0049)   methylPREDNISolone sodium succinate (Solu-MEDROL) injection 125 mg (125 mg Intravenous Given 3/24/24 0152)   LORazepam (ATIVAN) injection 0.5 mg (0.5 mg Intravenous Given 3/24/24 0156)   HYDROmorphone (DILAUDID) injection 0.5 mg (0.5 mg Intravenous Given 3/24/24 1142)   LORazepam (ATIVAN) injection 0.5 mg (0.5 mg Intravenous Given 3/25/24 0131)   LORazepam (ATIVAN) injection 0.5 mg (0.5 mg Intravenous Given 3/25/24 0639)   cefTRIAXone (ROCEPHIN) IVPB (premix in dextrose) 1,000 mg 50 mL (1,000 mg Intravenous New Bag 3/31/24 1439)   LORazepam (ATIVAN) injection 0.5 mg (0.5 mg Intravenous Given 3/25/24 1552)   lidocaine (PF) (XYLOCAINE-MPF) 1 % injection (10 mL Infiltration Given 3/26/24 0748)   furosemide (LASIX) injection 60 mg (60 mg Intravenous Given 3/29/24 1328)       Diagnostic Studies  Results Reviewed       Procedure Component Value Units Date/Time    HS Troponin I 4hr [[id number]]  (Normal) Collected: 03/24/24 0434    Lab Status: Final result Specimen: Blood from Arm, Left Updated: 03/24/24 0540     hs TnI 4hr 32 ng/L      Delta 4hr hsTnI -2 ng/L     HS Troponin I 2hr [[id number]]  (Normal) Collected: 03/24/24 0144    Lab Status: Final result Specimen: Blood from Arm, Right Updated: 03/24/24 0213     hs TnI 2hr 38 ng/L      Delta 2hr hsTnI 4 ng/L     RBC Morphology Reflex Test [[id number]] Collected: 03/23/24 2209    Lab Status: Final result Specimen: Blood from Arm, Left Updated: 03/23/24 2301    FLU/RSV/COVID -  if FLU/RSV clinically relevant [[id number]]  (Normal) Collected: 03/23/24 2209    Lab Status: Final result Specimen: Nares from Nose Updated: 03/23/24 2254     SARS-CoV-2 Negative     INFLUENZA A PCR Negative     INFLUENZA B PCR Negative     RSV PCR Negative    Narrative:      FOR PEDIATRIC PATIENTS - copy/paste COVID Guidelines URL to browser: [email or web address]    SARS-CoV-2 assay is a Nucleic Acid Amplification assay intended for the  qualitative detection of nucleic acid from SARS-CoV-2 in nasopharyngeal  swabs. Results are for the presumptive identification of SARS-CoV-2 RNA.    Positive results are indicative of infection with SARS-CoV-2, the virus  causing COVID-19, but do not rule out bacterial infection or co-infection  with other viruses. Laboratories within the [place] and its  territories are required to report all positive results to the appropriate  public health authorities. Negative results do not preclude SARS-CoV-2  infection and should not be used as the sole basis for treatment or other  patient management decisions. Negative results must be combined with  clinical observations, patient history, and epidemiological information.  This test has not been FDA cleared or approved.    This test has been authorized by FDA under an Emergency Use Authorization  (EUA). This test is only authorized for the duration of time the  declaration that circumstances exist justifying the authorization of the  emergency use of an in vitro diagnostic tests for detection of SARS-CoV-2  virus and/or diagnosis of COVID-19 infection under section 564(b)(1) of  the Act, 21 U.S.C. 360bbb-3(b)(1), unless the authorization is terminated  or revoked sooner. The test has been validated but independent review by FDA  and CLIA is pending.    Test performed using [employer]: This RT-PCR assay targets N2,  a region unique to SARS-CoV-2. A conserved region in the  E-gene was chosen  for pan-Sarbecovirus detection which includes SARS-CoV-2.    According to CMS-2020-01-R, this platform meets the definition of high-throughput technology.    CBC and differential [[id number]]  (Abnormal) Collected: 03/23/24 2209    Lab Status: Final result Specimen: Blood from Arm, Left Updated: 03/23/24 2252     WBC 4.78 Thousand/uL      RBC 3.97 Million/uL      Hemoglobin 11.5 g/dL      Hematocrit 34.0 %      MCV 86 fL      MCH 29.0 pg      MCHC 33.8 g/dL      RDW 14.2 %      MPV 10.5 fL      Platelets 196 Thousands/uL     Narrative:      This is an appended report.  These results have been appended to a previously verified report.    Manual Differential(PHLEBS Do Not Order) [[id number]]  (Abnormal) Collected: 03/23/24 2209    Lab Status: Final result Specimen: Blood from Arm, Left Updated: 03/23/24 2252     Segmented % 94 %      Lymphocytes % 5 %      Monocytes % 1 %      Eosinophils % 0 %      Basophils % 0 %      Absolute Neutrophils 4.49 Thousand/uL      Absolute Lymphocytes 0.24 Thousand/uL      Absolute Monocytes 0.05 Thousand/uL      Absolute Eosinophils 0.00 Thousand/uL      Absolute Basophils 0.00 Thousand/uL      Total Counted --     Hypersegmented Neutrophils Present     RBC Morphology Present     Platelet Estimate Adequate     Large Platelet Present     Anisocytosis Present     Marlena Cells Present    Procalcitonin [[id number]]  (Normal) Collected: 03/23/24 2209    Lab Status: Final result Specimen: Blood from Arm, Left Updated: 03/23/24 2247     Procalcitonin 0.12 ng/ml     HS Troponin 0hr (reflex protocol) [[id number]]  (Normal) Collected: 03/23/24 2209    Lab Status: Final result Specimen: Blood from Arm, Left Updated: 03/23/24 2243     hs TnI 0hr 34 ng/L     B-Type Natriuretic Peptide(BNP) [[id number]]  (Abnormal) Collected: 03/23/24 2209    Lab Status: Final result Specimen: Blood from Arm, Left Updated: 03/23/24 2243     BNP 1,126 pg/mL     Comprehensive metabolic panel [[id number]]   (Abnormal) Collected: 03/23/24 2209    Lab Status: Final result Specimen: Blood from Arm, Left Updated: 03/23/24 2238     Sodium 126 mmol/L      Potassium 5.2 mmol/L      Chloride 96 mmol/L      CO2 15 mmol/L      ANION GAP 15 mmol/L      BUN 54 mg/dL      Creatinine 2.86 mg/dL      Glucose 179 mg/dL      Calcium 9.2 mg/dL      AST 24 U/L      ALT 19 U/L      Alkaline Phosphatase 68 U/L      Total Protein 6.7 g/dL      Albumin 4.5 g/dL      Total Bilirubin 0.64 mg/dL      eGFR 14 ml/min/1.73sq m     Narrative:      National Kidney Disease Foundation guidelines for Chronic Kidney Disease (CKD):     Stage 1 with normal or high GFR (GFR > 90 mL/min/1.73 square meters)    Stage 2 Mild CKD (GFR = 60-89 mL/min/1.73 square meters)    Stage 3A Moderate CKD (GFR = 45-59 mL/min/1.73 square meters)    Stage 3B Moderate CKD (GFR = 30-44 mL/min/1.73 square meters)    Stage 4 Severe CKD (GFR = 15-29 mL/min/1.73 square meters)    Stage 5 End Stage CKD (GFR <15 mL/min/1.73 square meters)  Note: GFR calculation is accurate only with a steady state creatinine    Lactic acid [[id number]]  (Normal) Collected: 03/23/24 2209    Lab Status: Final result Specimen: Blood from Arm, Left Updated: 03/23/24 2236     LACTIC ACID 1.5 mmol/L     Narrative:      Result may be elevated if tourniquet was used during collection.    Protime-INR [[id number]]  (Normal) Collected: 03/23/24 2209    Lab Status: Final result Specimen: Blood from Arm, Left Updated: 03/23/24 2230     Protime 13.5 seconds      INR 1.04    APTT [[id number]]  (Normal) Collected: 03/23/24 2209    Lab Status: Final result Specimen: Blood from Arm, Left Updated: 03/23/24 2230     PTT 25 seconds                    CT abdomen wo contrast   Final Result by Luz Fuchs MD (04/02 1019)      Large bilateral pleural effusions, ascites and extensive flank edema. Findings suggesting fluid overload/third spacing.      Cholelithiasis without evidence for cholecystitis.       Significant renal cortical atrophy and cysts as as above. Left renal artery measurements provided as per history. Heavy calcified plaque within the aorta including at the level of the origin but not extending within the renal artery.      Asymmetric hypertrophy of the left liver and dilatation of the portal vein. Correlate for evidence of underlying liver disease and portal hypertension.      Workstation performed: [id number]         XR chest 1 view   Final Result by Hai Thompson DO (03/29 1752)      Small bilateral pleural effusions with left retrocardiac opacity, the latter possibly representing atelectasis or infiltrate.            Workstation performed: [id number]         VAS renal artery limited   Final Result by Juno Arnold MD (03/29 1216)       VAS VENOUS DUPLEX - LOWER LIMB BILATERAL   Final Result by Darya Castle MD (03/26 1634)      CT head wo contrast   Final Result by Alfredo Del Rosario MD (03/26 1435)      No acute intracranial abnormality.  Chronic microangiopathic changes.                  Workstation performed: [id number]         IR temporary dialysis catheter placement   Final Result by Antonino Stein MD (03/29 1612)   Technically successful ultrasound guided placement of central venous access catheter.                  This is the end of the clinically relevant portion of this report.  Subsequent information is for compliance, documentation, and coding purposes.      In the process of informed consent, information was communicated, verbally, to the patient regarding the procedure.  The patient was informed of; the name of the procedure, nature of the procedure, nature of the condition, risks, benefits, alternatives,    and potential complications, as well as the consequences of not having the procedure.  All the patient's questions were answered.  Informed consent was signed.  Quoted risks include bleeding, infection, inadvertent placement of a line tip outside of the    central  "system, and pneumothorax.      Chlorhexidine and alcohol was used for cleansing and sterile preparation of the skin.  This was allowed to dry prior to the application of sterile draping.      Timeout was performed, with all team members present, and in agreement.  Confirmation of patient, procedure, laterally, allergies, and all needed equipment was performed.      When ultrasound was used for needle entry guidance, into the vein, static and real-time images of needle entry are obtained, and archived.      PROCEDURE: Central line   PREPROCEDURE DIAGNOSIS: Please see \"history \", above   POSTPROCEDURE DIAGNOSIS: Same   ANTIBIOTICS: None   SPECIMEN: None   ESTIMATED BLOOD LOSS: None   ANESTHESIA: Local         Workstation performed: [id number]         XR chest portable   Final Result by Osmar Escamilla MD (03/26 1525)      No pneumothorax status post line placement.            Workstation performed: [id number]         XR chest portable ICU   Final Result by Jesus Yoder MD (03/26 1050)      Decreased size of the now small left pleural effusion.   No significant change in size of the small right pleural effusion.            Workstation performed: [id number]         US kidney and bladder   Final Result by Kevin Rodas MD (03/25 1140)      Chronically atrophic, echogenic right kidney. Normal left kidney. No calculi or hydronephrosis.               Workstation performed: [id number]         CT chest wo contrast   Final Result by Jose Luis Moyer MD (03/24 0908)      Small bilateral water density pleural effusions with compressive atelectasis of left lower lobe.      Moderate emphysema. No evidence of pneumonia.         Workstation performed: [id number]         XR chest portable   ED Interpretation by Garth Juarez MD (03/23 2303)   Left pleural effusion      Final Result by Russell Irizarry MD (03/24 0704)      New moderate left and small right pleural effusions with bibasilar atelectasis and " bilateral lower lobe hazy pulmonary infiltrates noted suggesting pneumonia.         Workstation performed: [id number]                    Procedures  CriticalCare Time    Date/Time: 3/23/2024 11:00 PM    Performed by: Garth Juarez MD  Authorized by: Garth Juarez MD    Critical care provider statement:     Critical care time (minutes):  35    Critical care start time:  3/23/2024 10:25 PM    Critical care end time:  3/23/2024 11:00 PM    Critical care time was exclusive of:  Teaching time and separately billable procedures and treating other patients    Critical care was necessary to treat or prevent imminent or life-threatening deterioration of the following conditions:  Respiratory failure, cardiac failure and renal failure    Critical care was time spent personally by me on the following activities:  Obtaining history from patient or surrogate, development of treatment plan with patient or surrogate, evaluation of patient's response to treatment, examination of patient, ordering and performing treatments and interventions, ordering and review of laboratory studies, ordering and review of radiographic studies, re-evaluation of patient's condition, ventilator management and review of old charts    I assumed direction of critical care for this patient from another provider in my specialty: no    ECG 12 Lead Documentation Only    Date/Time: 3/24/2024 1:19 AM    Performed by: Garth Juarez MD  Authorized by: Garth Juarez MD    Indications / Diagnosis:  Sob  ECG reviewed by me, the ED Provider: yes    Patient location:  ED  Previous ECG:     Previous ECG:  Compared to current    Similarity:  No change  Interpretation:     Interpretation: non-specific    Rate:     ECG rate:  92    ECG rate assessment: normal    Rhythm:     Rhythm: sinus rhythm    Ectopy:     Ectopy: none    QRS:     QRS axis:  Normal    QRS intervals:  Normal  Conduction:     Conduction: normal   "  ST segments:     ST segments:  Non-specific  T waves:     T waves: non-specific             ED Course  ED Course as of 04/03/24 0231   Sat Mar 23, 2024   2234 Will trial on bipap   2243 BNP(!): 1,126   2243 Creatinine(!): 2.86  SEEMA   2243 Sodium(!): 126  Hyponatremia   2243 BNP(!): 1,126  Elevated, concerning for CHF.                            Initial Sepsis Screening       Row Name 03/23/24 2234                Is the patient's history suggestive of a new or worsening infection? Yes (Proceed)  -BC        Suspected source of infection pneumonia  -BC        Indicate SIRS criteria Tachycardia > 90 bpm;Tachypnea > 20 resp per min  -BC        Are two or more of the above signs & symptoms of infection both present and new to the patient? Yes (Proceed)  -BC        Assess for evidence of organ dysfunction: Are any of the below criteria present within 6 hours of suspected infection and SIRS criteria that are NOT considered to be chronic conditions? New need for invasive/non-invasive ventilation  -BC        Date of presentation of severe sepsis 03/23/24  -BC        Time of presentation of severe sepsis 2334  -BC        Sepsis Note: Click \"NEXT\" below (NOT \"close\") to generate sepsis note based on above information. YES (proceed by clicking \"NEXT\")  -BC                  User Key  (r) = Recorded By, (t) = Taken By, (c) = Cosigned By      Initials Name Provider Type    BC Garth Juarez MD Physician                    SBIRT 20yo+      Flowsheet Row Most Recent Value   Initial Alcohol Screen: US AUDIT-C     1. How often do you have a drink containing alcohol? 0 Filed at: 03/23/2024 2143   2. How many drinks containing alcohol do you have on a typical day you are drinking?  0 Filed at: 03/23/2024 2143   3b. FEMALE Any Age, or MALE 65+: How often do you have 4 or more drinks on one occassion? 0 Filed at: 03/23/2024 2143   Audit-C Score 0 Filed at: 03/23/2024 2143   JIA: How many times in the past year have you...  "   Used an illegal drug or used a prescription medication for non-medical reasons? Never Filed at: 03/23/2024 2144                      Medical Decision Making  I reviewed the patient's medical chart, PMHx, prior encounters, medications.    My independent interpretation of ECG: NSR with rate of 92, no acute ischemic changes, short MT interval  My independent interpretation of CXR: left pleural effusion    My DDx includes: COPD exacerbation, CHF exacerbation, ACS, pneumothorax, PE, dissection, PTX, arrhythmia, electrolyte abnormality, costochondritis    Will perform cardiac workup, including ECG. troponin, imaging, reassess symptoms. Will provide heart nebulizer empirically, assuming that EKG does not show inferior changes will give a dose of nitro given elevated blood pressure of 180 systolic.    Patient had poor response to nebulizer treatment. Found to have elevated BNP, SEEMA, concerning for cardiorenal syndrome. Will give 40 mg IV lasix (she does only have one kidney, so will lightly diurese for now). Will place on bipap, provide SL nitroglycerin.    Patient had substantial improvement with this, breathing better on bipap, pressures improved to 130s/140s. Mostly suspect CHF.    Although I do not suspect infection at this time, will empirically give dose of ceftriaxone.    Patient admitted to [facility].     Amount and/or Complexity of Data Reviewed  Labs: ordered. Decision-making details documented in ED Course.  Radiology: ordered and independent interpretation performed.    Risk  Prescription drug management.  Decision regarding hospitalization.             Disposition  Final diagnoses:   CHF (congestive heart failure) (HCC)   Shortness of breath   SEEMA (acute kidney injury) (HCC)   Hyponatremia   Pleural effusion on left   Hypertension     Time reflects when diagnosis was documented in both MDM as applicable and the Disposition within this note       Time User Action Codes Description Comment    3/23/2024 11:26 PM  Garth Juarez Add [I50.9] CHF (congestive heart failure) ([facility])     3/23/2024 11:26 PM Garth Juarez Add [R06.02] Shortness of breath     3/23/2024 11:26 PM Garth Juarez Add [N17.9] SEEMA (acute kidney injury) ([facility])     3/23/2024 11:27 PM Garth Juarez Add [E87.1] Hyponatremia     3/23/2024 11:27 PM Garth Juarez Add [J90] Pleural effusion on left     3/23/2024 11:27 PM Garth Juarez Add [I10] Hypertension     3/24/2024  1:38 AM Eleanor Rodriguez Add [E46] Malnutrition, unspecified type ([facility])     3/25/2024  9:18 AM Malena Hearn Add [N18.31] Stage 3a chronic kidney disease ([facility])     3/25/2024  2:29 PM Bain, Ann Add [I50.31] Acute diastolic congestive heart failure ([facility])     3/25/2024  3:17 PM Gabby Craig Add [I50.9] Other congestive heart failure ([facility])     3/26/2024  8:28 AM Antonino Stein Modify [I50.9] CHF (congestive heart failure) ([facility])     3/26/2024  8:28 AM Antonino Stein Add [N17.9,  N18.9] Acute renal failure superimposed on chronic kidney disease, unspecified acute renal failure type, unspecified CKD stage  ([facility])     3/26/2024  4:30 PM Malena Hearn Add [D72.810] Lymphopenia     3/30/2024  1:15 PM Malena Hearn Add [I70.1] Renal artery stenosis ([facility])     4/1/2024  5:53 PM DoctorDarya Add [N28.89] Renal vascular disease           ED Disposition       ED Disposition   Admit    Condition   Stable    Date/Time   Sat Mar 23, 2024 1996    Comment   Case was discussed with SHAYLA and the patient's admission status was agreed to be Admission Status: inpatient status to the service of Dr. Rodriguez .               Follow-up Information    None         Discharge Medication List as of 4/2/2024  3:16 PM        CONTINUE these medications which have NOT CHANGED    Details   albuterol (PROVENTIL HFA,VENTOLIN HFA) 90 mcg/act inhaler Inhale 2 puffs every 6 (six) hours as needed for wheezing, Historical Med      amLODIPine (NORVASC) 5 mg tablet Take 1 tablet (5 mg total)  by mouth daily, Starting Wed 1/10/2024, Normal      atorvastatin (LIPITOR) 40 mg tablet TAKE 1 TABLET BY MOUTH ONCE DAILY IN THE EVENING. TAKE IN PLACE OF SIMVASTATIN, Normal      clopidogrel (PLAVIX) 75 mg tablet Take 1 tablet (75 mg total) by mouth daily To prevent stroke and heart attack, Starting Tue 8/8/2023, Normal      ergocalciferol (ERGOCALCIFEROL) 1.25 MG (70530 UT) capsule Take 1 capsule (50,000 Units total) by mouth once a week, Starting Tue 1/30/2024, Normal      lisinopril (ZESTRIL) 10 mg tablet Take 1 tablet (10 mg total) by mouth daily, Starting Tue 8/8/2023, Normal      tiotropium-olodaterol (Stiolto Respimat) 2.5-2.5 MCG/ACT inhaler Inhale 2 puffs daily, Starting Thu 2/22/2024, Print             No discharge procedures on file.    PDMP Review       None            ED Provider  Electronically Signed by             Garth Juarez MD  03/24/24 3418       Garth Juarez MD  04/03/24 5258

## 2024-03-24 NOTE — RESPIRATORY THERAPY NOTE
03/24/24 0700   Respiratory Assessment   Assessment Type During-treatment   General Appearance Awake   Respiratory Pattern Labored;Tachypneic   Chest Assessment Chest expansion symmetrical;Trachea midline   Bilateral Breath Sounds Diminished;Coarse   Resp Comments called by nursing that patient had just came back from CAT scan and she had an increase wob, placed patient back on the bipap therapy, changed some settings, patient now resting more comfortably   Non-Invasive Information   O2 Interface Device Full face mask   Non-Invasive Ventilation Mode BiPAP   SpO2 97 %   $ Pulse Oximetry Spot Check Charge Completed   Non-Invasive Settings   IPAP (cm) 12 cm   EPAP (cm) 6 cm   Rate (Set) 10   FiO2 (%) 30   Pressure Support (cm H2O) 6   Rise Time 3   Inspiratory Time (Set) 1.2   Non-Invasive Readings   Skin Intervention Mask rotated;Skin intact   Total Rate 17   Vt (mL) (Mech) 560   MV (Mech) 9.6   Non-Invasive Alarms   Insp Pressure High (cm H20) 20   Insp Pressure Low (cm H20) 5   Low Insp Pressure Time (sec) 20 sec   MV Low (L/min) 4   Vt High (mL) 1000   Vt Low (mL) 200   High Resp Rate (BPM) 40 BPM   Low Resp Rate (BPM) 8 BPM   Apnea Interval (sec) 20   Apnea Volume (mL) 10

## 2024-03-24 NOTE — QUICK NOTE
Met with patient's , endorses patient is full code    BP (!) 173/80   Pulse 88   Temp 98.8 °F (37.1 °C) (Temporal)   Resp 15   Ht 5' (1.524 m)   Wt 45.5 kg (100 lb 5 oz) Comment: too lethargic to stand  SpO2 98%   BMI 19.59 kg/m²   Results from last 7 days   Lab Units 03/23/24  2209   SODIUM mmol/L 126*   POTASSIUM mmol/L 5.2   CHLORIDE mmol/L 96   CO2 mmol/L 15*   BUN mg/dL 54*   CREATININE mg/dL 2.86*   CALCIUM mg/dL 9.2         Acute CHF  Acute Renal Failure      Lasix 40mg BID

## 2024-03-24 NOTE — ASSESSMENT & PLAN NOTE
Continue with Norvasc 5 mg daily  Holding lisinopril in view of the SEEMA  Continue with Lasix 40 mg every 12 hours

## 2024-03-24 NOTE — RESPIRATORY THERAPY NOTE
"   03/24/24 0210   Respiratory Assessment   Assessment Type Assess only   General Appearance Alert;Awake   Respiratory Pattern Tachypneic;Symmetrical   Chest Assessment Chest expansion symmetrical   Bilateral Breath Sounds Diminished;Coarse   Resp Comments (S)  Patient became restless, and was adamantly pulling and prying at the BiPAP mask and yelling \"I cannot breathe with this thing on, please take it off!\"  I was able to calm the patient.  Hospital provider came to see the patient at bedside and we agreed to draw an ABG.  ABG is stable, and the patient was hyperventilating herself.  Ativan and Salumederol was ordered by provider and given by nursing.  The patient is much more calm and relaxed at this time.  BiPAP removed, and patient placed on 2 LPM NC.  Will continue to monitor progress closely.   Oxygen Therapy/Pulse Ox   O2 Device (S)  Nasal cannula   O2 Therapy (S)  Oxygen   Nasal Cannula O2 Flow Rate (L/min) (S)  2 L/min   Calculated FIO2 (%) - Nasal Cannula 28   SpO2 96 %   SpO2 Activity At Rest   $ Pulse Oximetry Spot Check Charge Completed        "

## 2024-03-24 NOTE — RESPIRATORY THERAPY NOTE
03/23/24 2184   Respiratory Assessment   Assessment Type Assess only   General Appearance Alert;Awake   Respiratory Pattern Labored;Tachypneic;Symmetrical   Chest Assessment Chest expansion symmetrical   Bilateral Breath Sounds Diminished;Coarse   Resp Comments (S)  Patient placed on BiPAP therapy, due to increased work of breathing and suspect volume overload.   Non-Invasive Information   O2 Interface Device Full face mask   Non-Invasive Ventilation Mode BiPAP   $ Continous NIV Initial   SpO2 95 %   $ Pulse Oximetry Spot Check Charge Completed   Non-Invasive Settings   IPAP (cm) 12 cm   EPAP (cm) 6 cm   Rate (Set) 10   FiO2 (%) 30   Pressure Support (cm H2O) 6   Rise Time 3   Inspiratory Time (Set) 1.2   Non-Invasive Readings   Skin Intervention Mask rotated;Skin intact   Total Rate 30   MV (Mech) 17.1   Peak Pressure (Obs) 12   Spontaneous Vt (mL) 563   I/E Ratio (Obs) 1:4   Leak (lpm) 0   Non-Invasive Alarms   Insp Pressure High (cm H20) 20   Insp Pressure Low (cm H20) 5   MV Low (L/min) 4   Vt High (mL) 1   Vt Low (mL) 200   High Resp Rate (BPM) 40 BPM   Low Resp Rate (BPM) 8 BPM   Apnea Interval (sec) 20

## 2024-03-24 NOTE — RESPIRATORY THERAPY NOTE
03/24/24 1201   Respiratory Assessment   Assessment Type During-treatment   Resp Comments called by nursing to come to room. Found patient sitting up right, agitated and requesting a glass of water, nurses and doctor at bedside, patient was extremely tachypneic and labored. I did swab patients mouth with water, me and nurse was able to get patient repositioned back in bed, medications were given. Patient changed to a humidified bipap circuit to aide in comfort for patient, at this time patient appears much more comfortable   Non-Invasive Information   O2 Interface Device Full face mask   Non-Invasive Ventilation Mode BiPAP   SpO2 98 %   $ Pulse Oximetry Spot Check Charge Completed   Non-Invasive Settings   IPAP (cm) 12 cm   EPAP (cm) 6 cm   Rate (Set) 10   FiO2 (%) 30   Pressure Support (cm H2O) 6   Rise Time 3   Inspiratory Time (Set) 1.2   Humidification 36   Temperature (Set) 36   Non-Invasive Readings   Skin Intervention Mask rotated;Skin intact   Total Rate 12   Vt (mL) (Mech) 733   MV (Mech) 8.6   Non-Invasive Alarms   Insp Pressure High (cm H20) 20   Insp Pressure Low (cm H20) 4   Low Insp Pressure Time (sec) 20 sec   MV Low (L/min) 4   Vt High (mL) 1000   Vt Low (mL) 200   High Resp Rate (BPM) 40 BPM   Low Resp Rate (BPM) 8 BPM   Apnea Interval (sec) 20   Apnea Volume (mL) 10

## 2024-03-24 NOTE — PLAN OF CARE
Problem: PAIN - ADULT  Goal: Verbalizes/displays adequate comfort level or baseline comfort level  Description: Interventions:  - Encourage patient to monitor pain and request assistance  - Assess pain using appropriate pain scale  - Administer analgesics based on type and severity of pain and evaluate response  - Implement non-pharmacological measures as appropriate and evaluate response  - Consider cultural and social influences on pain and pain management  - Notify physician/advanced practitioner if interventions unsuccessful or patient reports new pain  Outcome: Progressing     Problem: SAFETY ADULT  Goal: Patient will remain free of falls  Description: INTERVENTIONS:  - Educate patient/family on patient safety including physical limitations  - Instruct patient to call for assistance with activity   - Consult OT/PT to assist with strengthening/mobility   - Keep Call bell within reach  - Keep bed low and locked with side rails adjusted as appropriate  - Keep care items and personal belongings within reach  - Initiate and maintain comfort rounds  - Make Fall Risk Sign visible to staff  - Offer Toileting every 2 Hours, in advance of need  - Initiate/Maintain fall alarm  - Obtain necessary fall risk management equipment: alarm, nonskid footwear, yellow wristband  - Apply yellow socks and bracelet for high fall risk patients  - Consider moving patient to room near nurses station  Outcome: Progressing  Goal: Maintain or return to baseline ADL function  Description: INTERVENTIONS:  -  Assess patient's ability to carry out ADLs; assess patient's baseline for ADL function and identify physical deficits which impact ability to perform ADLs (bathing, care of mouth/teeth, toileting, grooming, dressing, etc.)  - Assess/evaluate cause of self-care deficits   - Assess range of motion  - Assess patient's mobility; develop plan if impaired  - Assess patient's need for assistive devices and provide as appropriate  - Encourage  maximum independence but intervene and supervise when necessary  - Involve family in performance of ADLs  - Assess for home care needs following discharge   - Consider OT consult to assist with ADL evaluation and planning for discharge  - Provide patient education as appropriate  Outcome: Progressing  Goal: Maintains/Returns to pre admission functional level  Description: INTERVENTIONS:  - Perform AM-PAC 6 Click Basic Mobility/ Daily Activity assessment daily.  - Set and communicate daily mobility goal to care team and patient/family/caregiver.   - Collaborate with rehabilitation services on mobility goals if consulted  - Perform Range of Motion 4 times a day.  - Reposition patient every 2 hours.  - Dangle patient 3 times a day  - Stand patient 3 times a day  - Ambulate patient 3 times a day  - Out of bed to chair 3 times a day   - Out of bed for meals 3 times a day  - Out of bed for toileting  - Record patient progress and toleration of activity level   Outcome: Progressing     Problem: DISCHARGE PLANNING  Goal: Discharge to home or other facility with appropriate resources  Description: INTERVENTIONS:  - Identify barriers to discharge w/patient and caregiver  - Arrange for needed discharge resources and transportation as appropriate  - Identify discharge learning needs (meds, wound care, etc.)  - Arrange for interpretive services to assist at discharge as needed  - Refer to Case Management Department for coordinating discharge planning if the patient needs post-hospital services based on physician/advanced practitioner order or complex needs related to functional status, cognitive ability, or social support system  Outcome: Progressing     Problem: Knowledge Deficit  Goal: Patient/family/caregiver demonstrates understanding of disease process, treatment plan, medications, and discharge instructions  Description: Complete learning assessment and assess knowledge base.  Interventions:  - Provide teaching at level of  understanding  - Provide teaching via preferred learning methods  Outcome: Progressing     Problem: INFECTION - ADULT  Goal: Absence or prevention of progression during hospitalization  Description: INTERVENTIONS:  - Assess and monitor for signs and symptoms of infection  - Monitor lab/diagnostic results  - Monitor all insertion sites, i.e. indwelling lines, tubes, and drains  - Monitor endotracheal if appropriate and nasal secretions for changes in amount and color  - Mongo appropriate cooling/warming therapies per order  - Administer medications as ordered  - Instruct and encourage patient and family to use good hand hygiene technique  - Identify and instruct in appropriate isolation precautions for identified infection/condition  Outcome: Progressing     Problem: RESPIRATORY - ADULT  Goal: Achieves optimal ventilation and oxygenation  Description: INTERVENTIONS:  - Assess for changes in respiratory status  - Assess for changes in mentation and behavior  - Position to facilitate oxygenation and minimize respiratory effort  - Oxygen administered by appropriate delivery if ordered  - Initiate smoking cessation education as indicated  - Encourage broncho-pulmonary hygiene including cough, deep breathe, Incentive Spirometry  - Assess the need for suctioning and aspirate as needed  - Assess and instruct to report SOB or any respiratory difficulty  - Respiratory Therapy support as indicated  Outcome: Progressing     Problem: GENITOURINARY - ADULT  Goal: Maintains or returns to baseline urinary function  Description: INTERVENTIONS:  - Assess urinary function  - Encourage oral fluids to ensure adequate hydration if ordered  - Administer IV fluids as ordered to ensure adequate hydration  - Administer ordered medications as needed  - Offer frequent toileting  - Follow urinary retention protocol if ordered  Outcome: Progressing  Goal: Absence of urinary retention  Description: INTERVENTIONS:  - Assess patient’s ability  to void and empty bladder  - Monitor I/O  - Bladder scan as needed  - Discuss with physician/AP medications to alleviate retention as needed  - Discuss catheterization for long term situations as appropriate  Outcome: Progressing     Problem: METABOLIC, FLUID AND ELECTROLYTES - ADULT  Goal: Electrolytes maintained within normal limits  Description: INTERVENTIONS:  - Monitor labs and assess patient for signs and symptoms of electrolyte imbalances  - Administer electrolyte replacement as ordered  - Monitor response to electrolyte replacements, including repeat lab results as appropriate  - Instruct patient on fluid and nutrition as appropriate  Outcome: Progressing  Goal: Fluid balance maintained  Description: INTERVENTIONS:  - Monitor labs   - Monitor I/O and WT  - Instruct patient on fluid and nutrition as appropriate  - Assess for signs & symptoms of volume excess or deficit  Outcome: Progressing     Problem: CARDIOVASCULAR - ADULT  Goal: Maintains optimal cardiac output and hemodynamic stability  Description: INTERVENTIONS:  - Monitor I/O, vital signs and rhythm  - Monitor for S/S and trends of decreased cardiac output  - Administer and titrate ordered vasoactive medications to optimize hemodynamic stability  - Assess quality of pulses, skin color and temperature  - Assess for signs of decreased coronary artery perfusion  - Instruct patient to report change in severity of symptoms  Outcome: Progressing  Goal: Absence of cardiac dysrhythmias or at baseline rhythm  Description: INTERVENTIONS:  - Continuous cardiac monitoring, vital signs, obtain 12 lead EKG if ordered  - Administer antiarrhythmic and heart rate control medications as ordered  - Monitor electrolytes and administer replacement therapy as ordered  Outcome: Progressing

## 2024-03-24 NOTE — ASSESSMENT & PLAN NOTE
Patient noted with SEEMA.  Possible due to hypoperfusion from CHF  Continue with IV diuretics for now.    If no improvement will consult nephrology   Will need to monitor I's and closely  Avoid nephrotoxic medication.  Hold her lisinopril

## 2024-03-25 ENCOUNTER — APPOINTMENT (INPATIENT)
Dept: NON INVASIVE DIAGNOSTICS | Facility: HOSPITAL | Age: 79
DRG: 682 | End: 2024-03-25
Payer: MEDICARE

## 2024-03-25 ENCOUNTER — APPOINTMENT (INPATIENT)
Dept: ULTRASOUND IMAGING | Facility: HOSPITAL | Age: 79
DRG: 682 | End: 2024-03-25
Payer: MEDICARE

## 2024-03-25 ENCOUNTER — APPOINTMENT (INPATIENT)
Dept: RADIOLOGY | Facility: HOSPITAL | Age: 79
DRG: 682 | End: 2024-03-25
Payer: MEDICARE

## 2024-03-25 PROBLEM — J96.01 ACUTE RESPIRATORY FAILURE WITH HYPOXIA (HCC): Status: ACTIVE | Noted: 2024-03-25

## 2024-03-25 PROBLEM — G92.8 TOXIC METABOLIC ENCEPHALOPATHY: Status: ACTIVE | Noted: 2024-03-25

## 2024-03-25 PROBLEM — I50.31 ACUTE DIASTOLIC CONGESTIVE HEART FAILURE (HCC): Status: ACTIVE | Noted: 2024-03-24

## 2024-03-25 LAB
ALBUMIN SERPL BCP-MCNC: 3.8 G/DL (ref 3.5–5)
ALP SERPL-CCNC: 55 U/L (ref 34–104)
ALT SERPL W P-5'-P-CCNC: 13 U/L (ref 7–52)
ANION GAP SERPL CALCULATED.3IONS-SCNC: 15 MMOL/L (ref 4–13)
ANION GAP SERPL CALCULATED.3IONS-SCNC: 16 MMOL/L (ref 4–13)
ANION GAP SERPL CALCULATED.3IONS-SCNC: 16 MMOL/L (ref 4–13)
ANISOCYTOSIS BLD QL SMEAR: PRESENT
APICAL FOUR CHAMBER EJECTION FRACTION: 55 %
ARTERIAL PATENCY WRIST A: YES
AST SERPL W P-5'-P-CCNC: 12 U/L (ref 13–39)
ATRIAL RATE: 92 BPM
BACTERIA UR QL AUTO: ABNORMAL /HPF
BASE EX.OXY STD BLDV CALC-SCNC: 96.8 % (ref 60–80)
BASE EXCESS BLDA CALC-SCNC: -8.2 MMOL/L
BASE EXCESS BLDV CALC-SCNC: -8.9 MMOL/L
BASOPHILS # BLD AUTO: 0 THOUSANDS/ÂΜL (ref 0–0.1)
BASOPHILS NFR BLD AUTO: 0 % (ref 0–1)
BILIRUB SERPL-MCNC: 0.36 MG/DL (ref 0.2–1)
BILIRUB UR QL STRIP: NEGATIVE
BSA FOR ECHO PROCEDURE: 1.38 M2
BUN SERPL-MCNC: 75 MG/DL (ref 5–25)
BUN SERPL-MCNC: 81 MG/DL (ref 5–25)
BUN SERPL-MCNC: 85 MG/DL (ref 5–25)
CALCIUM SERPL-MCNC: 8.3 MG/DL (ref 8.4–10.2)
CALCIUM SERPL-MCNC: 8.6 MG/DL (ref 8.4–10.2)
CALCIUM SERPL-MCNC: 9 MG/DL (ref 8.4–10.2)
CHLORIDE SERPL-SCNC: 98 MMOL/L (ref 96–108)
CHLORIDE SERPL-SCNC: 99 MMOL/L (ref 96–108)
CHLORIDE SERPL-SCNC: 99 MMOL/L (ref 96–108)
CLARITY UR: ABNORMAL
CO2 SERPL-SCNC: 15 MMOL/L (ref 21–32)
CO2 SERPL-SCNC: 15 MMOL/L (ref 21–32)
CO2 SERPL-SCNC: 16 MMOL/L (ref 21–32)
COLOR UR: YELLOW
CREAT SERPL-MCNC: 4.65 MG/DL (ref 0.6–1.3)
CREAT SERPL-MCNC: 5.08 MG/DL (ref 0.6–1.3)
CREAT SERPL-MCNC: 5.68 MG/DL (ref 0.6–1.3)
CREAT UR-MCNC: 122.5 MG/DL
D DIMER PPP FEU-MCNC: 1.74 UG/ML FEU
E WAVE DECELERATION TIME: 172 MS
E/A RATIO: 1.07
EOSINOPHIL # BLD AUTO: 0 THOUSAND/ÂΜL (ref 0–0.61)
EOSINOPHIL NFR BLD AUTO: 0 % (ref 0–6)
ERYTHROCYTE [DISTWIDTH] IN BLOOD BY AUTOMATED COUNT: 14.6 % (ref 11.6–15.1)
GFR SERPL CREATININE-BSD FRML MDRD: 6 ML/MIN/1.73SQ M
GFR SERPL CREATININE-BSD FRML MDRD: 7 ML/MIN/1.73SQ M
GFR SERPL CREATININE-BSD FRML MDRD: 8 ML/MIN/1.73SQ M
GLUCOSE SERPL-MCNC: 133 MG/DL (ref 65–140)
GLUCOSE SERPL-MCNC: 135 MG/DL (ref 65–140)
GLUCOSE SERPL-MCNC: 146 MG/DL (ref 65–140)
GLUCOSE UR STRIP-MCNC: ABNORMAL MG/DL
HCO3 BLDA-SCNC: 15.3 MMOL/L (ref 22–28)
HCO3 BLDV-SCNC: 14.4 MMOL/L (ref 24–30)
HCT VFR BLD AUTO: 30.6 % (ref 34.8–46.1)
HGB BLD-MCNC: 10.3 G/DL (ref 11.5–15.4)
HGB UR QL STRIP.AUTO: ABNORMAL
IMM GRANULOCYTES # BLD AUTO: 0.02 THOUSAND/UL (ref 0–0.2)
IMM GRANULOCYTES NFR BLD AUTO: 1 % (ref 0–2)
IPAP: 12
IPAP: 12
KETONES UR STRIP-MCNC: ABNORMAL MG/DL
LAAS-AP2: 17.4 CM2
LAAS-AP4: 16.1 CM2
LACTATE SERPL-SCNC: 0.4 MMOL/L (ref 0.5–2)
LEFT ATRIUM VOLUME (MOD BIPLANE): 54 ML
LEFT ATRIUM VOLUME INDEX (MOD BIPLANE): 39.1 ML/M2
LEUKOCYTE ESTERASE UR QL STRIP: ABNORMAL
LYMPHOCYTES # BLD AUTO: 0.19 THOUSANDS/ÂΜL (ref 0.6–4.47)
LYMPHOCYTES NFR BLD AUTO: 4 % (ref 14–44)
MCH RBC QN AUTO: 29.4 PG (ref 26.8–34.3)
MCHC RBC AUTO-ENTMCNC: 33.7 G/DL (ref 31.4–37.4)
MCV RBC AUTO: 87 FL (ref 82–98)
MONOCYTES # BLD AUTO: 0.14 THOUSAND/ÂΜL (ref 0.17–1.22)
MONOCYTES NFR BLD AUTO: 3 % (ref 4–12)
MV E'TISSUE VEL-LAT: 7 CM/S
MV E'TISSUE VEL-SEP: 5 CM/S
MV PEAK A VEL: 0.92 M/S
MV PEAK E VEL: 98 CM/S
MV STENOSIS PRESSURE HALF TIME: 50 MS
MV VALVE AREA P 1/2 METHOD: 4.4
NEUTROPHILS # BLD AUTO: 3.97 THOUSANDS/ÂΜL (ref 1.85–7.62)
NEUTS SEG NFR BLD AUTO: 92 % (ref 43–75)
NITRITE UR QL STRIP: NEGATIVE
NON VENT- BIPAP: ABNORMAL
NON VENT- BIPAP: ABNORMAL
NON-SQ EPI CELLS URNS QL MICRO: ABNORMAL /HPF
NRBC BLD AUTO-RTO: 0 /100 WBCS
O2 CT BLDA-SCNC: 14.7 ML/DL (ref 16–23)
O2 CT BLDV-SCNC: 15 ML/DL
OTHER FIO2: ABNORMAL %
OXYHGB MFR BLDA: 97.3 % (ref 94–97)
P AXIS: 82 DEGREES
PCO2 BLDA: 25.5 MM HG (ref 36–44)
PCO2 BLDV: 24.2 MM HG (ref 42–50)
PEEP MAX SETTING VENT: 6 CM[H2O]
PEEP MAX SETTING VENT: 6 CM[H2O]
PH BLDA: 7.39 [PH] (ref 7.35–7.45)
PH BLDV: 7.39 [PH] (ref 7.3–7.4)
PH UR STRIP.AUTO: 6 [PH]
PLATELET # BLD AUTO: 143 THOUSANDS/UL (ref 149–390)
PLATELET BLD QL SMEAR: ADEQUATE
PMV BLD AUTO: 10.6 FL (ref 8.9–12.7)
PO2 BLDA: 104.2 MM HG (ref 75–129)
PO2 BLDV: 143.7 MM HG (ref 35–45)
POTASSIUM SERPL-SCNC: 5.1 MMOL/L (ref 3.5–5.3)
POTASSIUM SERPL-SCNC: 5.1 MMOL/L (ref 3.5–5.3)
POTASSIUM SERPL-SCNC: 5.2 MMOL/L (ref 3.5–5.3)
PR INTERVAL: 84 MS
PROCALCITONIN SERPL-MCNC: 0.19 NG/ML
PROT SERPL-MCNC: 5.7 G/DL (ref 6.4–8.4)
PROT UR STRIP-MCNC: >=300 MG/DL
PROT UR-MCNC: 170 MG/DL
PROT/CREAT UR: 1.39 MG/G{CREAT} (ref 0–0.1)
QRS AXIS: 85 DEGREES
QRSD INTERVAL: 80 MS
QT INTERVAL: 370 MS
QTC INTERVAL: 457 MS
RBC # BLD AUTO: 3.5 MILLION/UL (ref 3.81–5.12)
RBC #/AREA URNS AUTO: ABNORMAL /HPF
RBC MORPH BLD: PRESENT
RIGHT ATRIUM AREA SYSTOLE A4C: 13.5 CM2
RIGHT VENTRICLE ID DIMENSION: 2.2 CM
SL CV LEFT ATRIUM LENGTH A2C: 5.2 CM
SL CV LV EF: 55
SODIUM SERPL-SCNC: 129 MMOL/L (ref 135–147)
SODIUM SERPL-SCNC: 130 MMOL/L (ref 135–147)
SODIUM SERPL-SCNC: 130 MMOL/L (ref 135–147)
SP GR UR STRIP.AUTO: 1.02 (ref 1–1.03)
SPECIMEN SOURCE: ABNORMAL
T WAVE AXIS: 27 DEGREES
TRICUSPID ANNULAR PLANE SYSTOLIC EXCURSION: 1.7 CM
TRICUSPID VALVE PEAK E WAVE VELOCITY: 0.13 M/S
UROBILINOGEN UR QL STRIP.AUTO: 0.2 E.U./DL
VENT BIPAP FIO2: 30 %
VENT BIPAP FIO2: 30 %
VENTRICULAR RATE: 92 BPM
WBC # BLD AUTO: 4.32 THOUSAND/UL (ref 4.31–10.16)
WBC #/AREA URNS AUTO: ABNORMAL /HPF

## 2024-03-25 PROCEDURE — 82570 ASSAY OF URINE CREATININE: CPT | Performed by: NURSE PRACTITIONER

## 2024-03-25 PROCEDURE — 76775 US EXAM ABDO BACK WALL LIM: CPT

## 2024-03-25 PROCEDURE — 93010 ELECTROCARDIOGRAM REPORT: CPT | Performed by: INTERNAL MEDICINE

## 2024-03-25 PROCEDURE — 83605 ASSAY OF LACTIC ACID: CPT | Performed by: NURSE PRACTITIONER

## 2024-03-25 PROCEDURE — 99223 1ST HOSP IP/OBS HIGH 75: CPT | Performed by: NURSE PRACTITIONER

## 2024-03-25 PROCEDURE — 85025 COMPLETE CBC W/AUTO DIFF WBC: CPT | Performed by: FAMILY MEDICINE

## 2024-03-25 PROCEDURE — 84156 ASSAY OF PROTEIN URINE: CPT | Performed by: NURSE PRACTITIONER

## 2024-03-25 PROCEDURE — 71045 X-RAY EXAM CHEST 1 VIEW: CPT

## 2024-03-25 PROCEDURE — 94640 AIRWAY INHALATION TREATMENT: CPT

## 2024-03-25 PROCEDURE — 94760 N-INVAS EAR/PLS OXIMETRY 1: CPT

## 2024-03-25 PROCEDURE — 99223 1ST HOSP IP/OBS HIGH 75: CPT | Performed by: INTERNAL MEDICINE

## 2024-03-25 PROCEDURE — 99497 ADVNCD CARE PLAN 30 MIN: CPT | Performed by: FAMILY MEDICINE

## 2024-03-25 PROCEDURE — 85379 FIBRIN DEGRADATION QUANT: CPT | Performed by: FAMILY MEDICINE

## 2024-03-25 PROCEDURE — NC001 PR NO CHARGE: Performed by: RADIOLOGY

## 2024-03-25 PROCEDURE — 82805 BLOOD GASES W/O2 SATURATION: CPT | Performed by: NURSE PRACTITIONER

## 2024-03-25 PROCEDURE — NC001 PR NO CHARGE: Performed by: INTERNAL MEDICINE

## 2024-03-25 PROCEDURE — 82805 BLOOD GASES W/O2 SATURATION: CPT | Performed by: FAMILY MEDICINE

## 2024-03-25 PROCEDURE — 80048 BASIC METABOLIC PNL TOTAL CA: CPT | Performed by: NURSE PRACTITIONER

## 2024-03-25 PROCEDURE — 36600 WITHDRAWAL OF ARTERIAL BLOOD: CPT

## 2024-03-25 PROCEDURE — 93306 TTE W/DOPPLER COMPLETE: CPT

## 2024-03-25 PROCEDURE — 99223 1ST HOSP IP/OBS HIGH 75: CPT | Performed by: FAMILY MEDICINE

## 2024-03-25 PROCEDURE — 94003 VENT MGMT INPAT SUBQ DAY: CPT

## 2024-03-25 PROCEDURE — 84145 PROCALCITONIN (PCT): CPT | Performed by: FAMILY MEDICINE

## 2024-03-25 PROCEDURE — 94664 DEMO&/EVAL PT USE INHALER: CPT

## 2024-03-25 PROCEDURE — 93306 TTE W/DOPPLER COMPLETE: CPT | Performed by: INTERNAL MEDICINE

## 2024-03-25 PROCEDURE — 81001 URINALYSIS AUTO W/SCOPE: CPT | Performed by: NURSE PRACTITIONER

## 2024-03-25 PROCEDURE — 80053 COMPREHEN METABOLIC PANEL: CPT | Performed by: INTERNAL MEDICINE

## 2024-03-25 RX ORDER — CEFTRIAXONE 1 G/50ML
1000 INJECTION, SOLUTION INTRAVENOUS EVERY 24 HOURS
Status: COMPLETED | OUTPATIENT
Start: 2024-03-25 | End: 2024-03-31

## 2024-03-25 RX ORDER — LORAZEPAM 2 MG/ML
0.5 INJECTION INTRAMUSCULAR ONCE
Status: COMPLETED | OUTPATIENT
Start: 2024-03-25 | End: 2024-03-25

## 2024-03-25 RX ORDER — AMLODIPINE BESYLATE 5 MG/1
5 TABLET ORAL DAILY
Status: DISCONTINUED | OUTPATIENT
Start: 2024-03-26 | End: 2024-03-25

## 2024-03-25 RX ORDER — BUDESONIDE 0.5 MG/2ML
0.5 INHALANT ORAL
Status: DISCONTINUED | OUTPATIENT
Start: 2024-03-25 | End: 2024-04-02 | Stop reason: HOSPADM

## 2024-03-25 RX ORDER — FORMOTEROL FUMARATE DIHYDRATE 20 UG/2ML
20 SOLUTION RESPIRATORY (INHALATION)
Status: DISCONTINUED | OUTPATIENT
Start: 2024-03-25 | End: 2024-04-02 | Stop reason: HOSPADM

## 2024-03-25 RX ORDER — ASPIRIN 300 MG/1
150 SUPPOSITORY RECTAL DAILY
Status: DISCONTINUED | OUTPATIENT
Start: 2024-03-25 | End: 2024-03-27

## 2024-03-25 RX ORDER — DEXMEDETOMIDINE HYDROCHLORIDE 4 UG/ML
.1-.7 INJECTION, SOLUTION INTRAVENOUS
Status: DISCONTINUED | OUTPATIENT
Start: 2024-03-25 | End: 2024-03-27

## 2024-03-25 RX ADMIN — CEFTRIAXONE 1000 MG: 1 INJECTION, SOLUTION INTRAVENOUS at 14:46

## 2024-03-25 RX ADMIN — IPRATROPIUM BROMIDE 0.5 MG: 0.5 SOLUTION RESPIRATORY (INHALATION) at 15:02

## 2024-03-25 RX ADMIN — DEXMEDETOMIDINE HYDROCHLORIDE 0.1 MCG/KG/HR: 400 INJECTION INTRAVENOUS at 16:01

## 2024-03-25 RX ADMIN — BUDESONIDE 0.5 MG: 0.5 INHALANT RESPIRATORY (INHALATION) at 11:17

## 2024-03-25 RX ADMIN — LORAZEPAM 1 MG: 2 INJECTION INTRAMUSCULAR; INTRAVENOUS at 05:18

## 2024-03-25 RX ADMIN — LORAZEPAM 1 MG: 2 INJECTION INTRAMUSCULAR; INTRAVENOUS at 13:43

## 2024-03-25 RX ADMIN — LORAZEPAM 0.5 MG: 2 INJECTION INTRAMUSCULAR; INTRAVENOUS at 15:52

## 2024-03-25 RX ADMIN — METHYLPREDNISOLONE SODIUM SUCCINATE 40 MG: 40 INJECTION, POWDER, FOR SOLUTION INTRAMUSCULAR; INTRAVENOUS at 10:33

## 2024-03-25 RX ADMIN — LORAZEPAM 0.5 MG: 2 INJECTION INTRAMUSCULAR; INTRAVENOUS at 06:39

## 2024-03-25 RX ADMIN — HEPARIN SODIUM 5000 UNITS: 5000 INJECTION, SOLUTION INTRAVENOUS; SUBCUTANEOUS at 21:19

## 2024-03-25 RX ADMIN — BUDESONIDE 0.5 MG: 0.5 INHALANT RESPIRATORY (INHALATION) at 19:39

## 2024-03-25 RX ADMIN — LORAZEPAM 0.5 MG: 2 INJECTION INTRAMUSCULAR; INTRAVENOUS at 01:31

## 2024-03-25 RX ADMIN — LEVALBUTEROL HYDROCHLORIDE 1.25 MG: 1.25 SOLUTION RESPIRATORY (INHALATION) at 15:02

## 2024-03-25 RX ADMIN — IPRATROPIUM BROMIDE 0.5 MG: 0.5 SOLUTION RESPIRATORY (INHALATION) at 08:07

## 2024-03-25 RX ADMIN — FORMOTEROL FUMARATE DIHYDRATE 20 MCG: 20 SOLUTION RESPIRATORY (INHALATION) at 19:39

## 2024-03-25 RX ADMIN — HYDRALAZINE HYDROCHLORIDE 10 MG: 20 INJECTION INTRAMUSCULAR; INTRAVENOUS at 11:40

## 2024-03-25 RX ADMIN — HYDRALAZINE HYDROCHLORIDE 10 MG: 20 INJECTION INTRAMUSCULAR; INTRAVENOUS at 05:17

## 2024-03-25 RX ADMIN — LEVALBUTEROL HYDROCHLORIDE 1.25 MG: 1.25 SOLUTION RESPIRATORY (INHALATION) at 08:07

## 2024-03-25 RX ADMIN — LEVALBUTEROL HYDROCHLORIDE 1.25 MG: 1.25 SOLUTION RESPIRATORY (INHALATION) at 19:22

## 2024-03-25 RX ADMIN — FORMOTEROL FUMARATE DIHYDRATE 20 MCG: 20 SOLUTION RESPIRATORY (INHALATION) at 11:17

## 2024-03-25 RX ADMIN — HYDRALAZINE HYDROCHLORIDE 10 MG: 20 INJECTION INTRAMUSCULAR; INTRAVENOUS at 00:05

## 2024-03-25 RX ADMIN — METHYLPREDNISOLONE SODIUM SUCCINATE 40 MG: 40 INJECTION, POWDER, FOR SOLUTION INTRAMUSCULAR; INTRAVENOUS at 16:23

## 2024-03-25 RX ADMIN — METHYLPREDNISOLONE SODIUM SUCCINATE 40 MG: 40 INJECTION, POWDER, FOR SOLUTION INTRAMUSCULAR; INTRAVENOUS at 21:19

## 2024-03-25 RX ADMIN — IPRATROPIUM BROMIDE 0.5 MG: 0.5 SOLUTION RESPIRATORY (INHALATION) at 19:22

## 2024-03-25 RX ADMIN — HEPARIN SODIUM 5000 UNITS: 5000 INJECTION, SOLUTION INTRAVENOUS; SUBCUTANEOUS at 13:43

## 2024-03-25 RX ADMIN — METHYLPREDNISOLONE SODIUM SUCCINATE 40 MG: 40 INJECTION, POWDER, FOR SOLUTION INTRAMUSCULAR; INTRAVENOUS at 03:59

## 2024-03-25 RX ADMIN — SODIUM BICARBONATE 125 ML/HR: 84 INJECTION, SOLUTION INTRAVENOUS at 11:28

## 2024-03-25 RX ADMIN — HEPARIN SODIUM 5000 UNITS: 5000 INJECTION, SOLUTION INTRAVENOUS; SUBCUTANEOUS at 05:17

## 2024-03-25 NOTE — CONSULTS
CONSULTATION-NEPHROLOGY   Laya Brooks 79 y.o. female MRN: [id number]  Unit/Bed#:  Encounter: [id number]        Assessment and Plan:    1.  Severe oliguric acute kidney injury (POA) atop chronic kidney disease  Presenting creatinine 2.8-> 4.6 mg/dL today.  Is oliguric via indwelling urinary catheter.  Etiology presumed ATN from hemodynamic perturbations, failure to autoregulate from ACE inhibitor and volume depletion in setting of right renal atrophy.  Obstruction has not been ruled out  Check UA, lactic acid and VBG now.  Stat renal ultrasound.  Patient does appear volume contracted with little oral intake over the last few days, loose stools prior to admission, previously Lasix.  Will treat with isotonic fluids versus starting bicarbonate depending on results of lactic acid. Echo pending  Did discuss probability of requiring dialysis with patient's  who gave verbal consent and will be present at bedside later today to discuss more in sign consent form if needed.  IR aware may need to place urgent temp catheter.  2.  Stage IIIa chronic kidney disease with baseline creatinine around 0.9-1.0 mg/dL  Last visit was with me 3/18.  3.  Acute hypoxic respiratory failure  Initially felt to be combination of COPD exacerbation and acute CHF treated with Lasix.  Remains on BiPAP during exam  4.  COPD with acute exacerbation  Management per primary team/pulmonology  5.  AGMA  Check lactic acid and VBG now  6.  Encephalopathy  Multifactorial -underlying pulmonary pathology, potential uremia, sedating agents Dilaudid/Ativan in setting of severe SEEMA.  Sedating agents have been discontinued  7.  Right renal atrophy  Repeat ultrasound    HPI:    Laya Brooks is a 79 y.o. female with CKD 3A, HTN, right renal atrophy, CAD status post MAYE, CVA, HLD, COPD who presented to [facility] emergency department 3/24 with chief complaint shortness of breath, wheezing x 3 days.  Initially treated in ICU for COPD  exacerbation and CHF.  She was noted to have acute kidney injury present on admission which worsened today associated with oliguria and AGMA.  Nephrology was consulted for same.    Patient does not lend any information to [facility] due to encephalopathy.  I called patient's  Moe and per his report-patient hard time breathing.  She told him she felt that her throat was closing up.  She was having loose stools.  She was taking all of her medications as prescribed.  He called EMS.    From nephrology's perspective last office visit with me on 3/18 for stage IIIa chronic kidney disease.  Creatinine was slightly increased from typical 1.17 mg/dL.  She was in no acute distress during office visit    Reason for Consult: Severe oliguric acute kidney injury    Review of Systems:  Unable to obtain due to encephalopathy    Historical Information   Past Medical History:   Diagnosis Date    CAD (coronary artery disease)     Cardiac disease     Hypercholesteremia     Hyperlipidemia     Hypertension     Hypertension     Renal disorder      Past Surgical History:   Procedure Laterality Date    CORONARY ANGIOPLASTY WITH STENT PLACEMENT      WRIST SURGERY       Social History   Social History     Substance and Sexual Activity   Alcohol Use Never     Social History     Substance and Sexual Activity   Drug Use No     Social History     Tobacco Use   Smoking Status Former   Smokeless Tobacco Never       Family History:   History reviewed. No pertinent family history.    Medications:  Pertinent medications were reviewed  Current Facility-Administered Medications   Medication Dose Route Frequency Provider Last Rate    acetaminophen  650 mg Oral Q4H PRN Eleanor Rodriguez MD      albuterol  2.5 mg Nebulization Q4H PRN Eleanor Rodriguez MD      amLODIPine  5 mg Oral Daily Eleanor Rodriguez MD      atorvastatin  40 mg Oral Daily With Dinner Eleanor Rodriguez MD      budesonide  0.5 mg Nebulization Q12H Sincere Goldman PA-C       clopidogrel  75 mg Oral Daily Eleanor Rodriguez MD      formoterol  20 mcg Nebulization Q12H Sincere Goldman PA-C      heparin (porcine)  5,000 Units Subcutaneous Q8H [facility] Eleanor Rodriguez MD      hydrALAZINE  10 mg Intravenous Q6H [facility] Darryl Powell MD      ipratropium  0.5 mg Nebulization TID Eleanor Rodriguez MD      levalbuterol  1.25 mg Nebulization TID Eleanor Rodriguez MD      LORazepam  1 mg Intravenous Q6H PRN Darryl Powell MD      methylPREDNISolone sodium succinate  40 mg Intravenous Q6H [facility] Eleanor Rodriguez MD      ondansetron  4 mg Intravenous Q6H PRN Eleanor Rodriguez MD           Allergies   Allergen Reactions    Influenza Virus Vaccine          Vitals:   /62   Pulse 90   Temp 97.7 °F (36.5 °C) (Tympanic)   Resp 17   Ht 5' (1.524 m)   Wt 44.6 kg (98 lb 5.2 oz)   SpO2 96%   BMI 19.20 kg/m²   Body mass index is 19.2 kg/m².  SpO2: 96 %,   SpO2 Activity: At Rest,   O2 Device: BiPAP      Intake/Output Summary (Last 24 hours) at 3/25/2024 1035  Last data filed at 3/25/2024 0235  Gross per 24 hour   Intake --   Output 215 ml   Net -215 ml     Invasive Devices       Peripheral Intravenous Line  Duration             Peripheral IV 03/23/24 Left Antecubital 1 day              Drain  Duration             Urethral Catheter 16 Fr. <1 day                    Physical Exam:  General: Acutely ill-appearing female  Eyes: conjunctivae pink, anicteric sclerae  ENT: lips and mucous membranes dry  Neck: supple, no JVD, no masses  Chest: Very diminished breath sounds bilaterally, scattered rhonchi, on BiPAP  CVS: S1 & S2, tachycardic rate, regular rhythm  Abdomen: soft, non-tender, non-distended, normoactive bowel sounds  Extremities: mild edema of both legs  Skin: no rash  Neuro: lethargic, disoriented,       Diagnostic Data:  Lab: I have personally reviewed pertinent lab results.,   CBC:  Results from last 7 days   Lab Units 03/25/24  0504   WBC Thousand/uL 4.32   HEMOGLOBIN g/dL 10.3*  "  HEMATOCRIT % 30.6*   PLATELETS Thousands/uL 143*      CMP:   Lab Results   Component Value Date    SODIUM 129 (L) 03/25/2024    K 5.1 03/25/2024    CL 99 03/25/2024    CO2 15 (L) 03/25/2024    BUN 75 (H) 03/25/2024    CREATININE 4.65 (H) 03/25/2024    CALCIUM 9.0 03/25/2024    AST 12 (L) 03/25/2024    ALT 13 03/25/2024    ALKPHOS 55 03/25/2024    EGFR 8 03/25/2024   ,   PT/INR: No results found for: \"PT\", \"INR\",   Magnesium: No components found for: \"MAG\",  Phosphorous: No results found for: \"PHOS\"    Microbiology:  @LABRCNTIP,(urinecx:7)@        MANOHAR Mccarthy    Portions of the record may have been created with voice recognition software. Occasional wrong word or \"sound a like\" substitutions may have occurred due to the inherent limitations of voice recognition software. Read the chart carefully and recognize, using context, where substitutions have occurred.      "

## 2024-03-25 NOTE — PROGRESS NOTES
[facility]  Progress Note  Name: Laya Brooks I  MRN: [id number]  Unit/Bed#:  I Date of Admission: 3/23/2024   Date of Service: 3/25/2024 I Hospital Day: 2    Assessment/Plan   * Chronic obstructive pulmonary disease with acute exacerbation (HCC)  Assessment & Plan  This is an 79-year-old female patient with recently diagnosed COPD, history of CAD, CKD and hypertension who initially presented to the ED with shortness of breath, was noted to have significant audible wheezing with shortness of breath and had to be placed on BiPAP  Recent PFT showed moderate obstructive deficit, moderate DLCO reduction and evidence of hyperinflation and air trapping on lung volumes without a significant bronchodilator response. She was placed on LAMA/LABA inhalers  Per family, patient was using bronchodilators without significant improvement  In the ER she was placed on BiPAP and received an hour-long neb  Patient received initial dose of IV Solu-Medrol 125 mg in the ED and will continue with 40 mg every 6 hours.  ABG done showed she is not retaining CO2 -will repeat ABG with worsening respiratory failure  Continue with bronchodilators with DuoNebs every 4 hours  No evidence of any superimposed bacterial infection.  Will add IV ceftriaxone for COPD exacerbation      Toxic metabolic encephalopathy  Assessment & Plan  Worsening mental status in the setting of metabolic disturbances including severe SEEMA, respiratory failure; toxic component of patient having received benzodiazepines and opiate medications  Monitor mental status, treat underlying conditions  Family meeting held at patient's bedside    Acute respiratory failure with hypoxia (HCC)  Assessment & Plan  Appears multifactorial, secondary to COPD exacerbation, worsening SEEMA, acute diastolic CHF  Requiring continuous BiPAP therapy  Treating underlying conditions as above    Hyponatremia  Assessment & Plan  Initiated on IV fluids, nephrology  following    Acute diastolic congestive heart failure (HCC)  Assessment & Plan  Wt Readings from Last 3 Encounters:   03/25/24 44.6 kg (98 lb 5.2 oz)   03/18/24 43.1 kg (95 lb)   02/22/24 40.8 kg (90 lb)     Possible acute diastolic CHF with mild tricuspid regurgitation, worsening renal and respiratory failure  Will obtain a 2D echo in the a.m.  Cardiology input appreciated, further diuresis stopped due to severely worsening kidney injury  Monitor daily weights, I's and O's      SEEMA (acute kidney injury) ([facility])  Assessment & Plan  SEEMA superimposed on CKD stage IIIa, present on admission, severely worsening renal function with oliguria  Patient initially admitted with acute respiratory failure felt to be secondary to COPD and CHF exacerbation component, received diuresis with worsening renal function today, further diuretics now discontinued  Appreciate nephrology input, concern for need for dialysis, family in agreement to attempt dialysis treatment  IR consult for hemodialysis catheter placement and initiation of dialysis tomorrow  Trial of volume expansion with bicarbonate drip  Monitor renal function and urine output      CAD (coronary artery disease)  Assessment & Plan  Patient with history of STEMI in 2008 status post RCA stenting  Continue with Plavix and statin when able to take p.o. -for now we will proceed with rectal ASA    Hypertension  Assessment & Plan  On Norvasc 5 mg daily and lisinopril   Blood pressure medications held in setting of worsening SEEMA, respiratory failure currently n.p.o. requiring BiPAP    Stage 3a chronic kidney disease (HCC)  Assessment & Plan  Lab Results   Component Value Date    EGFR 8 03/25/2024    EGFR 14 03/23/2024    EGFR 43 03/13/2024    CREATININE 4.65 (H) 03/25/2024    CREATININE 2.86 (H) 03/23/2024    CREATININE 1.17 03/13/2024   Patient with worsening SEEMA, please refer to above  Plan to initiate hemodialysis tomorrow    CVA (cerebral vascular accident) ([facility])  Assessment &  Plan  History noted  Continue with Plavix and statin             VTE Pharmacologic Prophylaxis:    heparin SQ    Mobility:   Basic Mobility Inpatient Raw Score: 21  JH-HLM Goal: 6: Walk 10 steps or more  JH-HLM Achieved: 2: Bed activities/Dependent transfer  JH-HLM Goal NOT achieved. Continue with multidisciplinary rounding and encourage appropriate mobility to improve upon JH-HLM goals.    Patient Centered Rounds: I performed bedside rounds with nursing staff today.   Discussions with Specialists or Other Care Team Provider: Pulmonology, cardiology, Nephrology    Education and Discussions with Family / Patient: Updated  (, son, and daughter in law) at bedside.    Total Time Spent on Date of Encounter in care of patient: 75 mins. This time was spent on one or more of the following: performing physical exam; counseling and coordination of care; obtaining or reviewing history; documenting in the medical record; reviewing/ordering tests, medications or procedures; communicating with other healthcare professionals and discussing with patient's family/caregivers.    Current Length of Stay: 2 day(s)  Current Patient Status: Inpatient   Certification Statement: The patient will continue to require additional inpatient hospital stay due to respiratory support, IV treatments, potential need for dialysis  Discharge Plan: Anticipate discharge in >72 hrs to discharge location to be determined pending rehab evaluations.    Code Status: Level 1 - Full Code    Subjective:   Patient unable to provide meaningful history.  History is ill-appearing.    Objective:     Vitals:   Temp (24hrs), Av.1 °F (36.7 °C), Min:97.7 °F (36.5 °C), Max:98.6 °F (37 °C)    Temp:  [97.7 °F (36.5 °C)-98.6 °F (37 °C)] 97.8 °F (36.6 °C)  HR:  [] 91  Resp:  [14-32] 19  BP: (130-163)/(60-93) 140/93  SpO2:  [95 %-98 %] 97 %  Body mass index is 19.2 kg/m².     Input and Output Summary (last 24 hours):     Intake/Output Summary  (Last 24 hours) at 3/25/2024 1410  Last data filed at 3/25/2024 0235  Gross per 24 hour   Intake --   Output 90 ml   Net -90 ml       Physical Exam:   Physical Exam  Constitutional:       Appearance: She is ill-appearing.   HENT:      Head: Normocephalic and atraumatic.   Eyes:      Conjunctiva/sclera: Conjunctivae normal.   Cardiovascular:      Rate and Rhythm: Normal rate and regular rhythm.   Pulmonary:      Effort: No respiratory distress.      Breath sounds: Wheezing present.   Abdominal:      General: There is no distension.      Tenderness: There is no abdominal tenderness. There is no guarding.   Musculoskeletal:      Right lower leg: No edema.      Left lower leg: No edema.   Skin:     General: Skin is warm and dry.   Neurological:      Comments: somnolent          Additional Data:     Labs:  Results from last 7 days   Lab Units 03/25/24  0504   WBC Thousand/uL 4.32   HEMOGLOBIN g/dL 10.3*   HEMATOCRIT % 30.6*   PLATELETS Thousands/uL 143*   NEUTROS PCT % 92*   LYMPHS PCT % 4*   MONOS PCT % 3*   EOS PCT % 0     Results from last 7 days   Lab Units 03/25/24  0504   SODIUM mmol/L 129*   POTASSIUM mmol/L 5.1   CHLORIDE mmol/L 99   CO2 mmol/L 15*   BUN mg/dL 75*   CREATININE mg/dL 4.65*   ANION GAP mmol/L 15*   CALCIUM mg/dL 9.0   ALBUMIN g/dL 3.8   TOTAL BILIRUBIN mg/dL 0.36   ALK PHOS U/L 55   ALT U/L 13   AST U/L 12*   GLUCOSE RANDOM mg/dL 135     Results from last 7 days   Lab Units 03/23/24  2209   INR  1.04             Results from last 7 days   Lab Units 03/25/24  1029 03/25/24  0504 03/23/24  2209   LACTIC ACID mmol/L 0.4*  --  1.5   PROCALCITONIN ng/ml  --  0.19 0.12       Lines/Drains:  Invasive Devices       Peripheral Intravenous Line  Duration             Peripheral IV 03/23/24 Left Antecubital 1 day              Drain  Duration             Urethral Catheter 16 Fr. 1 day                  Urinary Catheter:  Goal for removal: Voiding trial when ambulation improves               Imaging: Reviewed  radiology reports from this admission including: chest CT scan and ultrasound(s) and Personally reviewed the following imaging: chest CT scan and ultrasound(s) renal    Recent Cultures (last 7 days):         Last 24 Hours Medication List:   Current Facility-Administered Medications   Medication Dose Route Frequency Provider Last Rate    acetaminophen  650 mg Oral Q4H PRN Eleanor Rodriguez MD      albuterol  2.5 mg Nebulization Q4H PRN Eleanor Rodriguez MD      aspirin  150 mg Rectal Daily Malena Hearn MD      atorvastatin  40 mg Oral Daily With Dinner Eleanor Rodriguez MD      budesonide  0.5 mg Nebulization Q12H Sincere Goldman PA-C      cefTRIAXone  1,000 mg Intravenous Q24H Malena eHarn MD      clopidogrel  75 mg Oral Daily Eleanor Rodriguez MD      formoterol  20 mcg Nebulization Q12H Sincere Goldman PA-C      heparin (porcine)  5,000 Units Subcutaneous Q8H [facility] Eleanor Rodriguez MD      hydrALAZINE  10 mg Intravenous Q6H [facility] Darryl Powell MD      ipratropium  0.5 mg Nebulization TID Eleanor Rodriguez MD      levalbuterol  1.25 mg Nebulization TID Eleanor Rodriguez MD      LORazepam  1 mg Intravenous Q6H PRN Darryl Powell MD      methylPREDNISolone sodium succinate  40 mg Intravenous Q6H [facility] Eleanor Rodriguez MD      ondansetron  4 mg Intravenous Q6H PRN Eleanor Rodriguez MD      sodium bicarbonate 75 mEq in sodium chloride 0.45 % 1,000 mL infusion  125 mL/hr Intravenous Continuous MANOHAR Mccarthy 125 mL/hr (03/25/24 1128)        Today, Patient Was Seen By: Malena Hearn MD    **Please Note: This note may have been constructed using a voice recognition system.**

## 2024-03-25 NOTE — ASSESSMENT & PLAN NOTE
Patient with history of STEMI in 2008 status post RCA stenting  Continue with Plavix and statin when able to take p.o. -for now we will proceed with rectal ASA

## 2024-03-25 NOTE — CONSULTS
Consultation - Pulmonary Medicine   Laya Brooks 79 y.o. female MRN: [id number]  Unit/Bed#:  Encounter: [id number]      Assessment/Plan:    Acute hypoxic respiratory failure  Moderate COPD with acute exacerbation  Anion gap metabolic acidosis  Acute metabolic encephalopathy  SEEMA/CKD    Pulmonary recommendations:    Currently on BiPAP, continue this now for work of breathing while patient continues to compensate for her AGMA.  Keep saturations greater than 88%.  Continue Solu-Medrol 40 mg IV every 6 hours  Continue Xopenex/Atrovent nebs 3 times daily  Add Pulmicort/Perforomist every 12 hours  Add Precedex for agitation  Stop bicarb gtt  Further treatment per primary team/nephrology. Planning on starting HD tomorrow after temp dialysis catheter placed    History of Present Illness   Physician Requesting Consult: Malena Hearn MD  Reason for Consult / Principal Problem: hypoxia  Hx and PE limited by: n/a  Chief Complaint: shortness of breath   HPI: Laya Brooks is a 79 y.o.  female who [facility] to [facility] with complaints of shortness of breath.  Patient has a past medical is positive for moderate COPD, CAD prior STEMI status post RCA stenting in 2008, CVA, CKD stage III, HLD.  Patient presents the ED yesterday with increased shortness of breath and wheezing for the last 3 days.  Also found to have orthopnea and lower extremity edema.  Laboratory workup revealed hyponatremia, elevated BUN/creatinine, hyperglycemia elevated anion gap with metabolic acidosis on blood gas and elevated BNP.  CT imaging with emphysema and left greater than right small bilateral pleural effusions.  Patient was admitted to ICU on BiPAP for increased work of breathing.  She also received IV Lasix which in turn caused worsening creatinine.  Pulmonary was asked to see patient for further management.  At the time of our encounter, patient was sleepy but did open eyes to physical touch.  She did not  offer any substantial history or follow commands.  On 1 L reassessment, patient was more interactive and able to follow commands but was still requiring BiPAP.    Inpatient consult to Pulmonology  Consult performed by: Sincere Goldman PA-C  Consult ordered by: Eleanor Rodriguez MD          Review of Systems   Unable to perform ROS: Mental status change   All other systems reviewed and are negative.      Historical Information   Past Medical History:   Diagnosis Date    CAD (coronary artery disease)     Cardiac disease     Hypercholesteremia     Hyperlipidemia     Hypertension     Hypertension     Renal disorder      Past Surgical History:   Procedure Laterality Date    CORONARY ANGIOPLASTY WITH STENT PLACEMENT      WRIST SURGERY       Social History   Social History     Substance and Sexual Activity   Alcohol Use Never     Social History     Substance and Sexual Activity   Drug Use No     Social History     Tobacco Use   Smoking Status Former   Smokeless Tobacco Never     E-Cigarette/Vaping    E-Cigarette Use Never User      E-Cigarette/Vaping Substances    Nicotine No     THC No     CBD No     Flavoring No      Occupational History: noncontributory    Family History: History reviewed. No pertinent family history.    Meds/Allergies   all current active meds have been reviewed, pertinent pulmonary meds have been reviewed, and current meds:   Current Facility-Administered Medications   Medication Dose Route Frequency    acetaminophen (TYLENOL) tablet 650 mg  650 mg Oral Q4H PRN    albuterol inhalation solution 2.5 mg  2.5 mg Nebulization Q4H PRN    aspirin rectal suppository 150 mg  150 mg Rectal Daily    atorvastatin (LIPITOR) tablet 40 mg  40 mg Oral Daily With Dinner    budesonide (PULMICORT) inhalation solution 0.5 mg  0.5 mg Nebulization Q12H    cefTRIAXone (ROCEPHIN) IVPB (premix in dextrose) 1,000 mg 50 mL  1,000 mg Intravenous Q24H    clopidogrel (PLAVIX) tablet 75 mg  75 mg Oral Daily    dexmedeTOMIDine  (Precedex) 400 mcg in sodium chloride 0.9% 100 mL  0.1-0.7 mcg/kg/hr Intravenous Titrated    formoterol (PERFOROMIST) nebulizer solution 20 mcg  20 mcg Nebulization Q12H    heparin (porcine) subcutaneous injection 5,000 Units  5,000 Units Subcutaneous Q8H [facility]    hydrALAZINE (APRESOLINE) injection 10 mg  10 mg Intravenous Q6H [facility]    ipratropium (ATROVENT) 0.02 % inhalation solution 0.5 mg  0.5 mg Nebulization TID    levalbuterol (XOPENEX) inhalation solution 1.25 mg  1.25 mg Nebulization TID    LORazepam (ATIVAN) injection 1 mg  1 mg Intravenous Q6H PRN    methylPREDNISolone sodium succinate (Solu-MEDROL) injection 40 mg  40 mg Intravenous Q6H [facility]ON    ondansetron (ZOFRAN) injection 4 mg  4 mg Intravenous Q6H PRN    sodium bicarbonate 150 mEq in dextrose 5 % 1,000 mL infusion  75 mL/hr Intravenous Continuous       Allergies   Allergen Reactions    Influenza Virus Vaccine        Objective   Vitals: Blood pressure 132/84, pulse (!) 119, temperature 97.7 °F (36.5 °C), temperature source Temporal, resp. rate (!) 32, height 5' (1.524 m), weight 44.5 kg (98 lb 1.7 oz), SpO2 98%.BiPAP,Body mass index is 19.16 kg/m².    Intake/Output Summary (Last 24 hours) at 3/26/2024 1109  Last data filed at 3/26/2024 0940  Gross per 24 hour   Intake 200 ml   Output 5 ml   Net 195 ml     Invasive Devices       Central Venous Catheter Line  Duration             CVC Central Lines 03/26/24 Double <1 day              Peripheral Intravenous Line  Duration             Peripheral IV 03/23/24 Left Antecubital 2 days    Peripheral IV 03/25/24 Dorsal (posterior);Left Forearm <1 day              Hemodialysis Catheter  Duration             HD Temporary Double Catheter <1 day              Drain  Duration             Urethral Catheter 16 Fr. 2 days                    Physical Exam  Vitals and nursing note reviewed.   Constitutional:       General: She is not in acute distress.     Appearance: Normal appearance.   HENT:      Head: Normocephalic and  "atraumatic.      Mouth/Throat:      Mouth: Mucous membranes are moist.      Pharynx: Oropharynx is clear.   Cardiovascular:      Rate and Rhythm: Normal rate and regular rhythm.      Heart sounds: No murmur heard.  Pulmonary:      Breath sounds: Wheezing present. No rhonchi.   Musculoskeletal:      Cervical back: Normal range of motion.   Skin:     General: Skin is warm and dry.   Neurological:      General: No focal deficit present.      Mental Status: She is alert. Mental status is at baseline.   Psychiatric:         Mood and Affect: Mood normal.         Behavior: Behavior normal.         Lab Results: I have personally reviewed pertinent lab results., ABG:   Lab Results   Component Value Date    PHART 7.395 03/25/2024    KYU0LIF 25.5 (L) 03/25/2024    PO2ART 104.2 03/25/2024    AUD5NOU 15.3 (L) 03/25/2024    BEART -8.2 03/25/2024    SOURCE Radial, Right 03/25/2024   , BNP: No results found for: \"BNP\", CBC:   Lab Results   Component Value Date    WBC 1.58 (L) 03/26/2024    HGB 9.2 (L) 03/26/2024    HCT 28.1 (L) 03/26/2024    MCV 89 03/26/2024     (L) 03/26/2024    RBC 3.17 (L) 03/26/2024    MCH 29.0 03/26/2024    MCHC 32.7 03/26/2024    RDW 14.6 03/26/2024    MPV 10.4 03/26/2024    NRBC 0 03/26/2024   , CMP:   Lab Results   Component Value Date    SODIUM 129 (L) 03/26/2024    K 5.4 (H) 03/26/2024    CL 99 03/26/2024    CO2 16 (L) 03/26/2024    BUN 96 (H) 03/26/2024    CREATININE 6.27 (H) 03/26/2024    CALCIUM 8.4 03/26/2024    AST 9 (L) 03/26/2024    ALT 11 03/26/2024    ALKPHOS 45 03/26/2024    EGFR 5 03/26/2024   , PT/INR: No results found for: \"PT\", \"INR\", Troponin: No results found for: \"TROPONINI\"      Imaging Studies: I have personally reviewed pertinent reports.   and I have personally reviewed pertinent films in PACS     CT chest without contrast 3/20/2024  Small bilateral pleural effusions.  Moderate emphysema.    EKG, Pathology, and Other Studies: I have personally reviewed pertinent reports.  " "     Echo today  EF 55%.  Diastolic function normal.    Pulmonary Results (PFTs, PSG): I have personally reviewed pertinent reports.       Complete PFT with bronchodilator 2/2/24  FEV1/FVC ratio 56%  FEV1 61% predicted  FVC 83% predicted  No change after bronchodilator  % predicted  % predicted  DLCO corrected recent hemoglobin 54% predicted    Code Status: Level 1 - Full Code    Portions of the record may have been created with voice recognition software.  Occasional wrong word or \"sound a like\" substitutions may have occurred due to the inherent limitations of voice recognition software.  Read the chart carefully and recognize, using context, where substitutions have occurred.    "

## 2024-03-25 NOTE — ASSESSMENT & PLAN NOTE
Appears multifactorial, secondary to COPD exacerbation, worsening SEEMA, acute diastolic CHF  Requiring continuous BiPAP therapy  Treating underlying conditions as above

## 2024-03-25 NOTE — RESPIRATORY THERAPY NOTE
Pulse ox 96%   03/25/24 1125   Respiratory Assessment   Resp Comments trialed pt off bipap and placed on 4 l/mnc, then decreased to 3 l/m. pt pulse ox was good but pt had increased WOB, pt had to be placed back on bipap   Oxygen Therapy/Pulse Ox   O2 Device Nasal cannula   Nasal Cannula O2 Flow Rate (L/min) 3 L/min   Calculated FIO2 (%) - Nasal Cannula 32   O2 Flow Rate (L/min) 3 L/min   SpO2 Activity At Rest

## 2024-03-25 NOTE — ASSESSMENT & PLAN NOTE
On Norvasc 5 mg daily and lisinopril   Blood pressure medications held in setting of worsening SEEMA, respiratory failure currently n.p.o. requiring BiPAP

## 2024-03-25 NOTE — ASSESSMENT & PLAN NOTE
This is an 79-year-old female patient with recently diagnosed COPD, history of CAD, CKD and hypertension who initially presented to the ED with shortness of breath, was noted to have significant audible wheezing with shortness of breath and had to be placed on BiPAP  Recent PFT showed moderate obstructive deficit, moderate DLCO reduction and evidence of hyperinflation and air trapping on lung volumes without a significant bronchodilator response. She was placed on LAMA/LABA inhalers  Per family, patient was using bronchodilators without significant improvement  In the ER she was placed on BiPAP and received an hour-long neb  Patient received initial dose of IV Solu-Medrol 125 mg in the ED and will continue with 40 mg every 6 hours.  ABG done showed she is not retaining CO2 -will repeat ABG with worsening respiratory failure  Continue with bronchodilators with DuoNebs every 4 hours  No evidence of any superimposed bacterial infection.  Will add IV ceftriaxone for COPD exacerbation

## 2024-03-25 NOTE — SEPSIS NOTE
"  Sepsis Note   Laya Brooks 79 y.o. female MRN: [id number]  Unit/Bed#:  Encounter: [id number]       Initial Sepsis Screening       Row Name 03/23/24 2234                Is the patient's history suggestive of a new or worsening infection? Yes (Proceed)  -BC        Suspected source of infection pneumonia  -BC        Indicate SIRS criteria Tachycardia > 90 bpm;Tachypnea > 20 resp per min  -BC        Are two or more of the above signs & symptoms of infection both present and new to the patient? Yes (Proceed)  -BC        Assess for evidence of organ dysfunction: Are any of the below criteria present within 6 hours of suspected infection and SIRS criteria that are NOT considered to be chronic conditions? New need for invasive/non-invasive ventilation  -BC        Date of presentation of severe sepsis 03/23/24  -BC        Time of presentation of severe sepsis 2334  -BC        Sepsis Note: Click \"NEXT\" below (NOT \"close\") to generate sepsis note based on above information. YES (proceed by clicking \"NEXT\")  -BC                  User Key  (r) = Recorded By, (t) = Taken By, (c) = Cosigned By      Initials Name Provider Type    BC Garth Juarez MD Physician                        Body mass index is 19.59 kg/m².  Wt Readings from Last 1 Encounters:   03/24/24 45.5 kg (100 lb 5 oz)     IBW (Ideal Body Weight): 45.5 kg    Ideal body weight: 45.5 kg (100 lb 4.9 oz)    "

## 2024-03-25 NOTE — ASSESSMENT & PLAN NOTE
Wt Readings from Last 3 Encounters:   03/25/24 44.6 kg (98 lb 5.2 oz)   03/18/24 43.1 kg (95 lb)   02/22/24 40.8 kg (90 lb)     Possible acute diastolic CHF with mild tricuspid regurgitation, worsening renal and respiratory failure  Will obtain a 2D echo in the a.m.  Cardiology input appreciated, further diuresis stopped due to severely worsening kidney injury  Monitor daily weights, I's and O's

## 2024-03-25 NOTE — RESPIRATORY THERAPY NOTE
03/25/24 0807   Inhalation Therapy Tx   $ Inhalation Therapy Performed Yes   $ Pulse Oximetry Spot Check Charge Completed   SpO2 96 %   Pre-Treatment Pulse 84   Pre-Treatment Respirations 17   Duration 15   Breath Sounds Pre-Treatment Bilateral Diminished   Breath Sounds Pre-Treatment Right Expiratory wheezes  (posteriorly)   Breath Sounds Post-Treatment Bilateral Diminished   Breath Sounds Post-Treatment Right Expiratory wheezes   Post-Treatment Pulse 92   Post-Treatment Respirations 17   Delivery Source Air;UDN   Position Semi Baker's   Treatment Tolerance Tolerated well   Resp Comments neb tx inline with bipap

## 2024-03-25 NOTE — ASSESSMENT & PLAN NOTE
Lab Results   Component Value Date    EGFR 8 03/25/2024    EGFR 14 03/23/2024    EGFR 43 03/13/2024    CREATININE 4.65 (H) 03/25/2024    CREATININE 2.86 (H) 03/23/2024    CREATININE 1.17 03/13/2024   Patient with worsening SEEMA, please refer to above  Plan to initiate hemodialysis tomorrow

## 2024-03-25 NOTE — NURSING NOTE
Around 0119 hours, patient became anxious and non-compliant with bipap. She kept tearing the mask off and hyperventilating. She was flailing around and difficult to keep in the bed. I notified respiratory and the provider. The provider ordered a one time dose of 0.5 mg of IV ativan. Her previous dose  of 1 mg was at 2303 and is ordered q 6 hours. Patient is currently resting comfortably and tolerating the bipap. So far since my shift began at 1900 hours,  she only has about 15 ml out of  yellow urine in the omledo bag.

## 2024-03-25 NOTE — CONSULTS
Interventional Radiology  Consultation 3/25/2024     Inpatient Consult to IR  Consult performed by: Antonino Stein MD  Consult ordered by: MANOHAR Mccarthy        Laya ROBERTO Todd   1945   [id number]      Assessment/Plan:  Inpatient with acute kidney injury and need for urgent dialysis  Plan is early start tomorrow, Tuesday, morning.  IJ access.    Medical Problems       Problem List       * (Principal) Chronic obstructive pulmonary disease with acute exacerbation (HCC)    History of CVA (cerebrovascular accident)    Hypertensive emergency    Malnutrition (HCC)    Malnutrition Findings:                                 BMI Findings:           Body mass index is 19.2 kg/m².         Premature atrial complexes    Thrombocytopenia (HCC)    Leukopenia    Neutropenia (HCC)    Hyperphosphatemia    Hypercholesterolemia    Carotid artery stenosis    Nocturnal hypoxia    Dyspnea on exertion    Abnormal echocardiogram    CVA (cerebral vascular accident) (HCC)    Atrophy of right kidney    Stage 3a chronic kidney disease (HCC)    Lab Results   Component Value Date    EGFR 7 03/25/2024    EGFR 8 03/25/2024    EGFR 14 03/23/2024    CREATININE 5.08 (H) 03/25/2024    CREATININE 4.65 (H) 03/25/2024    CREATININE 2.86 (H) 03/23/2024         Renal vascular disease    Hypertension    Intracranial atherosclerosis    Weight loss, non-intentional    Hypervitaminosis D    CAD (coronary artery disease)    Celiac artery stenosis (HCC)    S/P right coronary artery (RCA) stent placement    Tobacco use    Wheezing    Dyslipidemia    Overview Signed 2/22/2024  2:02 PM by Lisa Kidd MD     Formatting of this note might be different from the original.   On statin.         SEEMA (acute kidney injury) (HCC)    Acute diastolic congestive heart failure (HCC)    Wt Readings from Last 3 Encounters:   03/25/24 44.6 kg (98 lb 5.2 oz)   03/18/24 43.1 kg (95 lb)   02/22/24 40.8 kg (90 lb)                 Hyponatremia    Acute respiratory failure with  hypoxia (HCC)    Toxic metabolic encephalopathy            Subjective:     Patient ID: Laya Brooks is a 79 y.o. female.    History of Present Illness  And patient with chronic renal disease and acute kidney injury    Review of Systems      Past Medical History:   Diagnosis Date    CAD (coronary artery disease)     Cardiac disease     Hypercholesteremia     Hyperlipidemia     Hypertension     Hypertension     Renal disorder         Past Surgical History:   Procedure Laterality Date    CORONARY ANGIOPLASTY WITH STENT PLACEMENT      WRIST SURGERY          Social History     Tobacco Use   Smoking Status Former   Smokeless Tobacco Never        Social History     Substance and Sexual Activity   Alcohol Use Never        Social History     Substance and Sexual Activity   Drug Use No        Allergies   Allergen Reactions    Influenza Virus Vaccine        Current Facility-Administered Medications   Medication Dose Route Frequency Provider Last Rate Last Admin    acetaminophen (TYLENOL) tablet 650 mg  650 mg Oral Q4H PRN Eleanor Rodriguez MD        albuterol inhalation solution 2.5 mg  2.5 mg Nebulization Q4H PRN Eleanor Rodriguez MD        aspirin rectal suppository 150 mg  150 mg Rectal Daily Malena Hearn MD        atorvastatin (LIPITOR) tablet 40 mg  40 mg Oral Daily With Dinner Eleanor Rodriguez MD        budesonide (PULMICORT) inhalation solution 0.5 mg  0.5 mg Nebulization Q12H Sincere Goldman PA-C   0.5 mg at 03/25/24 1117    cefTRIAXone (ROCEPHIN) IVPB (premix in dextrose) 1,000 mg 50 mL  1,000 mg Intravenous Q24H Malena Hearn  mL/hr at 03/25/24 1446 1,000 mg at 03/25/24 1446    clopidogrel (PLAVIX) tablet 75 mg  75 mg Oral Daily Eleanor Rodriguez MD        formoterol (PERFOROMIST) nebulizer solution 20 mcg  20 mcg Nebulization Q12H Sincere Goldman PA-C   20 mcg at 03/25/24 1117    heparin (porcine) subcutaneous injection 5,000 Units  5,000 Units Subcutaneous Q8H [facility] Eleanor TAI  MD Michael   5,000 Units at 03/25/24 1343    hydrALAZINE (APRESOLINE) injection 10 mg  10 mg Intravenous Q6H [facility] Darryl Powell MD   10 mg at 03/25/24 1140    ipratropium (ATROVENT) 0.02 % inhalation solution 0.5 mg  0.5 mg Nebulization TID Eleanor Rodriguez MD   0.5 mg at 03/25/24 1502    levalbuterol (XOPENEX) inhalation solution 1.25 mg  1.25 mg Nebulization TID Eleanor Rodriguez MD   1.25 mg at 03/25/24 1502    LORazepam (ATIVAN) injection 0.5 mg  0.5 mg Intravenous Once Malena Hearn MD        LORazepam (ATIVAN) injection 1 mg  1 mg Intravenous Q6H PRN Darryl Powell MD   1 mg at 03/25/24 1343    methylPREDNISolone sodium succinate (Solu-MEDROL) injection 40 mg  40 mg Intravenous Q6H [facility] Eleanor Rodriguez MD   40 mg at 03/25/24 1033    ondansetron (ZOFRAN) injection 4 mg  4 mg Intravenous Q6H PRN Eleanor Rodriguez MD        sodium bicarbonate 150 mEq in dextrose 5 % 1,000 mL infusion  75 mL/hr Intravenous Continuous Anu MANOHAR Bain 75 mL/hr at 03/25/24 1447 75 mL/hr at 03/25/24 1447          Objective:    Vitals:    03/25/24 1300 03/25/24 1400 03/25/24 1500 03/25/24 1502   BP: 137/63 143/63 144/64    BP Location:       Pulse: 89 94 87    Resp: 19 22 22    Temp:   98.6 °F (37 °C)    TempSrc:   Temporal    SpO2: 98% 97% 97% 97%   Weight:       Height:            Physical Exam      Lab Results   Component Value Date    BNP 1,126 (H) 03/23/2024      Lab Results   Component Value Date    WBC 4.32 03/25/2024    HGB 10.3 (L) 03/25/2024    HCT 30.6 (L) 03/25/2024    MCV 87 03/25/2024     (L) 03/25/2024     Lab Results   Component Value Date    INR 1.04 03/23/2024    INR 0.95 03/07/2022    INR 1.04 10/20/2017    PROTIME 13.5 03/23/2024    PROTIME 12.2 03/07/2022    PROTIME 13.5 10/20/2017     Lab Results   Component Value Date    PTT 25 03/23/2024         I have personally reviewed pertinent imaging and laboratory results.     Code Status: Level 1 - Full Code  Advance Directive and Living Will:       Power of :    POLST:      IR has been consulted to evaluate the patient, determine the appropriate procedure, and whether or not a procedure can and should be performed.    Thank you for allowing me to participate in the care of Laya Brooks. Please don't hesitate to call, text, email, or TigerText with any questions.     This text is generated with voice recognition software. There may be translation, syntax,  or grammatical errors. If you have any questions, please contact the dictating provider.

## 2024-03-25 NOTE — ASSESSMENT & PLAN NOTE
Worsening mental status in the setting of metabolic disturbances including severe SEEMA, respiratory failure; toxic component of patient having received benzodiazepines and opiate medications  Monitor mental status, treat underlying conditions  Family meeting held at patient's bedside

## 2024-03-25 NOTE — RESPIRATORY THERAPY NOTE
03/25/24 1155   Respiratory Assessment   Resp Comments pt had increase WOB and accessory muscle use, had to be placed back on bipap although the pulse ox remained in the vacinity of 95-97%.  notified and RN aware in room   Non-Invasive Information   O2 Interface Device Full face mask   Non-Invasive Ventilation Mode BiPAP   Non-Invasive Settings   IPAP (cm) 12 cm   EPAP (cm) 6 cm   Rate (Set) 10   FiO2 (%) 30   Pressure Support (cm H2O) 6   Rise Time 2   Inspiratory Time (Set) 1   Non-Invasive Readings   Skin Intervention Skin intact   Total Rate 19   MV (Mech) 11.1   Peak Pressure (Obs) 12   Spontaneous Vt (mL) 582   Leak (lpm) 3   Non-Invasive Alarms   Insp Pressure High (cm H20) 20   Insp Pressure Low (cm H20) 4   Low Insp Pressure Time (sec) 20 sec   MV Low (L/min) 4   Vt High (mL) 1000   Vt Low (mL) 200   High Resp Rate (BPM) 40 BPM   Low Resp Rate (BPM) 8 BPM   Apnea Interval (sec) 20   Apnea Rate 8   Apnea Volume (mL) 10

## 2024-03-25 NOTE — PLAN OF CARE
Problem: PAIN - ADULT  Goal: Verbalizes/displays adequate comfort level or baseline comfort level  Description: Interventions:  - Encourage patient to monitor pain and request assistance  - Assess pain using appropriate pain scale  - Administer analgesics based on type and severity of pain and evaluate response  - Implement non-pharmacological measures as appropriate and evaluate response  - Consider cultural and social influences on pain and pain management  - Notify physician/advanced practitioner if interventions unsuccessful or patient reports new pain  Outcome: Progressing     Problem: SAFETY ADULT  Goal: Patient will remain free of falls  Description: INTERVENTIONS:  - Educate patient/family on patient safety including physical limitations  - Instruct patient to call for assistance with activity   - Consult OT/PT to assist with strengthening/mobility   - Keep Call bell within reach  - Keep bed low and locked with side rails adjusted as appropriate  - Keep care items and personal belongings within reach  - Initiate and maintain comfort rounds  - Make Fall Risk Sign visible to staff  - Offer Toileting every 2 Hours, in advance of need  - Initiate/Maintain fall alarm  - Obtain necessary fall risk management equipment: alarm, nonskid footwear, yellow wristband  - Apply yellow socks and bracelet for high fall risk patients  - Consider moving patient to room near nurses station  Outcome: Progressing  Goal: Maintain or return to baseline ADL function  Description: INTERVENTIONS:  -  Assess patient's ability to carry out ADLs; assess patient's baseline for ADL function and identify physical deficits which impact ability to perform ADLs (bathing, care of mouth/teeth, toileting, grooming, dressing, etc.)  - Assess/evaluate cause of self-care deficits   - Assess range of motion  - Assess patient's mobility; develop plan if impaired  - Assess patient's need for assistive devices and provide as appropriate  - Encourage  maximum independence but intervene and supervise when necessary  - Involve family in performance of ADLs  - Assess for home care needs following discharge   - Consider OT consult to assist with ADL evaluation and planning for discharge  - Provide patient education as appropriate  Outcome: Progressing  Goal: Maintains/Returns to pre admission functional level  Description: INTERVENTIONS:  - Perform AM-PAC 6 Click Basic Mobility/ Daily Activity assessment daily.  - Set and communicate daily mobility goal to care team and patient/family/caregiver.   - Collaborate with rehabilitation services on mobility goals if consulted  - Perform Range of Motion 4 times a day.  - Reposition patient every 2 hours.  - Dangle patient 3 times a day  - Stand patient 3 times a day  - Ambulate patient 3 times a day  - Out of bed to chair 3 times a day   - Out of bed for meals 3 times a day  - Out of bed for toileting  - Record patient progress and toleration of activity level   Outcome: Progressing     Problem: DISCHARGE PLANNING  Goal: Discharge to home or other facility with appropriate resources  Description: INTERVENTIONS:  - Identify barriers to discharge w/patient and caregiver  - Arrange for needed discharge resources and transportation as appropriate  - Identify discharge learning needs (meds, wound care, etc.)  - Arrange for interpretive services to assist at discharge as needed  - Refer to Case Management Department for coordinating discharge planning if the patient needs post-hospital services based on physician/advanced practitioner order or complex needs related to functional status, cognitive ability, or social support system  Outcome: Progressing     Problem: Knowledge Deficit  Goal: Patient/family/caregiver demonstrates understanding of disease process, treatment plan, medications, and discharge instructions  Description: Complete learning assessment and assess knowledge base.  Interventions:  - Provide teaching at level of  understanding  - Provide teaching via preferred learning methods  Outcome: Progressing     Problem: INFECTION - ADULT  Goal: Absence or prevention of progression during hospitalization  Description: INTERVENTIONS:  - Assess and monitor for signs and symptoms of infection  - Monitor lab/diagnostic results  - Monitor all insertion sites, i.e. indwelling lines, tubes, and drains  - Monitor endotracheal if appropriate and nasal secretions for changes in amount and color  - [place] appropriate cooling/warming therapies per order  - Administer medications as ordered  - Instruct and encourage patient and family to use good hand hygiene technique  - Identify and instruct in appropriate isolation precautions for identified infection/condition  Outcome: Progressing     Problem: RESPIRATORY - ADULT  Goal: Achieves optimal ventilation and oxygenation  Description: INTERVENTIONS:  - Assess for changes in respiratory status  - Assess for changes in mentation and behavior  - Position to facilitate oxygenation and minimize respiratory effort  - Oxygen administered by appropriate delivery if ordered  - Initiate smoking cessation education as indicated  - Encourage broncho-pulmonary hygiene including cough, deep breathe, Incentive Spirometry  - Assess the need for suctioning and aspirate as needed  - Assess and instruct to report SOB or any respiratory difficulty  - Respiratory Therapy support as indicated  Outcome: Progressing     Problem: GENITOURINARY - ADULT  Goal: Maintains or returns to baseline urinary function  Description: INTERVENTIONS:  - Assess urinary function  - Encourage oral fluids to ensure adequate hydration if ordered  - Administer IV fluids as ordered to ensure adequate hydration  - Administer ordered medications as needed  - Offer frequent toileting  - Follow urinary retention protocol if ordered  Outcome: Progressing  Goal: Absence of urinary retention  Description: INTERVENTIONS:  - Assess patient’s ability  to void and empty bladder  - Monitor I/O  - Bladder scan as needed  - Discuss with physician/AP medications to alleviate retention as needed  - Discuss catheterization for long term situations as appropriate  Outcome: Progressing     Problem: METABOLIC, FLUID AND ELECTROLYTES - ADULT  Goal: Electrolytes maintained within normal limits  Description: INTERVENTIONS:  - Monitor labs and assess patient for signs and symptoms of electrolyte imbalances  - Administer electrolyte replacement as ordered  - Monitor response to electrolyte replacements, including repeat lab results as appropriate  - Instruct patient on fluid and nutrition as appropriate  Outcome: Progressing  Goal: Fluid balance maintained  Description: INTERVENTIONS:  - Monitor labs   - Monitor I/O and WT  - Instruct patient on fluid and nutrition as appropriate  - Assess for signs & symptoms of volume excess or deficit  Outcome: Progressing     Problem: CARDIOVASCULAR - ADULT  Goal: Maintains optimal cardiac output and hemodynamic stability  Description: INTERVENTIONS:  - Monitor I/O, vital signs and rhythm  - Monitor for S/S and trends of decreased cardiac output  - Administer and titrate ordered vasoactive medications to optimize hemodynamic stability  - Assess quality of pulses, skin color and temperature  - Assess for signs of decreased coronary artery perfusion  - Instruct patient to report change in severity of symptoms  Outcome: Progressing  Goal: Absence of cardiac dysrhythmias or at baseline rhythm  Description: INTERVENTIONS:  - Continuous cardiac monitoring, vital signs, obtain 12 lead EKG if ordered  - Administer antiarrhythmic and heart rate control medications as ordered  - Monitor electrolytes and administer replacement therapy as ordered  Outcome: Progressing     Problem: Prexisting or High Potential for Compromised Skin Integrity  Goal: Skin integrity is maintained or improved  Description: INTERVENTIONS:  - Identify patients at risk for skin  breakdown  - Assess and monitor skin integrity  - Assess and monitor nutrition and hydration status  - Monitor labs   - Assess for incontinence   - Turn and reposition patient  - Assist with mobility/ambulation  - Relieve pressure over bony prominences  - Avoid friction and shearing  - Provide appropriate hygiene as needed including keeping skin clean and dry  - Evaluate need for skin moisturizer/barrier cream  - Collaborate with interdisciplinary team   - Patient/family teaching  - Consider wound care consult   Outcome: Progressing     Problem: Nutrition/Hydration-ADULT  Goal: Nutrient/Hydration intake appropriate for improving, restoring or maintaining nutritional needs  Description: Monitor and assess patient's nutrition/hydration status for malnutrition. Collaborate with interdisciplinary team and initiate plan and interventions as ordered.  Monitor patient's weight and dietary intake as ordered or per policy. Utilize nutrition screening tool and intervene as necessary. Determine patient's food preferences and provide high-protein, high-caloric foods as appropriate.     INTERVENTIONS:  - Monitor oral intake, urinary output, labs, and treatment plans  - Assess nutrition and hydration status and recommend course of action  - Evaluate amount of meals eaten  - Assist patient with eating if necessary   - Allow adequate time for meals  - Recommend/ encourage appropriate diets, oral nutritional supplements, and vitamin/mineral supplements  - Order, calculate, and assess calorie counts as needed  - Recommend, monitor, and adjust tube feedings and TPN/PPN based on assessed needs  - Assess need for intravenous fluids  - Provide specific nutrition/hydration education as appropriate  - Include patient/family/caregiver in decisions related to nutrition  Outcome: Progressing

## 2024-03-25 NOTE — CONSULTS
Consultation - Cardiology   Laya Brooks 79 y.o. female MRN: [id number]  Unit/Bed#:  Encounter: [id number]    Inpatient consult to Cardiology  Consult performed by: Ailyn Aranda PA-C  Consult ordered by: Darryl Powell MD          Physician Requesting Consult: Eleanor Rodriguez MD  Reason for Consult / Principal Problem: congestive heart failure    Assessment/Plan:  Acute respiratory failure  - patient has no oxygen requirements at baseline  - has been requiring NC along with intermittent biPAP  - respiratory failure thought to be secondary to #2 and #3    COPD with acute exacerbation  - patient was recently diagnosed with moderate COPD based on PFT's  - initiated on IV steroids by the primary team    Possible acute congestive heart failure   - patient presented with worsening shortness of breath and lower extremity edema per chart review. However, on exam she does not have any lower extremity edema  - CT chest: small bilateral pleural effusions  - BNP: 1,126, increased compared to prior although this was with a creatinine of 2.86  - she does not take any diuretics at baseline  - was initiated on IV lasix - given 40 mg IV BID yesterday. Minimal urinary output thus far per discussion with nursing. Bladder scan negative. Patient now has olmedo  - with worsening creatinine to 4.65 today. Patient does not appear total body volume overloaded on exam. Will discontinue diuretics for now until nephrology consultation.  - dry weight is unclear. Weight over the last few months at office visits have been between 90-95 lbs. Bed weight today 98 lbs.   - continue daily weights and strict I/O as able  - continue low sodium diet, fluid restriction    Acute kidney injury  - presented with a creatinine of 2.86, worsened to 4.65 today s/p IV diuretic therapy. Baseline creatinine around 1.  - for nephrology consultation. Discontinue diuretics for now until nephrology input.    Coronary artery disease  - with prior RCA  stenting in 2008.  - despite severe SEEMA and respiratory failure patient's hs troponin levels are normal.  - nuclear stress test in April 2022 was negative for ischemia/scar  - continue plavix, statin    Hypertension  - initially elevated, now better controlled.    Dyslipidemia    - continue statin therapy    History of Present Illness   HPI: Laya Brooks is a 79 y.o. year old female with coronary artery disease - prior STEMI in 2008 s/p RCA stenting, prior CVA, hypertension, dyslipidemia, moderate COPD with continued tobacco use who follows with Dr. Rivas.    The following history is taken mainly from chart review as the patient is currently on BiPAP and lethargic.  The patient presented to the emergency department on March 23 for the evaluation of shortness of breath.  There apparently was also concern for mild bilateral lower extremity edema per the ER documentation.  She was thought to have both a COPD exacerbation as well as possible congestive heart failure based on pleural effusions on CT imaging as well as elevated BNP.  She had an SEEMA on admission with a creatinine of 2.86, prior baseline around 1.0.  She was initiated on IV steroid therapy by the primary team.  She was also given IV diuretic therapy due to concern for volume overload.  Unfortunately this a.m. her creatinine has significantly worsened to 4.65 with a GFR of 8.  The patient has been producing minimal urine per conversation with nursing staff.  She has been intermittently requiring BiPAP.  An echocardiogram was ordered.  Cardiology was consulted for further evaluation and management of possible congestive heart failure.      Review of Systems   Unable to perform ROS: Acuity of condition     Historical Information   Past Medical History:   Diagnosis Date    CAD (coronary artery disease)     Cardiac disease     Hypercholesteremia     Hyperlipidemia     Hypertension     Hypertension     Renal disorder      Past Surgical History:   Procedure  Laterality Date    CORONARY ANGIOPLASTY WITH STENT PLACEMENT      WRIST SURGERY       Social History     Substance and Sexual Activity   Alcohol Use Never     Social History     Substance and Sexual Activity   Drug Use No     Social History     Tobacco Use   Smoking Status Former   Smokeless Tobacco Never     Family History: non-contributory    Meds/Allergies   all current active meds have been reviewed       Allergies   Allergen Reactions    Influenza Virus Vaccine        Objective   Vitals: Blood pressure 147/68, pulse 101, temperature 98.4 °F (36.9 °C), temperature source Temporal, resp. rate 17, height 5' (1.524 m), weight 44.6 kg (98 lb 5.2 oz), SpO2 95%., Body mass index is 19.2 kg/m².,   Orthostatic Blood Pressures      Flowsheet Row Most Recent Value   Blood Pressure 147/68 filed at 2024 0600   Patient Position - Orthostatic VS Lying filed at 2024 1900          Systolic (24hrs), Av , Min:134 , Max:189     Diastolic (24hrs), Av, Min:60, Max:92      Intake/Output Summary (Last 24 hours) at 3/25/2024 0807  Last data filed at 3/25/2024 0235  Gross per 24 hour   Intake --   Output 215 ml   Net -215 ml       Weight (last 2 days)       Date/Time Weight    24 0548 44.6 (98.33)    24 0600 45.5 (100.31)     Weight: too lethargic to stand at 24 0600    24 0128 45.5 (100.31)    24 2140 44.8 (98.77)            Invasive Devices       Peripheral Intravenous Line  Duration             Peripheral IV 24 Left Antecubital 1 day              Drain  Duration             Urethral Catheter 16 Fr. <1 day                      Physical Exam  Vitals reviewed.   Constitutional:       General: She is not in acute distress.     Appearance: She is not diaphoretic.      Comments: On biPAP   HENT:      Head: Normocephalic and atraumatic.   Eyes:      Pupils: Pupils are equal, round, and reactive to light.   Neck:      Vascular: No carotid bruit.   Cardiovascular:      Rate and Rhythm:  Normal rate and regular rhythm.      Pulses:           Radial pulses are 2+ on the right side and 2+ on the left side.      Heart sounds: S1 normal and S2 normal. No murmur heard.  Pulmonary:      Effort: Pulmonary effort is normal. No respiratory distress.      Breath sounds: Wheezing present. No rales.      Comments: On biPAP  Abdominal:      General: There is no distension.      Palpations: Abdomen is soft.      Tenderness: There is no abdominal tenderness.   Musculoskeletal:         General: No deformity. Normal range of motion.      Cervical back: Normal range of motion.      Right lower leg: No edema.      Left lower leg: No edema.   Skin:     General: Skin is warm and dry.      Findings: No erythema.   Neurological:      Mental Status: She is lethargic.             Laboratory Results:        CBC with diff:   Results from last 7 days   Lab Units 03/25/24  0504 03/24/24  0434 03/23/24 2209   WBC Thousand/uL 4.32 3.84* 4.78   HEMOGLOBIN g/dL 10.3* 10.1* 11.5   HEMATOCRIT % 30.6* 30.3* 34.0*   MCV fL 87 87 86   PLATELETS Thousands/uL 143* 122* 196   RBC Million/uL 3.50* 3.47* 3.97   MCH pg 29.4 29.1 29.0   MCHC g/dL 33.7 33.3 33.8   RDW % 14.6 14.1 14.2   MPV fL 10.6 11.1 10.5   NRBC AUTO /100 WBCs 0 0  --          CMP:  Results from last 7 days   Lab Units 03/23/24  2209   POTASSIUM mmol/L 5.2   CHLORIDE mmol/L 96   CO2 mmol/L 15*   BUN mg/dL 54*   CREATININE mg/dL 2.86*   CALCIUM mg/dL 9.2   AST U/L 24   ALT U/L 19   ALK PHOS U/L 68   EGFR ml/min/1.73sq m 14         BMP:  Results from last 7 days   Lab Units 03/23/24  2209   POTASSIUM mmol/L 5.2   CHLORIDE mmol/L 96   CO2 mmol/L 15*   BUN mg/dL 54*   CREATININE mg/dL 2.86*   CALCIUM mg/dL 9.2       BNP:    Recent Labs     03/23/24  2209   BNP 1,126*       Magnesium:       Coags:   Results from last 7 days   Lab Units 03/23/24  2209   PTT seconds 25   INR  1.04       TSH:       Hemoglobin A1C       Lipid Profile:         Cardiac testing:   No results found for  this or any previous visit.    No results found for this or any previous visit.    No results found for this or any previous visit.    No results found for this or any previous visit.        Imaging: I have personally reviewed pertinent reports.    CT chest wo contrast    Result Date: 3/24/2024  Narrative: CT CHEST WITHOUT IV CONTRAST INDICATION: shortness of breath. Patient is a former smoker. COMPARISON: Chest CT from 10/20/2017. TECHNIQUE: CT examination of the chest was performed without intravenous contrast. Multiplanar 2D reformatted images were created from the source data. This examination, like all CT scans performed in the [facility], was performed utilizing techniques to minimize radiation dose exposure, including the use of iterative reconstruction and automated exposure control. Radiation dose length product (DLP) for this visit: 164.3 mGy-cm FINDINGS: LUNGS: Moderate emphysema. No evidence of pneumonia. Compressive atelectasis of left lower lobe secondary to pleural effusion. There is no tracheal or endobronchial lesion. PLEURA: Small bilateral water density pleural effusions. HEART/GREAT VESSELS: Heart is unremarkable for patient's age. No thoracic aortic aneurysm. MEDIASTINUM AND RAINE: Unremarkable. CHEST WALL AND LOWER NECK: Unremarkable. VISUALIZED STRUCTURES IN THE UPPER ABDOMEN: Unremarkable. OSSEOUS STRUCTURES: No acute fracture or destructive osseous lesion.     Impression: Small bilateral water density pleural effusions with compressive atelectasis of left lower lobe. Moderate emphysema. No evidence of pneumonia. Workstation performed: [id number]     XR chest portable    Result Date: 3/24/2024  Narrative: XR CHEST PORTABLE INDICATION: sob. COMPARISON: 3/7/2024 FINDINGS: No evidence for pneumothorax. New moderate left and small right pleural effusions with bibasilar atelectasis and bilateral lower lobe hazy pulmonary infiltrates noted suggesting pneumonia. Mildly enlarged  cardiac silhouette, unchanged. Calcific atherosclerosis of the aortic arch region again seen. Bones are unremarkable for age. Normal upper abdomen.     Impression: New moderate left and small right pleural effusions with bibasilar atelectasis and bilateral lower lobe hazy pulmonary infiltrates noted suggesting pneumonia. Workstation performed: [id number]     XR chest pa & lateral    Result Date: 3/7/2024  Narrative: CHEST INDICATION:   Chronic obstructive pulmonary disease, unspecified. COMPARISON:  None. EXAM PERFORMED/VIEWS:  XR CHEST PA & LATERAL FINDINGS: Cardiomediastinal silhouette appears unremarkable. The lungs are clear.  No pneumothorax or pleural effusion. Hyperinflated appearing lungs with flattened diaphragms, increased clear space and parenchymal attenuation. Osseous structures appear within normal limits for patient age.     Impression: No acute cardiopulmonary disease. Hyperinflated. No significant change from prior study 1/29/2024 Electronically signed: 03/07/2024 04:10 PM Sammy Squires MD      EKG reviewed personally: sinus rhythm with short VA, poor anterior R wave progression  Telemetry reviewed personally: sinus rhythm, one short burst of atrial tachycardia    Assessment:  Principal Problem:    Chronic obstructive pulmonary disease with acute exacerbation (HCC)  Active Problems:    Hypercholesterolemia    CVA (cerebral vascular accident) ([facility])    Stage 3a chronic kidney disease (HCC)    Hypertension    CAD (coronary artery disease)    SEEMA (acute kidney injury) ([facility])    CHF (congestive heart failure) ([facility])    Hyponatremia          Code Status: Level 1 - Full Code

## 2024-03-25 NOTE — PLAN OF CARE
Problem: PAIN - ADULT  Goal: Verbalizes/displays adequate comfort level or baseline comfort level  Description: Interventions:  - Encourage patient to monitor pain and request assistance  - Assess pain using appropriate pain scale  - Administer analgesics based on type and severity of pain and evaluate response  - Implement non-pharmacological measures as appropriate and evaluate response  - Consider cultural and social influences on pain and pain management  - Notify physician/advanced practitioner if interventions unsuccessful or patient reports new pain  Outcome: Progressing     Problem: Knowledge Deficit  Goal: Patient/family/caregiver demonstrates understanding of disease process, treatment plan, medications, and discharge instructions  Description: Complete learning assessment and assess knowledge base.  Interventions:  - Provide teaching at level of understanding  - Provide teaching via preferred learning methods  Outcome: Progressing     Problem: RESPIRATORY - ADULT  Goal: Achieves optimal ventilation and oxygenation  Description: INTERVENTIONS:  - Assess for changes in respiratory status  - Assess for changes in mentation and behavior  - Position to facilitate oxygenation and minimize respiratory effort  - Oxygen administered by appropriate delivery if ordered  - Initiate smoking cessation education as indicated  - Encourage broncho-pulmonary hygiene including cough, deep breathe, Incentive Spirometry  - Assess the need for suctioning and aspirate as needed  - Assess and instruct to report SOB or any respiratory difficulty  - Respiratory Therapy support as indicated  Outcome: Progressing     Problem: GENITOURINARY - ADULT  Goal: Maintains or returns to baseline urinary function  Description: INTERVENTIONS:  - Assess urinary function  - Encourage oral fluids to ensure adequate hydration if ordered  - Administer IV fluids as ordered to ensure adequate hydration  - Administer ordered medications as needed  -  Offer frequent toileting  - Follow urinary retention protocol if ordered  Outcome: Progressing  Goal: Absence of urinary retention  Description: INTERVENTIONS:  - Assess patient’s ability to void and empty bladder  - Monitor I/O  - Bladder scan as needed  - Discuss with physician/AP medications to alleviate retention as needed  - Discuss catheterization for long term situations as appropriate  Outcome: Progressing     Problem: METABOLIC, FLUID AND ELECTROLYTES - ADULT  Goal: Electrolytes maintained within normal limits  Description: INTERVENTIONS:  - Monitor labs and assess patient for signs and symptoms of electrolyte imbalances  - Administer electrolyte replacement as ordered  - Monitor response to electrolyte replacements, including repeat lab results as appropriate  - Instruct patient on fluid and nutrition as appropriate  Outcome: Progressing  Goal: Fluid balance maintained  Description: INTERVENTIONS:  - Monitor labs   - Monitor I/O and WT  - Instruct patient on fluid and nutrition as appropriate  - Assess for signs & symptoms of volume excess or deficit  Outcome: Progressing     Problem: CARDIOVASCULAR - ADULT  Goal: Maintains optimal cardiac output and hemodynamic stability  Description: INTERVENTIONS:  - Monitor I/O, vital signs and rhythm  - Monitor for S/S and trends of decreased cardiac output  - Administer and titrate ordered vasoactive medications to optimize hemodynamic stability  - Assess quality of pulses, skin color and temperature  - Assess for signs of decreased coronary artery perfusion  - Instruct patient to report change in severity of symptoms  Outcome: Progressing  Goal: Absence of cardiac dysrhythmias or at baseline rhythm  Description: INTERVENTIONS:  - Continuous cardiac monitoring, vital signs, obtain 12 lead EKG if ordered  - Administer antiarrhythmic and heart rate control medications as ordered  - Monitor electrolytes and administer replacement therapy as ordered  Outcome:  Progressing

## 2024-03-25 NOTE — RESPIRATORY THERAPY NOTE
03/25/24 0900   Respiratory Assessment   Resp Comments resp tx done in line   Non-Invasive Information   O2 Interface Device Full face mask   Non-Invasive Ventilation Mode BiPAP   $ Pulse Oximetry Spot Check Charge Completed   Non-Invasive Settings   IPAP (cm) 12 cm   EPAP (cm) 6 cm   Rate (Set) 10   FiO2 (%) 30   Pressure Support (cm H2O) 6   Rise Time 3   Inspiratory Time (Set) 1.2   Humidification 30.2   Temperature (Set) 30.2   Non-Invasive Readings   Skin Intervention Skin intact   Total Rate 17   MV (Mech) 8.5   Peak Pressure (Obs) 12   Spontaneous Vt (mL) 497   I/E Ratio (Obs) 1:4.0   Heater Temperature (Obs) 30.2   Leak (lpm) 0   Non-Invasive Alarms   Insp Pressure High (cm H20) 20   Insp Pressure Low (cm H20) 4   Low Insp Pressure Time (sec) 20 sec   MV Low (L/min) 4   Vt High (mL) 1000   Vt Low (mL) 200   High Resp Rate (BPM) 40 BPM   Low Resp Rate (BPM) 8 BPM   Apnea Interval (sec) 20  (alarm volume level 10)   Apnea Rate 8

## 2024-03-25 NOTE — PROGRESS NOTES
"I presented to patient's room accompanied by hospitalist Dr. Hearn to discuss GOC with patient's family.  Patient noted to be obtunded on BiPAP and was not able to participate meaningfully in conversation.  Discussion held with patient's , 2 sons and daughter-in-law.    Discussed current situation including respiratory failure and oliguric kidney failure with overall poor prognosis if no improvement in next 12 to 24 hours.  Reviewed patient's wishes with family that living will supporting full measures.  Patient's family including  states that she would like aggressive measures including dialysis if it were to be necessary.  Risk explained in layman's terms including, but not limited to, arrhythmia, low blood pressure, bleeding, allergy, death, air in lung.  Consent form signed.    Overall family made it clear that patient had declining quality of life as an outpatient however had goals to \"make it until grandson's graduation\".    Will continue sodium bicarbonate infusion with close monitoring of respiratory status and repeat labs around 1400.  SANDRA Arnold with IR has already been made aware of potential need for temporary dialysis access [formal consult not yet placed] as has dialysis nursing made aware she is \"eval\" for tomorrow, 3/26 [orders for treatment not yet placed].  "

## 2024-03-25 NOTE — PLAN OF CARE
Problem: PAIN - ADULT  Goal: Verbalizes/displays adequate comfort level or baseline comfort level  Description: Interventions:  - Encourage patient to monitor pain and request assistance  - Assess pain using appropriate pain scale  - Administer analgesics based on type and severity of pain and evaluate response  - Implement non-pharmacological measures as appropriate and evaluate response  - Consider cultural and social influences on pain and pain management  - Notify physician/advanced practitioner if interventions unsuccessful or patient reports new pain  Outcome: Progressing     Problem: SAFETY ADULT  Goal: Patient will remain free of falls  Description: INTERVENTIONS:  - Educate patient/family on patient safety including physical limitations  - Instruct patient to call for assistance with activity   - Consult OT/PT to assist with strengthening/mobility   - Keep Call bell within reach  - Keep bed low and locked with side rails adjusted as appropriate  - Keep care items and personal belongings within reach  - Initiate and maintain comfort rounds  - Make Fall Risk Sign visible to staff  - Offer Toileting every 2 Hours, in advance of need  - Initiate/Maintain fall alarm  - Obtain necessary fall risk management equipment: alarm, nonskid footwear, yellow wristband  - Apply yellow socks and bracelet for high fall risk patients  - Consider moving patient to room near nurses station  Outcome: Progressing  Goal: Maintain or return to baseline ADL function  Description: INTERVENTIONS:  -  Assess patient's ability to carry out ADLs; assess patient's baseline for ADL function and identify physical deficits which impact ability to perform ADLs (bathing, care of mouth/teeth, toileting, grooming, dressing, etc.)  - Assess/evaluate cause of self-care deficits   - Assess range of motion  - Assess patient's mobility; develop plan if impaired  - Assess patient's need for assistive devices and provide as appropriate  - Encourage  maximum independence but intervene and supervise when necessary  - Involve family in performance of ADLs  - Assess for home care needs following discharge   - Consider OT consult to assist with ADL evaluation and planning for discharge  - Provide patient education as appropriate  Outcome: Progressing  Goal: Maintains/Returns to pre admission functional level  Description: INTERVENTIONS:  - Perform AM-PAC 6 Click Basic Mobility/ Daily Activity assessment daily.  - Set and communicate daily mobility goal to care team and patient/family/caregiver.   - Collaborate with rehabilitation services on mobility goals if consulted  - Perform Range of Motion 4 times a day.  - Reposition patient every 2 hours.  - Dangle patient 3 times a day  - Stand patient 3 times a day  - Ambulate patient 3 times a day  - Out of bed to chair 3 times a day   - Out of bed for meals 3 times a day  - Out of bed for toileting  - Record patient progress and toleration of activity level   Outcome: Progressing     Problem: DISCHARGE PLANNING  Goal: Discharge to home or other facility with appropriate resources  Description: INTERVENTIONS:  - Identify barriers to discharge w/patient and caregiver  - Arrange for needed discharge resources and transportation as appropriate  - Identify discharge learning needs (meds, wound care, etc.)  - Arrange for interpretive services to assist at discharge as needed  - Refer to Case Management Department for coordinating discharge planning if the patient needs post-hospital services based on physician/advanced practitioner order or complex needs related to functional status, cognitive ability, or social support system  Outcome: Progressing     Problem: Knowledge Deficit  Goal: Patient/family/caregiver demonstrates understanding of disease process, treatment plan, medications, and discharge instructions  Description: Complete learning assessment and assess knowledge base.  Interventions:  - Provide teaching at level of  understanding  - Provide teaching via preferred learning methods  Outcome: Progressing     Problem: INFECTION - ADULT  Goal: Absence or prevention of progression during hospitalization  Description: INTERVENTIONS:  - Assess and monitor for signs and symptoms of infection  - Monitor lab/diagnostic results  - Monitor all insertion sites, i.e. indwelling lines, tubes, and drains  - Monitor endotracheal if appropriate and nasal secretions for changes in amount and color  - [place] appropriate cooling/warming therapies per order  - Administer medications as ordered  - Instruct and encourage patient and family to use good hand hygiene technique  - Identify and instruct in appropriate isolation precautions for identified infection/condition  Outcome: Progressing     Problem: RESPIRATORY - ADULT  Goal: Achieves optimal ventilation and oxygenation  Description: INTERVENTIONS:  - Assess for changes in respiratory status  - Assess for changes in mentation and behavior  - Position to facilitate oxygenation and minimize respiratory effort  - Oxygen administered by appropriate delivery if ordered  - Initiate smoking cessation education as indicated  - Encourage broncho-pulmonary hygiene including cough, deep breathe, Incentive Spirometry  - Assess the need for suctioning and aspirate as needed  - Assess and instruct to report SOB or any respiratory difficulty  - Respiratory Therapy support as indicated  Outcome: Progressing     Problem: GENITOURINARY - ADULT  Goal: Maintains or returns to baseline urinary function  Description: INTERVENTIONS:  - Assess urinary function  - Encourage oral fluids to ensure adequate hydration if ordered  - Administer IV fluids as ordered to ensure adequate hydration  - Administer ordered medications as needed  - Offer frequent toileting  - Follow urinary retention protocol if ordered  Outcome: Progressing  Goal: Absence of urinary retention  Description: INTERVENTIONS:  - Assess patient’s ability  to void and empty bladder  - Monitor I/O  - Bladder scan as needed  - Discuss with physician/AP medications to alleviate retention as needed  - Discuss catheterization for long term situations as appropriate  Outcome: Progressing     Problem: METABOLIC, FLUID AND ELECTROLYTES - ADULT  Goal: Electrolytes maintained within normal limits  Description: INTERVENTIONS:  - Monitor labs and assess patient for signs and symptoms of electrolyte imbalances  - Administer electrolyte replacement as ordered  - Monitor response to electrolyte replacements, including repeat lab results as appropriate  - Instruct patient on fluid and nutrition as appropriate  Outcome: Progressing  Goal: Fluid balance maintained  Description: INTERVENTIONS:  - Monitor labs   - Monitor I/O and WT  - Instruct patient on fluid and nutrition as appropriate  - Assess for signs & symptoms of volume excess or deficit  Outcome: Progressing     Problem: CARDIOVASCULAR - ADULT  Goal: Maintains optimal cardiac output and hemodynamic stability  Description: INTERVENTIONS:  - Monitor I/O, vital signs and rhythm  - Monitor for S/S and trends of decreased cardiac output  - Administer and titrate ordered vasoactive medications to optimize hemodynamic stability  - Assess quality of pulses, skin color and temperature  - Assess for signs of decreased coronary artery perfusion  - Instruct patient to report change in severity of symptoms  Outcome: Progressing  Goal: Absence of cardiac dysrhythmias or at baseline rhythm  Description: INTERVENTIONS:  - Continuous cardiac monitoring, vital signs, obtain 12 lead EKG if ordered  - Administer antiarrhythmic and heart rate control medications as ordered  - Monitor electrolytes and administer replacement therapy as ordered  Outcome: Progressing

## 2024-03-25 NOTE — ASSESSMENT & PLAN NOTE
SEEMA superimposed on CKD stage IIIa, present on admission, severely worsening renal function with oliguria  Patient initially admitted with acute respiratory failure felt to be secondary to COPD and CHF exacerbation component, received diuresis with worsening renal function today, further diuretics now discontinued  Appreciate nephrology input, concern for need for dialysis, family in agreement to attempt dialysis treatment  IR consult for hemodialysis catheter placement and initiation of dialysis tomorrow  Trial of volume expansion with bicarbonate drip  Monitor renal function and urine output

## 2024-03-26 ENCOUNTER — APPOINTMENT (INPATIENT)
Dept: DIALYSIS | Facility: HOSPITAL | Age: 79
DRG: 682 | End: 2024-03-26
Payer: MEDICARE

## 2024-03-26 ENCOUNTER — APPOINTMENT (INPATIENT)
Dept: NON INVASIVE DIAGNOSTICS | Facility: HOSPITAL | Age: 79
DRG: 682 | End: 2024-03-26
Payer: MEDICARE

## 2024-03-26 ENCOUNTER — APPOINTMENT (INPATIENT)
Dept: CT IMAGING | Facility: HOSPITAL | Age: 79
DRG: 682 | End: 2024-03-26
Payer: MEDICARE

## 2024-03-26 ENCOUNTER — APPOINTMENT (INPATIENT)
Dept: RADIOLOGY | Facility: HOSPITAL | Age: 79
DRG: 682 | End: 2024-03-26
Payer: MEDICARE

## 2024-03-26 ENCOUNTER — APPOINTMENT (INPATIENT)
Dept: INTERVENTIONAL RADIOLOGY/VASCULAR | Facility: HOSPITAL | Age: 79
DRG: 682 | End: 2024-03-26
Attending: RADIOLOGY
Payer: MEDICARE

## 2024-03-26 LAB
ALBUMIN SERPL BCP-MCNC: 3.5 G/DL (ref 3.5–5)
ALP SERPL-CCNC: 45 U/L (ref 34–104)
ALT SERPL W P-5'-P-CCNC: 11 U/L (ref 7–52)
ANION GAP SERPL CALCULATED.3IONS-SCNC: 14 MMOL/L (ref 4–13)
AST SERPL W P-5'-P-CCNC: 9 U/L (ref 13–39)
BASOPHILS # BLD AUTO: 0 THOUSANDS/ÂΜL (ref 0–0.1)
BASOPHILS NFR BLD AUTO: 0 % (ref 0–1)
BILIRUB SERPL-MCNC: 0.3 MG/DL (ref 0.2–1)
BUN SERPL-MCNC: 96 MG/DL (ref 5–25)
CALCIUM SERPL-MCNC: 8.4 MG/DL (ref 8.4–10.2)
CHLORIDE SERPL-SCNC: 99 MMOL/L (ref 96–108)
CO2 SERPL-SCNC: 16 MMOL/L (ref 21–32)
CREAT SERPL-MCNC: 6.27 MG/DL (ref 0.6–1.3)
EOSINOPHIL # BLD AUTO: 0 THOUSAND/ÂΜL (ref 0–0.61)
EOSINOPHIL NFR BLD AUTO: 0 % (ref 0–6)
ERYTHROCYTE [DISTWIDTH] IN BLOOD BY AUTOMATED COUNT: 14.6 % (ref 11.6–15.1)
GFR SERPL CREATININE-BSD FRML MDRD: 5 ML/MIN/1.73SQ M
GLUCOSE SERPL-MCNC: 140 MG/DL (ref 65–140)
HBV CORE AB SER QL: NORMAL
HBV CORE IGM SER QL: NORMAL
HBV SURFACE AB SER-ACNC: <3 MIU/ML
HBV SURFACE AG SER QL: NORMAL
HCT VFR BLD AUTO: 28.1 % (ref 34.8–46.1)
HCV AB SER QL: NORMAL
HGB BLD-MCNC: 9.2 G/DL (ref 11.5–15.4)
IMM GRANULOCYTES # BLD AUTO: 0 THOUSAND/UL (ref 0–0.2)
IMM GRANULOCYTES NFR BLD AUTO: 0 % (ref 0–2)
LYMPHOCYTES # BLD AUTO: 0.13 THOUSANDS/ÂΜL (ref 0.6–4.47)
LYMPHOCYTES NFR BLD AUTO: 8 % (ref 14–44)
MCH RBC QN AUTO: 29 PG (ref 26.8–34.3)
MCHC RBC AUTO-ENTMCNC: 32.7 G/DL (ref 31.4–37.4)
MCV RBC AUTO: 89 FL (ref 82–98)
MONOCYTES # BLD AUTO: 0.08 THOUSAND/ÂΜL (ref 0.17–1.22)
MONOCYTES NFR BLD AUTO: 5 % (ref 4–12)
NEUTROPHILS # BLD AUTO: 1.37 THOUSANDS/ÂΜL (ref 1.85–7.62)
NEUTS SEG NFR BLD AUTO: 87 % (ref 43–75)
NRBC BLD AUTO-RTO: 0 /100 WBCS
PLATELET # BLD AUTO: 123 THOUSANDS/UL (ref 149–390)
PMV BLD AUTO: 10.4 FL (ref 8.9–12.7)
POTASSIUM SERPL-SCNC: 5.4 MMOL/L (ref 3.5–5.3)
PROT SERPL-MCNC: 5.1 G/DL (ref 6.4–8.4)
RBC # BLD AUTO: 3.17 MILLION/UL (ref 3.81–5.12)
SODIUM SERPL-SCNC: 129 MMOL/L (ref 135–147)
WBC # BLD AUTO: 1.58 THOUSAND/UL (ref 4.31–10.16)

## 2024-03-26 PROCEDURE — 94640 AIRWAY INHALATION TREATMENT: CPT

## 2024-03-26 PROCEDURE — 99232 SBSQ HOSP IP/OBS MODERATE 35: CPT | Performed by: PHYSICIAN ASSISTANT

## 2024-03-26 PROCEDURE — 94664 DEMO&/EVAL PT USE INHALER: CPT

## 2024-03-26 PROCEDURE — 76937 US GUIDE VASCULAR ACCESS: CPT | Performed by: RADIOLOGY

## 2024-03-26 PROCEDURE — 94760 N-INVAS EAR/PLS OXIMETRY 1: CPT

## 2024-03-26 PROCEDURE — 90935 HEMODIALYSIS ONE EVALUATION: CPT | Performed by: INTERNAL MEDICINE

## 2024-03-26 PROCEDURE — 76937 US GUIDE VASCULAR ACCESS: CPT

## 2024-03-26 PROCEDURE — 87081 CULTURE SCREEN ONLY: CPT | Performed by: FAMILY MEDICINE

## 2024-03-26 PROCEDURE — 99233 SBSQ HOSP IP/OBS HIGH 50: CPT | Performed by: FAMILY MEDICINE

## 2024-03-26 PROCEDURE — 87205 SMEAR GRAM STAIN: CPT | Performed by: PHYSICIAN ASSISTANT

## 2024-03-26 PROCEDURE — 02HV33Z INSERTION OF INFUSION DEVICE INTO SUPERIOR VENA CAVA, PERCUTANEOUS APPROACH: ICD-10-PCS | Performed by: RADIOLOGY

## 2024-03-26 PROCEDURE — 87340 HEPATITIS B SURFACE AG IA: CPT | Performed by: NURSE PRACTITIONER

## 2024-03-26 PROCEDURE — 70450 CT HEAD/BRAIN W/O DYE: CPT

## 2024-03-26 PROCEDURE — 94003 VENT MGMT INPAT SUBQ DAY: CPT

## 2024-03-26 PROCEDURE — 92610 EVALUATE SWALLOWING FUNCTION: CPT

## 2024-03-26 PROCEDURE — C1894 INTRO/SHEATH, NON-LASER: HCPCS

## 2024-03-26 PROCEDURE — NC001 PR NO CHARGE: Performed by: RADIOLOGY

## 2024-03-26 PROCEDURE — 93970 EXTREMITY STUDY: CPT | Performed by: SURGERY

## 2024-03-26 PROCEDURE — 36556 INSERT NON-TUNNEL CV CATH: CPT | Performed by: RADIOLOGY

## 2024-03-26 PROCEDURE — 93970 EXTREMITY STUDY: CPT

## 2024-03-26 PROCEDURE — 85025 COMPLETE CBC W/AUTO DIFF WBC: CPT | Performed by: FAMILY MEDICINE

## 2024-03-26 PROCEDURE — 86704 HEP B CORE ANTIBODY TOTAL: CPT | Performed by: NURSE PRACTITIONER

## 2024-03-26 PROCEDURE — 86706 HEP B SURFACE ANTIBODY: CPT | Performed by: NURSE PRACTITIONER

## 2024-03-26 PROCEDURE — 36556 INSERT NON-TUNNEL CV CATH: CPT

## 2024-03-26 PROCEDURE — C1752 CATH,HEMODIALYSIS,SHORT-TERM: HCPCS

## 2024-03-26 PROCEDURE — 5A1D70Z PERFORMANCE OF URINARY FILTRATION, INTERMITTENT, LESS THAN 6 HOURS PER DAY: ICD-10-PCS | Performed by: INTERNAL MEDICINE

## 2024-03-26 PROCEDURE — 86705 HEP B CORE ANTIBODY IGM: CPT | Performed by: NURSE PRACTITIONER

## 2024-03-26 PROCEDURE — 71045 X-RAY EXAM CHEST 1 VIEW: CPT

## 2024-03-26 PROCEDURE — 86803 HEPATITIS C AB TEST: CPT | Performed by: NURSE PRACTITIONER

## 2024-03-26 PROCEDURE — 80053 COMPREHEN METABOLIC PANEL: CPT | Performed by: FAMILY MEDICINE

## 2024-03-26 RX ORDER — LIDOCAINE HYDROCHLORIDE 10 MG/ML
INJECTION, SOLUTION EPIDURAL; INFILTRATION; INTRACAUDAL; PERINEURAL AS NEEDED
Status: COMPLETED | OUTPATIENT
Start: 2024-03-26 | End: 2024-03-26

## 2024-03-26 RX ORDER — DEXTROSE MONOHYDRATE 50 MG/ML
50 INJECTION, SOLUTION INTRAVENOUS CONTINUOUS
Status: DISCONTINUED | OUTPATIENT
Start: 2024-03-26 | End: 2024-03-27

## 2024-03-26 RX ORDER — METHYLPREDNISOLONE SODIUM SUCCINATE 40 MG/ML
40 INJECTION, POWDER, LYOPHILIZED, FOR SOLUTION INTRAMUSCULAR; INTRAVENOUS EVERY 8 HOURS SCHEDULED
Status: DISCONTINUED | OUTPATIENT
Start: 2024-03-26 | End: 2024-03-27

## 2024-03-26 RX ADMIN — LEVALBUTEROL HYDROCHLORIDE 1.25 MG: 1.25 SOLUTION RESPIRATORY (INHALATION) at 19:05

## 2024-03-26 RX ADMIN — CEFTRIAXONE 1000 MG: 1 INJECTION, SOLUTION INTRAVENOUS at 14:25

## 2024-03-26 RX ADMIN — HYDRALAZINE HYDROCHLORIDE 10 MG: 20 INJECTION INTRAMUSCULAR; INTRAVENOUS at 06:06

## 2024-03-26 RX ADMIN — METHYLPREDNISOLONE SODIUM SUCCINATE 40 MG: 40 INJECTION, POWDER, FOR SOLUTION INTRAMUSCULAR; INTRAVENOUS at 21:19

## 2024-03-26 RX ADMIN — LEVALBUTEROL HYDROCHLORIDE 1.25 MG: 1.25 SOLUTION RESPIRATORY (INHALATION) at 14:07

## 2024-03-26 RX ADMIN — LIDOCAINE HYDROCHLORIDE 10 ML: 10 INJECTION, SOLUTION EPIDURAL; INFILTRATION; INTRACAUDAL; PERINEURAL at 07:48

## 2024-03-26 RX ADMIN — DEXTROSE 50 ML/HR: 5 SOLUTION INTRAVENOUS at 17:47

## 2024-03-26 RX ADMIN — HYDRALAZINE HYDROCHLORIDE 10 MG: 20 INJECTION INTRAMUSCULAR; INTRAVENOUS at 17:47

## 2024-03-26 RX ADMIN — IPRATROPIUM BROMIDE 0.5 MG: 0.5 SOLUTION RESPIRATORY (INHALATION) at 14:07

## 2024-03-26 RX ADMIN — HEPARIN SODIUM 5000 UNITS: 5000 INJECTION, SOLUTION INTRAVENOUS; SUBCUTANEOUS at 14:25

## 2024-03-26 RX ADMIN — BUDESONIDE 0.5 MG: 0.5 INHALANT RESPIRATORY (INHALATION) at 07:14

## 2024-03-26 RX ADMIN — BUDESONIDE 0.5 MG: 0.5 INHALANT RESPIRATORY (INHALATION) at 19:05

## 2024-03-26 RX ADMIN — IPRATROPIUM BROMIDE 0.5 MG: 0.5 SOLUTION RESPIRATORY (INHALATION) at 07:14

## 2024-03-26 RX ADMIN — FORMOTEROL FUMARATE DIHYDRATE 20 MCG: 20 SOLUTION RESPIRATORY (INHALATION) at 07:14

## 2024-03-26 RX ADMIN — METHYLPREDNISOLONE SODIUM SUCCINATE 40 MG: 40 INJECTION, POWDER, FOR SOLUTION INTRAMUSCULAR; INTRAVENOUS at 11:24

## 2024-03-26 RX ADMIN — METHYLPREDNISOLONE SODIUM SUCCINATE 40 MG: 40 INJECTION, POWDER, FOR SOLUTION INTRAMUSCULAR; INTRAVENOUS at 04:34

## 2024-03-26 RX ADMIN — HYDRALAZINE HYDROCHLORIDE 10 MG: 20 INJECTION INTRAMUSCULAR; INTRAVENOUS at 00:32

## 2024-03-26 RX ADMIN — DEXMEDETOMIDINE HYDROCHLORIDE 0.4 MCG/KG/HR: 400 INJECTION INTRAVENOUS at 07:31

## 2024-03-26 RX ADMIN — HYDRALAZINE HYDROCHLORIDE 10 MG: 20 INJECTION INTRAMUSCULAR; INTRAVENOUS at 11:59

## 2024-03-26 RX ADMIN — IPRATROPIUM BROMIDE 0.5 MG: 0.5 SOLUTION RESPIRATORY (INHALATION) at 19:05

## 2024-03-26 RX ADMIN — HEPARIN SODIUM 5000 UNITS: 5000 INJECTION, SOLUTION INTRAVENOUS; SUBCUTANEOUS at 21:19

## 2024-03-26 RX ADMIN — LEVALBUTEROL HYDROCHLORIDE 1.25 MG: 1.25 SOLUTION RESPIRATORY (INHALATION) at 07:14

## 2024-03-26 RX ADMIN — ASPIRIN 150 MG: 300 SUPPOSITORY RECTAL at 11:24

## 2024-03-26 RX ADMIN — FORMOTEROL FUMARATE DIHYDRATE 20 MCG: 20 SOLUTION RESPIRATORY (INHALATION) at 19:05

## 2024-03-26 NOTE — PLAN OF CARE
Problem: PAIN - ADULT  Goal: Verbalizes/displays adequate comfort level or baseline comfort level  Description: Interventions:  - Encourage patient to monitor pain and request assistance  - Assess pain using appropriate pain scale  - Administer analgesics based on type and severity of pain and evaluate response  - Implement non-pharmacological measures as appropriate and evaluate response  - Consider cultural and social influences on pain and pain management  - Notify physician/advanced practitioner if interventions unsuccessful or patient reports new pain  Outcome: Progressing     Problem: SAFETY ADULT  Goal: Patient will remain free of falls  Description: INTERVENTIONS:  - Educate patient/family on patient safety including physical limitations  - Instruct patient to call for assistance with activity   - Consult OT/PT to assist with strengthening/mobility   - Keep Call bell within reach  - Keep bed low and locked with side rails adjusted as appropriate  - Keep care items and personal belongings within reach  - Initiate and maintain comfort rounds  - Make Fall Risk Sign visible to staff  - Offer Toileting every 2 Hours, in advance of need  - Initiate/Maintain fall alarm  - Obtain necessary fall risk management equipment: alarm, nonskid footwear, yellow wristband  - Apply yellow socks and bracelet for high fall risk patients  - Consider moving patient to room near nurses station  Outcome: Progressing  Goal: Maintain or return to baseline ADL function  Description: INTERVENTIONS:  -  Assess patient's ability to carry out ADLs; assess patient's baseline for ADL function and identify physical deficits which impact ability to perform ADLs (bathing, care of mouth/teeth, toileting, grooming, dressing, etc.)  - Assess/evaluate cause of self-care deficits   - Assess range of motion  - Assess patient's mobility; develop plan if impaired  - Assess patient's need for assistive devices and provide as appropriate  - Encourage  maximum independence but intervene and supervise when necessary  - Involve family in performance of ADLs  - Assess for home care needs following discharge   - Consider OT consult to assist with ADL evaluation and planning for discharge  - Provide patient education as appropriate  Outcome: Progressing  Goal: Maintains/Returns to pre admission functional level  Description: INTERVENTIONS:  - Perform AM-PAC 6 Click Basic Mobility/ Daily Activity assessment daily.  - Set and communicate daily mobility goal to care team and patient/family/caregiver.   - Collaborate with rehabilitation services on mobility goals if consulted  - Perform Range of Motion 4 times a day.  - Reposition patient every 2 hours.  - Dangle patient 3 times a day  - Stand patient 3 times a day  - Ambulate patient 3 times a day  - Out of bed to chair 3 times a day   - Out of bed for meals 3 times a day  - Out of bed for toileting  - Record patient progress and toleration of activity level   Outcome: Progressing     Problem: DISCHARGE PLANNING  Goal: Discharge to home or other facility with appropriate resources  Description: INTERVENTIONS:  - Identify barriers to discharge w/patient and caregiver  - Arrange for needed discharge resources and transportation as appropriate  - Identify discharge learning needs (meds, wound care, etc.)  - Arrange for interpretive services to assist at discharge as needed  - Refer to Case Management Department for coordinating discharge planning if the patient needs post-hospital services based on physician/advanced practitioner order or complex needs related to functional status, cognitive ability, or social support system  Outcome: Progressing     Problem: Knowledge Deficit  Goal: Patient/family/caregiver demonstrates understanding of disease process, treatment plan, medications, and discharge instructions  Description: Complete learning assessment and assess knowledge base.  Interventions:  - Provide teaching at level of  understanding  - Provide teaching via preferred learning methods  Outcome: Progressing     Problem: INFECTION - ADULT  Goal: Absence or prevention of progression during hospitalization  Description: INTERVENTIONS:  - Assess and monitor for signs and symptoms of infection  - Monitor lab/diagnostic results  - Monitor all insertion sites, i.e. indwelling lines, tubes, and drains  - Monitor endotracheal if appropriate and nasal secretions for changes in amount and color  - [place] appropriate cooling/warming therapies per order  - Administer medications as ordered  - Instruct and encourage patient and family to use good hand hygiene technique  - Identify and instruct in appropriate isolation precautions for identified infection/condition  Outcome: Progressing     Problem: RESPIRATORY - ADULT  Goal: Achieves optimal ventilation and oxygenation  Description: INTERVENTIONS:  - Assess for changes in respiratory status  - Assess for changes in mentation and behavior  - Position to facilitate oxygenation and minimize respiratory effort  - Oxygen administered by appropriate delivery if ordered  - Initiate smoking cessation education as indicated  - Encourage broncho-pulmonary hygiene including cough, deep breathe, Incentive Spirometry  - Assess the need for suctioning and aspirate as needed  - Assess and instruct to report SOB or any respiratory difficulty  - Respiratory Therapy support as indicated  Outcome: Progressing     Problem: GENITOURINARY - ADULT  Goal: Maintains or returns to baseline urinary function  Description: INTERVENTIONS:  - Assess urinary function  - Encourage oral fluids to ensure adequate hydration if ordered  - Administer IV fluids as ordered to ensure adequate hydration  - Administer ordered medications as needed  - Offer frequent toileting  - Follow urinary retention protocol if ordered  Outcome: Progressing  Goal: Absence of urinary retention  Description: INTERVENTIONS:  - Assess patient’s ability  to void and empty bladder  - Monitor I/O  - Bladder scan as needed  - Discuss with physician/AP medications to alleviate retention as needed  - Discuss catheterization for long term situations as appropriate  Outcome: Progressing     Problem: METABOLIC, FLUID AND ELECTROLYTES - ADULT  Goal: Electrolytes maintained within normal limits  Description: INTERVENTIONS:  - Monitor labs and assess patient for signs and symptoms of electrolyte imbalances  - Administer electrolyte replacement as ordered  - Monitor response to electrolyte replacements, including repeat lab results as appropriate  - Instruct patient on fluid and nutrition as appropriate  Outcome: Progressing  Goal: Fluid balance maintained  Description: INTERVENTIONS:  - Monitor labs   - Monitor I/O and WT  - Instruct patient on fluid and nutrition as appropriate  - Assess for signs & symptoms of volume excess or deficit  Outcome: Progressing     Problem: CARDIOVASCULAR - ADULT  Goal: Maintains optimal cardiac output and hemodynamic stability  Description: INTERVENTIONS:  - Monitor I/O, vital signs and rhythm  - Monitor for S/S and trends of decreased cardiac output  - Administer and titrate ordered vasoactive medications to optimize hemodynamic stability  - Assess quality of pulses, skin color and temperature  - Assess for signs of decreased coronary artery perfusion  - Instruct patient to report change in severity of symptoms  Outcome: Progressing  Goal: Absence of cardiac dysrhythmias or at baseline rhythm  Description: INTERVENTIONS:  - Continuous cardiac monitoring, vital signs, obtain 12 lead EKG if ordered  - Administer antiarrhythmic and heart rate control medications as ordered  - Monitor electrolytes and administer replacement therapy as ordered  Outcome: Progressing     Problem: Prexisting or High Potential for Compromised Skin Integrity  Goal: Skin integrity is maintained or improved  Description: INTERVENTIONS:  - Identify patients at risk for skin  breakdown  - Assess and monitor skin integrity  - Assess and monitor nutrition and hydration status  - Monitor labs   - Assess for incontinence   - Turn and reposition patient  - Assist with mobility/ambulation  - Relieve pressure over bony prominences  - Avoid friction and shearing  - Provide appropriate hygiene as needed including keeping skin clean and dry  - Evaluate need for skin moisturizer/barrier cream  - Collaborate with interdisciplinary team   - Patient/family teaching  - Consider wound care consult   Outcome: Progressing     Problem: Nutrition/Hydration-ADULT  Goal: Nutrient/Hydration intake appropriate for improving, restoring or maintaining nutritional needs  Description: Monitor and assess patient's nutrition/hydration status for malnutrition. Collaborate with interdisciplinary team and initiate plan and interventions as ordered.  Monitor patient's weight and dietary intake as ordered or per policy. Utilize nutrition screening tool and intervene as necessary. Determine patient's food preferences and provide high-protein, high-caloric foods as appropriate.     INTERVENTIONS:  - Monitor oral intake, urinary output, labs, and treatment plans  - Assess nutrition and hydration status and recommend course of action  - Evaluate amount of meals eaten  - Assist patient with eating if necessary   - Allow adequate time for meals  - Recommend/ encourage appropriate diets, oral nutritional supplements, and vitamin/mineral supplements  - Order, calculate, and assess calorie counts as needed  - Recommend, monitor, and adjust tube feedings and TPN/PPN based on assessed needs  - Assess need for intravenous fluids  - Provide specific nutrition/hydration education as appropriate  - Include patient/family/caregiver in decisions related to nutrition  Outcome: Progressing

## 2024-03-26 NOTE — PROGRESS NOTES
Progress Note - Nephrology   Laya Brooks 79 y.o. female MRN: [id number]  Unit/Bed#:  Encounter: [id number]    A/P:  1.  Hemodialysis dependent SEEMA, severe KDIGO stage 3.     HEMODIALYSIS PROCEDURE NOTE  The patient was seen and examined on hemodialysis.  Time: 2 hours  Sodium: 135 Blood flow: 250   Dialyzer: F160 Potassium: 2 Dialysate flow: 400   Access: rij pc Bicarbonate: 35 Ultrafiltration goal: even   Medications on HD: none        Cr continues to trend up to 5.6--> 6.2 with persistent acidosis and hyperkalemia.  Plan for HD #1 today, Plan for HD #2 3/27.   Etiology SEEMA presumed ATN.  Recommend gentle IVF with D5W as she is not tolerating oral intake.   Plan to change to plasmlayte after HD.     2. AGMA due to severe SEEMA.  Will fix with HD. Change 3 amp bicarb gtt to plasmlayte post HD.    3. Hyperkalemia due to severe SEEMA.  Will fix with HD.     4. Toxic metabolic encephalopathy due to uremia /medications  Will fix with HD, gentle blood/dialysate flow.   Consider CT head, discussed with primary.    5. COPD   Management per primary team     6. Acute hypoxic respiratory failure  Avoid diuretics as she is not tolerating oral intake. Bipap per primary team.    7. Right renal atrophy   Chronically atrophic, echogenic right kidney. Normal left kidney. No calculi or hydronephrosis.     Follow up reason for today's visit:     Chronic obstructive pulmonary disease with acute exacerbation (HCC)      Subjective:   Patient seen and examined today on hemodialysis treatment #1.  Patient is somnolent on BiPAP.  She is on a Precedex drip.  Cannot obtain review of systems.    Objective:     Vitals: Blood pressure 132/58, pulse 70, temperature 97.7 °F (36.5 °C), temperature source Temporal, resp. rate 19, height 5' (1.524 m), weight 44.5 kg (98 lb 1.7 oz), SpO2 98%.,Body mass index is 19.16 kg/m².    Weight (last 2 days)       Date/Time Weight    03/26/24 0600 44.5 (98.11)    03/25/24 0937 44.6 (98.33)    03/25/24  0548 44.6 (98.33)    03/24/24 0600 45.5 (100.31)     Weight: too lethargic to stand at 03/24/24 0600    03/24/24 0128 45.5 (100.31)              Intake/Output Summary (Last 24 hours) at 3/26/2024 1018  Last data filed at 3/26/2024 0940  Gross per 24 hour   Intake 200 ml   Output 5 ml   Net 195 ml     I/O last 3 completed shifts:  In: 0   Out: 20 [Urine:20]    HD Temporary Double Catheter (Active)   Reasons to continue HD Cath Treatment Therapy 03/26/24 0940   Goal for Removal Other (comment) 03/26/24 0940   Line Necessity Reviewed Yes, reviewed with provider 03/26/24 0940   Site Assessment WDL;Clean;Dry;Intact 03/26/24 0940   Proximal Lumen Status Blood return noted;Flushed 03/26/24 0940   Distal Lumen Status Blood return noted;Flushed 03/26/24 0940   Dressing Type Chlorhexidine dressing 03/26/24 0940   Dressing Status Clean;Dry;Intact 03/26/24 0940   Dressing Change Due 04/02/24 03/26/24 0940       Urethral Catheter 16 Fr. (Active)   Amt returned on insertion(mL) 125 mL 03/24/24 1108   Reasons to continue Urinary Catheter  Accurate I&O assessment in critically ill patients (48 hr. max) 03/26/24 0000   Goal for Removal No longer needed- Will place order to discontinue 03/24/24 1929   Site Assessment Clean;Skin intact 03/26/24 0000   Perez Care Done 03/26/24 0900   Collection Container Standard drainage bag 03/26/24 0000   Securement Method Securing device (Describe) 03/26/24 0000   Output (mL) 0 mL 03/26/24 0600   CAUTI Time-out performed before Urine Culture Yes 03/24/24 1108       Physical Exam: /58 (BP Location: Right arm)   Pulse 70   Temp 97.7 °F (36.5 °C) (Temporal)   Resp 19   Ht 5' (1.524 m)   Wt 44.5 kg (98 lb 1.7 oz)   SpO2 98%   BMI 19.16 kg/m²     General Appearance:  Somnolent   Head:    Normocephalic, without obvious abnormality, atraumatic   Eyes:  Eyes closed   Ears:    Normal external ears   Nose:   Nares normal, septum midline, mucosa normal, no drainage    or sinus tenderness  "  Throat:     Neck:    Back:      Lungs:   On BiPAP, clear to auscultation   Chest wall:    No tenderness or deformity   Heart:  Regular, no rub   Abdomen:     Soft, non-tender, bowel sounds active   Extremities:   Extremities normal, atraumatic, no cyanosis or edema   Skin: No rashes noted   Lymph nodes:      Neurologic: Somnolent            Lab, Imaging and other studies: I have personally reviewed pertinent labs.  CBC:   Lab Results   Component Value Date    WBC 1.58 (L) 03/26/2024    HGB 9.2 (L) 03/26/2024    HCT 28.1 (L) 03/26/2024    MCV 89 03/26/2024     (L) 03/26/2024    RBC 3.17 (L) 03/26/2024    MCH 29.0 03/26/2024    MCHC 32.7 03/26/2024    RDW 14.6 03/26/2024    MPV 10.4 03/26/2024    NRBC 0 03/26/2024     CMP:   Lab Results   Component Value Date    K 5.4 (H) 03/26/2024    CL 99 03/26/2024    CO2 16 (L) 03/26/2024    BUN 96 (H) 03/26/2024    CREATININE 6.27 (H) 03/26/2024    CALCIUM 8.4 03/26/2024    AST 9 (L) 03/26/2024    ALT 11 03/26/2024    ALKPHOS 45 03/26/2024    EGFR 5 03/26/2024       .  Results from last 7 days   Lab Units 03/26/24  0456 03/25/24 2020 03/25/24  1351 03/25/24  0504 03/23/24  2209   POTASSIUM mmol/L 5.4* 5.2 5.1 5.1 5.2   CHLORIDE mmol/L 99 98 99 99 96   CO2 mmol/L 16* 16* 15* 15* 15*   BUN mg/dL 96* 85* 81* 75* 54*   CREATININE mg/dL 6.27* 5.68* 5.08* 4.65* 2.86*   CALCIUM mg/dL 8.4 8.3* 8.6 9.0 9.2   ALK PHOS U/L 45  --   --  55 68   ALT U/L 11  --   --  13 19   AST U/L 9*  --   --  12* 24         Phosphorus: No results found for: \"PHOS\"  Magnesium: No results found for: \"MG\"  Urinalysis:   Lab Results   Component Value Date    COLORU Yellow 03/25/2024    CLARITYU Cloudy 03/25/2024    SPECGRAV 1.025 03/25/2024    PHUR 6.0 03/25/2024    LEUKOCYTESUR Moderate (A) 03/25/2024    NITRITE Negative 03/25/2024    GLUCOSEU 100 (1/10%) (A) 03/25/2024    KETONESU Trace (A) 03/25/2024    BILIRUBINUR Negative 03/25/2024    BLOODU Large (A) 03/25/2024     Ionized Calcium: No results " "found for: \"CAION\"  Coagulation: No results found for: \"PT\", \"INR\", \"APTT\"  Troponin: No results found for: \"TROPONINI\"  ABG:   Lab Results   Component Value Date    PHART 7.395 03/25/2024    GNG6JDJ 25.5 (L) 03/25/2024    PO2ART 104.2 03/25/2024    WSG5MPK 15.3 (L) 03/25/2024    BEART -8.2 03/25/2024    SOURCE Radial, Right 03/25/2024     Radiology review:     IMAGING  Procedure: US kidney and bladder    Result Date: 3/25/2024  Narrative: RENAL ULTRASOUND INDICATION: oliguric zheng. COMPARISON: Renal ultrasound 1/15/2013. TECHNIQUE: Ultrasound of the retroperitoneum was performed with a curvilinear transducer utilizing volumetric sweeps and still imaging techniques. Challenging exam due to patient condition and positioning. FINDINGS: KIDNEYS: Right kidney: 7.6 x 3.3 x 3.0 cm. Volume 39.8 mL Left kidney: 9.3 x 4.0 x 4.1 cm. Volume 79.0 mL Right kidney Limited visualization of the right kidney due to patient condition; still images obtained from cine sweeps. Atrophic and markedly echogenic. An interpolar cortical cyst measures 3.1 x 2.0 x 3.1 cm and demonstrates 2 thin internal septations and no internal Doppler flow, likely representing a Bosniak II renal cyst. No hydronephrosis. No shadowing calculi. No perinephric fluid collections. Left kidney Normal echogenicity and contour. No mass is identified. No hydronephrosis. No shadowing calculi. No perinephric fluid collections. URETERS: Nonvisualized. BLADDER: Decompressed with a Perez.     Impression: Chronically atrophic, echogenic right kidney. Normal left kidney. No calculi or hydronephrosis. Workstation performed: [id number]     Procedure: Echo complete    Result Date: 3/25/2024  Narrative:   Left Ventricle: Left ventricular cavity size is normal. Wall thickness is increased. There is borderline concentric hypertrophy. The left ventricular ejection fraction is 55%. Systolic function is normal. Wall motion is normal. Diastolic function is normal.   Left Atrium: The " atrium is mildly dilated.   Aortic Valve: There is aortic valve sclerosis.   Mitral Valve: There is moderate annular calcification.   Tricuspid Valve: There is mild regurgitation.     Procedure: CT chest wo contrast    Result Date: 3/24/2024  Narrative: CT CHEST WITHOUT IV CONTRAST INDICATION: shortness of breath. Patient is a former smoker. COMPARISON: Chest CT from 10/20/2017. TECHNIQUE: CT examination of the chest was performed without intravenous contrast. Multiplanar 2D reformatted images were created from the source data. This examination, like all CT scans performed in the [facility], was performed utilizing techniques to minimize radiation dose exposure, including the use of iterative reconstruction and automated exposure control. Radiation dose length product (DLP) for this visit: 164.3 mGy-cm FINDINGS: LUNGS: Moderate emphysema. No evidence of pneumonia. Compressive atelectasis of left lower lobe secondary to pleural effusion. There is no tracheal or endobronchial lesion. PLEURA: Small bilateral water density pleural effusions. HEART/GREAT VESSELS: Heart is unremarkable for patient's age. No thoracic aortic aneurysm. MEDIASTINUM AND RAINE: Unremarkable. CHEST WALL AND LOWER NECK: Unremarkable. VISUALIZED STRUCTURES IN THE UPPER ABDOMEN: Unremarkable. OSSEOUS STRUCTURES: No acute fracture or destructive osseous lesion.     Impression: Small bilateral water density pleural effusions with compressive atelectasis of left lower lobe. Moderate emphysema. No evidence of pneumonia. Workstation performed: [id number]     Procedure: XR chest portable    Result Date: 3/24/2024  Narrative: XR CHEST PORTABLE INDICATION: sob. COMPARISON: 3/7/2024 FINDINGS: No evidence for pneumothorax. New moderate left and small right pleural effusions with bibasilar atelectasis and bilateral lower lobe hazy pulmonary infiltrates noted suggesting pneumonia. Mildly enlarged cardiac silhouette, unchanged. Calcific  atherosclerosis of the aortic arch region again seen. Bones are unremarkable for age. Normal upper abdomen.     Impression: New moderate left and small right pleural effusions with bibasilar atelectasis and bilateral lower lobe hazy pulmonary infiltrates noted suggesting pneumonia. Workstation performed: [id number]       Current Facility-Administered Medications   Medication Dose Route Frequency    acetaminophen (TYLENOL) tablet 650 mg  650 mg Oral Q4H PRN    albuterol inhalation solution 2.5 mg  2.5 mg Nebulization Q4H PRN    aspirin rectal suppository 150 mg  150 mg Rectal Daily    atorvastatin (LIPITOR) tablet 40 mg  40 mg Oral Daily With Dinner    budesonide (PULMICORT) inhalation solution 0.5 mg  0.5 mg Nebulization Q12H    cefTRIAXone (ROCEPHIN) IVPB (premix in dextrose) 1,000 mg 50 mL  1,000 mg Intravenous Q24H    clopidogrel (PLAVIX) tablet 75 mg  75 mg Oral Daily    dexmedeTOMIDine (Precedex) 400 mcg in sodium chloride 0.9% 100 mL  0.1-0.7 mcg/kg/hr Intravenous Titrated    formoterol (PERFOROMIST) nebulizer solution 20 mcg  20 mcg Nebulization Q12H    heparin (porcine) subcutaneous injection 5,000 Units  5,000 Units Subcutaneous Q8H JHON    hydrALAZINE (APRESOLINE) injection 10 mg  10 mg Intravenous Q6H JHON    ipratropium (ATROVENT) 0.02 % inhalation solution 0.5 mg  0.5 mg Nebulization TID    levalbuterol (XOPENEX) inhalation solution 1.25 mg  1.25 mg Nebulization TID    LORazepam (ATIVAN) injection 1 mg  1 mg Intravenous Q6H PRN    methylPREDNISolone sodium succinate (Solu-MEDROL) injection 40 mg  40 mg Intravenous Q6H JHON    ondansetron (ZOFRAN) injection 4 mg  4 mg Intravenous Q6H PRN    sodium bicarbonate 150 mEq in dextrose 5 % 1,000 mL infusion  75 mL/hr Intravenous Continuous     Medications Discontinued During This Encounter   Medication Reason    umeclidinium 62.5 mcg/actuation inhaler AEPB 1 puff     olodaterol HCl (STRIVERDI RESPIMAT) inhaler 2 puff     ipratropium-albuterol (DUO-NEB) 0.5-2.5 mg/3  mL inhalation solution 3 mL     albuterol (PROVENTIL HFA,VENTOLIN HFA) inhaler 2 puff     LORazepam (ATIVAN) injection 0.5 mg     furosemide (LASIX) injection 40 mg     amLODIPine (NORVASC) tablet 5 mg     sodium bicarbonate 75 mEq in sodium chloride 0.45 % 1,000 mL infusion     amLODIPine (NORVASC) tablet 5 mg     sodium bicarbonate 75 mEq in sodium chloride 0.45 % 1,000 mL infusion        Kate Weston, DO      This progress note was produced in part using a dictation device which may document imprecise wording from author's original intent.

## 2024-03-26 NOTE — PROGRESS NOTES
"Progress Note - Pulmonary   Laya Brooks 79 y.o. female MRN: [id number]  Unit/Bed#:  Encounter: [id number]    Assessment/Plan:    Acute hypoxic respiratory failure   Acute metabolic encephalopathy   Moderate COPD with acute exacerbation   Lymphopenia   Anion gap metabolic acidosis  SEEMA/CKD  Hyperkalemia   Right renal atrophy     Pulmonary recommendations:     Seen on 4L NC with adequate oxygen saturations. Maintain saturations greater than or equal to 88%.   Continue BiPAP qHS and prn  Continue Solumedrol- decrease to 40 mg IV q8hrs   Continue Pulmicort/Perforomist every 12 hours  Underwent HD today with another session planned for tomorrow.   Continue to avoid diuretics.   Continue to trend labs.   Unclear etiology of ATN (patient was not hypotension and does not have signs of infection or sepsis). Could consider AIN, possible drug induced??  Will check urine eosinophil. Also consider heme consult for new lymphopenia.    Further treatment per primary team/nephrology    Chief Complaint:    \"I'm tired.\"    Subjective:    Patient was seen and examined today. No overnight events reported. Patient states that she is tired. Denies SOB or pain. She is also cold. Currently on HD for which RN states she tolerated well.     Objective:    Vitals: Blood pressure 132/56, pulse 88, temperature 97.7 °F (36.5 °C), temperature source Temporal, resp. rate (!) 34, height 5' (1.524 m), weight 44.5 kg (98 lb 1.7 oz), SpO2 99%.4L NC,Body mass index is 19.16 kg/m².      Intake/Output Summary (Last 24 hours) at 3/26/2024 1508  Last data filed at 3/26/2024 1144  Gross per 24 hour   Intake 450 ml   Output 508 ml   Net -58 ml       Invasive Devices       Central Venous Catheter Line  Duration             CVC Central Lines 03/26/24 Double <1 day              Peripheral Intravenous Line  Duration             Peripheral IV 03/23/24 Left Antecubital 2 days    Peripheral IV 03/25/24 Dorsal (posterior);Left Forearm <1 day              " "Hemodialysis Catheter  Duration             HD Temporary Double Catheter <1 day              Drain  Duration             Urethral Catheter 16 Fr. 2 days                    Physical Exam:     Physical Exam  Vitals and nursing note reviewed.   Constitutional:       General: She is sleeping. She is not in acute distress.     Appearance: Normal appearance.   HENT:      Head: Normocephalic and atraumatic.      Mouth/Throat:      Mouth: Mucous membranes are moist.      Pharynx: Oropharynx is clear.   Cardiovascular:      Rate and Rhythm: Normal rate and regular rhythm.      Heart sounds: No murmur heard.  Pulmonary:      Effort: Pulmonary effort is normal.      Breath sounds: Wheezing present.   Musculoskeletal:      Cervical back: Normal range of motion.   Skin:     General: Skin is warm and dry.   Neurological:      Mental Status: She is oriented to person, place, and time and easily aroused.         Labs: I have personally reviewed pertinent lab results., ABG:   Lab Results   Component Value Date    PHART 7.395 03/25/2024    [id number] 25.5 (L) 03/25/2024    PO2ART 104.2 03/25/2024    [id number] 15.3 (L) 03/25/2024    BEART -8.2 03/25/2024    SOURCE Radial, Right 03/25/2024   , BNP: No results found for: \"BNP\", CBC:   Lab Results   Component Value Date    WBC 1.58 (L) 03/26/2024    HGB 9.2 (L) 03/26/2024    HCT 28.1 (L) 03/26/2024    MCV 89 03/26/2024     (L) 03/26/2024    RBC 3.17 (L) 03/26/2024    MCH 29.0 03/26/2024    MCHC 32.7 03/26/2024    RDW 14.6 03/26/2024    MPV 10.4 03/26/2024    NRBC 0 03/26/2024   , CMP:   Lab Results   Component Value Date    SODIUM 129 (L) 03/26/2024    K 5.4 (H) 03/26/2024    CL 99 03/26/2024    CO2 16 (L) 03/26/2024    BUN 96 (H) 03/26/2024    CREATININE 6.27 (H) 03/26/2024    CALCIUM 8.4 03/26/2024    AST 9 (L) 03/26/2024    ALT 11 03/26/2024    ALKPHOS 45 03/26/2024    EGFR 5 03/26/2024   , PT/INR: No results found for: \"PT\", \"INR\", Troponin: No results found for: " "\"TROPONINI\"    Imaging and other studies: I have personally reviewed pertinent reports.   and I have personally reviewed pertinent films in PACS    "

## 2024-03-26 NOTE — PLAN OF CARE
Target UF Goal, running tx net even for first dialysis. Patient dialyzing for 2 hours on 2 K bath for serum K of  5.4  per protocol. Treatment plan reviewed with Nephrology, Dr. Weston at bedside, pt on telemetry in CCU.      Post-Dialysis RN Treatment Note    Blood Pressure:  Pre 123/60 mm/Hg  Post 145/74 mmHg   EDW  TBD kg    Weight:  Pre 44.5 kg   Post 44.5 kg   Mode of weight measurement: Bed Scale   Volume Removed  0 ml    Treatment duration 2 hours   NS given  No    Treatment shortened? No   Medications given during Rx None Reported   Estimated Kt/V  0.74   Access type: Temporary HD catheter   Access Issues: Yes, describe: Some resistance noted in intervals throughout treatment needing frequent flushing with 10cc syringes; also had to reverse lines     Report called to primary nurse   Yes, Neema RN at bedside        Problem: METABOLIC, FLUID AND ELECTROLYTES - ADULT  Goal: Electrolytes maintained within normal limits  Description: INTERVENTIONS:  - Monitor labs and assess patient for signs and symptoms of electrolyte imbalances  - Administer electrolyte replacement as ordered  - Monitor response to electrolyte replacements, including repeat lab results as appropriate  - Instruct patient on fluid and nutrition as appropriate  Outcome: Progressing  Goal: Fluid balance maintained  Description: INTERVENTIONS:  - Monitor labs   - Monitor I/O and WT  - Instruct patient on fluid and nutrition as appropriate  - Assess for signs & symptoms of volume excess or deficit  Outcome: Progressing

## 2024-03-26 NOTE — OCCUPATIONAL THERAPY NOTE
Occupational Therapy         Patient Name: Laya Brooks  Today's Date: 3/26/2024         03/26/24 1027   OT Last Visit   OT Visit Date 03/26/24   Note Type   Note type Cancelled Session   Cancel Reasons Medical status   Additional Comments pt receiving dialysis and on continuous bipap; not appropriate for OT evaluation at this time; will continue to follow as able     Farooq Phelps

## 2024-03-26 NOTE — ASSESSMENT & PLAN NOTE
On Norvasc 5 mg daily and lisinopril   Blood pressure medications held in setting of worsening SEEMA, respiratory failure currently n.p.o. requiring BiPAP  IV hydralazine PRN

## 2024-03-26 NOTE — ASSESSMENT & PLAN NOTE
Worsening mental status in the setting of metabolic disturbances including severe SEEMA, respiratory failure; toxic component of patient having received benzodiazepines and opiate medications  Monitor mental status, treat underlying conditions  Family meeting held at patient's bedside -for now excepting all medical treatments, full code  Will obtain CT head for completion

## 2024-03-26 NOTE — ASSESSMENT & PLAN NOTE
SEEMA superimposed on CKD stage IIIa, present on admission, severely worsening renal function with oliguria  Patient initially admitted with acute respiratory failure felt to be secondary to COPD and CHF exacerbation component, received diuresis - with worsening renal function further diuretics now discontinued  S/p hemodialysis catheter placement 3/26, HD initiated  Monitor renal function and urine output

## 2024-03-26 NOTE — ASSESSMENT & PLAN NOTE
Lab Results   Component Value Date    EGFR 5 03/26/2024    EGFR 6 03/25/2024    EGFR 7 03/25/2024    CREATININE 6.27 (H) 03/26/2024    CREATININE 5.68 (H) 03/25/2024    CREATININE 5.08 (H) 03/25/2024   Patient with worsening SEEMA, now initiated on hemodialysis

## 2024-03-26 NOTE — QUICK NOTE
Chart reviewed.   Cardiology was initially yesterday consulted for possible congestive heart failure. On exam yesterday, she appeared euvolemic. Patient had worsening renal function s/p diuretic therapy. Diuretics were held. Patient's family consented to dialysis. For HD session today.  Her echocardiogram showed preserved LV systolic function, normal diastolic function, without any significant valvular abnormalcies.   At this time she does not appear to have any active cardiology issues. Discussed with SLIM. Cardiology will sign off but will be available if needed.

## 2024-03-26 NOTE — ACP (ADVANCE CARE PLANNING)
Advanced Care Planning Progress Note    Serious Illness Conversation    1. What is your understanding now of where you are with your illness?  Prognostic Understanding: appropriate understanding of prognosis  Discussed with family     2. How much information about what is likely to be ahead with your illness would you like to have?  Information: patient wants to be fully informed  Once able to stay more alert and process information     3. What did you (clinician) communicate to the patient?  Prognostic Communication: Uncertain - It can be difficult to predict what will happen with your illness. I hope you will continue to live well for a long time but I’m worried that you could get sick quickly, and I think it is important to prepare for that possibility.     4. If your health situation worsens, what are your most important goals?  Goals: be physically comfortable     5. What are the biggest fears and worries about the future and your health?  Fears/Worries: pain     6. What abilities are so critical to your life that you cannot imagine living without them?  Unable to clarify     7. What gives you strength as you think about the future with your illness?     8. If you become sicker, how much are you willing to go through for the possibility of gaining more time?  Be in the hospital: Yes    Be in the ICU: Yes    Be on a ventilator: Yes    Be on dialysis: Yes    Would want CPR but would not want to remain on prolonged life support  9. How much does your proxy and family know about your priorities and wishes?  Discussion Discussion: wants clinician to talk with family     Discussion with family: I’ve heard you say that avoiding prolonging of suffering for Ms. Brooks is really important to you. Keeping that in mind, and what we know about your illness, I recommend that we proceed with respiratory support and dialysis for now as stated in the patient's will. If patient's condition fails to improve and she requires  prolonged life support we can determine our next steps further. This will help us make sure that your treatment plans reflect what’s important to you.     How does this plan sound to you? I will do everything I can to help you through this.     I have spent 35 minutes speaking with my patient on advanced care planning today or during this visit     Advanced directives         Malena Hearn MD

## 2024-03-26 NOTE — ASSESSMENT & PLAN NOTE
Appears multifactorial, secondary to COPD exacerbation, worsening SEEMA, acute diastolic CHF  Was initially requiring continuous BiPAP therapy -now off BiPAP, on 3 L O2  Treating underlying conditions as above

## 2024-03-26 NOTE — ASSESSMENT & PLAN NOTE
This is an 79-year-old female patient with recently diagnosed COPD, history of CAD, CKD and hypertension who initially presented to the ED with shortness of breath, was noted to have significant audible wheezing with shortness of breath and had to be placed on BiPAP  Recent PFT showed moderate obstructive deficit, moderate DLCO reduction and evidence of hyperinflation and air trapping on lung volumes without a significant bronchodilator response. She was placed on LAMA/LABA inhalers  Per family, patient was using bronchodilators without significant improvement  In the ER she was placed on BiPAP and received an hour-long neb  Patient received initial dose of IV Solu-Medrol 125 mg in the ED and will continue with 40 mg every 6 hours.  ABG without evidence of CO2 retention  Continue with bronchodilators with DuoNebs every 4 hours  Added IV ceftriaxone for COPD exacerbation 3/25/24

## 2024-03-26 NOTE — ASSESSMENT & PLAN NOTE
Wt Readings from Last 3 Encounters:   03/26/24 44.5 kg (98 lb 1.7 oz)   03/18/24 43.1 kg (95 lb)   02/22/24 40.8 kg (90 lb)     Possible acute diastolic CHF with mild tricuspid regurgitation, worsening renal and respiratory failure  Patient received dialysis initially  Cardiology input appreciated, further diuresis stopped due to severely worsening kidney injury, no evidence of significant fluid overload  Monitor daily weights, I's and O's

## 2024-03-26 NOTE — SPEECH THERAPY NOTE
Speech Language/Pathology  Speech-Language Pathology Bedside Swallow Evaluation      Patient Name: Laya Brooks    Today's Date: 3/26/2024     Problem List  Principal Problem:    Chronic obstructive pulmonary disease with acute exacerbation (HCC)  Active Problems:    Hypercholesterolemia    CVA (cerebral vascular accident) (HCC)    Stage 3a chronic kidney disease (HCC)    Hypertension    CAD (coronary artery disease)    SEEMA (acute kidney injury) (HCC)    Acute diastolic congestive heart failure (HCC)    Hyponatremia    Acute respiratory failure with hypoxia (HCC)    Toxic metabolic encephalopathy      Past Medical History  Past Medical History:   Diagnosis Date    CAD (coronary artery disease)     Cardiac disease     Hypercholesteremia     Hyperlipidemia     Hypertension     Hypertension     Renal disorder        Past Surgical History  Past Surgical History:   Procedure Laterality Date    CORONARY ANGIOPLASTY WITH STENT PLACEMENT      WRIST SURGERY         Summary   Pt presented with s/s suggestive of moderate oral and suspected moderate-severe pharyngeal dysphagia. Symptoms or concerns included suspected decreased oral control posteriorly, moderately decreased bolus formation and transfer with observable tongue pumping, moderately delayed/weak swallow initiation, weak/decreased hyolaryngeal excursion, severely decreased respiratory deglutition which increased in severity as trials persisted , severely audible swallow incoordination noted with thin liquid via cup, mild-moderate audible swallow incoordination also noted with NTL via tsp, secondary swallow noted which is suspicious of pharyngeal retention. Immediate cough x1 following first administration of thin liquid via cup followed by decreased respiratory rate. Trials were limited d/t decreased alertness and concern for pt's safety. Pt recently was taken off continuous BiPAP which warranted ST evaluation. Pt alertness level was severely decreased affecting  overall swallow function. It is recommended for pt to remain NPO expect medication at this time d/t decreased level of alertness and decreased respiratory status.    Risk/s for Aspiration: high     Recommended Diet: NPO except medications   Recommended Form of Meds: crushed with puree   Aspiration precautions and swallowing strategies: only feed when fully alert, slow rate of feeding, and liquids by teaspoon only  Other Recommendations: Continue frequent oral care        Current Medical Status  3/23/2024 Per MD on admission:  79-year-old female with a past medical history of COPD, prior echo demonstrating grade 1 diastolic dysfunction, who presents for shortness of breath.  She reports that for the past.  She has had worsening shortness of breath, describes that she has noticed some minor leg swelling bilaterally in the past week or so, and does report orthopnea, worsening shortness of breath when lying flat.  She did receive a nebulizer treatment on the way here by ambulance, however is unsure if this helps.  She does have significant wheezing audible when talking with her.  She denies chest pain.  No fevers or chills.  Review of systems otherwise negative.    Current Precautions: Aspiration      Allergies:  No known food allergies    Past medical history:  Please see H&P for details    Special Studies:  3/25/2024 Imaging: XR Chest Portable ICU   IMPRESSION:     Decreased size of the now small left pleural effusion.  No significant change in size of the small right pleural effusion.     Social/Education/Vocational Hx:  Pt lives with family    Swallow Information   Current Risks for Dysphagia & Aspiration: decreased alertness and recently taken off  continuous bipap   Current Symptoms/Concerns: coughing with po and change in respiratory status  Current Diet: NPO except medications   Baseline Diet: regular diet and thin liquids      Baseline Assessment   Behavior/Cognition: lethargic, waxing and waning arousal level,  and low alertness level  Speech/Language Status: able to follow commands inconsistently and limited verbal output  Patient Positioning: upright in bed  Pain Status/Interventions/Response to Interventions: No report of or nonverbal indications of pain.       Swallow Mechanism Exam  Facial: symmetrical  Labial: unable to test 2/2 limited command following  Lingual: unable to test 2/2 limited command following  Velum: unable to visualize  Mandible: unable to test 2/2 limited command following  Dentition: full dentures  Vocal quality:weak and dysphonic   Volitional Cough: strong/productive   Respiratory Status:  4L O2    Tracheostomy: n/a      Consistencies Assessed and Performance   Consistencies Administered: thin liquids and nectar thick  Materials administered included thin liquids via cup and NTL via tsp     Oral Stage: moderate  Mastication was  not assessed with the materials administered today.  Bolus formation and transfer were moderately decreased with observable tongue pumping. Suspect decreased oral control with posterior spillage.     Pharyngeal Stage: moderate-severe  Swallowing initiation suspected to be delayed/weak. Laryngeal rise was palpated and judged to be moderate-severely reduced/weak.Severely decreased respiratory deglutition coordination noted which worsened as trials persisted. Audible swallow incoordination severely noted with thin liquid via cup and moderately noted with NTL via tsp. Secondary swallow noted with thin liquid via cup suspicious of pharyngeal retention.   Immediate cough x1 following first administration of thin liquid via cup followed by decreased respiratory rate.   Esophageal Concerns: none reported    Strategies and Efficacy: Small sips of thin liquid were provided to pt via cup administered by clinician/self administered  as well as NTL via tsp administered by clinician     Summary and Recommendations (see above)    Results Reviewed with: patient and family     Treatment  Recommended: Yes for further trailing of consistencies      Frequency of treatment: 2/3x per week     Patient Stated Goal: Pt did not state goal     Dysphagia LTG  -Patient will demonstrate safe and effective oral intake (without overt s/s significant oral/pharyngeal dysphagia including s/s penetration or aspiration) for the highest appropriate diet level.     Short Term Goals:  -Patient will tolerate trials of upgraded food and/or liquid texture with no significant s/s of oral or pharyngeal dysphagia including aspiration across 1-3 diagnostic sessions     Speech Therapy Prognosis   Prognosis: good    Prognosis Considerations: age, medically fragile status, and respiratory status

## 2024-03-26 NOTE — PHYSICAL THERAPY NOTE
PHYSICAL THERAPY        Patient Name: Laya Brooks  Today's Date: 3/26/2024       03/26/24 1301   PT Last Visit   PT Visit Date 03/26/24   Note Type   Note type Cancelled Session   Cancel Reasons Patient off floor/hemodialysis   Additional Comments pt also requiring continuous bipap     Margie Olmos

## 2024-03-26 NOTE — PROCEDURES
Central Line Insertion    Date/Time: 3/26/2024 8:26 AM    Performed by: Antonino Stein MD  Authorized by: Antonino Stein MD    Patient location:  ICU and bedside  Other Assisting Provider: No    Consent:     Consent obtained:  Verbal    Consent given by:  Healthcare agent    Risks discussed:  Bleeding, infection and pneumothorax    Alternatives discussed:  No treatment and delayed treatment  Universal protocol:     Procedure explained and questions answered to patient or proxy's satisfaction: yes      Relevant documents present and verified: yes      Test results available and properly labeled: yes      Radiology Images displayed and confirmed.  If images not available, report reviewed: yes      Required blood products, implants, devices, and special equipment available: yes      Site/side marked: yes      Immediately prior to procedure, a time out was called: yes      Patient identity confirmed:  Arm band, hospital-assigned identification number, provided demographic data and verbally with patient  Pre-procedure details:     Hand hygiene: Hand hygiene performed prior to insertion      Sterile barrier technique: All elements of maximal sterile technique followed      Skin preparation:  2% chlorhexidine    Skin preparation agent: Skin preparation agent completely dried prior to procedure    Indications:     Central line indications: dialysis      Site selection rationale:  Largest IJ on US  Anesthesia (see MAR for exact dosages):     Anesthesia method:  Local infiltration    Local anesthetic:  Lidocaine 1% w/o epi  Procedure details:     Location:  Right internal jugular    Vessel type: vein      Laterality:  Right    Approach: percutaneous technique used      Patient position:  Flat    Catheter type:  Double lumen    Catheter size:  14 Fr    Landmarks identified: no      Ultrasound guidance: yes      Ultrasound image availability:  Images available in PACS and still images obtained    Sterile ultrasound  techniques: Sterile gel and sterile probe covers were used      Manometry confirmation: no      Number of attempts:  1    Successful placement: yes      Catheter tip vessel location: atriocaval junction    Post-procedure details:     Post-procedure:  Dressing applied    Assessment:  Blood return through all ports, no pneumothorax on x-ray and placement verified by x-ray    Post-procedure complications: none      Patient tolerance of procedure:  Tolerated well, no immediate complications

## 2024-03-26 NOTE — PROGRESS NOTES
[facility]  Progress Note  Name: Laya Brooks I  MRN: [id number]  Unit/Bed#:  I Date of Admission: 3/23/2024   Date of Service: 3/26/2024 I Hospital Day: 3    Assessment/Plan   * Chronic obstructive pulmonary disease with acute exacerbation (HCC)  Assessment & Plan  This is an 79-year-old female patient with recently diagnosed COPD, history of CAD, CKD and hypertension who initially presented to the ED with shortness of breath, was noted to have significant audible wheezing with shortness of breath and had to be placed on BiPAP  Recent PFT showed moderate obstructive deficit, moderate DLCO reduction and evidence of hyperinflation and air trapping on lung volumes without a significant bronchodilator response. She was placed on LAMA/LABA inhalers  Per family, patient was using bronchodilators without significant improvement  In the ER she was placed on BiPAP and received an hour-long neb  Patient received initial dose of IV Solu-Medrol 125 mg in the ED and will continue with 40 mg every 6 hours.  ABG without evidence of CO2 retention  Continue with bronchodilators with DuoNebs every 4 hours  Added IV ceftriaxone for COPD exacerbation 3/25/24      Toxic metabolic encephalopathy  Assessment & Plan  Worsening mental status in the setting of metabolic disturbances including severe SEEMA, respiratory failure; toxic component of patient having received benzodiazepines and opiate medications  Monitor mental status, treat underlying conditions  Family meeting held at patient's bedside -for now excepting all medical treatments, full code  Will obtain CT head for completion    Acute respiratory failure with hypoxia (HCC)  Assessment & Plan  Appears multifactorial, secondary to COPD exacerbation, worsening SEEMA, acute diastolic CHF  Was initially requiring continuous BiPAP therapy -now off BiPAP, on 3 L O2  Treating underlying conditions as above    Hyponatremia  Assessment & Plan  Associated with  worsening SEEMA  Now initiated on HD    Acute diastolic congestive heart failure (HCC)  Assessment & Plan  Wt Readings from Last 3 Encounters:   03/26/24 44.5 kg (98 lb 1.7 oz)   03/18/24 43.1 kg (95 lb)   02/22/24 40.8 kg (90 lb)     Possible acute diastolic CHF with mild tricuspid regurgitation, worsening renal and respiratory failure  Patient received dialysis initially  Cardiology input appreciated, further diuresis stopped due to severely worsening kidney injury, no evidence of significant fluid overload  Monitor daily weights, I's and O's      SEEMA (acute kidney injury) (Formerly [facility])  Assessment & Plan  SEEMA superimposed on CKD stage IIIa, present on admission, severely worsening renal function with oliguria  Patient initially admitted with acute respiratory failure felt to be secondary to COPD and CHF exacerbation component, received diuresis - with worsening renal function further diuretics now discontinued  S/p hemodialysis catheter placement 3/26, HD initiated  Monitor renal function and urine output      CAD (coronary artery disease)  Assessment & Plan  Patient with history of STEMI in 2008 status post RCA stenting  Continue with Plavix and statin when able to take p.o. -for now we will proceed with rectal ASA    Hypertension  Assessment & Plan  On Norvasc 5 mg daily and lisinopril   Blood pressure medications held in setting of worsening SEEMA, respiratory failure currently n.p.o. requiring BiPAP  IV hydralazine PRN    Stage 3a chronic kidney disease (Former[facility])  Assessment & Plan  Lab Results   Component Value Date    EGFR 5 03/26/2024    EGFR 6 03/25/2024    EGFR 7 03/25/2024    CREATININE 6.27 (H) 03/26/2024    CREATININE 5.68 (H) 03/25/2024    CREATININE 5.08 (H) 03/25/2024   Patient with worsening SEEMA, now initiated on hemodialysis     CVA (cerebral vascular accident) (Former[facility])  Assessment & Plan  History noted  Continue with Plavix and statin  Repeat CTH due to encephalopathy    Hypercholesterolemia  Assessment &  Plan  Continue with statin, Lipitor 40 mg nightly once able to resume PO intake               VTE Pharmacologic Prophylaxis:    heparin SQ    Mobility:   Basic Mobility Inpatient Raw Score: 6  JH-HLM Goal: 2: Bed activities/Dependent transfer  JH-HLM Achieved: 2: Bed activities/Dependent transfer      Patient Centered Rounds: I performed bedside rounds with nursing staff today.   Discussions with Specialists or Other Care Team Provider: Nephrology, Cardiology, Nephrology    Education and Discussions with Family / Patient: Updated  () at bedside.    Total Time Spent on Date of Encounter in care of patient: 50 mins. This time was spent on one or more of the following: performing physical exam; counseling and coordination of care; obtaining or reviewing history; documenting in the medical record; reviewing/ordering tests, medications or procedures; communicating with other healthcare professionals and discussing with patient's family/caregivers.    Current Length of Stay: 3 day(s)  Current Patient Status: Inpatient   Certification Statement: The patient will continue to require additional inpatient hospital stay due to close monitoring, respiratory support  Discharge Plan: Anticipate discharge in >72 hrs to rehab facility.    Code Status: Level 1 - Full Code    Subjective:   Patient still lethargic but does respond. She is weaned off BiPaP to 3L O2 via NC.    Objective:     Vitals:   Temp (24hrs), Av.4 °F (36.3 °C), Min:96.3 °F (35.7 °C), Max:98.6 °F (37 °C)    Temp:  [96.3 °F (35.7 °C)-98.6 °F (37 °C)] 97.7 °F (36.5 °C)  HR:  [] 78  Resp:  [15-36] 29  BP: (121-177)/(54-87) 145/74  SpO2:  [96 %-98 %] 98 %  Body mass index is 19.16 kg/m².     Input and Output Summary (last 24 hours):     Intake/Output Summary (Last 24 hours) at 3/26/2024 1232  Last data filed at 3/26/2024 1144  Gross per 24 hour   Intake 450 ml   Output 508 ml   Net -58 ml       Physical Exam:   Physical  Exam  Constitutional:       Appearance: She is ill-appearing.   HENT:      Head: Normocephalic and atraumatic.      Nose: No congestion.   Eyes:      Conjunctiva/sclera: Conjunctivae normal.   Cardiovascular:      Rate and Rhythm: Normal rate and regular rhythm.      Heart sounds: No murmur heard.  Pulmonary:      Effort: No respiratory distress.      Breath sounds: Wheezing present.   Abdominal:      General: There is no distension.      Tenderness: There is no abdominal tenderness. There is no guarding.   Musculoskeletal:      Right lower leg: No edema.      Left lower leg: No edema.   Skin:     General: Skin is warm and dry.          Additional Data:     Labs:  Results from last 7 days   Lab Units 03/26/24  0456   WBC Thousand/uL 1.58*   HEMOGLOBIN g/dL 9.2*   HEMATOCRIT % 28.1*   PLATELETS Thousands/uL 123*   NEUTROS PCT % 87*   LYMPHS PCT % 8*   MONOS PCT % 5   EOS PCT % 0     Results from last 7 days   Lab Units 03/26/24  0456   SODIUM mmol/L 129*   POTASSIUM mmol/L 5.4*   CHLORIDE mmol/L 99   CO2 mmol/L 16*   BUN mg/dL 96*   CREATININE mg/dL 6.27*   ANION GAP mmol/L 14*   CALCIUM mg/dL 8.4   ALBUMIN g/dL 3.5   TOTAL BILIRUBIN mg/dL 0.30   ALK PHOS U/L 45   ALT U/L 11   AST U/L 9*   GLUCOSE RANDOM mg/dL 140     Results from last 7 days   Lab Units 03/23/24  2209   INR  1.04             Results from last 7 days   Lab Units 03/25/24  1029 03/25/24  0504 03/23/24  2209   LACTIC ACID mmol/L 0.4*  --  1.5   PROCALCITONIN ng/ml  --  0.19 0.12       Lines/Drains:  Invasive Devices       Central Venous Catheter Line  Duration             CVC Central Lines 03/26/24 Double <1 day              Peripheral Intravenous Line  Duration             Peripheral IV 03/23/24 Left Antecubital 2 days    Peripheral IV 03/25/24 Dorsal (posterior);Left Forearm <1 day              Hemodialysis Catheter  Duration             HD Temporary Double Catheter <1 day              Drain  Duration             Urethral Catheter 16 Fr. 2 days                   Urinary Catheter:  Goal for removal: Remove after 48 hrs of I/O monitoring         Central Line:  Goal for removal: HD catheter             Imaging: will review CT head    Recent Cultures (last 7 days):         Last 24 Hours Medication List:   Current Facility-Administered Medications   Medication Dose Route Frequency Provider Last Rate    acetaminophen  650 mg Oral Q4H PRN Eleanor Rodriguez MD      albuterol  2.5 mg Nebulization Q4H PRN Eleanor Rodriguez MD      aspirin  150 mg Rectal Daily Malena Hearn MD      atorvastatin  40 mg Oral Daily With Dinner Eleanor Rodriguez MD      budesonide  0.5 mg Nebulization Q12H Sincere Goldman PA-C      cefTRIAXone  1,000 mg Intravenous Q24H Malena Hearn MD 1,000 mg (03/25/24 1446)    clopidogrel  75 mg Oral Daily Eleanor Rodriguez MD      dexmedetomidine  0.1-0.7 mcg/kg/hr Intravenous Titrated Malena Hearn MD 0.3 mcg/kg/hr (03/26/24 0833)    formoterol  20 mcg Nebulization Q12H Sincere Goldman PA-C      heparin (porcine)  5,000 Units Subcutaneous Q8H [facility] Eleanor Rodriguez MD      hydrALAZINE  10 mg Intravenous Q6H [facility] Darryl Powell MD      ipratropium  0.5 mg Nebulization TID Eleanor Rodriguez MD      levalbuterol  1.25 mg Nebulization TID Eleanor Rodriguez MD      LORazepam  1 mg Intravenous Q6H PRN Darryl Powell MD      methylPREDNISolone sodium succinate  40 mg Intravenous Q8H [facility] Sincere Goldman PA-C      ondansetron  4 mg Intravenous Q6H PRN Eleanor Rodriguez MD      sodium bicarbonate 150 mEq in dextrose 5 % 1,000 mL infusion  75 mL/hr Intravenous Continuous MANOHAR Mccarthy Stopped (03/25/24 1530)        Today, Patient Was Seen By: Malena Hearn MD    **Please Note: This note may have been constructed using a voice recognition system.**

## 2024-03-26 NOTE — RESPIRATORY THERAPY NOTE
4 l/m nc 99%, 1120 decreased to 3 l/m nc   03/26/24 1104   Respiratory Assessment   Resp Comments pt taken off bipap and placed on 4 l/m nc, pt has 43 minutes of dialysis left. pt  is answering questions in sentences.   Oxygen Therapy/Pulse Ox   O2 Device Nasal cannula   Nasal Cannula O2 Flow Rate (L/min) 4 L/min   Calculated FIO2 (%) - Nasal Cannula 36   O2 Flow Rate (L/min) 4 L/min   SpO2 Activity At Rest

## 2024-03-27 ENCOUNTER — APPOINTMENT (INPATIENT)
Dept: DIALYSIS | Facility: HOSPITAL | Age: 79
DRG: 682 | End: 2024-03-27
Payer: MEDICARE

## 2024-03-27 PROBLEM — R79.89 ELEVATED D-DIMER: Status: ACTIVE | Noted: 2024-03-27

## 2024-03-27 LAB
ALBUMIN SERPL BCP-MCNC: 3.4 G/DL (ref 3.5–5)
ALP SERPL-CCNC: 42 U/L (ref 34–104)
ALT SERPL W P-5'-P-CCNC: 11 U/L (ref 7–52)
ANION GAP SERPL CALCULATED.3IONS-SCNC: 13 MMOL/L (ref 4–13)
AST SERPL W P-5'-P-CCNC: 14 U/L (ref 13–39)
BASOPHILS # BLD AUTO: 0 THOUSANDS/ÂΜL (ref 0–0.1)
BASOPHILS NFR BLD AUTO: 0 % (ref 0–1)
BILIRUB SERPL-MCNC: 0.41 MG/DL (ref 0.2–1)
BUN SERPL-MCNC: 70 MG/DL (ref 5–25)
CALCIUM ALBUM COR SERPL-MCNC: 8.9 MG/DL (ref 8.3–10.1)
CALCIUM SERPL-MCNC: 8.4 MG/DL (ref 8.4–10.2)
CHLORIDE SERPL-SCNC: 97 MMOL/L (ref 96–108)
CO2 SERPL-SCNC: 22 MMOL/L (ref 21–32)
CREAT SERPL-MCNC: 4.47 MG/DL (ref 0.6–1.3)
EOSINOPHIL # BLD AUTO: 0 THOUSAND/ÂΜL (ref 0–0.61)
EOSINOPHIL NFR BLD AUTO: 0 % (ref 0–6)
EOSINOPHIL NFR URNS MANUAL: 0 %
ERYTHROCYTE [DISTWIDTH] IN BLOOD BY AUTOMATED COUNT: 14.9 % (ref 11.6–15.1)
GFR SERPL CREATININE-BSD FRML MDRD: 8 ML/MIN/1.73SQ M
GLUCOSE SERPL-MCNC: 142 MG/DL (ref 65–140)
HCT VFR BLD AUTO: 29.8 % (ref 34.8–46.1)
HGB BLD-MCNC: 10.1 G/DL (ref 11.5–15.4)
IMM GRANULOCYTES # BLD AUTO: 0.03 THOUSAND/UL (ref 0–0.2)
IMM GRANULOCYTES NFR BLD AUTO: 1 % (ref 0–2)
LYMPHOCYTES # BLD AUTO: 0.12 THOUSANDS/ÂΜL (ref 0.6–4.47)
LYMPHOCYTES NFR BLD AUTO: 2 % (ref 14–44)
MCH RBC QN AUTO: 29.3 PG (ref 26.8–34.3)
MCHC RBC AUTO-ENTMCNC: 33.9 G/DL (ref 31.4–37.4)
MCV RBC AUTO: 86 FL (ref 82–98)
MONOCYTES # BLD AUTO: 0.36 THOUSAND/ÂΜL (ref 0.17–1.22)
MONOCYTES NFR BLD AUTO: 7 % (ref 4–12)
MRSA NOSE QL CULT: NORMAL
NEUTROPHILS # BLD AUTO: 4.94 THOUSANDS/ÂΜL (ref 1.85–7.62)
NEUTS SEG NFR BLD AUTO: 90 % (ref 43–75)
NRBC BLD AUTO-RTO: 0 /100 WBCS
PLATELET # BLD AUTO: 127 THOUSANDS/UL (ref 149–390)
PMV BLD AUTO: 11 FL (ref 8.9–12.7)
POTASSIUM SERPL-SCNC: 4.5 MMOL/L (ref 3.5–5.3)
PROT SERPL-MCNC: 5.2 G/DL (ref 6.4–8.4)
RBC # BLD AUTO: 3.45 MILLION/UL (ref 3.81–5.12)
SODIUM SERPL-SCNC: 132 MMOL/L (ref 135–147)
WBC # BLD AUTO: 5.45 THOUSAND/UL (ref 4.31–10.16)

## 2024-03-27 PROCEDURE — 94760 N-INVAS EAR/PLS OXIMETRY 1: CPT

## 2024-03-27 PROCEDURE — 97530 THERAPEUTIC ACTIVITIES: CPT

## 2024-03-27 PROCEDURE — 85025 COMPLETE CBC W/AUTO DIFF WBC: CPT | Performed by: FAMILY MEDICINE

## 2024-03-27 PROCEDURE — 93005 ELECTROCARDIOGRAM TRACING: CPT

## 2024-03-27 PROCEDURE — 80053 COMPREHEN METABOLIC PANEL: CPT | Performed by: FAMILY MEDICINE

## 2024-03-27 PROCEDURE — 97163 PT EVAL HIGH COMPLEX 45 MIN: CPT

## 2024-03-27 PROCEDURE — 99232 SBSQ HOSP IP/OBS MODERATE 35: CPT | Performed by: INTERNAL MEDICINE

## 2024-03-27 PROCEDURE — 99451 NTRPROF PH1/NTRNET/EHR 5/>: CPT | Performed by: INTERNAL MEDICINE

## 2024-03-27 PROCEDURE — 5A1D70Z PERFORMANCE OF URINARY FILTRATION, INTERMITTENT, LESS THAN 6 HOURS PER DAY: ICD-10-PCS | Performed by: INTERNAL MEDICINE

## 2024-03-27 PROCEDURE — 94640 AIRWAY INHALATION TREATMENT: CPT

## 2024-03-27 PROCEDURE — 90935 HEMODIALYSIS ONE EVALUATION: CPT | Performed by: INTERNAL MEDICINE

## 2024-03-27 PROCEDURE — 99233 SBSQ HOSP IP/OBS HIGH 50: CPT | Performed by: FAMILY MEDICINE

## 2024-03-27 PROCEDURE — 97167 OT EVAL HIGH COMPLEX 60 MIN: CPT

## 2024-03-27 PROCEDURE — 92526 ORAL FUNCTION THERAPY: CPT

## 2024-03-27 RX ORDER — METHYLPREDNISOLONE SODIUM SUCCINATE 40 MG/ML
40 INJECTION, POWDER, LYOPHILIZED, FOR SOLUTION INTRAMUSCULAR; INTRAVENOUS EVERY 12 HOURS SCHEDULED
Status: DISCONTINUED | OUTPATIENT
Start: 2024-03-27 | End: 2024-03-29

## 2024-03-27 RX ADMIN — FORMOTEROL FUMARATE DIHYDRATE 20 MCG: 20 SOLUTION RESPIRATORY (INHALATION) at 08:02

## 2024-03-27 RX ADMIN — LEVALBUTEROL HYDROCHLORIDE 1.25 MG: 1.25 SOLUTION RESPIRATORY (INHALATION) at 19:20

## 2024-03-27 RX ADMIN — CEFTRIAXONE 1000 MG: 1 INJECTION, SOLUTION INTRAVENOUS at 14:57

## 2024-03-27 RX ADMIN — FORMOTEROL FUMARATE DIHYDRATE 20 MCG: 20 SOLUTION RESPIRATORY (INHALATION) at 19:20

## 2024-03-27 RX ADMIN — CLOPIDOGREL 75 MG: 75 TABLET ORAL at 12:16

## 2024-03-27 RX ADMIN — HYDRALAZINE HYDROCHLORIDE 10 MG: 20 INJECTION INTRAMUSCULAR; INTRAVENOUS at 23:56

## 2024-03-27 RX ADMIN — HYDRALAZINE HYDROCHLORIDE 10 MG: 20 INJECTION INTRAMUSCULAR; INTRAVENOUS at 17:00

## 2024-03-27 RX ADMIN — METHYLPREDNISOLONE SODIUM SUCCINATE 40 MG: 40 INJECTION, POWDER, FOR SOLUTION INTRAMUSCULAR; INTRAVENOUS at 21:41

## 2024-03-27 RX ADMIN — HYDRALAZINE HYDROCHLORIDE 10 MG: 20 INJECTION INTRAMUSCULAR; INTRAVENOUS at 05:08

## 2024-03-27 RX ADMIN — ASPIRIN 150 MG: 300 SUPPOSITORY RECTAL at 12:11

## 2024-03-27 RX ADMIN — BUDESONIDE 0.5 MG: 0.5 INHALANT RESPIRATORY (INHALATION) at 19:20

## 2024-03-27 RX ADMIN — ONDANSETRON 4 MG: 2 INJECTION INTRAMUSCULAR; INTRAVENOUS at 14:17

## 2024-03-27 RX ADMIN — HEPARIN SODIUM 5000 UNITS: 5000 INJECTION, SOLUTION INTRAVENOUS; SUBCUTANEOUS at 05:08

## 2024-03-27 RX ADMIN — IPRATROPIUM BROMIDE 0.5 MG: 0.5 SOLUTION RESPIRATORY (INHALATION) at 08:02

## 2024-03-27 RX ADMIN — METHYLPREDNISOLONE SODIUM SUCCINATE 40 MG: 40 INJECTION, POWDER, FOR SOLUTION INTRAMUSCULAR; INTRAVENOUS at 05:08

## 2024-03-27 RX ADMIN — BUDESONIDE 0.5 MG: 0.5 INHALANT RESPIRATORY (INHALATION) at 08:02

## 2024-03-27 RX ADMIN — HYDRALAZINE HYDROCHLORIDE 10 MG: 20 INJECTION INTRAMUSCULAR; INTRAVENOUS at 12:14

## 2024-03-27 RX ADMIN — ATORVASTATIN CALCIUM 40 MG: 40 TABLET, FILM COATED ORAL at 16:56

## 2024-03-27 RX ADMIN — IPRATROPIUM BROMIDE 0.5 MG: 0.5 SOLUTION RESPIRATORY (INHALATION) at 19:20

## 2024-03-27 RX ADMIN — LEVALBUTEROL HYDROCHLORIDE 1.25 MG: 1.25 SOLUTION RESPIRATORY (INHALATION) at 08:02

## 2024-03-27 RX ADMIN — LEVALBUTEROL HYDROCHLORIDE 1.25 MG: 1.25 SOLUTION RESPIRATORY (INHALATION) at 15:04

## 2024-03-27 RX ADMIN — HEPARIN SODIUM 5000 UNITS: 5000 INJECTION, SOLUTION INTRAVENOUS; SUBCUTANEOUS at 21:41

## 2024-03-27 RX ADMIN — HEPARIN SODIUM 5000 UNITS: 5000 INJECTION, SOLUTION INTRAVENOUS; SUBCUTANEOUS at 14:57

## 2024-03-27 RX ADMIN — HYDRALAZINE HYDROCHLORIDE 10 MG: 20 INJECTION INTRAMUSCULAR; INTRAVENOUS at 00:49

## 2024-03-27 RX ADMIN — IPRATROPIUM BROMIDE 0.5 MG: 0.5 SOLUTION RESPIRATORY (INHALATION) at 15:04

## 2024-03-27 NOTE — ASSESSMENT & PLAN NOTE
Wt Readings from Last 3 Encounters:   03/26/24 44.5 kg (98 lb 1.7 oz)   03/18/24 43.1 kg (95 lb)   02/22/24 40.8 kg (90 lb)     Possible acute diastolic CHF with mild tricuspid regurgitation, worsening renal and respiratory failure  Patient initiated on dialysis 3/26 2-hours treatment, 2.5-hour treatment today, 3/27  Monitor daily weights, I's and O's

## 2024-03-27 NOTE — PROGRESS NOTES
Patient attempting to get oob with pt/ot , patient sitting at bedside and became nauseous w/vomiting and went into a rapid a-fib. Patient was NSR on this am strip. Dr Hearn aware. Patient given zofran. Will do EKG.

## 2024-03-27 NOTE — ASSESSMENT & PLAN NOTE
Appears multifactorial, secondary to SEEMA/metabolic acidosis with compensatory tachypnea, COPD exacerbation, possibly acute diastolic CHF component  Was initially requiring continuous BiPAP therapy -now off BiPAP, on 3 L O2   Treating underlying conditions as above

## 2024-03-27 NOTE — ASSESSMENT & PLAN NOTE
Most likely due to acute illness  No significant clinical suspicion for acute PE with marked clinical improvement with HD  LE venous duplex negative for DVT

## 2024-03-27 NOTE — ASSESSMENT & PLAN NOTE
Patient with history of STEMI in 2008 status post RCA stenting  Resume Plavix and statin as able to take p.o.

## 2024-03-27 NOTE — ASSESSMENT & PLAN NOTE
Lab Results   Component Value Date    EGFR 8 03/27/2024    EGFR 5 03/26/2024    EGFR 6 03/25/2024    CREATININE 4.47 (H) 03/27/2024    CREATININE 6.27 (H) 03/26/2024    CREATININE 5.68 (H) 03/25/2024   Patient with worsening SEEMA, now initiated on hemodialysis   Still with minimal urine output, continue to monitor

## 2024-03-27 NOTE — PROGRESS NOTES
[facility]  Progress Note  Name: Laya Brooks I  MRN: [id number]  Unit/Bed#:  I Date of Admission: 3/23/2024   Date of Service: 3/27/2024 I Hospital Day: 4    Assessment/Plan   * Chronic obstructive pulmonary disease with acute exacerbation (HCC)  Assessment & Plan  This is an 79-year-old female patient with recently diagnosed COPD, history of CAD, CKD and hypertension who initially presented to the ED with shortness of breath, was noted to have significant audible wheezing with shortness of breath and had to be placed on BiPAP  Recent PFT showed moderate obstructive deficit, moderate DLCO reduction and evidence of hyperinflation and air trapping on lung volumes without a significant bronchodilator response. She was placed on LAMA/LABA inhalers  Per family, patient was using bronchodilators without significant improvement  In the ER she was placed on BiPAP - now weaned to 3L O2  Patient received initial dose of IV Solu-Medrol 125 mg in the ED and has been maintained on a taper - will proceed with 40 mg every 12 hours today  ABG without evidence of CO2 retention  Continue with bronchodilators with DuoNebs every 4 hours  Added IV ceftriaxone for COPD exacerbation 3/25/24  Treat SEEMA/metabolic acidosis with HD - contributing to patient's respiratory status      Elevated d-dimer  Assessment & Plan  Most likely due to acute illness  No significant clinical suspicion for acute PE with marked clinical improvement with HD  LE venous duplex negative for DVT      Toxic metabolic encephalopathy  Assessment & Plan  Worsening mental status in the setting of metabolic disturbances including severe SEEMA, respiratory failure; toxic component of patient having received benzodiazepines and opiate medications  Improved  Monitor mental status, treat underlying conditions  CT head no acute findings    Acute respiratory failure with hypoxia (HCC)  Assessment & Plan  Appears multifactorial, secondary to  SEEMA/metabolic acidosis with compensatory tachypnea, COPD exacerbation, possibly acute diastolic CHF component  Was initially requiring continuous BiPAP therapy -now off BiPAP, on 3 L O2   Treating underlying conditions as above    Hyponatremia  Assessment & Plan  Associated with worsening SEEMA  Now initiated on HD    Acute diastolic congestive heart failure (HCC)  Assessment & Plan  Wt Readings from Last 3 Encounters:   03/26/24 44.5 kg (98 lb 1.7 oz)   03/18/24 43.1 kg (95 lb)   02/22/24 40.8 kg (90 lb)     Possible acute diastolic CHF with mild tricuspid regurgitation, worsening renal and respiratory failure  Patient initiated on dialysis 3/26 2-hours treatment, 2.5-hour treatment today, 3/27  Monitor daily weights, I's and O's      SEEMA (acute kidney injury) (HCC)  Assessment & Plan  SEEMA superimposed on CKD stage IIIa, present on admission, severely worsening renal function with oliguria  Patient initially admitted with acute respiratory failure felt to be secondary to COPD as well as possible CHF exacerbation component, received diuresis - with worsening renal function further diuretics now discontinued  S/p hemodialysis catheter placement 3/26, HD initiated 3/26, additional treatment 3/27 with overall significant improvement  Urine output remains minimal.  Continue to monitor renal function and urine output      CAD (coronary artery disease)  Assessment & Plan  Patient with history of STEMI in 2008 status post RCA stenting  Resume Plavix and statin as able to take p.o.     Hypertension  Assessment & Plan  On Norvasc 5 mg daily and lisinopril   Blood pressure medications held in setting of worsening SEEMA  Continue IV Hydralazine as needed for now    Stage 3a chronic kidney disease (HCC)  Assessment & Plan  Lab Results   Component Value Date    EGFR 8 03/27/2024    EGFR 5 03/26/2024    EGFR 6 03/25/2024    CREATININE 4.47 (H) 03/27/2024    CREATININE 6.27 (H) 03/26/2024    CREATININE 5.68 (H) 03/25/2024   Patient  with worsening SEEMA, now initiated on hemodialysis   Still with minimal urine output, continue to monitor    CVA (cerebral vascular accident) (HCC)  Assessment & Plan  History noted  Continue with Plavix and statin  Repeat CTH due to encephalopathy    Hypercholesterolemia  Assessment & Plan  Resume Lipitor with patient tolerating PO               VTE Pharmacologic Prophylaxis:    heparin    Mobility:   Basic Mobility Inpatient Raw Score: 17  JH-HLM Goal: 5: Stand one or more mins  JH-HLM Achieved: 2: Bed activities/Dependent transfer  JH-HLM Goal NOT achieved. Continue with multidisciplinary rounding and encourage appropriate mobility to improve upon JH-HLM goals.    Patient Centered Rounds: I performed bedside rounds with nursing staff today.   Discussions with Specialists or Other Care Team Provider: Nephrology, Pulmonology    Education and Discussions with Family / Patient: will update family    Total Time Spent on Date of Encounter in care of patient: 50 mins. This time was spent on one or more of the following: performing physical exam; counseling and coordination of care; obtaining or reviewing history; documenting in the medical record; reviewing/ordering tests, medications or procedures; communicating with other healthcare professionals and discussing with patient's family/caregivers.    Current Length of Stay: 4 day(s)  Current Patient Status: Inpatient   Certification Statement: The patient will continue to require additional inpatient hospital stay due to close monitoring, HD, IV Rx  Discharge Plan: Anticipate discharge in 48-72 hrs to discharge location to be determined pending rehab evaluations.    Code Status: Level 1 - Full Code    Subjective:   Patient more awake, alert and conversational.  She is more oriented.  She is able to tolerate some oral intake.    Objective:     Vitals:   Temp (24hrs), Av °F (37.2 °C), Min:98.8 °F (37.1 °C), Max:99.4 °F (37.4 °C)    Temp:  [98.8 °F (37.1 °C)-99.4 °F  (37.4 °C)] 98.8 °F (37.1 °C)  HR:  [] 93  Resp:  [17-37] 18  BP: (127-199)/(56-92) 185/74  SpO2:  [91 %-99 %] 94 %  Body mass index is 19.16 kg/m².     Input and Output Summary (last 24 hours):     Intake/Output Summary (Last 24 hours) at 3/27/2024 1345  Last data filed at 3/27/2024 1300  Gross per 24 hour   Intake 1430.83 ml   Output 1135 ml   Net 295.83 ml       Physical Exam:   Physical Exam  Constitutional:       General: She is not in acute distress.  HENT:      Head: Normocephalic and atraumatic.      Nose: No congestion.   Eyes:      Conjunctiva/sclera: Conjunctivae normal.   Cardiovascular:      Rate and Rhythm: Normal rate and regular rhythm.      Heart sounds: No murmur heard.  Pulmonary:      Effort: No respiratory distress.      Breath sounds: Wheezing present. No rales.   Abdominal:      General: There is no distension.      Tenderness: There is no abdominal tenderness. There is no guarding.   Musculoskeletal:      Right lower leg: No edema.      Left lower leg: No edema.   Skin:     General: Skin is warm and dry.   Psychiatric:      Comments: Delayed speech          Additional Data:     Labs:  Results from last 7 days   Lab Units 03/27/24  0517   WBC Thousand/uL 5.45   HEMOGLOBIN g/dL 10.1*   HEMATOCRIT % 29.8*   PLATELETS Thousands/uL 127*   NEUTROS PCT % 90*   LYMPHS PCT % 2*   MONOS PCT % 7   EOS PCT % 0     Results from last 7 days   Lab Units 03/27/24  0517   SODIUM mmol/L 132*   POTASSIUM mmol/L 4.5   CHLORIDE mmol/L 97   CO2 mmol/L 22   BUN mg/dL 70*   CREATININE mg/dL 4.47*   ANION GAP mmol/L 13   CALCIUM mg/dL 8.4   ALBUMIN g/dL 3.4*   TOTAL BILIRUBIN mg/dL 0.41   ALK PHOS U/L 42   ALT U/L 11   AST U/L 14   GLUCOSE RANDOM mg/dL 142*     Results from last 7 days   Lab Units 03/23/24  2209   INR  1.04             Results from last 7 days   Lab Units 03/25/24  1029 03/25/24  0504 03/23/24 2209   LACTIC ACID mmol/L 0.4*  --  1.5   PROCALCITONIN ng/ml  --  0.19 0.12        Lines/Drains:  Invasive Devices       Central Venous Catheter Line  Duration             CVC Central Lines 03/26/24 Double 1 day              Peripheral Intravenous Line  Duration             Peripheral IV 03/23/24 Left Antecubital 3 days    Peripheral IV 03/25/24 Dorsal (posterior);Left Forearm 1 day              Hemodialysis Catheter  Duration             HD Temporary Double Catheter 1 day              Drain  Duration             Urethral Catheter 16 Fr. 3 days                  Urinary Catheter:  Goal for removal: Voiding trial when ambulation improves         Central Line:  Goal for removal:  HD catheter             Imaging: Reviewed radiology reports from this admission including: CT head and Personally reviewed the following imaging: CT head    Recent Cultures (last 7 days):         Last 24 Hours Medication List:   Current Facility-Administered Medications   Medication Dose Route Frequency Provider Last Rate    acetaminophen  650 mg Oral Q4H PRN Eleanor Rodriguez MD      albuterol  2.5 mg Nebulization Q4H PRN Eleanor Rodriguez MD      atorvastatin  40 mg Oral Daily With Dinner Eleanor Rodriguez MD      budesonide  0.5 mg Nebulization Q12H Sincere Goldman PA-C      cefTRIAXone  1,000 mg Intravenous Q24H Malena Hearn MD 1,000 mg (03/26/24 1425)    clopidogrel  75 mg Oral Daily Eleanor Rodriguez MD      formoterol  20 mcg Nebulization Q12H Sincere Goldman PA-C      heparin (porcine)  5,000 Units Subcutaneous Q8H Replaced by [facility] Eleanor Rodriguez MD      hydrALAZINE  10 mg Intravenous Q6H Replaced by [facility] Darryl Powell MD      ipratropium  0.5 mg Nebulization TID Eleanor Rodriguez MD      levalbuterol  1.25 mg Nebulization TID Eleanor Rodriguez MD      LORazepam  1 mg Intravenous Q6H PRN Darryl Powell MD      methylPREDNISolone sodium succinate  40 mg Intravenous Q12H Replaced by [facility] Sincere Goldman PA-C      ondansetron  4 mg Intravenous Q6H PRN Eleanor Rodriguez MD          Today, Patient Was Seen By: Malena  Smiley Hearn MD    **Please Note: This note may have been constructed using a voice recognition system.**

## 2024-03-27 NOTE — PLAN OF CARE
Post-Dialysis RN Treatment Note    Blood Pressure:  Pre-165/74 mm/Hg  Post- 177/72 mmHg   EDW- TBD    Weight:  Pre-42.7 kg   Post-42.2 kg   Mode of weight measurement: Bed Scale   Volume Removed-500 ml    Treatment duration-150 minutes    NS given  No    Treatment shortened? No   Medications given during Rx Not Applicable   Estimated Kt/V  Not Applicable   Access type: Permacath/TDC   Access Issues: Yes, describe: reversed lines     Report called to primary nurse   Yes-Sasha Guidry RN        Goal- remove 500 ml as tolerated using 3 K+ bath over 2 1/2 hr hd tx.    Problem: METABOLIC, FLUID AND ELECTROLYTES - ADULT  Goal: Electrolytes maintained within normal limits  Description: INTERVENTIONS:  - Monitor labs and assess patient for signs and symptoms of electrolyte imbalances  - Administer electrolyte replacement as ordered  - Monitor response to electrolyte replacements, including repeat lab results as appropriate  - Instruct patient on fluid and nutrition as appropriate  Outcome: Progressing

## 2024-03-27 NOTE — PROGRESS NOTES
"Progress Note - Pulmonary   Laya Brooks 79 y.o. female MRN: [id number]  Unit/Bed#:  Encounter: [id number]    Assessment/Plan:    Acute hypoxic respiratory failure   Acute metabolic encephalopathy   Moderate COPD with acute exacerbation   Lymphopenia   Anion gap metabolic acidosis  SEEMA/CKD  Hyperkalemia   Right renal atrophy      Pulmonary recommendations:      Seen on 3L NC with adequate oxygen saturations. Maintain saturations greater than or equal to 88%.   Continue BiPAP qHS and prn  Decrease systemic steroids to Solumedrol 40 mg IV q12hrs   Continue Pulmicort/Perforomist every 12 hours  Continue Xopenex/Atrovent TID   Continue HD per nephrology recommendations- planned 2.5 hr session today.   Continue to avoid diuretics.   Continue to trend labs.   Unclear etiology of ATN (patient was not hypotension and does not have signs of infection or sepsis). Could consider AIN, possible drug induced?? urine eosinophil pending.  Further treatment per primary team/nephrology    Chief Complaint:    \"I feel lousy.\"    Subjective:    Patient was seen and examined today.  No overnight events reported. Patient states that her breathing is okay at the moment. She overall feels overwhelmed by the news of her poor kidney function. She doesn't remember anything from the last few days.     Objective:    Vitals: Blood pressure (!) 189/77, pulse 87, temperature 98.9 °F (37.2 °C), temperature source Tympanic, resp. rate 18, height 5' (1.524 m), weight 44.5 kg (98 lb 1.7 oz), SpO2 94%.3L NC,Body mass index is 19.16 kg/m².      Intake/Output Summary (Last 24 hours) at 3/27/2024 1111  Last data filed at 3/27/2024 0900  Gross per 24 hour   Intake 450 ml   Output 638 ml   Net -188 ml       Invasive Devices       Central Venous Catheter Line  Duration             CVC Central Lines 03/26/24 Double 1 day              Peripheral Intravenous Line  Duration             Peripheral IV 03/23/24 Left Antecubital 3 days    Peripheral IV " "03/25/24 Dorsal (posterior);Left Forearm 1 day              Hemodialysis Catheter  Duration             HD Temporary Double Catheter 1 day              Drain  Duration             Urethral Catheter 16 Fr. 3 days                    Physical Exam:     Physical Exam  Vitals and nursing note reviewed.   Constitutional:       General: She is not in acute distress.     Appearance: Normal appearance.   HENT:      Head: Normocephalic and atraumatic.      Mouth/Throat:      Mouth: Mucous membranes are moist.      Pharynx: Oropharynx is clear.   Cardiovascular:      Rate and Rhythm: Normal rate and regular rhythm.      Heart sounds: No murmur heard.  Pulmonary:      Effort: Pulmonary effort is normal. No respiratory distress.      Breath sounds: Wheezing present. No rhonchi or rales.   Musculoskeletal:      Cervical back: Normal range of motion.   Skin:     General: Skin is warm and dry.   Neurological:      General: No focal deficit present.      Mental Status: She is alert. Mental status is at baseline.   Psychiatric:         Mood and Affect: Mood normal.         Behavior: Behavior normal.         Labs: I have personally reviewed pertinent lab results., ABG: No results found for: \"PHART\", \"DFQ4FAG\", \"PO2ART\", \"ITD3ZTO\", \"O4BNESRT\", \"BEART\", \"SOURCE\", BNP: No results found for: \"BNP\", CBC:   Lab Results   Component Value Date    WBC 5.45 03/27/2024    HGB 10.1 (L) 03/27/2024    HCT 29.8 (L) 03/27/2024    MCV 86 03/27/2024     (L) 03/27/2024    RBC 3.45 (L) 03/27/2024    MCH 29.3 03/27/2024    MCHC 33.9 03/27/2024    RDW 14.9 03/27/2024    MPV 11.0 03/27/2024    NRBC 0 03/27/2024   , CMP:   Lab Results   Component Value Date    SODIUM 132 (L) 03/27/2024    K 4.5 03/27/2024    CL 97 03/27/2024    CO2 22 03/27/2024    BUN 70 (H) 03/27/2024    CREATININE 4.47 (H) 03/27/2024    CALCIUM 8.4 03/27/2024    AST 14 03/27/2024    ALT 11 03/27/2024    ALKPHOS 42 03/27/2024    EGFR 8 03/27/2024   , PT/INR: No results found for: " "\"PT\", \"INR\", Troponin: No results found for: \"TROPONINI\"    Imaging and other studies: I have personally reviewed pertinent reports.   and I have personally reviewed pertinent films in PACS    "

## 2024-03-27 NOTE — PROGRESS NOTES
Patient back to bed, patient states nausea has subsided and she feels better since vomiting. EKG completed.

## 2024-03-27 NOTE — ASSESSMENT & PLAN NOTE
SEEMA superimposed on CKD stage IIIa, present on admission, severely worsening renal function with oliguria  Patient initially admitted with acute respiratory failure felt to be secondary to COPD as well as possible CHF exacerbation component, received diuresis - with worsening renal function further diuretics now discontinued  S/p hemodialysis catheter placement 3/26, HD initiated 3/26, additional treatment 3/27 with overall significant improvement  Urine output remains minimal.  Continue to monitor renal function and urine output

## 2024-03-27 NOTE — TELEMEDICINE
e-Consult ([facility])   Laya Brooks 79 y.o. female MRN: [id number]  Unit/Bed#:  Encounter: [id number]      Reason for Consult / Principal Problem: Pancytopenia  Inpatient consult to Hematology  Consult performed by: Andres Patrick MD  Consult ordered by: Malena Hearn MD        03/27/24  Reviewed patient's medical records.  Patient is in ICU and is on dialysis for acute on chronic kidney injury, renovascular disease, metabolic acidosis, metabolic encephalopathy and hyperkalemia.  From renal notes it looks like patient is improving.  Patient probably had acute marrow suppression and today blood counts look a lot better.  I hope these are her counts and not a mistake.    ASSESSMENT:  Pancytopenia likely secondary to acute myelosuppression and counts have improved significantly today.    RECOMMENDATIONS:  Please continue to monitor blood counts and patient's condition and keep treating patient's underlying medical problems.  Please continue checking blood counts daily for next 3 days at least.  Please call or text as needed.  I will be sending [place] text to patient primary physician Malena Hearn-Nahun texted.    >5 min, >50% time spent reviewing/analyzing record, written report will be generated in the EMR.    Andres Patrick MD

## 2024-03-27 NOTE — PLAN OF CARE
Problem: PHYSICAL THERAPY ADULT  Goal: Performs mobility at highest level of function for planned discharge setting.  See evaluation for individualized goals.  Description: Treatment/Interventions: Functional transfer training, LE strengthening/ROM, Therapeutic exercise, Endurance training, Bed mobility, Gait training          See flowsheet documentation for full assessment, interventions and recommendations.  Note: Prognosis: Guarded  Problem List: Decreased strength, Decreased endurance, Impaired balance, Decreased mobility  Assessment: Patient is a 79 y.o. female evaluated by Physical Therapy s/p admit to [facility] on 3/23/2024 with admitting diagnosis of: Shortness of breath, CHF (congestive heart failure), Hyponatremia, Hypertension, Pleural effusion on left, SEEMA (acute kidney injury), and principal problem of: Chronic obstructive pulmonary disease with acute exacerbation. PT was consulted to assess patient's functional mobility and discharge needs. Ordered are PT Evaluation and treatment. Comorbidities affecting patient's physical performance at time of assessment include:  hx of CVA, CKD, HTN, CAD, HTN, COPD . Personal factors affecting the patient at time of IE include: step(s) to enter home, inability to navigate community distances, inability/difficulty performing IADLs, and inability/difficulty performing ADLs. Please locate objective findings from PT assessment regarding body systems outlined above. Upon evaluation, pt able to perform all bed mobility with SUP, increased time, and occasional verbal cuing. While sitting EOB, pt became very nauseated, began vomiting, and experienced significant a-fib. RN made aware and pt returned to supine. SpO2 remained WFL on 3L O2 throughout. The patient's AM-PAC Basic Mobility Inpatient Short Form Raw Score is 9. A Raw score of less than or equal to 16 suggests the patient may benefit from discharge to post-acute rehabilitation services. Please also  refer to the recommendation of the Physical Therapist for safe discharge planning. Co treatment with OT secondary to complex medical condition of pt, possible A of 2 required to achieve and maintain transitional movements, requiring the need of skilled therapeutic intervention of 2 therapists to achieve delivery of services. Pt will benefit from continued PT intervention during LOS to address current deficits, increase LOF, and facilitate safe d/c to next level of care when medically appropriate. D/c recommendation at this time is rehabilitation resource intensity level I.        Rehab Resource Intensity Level, PT: I (Maximum Resource Intensity)    See flowsheet documentation for full assessment.

## 2024-03-27 NOTE — PROGRESS NOTES
Progress Note - Nephrology   Laya Brooks 79 y.o. female MRN: [id number]  Unit/Bed#:  Encounter: [id number]  HEMODIALYSIS PROCEDURE NOTE  The patient was seen and examined on hemodialysis.  Time: 2.5 hours  Sodium: 135 Blood flow: 300   Dialyzer: F160 Potassium: 3 Dialysate flow: 600   Access: Temp Catheter Bicarbonate: 35 Ultrafiltration goal: 500 ml   Medications on HD: none       A/P:  1.  Hemodialysis dependent acute kidney injury   Presumed etiology was acute tubular necrosis, however, the patient's fraction secretion of sodium is less than 1%, and given the patient's known clinical history of renovascular disease, there is a question of potential acute hemodynamically significant narrowing of the left kidney which is her only kidney that is likely providing significant function.  After discussion with our hospitalist colleague, I agree that a renal artery Doppler to further evaluate potential acute stenosis of the left kidney is appropriate as this will change and direct care in an effort towards improving the patient's kidney function.  Continue to provide hemodialysis sessions with today being her second session followed by her third session tomorrow.  Orders are in the chart for a 3-hour treatment for tomorrow.  Continue to avoid potential nephrotoxins.  2.  Chronic kidney disease stage IIIa with baseline creatinine around 1 mg/dL  3.  Renovascular disease   As mentioned above, presumed that the patient's atrophic right kidney is due to severe stenosis given the patient's overall clinical circumstances with peripheral chill disease and other vascular compromise.  Will pursue a renal artery Doppler to further evaluate with appropriate intervention as indicated if there is a hemodynamically significant stenosis of the left renal artery.  4.  Metabolic acidosis   Resolved status post hemodialysis session, will dialyze the patient with a 35 mill equivalent/liter bicarbonate bath today, and then reduce  down to 30 mEq/liter tomorrow for a full treatment.  5.  Hyperkalemia-resolved  6.  Metabolic encephalopathy-improved   There appears to have been a uremic component as the patient's mentation is much better today, anticipate continued improvement as we dialyze the patient.  Continue to monitor this, after tomorrow's treatment, the patient will be close to full mentation post toxic removals with hemodialysis sessions.    Case discussed with hospitalist.  Will continue to dialyze, next treatment will be tomorrow for 3 hours.  Will attempt to ultrafilter 500 mL today with hemodialysis.  Patient's estimated dry weight set at 43.5 kg, which is an estimate and presumed.    Follow up reason for today's visit: Acute kidney injury/chronic kidney disease/acidosis/hyperkalemia    Chronic obstructive pulmonary disease with acute exacerbation (HCC)    Patient Active Problem List   Diagnosis    History of CVA (cerebrovascular accident)    Hypertensive emergency    Malnutrition (HCC)    Premature atrial complexes    Thrombocytopenia (HCC)    Leukopenia    Neutropenia (HCC)    Hyperphosphatemia    Hypercholesterolemia    Carotid artery stenosis    Nocturnal hypoxia    Dyspnea on exertion    Abnormal echocardiogram    CVA (cerebral vascular accident) (HCC)    Atrophy of right kidney    Stage 3a chronic kidney disease (HCC)    Renal vascular disease    Hypertension    Intracranial atherosclerosis    Weight loss, non-intentional    Hypervitaminosis D    CAD (coronary artery disease)    Celiac artery stenosis (HCC)    S/P right coronary artery (RCA) stent placement    Tobacco use    Wheezing    Dyslipidemia    SEEMA (acute kidney injury) (HCC)    Acute diastolic congestive heart failure (HCC)    Hyponatremia    Chronic obstructive pulmonary disease with acute exacerbation (HCC)    Acute respiratory failure with hypoxia (HCC)    Toxic metabolic encephalopathy    Elevated d-dimer         Subjective:   Patient is feeling better, however  feels somewhat confused.    Objective:     Vitals: Blood pressure (!) 185/79, pulse 87, temperature 98.9 °F (37.2 °C), temperature source Tympanic, resp. rate 18, height 5' (1.524 m), weight 44.5 kg (98 lb 1.7 oz), SpO2 94%.,Body mass index is 19.16 kg/m².    Weight (last 2 days)       Date/Time Weight    03/26/24 0600 44.5 (98.11)    03/25/24 0937 44.6 (98.33)    03/25/24 0548 44.6 (98.33)              Intake/Output Summary (Last 24 hours) at 3/27/2024 1100  Last data filed at 3/27/2024 0900  Gross per 24 hour   Intake 450 ml   Output 638 ml   Net -188 ml     I/O last 3 completed shifts:  In: 450 [I.V.:450]  Out: 643 [Urine:140; Other:503]    HD Temporary Double Catheter (Active)   Reasons to continue HD Cath Treatment Therapy 03/27/24 0930   Goal for Removal Other (comment) 03/26/24 1144   Line Necessity Reviewed Yes, reviewed with provider 03/27/24 0900   Site Assessment WDL 03/27/24 0930   Proximal Lumen Status Infusing 03/27/24 0930   Distal Lumen Status Infusing 03/27/24 0930   Line Care Connections checked and tightened 03/27/24 0900   Dressing Type Chlorhexidine dressing 03/27/24 0930   Dressing Status Clean;Dry;Intact 03/27/24 0930   Dressing Change Due 04/02/24 03/27/24 0900       Urethral Catheter 16 Fr. (Active)   Amt returned on insertion(mL) 125 mL 03/24/24 1108   Reasons to continue Urinary Catheter  Accurate I&O assessment in critically ill patients (48 hr. max) 03/26/24 2000   Goal for Removal No longer needed- Will place order to discontinue 03/24/24 1929   Site Assessment Clean;Skin intact 03/26/24 2000   Perez Care Done 03/27/24 0900   Collection Container Standard drainage bag 03/26/24 2000   Securement Method Securing device (Describe) 03/26/24 2000   Output (mL) 100 mL 03/27/24 0549   CAUTI Time-out performed before Urine Culture Yes 03/24/24 1108       Physical Exam: BP (!) 185/79   Pulse 87   Temp 98.9 °F (37.2 °C) (Tympanic)   Resp 18   Ht 5' (1.524 m)   Wt 44.5 kg (98 lb 1.7 oz)    SpO2 94%   BMI 19.16 kg/m²     General Appearance:    Alert, cooperative, no distress, appears stated age   Head:    Normocephalic, without obvious abnormality, atraumatic   Eyes:    Conjunctiva/corneas clear   Ears:    Normal external ears   Nose:   Nares normal, septum midline, mucosa normal, no drainage    or sinus tenderness   Throat:   Lips, mucosa, and tongue normal; teeth and gums normal   Neck:   Supple   Back:     Symmetric, no curvature, ROM normal, no CVA tenderness   Lungs:     Clear to auscultation bilaterally, respirations unlabored   Chest wall:    No tenderness or deformity   Heart:    Regular rate and rhythm, S1 and S2 normal, no murmur, rub   or gallop   Abdomen:     Soft, non-tender, bowel sounds active   Extremities:   Extremities normal, atraumatic, no cyanosis, +1 bilateral lower extremity edema up to the presacral region   Skin:   Skin color, texture, turgor normal, no rashes or lesions   Lymph nodes:   Cervical normal   Neurologic:   CNII-XII intact            Lab, Imaging and other studies: I have personally reviewed pertinent labs.  CBC:   Lab Results   Component Value Date    WBC 5.45 03/27/2024    HGB 10.1 (L) 03/27/2024    HCT 29.8 (L) 03/27/2024    MCV 86 03/27/2024     (L) 03/27/2024    RBC 3.45 (L) 03/27/2024    MCH 29.3 03/27/2024    MCHC 33.9 03/27/2024    RDW 14.9 03/27/2024    MPV 11.0 03/27/2024    NRBC 0 03/27/2024     CMP:   Lab Results   Component Value Date    K 4.5 03/27/2024    CL 97 03/27/2024    CO2 22 03/27/2024    BUN 70 (H) 03/27/2024    CREATININE 4.47 (H) 03/27/2024    CALCIUM 8.4 03/27/2024    AST 14 03/27/2024    ALT 11 03/27/2024    ALKPHOS 42 03/27/2024    EGFR 8 03/27/2024       .  Results from last 7 days   Lab Units 03/27/24  0517 03/26/24  0456 03/25/24 2020 03/25/24  1351 03/25/24  0504   POTASSIUM mmol/L 4.5 5.4* 5.2   < > 5.1   CHLORIDE mmol/L 97 99 98   < > 99   CO2 mmol/L 22 16* 16*   < > 15*   BUN mg/dL 70* 96* 85*   < > 75*   CREATININE mg/dL  "4.47* 6.27* 5.68*   < > 4.65*   CALCIUM mg/dL 8.4 8.4 8.3*   < > 9.0   ALK PHOS U/L 42 45  --   --  55   ALT U/L 11 11  --   --  13   AST U/L 14 9*  --   --  12*    < > = values in this interval not displayed.         Phosphorus: No results found for: \"PHOS\"  Magnesium: No results found for: \"MG\"  Urinalysis: No results found for: \"COLORU\", \"CLARITYU\", \"SPECGRAV\", \"PHUR\", \"LEUKOCYTESUR\", \"NITRITE\", \"PROTEINUA\", \"GLUCOSEU\", \"KETONESU\", \"BILIRUBINUR\", \"BLOODU\"  Ionized Calcium: No results found for: \"CAION\"  Coagulation: No results found for: \"PT\", \"INR\", \"APTT\"  Troponin: No results found for: \"TROPONINI\"  ABG: No results found for: \"PHART\", \"OBD3KBW\", \"PO2ART\", \"IML2HUK\", \"N6LKYNZT\", \"BEART\", \"SOURCE\"  Radiology review:     IMAGING  Procedure:  VAS VENOUS DUPLEX - LOWER LIMB BILATERAL    Result Date: 3/26/2024  Narrative:  [facility] REPORT CLINICAL: Indications: Physician wants to determine patency of the venous system secondary to elevated d-dimer. Operative History: Coronary Angioplasty w/ Stent Placement Risk Factors The patient has history of HTN, Hyperlipidemia, CKD and CAD.   CONCLUSION:  Impression: RIGHT LOWER LIMB: No evidence of acute or chronic deep vein thrombosis. No evidence of superficial thrombophlebitis noted. Doppler evaluation shows a normal response to augmentation maneuvers. Popliteal, posterior tibial and anterior tibial arterial Doppler waveform's are monophasic.  LEFT LOWER LIMB: No evidence of acute or chronic deep vein thrombosis. No evidence of superficial thrombophlebitis noted. Doppler evaluation shows a normal response to augmentation maneuvers. Popliteal, posterior tibial and anterior tibial arterial Doppler waveform's are monophasic.  Technical findings were given to bedside nurse.  SIGNATURE: Electronically Signed by: OSEAS MENEZES MD on 2024-03-26 04:34:20 PM    Procedure: XR chest portable    Result Date: 3/26/2024  Narrative: XR CHEST PORTABLE INDICATION: line placement. " COMPARISON: 3/25/2024 FINDINGS: There is placement of a right internal jugular central venous catheter with tip over the caval atrial junction. Clear lungs. No pneumothorax or pleural effusion. Normal cardiomediastinal silhouette. Bones are unremarkable for age. Normal upper abdomen.     Impression: No pneumothorax status post line placement. Workstation performed: [id number]     Procedure: CT head wo contrast    Result Date: 3/26/2024  Narrative: CT BRAIN - WITHOUT CONTRAST INDICATION:   confusion. COMPARISON: 3/7/2022. TECHNIQUE:  CT examination of the brain was performed.  Multiplanar 2D reformatted images were created from the source data. Radiation dose length product (DLP) for this visit:  891.51 mGy-cm .  This examination, like all CT scans performed in the [facility], was performed utilizing techniques to minimize radiation dose exposure, including the use of iterative  reconstruction and automated exposure control. IMAGE QUALITY:  Diagnostic. FINDINGS: PARENCHYMA:  No intracranial mass, mass effect decreased attenuation is noted in periventricular and subcortical white matter demonstrating an appearance that is statistically most likely to represent mild microangiopathic change. No CT signs of acute infarction.  No intracranial mass, mass effect or midline shift.  No acute parenchymal hemorrhage. No midline shift. No CT signs of acute infarction.  No acute parenchymal hemorrhage. VENTRICLES AND EXTRA-AXIAL SPACES:  Ventricles and extra-axial CSF spaces are prominent commensurate with the degree of volume loss.  No hydrocephalus.  No acute extra-axial hemorrhage. VISUALIZED ORBITS: Normal visualized orbits. PARANASAL SINUSES: Normal visualized paranasal sinuses. CALVARIUM AND EXTRACRANIAL SOFT TISSUES:  Normal.     Impression: No acute intracranial abnormality.  Chronic microangiopathic changes. Workstation performed: [id number]     Procedure: XR chest portable ICU    Result Date:  3/26/2024  Narrative: XR CHEST PORTABLE ICU INDICATION: respiratory failure. COMPARISON: Chest radiograph March 23, 2024 FINDINGS: Pulmonary emphysema. No focal consolidation. Decreased size of the small left pleural effusion. No significant change in size of a small right pleural effusion. Normal cardiomediastinal silhouette. Bones are unremarkable for age. Normal upper abdomen.     Impression: Decreased size of the now small left pleural effusion. No significant change in size of the small right pleural effusion. Workstation performed: [id number]     Procedure: US kidney and bladder    Result Date: 3/25/2024  Narrative: RENAL ULTRASOUND INDICATION: oliguric zheng. COMPARISON: Renal ultrasound 1/15/2013. TECHNIQUE: Ultrasound of the retroperitoneum was performed with a curvilinear transducer utilizing volumetric sweeps and still imaging techniques. Challenging exam due to patient condition and positioning. FINDINGS: KIDNEYS: Right kidney: 7.6 x 3.3 x 3.0 cm. Volume 39.8 mL Left kidney: 9.3 x 4.0 x 4.1 cm. Volume 79.0 mL Right kidney Limited visualization of the right kidney due to patient condition; still images obtained from cine sweeps. Atrophic and markedly echogenic. An interpolar cortical cyst measures 3.1 x 2.0 x 3.1 cm and demonstrates 2 thin internal septations and no internal Doppler flow, likely representing a Bosniak II renal cyst. No hydronephrosis. No shadowing calculi. No perinephric fluid collections. Left kidney Normal echogenicity and contour. No mass is identified. No hydronephrosis. No shadowing calculi. No perinephric fluid collections. URETERS: Nonvisualized. BLADDER: Decompressed with a Perez.     Impression: Chronically atrophic, echogenic right kidney. Normal left kidney. No calculi or hydronephrosis. Workstation performed: [id number]     Procedure: Echo complete    Result Date: 3/25/2024  Narrative:   Left Ventricle: Left ventricular cavity size is normal. Wall thickness is increased. There  is borderline concentric hypertrophy. The left ventricular ejection fraction is 55%. Systolic function is normal. Wall motion is normal. Diastolic function is normal.   Left Atrium: The atrium is mildly dilated.   Aortic Valve: There is aortic valve sclerosis.   Mitral Valve: There is moderate annular calcification.   Tricuspid Valve: There is mild regurgitation.       Current Facility-Administered Medications   Medication Dose Route Frequency    acetaminophen (TYLENOL) tablet 650 mg  650 mg Oral Q4H PRN    albuterol inhalation solution 2.5 mg  2.5 mg Nebulization Q4H PRN    aspirin rectal suppository 150 mg  150 mg Rectal Daily    atorvastatin (LIPITOR) tablet 40 mg  40 mg Oral Daily With Dinner    budesonide (PULMICORT) inhalation solution 0.5 mg  0.5 mg Nebulization Q12H    cefTRIAXone (ROCEPHIN) IVPB (premix in dextrose) 1,000 mg 50 mL  1,000 mg Intravenous Q24H    clopidogrel (PLAVIX) tablet 75 mg  75 mg Oral Daily    formoterol (PERFOROMIST) nebulizer solution 20 mcg  20 mcg Nebulization Q12H    heparin (porcine) subcutaneous injection 5,000 Units  5,000 Units Subcutaneous Q8H JHON    hydrALAZINE (APRESOLINE) injection 10 mg  10 mg Intravenous Q6H JHON    ipratropium (ATROVENT) 0.02 % inhalation solution 0.5 mg  0.5 mg Nebulization TID    levalbuterol (XOPENEX) inhalation solution 1.25 mg  1.25 mg Nebulization TID    LORazepam (ATIVAN) injection 1 mg  1 mg Intravenous Q6H PRN    methylPREDNISolone sodium succinate (Solu-MEDROL) injection 40 mg  40 mg Intravenous Q8H JHON    ondansetron (ZOFRAN) injection 4 mg  4 mg Intravenous Q6H PRN     Medications Discontinued During This Encounter   Medication Reason    umeclidinium 62.5 mcg/actuation inhaler AEPB 1 puff     olodaterol HCl (STRIVERDI RESPIMAT) inhaler 2 puff     ipratropium-albuterol (DUO-NEB) 0.5-2.5 mg/3 mL inhalation solution 3 mL     albuterol (PROVENTIL HFA,VENTOLIN HFA) inhaler 2 puff     LORazepam (ATIVAN) injection 0.5 mg     furosemide (LASIX)  injection 40 mg     amLODIPine (NORVASC) tablet 5 mg     sodium bicarbonate 75 mEq in sodium chloride 0.45 % 1,000 mL infusion     amLODIPine (NORVASC) tablet 5 mg     sodium bicarbonate 75 mEq in sodium chloride 0.45 % 1,000 mL infusion     methylPREDNISolone sodium succinate (Solu-MEDROL) injection 40 mg     sodium bicarbonate 150 mEq in dextrose 5 % 1,000 mL infusion     dexmedeTOMIDine (Precedex) 400 mcg in sodium chloride 0.9% 100 mL     dextrose 5 % infusion        Damian Lynne, DO      This progress note was produced in part using a dictation device which may document imprecise wording from author's original intent.

## 2024-03-27 NOTE — ASSESSMENT & PLAN NOTE
This is an 79-year-old female patient with recently diagnosed COPD, history of CAD, CKD and hypertension who initially presented to the ED with shortness of breath, was noted to have significant audible wheezing with shortness of breath and had to be placed on BiPAP  Recent PFT showed moderate obstructive deficit, moderate DLCO reduction and evidence of hyperinflation and air trapping on lung volumes without a significant bronchodilator response. She was placed on LAMA/LABA inhalers  Per family, patient was using bronchodilators without significant improvement  In the ER she was placed on BiPAP - now weaned to 3L O2  Patient received initial dose of IV Solu-Medrol 125 mg in the ED and has been maintained on a taper - will proceed with 40 mg every 12 hours today  ABG without evidence of CO2 retention  Continue with bronchodilators with DuoNebs every 4 hours  Added IV ceftriaxone for COPD exacerbation 3/25/24  Treat SEEMA/metabolic acidosis with HD - contributing to patient's respiratory status

## 2024-03-27 NOTE — SPEECH THERAPY NOTE
"Speech Language/Pathology  Speech-Language Pathology Progress Note      Patient Name: Laya Brooks    Today's Date: 3/27/2024      Subjective:  Pt was awake and alert. She was repositioned for optimal PO intake safety. Patient stated \"I want a big glass of water \".    Objective:  Pt was seen today for dysphagia therapy. Pt is currently NPO expect for sips with medication. Pt was on 3L O2 via nasal cannula. Oral care was completed with use of oral care kits. Focus of today's session was to maximize PO intake safety, determine potential for diet texture advancement, determine potential for liquid consistency advancement, and improve use of strategies to minimize risk of aspiration. Textures offered today included puree, ice chips via tsp, thin liquid via tsp and via cup, and nectar thick liquid via tsp and via cup.    Swallow function:   Bolus formation and AP transfer were disorganized and reduced with thin liquid via cup. Control was of thin liquid suspected to be reduced with posterior spillage. Mod-severe audible swallow incoordination with thin liquids via  cup along with severely decreased respiratory deglutition requiring frequent breaks. Pharyngeal swallow initiation was present and delayed/weak most notable with thin liquids via cup. NTL via cup observed to allow more control with swallow coordination. Hyolaryngeal excursion was reduced and weak across consistencies. Secondary swallow observed with puree via tsp, suspicious of pharyngeal retention. Cough occurred with thin liquid via cup which could be contributed to impulsivity.   SLP provided consistent cueing/feedback throughout session. Pt demonstrated limited response to cueing.    Assessment:  Pt appeared more alert and interactive today, greatly increasing swallow function. Dysphagia level1/NTL via cup observed to be the most appropriate diet consistency at this time. Pt is impulsive with PO despite maximum verbal cues and frequent reminders to take " small sips/slow rate. It is evident that thin liquid continues to increase risk of asp/pen as mod-severe decreased respiratory deglutition and mod-severe audible swallow incoordination observed. To note, pt observed to become fatigued as trials persisted and benefited from frequent breaks d/t decreased respiration.    Diet texture and liquid consistency advancement is recommended at this time.  Plan:  Change liquid consistency to puree and nectar thick liquids. Continue ST follow up. Subsequent sessions to focus on maximizing PO intake safety, determining potential for diet texture advancement, determining potential for liquid consistency advancement, and improving use of strategies to minimize risk of aspiration.

## 2024-03-27 NOTE — OCCUPATIONAL THERAPY NOTE
"    Occupational Therapy Evaluation     Patient Name: Laya Brooks  Today's Date: 3/27/2024  Problem List  Principal Problem:    Chronic obstructive pulmonary disease with acute exacerbation (HCC)  Active Problems:    Hypercholesterolemia    CVA (cerebral vascular accident) (HCC)    Stage 3a chronic kidney disease (HCC)    Hypertension    CAD (coronary artery disease)    SEEMA (acute kidney injury) (HCC)    Acute diastolic congestive heart failure (HCC)    Hyponatremia    Acute respiratory failure with hypoxia (HCC)    Toxic metabolic encephalopathy    Elevated d-dimer    Past Medical History  Past Medical History:   Diagnosis Date    CAD (coronary artery disease)     Cardiac disease     Hypercholesteremia     Hyperlipidemia     Hypertension     Hypertension     Renal disorder      Past Surgical History  Past Surgical History:   Procedure Laterality Date    CORONARY ANGIOPLASTY WITH STENT PLACEMENT      WRIST SURGERY               03/27/24 1425   OT Last Visit   OT Visit Date 03/27/24   Note Type   Note type Evaluation   Pain Assessment   Pain Assessment Tool 0-10   Pain Score No Pain   Restrictions/Precautions   Weight Bearing Precautions Per Order No   Other Precautions Multiple lines;O2;Fall Risk;Pain   Home Living   Type of Home House   Home Layout One level;Performs ADLs on one level;Able to live on main level with bedroom/bathroom   Bathroom Shower/Tub Tub/shower unit   Bathroom Toilet Standard   Bathroom Accessibility Accessible   Additional Comments pt reports no use of device at baseline during functional mobility   Prior Function   Level of [place] Independent with ADLs;Independent with functional mobility;Independent with IADLS   Lives With Spouse   Receives Help From Family   Falls in the last 6 months 0   Vocational Retired   Subjective   Subjective \"I think I'm gonna throw up\"   ADL   Additional Comments unable to access due to significant weakness and nausea during actvity; pt throwing up at EOB " during activity   Bed Mobility   Supine to Sit 5  Supervision   Additional items Increased time required;Verbal cues   Sit to Supine 5  Supervision   Additional items Increased time required;Verbal cues   Additional Comments pt on 3L O2 during session; SP02 WFL with no complaints of SOB; pt ended session supine in bed; pt demonstrated rapid a-fib during activity; pt throwing up sitting EOB; weakness increased during activity   Transfers   Additional Comments unable to access due to significant weakness and nausea during actvity; pt throwing up at EOB during activity   Functional Mobility   Additional Comments unable to access due to significant weakness and nausea during actvity; pt throwing up at EOB during activity   Balance   Static Sitting Fair -   Dynamic Sitting Fair -   Activity Tolerance   Activity Tolerance Patient limited by fatigue   RUE Assessment   RUE Assessment WFL   LUE Assessment   LUE Assessment WFL   Hand Function   Gross Motor Coordination Functional   Fine Motor Coordination Functional   Psychosocial   Psychosocial (WDL) X   Perception   Inattention/Neglect Appears intact   Cognition   Overall Cognitive Status Impaired   Arousal/Participation Alert   Attention Attends with cues to redirect   Orientation Level Oriented to person   Following Commands Follows one step commands without difficulty   Assessment   Limitation Decreased ADL status;Decreased UE ROM;Decreased UE strength;Decreased Safe judgement during ADL;Decreased endurance;Decreased self-care trans;Decreased high-level ADLs   Assessment Pt is a 79 y.o. female seen for OT evaluation s/p admit to [facility] on 3/23/2024 w/ Chronic obstructive pulmonary disease with acute exacerbation (HCC).  Comorbidities affecting pt's functional performance at time of assessment include:  HTN, hyperlipidemia, cardiac disease, renal disorder, CAD . Personal factors affecting pt at time of IE include:difficulty performing ADLS, difficulty performing IADLS ,  limited insight into deficits, decreased initiation and engagement , health management , and environment. Prior to admission, pt was (I) with ADLs and IADLs with no use of device at baseline. Upon evaluation: Pt requires (S) level with no use of device 2* the following deficits impacting occupational performance: weakness, decreased ROM, decreased strength, decreased balance, decreased tolerance, impaired initiation, impaired sequencing, impaired problem solving, decreased safety awareness, and increased pain. Pt to benefit from continued skilled OT tx while in the hospital to address deficits as defined above and maximize level of functional independence w ADL's and functional mobility. Occupational Performance areas to address include: grooming, bathing/shower, toilet hygiene, dressing, health maintenance, functional mobility, and clothing management. The patient's raw score on the -PAC Daily Activity Inpatient Short Form is 14. A raw score of less than 19 suggests the patient may benefit from discharge to post-acute rehabilitation services. Discharge recommendation at this time is level I maximum resource intensity.  Pt benefited from co-evaluation of skilled OT and PT therapists in order to most appropriately address functional deficits d/t extensive assistance required for safe functional mobility, decreased activity tolerance, and regression from functioning level prior to admission and/or onset of present illness. OT/PT objectives were addressed separately; please see PT note for specific goal areas targeted.   Goals   Patient Goals to go home   Short Term Goal #1 pt will perform UE strengthening exercises   Short Term Goal #2 pt will demonstrate functional transfers at Min(A) x2 level with use of RW   Long Term Goal #1 pt will demonstrate toilet transfers and hygiene at Min(A) x1 level with use of RW   Long Term Goal #2 pt will demonstrate UB/LB bathing and grooming tasks at Min(A) x1 level   Plan    Treatment Interventions ADL retraining;Functional transfer training;UE strengthening/ROM;Endurance training;Patient/family training;Equipment evaluation/education;Compensatory technique education;Energy conservation;Activityengagement   Goal Expiration Date 04/10/24   OT Frequency 3-5x/wk   Discharge Recommendation   Rehab Resource Intensity Level, OT I (Maximum Resource Intensity)   AM-PAC Daily Activity Inpatient   Lower Body Dressing 2   Bathing 2   Toileting 2   Upper Body Dressing 2   Grooming 3   Eating 3   Daily Activity Raw Score 14   Daily Activity Standardized Score (Calc for Raw Score >=11) 33.39   AM-PAC Applied Cognition Inpatient   Following a Speech/Presentation 3   Understanding Ordinary Conversation 3   Taking Medications 3   Remembering Where Things Are Placed or Put Away 3   Remembering List of 4-5 Errands 3   Taking Care of Complicated Tasks 3   Applied Cognition Raw Score 18   Applied Cognition Standardized Score 38.07

## 2024-03-27 NOTE — ASSESSMENT & PLAN NOTE
Worsening mental status in the setting of metabolic disturbances including severe SEEMA, respiratory failure; toxic component of patient having received benzodiazepines and opiate medications  Improved  Monitor mental status, treat underlying conditions  CT head no acute findings

## 2024-03-27 NOTE — PLAN OF CARE
Problem: OCCUPATIONAL THERAPY ADULT  Goal: Performs self-care activities at highest level of function for planned discharge setting.  See evaluation for individualized goals.  Description: Treatment Interventions: ADL retraining, Functional transfer training, UE strengthening/ROM, Endurance training, Patient/family training, Equipment evaluation/education, Compensatory technique education, Energy conservation, Activityengagement          See flowsheet documentation for full assessment, interventions and recommendations.   Outcome: Progressing  Note: Limitation: Decreased ADL status, Decreased UE ROM, Decreased UE strength, Decreased Safe judgement during ADL, Decreased endurance, Decreased self-care trans, Decreased high-level ADLs     Assessment: Pt is a 79 y.o. female seen for OT evaluation s/p admit to [facility] on 3/23/2024 w/ Chronic obstructive pulmonary disease with acute exacerbation (HCC).  Comorbidities affecting pt's functional performance at time of assessment include:  HTN, hyperlipidemia, cardiac disease, renal disorder, CAD . Personal factors affecting pt at time of IE include:difficulty performing ADLS, difficulty performing IADLS , limited insight into deficits, decreased initiation and engagement , health management , and environment. Prior to admission, pt was (I) with ADLs and IADLs with no use of device at baseline. Upon evaluation: Pt requires (S) level with no use of device 2* the following deficits impacting occupational performance: weakness, decreased ROM, decreased strength, decreased balance, decreased tolerance, impaired initiation, impaired sequencing, impaired problem solving, decreased safety awareness, and increased pain. Pt to benefit from continued skilled OT tx while in the hospital to address deficits as defined above and maximize level of functional independence w ADL's and functional mobility. Occupational Performance areas to address include: grooming, bathing/shower, toilet  hygiene, dressing, health maintenance, functional mobility, and clothing management. The patient's raw score on the AM-PAC Daily Activity Inpatient Short Form is 14. A raw score of less than 19 suggests the patient may benefit from discharge to post-acute rehabilitation services. Discharge recommendation at this time is level I maximum resource intensity.  Pt benefited from co-evaluation of skilled OT and PT therapists in order to most appropriately address functional deficits d/t extensive assistance required for safe functional mobility, decreased activity tolerance, and regression from functioning level prior to admission and/or onset of present illness. OT/PT objectives were addressed separately; please see PT note for specific goal areas targeted.     Rehab Resource Intensity Level, OT: I (Maximum Resource Intensity)

## 2024-03-27 NOTE — ASSESSMENT & PLAN NOTE
On Norvasc 5 mg daily and lisinopril   Blood pressure medications held in setting of worsening SEEMA  Continue IV Hydralazine as needed for now

## 2024-03-27 NOTE — PHYSICAL THERAPY NOTE
Physical Therapy Evaluation    Patient Name: Laya Brooks    Today's Date: 3/27/2024     Problem List  Principal Problem:    Chronic obstructive pulmonary disease with acute exacerbation (HCC)  Active Problems:    Hypercholesterolemia    CVA (cerebral vascular accident) (HCC)    Stage 3a chronic kidney disease (HCC)    Hypertension    CAD (coronary artery disease)    SEEMA (acute kidney injury) (HCC)    Acute diastolic congestive heart failure (HCC)    Hyponatremia    Acute respiratory failure with hypoxia (HCC)    Toxic metabolic encephalopathy    Elevated d-dimer       Past Medical History  Past Medical History:   Diagnosis Date    CAD (coronary artery disease)     Cardiac disease     Hypercholesteremia     Hyperlipidemia     Hypertension     Hypertension     Renal disorder         Past Surgical History  Past Surgical History:   Procedure Laterality Date    CORONARY ANGIOPLASTY WITH STENT PLACEMENT      WRIST SURGERY             03/27/24 1426   PT Last Visit   PT Visit Date 03/27/24   Note Type   Note type Evaluation   Pain Assessment   Pain Assessment Tool 0-10   Pain Score No Pain   Restrictions/Precautions   Weight Bearing Precautions Per Order No   Other Precautions Fall Risk;O2;Telemetry;Multiple lines   Home Living   Type of Home House   Home Layout One level;Stairs to enter without rails  (1 ADONIS)   Bathroom Shower/Tub Tub/shower unit   Bathroom Toilet Standard   Bathroom Accessibility Accessible   Home Equipment Walker   Additional Comments pt denies use of DME at baseline   Prior Function   Level of [place] Independent with ADLs;Independent with functional mobility;Needs assistance with IADLS   Lives With Spouse   Receives Help From Family   IADLs Independent with driving   Falls in the last 6 months 0   Vocational Retired   General   Family/Caregiver Present No   Cognition   Overall Cognitive Status WFL   Arousal/Participation Lethargic  "  Orientation Level Oriented X4   Following Commands Follows one step commands without difficulty   Subjective   Subjective \"I'm so tired\"   RLE Assessment   RLE Assessment X  (4-/5 grossly)   LLE Assessment   LLE Assessment X  (4-/5 grossly)   Bed Mobility   Supine to Sit 5  Supervision   Additional items Increased time required;Verbal cues   Sit to Supine 5  Supervision   Additional items Increased time required;Verbal cues   Transfers   Sit to Stand Unable to assess   Stand to Sit Unable to assess   Ambulation/Elevation   Gait pattern Not appropriate;Not tested   Balance   Static Sitting Fair -   Dynamic Sitting Fair -   Endurance Deficit   Endurance Deficit Yes   Endurance Deficit Description pt appears very fatigued with minimal mobility   Activity Tolerance   Activity Tolerance Patient limited by fatigue   Assessment   Prognosis Guarded   Problem List Decreased strength;Decreased endurance;Impaired balance;Decreased mobility   Assessment Patient is a 79 y.o. female evaluated by Physical Therapy s/p admit to [facility] on 3/23/2024 with admitting diagnosis of: Shortness of breath, CHF (congestive heart failure), Hyponatremia, Hypertension, Pleural effusion on left, SEEMA (acute kidney injury), and principal problem of: Chronic obstructive pulmonary disease with acute exacerbation. PT was consulted to assess patient's functional mobility and discharge needs. Ordered are PT Evaluation and treatment. Comorbidities affecting patient's physical performance at time of assessment include:  hx of CVA, CKD, HTN, CAD, HTN, COPD . Personal factors affecting the patient at time of IE include: step(s) to enter home, inability to navigate community distances, inability/difficulty performing IADLs, and inability/difficulty performing ADLs. Please locate objective findings from PT assessment regarding body systems outlined above. Upon evaluation, pt able to perform all bed mobility with SUP, increased time, and " occasional verbal cuing. While sitting EOB, pt became very nauseated, began vomiting, and experienced significant a-fib. RN made aware and pt returned to supine. SpO2 remained WFL on 3L O2 throughout. The patient's AM-PAC Basic Mobility Inpatient Short Form Raw Score is 9. A Raw score of less than or equal to 16 suggests the patient may benefit from discharge to post-acute rehabilitation services. Please also refer to the recommendation of the Physical Therapist for safe discharge planning. Co treatment with OT secondary to complex medical condition of pt, possible A of 2 required to achieve and maintain transitional movements, requiring the need of skilled therapeutic intervention of 2 therapists to achieve delivery of services. Pt will benefit from continued PT intervention during LOS to address current deficits, increase LOF, and facilitate safe d/c to next level of care when medically appropriate. D/c recommendation at this time is rehabilitation resource intensity level I.   Goals   Patient Goals to go home   LTG Expiration Date 04/10/24   Long Term Goal #1 Pt will participate in B LE strengthening exercises to facilitate improved functional activity tolerance. Pt will perform all functional transfers and bed mobility mod(I) with good safety awareness. Pt will ambulate 250' mod(I) with LRAD while maintaining good functional dynamic balance.   Plan   Treatment/Interventions Functional transfer training;LE strengthening/ROM;Therapeutic exercise;Endurance training;Bed mobility;Gait training   PT Frequency 3-5x/wk   Discharge Recommendation   Rehab Resource Intensity Level, PT I (Maximum Resource Intensity)   AM-PAC Basic Mobility Inpatient   Turning in Flat Bed Without Bedrails 2   Lying on Back to Sitting on Edge of Flat Bed Without Bedrails 3   Moving Bed to Chair 1   Standing Up From Chair Using Arms 1   Walk in Room 1   Climb 3-5 Stairs With Railing 1   Basic Mobility Inpatient Raw Score 9   Turning Head  Towards Sound 3   Follow Simple Instructions 3   Low Function Basic Mobility Raw Score  15   Low Function Basic Mobility Standardized Score  23.9   [facility] Highest Level Of Mobility   -[facility] Goal 3: Sit at edge of bed   -[facility] Achieved 3: Sit at edge of bed   End of Consult   Patient Position at End of Consult Supine;All needs within reach

## 2024-03-28 ENCOUNTER — APPOINTMENT (INPATIENT)
Dept: NON INVASIVE DIAGNOSTICS | Facility: HOSPITAL | Age: 79
DRG: 682 | End: 2024-03-28
Payer: MEDICARE

## 2024-03-28 LAB
ANION GAP SERPL CALCULATED.3IONS-SCNC: 10 MMOL/L (ref 4–13)
ANION GAP SERPL CALCULATED.3IONS-SCNC: 11 MMOL/L (ref 4–13)
ANISOCYTOSIS BLD QL SMEAR: PRESENT
BASOPHILS # BLD AUTO: 0 THOUSANDS/ÂΜL (ref 0–0.1)
BASOPHILS NFR BLD AUTO: 0 % (ref 0–1)
BUN SERPL-MCNC: 40 MG/DL (ref 5–25)
BUN SERPL-MCNC: 47 MG/DL (ref 5–25)
CALCIUM SERPL-MCNC: 8.5 MG/DL (ref 8.4–10.2)
CALCIUM SERPL-MCNC: 8.8 MG/DL (ref 8.4–10.2)
CHLORIDE SERPL-SCNC: 97 MMOL/L (ref 96–108)
CHLORIDE SERPL-SCNC: 98 MMOL/L (ref 96–108)
CO2 SERPL-SCNC: 28 MMOL/L (ref 21–32)
CO2 SERPL-SCNC: 29 MMOL/L (ref 21–32)
CREAT SERPL-MCNC: 2.79 MG/DL (ref 0.6–1.3)
CREAT SERPL-MCNC: 2.83 MG/DL (ref 0.6–1.3)
CREAT UR-MCNC: 166.1 MG/DL
EOSINOPHIL # BLD AUTO: 0 THOUSAND/ÂΜL (ref 0–0.61)
EOSINOPHIL NFR BLD AUTO: 0 % (ref 0–6)
ERYTHROCYTE [DISTWIDTH] IN BLOOD BY AUTOMATED COUNT: 14.8 % (ref 11.6–15.1)
GFR SERPL CREATININE-BSD FRML MDRD: 15 ML/MIN/1.73SQ M
GFR SERPL CREATININE-BSD FRML MDRD: 15 ML/MIN/1.73SQ M
GLUCOSE SERPL-MCNC: 143 MG/DL (ref 65–140)
GLUCOSE SERPL-MCNC: 154 MG/DL (ref 65–140)
HCT VFR BLD AUTO: 32 % (ref 34.8–46.1)
HGB BLD-MCNC: 10.5 G/DL (ref 11.5–15.4)
IMM GRANULOCYTES # BLD AUTO: 0.05 THOUSAND/UL (ref 0–0.2)
IMM GRANULOCYTES NFR BLD AUTO: 1 % (ref 0–2)
LYMPHOCYTES # BLD AUTO: 0.14 THOUSANDS/ÂΜL (ref 0.6–4.47)
LYMPHOCYTES NFR BLD AUTO: 3 % (ref 14–44)
MCH RBC QN AUTO: 29.2 PG (ref 26.8–34.3)
MCHC RBC AUTO-ENTMCNC: 32.8 G/DL (ref 31.4–37.4)
MCV RBC AUTO: 89 FL (ref 82–98)
MONOCYTES # BLD AUTO: 0.29 THOUSAND/ÂΜL (ref 0.17–1.22)
MONOCYTES NFR BLD AUTO: 6 % (ref 4–12)
NEUTROPHILS # BLD AUTO: 4.14 THOUSANDS/ÂΜL (ref 1.85–7.62)
NEUTS SEG NFR BLD AUTO: 90 % (ref 43–75)
NRBC BLD AUTO-RTO: 0 /100 WBCS
PLATELET # BLD AUTO: 108 THOUSANDS/UL (ref 149–390)
PLATELET BLD QL SMEAR: ABNORMAL
PMV BLD AUTO: 11 FL (ref 8.9–12.7)
POTASSIUM SERPL-SCNC: 4.3 MMOL/L (ref 3.5–5.3)
POTASSIUM SERPL-SCNC: 4.4 MMOL/L (ref 3.5–5.3)
RBC # BLD AUTO: 3.59 MILLION/UL (ref 3.81–5.12)
RBC MORPH BLD: PRESENT
SODIUM 24H UR-SCNC: 21 MOL/L
SODIUM SERPL-SCNC: 136 MMOL/L (ref 135–147)
SODIUM SERPL-SCNC: 137 MMOL/L (ref 135–147)
WBC # BLD AUTO: 4.62 THOUSAND/UL (ref 4.31–10.16)

## 2024-03-28 PROCEDURE — 85025 COMPLETE CBC W/AUTO DIFF WBC: CPT | Performed by: FAMILY MEDICINE

## 2024-03-28 PROCEDURE — 97530 THERAPEUTIC ACTIVITIES: CPT

## 2024-03-28 PROCEDURE — 92526 ORAL FUNCTION THERAPY: CPT

## 2024-03-28 PROCEDURE — 93976 VASCULAR STUDY: CPT

## 2024-03-28 PROCEDURE — 80048 BASIC METABOLIC PNL TOTAL CA: CPT | Performed by: NURSE PRACTITIONER

## 2024-03-28 PROCEDURE — 94760 N-INVAS EAR/PLS OXIMETRY 1: CPT

## 2024-03-28 PROCEDURE — 99233 SBSQ HOSP IP/OBS HIGH 50: CPT | Performed by: FAMILY MEDICINE

## 2024-03-28 PROCEDURE — 94640 AIRWAY INHALATION TREATMENT: CPT

## 2024-03-28 PROCEDURE — 84300 ASSAY OF URINE SODIUM: CPT | Performed by: NURSE PRACTITIONER

## 2024-03-28 PROCEDURE — 80048 BASIC METABOLIC PNL TOTAL CA: CPT | Performed by: FAMILY MEDICINE

## 2024-03-28 PROCEDURE — 99232 SBSQ HOSP IP/OBS MODERATE 35: CPT | Performed by: PHYSICIAN ASSISTANT

## 2024-03-28 PROCEDURE — 94664 DEMO&/EVAL PT USE INHALER: CPT

## 2024-03-28 PROCEDURE — 82570 ASSAY OF URINE CREATININE: CPT | Performed by: NURSE PRACTITIONER

## 2024-03-28 PROCEDURE — 99233 SBSQ HOSP IP/OBS HIGH 50: CPT | Performed by: NURSE PRACTITIONER

## 2024-03-28 RX ORDER — ECHINACEA PURPUREA EXTRACT 125 MG
1 TABLET ORAL
Status: DISCONTINUED | OUTPATIENT
Start: 2024-03-28 | End: 2024-04-02 | Stop reason: HOSPADM

## 2024-03-28 RX ORDER — AMLODIPINE BESYLATE 5 MG/1
5 TABLET ORAL DAILY
Status: DISCONTINUED | OUTPATIENT
Start: 2024-03-28 | End: 2024-03-29

## 2024-03-28 RX ADMIN — IPRATROPIUM BROMIDE 0.5 MG: 0.5 SOLUTION RESPIRATORY (INHALATION) at 19:44

## 2024-03-28 RX ADMIN — BUDESONIDE 0.5 MG: 0.5 INHALANT RESPIRATORY (INHALATION) at 08:55

## 2024-03-28 RX ADMIN — HYDRALAZINE HYDROCHLORIDE 10 MG: 20 INJECTION INTRAMUSCULAR; INTRAVENOUS at 13:37

## 2024-03-28 RX ADMIN — FORMOTEROL FUMARATE DIHYDRATE 20 MCG: 20 SOLUTION RESPIRATORY (INHALATION) at 19:44

## 2024-03-28 RX ADMIN — SALINE NASAL SPRAY 1 SPRAY: 1.5 SOLUTION NASAL at 21:12

## 2024-03-28 RX ADMIN — HYDRALAZINE HYDROCHLORIDE 10 MG: 20 INJECTION INTRAMUSCULAR; INTRAVENOUS at 05:42

## 2024-03-28 RX ADMIN — SALINE NASAL SPRAY 1 SPRAY: 1.5 SOLUTION NASAL at 21:15

## 2024-03-28 RX ADMIN — AMLODIPINE BESYLATE 5 MG: 5 TABLET ORAL at 15:09

## 2024-03-28 RX ADMIN — LEVALBUTEROL HYDROCHLORIDE 1.25 MG: 1.25 SOLUTION RESPIRATORY (INHALATION) at 19:44

## 2024-03-28 RX ADMIN — IPRATROPIUM BROMIDE 0.5 MG: 0.5 SOLUTION RESPIRATORY (INHALATION) at 14:02

## 2024-03-28 RX ADMIN — FORMOTEROL FUMARATE DIHYDRATE 20 MCG: 20 SOLUTION RESPIRATORY (INHALATION) at 08:55

## 2024-03-28 RX ADMIN — LEVALBUTEROL HYDROCHLORIDE 1.25 MG: 1.25 SOLUTION RESPIRATORY (INHALATION) at 08:55

## 2024-03-28 RX ADMIN — HEPARIN SODIUM 5000 UNITS: 5000 INJECTION, SOLUTION INTRAVENOUS; SUBCUTANEOUS at 05:42

## 2024-03-28 RX ADMIN — CLOPIDOGREL 75 MG: 75 TABLET ORAL at 08:08

## 2024-03-28 RX ADMIN — LEVALBUTEROL HYDROCHLORIDE 1.25 MG: 1.25 SOLUTION RESPIRATORY (INHALATION) at 14:02

## 2024-03-28 RX ADMIN — BUDESONIDE 0.5 MG: 0.5 INHALANT RESPIRATORY (INHALATION) at 19:44

## 2024-03-28 RX ADMIN — METHYLPREDNISOLONE SODIUM SUCCINATE 40 MG: 40 INJECTION, POWDER, FOR SOLUTION INTRAMUSCULAR; INTRAVENOUS at 08:08

## 2024-03-28 RX ADMIN — HYDRALAZINE HYDROCHLORIDE 10 MG: 20 INJECTION INTRAMUSCULAR; INTRAVENOUS at 17:15

## 2024-03-28 RX ADMIN — METHYLPREDNISOLONE SODIUM SUCCINATE 40 MG: 40 INJECTION, POWDER, FOR SOLUTION INTRAMUSCULAR; INTRAVENOUS at 21:12

## 2024-03-28 RX ADMIN — ATORVASTATIN CALCIUM 40 MG: 40 TABLET, FILM COATED ORAL at 16:43

## 2024-03-28 RX ADMIN — HEPARIN SODIUM 5000 UNITS: 5000 INJECTION, SOLUTION INTRAVENOUS; SUBCUTANEOUS at 21:12

## 2024-03-28 RX ADMIN — HYDRALAZINE HYDROCHLORIDE 10 MG: 20 INJECTION INTRAMUSCULAR; INTRAVENOUS at 23:19

## 2024-03-28 RX ADMIN — HEPARIN SODIUM 5000 UNITS: 5000 INJECTION, SOLUTION INTRAVENOUS; SUBCUTANEOUS at 13:37

## 2024-03-28 RX ADMIN — IPRATROPIUM BROMIDE 0.5 MG: 0.5 SOLUTION RESPIRATORY (INHALATION) at 08:55

## 2024-03-28 RX ADMIN — CEFTRIAXONE 1000 MG: 1 INJECTION, SOLUTION INTRAVENOUS at 13:37

## 2024-03-28 NOTE — PROGRESS NOTES
NEPHROLOGY PROGRESS NOTE   Laya Brooks 79 y.o. female MRN: [id number]  Unit/Bed#:  Encounter: [id number]    Assessment/Plan:    78 yo female with CKD 3a, atrophic right kidney, HTN, CAD, COPD presented 3/24 with SOB treated for acute hypoxic respiratory failure felt to be multifactorial. Renal following for severe oliguric SEEMA started on HD 3/26    Hemodialysis dependent SEEMA (POA) atop CKD  Initiated on hemodialysis 3/26 via temp HD catheter. Second treatment yesterday for 3 hours net negative 500 mL. Etiology initially presumed ATN however concern for potential acute stenosis of left kidney per nephrologist and renal duplex pending. Urinalysis significant for proteinuria and hematuria- see below.   Metabolic perturbations and mental status improved. Starting to make urine via Perez. No indication for dialysis today- hold session and re-evaluate tomorrow  Can give loop diuretic if worsening respiratory status/pulmonary edema develop  Stage 3a chronic kidney disease with baseline creatinine around 0.9-1.0 mg/dL  Hematuria, proteinuria   Repeat urine chemistries today. Urine eos has poor sensitivity and specificity.  Biopsy likely not ideal given essentially single kidney status and unclear if she is an ideal candidate for immunosuppression. Will leave to expertise of rounding nephrologist.   Atrophic right kidney  Follow-up results of renal duplex which is pending at this time  Acute hypoxic respiratory failure  Currently 95% via 1 liter nasal cannula. Management per pulm/primary team   Hyperkalemia- resolved  Metabolic acidosis- resolved  Metabolic encephalopathy  Improved from initial evaluation.       ROS  Examined sitting upright in bed. States that she feels very weak today. A complete 10 point review of systems have been performed and are otherwise negative.       Historical Information   Past Medical History:   Diagnosis Date    CAD (coronary artery disease)     Cardiac disease     Hypercholesteremia      Hyperlipidemia     Hypertension     Hypertension     Renal disorder      Past Surgical History:   Procedure Laterality Date    CORONARY ANGIOPLASTY WITH STENT PLACEMENT      WRIST SURGERY       Social History   Social History     Substance and Sexual Activity   Alcohol Use Never     Social History     Substance and Sexual Activity   Drug Use No     Social History     Tobacco Use   Smoking Status Former   Smokeless Tobacco Never       Family History:   History reviewed. No pertinent family history.    Medications:  Pertinent medications were reviewed  Current Facility-Administered Medications   Medication Dose Route Frequency Provider Last Rate    acetaminophen  650 mg Oral Q4H PRN Eleanor Rodriguez MD      albuterol  2.5 mg Nebulization Q4H PRN Eleanor Rodriguez MD      atorvastatin  40 mg Oral Daily With Dinner Eleanor Rodriguez MD      budesonide  0.5 mg Nebulization Q12H Sincere Goldman PA-C      cefTRIAXone  1,000 mg Intravenous Q24H Malena Hearn MD Stopped (03/27/24 1643)    clopidogrel  75 mg Oral Daily Eleanor Rodriguez MD      formoterol  20 mcg Nebulization Q12H Sincere Goldman PA-C      heparin (porcine)  5,000 Units Subcutaneous Q8H [facility] Eleanor Rodriguez MD      hydrALAZINE  10 mg Intravenous Q6H [facility] Darryl Powell MD      ipratropium  0.5 mg Nebulization TID Eleanor Rodriguez MD      levalbuterol  1.25 mg Nebulization TID Eleanor Rodriguez MD      LORazepam  1 mg Intravenous Q6H PRN Darryl Powell MD      methylPREDNISolone sodium succinate  40 mg Intravenous Q12H [facility] Sincere Goldman PA-C      ondansetron  4 mg Intravenous Q6H PRN Eleanor Rodriguez MD           Allergies   Allergen Reactions    Influenza Virus Vaccine          Vitals:   BP (!) 171/72   Pulse 89   Temp 98 °F (36.7 °C) (Temporal)   Resp (!) 27   Ht 5' (1.524 m)   Wt 44.8 kg (98 lb 12.3 oz)   SpO2 95%   BMI 19.29 kg/m²   Body mass index is 19.29 kg/m².  SpO2: 95 %,   SpO2 Activity: At Rest,   O2  "Device: Nasal cannula      Intake/Output Summary (Last 24 hours) at 3/28/2024 0921  Last data filed at 3/28/2024 0843  Gross per 24 hour   Intake 1510.83 ml   Output 1170 ml   Net 340.83 ml     Invasive Devices       Central Venous Catheter Line  Duration             CVC Central Lines 03/26/24 Double 2 days              Peripheral Intravenous Line  Duration             Peripheral IV 03/23/24 Left Antecubital 4 days    Peripheral IV 03/25/24 Dorsal (posterior);Left Forearm 2 days              Hemodialysis Catheter  Duration             HD Temporary Double Catheter 2 days              Drain  Duration             Urethral Catheter 16 Fr. 3 days                    Physical Exam  General: conscious, cooperative, in no acute distress  Eyes: conjunctivae pink, anicteric sclerae  ENT: lips and mucous membranes moist  Neck: supple, no JVD, no masses  Chest: diminished breath sounds bilaterally, no crackles, ronchus or wheezings on nasal cannula  CVS: S1 & S2, normal rate, regular rhythm  Abdomen: soft, non-tender, non-distended, normoactive bowel sounds  Extremities: no edema of both legs  Skin: no rash  Neuro: awake, alert, oriented  Gu indwelling urinary catheter draining dark yellow urine     Diagnostic Data:  Lab: I have personally reviewed pertinent lab results.,   CBC:  Results from last 7 days   Lab Units 03/28/24  0548   WBC Thousand/uL 4.62   HEMOGLOBIN g/dL 10.5*   HEMATOCRIT % 32.0*   PLATELETS Thousands/uL 108*      CMP:   Lab Results   Component Value Date    SODIUM 137 03/28/2024    K 4.4 03/28/2024    CL 98 03/28/2024    CO2 29 03/28/2024    BUN 40 (H) 03/28/2024    CREATININE 2.79 (H) 03/28/2024    CALCIUM 8.5 03/28/2024    EGFR 15 03/28/2024   ,   PT/INR: No results found for: \"PT\", \"INR\",   Magnesium: No components found for: \"MAG\",  Phosphorous: No results found for: \"PHOS\"    Microbiology:  @LABRCNTIP,(urinecx:7)@        MANOHAR Mccarthy    Portions of the record may have been created with voice " "recognition software. Occasional wrong word or \"sound a like\" substitutions may have occurred due to the inherent limitations of voice recognition software. Read the chart carefully and recognize, using context, where substitutions have occurred.  "

## 2024-03-28 NOTE — RESPIRATORY THERAPY NOTE
03/28/24 0856   Inhalation Therapy Tx   $ Inhalation Therapy Performed Yes   $ Pulse Oximetry Spot Check Charge Completed   SpO2 95 %   Pre-Treatment Pulse 93   Pre-Treatment Respirations 24   Duration 20   Breath Sounds Pre-Treatment Bilateral Diminished;Inspiratory wheeze;Expiratory wheeze   Breath Sounds Post-Treatment Bilateral Expiratory wheezes;Inspiratory wheezes   Post-Treatment Pulse 89   Post-Treatment Respirations 26   Delivery Source Air;UDN   Position High [facility]   Resp Comments patient was on 2L and decreased to1L.  respiratory normal.     1:50 pm, discontinued bipap therapy as per the Pulmonary PA

## 2024-03-28 NOTE — RESPIRATORY THERAPY NOTE
"   03/28/24 0145   Respiratory Assessment   Assessment Type Assess only   General Appearance Alert;Awake   Respiratory Pattern Normal   Chest Assessment Chest expansion symmetrical   Resp Comments (S)  Attempt made to place patient on BiPAP therapy for sleeping, as noted by pulmonary.  Immediately after placing the patient on the mask, she immediately began pulling and prying at it, and tore it off.  She stated to me \"I do not want to wear this mask, because I feel like it is suffocating me!\"  I made several attempts to  the patient, but she continued to adamantly refuse.  She is back on her 2 LPM NC and resting comfortably.   Oxygen Therapy/Pulse Ox   O2 Device Nasal cannula   O2 Therapy Oxygen   Nasal Cannula O2 Flow Rate (L/min) 2 L/min   Calculated FIO2 (%) - Nasal Cannula 28   SpO2 96 %   SpO2 Activity At Rest   $ Pulse Oximetry Spot Check Charge Completed        "

## 2024-03-28 NOTE — PLAN OF CARE
Problem: PHYSICAL THERAPY ADULT  Goal: Performs mobility at highest level of function for planned discharge setting.  See evaluation for individualized goals.  Description: Treatment/Interventions: Functional transfer training, LE strengthening/ROM, Therapeutic exercise, Endurance training, Bed mobility, Gait training          See flowsheet documentation for full assessment, interventions and recommendations.  Outcome: Progressing  Note: Prognosis: Guarded  Problem List: Decreased strength, Decreased endurance, Impaired balance, Decreased mobility, Impaired judgement, Decreased safety awareness  Assessment: Patient seen this date for PT treatment session to increase level of mobility and functional activity tolerance. This date, pt able to perform all functional mobility with Supervision - Floyd x 2, RW, and increased time. Moderate verbal cuing provided for safety awareness and sequencing. Pt extremely fatigued with minimal exertion, so mobility limited to stand pivot transfer from bed to chair. HR and SpO2 remained WFL on 2L O2 throughout. No true LOB experienced. The patient's AM-PAC Basic Mobility Inpatient Short Form Raw Score is 10. A Raw score of less than or equal to 16 suggests the patient may benefit from discharge to post-acute rehabilitation services. Please also refer to the recommendation of the Physical Therapist for safe discharge planning. Co treatment with OT secondary to complex medical condition of pt, possible A of 2 required to achieve and maintain transitional movements, requiring the need of skilled therapeutic intervention of 2 therapists to achieve delivery of services. D/c recommendation continues to be rehab resource intensity level I.        Rehab Resource Intensity Level, PT: I (Maximum Resource Intensity)    See flowsheet documentation for full assessment.

## 2024-03-28 NOTE — OCCUPATIONAL THERAPY NOTE
Occupational Therapy Progress Note     Patient Name: Laya Brooks  Today's Date: 3/28/2024  Problem List  Principal Problem:    Chronic obstructive pulmonary disease with acute exacerbation (HCC)  Active Problems:    Hypercholesterolemia    CVA (cerebral vascular accident) (HCC)    Stage 3a chronic kidney disease (HCC)    Hypertension    CAD (coronary artery disease)    SEEMA (acute kidney injury) (HCC)    Acute diastolic congestive heart failure (HCC)    Hyponatremia    Acute respiratory failure with hypoxia (HCC)    Toxic metabolic encephalopathy    Elevated d-dimer              03/28/24 1353   OT Last Visit   OT Visit Date 03/28/24   Note Type   Note Type Treatment   Pain Assessment   Pain Score No Pain   Restrictions/Precautions   Weight Bearing Precautions Per Order No   Other Precautions Fall Risk;O2;Telemetry;Multiple lines;Chair Alarm;Bed Alarm;Cognitive   ADL   Where Assessed Edge of bed   LB Dressing Assistance 2  Maximal Assistance   LB Dressing Deficit Don/doff L sock;Don/doff R sock   Toileting Assistance  2  Maximal Assistance   Toileting Deficit Other (Comment)  (catheter management)   Bed Mobility   Supine to Sit 5  Supervision   Additional items Increased time required;Bedrails;Verbal cues   Sit to Supine   (seated in chair at end of session)   Additional Comments pt on 1L O2 during session; SpO2 <90% throughout session; no complaints of SOB; pt appears significantly exhausted and fatigued following minimal activity   Transfers   Sit to Stand 4  Minimal assistance   Additional items Assist x 2;Increased time required;Verbal cues  (RW)   Stand to Sit 4  Minimal assistance   Additional items Assist x 2;Increased time required;Verbal cues  (RW)   Stand pivot 4  Minimal assistance   Additional items Assist x 2;Increased time required;Verbal cues  (RW)   Additional Comments pt performs functional transfers with min (A) x2 level and use of RW; no significant LOB, however mild instability and weakness  "apparent during session with functional transfers   Functional Mobility   Additional Comments pt does not perform functional mobility this session due to significant weakness and fatigue from bed to chair transfer   Subjective   Subjective \"you don't understand how hard this is for me\"   Cognition   Overall Cognitive Status Impaired   Arousal/Participation Lethargic   Attention Attends with cues to redirect   Orientation Level Oriented to person;Oriented to place;Disoriented to time;Disoriented to situation   Memory Decreased long term memory;Decreased short term memory;Decreased recall of recent events   Following Commands Follows one step commands with increased time or repetition   Activity Tolerance   Activity Tolerance Patient limited by fatigue   Assessment   Assessment Patient participated in Skilled OT session this date with interventions consisting of ADL re training with the use of correct body mechnaics, safety awareness and fall prevention techniques,  therapeutic activities to: increase activity tolerance, increase standing tolerance time with unilateral UE support to complete sink level ADLs, increase cardiovascular endurance , increase dynamic sit/ stand balance during functional activity , increase postural control, increase trunk control, and increase OOB/ sitting tolerance . Co treatment with PT secondary to complex medical condition of pt, mod-max A of 2 required to achieve and maintain transitional movements, requiring the need of skilled therapeutic intervention of 2 therapists to achieve delivery of services. Patient agreeable to OT treatment session, upon arrival patient was found supine in bed. Patient requiring frequent re direction, verbal cues for correct technique, cognitive assistance to anticipate next step, one step directives, frequent rest periods, and ocassional safety reminders. Patient continues to be functioning below baseline level, occupational performance remains limited " secondary to factors listed above and increased risk for falls and injury. The patient's raw score on the AM-PAC Daily Activity Inpatient Short Form is 15. A raw score of less than 19 suggests the patient may benefit from discharge to post-acute rehabilitation services. Please refer to the recommendation of the Occupational Therapist for safe discharge planning. From OT standpoint, recommendation at time of d/c would be level I maximum resource intensity.   Discharge Recommendation   Rehab Resource Intensity Level, OT I (Maximum Resource Intensity)   AM-PAC Daily Activity Inpatient   Lower Body Dressing 2   Bathing 2   Toileting 2   Upper Body Dressing 3   Grooming 3   Eating 3   Daily Activity Raw Score 15   Daily Activity Standardized Score (Calc for Raw Score >=11) 34.69   AM-PAC Applied Cognition Inpatient   Following a Speech/Presentation 3   Understanding Ordinary Conversation 3   Taking Medications 2   Remembering Where Things Are Placed or Put Away 2   Remembering List of 4-5 Errands 1   Taking Care of Complicated Tasks 1   Applied Cognition Raw Score 12   Applied Cognition Standardized Score 28.82

## 2024-03-28 NOTE — ASSESSMENT & PLAN NOTE
Wt Readings from Last 3 Encounters:   03/28/24 44.8 kg (98 lb 12.3 oz)   03/18/24 43.1 kg (95 lb)   02/22/24 40.8 kg (90 lb)     Possible acute diastolic CHF with mild tricuspid regurgitation, worsening renal and respiratory failure  Patient initiated on dialysis 3/26 2-hours treatment, 2.5-hour treatment 3/27 -held off from dialysis today with improvement in creatinine  Monitor daily weights, I's and O's

## 2024-03-28 NOTE — CASE MANAGEMENT
Case Management Discharge Planning Note    Patient name Laya Brooks  Location / MRN [id number]  : 1945 Date 3/28/2024       Current Admission Date: 3/23/2024  Current Admission Diagnosis:Chronic obstructive pulmonary disease with acute exacerbation (HCC)   Patient Active Problem List    Diagnosis Date Noted    Elevated d-dimer 2024    Acute respiratory failure with hypoxia ([facility]) 2024    Toxic metabolic encephalopathy 2024    SEEMA (acute kidney injury) (HCC) 2024    Acute diastolic congestive heart failure (HCC) 2024    Hyponatremia 2024    Chronic obstructive pulmonary disease with acute exacerbation ([facility]) 2024    Tobacco use 2024    Wheezing 2024    S/P right coronary artery (RCA) stent placement 2024    Celiac artery stenosis ([facility]) 2023    CAD (coronary artery disease) 08/10/2022    Weight loss, non-intentional 2022    Hypervitaminosis D 2022    Hypertension 2022    Intracranial atherosclerosis 2022    Atrophy of right kidney 2022    Stage 3a chronic kidney disease (HCC) 2022    Renal vascular disease 2022    Dyspnea on exertion 2022    Abnormal echocardiogram 2022    CVA (cerebral vascular accident) ([facility]) 2022    Thrombocytopenia ([facility]) 2022    Leukopenia 2022    Neutropenia ([facility]) 2022    Hyperphosphatemia 2022    Hypercholesterolemia 2022    Carotid artery stenosis 2022    Nocturnal hypoxia 2022    History of CVA (cerebrovascular accident) 2022    Hypertensive emergency 2022    Malnutrition ([facility]) 2022    Premature atrial complexes 2022    Dyslipidemia 2015      LOS (days): 5  Geometric Mean LOS (GMLOS) (days): 4.4  Days to GMLOS:-0.2     OBJECTIVE:  Risk of Unplanned Readmission Score: 18.91         Current admission status: Inpatient   Preferred Pharmacy:   [id number] - [facility]  [place] - [address], [address]  [address] [facility] [address] [facility] [address], [address]  [place] [address]  Phone: [phone] Fax: [phone]    [id number] [place] - [address]  [address] [facility] [place] [address]  Phone: [phone] Fax: [phone]    [id number] - [place] - [address]  [address] [facility] [address]  Phone: [phone] Fax: [phone]    Primary Care Provider: Joana Shields,     Primary Insurance: MEDICARE  Secondary Insurance: AARP    DISCHARGE DETAILS:    Discharge planning discussed with:: patient and spouse  Freedom of Choice: Yes  Comments - Freedom of Choice: agreeable to blanket referrals to STR  CM contacted family/caregiver?: Yes (spouse at bedside)  Were Treatment Team discharge recommendations reviewed with patient/caregiver?: Yes  Did patient/caregiver verbalize understanding of patient care needs?: Yes  Were patient/caregiver advised of the risks associated with not following Treatment Team discharge recommendations?: Yes    Contacts  Contact Method: In Person  Reason/Outcome: Discharge Planning              Other Referral/Resources/Interventions Provided:  Interventions: Short Term Rehab    Would you like to participate in our [facility] service program?  : No - Declined    Treatment Team Recommendation: Short Term Rehab  Discharge Destination Plan:: Short Term Rehab   Spoke with pt and spouse at bedside therapy recommendations of STR. Both agreeable to send blanket referrals to STR. Explained that pt can improve and recommendations could change. Pt and pt spouse goals would be for her to return home but if needed would agree to STR. Referrals pending.

## 2024-03-28 NOTE — ASSESSMENT & PLAN NOTE
Lab Results   Component Value Date    EGFR 15 03/28/2024    EGFR 8 03/27/2024    EGFR 5 03/26/2024    CREATININE 2.79 (H) 03/28/2024    CREATININE 4.47 (H) 03/27/2024    CREATININE 6.27 (H) 03/26/2024   Patient with severe SEEMA, s/p HD 3/26. 3/27  Slight improvement in urine output and renal function, off dialysis today  Nephrology following

## 2024-03-28 NOTE — PROGRESS NOTES
"Progress Note - Pulmonary   Laya Brooks 79 y.o. female MRN: [id number]  Unit/Bed#:  Encounter: [id number]    Assessment/Plan:    Acute hypoxic respiratory failure   Acute metabolic encephalopathy   Moderate COPD with acute exacerbation   Lymphopenia   Anion gap metabolic acidosis  SEEMA/CKD  Hyperkalemia   Right renal atrophy      Pulmonary recommendations:      Seen on 1L NC with adequate oxygen saturations. Maintain saturations greater than or equal to 88%.   Home O2 eval prior to discharge.  May discontinue BiPAP   Continue Solu-Medrol 40 mg IV every 12 hours today.  Start prednisone 40 mg p.o. daily tomorrow and taper by 10 mg every 3 days until completion.  Continue Pulmicort/Perforomist every 12 hours and Xopenex/Atrovent nebs 3 times daily while inpatient  Continue HD per nephrology recommendations  Continue to avoid diuretics.   Continue to trend labs.   Urine eosinophil count negative  Further treatment per primary team/nephrology  Patient is stable from a pulmonary standpoint.  Will sign off.  Call with questions.    Chief Complaint:    \"I feel lousy.\"    Subjective:    Patient was seen and examined today.  No overnight events reported.  Patient states that she feels lousy.  She feels that her breathing will be better without these tubes and things people keep making her do.  He feels very congested in the nose.  She denies cough.  She is unsure if she is wheezing.  She denies shortness of breath currently. Denies pain. She feels tired from not getting enough sleep last night.     Objective:    Vitals: Blood pressure 168/72, pulse 83, temperature 98 °F (36.7 °C), temperature source Temporal, resp. rate 14, height 5' (1.524 m), weight 44.8 kg (98 lb 12.3 oz), SpO2 94%.1L NC,Body mass index is 19.29 kg/m².      Intake/Output Summary (Last 24 hours) at 3/28/2024 1134  Last data filed at 3/28/2024 0843  Gross per 24 hour   Intake 400 ml   Output 170 ml   Net 230 ml       Invasive Devices       Central " "Venous Catheter Line  Duration             CVC Central Lines 03/26/24 Double 2 days              Peripheral Intravenous Line  Duration             Peripheral IV 03/23/24 Left Antecubital 4 days    Peripheral IV 03/25/24 Dorsal (posterior);Left Forearm 2 days              Hemodialysis Catheter  Duration             HD Temporary Double Catheter 2 days              Drain  Duration             Urethral Catheter 16 Fr. 4 days                  Physical Exam:     Physical Exam  Vitals and nursing note reviewed.   Constitutional:       General: She is not in acute distress.     Appearance: Normal appearance.   HENT:      Head: Normocephalic and atraumatic.      Mouth/Throat:      Mouth: Mucous membranes are moist.      Pharynx: Oropharynx is clear.   Cardiovascular:      Rate and Rhythm: Normal rate and regular rhythm.      Heart sounds: No murmur heard.  Pulmonary:      Effort: Pulmonary effort is normal.      Breath sounds: Wheezing present.   Musculoskeletal:      Cervical back: Normal range of motion.   Skin:     General: Skin is warm and dry.   Neurological:      General: No focal deficit present.      Mental Status: She is alert. Mental status is at baseline.   Psychiatric:         Mood and Affect: Mood normal.         Behavior: Behavior normal.         Labs: I have personally reviewed pertinent lab results., ABG: No results found for: \"PHART\", \"TZJ5XZF\", \"PO2ART\", \"TMR6YCY\", \"C5WDFNSW\", \"BEART\", \"SOURCE\", BNP: No results found for: \"BNP\", CBC:   Lab Results   Component Value Date    WBC 4.62 03/28/2024    HGB 10.5 (L) 03/28/2024    HCT 32.0 (L) 03/28/2024    MCV 89 03/28/2024     (L) 03/28/2024    RBC 3.59 (L) 03/28/2024    MCH 29.2 03/28/2024    MCHC 32.8 03/28/2024    RDW 14.8 03/28/2024    MPV 11.0 03/28/2024    NRBC 0 03/28/2024   , CMP:   Lab Results   Component Value Date    SODIUM 137 03/28/2024    K 4.4 03/28/2024    CL 98 03/28/2024    CO2 29 03/28/2024    BUN 40 (H) 03/28/2024    CREATININE 2.79 (H) " "03/28/2024    CALCIUM 8.5 03/28/2024    EGFR 15 03/28/2024   , PT/INR: No results found for: \"PT\", \"INR\", Troponin: No results found for: \"TROPONINI\"    Imaging and other studies: I have personally reviewed pertinent reports.   and I have personally reviewed pertinent films in PACS    "

## 2024-03-28 NOTE — PLAN OF CARE
Problem: SAFETY ADULT  Goal: Patient will remain free of falls  Description: INTERVENTIONS:  - Educate patient/family on patient safety including physical limitations  - Instruct patient to call for assistance with activity   - Consult OT/PT to assist with strengthening/mobility   - Keep Call bell within reach  - Keep bed low and locked with side rails adjusted as appropriate  - Keep care items and personal belongings within reach  - Initiate and maintain comfort rounds  - Make Fall Risk Sign visible to staff  - Offer Toileting every 2 Hours, in advance of need  - Initiate/Maintain fall alarm  - Obtain necessary fall risk management equipment: alarm, nonskid footwear, yellow wristband  - Apply yellow socks and bracelet for high fall risk patients  - Consider moving patient to room near nurses station  Outcome: Progressing     Problem: DISCHARGE PLANNING  Goal: Discharge to home or other facility with appropriate resources  Description: INTERVENTIONS:  - Identify barriers to discharge w/patient and caregiver  - Arrange for needed discharge resources and transportation as appropriate  - Identify discharge learning needs (meds, wound care, etc.)  - Arrange for interpretive services to assist at discharge as needed  - Refer to Case Management Department for coordinating discharge planning if the patient needs post-hospital services based on physician/advanced practitioner order or complex needs related to functional status, cognitive ability, or social support system  Outcome: Progressing     Problem: Knowledge Deficit  Goal: Patient/family/caregiver demonstrates understanding of disease process, treatment plan, medications, and discharge instructions  Description: Complete learning assessment and assess knowledge base.  Interventions:  - Provide teaching at level of understanding  - Provide teaching via preferred learning methods  Outcome: Progressing

## 2024-03-28 NOTE — ASSESSMENT & PLAN NOTE
Appears multifactorial, secondary to SEEMA/metabolic acidosis with compensatory tachypnea, COPD exacerbation, possibly acute diastolic CHF component  Was initially requiring continuous BiPAP therapy -now off BiPAP, on 1 L O2   Treating underlying conditions as above

## 2024-03-28 NOTE — SPEECH THERAPY NOTE
"Speech Language/Pathology  Speech-Language Pathology Progress Note      Patient Name: Laya Brooks    Today's Date: 3/28/2024      Subjective:  Pt was awake and lethargic. She was repositioned for optimal PO intake safety. Patient stated \"I am just so tired \".    Objective:  Pt was seen today for dysphagia therapy. Current diet is puree with nectar thick liquids. Pt was on 2L O2 via nasal cannula. Oral care was completed with use of oral care kits. Focus of today's session was to maximize PO intake safety, determine potential for liquid consistency advancement, and improve use of strategies to minimize risk of aspiration. Textures offered today included puree and thin liquid via cup.    Swallow function:   Bolus formation and AP transfer were moderately, disorganized, and reduced.  Control was  suspected to be reduced causing posterior premature spillage . Pharyngeal swallow initiation was present however weak. Hyolaryngeal excursion was reduced in strength/ weak.  Moderate-severely decreased respiratory deglutition noted with thin liquids via cup, increasing in severity as trials persisted. Pt observed to tolerate puree consistency successfully. Pt swallow function suspected to be regaining strength however d/t respiratory concerns, level of alertness, and easily feeling fatigued, thin liquids continue to be an increased risk of asp/pen. Immediate cough x2 with throat clearx1 noted with thin liquids via cup. To note, pt endorsed feeling weak/ nauseous during session and eventually vomited at bedside, however this is not suspected to be caused by impairment in swallow function.   SLP provided max cueing/feedback throughout session. Pt benefited from frequent reminders throughout session.    Assessment:  Pt instructed to utilize compensatory strategy of small sips/bites to decrease risk of asp/pen. Observed to successfully utilize strategy with  with 80% accuracy yielding the need for frequent reminders/max verbal " cues. Swallow function is stable with current diet. Diet texture and liquid consistency remains appropriate at this time.    Plan:  Continue puree and nectar thick liquids. Continue ST follow up. Subsequent sessions to focus on maximizing PO intake safety, determining potential for diet texture advancement, determining potential for liquid consistency advancement, and improving use of strategies to minimize risk of aspiration.

## 2024-03-28 NOTE — PLAN OF CARE
Problem: OCCUPATIONAL THERAPY ADULT  Goal: Performs self-care activities at highest level of function for planned discharge setting.  See evaluation for individualized goals.  Description: Treatment Interventions: ADL retraining, Functional transfer training, UE strengthening/ROM, Endurance training, Patient/family training, Equipment evaluation/education, Compensatory technique education, Energy conservation, Activityengagement          See flowsheet documentation for full assessment, interventions and recommendations.   Outcome: Progressing  Note: Limitation: Decreased ADL status, Decreased UE ROM, Decreased UE strength, Decreased Safe judgement during ADL, Decreased endurance, Decreased self-care trans, Decreased high-level ADLs     Assessment: Patient participated in Skilled OT session this date with interventions consisting of ADL re training with the use of correct body mechnaics, safety awareness and fall prevention techniques,  therapeutic activities to: increase activity tolerance, increase standing tolerance time with unilateral UE support to complete sink level ADLs, increase cardiovascular endurance , increase dynamic sit/ stand balance during functional activity , increase postural control, increase trunk control, and increase OOB/ sitting tolerance . Co treatment with PT secondary to complex medical condition of pt, mod-max A of 2 required to achieve and maintain transitional movements, requiring the need of skilled therapeutic intervention of 2 therapists to achieve delivery of services. Patient agreeable to OT treatment session, upon arrival patient was found supine in bed. Patient requiring frequent re direction, verbal cues for correct technique, cognitive assistance to anticipate next step, one step directives, frequent rest periods, and ocassional safety reminders. Patient continues to be functioning below baseline level, occupational performance remains limited secondary to factors listed  above and increased risk for falls and injury. The patient's raw score on the AM-PAC Daily Activity Inpatient Short Form is 15. A raw score of less than 19 suggests the patient may benefit from discharge to post-acute rehabilitation services. Please refer to the recommendation of the Occupational Therapist for safe discharge planning. From OT standpoint, recommendation at time of d/c would be level I maximum resource intensity.     Rehab Resource Intensity Level, OT: I (Maximum Resource Intensity)

## 2024-03-28 NOTE — ASSESSMENT & PLAN NOTE
On Norvasc 5 mg daily and lisinopril   Blood pressure significantly elevated, resume Norvasc  Continue IV Hydralazine as needed for now

## 2024-03-28 NOTE — PROGRESS NOTES
[facility]  Progress Note  Name: Laya Brooks I  MRN: [id number]  Unit/Bed#:  I Date of Admission: 3/23/2024   Date of Service: 3/28/2024 I Hospital Day: 5    Assessment/Plan   * Chronic obstructive pulmonary disease with acute exacerbation (HCC)  Assessment & Plan  This is an 79-year-old female patient with recently diagnosed COPD, history of CAD, CKD and hypertension who initially presented to the ED with shortness of breath, was noted to have significant audible wheezing with shortness of breath and had to be placed on BiPAP  Recent PFT showed moderate obstructive deficit, moderate DLCO reduction and evidence of hyperinflation and air trapping on lung volumes without a significant bronchodilator response. She was placed on LAMA/LABA inhalers  Per family, patient was using bronchodilators without significant improvement  In the ER she was placed on BiPAP - now weaned to 3L O2  Patient received initial dose of IV Solu-Medrol 125 mg in the ED and has been maintained on a taper - will proceed with 40 mg every 12 hours today  ABG without evidence of CO2 retention  Continue with bronchodilators with DuoNebs every 4 hours  Added IV ceftriaxone for COPD exacerbation 3/25/24  Treat SEEMA/metabolic acidosis with HD - contributing to patient's respiratory status      Elevated d-dimer  Assessment & Plan  Most likely due to acute illness  No significant clinical suspicion for acute PE with marked clinical improvement with HD  LE venous duplex negative for DVT      Toxic metabolic encephalopathy  Assessment & Plan  Worsening mental status in the setting of metabolic disturbances including severe SEEMA, respiratory failure; toxic component of patient having received benzodiazepines and opiate medications  Improved  Monitor mental status, treat underlying conditions  CT head no acute findings    Acute respiratory failure with hypoxia (HCC)  Assessment & Plan  Appears multifactorial, secondary to  SEEMA/metabolic acidosis with compensatory tachypnea, COPD exacerbation, possibly acute diastolic CHF component  Was initially requiring continuous BiPAP therapy -now off BiPAP, on 1 L O2   Treating underlying conditions as above    Acute diastolic congestive heart failure (HCC)  Assessment & Plan  Wt Readings from Last 3 Encounters:   03/28/24 44.8 kg (98 lb 12.3 oz)   03/18/24 43.1 kg (95 lb)   02/22/24 40.8 kg (90 lb)     Possible acute diastolic CHF with mild tricuspid regurgitation, worsening renal and respiratory failure  Patient initiated on dialysis 3/26 2-hours treatment, 2.5-hour treatment 3/27 -held off from dialysis today with improvement in creatinine  Monitor daily weights, I's and O's      SEEMA (acute kidney injury) (HCC)  Assessment & Plan  SEEMA superimposed on CKD stage IIIa, present on admission, severely worsening renal function with oliguria  Patient initially admitted with acute respiratory failure felt to be secondary to COPD as well as possible CHF exacerbation component, received diuresis - with worsening renal function further diuretics now discontinued  S/p hemodialysis catheter placement 3/26, HD initiated 3/26, additional treatment 3/27 with overall significant improvement  Urine output remains minimal.  Continue to monitor renal function and urine output      CAD (coronary artery disease)  Assessment & Plan  Patient with history of STEMI in 2008 status post RCA stenting  Resume Plavix and statin as able to take p.o.     Hypertension  Assessment & Plan  On Norvasc 5 mg daily and lisinopril   Blood pressure significantly elevated, resume Norvasc  Continue IV Hydralazine as needed for now    Stage 3a chronic kidney disease (HCC)  Assessment & Plan  Lab Results   Component Value Date    EGFR 15 03/28/2024    EGFR 8 03/27/2024    EGFR 5 03/26/2024    CREATININE 2.79 (H) 03/28/2024    CREATININE 4.47 (H) 03/27/2024    CREATININE 6.27 (H) 03/26/2024   Patient with severe SEEMA, s/p HD 3/26.  3/27  Slight improvement in urine output and renal function, off dialysis today  Nephrology following    CVA (cerebral vascular accident) (HCC)  Assessment & Plan  History noted  Continue with Plavix and statin  CT head no acute findings    Hypercholesterolemia  Assessment & Plan  Resume Lipitor with patient tolerating PO               VTE Pharmacologic Prophylaxis:    heparin    Mobility:   Basic Mobility Inpatient Raw Score: 9  JH-HLM Goal: 3: Sit at edge of bed  JH-HLM Achieved: 2: Bed activities/Dependent transfer  JH-HLM Goal NOT achieved. Continue with multidisciplinary rounding and encourage appropriate mobility to improve upon JH-HLM goals.    Patient Centered Rounds: I performed bedside rounds with nursing staff today.   Discussions with Specialists or Other Care Team Provider: Nephrology    Education and Discussions with Family / Patient:  ongoing update of family.     Total Time Spent on Date of Encounter in care of patient: 50 mins. This time was spent on one or more of the following: performing physical exam; counseling and coordination of care; obtaining or reviewing history; documenting in the medical record; reviewing/ordering tests, medications or procedures; communicating with other healthcare professionals and discussing with patient's family/caregivers.    Current Length of Stay: 5 day(s)  Current Patient Status: Inpatient   Certification Statement: The patient will continue to require additional inpatient hospital stay due to close monitoring  Discharge Plan: Possibly to rehab facility    Code Status: Level 1 - Full Code    Subjective:   Patient significantly improved, alert and oriented, answering appropriately.  Still complaining of shortness of breath.    Objective:     Vitals:   Temp (24hrs), Av.8 °F (37.1 °C), Min:97.8 °F (36.6 °C), Max:99.8 °F (37.7 °C)    Temp:  [97.8 °F (36.6 °C)-99.8 °F (37.7 °C)] 99.8 °F (37.7 °C)  HR:  [] 98  Resp:  [13-37] 35  BP: ()/(50-98)  164/69  SpO2:  [88 %-97 %] 92 %  Body mass index is 19.29 kg/m².     Input and Output Summary (last 24 hours):     Intake/Output Summary (Last 24 hours) at 3/28/2024 1418  Last data filed at 3/28/2024 1308  Gross per 24 hour   Intake 400 ml   Output 170 ml   Net 230 ml       Physical Exam:   Physical Exam  Constitutional:       General: She is not in acute distress.  HENT:      Head: Normocephalic and atraumatic.      Nose: No congestion.   Eyes:      Conjunctiva/sclera: Conjunctivae normal.   Cardiovascular:      Rate and Rhythm: Normal rate and regular rhythm.   Pulmonary:      Effort: No respiratory distress.      Breath sounds: Wheezing present. No rales.   Abdominal:      General: There is no distension.      Tenderness: There is no abdominal tenderness. There is no guarding.   Musculoskeletal:      Right lower leg: No edema.      Left lower leg: No edema.   Skin:     General: Skin is warm and dry.   Neurological:      Mental Status: She is oriented to person, place, and time.   Psychiatric:         Mood and Affect: Mood normal.          Additional Data:     Labs:  Results from last 7 days   Lab Units 03/28/24  0548   WBC Thousand/uL 4.62   HEMOGLOBIN g/dL 10.5*   HEMATOCRIT % 32.0*   PLATELETS Thousands/uL 108*   NEUTROS PCT % 90*   LYMPHS PCT % 3*   MONOS PCT % 6   EOS PCT % 0     Results from last 7 days   Lab Units 03/28/24  0548 03/27/24  0517   SODIUM mmol/L 137 132*   POTASSIUM mmol/L 4.4 4.5   CHLORIDE mmol/L 98 97   CO2 mmol/L 29 22   BUN mg/dL 40* 70*   CREATININE mg/dL 2.79* 4.47*   ANION GAP mmol/L 10 13   CALCIUM mg/dL 8.5 8.4   ALBUMIN g/dL  --  3.4*   TOTAL BILIRUBIN mg/dL  --  0.41   ALK PHOS U/L  --  42   ALT U/L  --  11   AST U/L  --  14   GLUCOSE RANDOM mg/dL 154* 142*     Results from last 7 days   Lab Units 03/23/24  2209   INR  1.04             Results from last 7 days   Lab Units 03/25/24  1029 03/25/24  0504 03/23/24  2209   LACTIC ACID mmol/L 0.4*  --  1.5   PROCALCITONIN ng/ml  --   0.19 0.12       Lines/Drains:  Invasive Devices       Central Venous Catheter Line  Duration             CVC Central Lines 03/26/24 Double 2 days              Peripheral Intravenous Line  Duration             Peripheral IV 03/23/24 Left Antecubital 4 days    Peripheral IV 03/25/24 Dorsal (posterior);Left Forearm 2 days              Hemodialysis Catheter  Duration             HD Temporary Double Catheter 2 days              Drain  Duration             Urethral Catheter 16 Fr. 4 days                  Urinary Catheter:  Goal for removal: Voiding trial when ambulation improves         Central Line:  Goal for removal: HD catheter             Imaging: will review renal doppler     Recent Cultures (last 7 days):         Last 24 Hours Medication List:   Current Facility-Administered Medications   Medication Dose Route Frequency Provider Last Rate    acetaminophen  650 mg Oral Q4H PRN Eleanor Rodriguez MD      albuterol  2.5 mg Nebulization Q4H PRN Eleanor Rodriguez MD      amLODIPine  5 mg Oral Daily Malena Hearn MD      atorvastatin  40 mg Oral Daily With Dinner Eleanor Rodriguez MD      budesonide  0.5 mg Nebulization Q12H Sincere Goldman PA-C      cefTRIAXone  1,000 mg Intravenous Q24H Malena Hearn MD 1,000 mg (03/28/24 1337)    clopidogrel  75 mg Oral Daily Eleanor Rodriguez MD      formoterol  20 mcg Nebulization Q12H Sincere Goldman PA-C      heparin (porcine)  5,000 Units Subcutaneous Q8H [facility] Eleanor Rodriguez MD      hydrALAZINE  10 mg Intravenous Q6H [facility] Darryl Powell MD      ipratropium  0.5 mg Nebulization TID Eleanor Rodriguez MD      levalbuterol  1.25 mg Nebulization TID Eleanor Rodriguez MD      LORazepam  1 mg Intravenous Q6H PRN Darryl Powell MD      methylPREDNISolone sodium succinate  40 mg Intravenous Q12H [facility] Sincere Goldman PA-C      ondansetron  4 mg Intravenous Q6H PRN Eleanor Rodriguez MD          Today, Patient Was Seen By: Malena Hearn MD    **Please  Note: This note may have been constructed using a voice recognition system.**

## 2024-03-28 NOTE — PHYSICAL THERAPY NOTE
"                                                      Physical Therapy Progress Note    Patient Name: Laya Brooks    Today's Date: 3/28/2024     Problem List  Principal Problem:    Chronic obstructive pulmonary disease with acute exacerbation (HCC)  Active Problems:    Hypercholesterolemia    CVA (cerebral vascular accident) (HCC)    Stage 3a chronic kidney disease (HCC)    Hypertension    CAD (coronary artery disease)    SEEMA (acute kidney injury) (HCC)    Acute diastolic congestive heart failure (HCC)    Hyponatremia    Acute respiratory failure with hypoxia (HCC)    Toxic metabolic encephalopathy    Elevated d-dimer       Past Medical History  Past Medical History:   Diagnosis Date    CAD (coronary artery disease)     Cardiac disease     Hypercholesteremia     Hyperlipidemia     Hypertension     Hypertension     Renal disorder         Past Surgical History  Past Surgical History:   Procedure Laterality Date    CORONARY ANGIOPLASTY WITH STENT PLACEMENT      WRIST SURGERY             03/28/24 1354   PT Last Visit   PT Visit Date 03/28/24   Note Type   Note Type Treatment   Pain Assessment   Pain Assessment Tool 0-10   Pain Score No Pain   Restrictions/Precautions   Weight Bearing Precautions Per Order No   Other Precautions Fall Risk;O2;Telemetry;Multiple lines;Bed Alarm;Chair Alarm   General   Chart Reviewed Yes   Family/Caregiver Present Yes   Cognition   Overall Cognitive Status Impaired   Arousal/Participation Lethargic   Attention Attends with cues to redirect   Orientation Level Oriented to person;Oriented to place   Following Commands Follows one step commands with increased time or repetition   Subjective   Subjective \"I don't know why everyone is telling me I'm doing good\"   Bed Mobility   Supine to Sit 5  Supervision   Additional items Increased time required;Verbal cues   Sit to Supine   (pt OOB at end of session)   Transfers   Sit to Stand 4  Minimal assistance   Additional items Assist x 2;Increased " time required;Verbal cues   Stand to Sit 4  Minimal assistance   Additional items Assist x 2;Increased time required;Verbal cues   Stand pivot 4  Minimal assistance   Additional items Assist x 2;Increased time required;Verbal cues   Additional Comments RW used   Ambulation/Elevation   Gait pattern Not appropriate;Not tested   Balance   Static Sitting Fair   Dynamic Sitting Fair -   Static Standing Fair -   Dynamic Standing Fair -   Endurance Deficit   Endurance Deficit Yes   Endurance Deficit Description pt lethargic at baseline; exacerbated with mobility   Activity Tolerance   Activity Tolerance Patient limited by fatigue   Assessment   Prognosis Guarded   Problem List Decreased strength;Decreased endurance;Impaired balance;Decreased mobility;Impaired judgement;Decreased safety awareness   Assessment Patient seen this date for PT treatment session to increase level of mobility and functional activity tolerance. This date, pt able to perform all functional mobility with Supervision - Floyd x 2, RW, and increased time. Moderate verbal cuing provided for safety awareness and sequencing. Pt extremely fatigued with minimal exertion, so mobility limited to stand pivot transfer from bed to chair. HR and SpO2 remained WFL on 2L O2 throughout. No true LOB experienced. The patient's AM-PAC Basic Mobility Inpatient Short Form Raw Score is 10. A Raw score of less than or equal to 16 suggests the patient may benefit from discharge to post-acute rehabilitation services. Please also refer to the recommendation of the Physical Therapist for safe discharge planning. Co treatment with OT secondary to complex medical condition of pt, possible A of 2 required to achieve and maintain transitional movements, requiring the need of skilled therapeutic intervention of 2 therapists to achieve delivery of services. D/c recommendation continues to be rehab resource intensity level I.   Goals   LTG Expiration Date 04/10/24   PT Treatment Day 1    Plan   Treatment/Interventions Functional transfer training;LE strengthening/ROM;Therapeutic exercise;Endurance training;Bed mobility;Gait training   Progress Slow progress, decreased activity tolerance   PT Frequency 3-5x/wk   Discharge Recommendation   Rehab Resource Intensity Level, PT I (Maximum Resource Intensity)   AM-PAC Basic Mobility Inpatient   Turning in Flat Bed Without Bedrails 2   Lying on Back to Sitting on Edge of Flat Bed Without Bedrails 2   Moving Bed to Chair 2   Standing Up From Chair Using Arms 2   Walk in Room 1   Climb 3-5 Stairs With Railing 1   Basic Mobility Inpatient Raw Score 10   Turning Head Towards Sound 4   Follow Simple Instructions 3   Low Function Basic Mobility Raw Score  17   Low Function Basic Mobility Standardized Score  27.46   [facility] Highest Level Of Mobility   -[facility] Goal 4: Move to chair/commode   -[facility] Achieved 4: Move to chair/commode   Education   Education Provided Mobility training;Assistive device   Patient Demonstrates acceptance/verbal understanding;Reinforcement needed   End of Consult   Patient Position at End of Consult Bedside chair;Bed/Chair alarm activated;All needs within reach

## 2024-03-29 ENCOUNTER — APPOINTMENT (INPATIENT)
Dept: DIALYSIS | Facility: HOSPITAL | Age: 79
DRG: 682 | End: 2024-03-29
Attending: INTERNAL MEDICINE
Payer: MEDICARE

## 2024-03-29 ENCOUNTER — APPOINTMENT (INPATIENT)
Dept: RADIOLOGY | Facility: HOSPITAL | Age: 79
DRG: 682 | End: 2024-03-29
Payer: MEDICARE

## 2024-03-29 PROBLEM — I70.1 RENAL ARTERY STENOSIS (HCC): Status: ACTIVE | Noted: 2024-03-29

## 2024-03-29 PROBLEM — R13.10 DYSPHAGIA: Status: ACTIVE | Noted: 2024-03-29

## 2024-03-29 LAB
ANION GAP SERPL CALCULATED.3IONS-SCNC: 10 MMOL/L (ref 4–13)
BACTERIA UR QL AUTO: ABNORMAL /HPF
BASOPHILS # BLD AUTO: 0 THOUSANDS/ÂΜL (ref 0–0.1)
BASOPHILS NFR BLD AUTO: 0 % (ref 0–1)
BILIRUB UR QL STRIP: NEGATIVE
BUN SERPL-MCNC: 56 MG/DL (ref 5–25)
CALCIUM SERPL-MCNC: 8.7 MG/DL (ref 8.4–10.2)
CHLORIDE SERPL-SCNC: 98 MMOL/L (ref 96–108)
CLARITY UR: CLEAR
CO2 SERPL-SCNC: 28 MMOL/L (ref 21–32)
COARSE GRAN CASTS URNS QL MICRO: ABNORMAL /LPF
COLOR UR: YELLOW
CREAT SERPL-MCNC: 2.89 MG/DL (ref 0.6–1.3)
EOSINOPHIL # BLD AUTO: 0 THOUSAND/ÂΜL (ref 0–0.61)
EOSINOPHIL NFR BLD AUTO: 0 % (ref 0–6)
ERYTHROCYTE [DISTWIDTH] IN BLOOD BY AUTOMATED COUNT: 14.7 % (ref 11.6–15.1)
GFR SERPL CREATININE-BSD FRML MDRD: 14 ML/MIN/1.73SQ M
GLUCOSE SERPL-MCNC: 170 MG/DL (ref 65–140)
GLUCOSE UR STRIP-MCNC: NEGATIVE MG/DL
HCT VFR BLD AUTO: 32.5 % (ref 34.8–46.1)
HGB BLD-MCNC: 10.8 G/DL (ref 11.5–15.4)
HGB UR QL STRIP.AUTO: NEGATIVE
IMM GRANULOCYTES # BLD AUTO: 0.05 THOUSAND/UL (ref 0–0.2)
IMM GRANULOCYTES NFR BLD AUTO: 1 % (ref 0–2)
KETONES UR STRIP-MCNC: NEGATIVE MG/DL
LEUKOCYTE ESTERASE UR QL STRIP: ABNORMAL
LYMPHOCYTES # BLD AUTO: 0.2 THOUSANDS/ÂΜL (ref 0.6–4.47)
LYMPHOCYTES NFR BLD AUTO: 4 % (ref 14–44)
MAGNESIUM SERPL-MCNC: 2.3 MG/DL (ref 1.9–2.7)
MCH RBC QN AUTO: 29.3 PG (ref 26.8–34.3)
MCHC RBC AUTO-ENTMCNC: 33.2 G/DL (ref 31.4–37.4)
MCV RBC AUTO: 88 FL (ref 82–98)
MONOCYTES # BLD AUTO: 0.32 THOUSAND/ÂΜL (ref 0.17–1.22)
MONOCYTES NFR BLD AUTO: 6 % (ref 4–12)
NEUTROPHILS # BLD AUTO: 5.01 THOUSANDS/ÂΜL (ref 1.85–7.62)
NEUTS SEG NFR BLD AUTO: 89 % (ref 43–75)
NITRITE UR QL STRIP: NEGATIVE
NON-SQ EPI CELLS URNS QL MICRO: ABNORMAL /HPF
NRBC BLD AUTO-RTO: 0 /100 WBCS
PH UR STRIP.AUTO: 5.5 [PH]
PHOSPHATE SERPL-MCNC: 4.9 MG/DL (ref 2.3–4.1)
PLATELET # BLD AUTO: 102 THOUSANDS/UL (ref 149–390)
PMV BLD AUTO: 11.4 FL (ref 8.9–12.7)
POTASSIUM SERPL-SCNC: 4.5 MMOL/L (ref 3.5–5.3)
PROT UR STRIP-MCNC: ABNORMAL MG/DL
RBC # BLD AUTO: 3.68 MILLION/UL (ref 3.81–5.12)
RBC #/AREA URNS AUTO: ABNORMAL /HPF
SODIUM SERPL-SCNC: 136 MMOL/L (ref 135–147)
SP GR UR STRIP.AUTO: >=1.03 (ref 1–1.03)
UROBILINOGEN UR QL STRIP.AUTO: 0.2 E.U./DL
WBC # BLD AUTO: 5.58 THOUSAND/UL (ref 4.31–10.16)
WBC #/AREA URNS AUTO: ABNORMAL /HPF

## 2024-03-29 PROCEDURE — 99232 SBSQ HOSP IP/OBS MODERATE 35: CPT | Performed by: INTERNAL MEDICINE

## 2024-03-29 PROCEDURE — 94664 DEMO&/EVAL PT USE INHALER: CPT

## 2024-03-29 PROCEDURE — 97110 THERAPEUTIC EXERCISES: CPT

## 2024-03-29 PROCEDURE — 71045 X-RAY EXAM CHEST 1 VIEW: CPT

## 2024-03-29 PROCEDURE — 84100 ASSAY OF PHOSPHORUS: CPT | Performed by: NURSE PRACTITIONER

## 2024-03-29 PROCEDURE — 93976 VASCULAR STUDY: CPT | Performed by: SURGERY

## 2024-03-29 PROCEDURE — 81001 URINALYSIS AUTO W/SCOPE: CPT | Performed by: NURSE PRACTITIONER

## 2024-03-29 PROCEDURE — 99233 SBSQ HOSP IP/OBS HIGH 50: CPT | Performed by: FAMILY MEDICINE

## 2024-03-29 PROCEDURE — 80048 BASIC METABOLIC PNL TOTAL CA: CPT | Performed by: NURSE PRACTITIONER

## 2024-03-29 PROCEDURE — 85025 COMPLETE CBC W/AUTO DIFF WBC: CPT | Performed by: FAMILY MEDICINE

## 2024-03-29 PROCEDURE — 83735 ASSAY OF MAGNESIUM: CPT | Performed by: NURSE PRACTITIONER

## 2024-03-29 PROCEDURE — 97530 THERAPEUTIC ACTIVITIES: CPT

## 2024-03-29 PROCEDURE — 94760 N-INVAS EAR/PLS OXIMETRY 1: CPT

## 2024-03-29 PROCEDURE — 94640 AIRWAY INHALATION TREATMENT: CPT

## 2024-03-29 PROCEDURE — 99233 SBSQ HOSP IP/OBS HIGH 50: CPT | Performed by: INTERNAL MEDICINE

## 2024-03-29 PROCEDURE — 92526 ORAL FUNCTION THERAPY: CPT

## 2024-03-29 RX ORDER — LABETALOL HYDROCHLORIDE 5 MG/ML
10 INJECTION, SOLUTION INTRAVENOUS EVERY 6 HOURS PRN
Status: DISCONTINUED | OUTPATIENT
Start: 2024-03-29 | End: 2024-03-30

## 2024-03-29 RX ORDER — HYDRALAZINE HYDROCHLORIDE 20 MG/ML
10 INJECTION INTRAMUSCULAR; INTRAVENOUS EVERY 6 HOURS SCHEDULED
Status: DISCONTINUED | OUTPATIENT
Start: 2024-03-29 | End: 2024-03-30

## 2024-03-29 RX ORDER — NIFEDIPINE 30 MG/1
30 TABLET, EXTENDED RELEASE ORAL DAILY
Status: DISCONTINUED | OUTPATIENT
Start: 2024-03-29 | End: 2024-04-01

## 2024-03-29 RX ORDER — PREDNISONE 20 MG/1
40 TABLET ORAL DAILY
Status: DISCONTINUED | OUTPATIENT
Start: 2024-03-30 | End: 2024-04-01

## 2024-03-29 RX ORDER — FUROSEMIDE 10 MG/ML
60 INJECTION INTRAMUSCULAR; INTRAVENOUS ONCE
Status: COMPLETED | OUTPATIENT
Start: 2024-03-29 | End: 2024-03-29

## 2024-03-29 RX ORDER — SODIUM CHLORIDE, SODIUM LACTATE, POTASSIUM CHLORIDE, CALCIUM CHLORIDE 600; 310; 30; 20 MG/100ML; MG/100ML; MG/100ML; MG/100ML
50 INJECTION, SOLUTION INTRAVENOUS CONTINUOUS
Status: DISCONTINUED | OUTPATIENT
Start: 2024-03-29 | End: 2024-03-30

## 2024-03-29 RX ADMIN — AMLODIPINE BESYLATE 5 MG: 5 TABLET ORAL at 08:28

## 2024-03-29 RX ADMIN — ALBUTEROL SULFATE 2.5 MG: 2.5 SOLUTION RESPIRATORY (INHALATION) at 04:42

## 2024-03-29 RX ADMIN — METHYLPREDNISOLONE SODIUM SUCCINATE 40 MG: 40 INJECTION, POWDER, FOR SOLUTION INTRAMUSCULAR; INTRAVENOUS at 08:28

## 2024-03-29 RX ADMIN — LEVALBUTEROL HYDROCHLORIDE 1.25 MG: 1.25 SOLUTION RESPIRATORY (INHALATION) at 14:21

## 2024-03-29 RX ADMIN — FUROSEMIDE 60 MG: 10 INJECTION, SOLUTION INTRAMUSCULAR; INTRAVENOUS at 13:28

## 2024-03-29 RX ADMIN — HEPARIN SODIUM 5000 UNITS: 5000 INJECTION, SOLUTION INTRAVENOUS; SUBCUTANEOUS at 05:00

## 2024-03-29 RX ADMIN — LABETALOL HYDROCHLORIDE 10 MG: 5 INJECTION, SOLUTION INTRAVENOUS at 02:09

## 2024-03-29 RX ADMIN — ATORVASTATIN CALCIUM 40 MG: 40 TABLET, FILM COATED ORAL at 16:53

## 2024-03-29 RX ADMIN — HYDRALAZINE HYDROCHLORIDE 10 MG: 20 INJECTION INTRAMUSCULAR; INTRAVENOUS at 17:01

## 2024-03-29 RX ADMIN — SODIUM CHLORIDE, SODIUM LACTATE, POTASSIUM CHLORIDE, AND CALCIUM CHLORIDE 50 ML/HR: .6; .31; .03; .02 INJECTION, SOLUTION INTRAVENOUS at 18:03

## 2024-03-29 RX ADMIN — NIFEDIPINE 30 MG: 30 TABLET, FILM COATED, EXTENDED RELEASE ORAL at 16:52

## 2024-03-29 RX ADMIN — LABETALOL HYDROCHLORIDE 10 MG: 5 INJECTION, SOLUTION INTRAVENOUS at 21:19

## 2024-03-29 RX ADMIN — HYDRALAZINE HYDROCHLORIDE 10 MG: 20 INJECTION INTRAMUSCULAR; INTRAVENOUS at 05:00

## 2024-03-29 RX ADMIN — IPRATROPIUM BROMIDE 0.5 MG: 0.5 SOLUTION RESPIRATORY (INHALATION) at 07:43

## 2024-03-29 RX ADMIN — LEVALBUTEROL HYDROCHLORIDE 1.25 MG: 1.25 SOLUTION RESPIRATORY (INHALATION) at 07:43

## 2024-03-29 RX ADMIN — HEPARIN SODIUM 5000 UNITS: 5000 INJECTION, SOLUTION INTRAVENOUS; SUBCUTANEOUS at 16:52

## 2024-03-29 RX ADMIN — HEPARIN SODIUM 5000 UNITS: 5000 INJECTION, SOLUTION INTRAVENOUS; SUBCUTANEOUS at 21:28

## 2024-03-29 RX ADMIN — CEFTRIAXONE 1000 MG: 1 INJECTION, SOLUTION INTRAVENOUS at 16:53

## 2024-03-29 RX ADMIN — IPRATROPIUM BROMIDE 0.5 MG: 0.5 SOLUTION RESPIRATORY (INHALATION) at 14:21

## 2024-03-29 RX ADMIN — FORMOTEROL FUMARATE DIHYDRATE 20 MCG: 20 SOLUTION RESPIRATORY (INHALATION) at 07:43

## 2024-03-29 RX ADMIN — CLOPIDOGREL 75 MG: 75 TABLET ORAL at 08:28

## 2024-03-29 RX ADMIN — BUDESONIDE 0.5 MG: 0.5 INHALANT RESPIRATORY (INHALATION) at 07:43

## 2024-03-29 RX ADMIN — HYDRALAZINE HYDROCHLORIDE 10 MG: 20 INJECTION INTRAMUSCULAR; INTRAVENOUS at 23:44

## 2024-03-29 NOTE — PROGRESS NOTES
"Progress Note - Pulmonary   Laya Brooks 79 y.o. female MRN: [id number]  Unit/Bed#:  Encounter: [id number]    Assessment/Plan:    Acute hypoxic respiratory failure   Acute metabolic encephalopathy   Moderate COPD with acute exacerbation   Lymphopenia   Anion gap metabolic acidosis  SEEMA/CKD  Hyperkalemia   Right renal atrophy   Dysphagia        Pulmonary discussion/recommendations:      Asked to re-evaluate patient due to increased shortness of breath and difficulty swallowing today however seems improved on my evaluation.   Seen on 1L NC with adequate oxygen saturations. Maintain saturations greater than or equal to 88%.   Home O2 eval prior to discharge.  Complete IV Solu-Medrol today.  Start prednisone 40 mg p.o. daily tomorrow.  Taper by 10 mg every 3 days until completion.  Continue Pulmicort/Perforomist every 12 hours and Xopenex/Atrovent nebs 3 times daily while inpatient  Follow-up CXR today- pending  Trial Lasix 60 mg IV once per nephrology as well given increased SOB.   Continue HD on a day-to-day basis per nephrology.  Suspect her worsening symptoms are again related to her underlying uremia and in turn her swallowing is impaired as well.  Recommend VBS vs esophogram when able to adequately protect her airway during testing.        Chief Complaint:    \"I want a Pepsi.\"    Subjective:    Patient was seen and examined today.  This morning it was reported that patient had increased lethargy and worsening shortness of breath prompting primary team to asked pulmonary to reevaluate.  On my evaluation this afternoon however patient is sitting up in chair eating lunch surrounded by family and no acute distress.  Patient denies cough or sputum production.  No SOB at present but he does complain of nasal congestion.  She also complains that the consistency of the liquids and food she is being made to who eat and drink are upsetting her stomach and causing her to vomit. Per RN, patient had vomiting episodes " "yesterday as well even while on thin liquids.     Objective:    Vitals: Blood pressure (!) 177/161, pulse 91, temperature 98.7 °F (37.1 °C), temperature source Temporal, resp. rate (!) 27, height 5' (1.524 m), weight 42.3 kg (93 lb 4.1 oz), SpO2 94%.1L NC,Body mass index is 18.21 kg/m².      Intake/Output Summary (Last 24 hours) at 3/29/2024 1202  Last data filed at 3/29/2024 0828  Gross per 24 hour   Intake 190 ml   Output 200 ml   Net -10 ml       Invasive Devices       Central Venous Catheter Line  Duration             CVC Central Lines 03/26/24 Double 3 days              Peripheral Intravenous Line  Duration             Peripheral IV 03/25/24 Dorsal (posterior);Left Forearm 3 days              Hemodialysis Catheter  Duration             HD Temporary Double Catheter 3 days              Drain  Duration             Urethral Catheter 16 Fr. 5 days                    Physical Exam:     Physical Exam  Vitals and nursing note reviewed.   Constitutional:       General: She is not in acute distress.     Appearance: Normal appearance.   HENT:      Head: Normocephalic and atraumatic.      Mouth/Throat:      Mouth: Mucous membranes are moist.      Pharynx: Oropharynx is clear.   Cardiovascular:      Rate and Rhythm: Normal rate and regular rhythm.      Heart sounds: No murmur heard.  Pulmonary:      Breath sounds: Wheezing (much improved compared to earlier in the admission) present.   Musculoskeletal:      Cervical back: Normal range of motion.   Skin:     General: Skin is warm and dry.   Neurological:      General: No focal deficit present.      Mental Status: She is alert. Mental status is at baseline.   Psychiatric:         Mood and Affect: Mood normal.         Behavior: Behavior normal.         Labs: I have personally reviewed pertinent lab results., ABG: No results found for: \"PHART\", \"IQM0WGY\", \"PO2ART\", \"SWS8MXP\", \"F7MSKUXZ\", \"BEART\", \"SOURCE\", BNP: No results found for: \"BNP\", CBC:   Lab Results   Component Value " "Date    WBC 5.58 03/29/2024    HGB 10.8 (L) 03/29/2024    HCT 32.5 (L) 03/29/2024    MCV 88 03/29/2024     (L) 03/29/2024    RBC 3.68 (L) 03/29/2024    MCH 29.3 03/29/2024    MCHC 33.2 03/29/2024    RDW 14.7 03/29/2024    MPV 11.4 03/29/2024    NRBC 0 03/29/2024   , CMP:   Lab Results   Component Value Date    SODIUM 136 03/29/2024    K 4.5 03/29/2024    CL 98 03/29/2024    CO2 28 03/29/2024    BUN 56 (H) 03/29/2024    CREATININE 2.89 (H) 03/29/2024    CALCIUM 8.7 03/29/2024    EGFR 14 03/29/2024   , PT/INR: No results found for: \"PT\", \"INR\", Troponin: No results found for: \"TROPONINI\"    Imaging and other studies: I have personally reviewed pertinent reports.   and I have personally reviewed pertinent films in PACS    "

## 2024-03-29 NOTE — RESPIRATORY THERAPY NOTE
"Pt refusing breathing treatments at this time. Pt stated \"No. Please no respiratory treatments. I don't want it\". Told Pt if they change their mind to let us know.  RN aware.  Paul Sebastian RRT. 03/29/24  7:50 PM    "

## 2024-03-29 NOTE — PLAN OF CARE
Problem: OCCUPATIONAL THERAPY ADULT  Goal: Performs self-care activities at highest level of function for planned discharge setting.  See evaluation for individualized goals.  Description: Treatment Interventions: ADL retraining, Functional transfer training, UE strengthening/ROM, Endurance training, Patient/family training, Equipment evaluation/education, Compensatory technique education, Energy conservation, Activityengagement       See flowsheet documentation for full assessment, interventions and recommendations.   Note: Limitation: Decreased ADL status, Decreased UE ROM, Decreased UE strength, Decreased Safe judgement during ADL, Decreased endurance, Decreased self-care trans, Decreased high-level ADLs     Assessment: Patient participated in Skilled OT session this date with interventions consisting of Energy Conservation techniques, safety awareness and fall prevention techniques, therapeutic exercise to: increase functional use of BUEs, increase BUE muscle strength , and  therapeutic activities to: increase activity tolerance . Patient agreeable to OT treatment session, upon arrival patient was found supine in bed.   Patient requiring verbal cues for safety, verbal cues for correct technique, and frequent rest periods. Patient continues to be functioning below baseline level, occupational performance remains limited secondary to factors listed above and increased risk for falls and injury.   From OT standpoint, recommendation at time of d/c would with max intensity OT resources.   Patient to benefit from continued Occupational Therapy treatment while in the hospital to address deficits as defined above and maximize level of functional independence with ADLs and functional mobility.     Rehab Resource Intensity Level, OT: I (Maximum Resource Intensity)     Mishel Yepez MS, OTR/L

## 2024-03-29 NOTE — ASSESSMENT & PLAN NOTE
SEEMA superimposed on CKD stage IIIa, present on admission, severely worsening renal function with oliguria  Patient initially admitted with acute respiratory failure felt to be secondary to COPD as well as possible CHF exacerbation component, received diuresis - with worsening renal function further diuretics now discontinued  S/p hemodialysis catheter placement 3/26/24, HD initiated 3/26, additional treatment 3/27, for 3 hr treatment today, 3/29  Urine output remains minimal.  Continue to monitor renal function and urine output

## 2024-03-29 NOTE — PROGRESS NOTES
[facility]  Progress Note  Name: Laya Brooks I  MRN: [id number]  Unit/Bed#:  I Date of Admission: 3/23/2024   Date of Service: 3/29/2024 I Hospital Day: 6    Assessment/Plan   * Chronic obstructive pulmonary disease with acute exacerbation (HCC)  Assessment & Plan  This is an 79-year-old female patient with recently diagnosed COPD, history of CAD, CKD and hypertension who initially presented to the ED with shortness of breath, was noted to have significant audible wheezing with shortness of breath and had to be placed on BiPAP  Recent PFT showed moderate obstructive deficit, moderate DLCO reduction and evidence of hyperinflation and air trapping on lung volumes without a significant bronchodilator response. She was placed on LAMA/LABA inhalers  Per family, patient was using bronchodilators without significant improvement  In the ER she was placed on BiPAP - now weaned to 3L O2  Patient received initial dose of IV Solu-Medrol 125 mg in the ED and has been maintained on a taper - will proceed with 40 mg every 12 hours today  ABG without evidence of CO2 retention  Continue scheduled DuoNebs   Added IV ceftriaxone for COPD exacerbation 3/25/24  Treat SEEMA/metabolic acidosis with HD - contributing to patient's respiratory status      Dysphagia  Assessment & Plan  Initially felt to be due to encephalopathy, however failed to improve despite improvement in mental status  Speech therapy and pulmonology following  Patient is with poor oral intake, if she is unable to tolerate oral intake we will need to consider an alternative nutritional route on a short-term basis  Will likely need to further discuss goals of care with the patient and her family    Elevated d-dimer  Assessment & Plan  Most likely due to acute illness  No significant clinical suspicion for acute PE with marked clinical improvement with HD  LE venous duplex negative for DVT      Toxic metabolic encephalopathy  Assessment &  Plan  Worsening mental status in the setting of metabolic disturbances including severe SEEMA, respiratory failure; toxic component of patient having received benzodiazepines and opiate medications  Improved  Monitor mental status, treat underlying conditions  CT head no acute findings    Acute respiratory failure with hypoxia (HCC)  Assessment & Plan  Appears multifactorial, secondary to SEEMA/metabolic acidosis with compensatory tachypnea, COPD exacerbation, possibly acute diastolic CHF component  Was initially requiring continuous BiPAP therapy -now transitioned to room air  Treating underlying conditions as above    Acute diastolic congestive heart failure (HCC)  Assessment & Plan  Wt Readings from Last 3 Encounters:   03/29/24 42.3 kg (93 lb 4.1 oz)   03/18/24 43.1 kg (95 lb)   02/22/24 40.8 kg (90 lb)     Possible acute diastolic CHF with mild tricuspid regurgitation, worsening renal and respiratory failure  Patient initiated on dialysis 3/26 2-hours treatment, 2.5-hour treatment; additional treatment today 3/29/24  Monitor daily weights, I's and O's      SEEMA (acute kidney injury) (HCC)  Assessment & Plan  SEEMA superimposed on CKD stage IIIa, present on admission, severely worsening renal function with oliguria  Patient initially admitted with acute respiratory failure felt to be secondary to COPD as well as possible CHF exacerbation component, received diuresis - with worsening renal function further diuretics now discontinued  S/p hemodialysis catheter placement 3/26/24, HD initiated 3/26, additional treatment 3/27, for 3 hr treatment today, 3/29  Urine output remains minimal.  Continue to monitor renal function and urine output      CAD (coronary artery disease)  Assessment & Plan  Patient with history of STEMI in 2008 status post RCA stenting  Resumed Plavix and statin     Hypertension  Assessment & Plan  On Norvasc 5 mg daily and lisinopril   Blood pressure significantly elevated, resumed Norvasc  Continue IV  Hydralazine as needed for now    Stage 3a chronic kidney disease (HCC)  Assessment & Plan  Lab Results   Component Value Date    EGFR 14 03/29/2024    EGFR 15 03/28/2024    EGFR 15 03/28/2024    CREATININE 2.89 (H) 03/29/2024    CREATININE 2.83 (H) 03/28/2024    CREATININE 2.79 (H) 03/28/2024   Patient with severe SEEMA, s/p HD 3/26. 3/27  Continue with poor urine output  Nephrology following    CVA (cerebral vascular accident) (HCC)  Assessment & Plan  History noted  Continue with Plavix and statin  CT head no acute findings    Hypercholesterolemia  Assessment & Plan  Resume Lipitor with patient tolerating PO               VTE Pharmacologic Prophylaxis:    heparin SQ    Mobility:   Basic Mobility Inpatient Raw Score: 7  JH-HLM Goal: 2: Bed activities/Dependent transfer  JH-HLM Achieved: 4: Move to chair/commode  JH-HLM Goal NOT achieved. Continue with multidisciplinary rounding and encourage appropriate mobility to improve upon JH-HLM goals.    Patient Centered Rounds: I performed bedside rounds with nursing staff today.   Discussions with Specialists or Other Care Team Provider: Nephrology    Education and Discussions with Family / Patient:  will d/w family.     Total Time Spent on Date of Encounter in care of patient: 50 mins. This time was spent on one or more of the following: performing physical exam; counseling and coordination of care; obtaining or reviewing history; documenting in the medical record; reviewing/ordering tests, medications or procedures; communicating with other healthcare professionals and discussing with patient's family/caregivers.    Current Length of Stay: 6 day(s)  Current Patient Status: Inpatient   Certification Statement: The patient will continue to require additional inpatient hospital stay due to IV Rx, close monitoring  Discharge Plan: Anticipate discharge in >72 hrs to rehab facility.    Code Status: Level 1 - Full Code    Subjective:   Patient c/o generalized weakness and SOB.      Objective:     Vitals:   Temp (24hrs), Av.2 °F (37.3 °C), Min:98.7 °F (37.1 °C), Max:99.6 °F (37.6 °C)    Temp:  [98.7 °F (37.1 °C)-99.6 °F (37.6 °C)] 98.7 °F (37.1 °C)  HR:  [] 91  Resp:  [13-30] 27  BP: (102-205)/() 177/161  SpO2:  [86 %-96 %] 94 %  Body mass index is 18.21 kg/m².     Input and Output Summary (last 24 hours):     Intake/Output Summary (Last 24 hours) at 3/29/2024 1321  Last data filed at 3/29/2024 0828  Gross per 24 hour   Intake 70 ml   Output 200 ml   Net -130 ml       Physical Exam:   Physical Exam  Constitutional:       Appearance: She is ill-appearing.   HENT:      Head: Normocephalic and atraumatic.      Nose: No congestion.   Eyes:      Conjunctiva/sclera: Conjunctivae normal.   Cardiovascular:      Rate and Rhythm: Normal rate and regular rhythm.      Heart sounds: No murmur heard.  Pulmonary:      Effort: Respiratory distress present.      Breath sounds: Wheezing present.   Abdominal:      General: There is no distension.      Tenderness: There is no abdominal tenderness. There is no guarding.   Musculoskeletal:      Right lower leg: No edema.   Neurological:      Comments: Appears tired          Additional Data:     Labs:  Results from last 7 days   Lab Units 24  0435   WBC Thousand/uL 5.58   HEMOGLOBIN g/dL 10.8*   HEMATOCRIT % 32.5*   PLATELETS Thousands/uL 102*   NEUTROS PCT % 89*   LYMPHS PCT % 4*   MONOS PCT % 6   EOS PCT % 0     Results from last 7 days   Lab Units 24  0435 24  0548 24  0517   SODIUM mmol/L 136   < > 132*   POTASSIUM mmol/L 4.5   < > 4.5   CHLORIDE mmol/L 98   < > 97   CO2 mmol/L 28   < > 22   BUN mg/dL 56*   < > 70*   CREATININE mg/dL 2.89*   < > 4.47*   ANION GAP mmol/L 10   < > 13   CALCIUM mg/dL 8.7   < > 8.4   ALBUMIN g/dL  --   --  3.4*   TOTAL BILIRUBIN mg/dL  --   --  0.41   ALK PHOS U/L  --   --  42   ALT U/L  --   --  11   AST U/L  --   --  14   GLUCOSE RANDOM mg/dL 170*   < > 142*    < > = values in this  interval not displayed.     Results from last 7 days   Lab Units 03/23/24  2209   INR  1.04             Results from last 7 days   Lab Units 03/25/24  1029 03/25/24  0504 03/23/24  2209   LACTIC ACID mmol/L 0.4*  --  1.5   PROCALCITONIN ng/ml  --  0.19 0.12       Lines/Drains:  Invasive Devices       Central Venous Catheter Line  Duration             CVC Central Lines 03/26/24 Double 3 days              Peripheral Intravenous Line  Duration             Peripheral IV 03/25/24 Dorsal (posterior);Left Forearm 3 days              Hemodialysis Catheter  Duration             HD Temporary Double Catheter 3 days              Drain  Duration             Urethral Catheter 16 Fr. 5 days                  Urinary Catheter:  Goal for removal: Voiding trial when ambulation improves         Central Line:  Goal for removal: HD catheter             Imaging: no new    Recent Cultures (last 7 days):         Last 24 Hours Medication List:   Current Facility-Administered Medications   Medication Dose Route Frequency Provider Last Rate    acetaminophen  650 mg Oral Q4H PRN Eleanor Rodriguez MD      albuterol  2.5 mg Nebulization Q4H PRN Eleanor Rodriguez MD      amLODIPine  5 mg Oral Daily Malena Hearn MD      atorvastatin  40 mg Oral Daily With Dinner Eleanor Rodriguez MD      budesonide  0.5 mg Nebulization Q12H Sincere Goldman PA-C      cefTRIAXone  1,000 mg Intravenous Q24H Malena Hearn MD 1,000 mg (03/28/24 1337)    clopidogrel  75 mg Oral Daily Eleanor Rodriguez MD      formoterol  20 mcg Nebulization Q12H Sincere Goldman PA-C      furosemide  60 mg Intravenous Once Kate Weston, DO      heparin (porcine)  5,000 Units Subcutaneous Q8H [facility] Eleanor Rodriguez MD      hydrALAZINE  10 mg Intravenous Q6H [facility] Darryl Powell MD      ipratropium  0.5 mg Nebulization TID Eleanor Rodriguez MD      labetalol  10 mg Intravenous Q6H PRN George Barcenas DO      levalbuterol  1.25 mg Nebulization TID Eleanor TAI  MD Michael      LORazepam  1 mg Intravenous Q6H PRN Darryl Powell MD      ondansetron  4 mg Intravenous Q6H PRN Eleanor Rodriguez MD      [START ON 3/30/2024] predniSONE  40 mg Oral Daily Sincere Goldman PA-C      sodium chloride  1 spray Each Nare Q1H PRN Malena Hearn MD          Today, Patient Was Seen By: Malena Hearn MD    **Please Note: This note may have been constructed using a voice recognition system.**

## 2024-03-29 NOTE — PLAN OF CARE
Target UF Goal  0.5- 1  L as tolerated. Patient dialyzing for 3 hours on 3 K bath for serum K of  4.5  per protocol. Treatment plan reviewed with Nephrology.       Post-Dialysis RN Treatment Note    Blood Pressure:  Pre 189/74 mm/Hg  Post 191/84 mmHg   EDW  TBD    Weight:  Pre 43.6 kg   Post 42.7 kg   Mode of weight measurement: Bed Scale   Volume Removed  250 ml    Treatment duration 1.5 hours   NS given  Yes, 250ml NSS bolus given per Dr Weston.    Treatment shortened? Yes, describe: tachycardia   Medications given during Rx None Reported   Estimated Kt/V  Not Applicable   Access type: Temporary HD catheter   Access Issues: Yes, describe: [place] cath caps removed, catheter reversed.      Report called to primary nurse   Yes, Aminah Zheng RN              Problem: METABOLIC, FLUID AND ELECTROLYTES - ADULT  Goal: Electrolytes maintained within normal limits  Description: INTERVENTIONS:  - Monitor labs and assess patient for signs and symptoms of electrolyte imbalances  - Administer electrolyte replacement as ordered  - Monitor response to electrolyte replacements, including repeat lab results as appropriate  - Instruct patient on fluid and nutrition as appropriate  Outcome: Progressing  Goal: Fluid balance maintained  Description: INTERVENTIONS:  - Monitor labs   - Monitor I/O and WT  - Instruct patient on fluid and nutrition as appropriate  - Assess for signs & symptoms of volume excess or deficit  Outcome: Progressing

## 2024-03-29 NOTE — PLAN OF CARE
Problem: PAIN - ADULT  Goal: Verbalizes/displays adequate comfort level or baseline comfort level  Description: Interventions:  - Encourage patient to monitor pain and request assistance  - Assess pain using appropriate pain scale  - Administer analgesics based on type and severity of pain and evaluate response  - Implement non-pharmacological measures as appropriate and evaluate response  - Consider cultural and social influences on pain and pain management  - Notify physician/advanced practitioner if interventions unsuccessful or patient reports new pain  Outcome: Progressing     Problem: SAFETY ADULT  Goal: Patient will remain free of falls  Description: INTERVENTIONS:  - Educate patient/family on patient safety including physical limitations  - Instruct patient to call for assistance with activity   - Consult OT/PT to assist with strengthening/mobility   - Keep Call bell within reach  - Keep bed low and locked with side rails adjusted as appropriate  - Keep care items and personal belongings within reach  - Initiate and maintain comfort rounds  - Make Fall Risk Sign visible to staff  - Offer Toileting every 2 Hours, in advance of need  - Initiate/Maintain fall alarm  - Obtain necessary fall risk management equipment: alarm, nonskid footwear, yellow wristband  - Apply yellow socks and bracelet for high fall risk patients  - Consider moving patient to room near nurses station  Outcome: Progressing  Goal: Maintain or return to baseline ADL function  Description: INTERVENTIONS:  -  Assess patient's ability to carry out ADLs; assess patient's baseline for ADL function and identify physical deficits which impact ability to perform ADLs (bathing, care of mouth/teeth, toileting, grooming, dressing, etc.)  - Assess/evaluate cause of self-care deficits   - Assess range of motion  - Assess patient's mobility; develop plan if impaired  - Assess patient's need for assistive devices and provide as appropriate  - Encourage  maximum independence but intervene and supervise when necessary  - Involve family in performance of ADLs  - Assess for home care needs following discharge   - Consider OT consult to assist with ADL evaluation and planning for discharge  - Provide patient education as appropriate  Outcome: Progressing  Goal: Maintains/Returns to pre admission functional level  Description: INTERVENTIONS:  - Perform AM-PAC 6 Click Basic Mobility/ Daily Activity assessment daily.  - Set and communicate daily mobility goal to care team and patient/family/caregiver.   - Collaborate with rehabilitation services on mobility goals if consulted  - Perform Range of Motion 4 times a day.  - Reposition patient every 2 hours.  - Dangle patient 3 times a day  - Stand patient 3 times a day  - Ambulate patient 3 times a day  - Out of bed to chair 3 times a day   - Out of bed for meals 3 times a day  - Out of bed for toileting  - Record patient progress and toleration of activity level   Outcome: Progressing     Problem: DISCHARGE PLANNING  Goal: Discharge to home or other facility with appropriate resources  Description: INTERVENTIONS:  - Identify barriers to discharge w/patient and caregiver  - Arrange for needed discharge resources and transportation as appropriate  - Identify discharge learning needs (meds, wound care, etc.)  - Arrange for interpretive services to assist at discharge as needed  - Refer to Case Management Department for coordinating discharge planning if the patient needs post-hospital services based on physician/advanced practitioner order or complex needs related to functional status, cognitive ability, or social support system  Outcome: Progressing     Problem: Knowledge Deficit  Goal: Patient/family/caregiver demonstrates understanding of disease process, treatment plan, medications, and discharge instructions  Description: Complete learning assessment and assess knowledge base.  Interventions:  - Provide teaching at level of  understanding  - Provide teaching via preferred learning methods  Outcome: Progressing     Problem: INFECTION - ADULT  Goal: Absence or prevention of progression during hospitalization  Description: INTERVENTIONS:  - Assess and monitor for signs and symptoms of infection  - Monitor lab/diagnostic results  - Monitor all insertion sites, i.e. indwelling lines, tubes, and drains  - Monitor endotracheal if appropriate and nasal secretions for changes in amount and color  - [place] appropriate cooling/warming therapies per order  - Administer medications as ordered  - Instruct and encourage patient and family to use good hand hygiene technique  - Identify and instruct in appropriate isolation precautions for identified infection/condition  Outcome: Progressing     Problem: RESPIRATORY - ADULT  Goal: Achieves optimal ventilation and oxygenation  Description: INTERVENTIONS:  - Assess for changes in respiratory status  - Assess for changes in mentation and behavior  - Position to facilitate oxygenation and minimize respiratory effort  - Oxygen administered by appropriate delivery if ordered  - Initiate smoking cessation education as indicated  - Encourage broncho-pulmonary hygiene including cough, deep breathe, Incentive Spirometry  - Assess the need for suctioning and aspirate as needed  - Assess and instruct to report SOB or any respiratory difficulty  - Respiratory Therapy support as indicated  Outcome: Progressing     Problem: GENITOURINARY - ADULT  Goal: Maintains or returns to baseline urinary function  Description: INTERVENTIONS:  - Assess urinary function  - Encourage oral fluids to ensure adequate hydration if ordered  - Administer IV fluids as ordered to ensure adequate hydration  - Administer ordered medications as needed  - Offer frequent toileting  - Follow urinary retention protocol if ordered  Outcome: Progressing  Goal: Absence of urinary retention  Description: INTERVENTIONS:  - Assess patient’s ability  to void and empty bladder  - Monitor I/O  - Bladder scan as needed  - Discuss with physician/AP medications to alleviate retention as needed  - Discuss catheterization for long term situations as appropriate  Outcome: Progressing     Problem: METABOLIC, FLUID AND ELECTROLYTES - ADULT  Goal: Electrolytes maintained within normal limits  Description: INTERVENTIONS:  - Monitor labs and assess patient for signs and symptoms of electrolyte imbalances  - Administer electrolyte replacement as ordered  - Monitor response to electrolyte replacements, including repeat lab results as appropriate  - Instruct patient on fluid and nutrition as appropriate  Outcome: Progressing  Goal: Fluid balance maintained  Description: INTERVENTIONS:  - Monitor labs   - Monitor I/O and WT  - Instruct patient on fluid and nutrition as appropriate  - Assess for signs & symptoms of volume excess or deficit  Outcome: Progressing     Problem: CARDIOVASCULAR - ADULT  Goal: Maintains optimal cardiac output and hemodynamic stability  Description: INTERVENTIONS:  - Monitor I/O, vital signs and rhythm  - Monitor for S/S and trends of decreased cardiac output  - Administer and titrate ordered vasoactive medications to optimize hemodynamic stability  - Assess quality of pulses, skin color and temperature  - Assess for signs of decreased coronary artery perfusion  - Instruct patient to report change in severity of symptoms  Outcome: Progressing  Goal: Absence of cardiac dysrhythmias or at baseline rhythm  Description: INTERVENTIONS:  - Continuous cardiac monitoring, vital signs, obtain 12 lead EKG if ordered  - Administer antiarrhythmic and heart rate control medications as ordered  - Monitor electrolytes and administer replacement therapy as ordered  Outcome: Progressing     Problem: Prexisting or High Potential for Compromised Skin Integrity  Goal: Skin integrity is maintained or improved  Description: INTERVENTIONS:  - Identify patients at risk for skin  breakdown  - Assess and monitor skin integrity  - Assess and monitor nutrition and hydration status  - Monitor labs   - Assess for incontinence   - Turn and reposition patient  - Assist with mobility/ambulation  - Relieve pressure over bony prominences  - Avoid friction and shearing  - Provide appropriate hygiene as needed including keeping skin clean and dry  - Evaluate need for skin moisturizer/barrier cream  - Collaborate with interdisciplinary team   - Patient/family teaching  - Consider wound care consult   Outcome: Progressing     Problem: Nutrition/Hydration-ADULT  Goal: Nutrient/Hydration intake appropriate for improving, restoring or maintaining nutritional needs  Description: Monitor and assess patient's nutrition/hydration status for malnutrition. Collaborate with interdisciplinary team and initiate plan and interventions as ordered.  Monitor patient's weight and dietary intake as ordered or per policy. Utilize nutrition screening tool and intervene as necessary. Determine patient's food preferences and provide high-protein, high-caloric foods as appropriate.     INTERVENTIONS:  - Monitor oral intake, urinary output, labs, and treatment plans  - Assess nutrition and hydration status and recommend course of action  - Evaluate amount of meals eaten  - Assist patient with eating if necessary   - Allow adequate time for meals  - Recommend/ encourage appropriate diets, oral nutritional supplements, and vitamin/mineral supplements  - Order, calculate, and assess calorie counts as needed  - Recommend, monitor, and adjust tube feedings and TPN/PPN based on assessed needs  - Assess need for intravenous fluids  - Provide specific nutrition/hydration education as appropriate  - Include patient/family/caregiver in decisions related to nutrition  Outcome: Progressing

## 2024-03-29 NOTE — ASSESSMENT & PLAN NOTE
Wt Readings from Last 3 Encounters:   03/29/24 42.3 kg (93 lb 4.1 oz)   03/18/24 43.1 kg (95 lb)   02/22/24 40.8 kg (90 lb)     Possible acute diastolic CHF with mild tricuspid regurgitation, worsening renal and respiratory failure  Patient initiated on dialysis 3/26 2-hours treatment, 2.5-hour treatment; additional treatment today 3/29/24  Monitor daily weights, I's and O's

## 2024-03-29 NOTE — PHYSICAL THERAPY NOTE
PHYSICAL THERAPY TREATMENT NOTE  NAME:  Laya Brooks  DATE: 03/29/24    Length Of Stay: 6  Performed at least 2 patient identifiers during session: Name and Birthday  Treatment time: 5576-8988  Treatment length: 25 min       03/29/24 1016   Note Type   Note Type Treatment   Pain Assessment   Pain Assessment Tool 0-10   Pain Score No Pain   Restrictions/Precautions   Other Precautions Cognitive;Chair Alarm;Bed Alarm;Multiple lines;Telemetry;O2;Fall Risk  (RA start satting 86-88% -> 1 lpm at end 92-93%)   General   Chart Reviewed Yes   Response to Previous Treatment Patient reporting fatigue but able to participate.   Cognition   Overall Cognitive Status Impaired   Orientation Level Oriented to person;Oriented to place;Disoriented to time;Disoriented to situation   Following Commands Follows one step commands with increased time or repetition   Bed Mobility   Supine to Sit 2  Maximal assistance   Additional items Assist x 2;Increased time required;Verbal cues;LE management  (trunk management)   Additional Comments HOB elevated, bedrails, max VC for hand placement and technique. O2 donned prior to mobility as pt satting 86-88% on RA, O2 donned at 1 lpm satting 92-94% throughout session. Max x 2 for trunk and LE management. Pt with eyes closed, increased lethargy through most of session. VC for increased UE support as pt with increased forward trunk flexion, decreased trunk stability, primarily min A to maintain seated EOB   Transfers   Sit to Stand 3  Moderate assistance   Additional items Assist x 2;Increased time required;Verbal cues   Stand to Sit 3  Moderate assistance   Additional items Assist x 2;Increased time required;Verbal cues   Stand pivot 3  Moderate assistance   Additional items Assist x 2;Increased time required;Verbal cues   Additional Comments RW for transfers, mod x 2 for STS for force production with VC and tactile cues for hand placement and technique. Deferred gait this session due to pt with  increased fatigue, safety concerns   Balance   Static Sitting Fair -   Dynamic Sitting Poor +   Static Standing Poor   Dynamic Standing Poor -   Endurance Deficit   Endurance Deficit Yes   Endurance Deficit Description fatigue, lethargy   Activity Tolerance   Activity Tolerance Patient limited by fatigue   Exercises   Quad Sets Sitting;10 reps;AROM;Bilateral   Hip Adduction Sitting;10 reps;AROM;Bilateral   Ankle Pumps Sitting;10 reps;AROM;Bilateral   Assessment   Prognosis Guarded   Problem List Decreased strength;Decreased endurance;Impaired balance;Decreased mobility;Decreased cognition;Impaired judgement;Decreased safety awareness   Barriers to Discharge Inaccessible home environment;Decreased caregiver support   Barriers to Discharge Comments pt with increased fatigue, requires assistance for mobility   Goals   PT Treatment Day 2   Plan   Treatment/Interventions Functional transfer training;LE strengthening/ROM;Therapeutic exercise;Endurance training;Patient/family training;Equipment eval/education;Bed mobility;Gait training;Compensatory technique education;Spoke to nursing;OT   Progress No functional improvements   PT Frequency 3-5x/wk   Discharge Recommendation   Rehab Resource Intensity Level, PT I (Maximum Resource Intensity)   AM-PAC Basic Mobility Inpatient   Turning in Flat Bed Without Bedrails 2   Lying on Back to Sitting on Edge of Flat Bed Without Bedrails 1   Moving Bed to Chair 1   Standing Up From Chair Using Arms 1   Walk in Room 1   Climb 3-5 Stairs With Railing 1   Basic Mobility Inpatient Raw Score 7   Turning Head Towards Sound 3   Follow Simple Instructions 2   Low Function Basic Mobility Raw Score  12   Low Function Basic Mobility Standardized Score  18.33   [facility] Highest Level Of Mobility   -HLM Goal 2: Bed activities/Dependent transfer   -HLM Achieved 4: Move to chair/commode   Education   Education Provided Mobility training;Home exercise program;Assistive device   Patient  Reinforcement needed   End of Consult   Patient Position at End of Consult Bedside chair;Bed/Chair alarm activated;All needs within reach     The patient's AM-PAC Basic Mobility Inpatient Short Form Raw Score is 7. A Raw score of less than or equal to 16 suggests the patient may benefit from discharge to post-acute rehabilitation services. Please also refer to the recommendation of the Physical Therapist for safe discharge planning.    Assessment: Pt seen for PT treatment session this date with interventions consisting of Therapeutic exercise consisting of: AROM 10 reps B LE in sitting position and therapeutic activity consisting of training: supine<>sit transfers, sit<>stand transfers, and stand pivot transfers towards L direction. Pt agreeable to PT treatment session upon arrival, pt found supine in bed w/ HOB elevated, in no apparent distress. In comparison to previous session, pt with no improvements as evidenced by pt requires increased assistance for mobility, increased fatigue this session but participatory . Post session: pt returned back to recliner, chair alarm engaged, all needs in reach, and RN notified of session findings/recommendations Continue to recommend  Level I Resource Intensity  at time of d/c in order to maximize pt's functional independence and safety w/ mobility. Pt continues to be functioning below baseline level, and remains limited 2* factors listed above and including decreased strength, balance, mobility, and activity tolerance. PT will continue to see pt while here in order to address the deficits listed above and provide interventions consistent w/ POC in effort to achieve STGs.     Mishel King, PT,DPT

## 2024-03-29 NOTE — OCCUPATIONAL THERAPY NOTE
Occupational Therapy Treatment Note      Laya Brooks    3/29/2024    Principal Problem:    Chronic obstructive pulmonary disease with acute exacerbation (HCC)  Active Problems:    Hypercholesterolemia    CVA (cerebral vascular accident) (HCC)    Stage 3a chronic kidney disease (HCC)    Hypertension    CAD (coronary artery disease)    SEEMA (acute kidney injury) (HCC)    Acute diastolic congestive heart failure (HCC)    Hyponatremia    Acute respiratory failure with hypoxia (HCC)    Toxic metabolic encephalopathy    Elevated d-dimer      Past Medical History:   Diagnosis Date    CAD (coronary artery disease)     Cardiac disease     Hypercholesteremia     Hyperlipidemia     Hypertension     Hypertension     Renal disorder        Past Surgical History:   Procedure Laterality Date    CORONARY ANGIOPLASTY WITH STENT PLACEMENT      WRIST SURGERY          03/29/24 1040   OT Last Visit   OT Visit Date 03/29/24   Note Type   Note Type Treatment  (Pt seen as a co-treatment with PT due to the patient's co-morbidities, clinically unstable presentation, and present impairments which are a regression from the patient's baseline.)   Pain Assessment   Pain Assessment Tool 0-10   Pain Score No Pain   Restrictions/Precautions   Weight Bearing Precautions Per Order No   Other Precautions Cognitive;Chair Alarm;Bed Alarm;Multiple lines;Telemetry;O2;Fall Risk  (RA start satting 86-88%; pt placed back on 1L 92-93%)   Bed Mobility   Supine to Sit 2  Maximal assistance   Additional items Assist x 2;Increased time required;Verbal cues;LE management  (trunk management)   Sit to Supine   (DNT; pt seated in recliner upon conclusion of session)   Additional Comments extensive VC for hand placement and proper body mechanics to achieve EOB sitting. O2 satting 86-88% on RA, 1L O2 donned, sats 92-94% throughout session. Max A x2 for trunk and LE management. Min A x1 for EOB sitting for postural and trunk control   Transfers   Sit to Stand 3   "Moderate assistance   Additional items Assist x 2;Increased time required;Verbal cues   Stand to Sit 3  Moderate assistance   Additional items Assist x 2;Increased time required;Verbal cues   Stand pivot 3  Moderate assistance   Additional items Assist x 2;Increased time required;Verbal cues   Additional Comments RW used; VC and tactile cues for safe hand placement and proper body mechanics   Therapeutic Exercise - ROM   UE-ROM Yes   ROM- Right Upper Extremities   R Shoulder PROM;Flexion;Extension  (& protraction/retraction)   R Elbow AROM;Elbow flexion;Elbow extension   R Wrist AROM;Wrist flexion;Wrist extension   R Weight/Reps/Sets 1x10 reps d/t significant fatigue   RUE ROM Comment Verbal and visual cues for exercise technique and sequence   ROM - Left Upper Extremities    L Shoulder PROM;Flexion;Extension  (& protraction/retraction)   L Elbow AROM;Elbow flexion;Elbow extension   L Wrist AROM;Wrist flexion;Wrist extension   L Weight/Reps/Sets 1x10 reps d/t significant fatigue   LUE ROM Comment Verbal and visual cues for exercise technique and sequence   Subjective   Subjective \"I don't understand what happened to me over night\"   Cognition   Overall Cognitive Status Impaired   Arousal/Participation Lethargic   Attention Attends with cues to redirect   Orientation Level Oriented to person;Oriented to place;Disoriented to time;Disoriented to situation   Memory Decreased long term memory;Decreased short term memory;Decreased recall of recent events   Following Commands Follows one step commands with increased time or repetition   Comments Pt required increased encouragement and motivation for participation   Activity Tolerance   Activity Tolerance Patient limited by fatigue   Medical Staff Made Aware Yes, RN aware of session outcomes   Assessment   Assessment Patient participated in Skilled OT session this date with interventions consisting of Energy Conservation techniques, safety awareness and fall prevention " techniques, therapeutic exercise to: increase functional use of BUEs, increase BUE muscle strength , and  therapeutic activities to: increase activity tolerance . Patient agreeable to OT treatment session, upon arrival patient was found supine in bed.   Patient requiring verbal cues for safety, verbal cues for correct technique, and frequent rest periods. Patient continues to be functioning below baseline level, occupational performance remains limited secondary to factors listed above and increased risk for falls and injury.   From OT standpoint, recommendation at time of d/c would with max intensity OT resources.   Patient to benefit from continued Occupational Therapy treatment while in the hospital to address deficits as defined above and maximize level of functional independence with ADLs and functional mobility.   Plan   Treatment Interventions ADL retraining;Functional transfer training;UE strengthening/ROM;Endurance training;Patient/family training;Equipment evaluation/education;Compensatory technique education;Energy conservation   Goal Expiration Date 04/10/24   OT Treatment Day 1   OT Frequency 3-5x/wk   Discharge Recommendation   Rehab Resource Intensity Level, OT I (Maximum Resource Intensity)   Additional Comments  The patient's raw score on the AM-PAC Daily Activity inpatient short form is 14, standardized score is 33.39, less than 39.4. Patients at this level are likely to benefit from discharge with max intensity OT resources. Please refer to the recommendation of the Occupational Therapist for safe discharge planning.   AM-PAC Daily Activity Inpatient   Lower Body Dressing 2   Bathing 2   Toileting 2   Upper Body Dressing 2   Grooming 3   Eating 3   Daily Activity Raw Score 14   Daily Activity Standardized Score (Calc for Raw Score >=11) 33.39   AM-PAC Applied Cognition Inpatient   Following a Speech/Presentation 2   Understanding Ordinary Conversation 3   Taking Medications 2   Remembering Where  Things Are Placed or Put Away 2   Remembering List of 4-5 Errands 1   Taking Care of Complicated Tasks 1   Applied Cognition Raw Score 11   Applied Cognition Standardized Score 27.03     All needs met, call bell within reach  Mishel Yepez MS, OTR/L

## 2024-03-29 NOTE — ASSESSMENT & PLAN NOTE
On Norvasc 5 mg daily and lisinopril   Blood pressure significantly elevated, resumed Norvasc  Continue IV Hydralazine as needed for now

## 2024-03-29 NOTE — PROGRESS NOTES
Progress Note - Nephrology   Laya Brooks 79 y.o. 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     Patient started on hemodialysis on 3/2 at 24 via temporary dialysis catheter.  She had 2 dialysis treatments last 1 on 3/27.  She started producing more urine via her Perez on 3/28 and felt no indication for dialysis as she was improving in terms of her metabolic and mental status.    Today unfortunately, she has increased respiratory distress with decline in urine output.  She may have subtle uremic signs and symptoms.  Will trial Lasix 60 mg IV x 1 to help with increased work of breathing/ promote UOP to nonoliguric state.  Planned for HD today due to oliguric UOP, concern for uremia and respiratory distress.  Depending on lasix response, can provide limited HD tx or hold.   Dialysis decisions on a day-to-day basis.  Check CXR with increased WOB.  Discussed care with son by phone today regarding overall concerns in terms of nutrition, respiratory failure and kidney function.    As she is not tolerating much p.o. intake will provide gentle hypotonic IVF to avoid dehydration.    2. Hematuria, proteinuria.  Urine eos is not a helpful test and should not - can be seen in ATN/AIN.   Would not advise kidney biopsy with risk of complication in  a functional solitary kidney, risks>benefits.She had multiple comorbid conditions which make her not a suitable immunosuppression candidate.    3. Acute hypoxic respiratory distress.  Increased WOB today, check CXR, lasix 60mg IV to promote UOP.  Pulm signed off patient. Steroid taper per pulm.    4. Malnutrition  Encourage support with speech therapy and OT.  Encourage oral intake as best she can.  She is cachetic with low BMI with concerns for failure  to thrive.    5. Atrophic right kidney   Vascular duplex--- abdominal aorta patent, no evidence of significant SALAZAR, adequate flow, kidney slightly atrophic 9.8 cm. Stenosis of celiac and SMA arteries--defer to primary team.  Right kidney is chronically atrophic and echogenic--may have SALAZAR on right side. Intervention on this kidney may have limited utility as this already atrophic    6. HTN renal disease due to SEEMA/volume/?Renovascular HTN.  Currently on standing IV hydralazine and amlodipine. Lasix IV x 1.  Avoid titration with potential HD today.       Follow up reason for today's visit:   seema      Subjective:   Patient seen and examined today. She is answering orientation questions but appears lethargic with increased work of breathing. RR 27-28.   She is a limited historian and cannot provide accurate ROS.  She is not eating well.   UOP dropped off in the past 24 hours.    ml/24 hr.   Objective:     Vitals: Blood pressure 155/69, pulse 76, temperature 98.7 °F (37.1 °C), temperature source Temporal, resp. rate 16, height 5' (1.524 m), weight 42.3 kg (93 lb 4.1 oz), SpO2 93%.,Body mass index is 18.21 kg/m².    Weight (last 2 days)       Date/Time Weight    03/29/24 0540 42.3 (93.25)    03/28/24 0600 44.8 (98.77)              Intake/Output Summary (Last 24 hours) at 3/29/2024 0830  Last data filed at 3/29/2024 0438  Gross per 24 hour   Intake 180 ml   Output 275 ml   Net -95 ml     I/O last 3 completed shifts:  In: 310 [P.O.:300; I.V.:10]  Out: 295 [Urine:295]    HD Temporary Double Catheter (Active)   Reasons to continue HD Cath Treatment Therapy 03/28/24 2056   Goal for Removal Other (comment) 03/28/24 2056   Line Necessity Reviewed Yes, reviewed with provider 03/28/24 0843   Site Assessment WDL 03/28/24 2056   Proximal Lumen Status Capped 03/28/24 2056   Distal Lumen Status Capped 03/28/24 2056   Line Care Connections checked and tightened 03/27/24 0900   Dressing Type Chlorhexidine dressing 03/28/24 2056    Dressing Status Clean;Dry;Intact 03/28/24 2056   Dressing Change Due 04/02/24 03/28/24 2056       Urethral Catheter 16 Fr. (Active)   Amt returned on insertion(mL) 125 mL 03/24/24 1108   Reasons to continue Urinary Catheter  Accurate I&O assessment in critically ill patients (48 hr. max) 03/28/24 2056   Goal for Removal Voiding trial when ambulation improves 03/28/24 2056   Site Assessment Clean;Skin intact 03/28/24 2056   Perez Care Done 03/28/24 2056   Collection Container Standard drainage bag 03/28/24 2056   Securement Method Securing device (Describe) 03/28/24 2056   Output (mL) 75 mL 03/29/24 0438   CAUTI Time-out performed before Urine Culture Yes 03/24/24 1108       Physical Exam: /69   Pulse 76   Temp 98.7 °F (37.1 °C) (Temporal)   Resp 16   Ht 5' (1.524 m)   Wt 42.3 kg (93 lb 4.1 oz)   SpO2 93%   BMI 18.21 kg/m²     General Appearance:    Lethargic, ill appearing    Head:    Normocephalic, without obvious abnormality, atraumatic   Eyes:    Conjunctiva/corneas clear   Ears:    Normal external ears   Nose:   Nares normal, septum midline, mucosa normal, no drainage    or sinus tenderness   Throat:   Lips, mucosa, and tongue normal; teeth and gums normal   Neck:    Back:      Lungs:     Increased work of breathing, rhonchi.   Chest wall:    No tenderness or deformity   Heart:    Regular, no rub    Abdomen:     Soft, non-tender, bowel sounds active   Extremities:   Extremities normal, atraumatic, no cyanosis or edema   Skin:   Skin color, texture, turgor normal, no rashes or lesions   Lymph nodes:      Neurologic:   Alert, oriented             Lab, Imaging and other studies: I have personally reviewed pertinent labs.  CBC:   Lab Results   Component Value Date    WBC 5.58 03/29/2024    HGB 10.8 (L) 03/29/2024    HCT 32.5 (L) 03/29/2024    MCV 88 03/29/2024     (L) 03/29/2024    RBC 3.68 (L) 03/29/2024    MCH 29.3 03/29/2024    MCHC 33.2 03/29/2024    RDW 14.7 03/29/2024    MPV 11.4  "03/29/2024    NRBC 0 03/29/2024     CMP:   Lab Results   Component Value Date    K 4.5 03/29/2024    CL 98 03/29/2024    CO2 28 03/29/2024    BUN 56 (H) 03/29/2024    CREATININE 2.89 (H) 03/29/2024    CALCIUM 8.7 03/29/2024    EGFR 14 03/29/2024       .  Results from last 7 days   Lab Units 03/29/24  0435 03/28/24  1646 03/28/24  0548 03/27/24  0517 03/26/24  0456 03/25/24  1351 03/25/24  0504   POTASSIUM mmol/L 4.5 4.3 4.4 4.5 5.4*   < > 5.1   CHLORIDE mmol/L 98 97 98 97 99   < > 99   CO2 mmol/L 28 28 29 22 16*   < > 15*   BUN mg/dL 56* 47* 40* 70* 96*   < > 75*   CREATININE mg/dL 2.89* 2.83* 2.79* 4.47* 6.27*   < > 4.65*   CALCIUM mg/dL 8.7 8.8 8.5 8.4 8.4   < > 9.0   ALK PHOS U/L  --   --   --  42 45  --  55   ALT U/L  --   --   --  11 11  --  13   AST U/L  --   --   --  14 9*  --  12*    < > = values in this interval not displayed.         Phosphorus:   Lab Results   Component Value Date    PHOS 4.9 (H) 03/29/2024     Magnesium:   Lab Results   Component Value Date    MG 2.3 03/29/2024     Urinalysis:   Lab Results   Component Value Date    COLORU Yellow 03/29/2024    CLARITYU Clear 03/29/2024    SPECGRAV >=1.030 03/29/2024    PHUR 5.5 03/29/2024    LEUKOCYTESUR Trace (A) 03/29/2024    NITRITE Negative 03/29/2024    GLUCOSEU Negative 03/29/2024    KETONESU Negative 03/29/2024    BILIRUBINUR Negative 03/29/2024    BLOODU Negative 03/29/2024     Ionized Calcium: No results found for: \"CAION\"  Coagulation: No results found for: \"PT\", \"INR\", \"APTT\"  Troponin: No results found for: \"TROPONINI\"  ABG: No results found for: \"PHART\", \"KQD6CXD\", \"PO2ART\", \"DRP1TIR\", \"J2OTQKQX\", \"BEART\", \"SOURCE\"  Radiology review:     IMAGING  Procedure: VAS renal artery limited    Result Date: 3/28/2024  Narrative:  [facility] REPORT [facility]INICAL: Indications: Provider would like to evaluate perfusion to the left kidney due to rapid severe SEEMA and history of renal artery disease. Operative History: Coronary Angioplasty w/ Stent " Placement Risk Factors The patient has history of HTN, Hyperlipidemia, CKD and CAD.  FINDINGS:  Unilateral     Impression  PSV (cm/s)  EDV (cm/s)  Sup-Calli Ao                         60              Celiac         >70%               265          19  Px Inf-Tomas Ao  Ectatic             62              Ds Inf-Tomas Ao                      73              Prox. SMA      >70%               517          17   Segment        Righ  Left                            RI  Impression  PSV (cm/s)  EDV (cm/s)   RAR    RI  Kidney (cm)  Ostial Renal         60 - 99%           530         167  8.91                     Prox Renal     0.80  60 - 99%           297          65  4.99                     Mid Renal      0.77                     131          30  2.20                     Dist Renal     0.75                      91          25  1.53                     Celiac Artery                            13           5        0.60               Kidney                                                                      9.80  Renal                Patent                                                       Hilum                                    13           5        0.60                  CONCLUSION: Impression The abdominal aorta is widely patent and ectatic in caliber in the mid segment.  LEFT RENAL: No evidence of significant arterial occlusive disease in the main renal artery. Patent renal vein. Adequate parenchymal flow is noted with a renovascular resistive index of 0.60. Renal/Aorta Ratio: 8.91. The kidney measures approximately 9.8 x 4.3 cm. Prior: 10.0 x 4.7 cm.  MESENTERIC: Celiac and superior mesenteric arteries demonstrate elevated velocities, consistent with significant stenosis.  Compared to previous study on 5/6/2022, there is no significant change.  SIGNATURE: Electronically Signed by: JIAN CULVER MD, RPVI on 2024-03-28 02:23:29 PM    Procedure:  VAS VENOUS DUPLEX - LOWER LIMB BILATERAL    Result Date: 3/26/2024  Narrative:   [facility] REPORT [facility]INICAL: Indications: Physician wants to determine patency of the venous system secondary to elevated d-dimer. Operative History: Coronary Angioplasty w/ Stent Placement Risk Factors The patient has history of HTN, Hyperlipidemia, CKD and CAD.   CONCLUSION:  Impression: RIGHT LOWER LIMB: No evidence of acute or chronic deep vein thrombosis. No evidence of superficial thrombophlebitis noted. Doppler evaluation shows a normal response to augmentation maneuvers. Popliteal, posterior tibial and anterior tibial arterial Doppler waveform's are monophasic.  LEFT LOWER LIMB: No evidence of acute or chronic deep vein thrombosis. No evidence of superficial thrombophlebitis noted. Doppler evaluation shows a normal response to augmentation maneuvers. Popliteal, posterior tibial and anterior tibial arterial Doppler waveform's are monophasic.  Technical findings were given to bedside nurse.  SIGNATURE: Electronically Signed by: OSEAS MENEZES MD on 2024-03-26 04:34:20 PM    Procedure: CT head wo contrast    Result Date: 3/26/2024  Narrative: CT BRAIN - WITHOUT CONTRAST INDICATION:   confusion. COMPARISON: 3/7/2022. TECHNIQUE:  CT examination of the brain was performed.  Multiplanar 2D reformatted images were created from the source data. Radiation dose length product (DLP) for this visit:  891.51 mGy-cm .  This examination, like all CT scans performed in the [facility], was performed utilizing techniques to minimize radiation dose exposure, including the use of iterative  reconstruction and automated exposure control. IMAGE QUALITY:  Diagnostic. FINDINGS: PARENCHYMA:  No intracranial mass, mass effect decreased attenuation is noted in periventricular and subcortical white matter demonstrating an appearance that is statistically most likely to represent mild microangiopathic change. No CT signs of acute infarction.  No intracranial mass, mass effect or midline shift.  No acute  parenchymal hemorrhage. No midline shift. No CT signs of acute infarction.  No acute parenchymal hemorrhage. VENTRICLES AND EXTRA-AXIAL SPACES:  Ventricles and extra-axial CSF spaces are prominent commensurate with the degree of volume loss.  No hydrocephalus.  No acute extra-axial hemorrhage. VISUALIZED ORBITS: Normal visualized orbits. PARANASAL SINUSES: Normal visualized paranasal sinuses. CALVARIUM AND EXTRACRANIAL SOFT TISSUES:  Normal.     Impression: No acute intracranial abnormality.  Chronic microangiopathic changes. Workstation performed: [id number]       Current Facility-Administered Medications   Medication Dose Route Frequency    acetaminophen (TYLENOL) tablet 650 mg  650 mg Oral Q4H PRN    albuterol inhalation solution 2.5 mg  2.5 mg Nebulization Q4H PRN    amLODIPine (NORVASC) tablet 5 mg  5 mg Oral Daily    atorvastatin (LIPITOR) tablet 40 mg  40 mg Oral Daily With Dinner    budesonide (PULMICORT) inhalation solution 0.5 mg  0.5 mg Nebulization Q12H    cefTRIAXone (ROCEPHIN) IVPB (premix in dextrose) 1,000 mg 50 mL  1,000 mg Intravenous Q24H    clopidogrel (PLAVIX) tablet 75 mg  75 mg Oral Daily    formoterol (PERFOROMIST) nebulizer solution 20 mcg  20 mcg Nebulization Q12H    heparin (porcine) subcutaneous injection 5,000 Units  5,000 Units Subcutaneous Q8H JHON    hydrALAZINE (APRESOLINE) injection 10 mg  10 mg Intravenous Q6H JHON    ipratropium (ATROVENT) 0.02 % inhalation solution 0.5 mg  0.5 mg Nebulization TID    labetalol (NORMODYNE) injection 10 mg  10 mg Intravenous Q6H PRN    levalbuterol (XOPENEX) inhalation solution 1.25 mg  1.25 mg Nebulization TID    LORazepam (ATIVAN) injection 1 mg  1 mg Intravenous Q6H PRN    methylPREDNISolone sodium succinate (Solu-MEDROL) injection 40 mg  40 mg Intravenous Q12H JHON    ondansetron (ZOFRAN) injection 4 mg  4 mg Intravenous Q6H PRN    sodium chloride (OCEAN) 0.65 % nasal spray 1 spray  1 spray Each Nare Q1H PRN     Medications Discontinued During This  Encounter   Medication Reason    umeclidinium 62.5 mcg/actuation inhaler AEPB 1 puff     olodaterol HCl (STRIVERDI RESPIMAT) inhaler 2 puff     ipratropium-albuterol (DUO-NEB) 0.5-2.5 mg/3 mL inhalation solution 3 mL     albuterol (PROVENTIL HFA,VENTOLIN HFA) inhaler 2 puff     LORazepam (ATIVAN) injection 0.5 mg     furosemide (LASIX) injection 40 mg     amLODIPine (NORVASC) tablet 5 mg     sodium bicarbonate 75 mEq in sodium chloride 0.45 % 1,000 mL infusion     amLODIPine (NORVASC) tablet 5 mg     sodium bicarbonate 75 mEq in sodium chloride 0.45 % 1,000 mL infusion     methylPREDNISolone sodium succinate (Solu-MEDROL) injection 40 mg     sodium bicarbonate 150 mEq in dextrose 5 % 1,000 mL infusion     dexmedeTOMIDine (Precedex) 400 mcg in sodium chloride 0.9% 100 mL     dextrose 5 % infusion     methylPREDNISolone sodium succinate (Solu-MEDROL) injection 40 mg     aspirin rectal suppository 150 mg        Kate Weston, DO      This progress note was produced in part using a dictation device which may document imprecise wording from author's original intent.

## 2024-03-29 NOTE — QUICK NOTE
Discussed care with HD RN regarding treatment tolerability today.  Persistent hypertension noted however, she is now having episodes of tachycardia which may be due to volume depletion.  Her heart rate would intermittently go to the 130-150 range only during treatment.    There is definite concern this a.m. with increased work of breathing and decline in urine output that she potentially had developed pulmonary edema.  Her chest x-ray was slightly concerning for congestion/effusion on my initial read but awaiting for full report.  However given her response to ultrafiltration and the fact that her oral intake is poor, favor that she might be slightly dry and 250 mL normal saline will be returned to her.  UF had been 700 mL since dialysis start.  Will start on gentle intravenous fluids to help with volume replacement.  If she develops tachycardia she can be given albumin.    Treatment time has been abbreviated due to concerns for tachycardia and tolerability.    Will provide judicious hypotonic IVF.    BP severely elevated, will switch amlodipine to nifedipine 30mg/day first dose now.   Will adjust hold parameters for IV hydralazine but consider switching to oral medications only.

## 2024-03-29 NOTE — PLAN OF CARE
Problem: PHYSICAL THERAPY ADULT  Goal: Performs mobility at highest level of function for planned discharge setting.  See evaluation for individualized goals.  Description: Treatment/Interventions: Functional transfer training, LE strengthening/ROM, Therapeutic exercise, Endurance training, Bed mobility, Gait training          See flowsheet documentation for full assessment, interventions and recommendations.  Outcome: Not Progressing  Note: Prognosis: Guarded  Problem List: Decreased strength, Decreased endurance, Impaired balance, Decreased mobility, Decreased cognition, Impaired judgement, Decreased safety awareness  Assessment: Pt seen for PT treatment session this date with interventions consisting of Therapeutic exercise consisting of: AROM 10 reps B LE in sitting position and therapeutic activity consisting of training: supine<>sit transfers, sit<>stand transfers, and stand pivot transfers towards L direction. Pt agreeable to PT treatment session upon arrival, pt found supine in bed w/ HOB elevated, in no apparent distress. In comparison to previous session, pt with no improvements as evidenced by pt requires increased assistance for mobility, increased fatigue this session but participatory . Post session: pt returned back to recliner, chair alarm engaged, all needs in reach, and RN notified of session findings/recommendations Continue to recommend  Level I Resource Intensity  at time of d/c in order to maximize pt's functional independence and safety w/ mobility. Pt continues to be functioning below baseline level, and remains limited 2* factors listed above and including decreased strength, balance, mobility, and activity tolerance. PT will continue to see pt while here in order to address the deficits listed above and provide interventions consistent w/ POC in effort to achieve STGs.  Barriers to Discharge: Inaccessible home environment, Decreased caregiver support  Barriers to Discharge Comments: pt  with increased fatigue, requires assistance for mobility  Rehab Resource Intensity Level, PT: I (Maximum Resource Intensity)    See flowsheet documentation for full assessment.

## 2024-03-29 NOTE — ASSESSMENT & PLAN NOTE
This is an 79-year-old female patient with recently diagnosed COPD, history of CAD, CKD and hypertension who initially presented to the ED with shortness of breath, was noted to have significant audible wheezing with shortness of breath and had to be placed on BiPAP  Recent PFT showed moderate obstructive deficit, moderate DLCO reduction and evidence of hyperinflation and air trapping on lung volumes without a significant bronchodilator response. She was placed on LAMA/LABA inhalers  Per family, patient was using bronchodilators without significant improvement  In the ER she was placed on BiPAP - now weaned to 3L O2  Patient received initial dose of IV Solu-Medrol 125 mg in the ED and has been maintained on a taper - will proceed with 40 mg every 12 hours today  ABG without evidence of CO2 retention  Continue scheduled Erlinda   Added IV ceftriaxone for COPD exacerbation 3/25/24  Treat SEEMA/metabolic acidosis with HD - contributing to patient's respiratory status

## 2024-03-29 NOTE — ASSESSMENT & PLAN NOTE
Appears multifactorial, secondary to SEEMA/metabolic acidosis with compensatory tachypnea, COPD exacerbation, possibly acute diastolic CHF component  Was initially requiring continuous BiPAP therapy -now transitioned to room air  Treating underlying conditions as above

## 2024-03-29 NOTE — RESPIRATORY THERAPY NOTE
03/29/24 0746   Inhalation Therapy Tx   $ Inhalation Therapy Performed Yes   $ Pulse Oximetry Spot Check Charge Completed   SpO2 93 %   Pre-Treatment Pulse 72   Pre-Treatment Respirations 18   Duration 20   Breath Sounds Pre-Treatment Bilateral Diminished;Clear   Delivery Source Air;UDN   Position Semi Baker's   Treatment Tolerance Tolerated well   Resp Comments patient is sleeping on 1 lpm nasal cannula, respirations easy and non labored at this time

## 2024-03-29 NOTE — ASSESSMENT & PLAN NOTE
Lab Results   Component Value Date    EGFR 14 03/29/2024    EGFR 15 03/28/2024    EGFR 15 03/28/2024    CREATININE 2.89 (H) 03/29/2024    CREATININE 2.83 (H) 03/28/2024    CREATININE 2.79 (H) 03/28/2024   Patient with severe SEEMA, s/p HD 3/26. 3/27  Continue with poor urine output  Nephrology following

## 2024-03-29 NOTE — ASSESSMENT & PLAN NOTE
Initially felt to be due to encephalopathy, however failed to improve despite improvement in mental status  Speech therapy and pulmonology following  Patient is with poor oral intake, if she is unable to tolerate oral intake we will need to consider an alternative nutritional route on a short-term basis  Will likely need to further discuss goals of care with the patient and her family

## 2024-03-29 NOTE — SPEECH THERAPY NOTE
"Speech Language/Pathology  Speech-Language Pathology Progress Note      Patient Name: Laya Brooks    Today's Date: 3/29/2024      Subjective:  Pt was  awake however lethargic . She was repositioned for optimal PO intake safety. Patient stated \"It is hard to breathe \".    Objective:  Pt was seen today for dysphagia therapy. Current diet is puree with nectar thick liquids. Pt was on room air. Oral care was completed with use of oral care kits. Focus of today's session was to maximize PO intake safety, determine potential for liquid consistency advancement, and improve use of strategies to minimize risk of aspiration. Trials were limited d/t pt respiratory status, however textures offered today included ice chips via tsp and puree with medication intake.     Swallow function:   During medication intake of whole pills in puree, bolus formation and AP transfer was observed to be severely reduced with notable tongue pumping. It is recommended for medication to be crushed in puree to ensure proper intake d/t decline in presentation. Control was suspected to be moderate-severely reduced with posterior spillage d/t extra effort needed for respiratory function. Severely reduced respiratory deglutition noted across trials. Pharyngeal swallow initiation was present and sluggish. Hyolaryngeal excursion was weak. No s/s aspiration occurred during session today.  SLP provided max cueing/feedback throughout session. Pt demonstrated limited response to cueing.    Assessment:  Severely attempts were needed to transfer and initiate swallow with single tsp of puree. Notable facial grimacing observed during attempts. Suspect decreased swallow function d/t decreased respiratory coordination. Thin liquid trials were not appropriate in session. Pt gasping breathing pattern at rest and during PO trials of ice chips caused concern to trial thin liquids because likelihood of decreased bolus control causing posterior spillage.  Swallow " function is stable with current diet. Diet texture and liquid consistency remains appropriate at this time.   Plan:  Continue puree and nectar thick liquids. Continue ST follow up. Subsequent sessions to focus on maximizing PO intake safety, determining potential for diet texture advancement, determining potential for liquid consistency advancement, and improving use of strategies to minimize risk of aspiration.

## 2024-03-30 PROBLEM — Z71.89 GOALS OF CARE, COUNSELING/DISCUSSION: Status: ACTIVE | Noted: 2024-03-30

## 2024-03-30 PROBLEM — B37.0 ORAL CANDIDIASIS: Status: ACTIVE | Noted: 2024-03-30

## 2024-03-30 LAB
ANION GAP SERPL CALCULATED.3IONS-SCNC: 10 MMOL/L (ref 4–13)
BASOPHILS # BLD AUTO: 0 THOUSANDS/ÂΜL (ref 0–0.1)
BASOPHILS NFR BLD AUTO: 0 % (ref 0–1)
BUN SERPL-MCNC: 44 MG/DL (ref 5–25)
CALCIUM SERPL-MCNC: 8.8 MG/DL (ref 8.4–10.2)
CHLORIDE SERPL-SCNC: 105 MMOL/L (ref 96–108)
CO2 SERPL-SCNC: 23 MMOL/L (ref 21–32)
CREAT SERPL-MCNC: 2.02 MG/DL (ref 0.6–1.3)
EOSINOPHIL # BLD AUTO: 0 THOUSAND/ÂΜL (ref 0–0.61)
EOSINOPHIL NFR BLD AUTO: 0 % (ref 0–6)
ERYTHROCYTE [DISTWIDTH] IN BLOOD BY AUTOMATED COUNT: 14.7 % (ref 11.6–15.1)
GFR SERPL CREATININE-BSD FRML MDRD: 22 ML/MIN/1.73SQ M
GLUCOSE SERPL-MCNC: 127 MG/DL (ref 65–140)
HCT VFR BLD AUTO: 32.6 % (ref 34.8–46.1)
HGB BLD-MCNC: 10.6 G/DL (ref 11.5–15.4)
IMM GRANULOCYTES # BLD AUTO: 0.06 THOUSAND/UL (ref 0–0.2)
IMM GRANULOCYTES NFR BLD AUTO: 1 % (ref 0–2)
LYMPHOCYTES # BLD AUTO: 0.46 THOUSANDS/ÂΜL (ref 0.6–4.47)
LYMPHOCYTES NFR BLD AUTO: 5 % (ref 14–44)
MCH RBC QN AUTO: 29.3 PG (ref 26.8–34.3)
MCHC RBC AUTO-ENTMCNC: 32.5 G/DL (ref 31.4–37.4)
MCV RBC AUTO: 90 FL (ref 82–98)
MONOCYTES # BLD AUTO: 0.77 THOUSAND/ÂΜL (ref 0.17–1.22)
MONOCYTES NFR BLD AUTO: 8 % (ref 4–12)
NEUTROPHILS # BLD AUTO: 8.07 THOUSANDS/ÂΜL (ref 1.85–7.62)
NEUTS SEG NFR BLD AUTO: 86 % (ref 43–75)
NRBC BLD AUTO-RTO: 0 /100 WBCS
PLATELET # BLD AUTO: 95 THOUSANDS/UL (ref 149–390)
PMV BLD AUTO: 11.1 FL (ref 8.9–12.7)
POTASSIUM SERPL-SCNC: 4.2 MMOL/L (ref 3.5–5.3)
RBC # BLD AUTO: 3.62 MILLION/UL (ref 3.81–5.12)
SODIUM SERPL-SCNC: 138 MMOL/L (ref 135–147)
WBC # BLD AUTO: 9.36 THOUSAND/UL (ref 4.31–10.16)

## 2024-03-30 PROCEDURE — 99232 SBSQ HOSP IP/OBS MODERATE 35: CPT | Performed by: INTERNAL MEDICINE

## 2024-03-30 PROCEDURE — 85025 COMPLETE CBC W/AUTO DIFF WBC: CPT | Performed by: FAMILY MEDICINE

## 2024-03-30 PROCEDURE — 94760 N-INVAS EAR/PLS OXIMETRY 1: CPT

## 2024-03-30 PROCEDURE — 94640 AIRWAY INHALATION TREATMENT: CPT

## 2024-03-30 PROCEDURE — 99233 SBSQ HOSP IP/OBS HIGH 50: CPT | Performed by: FAMILY MEDICINE

## 2024-03-30 PROCEDURE — 80048 BASIC METABOLIC PNL TOTAL CA: CPT | Performed by: FAMILY MEDICINE

## 2024-03-30 PROCEDURE — 94664 DEMO&/EVAL PT USE INHALER: CPT

## 2024-03-30 RX ORDER — SODIUM CHLORIDE, SODIUM LACTATE, POTASSIUM CHLORIDE, CALCIUM CHLORIDE 600; 310; 30; 20 MG/100ML; MG/100ML; MG/100ML; MG/100ML
75 INJECTION, SOLUTION INTRAVENOUS CONTINUOUS
Status: DISPENSED | OUTPATIENT
Start: 2024-03-30 | End: 2024-03-31

## 2024-03-30 RX ORDER — LABETALOL HYDROCHLORIDE 5 MG/ML
10 INJECTION, SOLUTION INTRAVENOUS EVERY 6 HOURS PRN
Status: DISCONTINUED | OUTPATIENT
Start: 2024-03-30 | End: 2024-04-02 | Stop reason: HOSPADM

## 2024-03-30 RX ORDER — ALPRAZOLAM 0.5 MG/1
0.5 TABLET ORAL 3 TIMES DAILY PRN
Status: DISCONTINUED | OUTPATIENT
Start: 2024-03-30 | End: 2024-04-02 | Stop reason: HOSPADM

## 2024-03-30 RX ADMIN — BUDESONIDE 0.5 MG: 0.5 INHALANT RESPIRATORY (INHALATION) at 19:05

## 2024-03-30 RX ADMIN — BUDESONIDE 0.5 MG: 0.5 INHALANT RESPIRATORY (INHALATION) at 07:45

## 2024-03-30 RX ADMIN — PREDNISONE 40 MG: 20 TABLET ORAL at 09:50

## 2024-03-30 RX ADMIN — LEVALBUTEROL HYDROCHLORIDE 1.25 MG: 1.25 SOLUTION RESPIRATORY (INHALATION) at 19:05

## 2024-03-30 RX ADMIN — CLOPIDOGREL 75 MG: 75 TABLET ORAL at 09:50

## 2024-03-30 RX ADMIN — IPRATROPIUM BROMIDE 0.5 MG: 0.5 SOLUTION RESPIRATORY (INHALATION) at 19:05

## 2024-03-30 RX ADMIN — ALPRAZOLAM 0.5 MG: 0.5 TABLET ORAL at 22:25

## 2024-03-30 RX ADMIN — IPRATROPIUM BROMIDE 0.5 MG: 0.5 SOLUTION RESPIRATORY (INHALATION) at 14:35

## 2024-03-30 RX ADMIN — HEPARIN SODIUM 5000 UNITS: 5000 INJECTION, SOLUTION INTRAVENOUS; SUBCUTANEOUS at 22:25

## 2024-03-30 RX ADMIN — HEPARIN SODIUM 5000 UNITS: 5000 INJECTION, SOLUTION INTRAVENOUS; SUBCUTANEOUS at 05:21

## 2024-03-30 RX ADMIN — SODIUM CHLORIDE, SODIUM LACTATE, POTASSIUM CHLORIDE, AND CALCIUM CHLORIDE 75 ML/HR: .6; .31; .03; .02 INJECTION, SOLUTION INTRAVENOUS at 10:13

## 2024-03-30 RX ADMIN — IPRATROPIUM BROMIDE 0.5 MG: 0.5 SOLUTION RESPIRATORY (INHALATION) at 07:45

## 2024-03-30 RX ADMIN — HYDRALAZINE HYDROCHLORIDE 10 MG: 20 INJECTION INTRAMUSCULAR; INTRAVENOUS at 05:22

## 2024-03-30 RX ADMIN — NIFEDIPINE 30 MG: 30 TABLET, FILM COATED, EXTENDED RELEASE ORAL at 09:50

## 2024-03-30 RX ADMIN — NYSTATIN 500000 UNITS: 100000 SUSPENSION ORAL at 22:25

## 2024-03-30 RX ADMIN — LEVALBUTEROL HYDROCHLORIDE 1.25 MG: 1.25 SOLUTION RESPIRATORY (INHALATION) at 14:35

## 2024-03-30 RX ADMIN — LABETALOL HYDROCHLORIDE 10 MG: 5 INJECTION, SOLUTION INTRAVENOUS at 19:48

## 2024-03-30 RX ADMIN — CEFTRIAXONE 1000 MG: 1 INJECTION, SOLUTION INTRAVENOUS at 14:57

## 2024-03-30 RX ADMIN — NYSTATIN 500000 UNITS: 100000 SUSPENSION ORAL at 17:09

## 2024-03-30 RX ADMIN — ONDANSETRON 4 MG: 2 INJECTION INTRAMUSCULAR; INTRAVENOUS at 19:53

## 2024-03-30 RX ADMIN — NYSTATIN 500000 UNITS: 100000 SUSPENSION ORAL at 11:48

## 2024-03-30 RX ADMIN — ATORVASTATIN CALCIUM 40 MG: 40 TABLET, FILM COATED ORAL at 17:09

## 2024-03-30 RX ADMIN — FORMOTEROL FUMARATE DIHYDRATE 20 MCG: 20 SOLUTION RESPIRATORY (INHALATION) at 19:05

## 2024-03-30 RX ADMIN — LEVALBUTEROL HYDROCHLORIDE 1.25 MG: 1.25 SOLUTION RESPIRATORY (INHALATION) at 07:45

## 2024-03-30 RX ADMIN — LORAZEPAM 1 MG: 2 INJECTION INTRAMUSCULAR; INTRAVENOUS at 02:44

## 2024-03-30 RX ADMIN — HEPARIN SODIUM 5000 UNITS: 5000 INJECTION, SOLUTION INTRAVENOUS; SUBCUTANEOUS at 14:57

## 2024-03-30 RX ADMIN — FORMOTEROL FUMARATE DIHYDRATE 20 MCG: 20 SOLUTION RESPIRATORY (INHALATION) at 07:45

## 2024-03-30 NOTE — PLAN OF CARE
Problem: PAIN - ADULT  Goal: Verbalizes/displays adequate comfort level or baseline comfort level  Description: Interventions:  - Encourage patient to monitor pain and request assistance  - Assess pain using appropriate pain scale  - Administer analgesics based on type and severity of pain and evaluate response  - Implement non-pharmacological measures as appropriate and evaluate response  - Consider cultural and social influences on pain and pain management  - Notify physician/advanced practitioner if interventions unsuccessful or patient reports new pain  Outcome: Progressing     Problem: SAFETY ADULT  Goal: Patient will remain free of falls  Description: INTERVENTIONS:  - Educate patient/family on patient safety including physical limitations  - Instruct patient to call for assistance with activity   - Consult OT/PT to assist with strengthening/mobility   - Keep Call bell within reach  - Keep bed low and locked with side rails adjusted as appropriate  - Keep care items and personal belongings within reach  - Initiate and maintain comfort rounds  - Make Fall Risk Sign visible to staff  - Offer Toileting every 2 Hours, in advance of need  - Initiate/Maintain fall alarm  - Obtain necessary fall risk management equipment: alarm, nonskid footwear, yellow wristband  - Apply yellow socks and bracelet for high fall risk patients  - Consider moving patient to room near nurses station  Outcome: Progressing  Goal: Maintain or return to baseline ADL function  Description: INTERVENTIONS:  -  Assess patient's ability to carry out ADLs; assess patient's baseline for ADL function and identify physical deficits which impact ability to perform ADLs (bathing, care of mouth/teeth, toileting, grooming, dressing, etc.)  - Assess/evaluate cause of self-care deficits   - Assess range of motion  - Assess patient's mobility; develop plan if impaired  - Assess patient's need for assistive devices and provide as appropriate  - Encourage  maximum independence but intervene and supervise when necessary  - Involve family in performance of ADLs  - Assess for home care needs following discharge   - Consider OT consult to assist with ADL evaluation and planning for discharge  - Provide patient education as appropriate  Outcome: Progressing  Goal: Maintains/Returns to pre admission functional level  Description: INTERVENTIONS:  - Perform AM-PAC 6 Click Basic Mobility/ Daily Activity assessment daily.  - Set and communicate daily mobility goal to care team and patient/family/caregiver.   - Collaborate with rehabilitation services on mobility goals if consulted  - Perform Range of Motion 4 times a day.  - Reposition patient every 2 hours.  - Dangle patient 3 times a day  - Stand patient 3 times a day  - Ambulate patient 3 times a day  - Out of bed to chair 3 times a day   - Out of bed for meals 3 times a day  - Out of bed for toileting  - Record patient progress and toleration of activity level   Outcome: Progressing     Problem: DISCHARGE PLANNING  Goal: Discharge to home or other facility with appropriate resources  Description: INTERVENTIONS:  - Identify barriers to discharge w/patient and caregiver  - Arrange for needed discharge resources and transportation as appropriate  - Identify discharge learning needs (meds, wound care, etc.)  - Arrange for interpretive services to assist at discharge as needed  - Refer to Case Management Department for coordinating discharge planning if the patient needs post-hospital services based on physician/advanced practitioner order or complex needs related to functional status, cognitive ability, or social support system  Outcome: Progressing     Problem: Knowledge Deficit  Goal: Patient/family/caregiver demonstrates understanding of disease process, treatment plan, medications, and discharge instructions  Description: Complete learning assessment and assess knowledge base.  Interventions:  - Provide teaching at level of  understanding  - Provide teaching via preferred learning methods  Outcome: Progressing     Problem: INFECTION - ADULT  Goal: Absence or prevention of progression during hospitalization  Description: INTERVENTIONS:  - Assess and monitor for signs and symptoms of infection  - Monitor lab/diagnostic results  - Monitor all insertion sites, i.e. indwelling lines, tubes, and drains  - Monitor endotracheal if appropriate and nasal secretions for changes in amount and color  - [place] appropriate cooling/warming therapies per order  - Administer medications as ordered  - Instruct and encourage patient and family to use good hand hygiene technique  - Identify and instruct in appropriate isolation precautions for identified infection/condition  Outcome: Progressing     Problem: RESPIRATORY - ADULT  Goal: Achieves optimal ventilation and oxygenation  Description: INTERVENTIONS:  - Assess for changes in respiratory status  - Assess for changes in mentation and behavior  - Position to facilitate oxygenation and minimize respiratory effort  - Oxygen administered by appropriate delivery if ordered  - Initiate smoking cessation education as indicated  - Encourage broncho-pulmonary hygiene including cough, deep breathe, Incentive Spirometry  - Assess the need for suctioning and aspirate as needed  - Assess and instruct to report SOB or any respiratory difficulty  - Respiratory Therapy support as indicated  Outcome: Progressing     Problem: GENITOURINARY - ADULT  Goal: Maintains or returns to baseline urinary function  Description: INTERVENTIONS:  - Assess urinary function  - Encourage oral fluids to ensure adequate hydration if ordered  - Administer IV fluids as ordered to ensure adequate hydration  - Administer ordered medications as needed  - Offer frequent toileting  - Follow urinary retention protocol if ordered  Outcome: Progressing  Goal: Absence of urinary retention  Description: INTERVENTIONS:  - Assess patient’s ability  to void and empty bladder  - Monitor I/O  - Bladder scan as needed  - Discuss with physician/AP medications to alleviate retention as needed  - Discuss catheterization for long term situations as appropriate  Outcome: Progressing     Problem: METABOLIC, FLUID AND ELECTROLYTES - ADULT  Goal: Electrolytes maintained within normal limits  Description: INTERVENTIONS:  - Monitor labs and assess patient for signs and symptoms of electrolyte imbalances  - Administer electrolyte replacement as ordered  - Monitor response to electrolyte replacements, including repeat lab results as appropriate  - Instruct patient on fluid and nutrition as appropriate  Outcome: Progressing  Goal: Fluid balance maintained  Description: INTERVENTIONS:  - Monitor labs   - Monitor I/O and WT  - Instruct patient on fluid and nutrition as appropriate  - Assess for signs & symptoms of volume excess or deficit  Outcome: Progressing     Problem: CARDIOVASCULAR - ADULT  Goal: Maintains optimal cardiac output and hemodynamic stability  Description: INTERVENTIONS:  - Monitor I/O, vital signs and rhythm  - Monitor for S/S and trends of decreased cardiac output  - Administer and titrate ordered vasoactive medications to optimize hemodynamic stability  - Assess quality of pulses, skin color and temperature  - Assess for signs of decreased coronary artery perfusion  - Instruct patient to report change in severity of symptoms  Outcome: Progressing  Goal: Absence of cardiac dysrhythmias or at baseline rhythm  Description: INTERVENTIONS:  - Continuous cardiac monitoring, vital signs, obtain 12 lead EKG if ordered  - Administer antiarrhythmic and heart rate control medications as ordered  - Monitor electrolytes and administer replacement therapy as ordered  Outcome: Progressing     Problem: Prexisting or High Potential for Compromised Skin Integrity  Goal: Skin integrity is maintained or improved  Description: INTERVENTIONS:  - Identify patients at risk for skin  breakdown  - Assess and monitor skin integrity  - Assess and monitor nutrition and hydration status  - Monitor labs   - Assess for incontinence   - Turn and reposition patient  - Assist with mobility/ambulation  - Relieve pressure over bony prominences  - Avoid friction and shearing  - Provide appropriate hygiene as needed including keeping skin clean and dry  - Evaluate need for skin moisturizer/barrier cream  - Collaborate with interdisciplinary team   - Patient/family teaching  - Consider wound care consult   Outcome: Progressing     Problem: Nutrition/Hydration-ADULT  Goal: Nutrient/Hydration intake appropriate for improving, restoring or maintaining nutritional needs  Description: Monitor and assess patient's nutrition/hydration status for malnutrition. Collaborate with interdisciplinary team and initiate plan and interventions as ordered.  Monitor patient's weight and dietary intake as ordered or per policy. Utilize nutrition screening tool and intervene as necessary. Determine patient's food preferences and provide high-protein, high-caloric foods as appropriate.     INTERVENTIONS:  - Monitor oral intake, urinary output, labs, and treatment plans  - Assess nutrition and hydration status and recommend course of action  - Evaluate amount of meals eaten  - Assist patient with eating if necessary   - Allow adequate time for meals  - Recommend/ encourage appropriate diets, oral nutritional supplements, and vitamin/mineral supplements  - Order, calculate, and assess calorie counts as needed  - Recommend, monitor, and adjust tube feedings and TPN/PPN based on assessed needs  - Assess need for intravenous fluids  - Provide specific nutrition/hydration education as appropriate  - Include patient/family/caregiver in decisions related to nutrition  Outcome: Progressing

## 2024-03-30 NOTE — PROGRESS NOTES
Progress Note - Nephrology   Laya Brooks 79 y.o. female MRN: [id number]  Unit/Bed#:  Encounter: [id number]    A/P:  1.  Hemodialysis dependent acute kidney injury              Patient remains hemodialysis dependent at this time.  Will hold dialysis over the weekend and see how the patient's creatinine progresses.  I am concerned about the patient's renal artery Doppler which noted potential stenosis of the proximal left renal artery and given overall clinical circumstances, would like for our vascular and interventional radiologist to evaluate the patient to see if she is a candidate for angioplasty if clinically indicated.  If the patient is a potential candidate, then we should proceed with more investigation to determine if vascular intervention is appropriate.   In the meantime, continue to avoid potential nephrotoxins and optimize care in general.  2.  Chronic kidney disease stage IIIa with baseline creatinine around 1 mg/dL  3.  Renovascular disease              As noted previously, patient has an atrophic right kidney due to renal vascular disease.  Renal artery Doppler noted potential hemodynamically significant stenosis in the left kidney, and as noted above, given overall clinical circumstances we should see if she is a candidate to proceed with angioplasty.  I think in general, the patient is a poor candidate, but if she is deemed a potential candidate, will need to proceed with either and MRA or CTA, according to our consultants recommendations.  4.  Metabolic acidosis              Bicarbonate has been stable, continue with current care.  5.  Hyperkalemia-resolved  6.  Hypertension in setting of chronic kidney disease   Patient had hypertensive urgency over the last 24 hours, this was ultimately controlled with a dose of lorazepam.  As the patient wakes up will monitor blood pressures, provide additional blood pressure support in the meantime.  Will maximize medical management, ultimately may  be due to worsening stenosis, as noted above, with the left renal artery.  7.  Metabolic encephalopathy-improved              Patient lethargic this morning, however, may be due to dose of lorazepam received overnight.  Continue to monitor for now.    Case discussed with hospitalist.  As mentioned above, will ask interventional radiology and vascular surgeons to evaluate the patient to see if she is a potential candidate for intervention, and if so we will proceed with imaging that they deem appropriate to determine if intervention is indicated.    Follow up reason for today's visit: Acute kidney injury/chronic kidney disease/renovascular disease/acidosis    Chronic obstructive pulmonary disease with acute exacerbation (HCC)    Patient Active Problem List   Diagnosis    History of CVA (cerebrovascular accident)    Hypertensive emergency    Malnutrition (HCC)    Premature atrial complexes    Thrombocytopenia (HCC)    Leukopenia    Neutropenia (HCC)    Hyperphosphatemia    Hypercholesterolemia    Carotid artery stenosis    Nocturnal hypoxia    Dyspnea on exertion    Abnormal echocardiogram    CVA (cerebral vascular accident) (HCC)    Atrophy of right kidney    Stage 3a chronic kidney disease (HCC)    Renal vascular disease    Hypertension    Intracranial atherosclerosis    Weight loss, non-intentional    Hypervitaminosis D    CAD (coronary artery disease)    Celiac artery stenosis (HCC)    S/P right coronary artery (RCA) stent placement    Tobacco use    Wheezing    Dyslipidemia    SEEMA (acute kidney injury) (HCC)    Acute diastolic congestive heart failure (HCC)    Hyponatremia    Chronic obstructive pulmonary disease with acute exacerbation (HCC)    Acute respiratory failure with hypoxia (HCC)    Toxic metabolic encephalopathy    Elevated d-dimer    Dysphagia    Renal artery stenosis (HCC)         Subjective:   Patient feels tired this morning has no acute complaints at this time.    Objective:     Vitals: Blood  pressure 149/63, pulse 81, temperature 99.5 °F (37.5 °C), temperature source Temporal, resp. rate 20, height 5' (1.524 m), weight 41.9 kg (92 lb 6 oz), SpO2 95%.,Body mass index is 18.04 kg/m².    Weight (last 2 days)       Date/Time Weight    03/30/24 0548 41.9 (92.37)    03/29/24 0540 42.3 (93.25)    03/28/24 0600 44.8 (98.77)              Intake/Output Summary (Last 24 hours) at 3/30/2024 1051  Last data filed at 3/30/2024 0545  Gross per 24 hour   Intake 1110 ml   Output 1105 ml   Net 5 ml     I/O last 3 completed shifts:  In: 1180 [P.O.:420; I.V.:510; Other:250]  Out: 1255 [Urine:550; Other:705]    HD Temporary Double Catheter (Active)   Reasons to continue HD Cath Treatment Therapy 03/29/24 2100   Goal for Removal Other (comment) 03/29/24 2100   Line Necessity Reviewed Yes, reviewed with provider 03/29/24 1550   Site Assessment WDL 03/29/24 2100   Proximal Lumen Status Flushed & Clamped;Normal saline locked;Needle-free valve changed;Passive disinfecting cap applied 03/29/24 1550   Distal Lumen Status Flushed & Clamped;Normal saline locked;Needle-free valve changed;Passive disinfecting cap applied 03/29/24 1550   Line Care Connections checked and tightened 03/29/24 1550   Dressing Type Chlorhexidine dressing 03/29/24 2100   Dressing Status Clean;Dry;Intact 03/29/24 2100   Dressing Change Due 04/02/24 03/29/24 2100       Urethral Catheter 16 Fr. (Active)   Amt returned on insertion(mL) 125 mL 03/24/24 1108   Reasons to continue Urinary Catheter  Accurate I&O assessment in critically ill patients (48 hr. max) 03/29/24 2100   Goal for Removal Voiding trial when ambulation improves 03/29/24 2100   Site Assessment Clean;Skin intact 03/29/24 2100   Perez Care Done 03/30/24 0900   Collection Container Standard drainage bag 03/29/24 2100   Securement Method Securing device (Describe) 03/29/24 2100   Output (mL) 200 mL 03/30/24 0545   CAUTI Time-out performed before Urine Culture Yes 03/24/24 1108       Physical Exam:  /63 (BP Location: Left arm)   Pulse 81   Temp 99.5 °F (37.5 °C) (Temporal)   Resp 20   Ht 5' (1.524 m)   Wt 41.9 kg (92 lb 6 oz)   SpO2 95% Comment: 1 l/m  BMI 18.04 kg/m²     General Appearance:  Lethargic this morning, cooperative, no distress   Head:    Normocephalic, without obvious abnormality, atraumatic   Eyes:    Conjunctiva/corneas clear   Ears:    Normal external ears   Nose:   Nares normal, septum midline, mucosa normal, no drainage    or sinus tenderness   Throat:   Lips, mucosa, and tongue normal; teeth and gums normal   Neck:   Supple   Back:     Symmetric, no curvature, ROM normal, no CVA tenderness   Lungs:     Reduced bilaterally   Chest wall:    No tenderness or deformity   Heart:    Regular rate and rhythm, S1 and S2 normal, no murmur, rub   or gallop   Abdomen:     Soft, non-tender, bowel sounds active   Extremities:   Extremities normal, atraumatic, no cyanosis, trace bilateral lower extremity edema   Skin:   Skin color, texture, turgor normal, no rashes or lesions   Lymph nodes:   Cervical normal   Neurologic:   CNII-XII intact            Lab, Imaging and other studies: I have personally reviewed pertinent labs.  CBC:   Lab Results   Component Value Date    WBC 9.36 03/30/2024    HGB 10.6 (L) 03/30/2024    HCT 32.6 (L) 03/30/2024    MCV 90 03/30/2024    PLT 95 (L) 03/30/2024    RBC 3.62 (L) 03/30/2024    MCH 29.3 03/30/2024    MCHC 32.5 03/30/2024    RDW 14.7 03/30/2024    MPV 11.1 03/30/2024    NRBC 0 03/30/2024     CMP:   Lab Results   Component Value Date    K 4.2 03/30/2024     03/30/2024    CO2 23 03/30/2024    BUN 44 (H) 03/30/2024    CREATININE 2.02 (H) 03/30/2024    CALCIUM 8.8 03/30/2024    EGFR 22 03/30/2024       .  Results from last 7 days   Lab Units 03/30/24  0539 03/29/24  0435 03/28/24  1646 03/28/24  0548 03/27/24  0517 03/26/24  0456 03/25/24  1351 03/25/24  0504   POTASSIUM mmol/L 4.2 4.5 4.3   < > 4.5 5.4*   < > 5.1   CHLORIDE mmol/L 105 98 97   < > 97 99   " < > 99   CO2 mmol/L 23 28 28   < > 22 16*   < > 15*   BUN mg/dL 44* 56* 47*   < > 70* 96*   < > 75*   CREATININE mg/dL 2.02* 2.89* 2.83*   < > 4.47* 6.27*   < > 4.65*   CALCIUM mg/dL 8.8 8.7 8.8   < > 8.4 8.4   < > 9.0   ALK PHOS U/L  --   --   --   --  42 45  --  55   ALT U/L  --   --   --   --  11 11  --  13   AST U/L  --   --   --   --  14 9*  --  12*    < > = values in this interval not displayed.         Phosphorus: No results found for: \"PHOS\"  Magnesium: No results found for: \"MG\"  Urinalysis: No results found for: \"COLORU\", \"CLARITYU\", \"SPECGRAV\", \"PHUR\", \"LEUKOCYTESUR\", \"NITRITE\", \"PROTEINUA\", \"GLUCOSEU\", \"KETONESU\", \"BILIRUBINUR\", \"BLOODU\"  Ionized Calcium: No results found for: \"CAION\"  Coagulation: No results found for: \"PT\", \"INR\", \"APTT\"  Troponin: No results found for: \"TROPONINI\"  ABG: No results found for: \"PHART\", \"IXO1QFN\", \"PO2ART\", \"NSH5ELJ\", \"I7LDFOPK\", \"BEART\", \"SOURCE\"  Radiology review:     IMAGING  Procedure: XR chest 1 view    Result Date: 3/29/2024  Narrative: XR CHEST 1 VIEW INDICATION: Shortness of breath. COMPARISON: 3/26/2024, CT chest 3/24/2024 FINDINGS: Monitoring leads and clips project over the chest. -Right IJ central line tip terminates in the region of the distal SVC. Small bilateral pleural effusions with left retrocardiac opacity, the latter possibly representing atelectasis or infiltrate. No pneumothorax. Normal cardiomediastinal silhouette. Bones are unremarkable for age. Normal upper abdomen.     Impression: Small bilateral pleural effusions with left retrocardiac opacity, the latter possibly representing atelectasis or infiltrate. Workstation performed: [id number]     Procedure: VAS renal artery limited    Result Date: 3/29/2024  Narrative:  [facility] REPORT CLINICAL: Indications: Provider would like to evaluate perfusion to the left kidney due to rapid severe SEEMA and history of renal artery disease. Operative History: Coronary Angioplasty w/ Stent Placement " Risk Factors The patient has history of HTN, Hyperlipidemia, CKD and CAD.  FINDINGS:  Unilateral     Impression  PSV (cm/s)  EDV (cm/s)  Sup-Calli Ao                         60              Celiac         >70%               265          19  Px Inf-Tomas Ao  Ectatic             62              Ds Inf-Tomas Ao                      73              Prox. SMA      >70%               517          17   Segment        Righ  Left                            RI  Impression  PSV (cm/s)  EDV (cm/s)   RAR    RI  Kidney (cm)  Ostial Renal         60 - 99%           530         167  8.91                     Prox Renal     0.80  60 - 99%           297          65  4.99                     Mid Renal      0.77                     131          30  2.20                     Dist Renal     0.75                      91          25  1.53                     Celiac Artery                            13           5        0.60               Kidney                                                                      9.80  Renal                Patent                                                       Hilum                                    13           5        0.60                  CONCLUSION: Impression The abdominal aorta is widely patent and ectatic in caliber in the mid segment.  LEFT RENAL: No evidence of significant arterial occlusive disease in the main renal artery. Patent renal vein. Adequate parenchymal flow is noted with a renovascular resistive index of 0.60. Renal/Aorta Ratio: 8.91. The kidney measures approximately 9.8 x 4.3 cm. Prior: 10.0 x 4.7 cm.  MESENTERIC: Celiac and superior mesenteric arteries demonstrate elevated velocities, consistent with significant stenosis.  Compared to previous study on 5/6/2022, there is no significant change.  SIGNATURE: Electronically Signed by: JIAN CULVER MD, RPVI on 2024-03-28 02:23:29 PM ADDENDUM:  3/29/2024 12:16:15 PM: After further review of the data the patient does have a severe left  renal artery stenosis at the aorto- renal junction.  Moderate disease more proximally is seen as well. Signed by JIAN CULVER MD, RPVI on 2024-03-29 12:20:31 PM      Current Facility-Administered Medications   Medication Dose Route Frequency    acetaminophen (TYLENOL) tablet 650 mg  650 mg Oral Q4H PRN    albuterol inhalation solution 2.5 mg  2.5 mg Nebulization Q4H PRN    atorvastatin (LIPITOR) tablet 40 mg  40 mg Oral Daily With Dinner    budesonide (PULMICORT) inhalation solution 0.5 mg  0.5 mg Nebulization Q12H    cefTRIAXone (ROCEPHIN) IVPB (premix in dextrose) 1,000 mg 50 mL  1,000 mg Intravenous Q24H    clopidogrel (PLAVIX) tablet 75 mg  75 mg Oral Daily    formoterol (PERFOROMIST) nebulizer solution 20 mcg  20 mcg Nebulization Q12H    heparin (porcine) subcutaneous injection 5,000 Units  5,000 Units Subcutaneous Q8H JHON    ipratropium (ATROVENT) 0.02 % inhalation solution 0.5 mg  0.5 mg Nebulization TID    labetalol (NORMODYNE) injection 10 mg  10 mg Intravenous Q6H PRN    lactated ringers infusion  75 mL/hr Intravenous Continuous    levalbuterol (XOPENEX) inhalation solution 1.25 mg  1.25 mg Nebulization TID    NIFEdipine (PROCARDIA XL) 24 hr tablet 30 mg  30 mg Oral Daily    nystatin (MYCOSTATIN) oral suspension 500,000 Units  500,000 Units Swish & Swallow 4x Daily    ondansetron (ZOFRAN) injection 4 mg  4 mg Intravenous Q6H PRN    predniSONE tablet 40 mg  40 mg Oral Daily    sodium chloride (OCEAN) 0.65 % nasal spray 1 spray  1 spray Each Nare Q1H PRN     Medications Discontinued During This Encounter   Medication Reason    umeclidinium 62.5 mcg/actuation inhaler AEPB 1 puff     olodaterol HCl (STRIVERDI RESPIMAT) inhaler 2 puff     ipratropium-albuterol (DUO-NEB) 0.5-2.5 mg/3 mL inhalation solution 3 mL     albuterol (PROVENTIL HFA,VENTOLIN HFA) inhaler 2 puff     LORazepam (ATIVAN) injection 0.5 mg     furosemide (LASIX) injection 40 mg     amLODIPine (NORVASC) tablet 5 mg     sodium bicarbonate 75  mEq in sodium chloride 0.45 % 1,000 mL infusion     amLODIPine (NORVASC) tablet 5 mg     sodium bicarbonate 75 mEq in sodium chloride 0.45 % 1,000 mL infusion     methylPREDNISolone sodium succinate (Solu-MEDROL) injection 40 mg     sodium bicarbonate 150 mEq in dextrose 5 % 1,000 mL infusion     dexmedeTOMIDine (Precedex) 400 mcg in sodium chloride 0.9% 100 mL     dextrose 5 % infusion     methylPREDNISolone sodium succinate (Solu-MEDROL) injection 40 mg     aspirin rectal suppository 150 mg     methylPREDNISolone sodium succinate (Solu-MEDROL) injection 40 mg     amLODIPine (NORVASC) tablet 5 mg     hydrALAZINE (APRESOLINE) injection 10 mg     lactated ringers infusion     hydrALAZINE (APRESOLINE) injection 10 mg     LORazepam (ATIVAN) injection 1 mg        Damian Varrose, DO      This progress note was produced in part using a dictation device which may document imprecise wording from author's original intent.

## 2024-03-30 NOTE — ASSESSMENT & PLAN NOTE
Appears multifactorial, secondary to SEEMA/metabolic acidosis with compensatory tachypnea, COPD exacerbation, possibly acute diastolic CHF component  Was initially requiring continuous BiPAP therapy -now transitioned NC  Treating underlying conditions as above

## 2024-03-30 NOTE — ASSESSMENT & PLAN NOTE
"Per renal artery ultrasound report 3/29/2024: \"after further review of the data the patient does have a severe left renal artery stenosis at the aorto- renal junction.  Moderate disease more proximally  is seen as well.\"  Noted on renal artery US  Will request Vascular surgery evaluation  " Chest pain

## 2024-03-30 NOTE — PLAN OF CARE
Problem: PAIN - ADULT  Goal: Verbalizes/displays adequate comfort level or baseline comfort level  Description: Interventions:  - Encourage patient to monitor pain and request assistance  - Assess pain using appropriate pain scale  - Administer analgesics based on type and severity of pain and evaluate response  - Implement non-pharmacological measures as appropriate and evaluate response  - Consider cultural and social influences on pain and pain management  - Notify physician/advanced practitioner if interventions unsuccessful or patient reports new pain  Outcome: Progressing     Problem: SAFETY ADULT  Goal: Patient will remain free of falls  Description: INTERVENTIONS:  - Educate patient/family on patient safety including physical limitations  - Instruct patient to call for assistance with activity   - Consult OT/PT to assist with strengthening/mobility   - Keep Call bell within reach  - Keep bed low and locked with side rails adjusted as appropriate  - Keep care items and personal belongings within reach  - Initiate and maintain comfort rounds  - Make Fall Risk Sign visible to staff  - Offer Toileting every 2 Hours, in advance of need  - Initiate/Maintain fall alarm  - Obtain necessary fall risk management equipment: alarm, nonskid footwear, yellow wristband  - Apply yellow socks and bracelet for high fall risk patients  - Consider moving patient to room near nurses station  Outcome: Progressing  Goal: Maintain or return to baseline ADL function  Description: INTERVENTIONS:  -  Assess patient's ability to carry out ADLs; assess patient's baseline for ADL function and identify physical deficits which impact ability to perform ADLs (bathing, care of mouth/teeth, toileting, grooming, dressing, etc.)  - Assess/evaluate cause of self-care deficits   - Assess range of motion  - Assess patient's mobility; develop plan if impaired  - Assess patient's need for assistive devices and provide as appropriate  - Encourage  maximum independence but intervene and supervise when necessary  - Involve family in performance of ADLs  - Assess for home care needs following discharge   - Consider OT consult to assist with ADL evaluation and planning for discharge  - Provide patient education as appropriate  Outcome: Progressing  Goal: Maintains/Returns to pre admission functional level  Description: INTERVENTIONS:  - Perform AM-PAC 6 Click Basic Mobility/ Daily Activity assessment daily.  - Set and communicate daily mobility goal to care team and patient/family/caregiver.   - Collaborate with rehabilitation services on mobility goals if consulted  - Perform Range of Motion 4 times a day.  - Reposition patient every 2 hours.  - Dangle patient 3 times a day  - Stand patient 3 times a day  - Ambulate patient 3 times a day  - Out of bed to chair 3 times a day   - Out of bed for meals 3 times a day  - Out of bed for toileting  - Record patient progress and toleration of activity level   Outcome: Progressing     Problem: DISCHARGE PLANNING  Goal: Discharge to home or other facility with appropriate resources  Description: INTERVENTIONS:  - Identify barriers to discharge w/patient and caregiver  - Arrange for needed discharge resources and transportation as appropriate  - Identify discharge learning needs (meds, wound care, etc.)  - Arrange for interpretive services to assist at discharge as needed  - Refer to Case Management Department for coordinating discharge planning if the patient needs post-hospital services based on physician/advanced practitioner order or complex needs related to functional status, cognitive ability, or social support system  Outcome: Progressing     Problem: Knowledge Deficit  Goal: Patient/family/caregiver demonstrates understanding of disease process, treatment plan, medications, and discharge instructions  Description: Complete learning assessment and assess knowledge base.  Interventions:  - Provide teaching at level of  understanding  - Provide teaching via preferred learning methods  Outcome: Progressing     Problem: INFECTION - ADULT  Goal: Absence or prevention of progression during hospitalization  Description: INTERVENTIONS:  - Assess and monitor for signs and symptoms of infection  - Monitor lab/diagnostic results  - Monitor all insertion sites, i.e. indwelling lines, tubes, and drains  - Monitor endotracheal if appropriate and nasal secretions for changes in amount and color  - Wayne appropriate cooling/warming therapies per order  - Administer medications as ordered  - Instruct and encourage patient and family to use good hand hygiene technique  - Identify and instruct in appropriate isolation precautions for identified infection/condition  Outcome: Progressing     Problem: RESPIRATORY - ADULT  Goal: Achieves optimal ventilation and oxygenation  Description: INTERVENTIONS:  - Assess for changes in respiratory status  - Assess for changes in mentation and behavior  - Position to facilitate oxygenation and minimize respiratory effort  - Oxygen administered by appropriate delivery if ordered  - Initiate smoking cessation education as indicated  - Encourage broncho-pulmonary hygiene including cough, deep breathe, Incentive Spirometry  - Assess the need for suctioning and aspirate as needed  - Assess and instruct to report SOB or any respiratory difficulty  - Respiratory Therapy support as indicated  Outcome: Progressing     Problem: GENITOURINARY - ADULT  Goal: Maintains or returns to baseline urinary function  Description: INTERVENTIONS:  - Assess urinary function  - Encourage oral fluids to ensure adequate hydration if ordered  - Administer IV fluids as ordered to ensure adequate hydration  - Administer ordered medications as needed  - Offer frequent toileting  - Follow urinary retention protocol if ordered  Outcome: Progressing  Goal: Absence of urinary retention  Description: INTERVENTIONS:  - Assess patient’s ability  to void and empty bladder  - Monitor I/O  - Bladder scan as needed  - Discuss with physician/AP medications to alleviate retention as needed  - Discuss catheterization for long term situations as appropriate  Outcome: Progressing     Problem: METABOLIC, FLUID AND ELECTROLYTES - ADULT  Goal: Electrolytes maintained within normal limits  Description: INTERVENTIONS:  - Monitor labs and assess patient for signs and symptoms of electrolyte imbalances  - Administer electrolyte replacement as ordered  - Monitor response to electrolyte replacements, including repeat lab results as appropriate  - Instruct patient on fluid and nutrition as appropriate  Outcome: Progressing  Goal: Fluid balance maintained  Description: INTERVENTIONS:  - Monitor labs   - Monitor I/O and WT  - Instruct patient on fluid and nutrition as appropriate  - Assess for signs & symptoms of volume excess or deficit  Outcome: Progressing     Problem: CARDIOVASCULAR - ADULT  Goal: Maintains optimal cardiac output and hemodynamic stability  Description: INTERVENTIONS:  - Monitor I/O, vital signs and rhythm  - Monitor for S/S and trends of decreased cardiac output  - Administer and titrate ordered vasoactive medications to optimize hemodynamic stability  - Assess quality of pulses, skin color and temperature  - Assess for signs of decreased coronary artery perfusion  - Instruct patient to report change in severity of symptoms  Outcome: Progressing  Goal: Absence of cardiac dysrhythmias or at baseline rhythm  Description: INTERVENTIONS:  - Continuous cardiac monitoring, vital signs, obtain 12 lead EKG if ordered  - Administer antiarrhythmic and heart rate control medications as ordered  - Monitor electrolytes and administer replacement therapy as ordered  Outcome: Progressing     Problem: Prexisting or High Potential for Compromised Skin Integrity  Goal: Skin integrity is maintained or improved  Description: INTERVENTIONS:  - Identify patients at risk for skin  breakdown  - Assess and monitor skin integrity  - Assess and monitor nutrition and hydration status  - Monitor labs   - Assess for incontinence   - Turn and reposition patient  - Assist with mobility/ambulation  - Relieve pressure over bony prominences  - Avoid friction and shearing  - Provide appropriate hygiene as needed including keeping skin clean and dry  - Evaluate need for skin moisturizer/barrier cream  - Collaborate with interdisciplinary team   - Patient/family teaching  - Consider wound care consult   Outcome: Progressing     Problem: Nutrition/Hydration-ADULT  Goal: Nutrient/Hydration intake appropriate for improving, restoring or maintaining nutritional needs  Description: Monitor and assess patient's nutrition/hydration status for malnutrition. Collaborate with interdisciplinary team and initiate plan and interventions as ordered.  Monitor patient's weight and dietary intake as ordered or per policy. Utilize nutrition screening tool and intervene as necessary. Determine patient's food preferences and provide high-protein, high-caloric foods as appropriate.     INTERVENTIONS:  - Monitor oral intake, urinary output, labs, and treatment plans  - Assess nutrition and hydration status and recommend course of action  - Evaluate amount of meals eaten  - Assist patient with eating if necessary   - Allow adequate time for meals  - Recommend/ encourage appropriate diets, oral nutritional supplements, and vitamin/mineral supplements  - Order, calculate, and assess calorie counts as needed  - Recommend, monitor, and adjust tube feedings and TPN/PPN based on assessed needs  - Assess need for intravenous fluids  - Provide specific nutrition/hydration education as appropriate  - Include patient/family/caregiver in decisions related to nutrition  Outcome: Progressing

## 2024-03-30 NOTE — ASSESSMENT & PLAN NOTE
Lab Results   Component Value Date    EGFR 22 03/30/2024    EGFR 14 03/29/2024    EGFR 15 03/28/2024    CREATININE 2.02 (H) 03/30/2024    CREATININE 2.89 (H) 03/29/2024    CREATININE 2.83 (H) 03/28/2024   Patient with severe SEEMA, s/p HD 3/26. 3/27. 3/29  Continue with poor urine output  Nephrology following

## 2024-03-30 NOTE — ASSESSMENT & PLAN NOTE
Discussion with patient and family today, 3/30/2024 regarding next step in care  Patient understands her poor prognosis, and is contemplating transitioning to comfort care measures only, however, she does feel some improvement today and would like to wait over the next couple of days to see if she notices any improvement  Family is in understanding and agreement of this plan

## 2024-03-30 NOTE — ASSESSMENT & PLAN NOTE
SEEMA superimposed on CKD stage IIIa, present on admission, severely worsening renal function with oliguria  Patient initially admitted with acute respiratory failure felt to be secondary to COPD as well as possible CHF exacerbation component, received diuresis - with worsening renal function further diuretics now discontinued  S/p hemodialysis catheter placement 3/26/24, HD initiated 3/26, additional treatment 3/27, 3 hr treatment 3/29  Urine output remains poor.  Continue to monitor renal function and urine output

## 2024-03-30 NOTE — PROGRESS NOTES
"[facility]  Progress Note  Name: Laya Brooks I  MRN: [id number]  Unit/Bed#:  I Date of Admission: 3/23/2024   Date of Service: 3/30/2024 I Hospital Day: 7    Assessment/Plan   * Chronic obstructive pulmonary disease with acute exacerbation (HCC)  Assessment & Plan  This is an 79-year-old female patient with recently diagnosed COPD, history of CAD, CKD and hypertension who initially presented to the ED with shortness of breath, was noted to have significant audible wheezing with shortness of breath and had to be placed on BiPAP  Recent PFT showed moderate obstructive deficit, moderate DLCO reduction and evidence of hyperinflation and air trapping on lung volumes without a significant bronchodilator response. She was placed on LAMA/LABA inhalers  Per family, patient was using bronchodilators without significant improvement  In the ER she was placed on BiPAP - now weaned to 3L O2  Patient received initial dose of IV Solu-Medrol 125 mg in the ED and has been maintained on a taper - will proceed with 40 mg every 12 hours today  ABG without evidence of CO2 retention  Continue scheduled DuoNebs   Added IV ceftriaxone for COPD exacerbation 3/25/24  Treat SEEMA/metabolic acidosis with HD - contributing to patient's respiratory status      Oral candidiasis  Assessment & Plan  Tolerating nystatin swish and swallow  Likely contributing to dysphagia    Goals of care, counseling/discussion  Assessment & Plan  Discussion with patient and family today, 3/30/2024 regarding next step in care  Patient understands her poor prognosis, and is contemplating transitioning to comfort care measures only, however, she does feel some improvement today and would like to wait over the next couple of days to see if she notices any improvement  Family is in understanding and agreement of this plan    Renal artery stenosis (HCC)  Assessment & Plan  Per renal artery ultrasound report 3/29/2024: \"after further review " "of the data the patient does have a severe left renal artery stenosis at the aorto- renal junction.  Moderate disease more proximally  is seen as well.\"  Noted on renal artery US  Will request Vascular surgery evaluation    Dysphagia  Assessment & Plan  Initially felt to be due to encephalopathy, however failed to improve despite improvement in mental status  Speech therapy and pulmonology following  Patient is with poor oral intake, if she is unable to tolerate oral intake we will need to consider an alternative nutritional route on a short-term basis  Will likely need to further discuss goals of care with the patient and her family    Toxic metabolic encephalopathy  Assessment & Plan  Worsening mental status in the setting of metabolic disturbances including severe SEEMA, respiratory failure; toxic component of patient having received benzodiazepines and opiate medications  Improved  Monitor mental status, treat underlying conditions  CT head no acute findings    Acute respiratory failure with hypoxia (HCC)  Assessment & Plan  Appears multifactorial, secondary to SEEMA/metabolic acidosis with compensatory tachypnea, COPD exacerbation, possibly acute diastolic CHF component  Was initially requiring continuous BiPAP therapy -now transitioned NC  Treating underlying conditions as above    SEEMA (acute kidney injury) (HCC)  Assessment & Plan  SEEMA superimposed on CKD stage IIIa, present on admission, severely worsening renal function with oliguria  Patient initially admitted with acute respiratory failure felt to be secondary to COPD as well as possible CHF exacerbation component, received diuresis - with worsening renal function further diuretics now discontinued  S/p hemodialysis catheter placement 3/26/24, HD initiated 3/26, additional treatment 3/27, 3 hr treatment 3/29  Urine output remains poor.  Continue to monitor renal function and urine output      CAD (coronary artery disease)  Assessment & Plan  Patient with " history of STEMI in 2008 status post RCA stenting  Resumed Plavix and statin     Stage 3a chronic kidney disease (HCC)  Assessment & Plan  Lab Results   Component Value Date    EGFR 22 03/30/2024    EGFR 14 03/29/2024    EGFR 15 03/28/2024    CREATININE 2.02 (H) 03/30/2024    CREATININE 2.89 (H) 03/29/2024    CREATININE 2.83 (H) 03/28/2024   Patient with severe SEEMA, s/p HD 3/26. 3/27. 3/29  Continue with poor urine output  Nephrology following    CVA (cerebral vascular accident) (HCC)  Assessment & Plan  History noted  Continue with Plavix and statin  CT head no acute findings               VTE Pharmacologic Prophylaxis:    heparin    Mobility:   Basic Mobility Inpatient Raw Score: 7  [facility] Goal: 2: Bed activities/Dependent transfer  -[facility] Achieved: 4: Move to chair/commode      Patient Centered Rounds: I performed bedside rounds with nursing staff today.   Discussions with Specialists or Other Care Team Provider: Nephrology    Education and Discussions with Family / Patient: Updated  ( and son) at bedside.    Total Time Spent on Date of Encounter in care of patient: 50 mins. This time was spent on one or more of the following: performing physical exam; counseling and coordination of care; obtaining or reviewing history; documenting in the medical record; reviewing/ordering tests, medications or procedures; communicating with other healthcare professionals and discussing with patient's family/caregivers.    Current Length of Stay: 7 day(s)  Current Patient Status: Inpatient   Certification Statement: IV Rx, close monitoring  Discharge Plan: Anticipate discharge in >72 hrs to rehab facility.    Code Status: Level 1 - Full Code    Subjective:   Patient continues to complain of generalized weakness.  She does note some mild improvement and while considering transitioning to comfort measures, she states that she would like to continue treatments for next couple of days before deciding if she wants  to continue treatments and transition to hospice.    Objective:     Vitals:   Temp (24hrs), Av.9 °F (37.2 °C), Min:98.5 °F (36.9 °C), Max:99.5 °F (37.5 °C)    Temp:  [98.5 °F (36.9 °C)-99.5 °F (37.5 °C)] 99.5 °F (37.5 °C)  HR:  [72-95] 79  Resp:  [15-38] 16  BP: (141-202)/() 163/71  SpO2:  [87 %-99 %] 95 %  Body mass index is 18.04 kg/m².     Input and Output Summary (last 24 hours):     Intake/Output Summary (Last 24 hours) at 3/30/2024 1314  Last data filed at 3/30/2024 0545  Gross per 24 hour   Intake 1110 ml   Output 1105 ml   Net 5 ml       Physical Exam:   Physical Exam  Constitutional:       General: She is not in acute distress.     Appearance: She is ill-appearing.   HENT:      Head: Normocephalic and atraumatic.      Mouth/Throat:      Pharynx: Oropharynx is clear.   Cardiovascular:      Rate and Rhythm: Normal rate and regular rhythm.   Pulmonary:      Effort: Respiratory distress present.      Breath sounds: No wheezing or rales.   Abdominal:      General: There is no distension.      Tenderness: There is no abdominal tenderness. There is no guarding.   Musculoskeletal:      Right lower leg: No edema.      Left lower leg: No edema.   Skin:     General: Skin is warm and dry.   Neurological:      Mental Status: She is oriented to person, place, and time.          Additional Data:     Labs:  Results from last 7 days   Lab Units 24  0539   WBC Thousand/uL 9.36   HEMOGLOBIN g/dL 10.6*   HEMATOCRIT % 32.6*   PLATELETS Thousands/uL 95*   NEUTROS PCT % 86*   LYMPHS PCT % 5*   MONOS PCT % 8   EOS PCT % 0     Results from last 7 days   Lab Units 24  0539 24  0548 24  0517   SODIUM mmol/L 138   < > 132*   POTASSIUM mmol/L 4.2   < > 4.5   CHLORIDE mmol/L 105   < > 97   CO2 mmol/L 23   < > 22   BUN mg/dL 44*   < > 70*   CREATININE mg/dL 2.02*   < > 4.47*   ANION GAP mmol/L 10   < > 13   CALCIUM mg/dL 8.8   < > 8.4   ALBUMIN g/dL  --   --  3.4*   TOTAL BILIRUBIN mg/dL  --   --  0.41    ALK PHOS U/L  --   --  42   ALT U/L  --   --  11   AST U/L  --   --  14   GLUCOSE RANDOM mg/dL 127   < > 142*    < > = values in this interval not displayed.     Results from last 7 days   Lab Units 03/23/24  2209   INR  1.04             Results from last 7 days   Lab Units 03/25/24  1029 03/25/24  0504 03/23/24  2209   LACTIC ACID mmol/L 0.4*  --  1.5   PROCALCITONIN ng/ml  --  0.19 0.12       Lines/Drains:  Invasive Devices       Central Venous Catheter Line  Duration             CVC Central Lines 03/26/24 Double 4 days              Peripheral Intravenous Line  Duration             Peripheral IV 03/30/24 Dorsal (posterior);Left Forearm <1 day              Hemodialysis Catheter  Duration             HD Temporary Double Catheter 4 days              Drain  Duration             Urethral Catheter 16 Fr. 6 days                  Urinary Catheter:  Goal for removal: Voiding trial when ambulation improves         Central Line:  Goal for removal:  HD catheter             Imaging: Reviewed radiology reports from this admission including: ultrasound(s) and Personally reviewed the following imaging: ultrasound(s) renal artery doppler    Recent Cultures (last 7 days):         Last 24 Hours Medication List:   Current Facility-Administered Medications   Medication Dose Route Frequency Provider Last Rate    acetaminophen  650 mg Oral Q4H PRN Eleanor Rodriguez MD      albuterol  2.5 mg Nebulization Q4H PRN Eleanor Rodriguez MD      ALPRAZolam  0.5 mg Oral TID PRN Malena Hearn MD      atorvastatin  40 mg Oral Daily With Dinner Eleanor Rodriguez MD      budesonide  0.5 mg Nebulization Q12H Sincere Goldman PA-C      cefTRIAXone  1,000 mg Intravenous Q24H Malena Hearn MD 1,000 mg (03/29/24 1653)    clopidogrel  75 mg Oral Daily Eleanor Rodriguez MD      formoterol  20 mcg Nebulization Q12H Sincere Goldman PA-C      heparin (porcine)  5,000 Units Subcutaneous Q8H [facility] Eleanor Rodriguez MD      ipratropium   0.5 mg Nebulization TID Eleanor Rodriguez MD      labetalol  10 mg Intravenous Q6H PRN George Barcenas DO      lactated ringers  75 mL/hr Intravenous Continuous Kategalina Weston, DO 75 mL/hr (03/30/24 1013)    levalbuterol  1.25 mg Nebulization TID Eleanor Rodriguez MD      NIFEdipine  30 mg Oral Daily Kate Weston, DO      nystatin  500,000 Units Swish & Swallow 4x Daily Malena Hearn MD      ondansetron  4 mg Intravenous Q6H PRN Eleanor Rodriguez MD      predniSONE  40 mg Oral Daily Sincere Goldman PA-C      sodium chloride  1 spray Each Nare Q1H PRN Malena Hearn MD          Today, Patient Was Seen By: Malena Hearn MD    **Please Note: This note may have been constructed using a voice recognition system.**

## 2024-03-31 LAB
ANION GAP SERPL CALCULATED.3IONS-SCNC: 10 MMOL/L (ref 4–13)
BASOPHILS # BLD AUTO: 0.01 THOUSANDS/ÂΜL (ref 0–0.1)
BASOPHILS NFR BLD AUTO: 0 % (ref 0–1)
BUN SERPL-MCNC: 51 MG/DL (ref 5–25)
CALCIUM SERPL-MCNC: 8.6 MG/DL (ref 8.4–10.2)
CHLORIDE SERPL-SCNC: 105 MMOL/L (ref 96–108)
CO2 SERPL-SCNC: 23 MMOL/L (ref 21–32)
CREAT SERPL-MCNC: 2.01 MG/DL (ref 0.6–1.3)
EOSINOPHIL # BLD AUTO: 0 THOUSAND/ÂΜL (ref 0–0.61)
EOSINOPHIL NFR BLD AUTO: 0 % (ref 0–6)
ERYTHROCYTE [DISTWIDTH] IN BLOOD BY AUTOMATED COUNT: 14.6 % (ref 11.6–15.1)
GFR SERPL CREATININE-BSD FRML MDRD: 22 ML/MIN/1.73SQ M
GLUCOSE SERPL-MCNC: 119 MG/DL (ref 65–140)
HCT VFR BLD AUTO: 30.4 % (ref 34.8–46.1)
HGB BLD-MCNC: 9.6 G/DL (ref 11.5–15.4)
IMM GRANULOCYTES # BLD AUTO: 0.05 THOUSAND/UL (ref 0–0.2)
IMM GRANULOCYTES NFR BLD AUTO: 1 % (ref 0–2)
LYMPHOCYTES # BLD AUTO: 0.51 THOUSANDS/ÂΜL (ref 0.6–4.47)
LYMPHOCYTES NFR BLD AUTO: 8 % (ref 14–44)
MCH RBC QN AUTO: 28.9 PG (ref 26.8–34.3)
MCHC RBC AUTO-ENTMCNC: 31.6 G/DL (ref 31.4–37.4)
MCV RBC AUTO: 92 FL (ref 82–98)
MONOCYTES # BLD AUTO: 0.56 THOUSAND/ÂΜL (ref 0.17–1.22)
MONOCYTES NFR BLD AUTO: 9 % (ref 4–12)
NEUTROPHILS # BLD AUTO: 5.33 THOUSANDS/ÂΜL (ref 1.85–7.62)
NEUTS SEG NFR BLD AUTO: 82 % (ref 43–75)
NRBC BLD AUTO-RTO: 0 /100 WBCS
PLATELET # BLD AUTO: 78 THOUSANDS/UL (ref 149–390)
PMV BLD AUTO: 11.8 FL (ref 8.9–12.7)
POTASSIUM SERPL-SCNC: 4.3 MMOL/L (ref 3.5–5.3)
RBC # BLD AUTO: 3.32 MILLION/UL (ref 3.81–5.12)
SODIUM SERPL-SCNC: 138 MMOL/L (ref 135–147)
WBC # BLD AUTO: 6.46 THOUSAND/UL (ref 4.31–10.16)

## 2024-03-31 PROCEDURE — 94640 AIRWAY INHALATION TREATMENT: CPT

## 2024-03-31 PROCEDURE — 99233 SBSQ HOSP IP/OBS HIGH 50: CPT | Performed by: FAMILY MEDICINE

## 2024-03-31 PROCEDURE — 94760 N-INVAS EAR/PLS OXIMETRY 1: CPT

## 2024-03-31 PROCEDURE — 94664 DEMO&/EVAL PT USE INHALER: CPT

## 2024-03-31 PROCEDURE — 85025 COMPLETE CBC W/AUTO DIFF WBC: CPT | Performed by: FAMILY MEDICINE

## 2024-03-31 PROCEDURE — 80048 BASIC METABOLIC PNL TOTAL CA: CPT | Performed by: FAMILY MEDICINE

## 2024-03-31 PROCEDURE — 99232 SBSQ HOSP IP/OBS MODERATE 35: CPT | Performed by: INTERNAL MEDICINE

## 2024-03-31 RX ORDER — SODIUM CHLORIDE, SODIUM LACTATE, POTASSIUM CHLORIDE, CALCIUM CHLORIDE 600; 310; 30; 20 MG/100ML; MG/100ML; MG/100ML; MG/100ML
50 INJECTION, SOLUTION INTRAVENOUS CONTINUOUS
Status: DISPENSED | OUTPATIENT
Start: 2024-03-31 | End: 2024-04-01

## 2024-03-31 RX ORDER — SENNOSIDES 8.6 MG
1 TABLET ORAL
Status: DISCONTINUED | OUTPATIENT
Start: 2024-03-31 | End: 2024-04-02 | Stop reason: HOSPADM

## 2024-03-31 RX ADMIN — SODIUM CHLORIDE, SODIUM LACTATE, POTASSIUM CHLORIDE, AND CALCIUM CHLORIDE 50 ML/HR: .6; .31; .03; .02 INJECTION, SOLUTION INTRAVENOUS at 13:36

## 2024-03-31 RX ADMIN — NYSTATIN 500000 UNITS: 100000 SUSPENSION ORAL at 12:57

## 2024-03-31 RX ADMIN — FORMOTEROL FUMARATE DIHYDRATE 20 MCG: 20 SOLUTION RESPIRATORY (INHALATION) at 19:21

## 2024-03-31 RX ADMIN — IPRATROPIUM BROMIDE 0.5 MG: 0.5 SOLUTION RESPIRATORY (INHALATION) at 19:20

## 2024-03-31 RX ADMIN — CLOPIDOGREL 75 MG: 75 TABLET ORAL at 09:34

## 2024-03-31 RX ADMIN — IPRATROPIUM BROMIDE 0.5 MG: 0.5 SOLUTION RESPIRATORY (INHALATION) at 08:36

## 2024-03-31 RX ADMIN — NYSTATIN 500000 UNITS: 100000 SUSPENSION ORAL at 09:34

## 2024-03-31 RX ADMIN — IPRATROPIUM BROMIDE 0.5 MG: 0.5 SOLUTION RESPIRATORY (INHALATION) at 14:56

## 2024-03-31 RX ADMIN — NYSTATIN 500000 UNITS: 100000 SUSPENSION ORAL at 17:24

## 2024-03-31 RX ADMIN — NIFEDIPINE 30 MG: 30 TABLET, FILM COATED, EXTENDED RELEASE ORAL at 09:34

## 2024-03-31 RX ADMIN — LEVALBUTEROL HYDROCHLORIDE 1.25 MG: 1.25 SOLUTION RESPIRATORY (INHALATION) at 14:56

## 2024-03-31 RX ADMIN — HEPARIN SODIUM 5000 UNITS: 5000 INJECTION, SOLUTION INTRAVENOUS; SUBCUTANEOUS at 05:15

## 2024-03-31 RX ADMIN — ALPRAZOLAM 0.5 MG: 0.5 TABLET ORAL at 22:36

## 2024-03-31 RX ADMIN — ONDANSETRON 4 MG: 2 INJECTION INTRAMUSCULAR; INTRAVENOUS at 15:17

## 2024-03-31 RX ADMIN — ATORVASTATIN CALCIUM 40 MG: 40 TABLET, FILM COATED ORAL at 17:24

## 2024-03-31 RX ADMIN — CEFTRIAXONE 1000 MG: 1 INJECTION, SOLUTION INTRAVENOUS at 14:39

## 2024-03-31 RX ADMIN — HEPARIN SODIUM 5000 UNITS: 5000 INJECTION, SOLUTION INTRAVENOUS; SUBCUTANEOUS at 22:21

## 2024-03-31 RX ADMIN — BUDESONIDE 0.5 MG: 0.5 INHALANT RESPIRATORY (INHALATION) at 08:36

## 2024-03-31 RX ADMIN — LEVALBUTEROL HYDROCHLORIDE 1.25 MG: 1.25 SOLUTION RESPIRATORY (INHALATION) at 08:36

## 2024-03-31 RX ADMIN — HEPARIN SODIUM 5000 UNITS: 5000 INJECTION, SOLUTION INTRAVENOUS; SUBCUTANEOUS at 14:39

## 2024-03-31 RX ADMIN — LEVALBUTEROL HYDROCHLORIDE 1.25 MG: 1.25 SOLUTION RESPIRATORY (INHALATION) at 19:20

## 2024-03-31 RX ADMIN — SODIUM CHLORIDE, SODIUM LACTATE, POTASSIUM CHLORIDE, AND CALCIUM CHLORIDE 75 ML/HR: .6; .31; .03; .02 INJECTION, SOLUTION INTRAVENOUS at 00:10

## 2024-03-31 RX ADMIN — FORMOTEROL FUMARATE DIHYDRATE 20 MCG: 20 SOLUTION RESPIRATORY (INHALATION) at 08:36

## 2024-03-31 RX ADMIN — NYSTATIN 500000 UNITS: 100000 SUSPENSION ORAL at 22:21

## 2024-03-31 RX ADMIN — ALPRAZOLAM 0.5 MG: 0.5 TABLET ORAL at 22:21

## 2024-03-31 RX ADMIN — PREDNISONE 40 MG: 20 TABLET ORAL at 09:33

## 2024-03-31 RX ADMIN — SENNOSIDES 8.6 MG: 8.6 TABLET, FILM COATED ORAL at 22:21

## 2024-03-31 RX ADMIN — BUDESONIDE 0.5 MG: 0.5 INHALANT RESPIRATORY (INHALATION) at 19:21

## 2024-03-31 NOTE — PROGRESS NOTES
"[facility]  Progress Note  Name: Laya Brooks I  MRN: [id number]  Unit/Bed#:  I Date of Admission: 3/23/2024   Date of Service: 3/31/2024 I Hospital Day: 8    Assessment/Plan   * Chronic obstructive pulmonary disease with acute exacerbation (HCC)  Assessment & Plan  This is an 79-year-old female patient with recently diagnosed COPD, history of CAD, CKD and hypertension who initially presented to the ED with shortness of breath, was noted to have significant audible wheezing with shortness of breath and had to be placed on BiPAP  Recent PFT showed moderate obstructive deficit, moderate DLCO reduction and evidence of hyperinflation and air trapping on lung volumes without a significant bronchodilator response. She was placed on LAMA/LABA inhalers  Per family, patient was using bronchodilators without significant improvement  In the ER she was placed on BiPAP - now weaned to 3L O2  Patient received initial dose of IV Solu-Medrol 125 mg in the ED and has been maintained on a taper - transitioned to prednisone  ABG without evidence of CO2 retention  Continue scheduled DuoNebs   Course of ceftriaxone for COPD exacerbation ending 3/31/24  Treat SEEMA/metabolic acidosis with HD - contributing to patient's respiratory status and improved      Oral candidiasis  Assessment & Plan  Tolerating nystatin swish and swallow  Likely contributing to dysphagia  Symptoms improving     Renal artery stenosis (HCC)  Assessment & Plan  Per renal artery ultrasound report 3/29/2024: \"after further review of the data the patient does have a severe left renal artery stenosis at the aorto- renal junction.  Moderate disease more proximally  is seen as well.\"  Noted on renal artery US  Will request Vascular surgery evaluation    Dysphagia  Assessment & Plan  Initially felt to be due to encephalopathy, however failed to improve despite improvement in mental status  Speech therapy and pulmonology following  Suspect " her oral candidiasis is contributing to this with her dysphagia appears to be improving with oral nystatin treatment    Elevated d-dimer  Assessment & Plan  Most likely due to acute illness  No significant clinical suspicion for acute PE with marked clinical improvement with HD  LE venous duplex negative for DVT      Toxic metabolic encephalopathy  Assessment & Plan  Worsening mental status in the setting of metabolic disturbances including severe SEEMA, respiratory failure; toxic component of patient having received benzodiazepines and opiate medications  Significantly improved, patient alert and oriented x3  Monitor mental status, treat underlying conditions  CT head no acute findings    Acute respiratory failure with hypoxia (HCC)  Assessment & Plan  Appears multifactorial, secondary to SEEMA/metabolic acidosis with compensatory tachypnea, COPD exacerbation, possibly acute diastolic CHF component  Was initially requiring continuous BiPAP therapy -now transitioned NC  Treating underlying conditions as above    Acute diastolic congestive heart failure (HCC)  Assessment & Plan  Wt Readings from Last 3 Encounters:   03/31/24 42.1 kg (92 lb 13 oz)   03/18/24 43.1 kg (95 lb)   02/22/24 40.8 kg (90 lb)     Possible acute diastolic CHF with mild tricuspid regurgitation, worsening renal and respiratory failure  Patient initiated on dialysis 3/26 2-hours treatment, 2.5-hour treatment; additional treatment today 3/29/24  Monitor daily weights, I's and O's      SEEMA (acute kidney injury) (HCC)  Assessment & Plan  SEEMA superimposed on CKD stage IIIa, present on admission, severely worsening renal function with oliguria  Patient initially admitted with acute respiratory failure felt to be secondary to COPD as well as possible CHF exacerbation component, received diuresis - with worsening renal function further diuretics now discontinued  S/p hemodialysis catheter placement 3/26/24, HD initiated 3/26, additional treatment 3/27, 3 hr  treatment 3/29  Cr stable with still poor urine output. Continue to monitor for renal recovery.      CAD (coronary artery disease)  Assessment & Plan  Patient with history of STEMI in 2008 status post RCA stenting  Resumed Plavix and statin     Hypertension  Assessment & Plan  Continue nifedipine  IV labetalol PRN  BP trend appears improved    Stage 3a chronic kidney disease (HCC)  Assessment & Plan  Lab Results   Component Value Date    EGFR 22 03/31/2024    EGFR 22 03/30/2024    EGFR 14 03/29/2024    CREATININE 2.01 (H) 03/31/2024    CREATININE 2.02 (H) 03/30/2024    CREATININE 2.89 (H) 03/29/2024   Patient with severe SEEMA, s/p HD 3/26. 3/27. 3/29  Cr stabilized with still modest urine output  Nephrology following    CVA (cerebral vascular accident) (HCC)  Assessment & Plan  History noted  Continue with Plavix and statin  CT head no acute findings    Hypercholesterolemia  Assessment & Plan  Resume Lipitor with patient tolerating PO               VTE Pharmacologic Prophylaxis:    heparin SQ    Mobility:   Basic Mobility Inpatient Raw Score: 18  JH-HLM Goal: 6: Walk 10 steps or more  JH-HLM Achieved: 2: Bed activities/Dependent transfer  JH-HLM Goal NOT achieved. Continue with multidisciplinary rounding and encourage appropriate mobility to improve upon JH-HLM goals.    Patient Centered Rounds: I performed bedside rounds with nursing staff today.   Discussions with Specialists or Other Care Team Provider: Nephrology    Education and Discussions with Family / Patient:  ongoing update of family.     Total Time Spent on Date of Encounter in care of patient: 50 mins. This time was spent on one or more of the following: performing physical exam; counseling and coordination of care; obtaining or reviewing history; documenting in the medical record; reviewing/ordering tests, medications or procedures; communicating with other healthcare professionals and discussing with patient's family/caregivers.    Current Length of  Stay: 8 day(s)  Current Patient Status: Inpatient   Certification Statement: The patient will continue to require additional inpatient hospital stay due to close monitoring  Discharge Plan: Anticipate discharge in 48-72 hrs to rehab facility.    Code Status: Level 1 - Full Code    Subjective:   Patient states she is feeling somewhat better today. States her appetite seems better and that her sore throat has resolved.    Objective:     Vitals:   Temp (24hrs), Av.9 °F (37.2 °C), Min:98.1 °F (36.7 °C), Max:99.8 °F (37.7 °C)    Temp:  [98.1 °F (36.7 °C)-99.8 °F (37.7 °C)] 99 °F (37.2 °C)  HR:  [] 78  Resp:  [17-29] 20  BP: (131-190)/(63-88) 131/63  SpO2:  [88 %-94 %] 91 %  Body mass index is 18.13 kg/m².     Input and Output Summary (last 24 hours):     Intake/Output Summary (Last 24 hours) at 3/31/2024 1152  Last data filed at 3/31/2024 0855  Gross per 24 hour   Intake 1605 ml   Output 300 ml   Net 1305 ml       Physical Exam:   Physical Exam  Constitutional:       General: She is not in acute distress.     Appearance: She is underweight.   HENT:      Head: Normocephalic and atraumatic.      Nose: No congestion.   Eyes:      Conjunctiva/sclera: Conjunctivae normal.   Cardiovascular:      Rate and Rhythm: Normal rate and regular rhythm.      Heart sounds: No murmur heard.  Pulmonary:      Effort: No respiratory distress.      Breath sounds: Wheezing present. No rales.   Abdominal:      General: There is no distension.      Tenderness: There is no abdominal tenderness. There is no guarding.   Musculoskeletal:      Right lower leg: No edema.      Left lower leg: No edema.   Skin:     General: Skin is warm and dry.   Neurological:      Mental Status: She is oriented to person, place, and time.          Additional Data:     Labs:  Results from last 7 days   Lab Units 24  0450   WBC Thousand/uL 6.46   HEMOGLOBIN g/dL 9.6*   HEMATOCRIT % 30.4*   PLATELETS Thousands/uL 78*   NEUTROS PCT % 82*   LYMPHS PCT % 8*    MONOS PCT % 9   EOS PCT % 0     Results from last 7 days   Lab Units 03/31/24  0450 03/28/24  0548 03/27/24  0517   SODIUM mmol/L 138   < > 132*   POTASSIUM mmol/L 4.3   < > 4.5   CHLORIDE mmol/L 105   < > 97   CO2 mmol/L 23   < > 22   BUN mg/dL 51*   < > 70*   CREATININE mg/dL 2.01*   < > 4.47*   ANION GAP mmol/L 10   < > 13   CALCIUM mg/dL 8.6   < > 8.4   ALBUMIN g/dL  --   --  3.4*   TOTAL BILIRUBIN mg/dL  --   --  0.41   ALK PHOS U/L  --   --  42   ALT U/L  --   --  11   AST U/L  --   --  14   GLUCOSE RANDOM mg/dL 119   < > 142*    < > = values in this interval not displayed.                 Results from last 7 days   Lab Units 03/25/24  1029 03/25/24  0504   LACTIC ACID mmol/L 0.4*  --    PROCALCITONIN ng/ml  --  0.19       Lines/Drains:  Invasive Devices       Central Venous Catheter Line  Duration             CVC Central Lines 03/26/24 Double 5 days              Peripheral Intravenous Line  Duration             Peripheral IV 03/30/24 Dorsal (posterior);Left Forearm 1 day              Hemodialysis Catheter  Duration             HD Temporary Double Catheter 5 days              Drain  Duration             Urethral Catheter 16 Fr. 7 days                  Urinary Catheter:  Goal for removal: will remove with improvement in renal function         Central Line:  Goal for removal:  HD catheter             Imaging: no new today    Recent Cultures (last 7 days):         Last 24 Hours Medication List:   Current Facility-Administered Medications   Medication Dose Route Frequency Provider Last Rate    acetaminophen  650 mg Oral Q4H PRN Eleanor Rodriguez MD      albuterol  2.5 mg Nebulization Q4H PRN Eleanor Rodriguez MD      ALPRAZolam  0.5 mg Oral TID PRN Malena Hearn MD      atorvastatin  40 mg Oral Daily With Dinner Eleanor Rodriguez MD      budesonide  0.5 mg Nebulization Q12H Sincere Goldman PA-C      cefTRIAXone  1,000 mg Intravenous Q24H Malena Hearn MD 1,000 mg (03/30/24 2088)     clopidogrel  75 mg Oral Daily Eleanor Rodriguez MD      formoterol  20 mcg Nebulization Q12H Sincere Goldman PA-C      heparin (porcine)  5,000 Units Subcutaneous Q8H J[facility]ON Eleanor Rodriguez MD      ipratropium  0.5 mg Nebulization TID Eleanor Rodriguez MD      labetalol  10 mg Intravenous Q6H PRN Malena Hearn MD      lactated ringers  75 mL/hr Intravenous Continuous Kate Trista, DO 75 mL/hr (03/31/24 0010)    levalbuterol  1.25 mg Nebulization TID Eleanor Rodriguez MD      NIFEdipine  30 mg Oral Daily Kate Trista, DO      nystatin  500,000 Units Swish & Swallow 4x Daily Malena Hearn MD      ondansetron  4 mg Intravenous Q6H PRN Eleanor Rodriguez MD      predniSONE  40 mg Oral Daily Sincere Goldman PA-C      sodium chloride  1 spray Each Nare Q1H PRN Malena Hearn MD          Today, Patient Was Seen By: Malena Hearn MD    **Please Note: This note may have been constructed using a voice recognition system.**

## 2024-03-31 NOTE — ASSESSMENT & PLAN NOTE
Lab Results   Component Value Date    EGFR 22 03/31/2024    EGFR 22 03/30/2024    EGFR 14 03/29/2024    CREATININE 2.01 (H) 03/31/2024    CREATININE 2.02 (H) 03/30/2024    CREATININE 2.89 (H) 03/29/2024   Patient with severe SEEMA, s/p HD 3/26. 3/27. 3/29  Cr stabilized with still modest urine output  Nephrology following

## 2024-03-31 NOTE — ASSESSMENT & PLAN NOTE
Initially felt to be due to encephalopathy, however failed to improve despite improvement in mental status  Speech therapy and pulmonology following  Suspect her oral candidiasis is contributing to this with her dysphagia appears to be improving with oral nystatin treatment

## 2024-03-31 NOTE — ASSESSMENT & PLAN NOTE
"Per renal artery ultrasound report 3/29/2024: \"after further review of the data the patient does have a severe left renal artery stenosis at the aorto- renal junction.  Moderate disease more proximally  is seen as well.\"  Noted on renal artery US  Will request Vascular surgery evaluation  "

## 2024-03-31 NOTE — PROGRESS NOTES
Progress Note - Nephrology   Laya Brooks 79 y.o. female MRN: [id number]  Unit/Bed#:  Encounter: [id number]    A/P:  1.  Hemodialysis dependent acute kidney injury              Patient to be in evaluation for tomorrow, creatinine stable over the last 24 hours at around 2 mg/dL.  Continue to optimize care and avoid nephrotoxins.  Suspect the patient's reduced urine output, it is apparently substantial enough to maintain current creatinine clearance.  Continue to closely monitor.  There may be a component of hemodynamically significant renovascular disease, please refer below.  2.  Chronic kidney disease stage IIIa with baseline creatinine around 1 mg/dL  3.  Renovascular disease              Patient with a potentially hemodynamically significant finding.  As noted yesterday, please refer to note from March 30, will ask vascular and interventional radiology to evaluate to see if further investigation is indicated and if the patient is a potential candidate for intervention prior to proceeding with more advanced imaging studies and treatments.  4.  Metabolic acidosis              Bicarbonate remains stable  5.  Hyperkalemia-resolved  6.  Hypertension in setting of chronic kidney disease              Blood pressures are little labile, they have been as good as 131 mmHg systolic this morning, slightly elevated 164 mm of mercury late morning just before noon time.  Additional medications as indicated going forward.  7.  Metabolic encephalopathy-improved              Patient lethargic this morning, but in general improved from the encephalopathic standpoint.    Case discussed with hospitalist.  Patient will be in evaluation for potential hemodialysis tomorrow, will slightly reduce IV fluid rate from 75 mL/hr down to 50 mL/hr.  Patient continues to have poor oral intake in general.    Follow up reason for today's visit: Acute kidney injury/chronic kidney disease/renovascular disease/acidosis    Chronic obstructive  pulmonary disease with acute exacerbation (HCC)    Patient Active Problem List   Diagnosis    History of CVA (cerebrovascular accident)    Hypertensive emergency    Malnutrition (HCC)    Premature atrial complexes    Thrombocytopenia (HCC)    Leukopenia    Neutropenia (HCC)    Hyperphosphatemia    Hypercholesterolemia    Carotid artery stenosis    Nocturnal hypoxia    Dyspnea on exertion    Abnormal echocardiogram    CVA (cerebral vascular accident) (HCC)    Atrophy of right kidney    Stage 3a chronic kidney disease (HCC)    Renal vascular disease    Hypertension    Intracranial atherosclerosis    Weight loss, non-intentional    Hypervitaminosis D    CAD (coronary artery disease)    Celiac artery stenosis (HCC)    S/P right coronary artery (RCA) stent placement    Tobacco use    Wheezing    Dyslipidemia    SEEMA (acute kidney injury) (HCC)    Acute diastolic congestive heart failure (HCC)    Hyponatremia    Chronic obstructive pulmonary disease with acute exacerbation (HCC)    Acute respiratory failure with hypoxia (HCC)    Toxic metabolic encephalopathy    Elevated d-dimer    Dysphagia    Renal artery stenosis (HCC)    Goals of care, counseling/discussion    Oral candidiasis         Subjective:   Patient is tired today but otherwise has no acute complaints.  She is feeling better in general.    Objective:     Vitals: Blood pressure 164/67, pulse 82, temperature 99 °F (37.2 °C), temperature source Temporal, resp. rate 20, height 5' (1.524 m), weight 42.1 kg (92 lb 13 oz), SpO2 91%.,Body mass index is 18.13 kg/m².    Weight (last 2 days)       Date/Time Weight    03/31/24 0600 42.1 (92.81)    03/30/24 0548 41.9 (92.37)    03/29/24 0540 42.3 (93.25)              Intake/Output Summary (Last 24 hours) at 3/31/2024 1229  Last data filed at 3/31/2024 0855  Gross per 24 hour   Intake 1485 ml   Output 300 ml   Net 1185 ml     I/O last 3 completed shifts:  In: 1545 [P.O.:420; I.V.:1125]  Out: 350 [Urine:350]    HD Temporary  Double Catheter (Active)   Reasons to continue HD Cath Treatment Therapy 03/31/24 0721   Goal for Removal Other (comment) 03/29/24 2100   Line Necessity Reviewed Yes, reviewed with provider 03/31/24 0721   Site Assessment WDL 03/31/24 0721   Proximal Lumen Status Flushed & Clamped;Normal saline locked;Needle-free valve changed;Passive disinfecting cap applied 03/29/24 1550   Distal Lumen Status Flushed & Clamped;Normal saline locked;Needle-free valve changed;Passive disinfecting cap applied 03/29/24 1550   Line Care Connections checked and tightened 03/29/24 1550   Dressing Type Chlorhexidine dressing 03/31/24 0721   Dressing Status Clean;Dry;Intact 03/31/24 0721   Dressing Change Due 04/02/24 03/29/24 2100       Urethral Catheter 16 Fr. (Active)   Amt returned on insertion(mL) 125 mL 03/24/24 1108   Reasons to continue Urinary Catheter  Accurate I&O assessment in critically ill patients (48 hr. max) 03/30/24 2001   Goal for Removal Voiding trial when ambulation improves 03/29/24 2100   Site Assessment Skin intact;Clean 03/30/24 2001   Perez Care Done 03/31/24 0900   Collection Container Standard drainage bag 03/30/24 2001   Securement Method Securing device (Describe) 03/30/24 2001   Output (mL) 150 mL 03/30/24 1723   CAUTI Time-out performed before Urine Culture Yes 03/24/24 1108       Physical Exam: /67 (BP Location: Right arm)   Pulse 82   Temp 99 °F (37.2 °C) (Temporal)   Resp 20   Ht 5' (1.524 m)   Wt 42.1 kg (92 lb 13 oz)   SpO2 91%   BMI 18.13 kg/m²     General Appearance:    Alert, cooperative, no distress, appears stated age   Head:    Normocephalic, without obvious abnormality, atraumatic   Eyes:    Conjunctiva/corneas clear   Ears:    Normal external ears   Nose:   Nares normal, septum midline, mucosa normal, no drainage    or sinus tenderness   Throat:   Lips, mucosa, and tongue normal; teeth and gums normal   Neck:   Supple   Back:     Symmetric, no curvature, ROM normal, no CVA tenderness  "  Lungs:     Clear to auscultation bilaterally, respirations unlabored   Chest wall:    No tenderness or deformity   Heart:    Regular rate and rhythm, S1 and S2 normal, no murmur, rub   or gallop   Abdomen:     Soft, non-tender, bowel sounds active   Extremities:   Extremities normal, atraumatic, no cyanosis or edema   Skin:   Skin color, texture, turgor normal, no rashes or lesions   Lymph nodes:   Cervical normal   Neurologic:   CNII-XII intact            Lab, Imaging and other studies: I have personally reviewed pertinent labs.  CBC:   Lab Results   Component Value Date    WBC 6.46 03/31/2024    HGB 9.6 (L) 03/31/2024    HCT 30.4 (L) 03/31/2024    MCV 92 03/31/2024    PLT 78 (L) 03/31/2024    RBC 3.32 (L) 03/31/2024    MCH 28.9 03/31/2024    MCHC 31.6 03/31/2024    RDW 14.6 03/31/2024    MPV 11.8 03/31/2024    NRBC 0 03/31/2024     CMP:   Lab Results   Component Value Date    K 4.3 03/31/2024     03/31/2024    CO2 23 03/31/2024    BUN 51 (H) 03/31/2024    CREATININE 2.01 (H) 03/31/2024    CALCIUM 8.6 03/31/2024    EGFR 22 03/31/2024       .  Results from last 7 days   Lab Units 03/31/24  0450 03/30/24  0539 03/29/24  0435 03/28/24  0548 03/27/24  0517 03/26/24  0456 03/25/24  1351 03/25/24  0504   POTASSIUM mmol/L 4.3 4.2 4.5   < > 4.5 5.4*   < > 5.1   CHLORIDE mmol/L 105 105 98   < > 97 99   < > 99   CO2 mmol/L 23 23 28   < > 22 16*   < > 15*   BUN mg/dL 51* 44* 56*   < > 70* 96*   < > 75*   CREATININE mg/dL 2.01* 2.02* 2.89*   < > 4.47* 6.27*   < > 4.65*   CALCIUM mg/dL 8.6 8.8 8.7   < > 8.4 8.4   < > 9.0   ALK PHOS U/L  --   --   --   --  42 45  --  55   ALT U/L  --   --   --   --  11 11  --  13   AST U/L  --   --   --   --  14 9*  --  12*    < > = values in this interval not displayed.         Phosphorus: No results found for: \"PHOS\"  Magnesium: No results found for: \"MG\"  Urinalysis: No results found for: \"COLORU\", \"CLARITYU\", \"SPECGRAV\", \"PHUR\", \"LEUKOCYTESUR\", \"NITRITE\", \"PROTEINUA\", \"GLUCOSEU\", " "\"KETONESU\", \"BILIRUBINUR\", \"BLOODU\"  Ionized Calcium: No results found for: \"CAION\"  Coagulation: No results found for: \"PT\", \"INR\", \"APTT\"  Troponin: No results found for: \"TROPONINI\"  ABG: No results found for: \"PHART\", \"SUG5SYZ\", \"PO2ART\", \"OCF5IHK\", \"F1LLPVUS\", \"BEART\", \"SOURCE\"  Radiology review:     IMAGING  Procedure: XR chest 1 view    Result Date: 3/29/2024  Narrative: XR CHEST 1 VIEW INDICATION: Shortness of breath. COMPARISON: 3/26/2024, CT chest 3/24/2024 FINDINGS: Monitoring leads and clips project over the chest. -Right IJ central line tip terminates in the region of the distal SVC. Small bilateral pleural effusions with left retrocardiac opacity, the latter possibly representing atelectasis or infiltrate. No pneumothorax. Normal cardiomediastinal silhouette. Bones are unremarkable for age. Normal upper abdomen.     Impression: Small bilateral pleural effusions with left retrocardiac opacity, the latter possibly representing atelectasis or infiltrate. Workstation performed: [id number]     Procedure: VAS renal artery limited    Result Date: 3/29/2024  Narrative:  [facility] REPORT CLINICAL: Indications: Provider would like to evaluate perfusion to the left kidney due to rapid severe SEEMA and history of renal artery disease. Operative History: Coronary Angioplasty w/ Stent Placement Risk Factors The patient has history of HTN, Hyperlipidemia, CKD and CAD.  FINDINGS:  Unilateral     Impression  PSV (cm/s)  EDV (cm/s)  Sup-Calli Ao                         60              Celiac         >70%               265          19  Px Inf-Tomas Ao  Ectatic             62              Ds Inf-Tomas Ao                      73              Prox. SMA      >70%               517          17   Segment        Righ  Left                            RI  Impression  PSV (cm/s)  EDV (cm/s)   RAR    RI  Kidney (cm)  Ostial Renal         60 - 99%           530         167  8.91                     Prox Renal     0.80  60 - 99% "           297          65  4.99                     Mid Renal      0.77                     131          30  2.20                     Dist Renal     0.75                      91          25  1.53                     Celiac Artery                            13           5        0.60               Kidney                                                                      9.80  Renal                Patent                                                       Hilum                                    13           5        0.60                  CONCLUSION: Impression The abdominal aorta is widely patent and ectatic in caliber in the mid segment.  LEFT RENAL: No evidence of significant arterial occlusive disease in the main renal artery. Patent renal vein. Adequate parenchymal flow is noted with a renovascular resistive index of 0.60. Renal/Aorta Ratio: 8.91. The kidney measures approximately 9.8 x 4.3 cm. Prior: 10.0 x 4.7 cm.  MESENTERIC: Celiac and superior mesenteric arteries demonstrate elevated velocities, consistent with significant stenosis.  Compared to previous study on 5/6/2022, there is no significant change.  SIGNATURE: Electronically Signed by: JIAN CULVER MD, RPVI on 2024-03-28 02:23:29 PM ADDENDUM:  3/29/2024 12:16:15 PM: After further review of the data the patient does have a severe left renal artery stenosis at the aorto- renal junction.  Moderate disease more proximally is seen as well. Signed by JIAN CULVER MD, RPVI on 2024-03-29 12:20:31 PM      Current Facility-Administered Medications   Medication Dose Route Frequency    acetaminophen (TYLENOL) tablet 650 mg  650 mg Oral Q4H PRN    albuterol inhalation solution 2.5 mg  2.5 mg Nebulization Q4H PRN    ALPRAZolam (XANAX) tablet 0.5 mg  0.5 mg Oral TID PRN    atorvastatin (LIPITOR) tablet 40 mg  40 mg Oral Daily With Dinner    budesonide (PULMICORT) inhalation solution 0.5 mg  0.5 mg Nebulization Q12H    cefTRIAXone (ROCEPHIN) IVPB (premix in  dextrose) 1,000 mg 50 mL  1,000 mg Intravenous Q24H    clopidogrel (PLAVIX) tablet 75 mg  75 mg Oral Daily    formoterol (PERFOROMIST) nebulizer solution 20 mcg  20 mcg Nebulization Q12H    heparin (porcine) subcutaneous injection 5,000 Units  5,000 Units Subcutaneous Q8H JHON    ipratropium (ATROVENT) 0.02 % inhalation solution 0.5 mg  0.5 mg Nebulization TID    labetalol (NORMODYNE) injection 10 mg  10 mg Intravenous Q6H PRN    levalbuterol (XOPENEX) inhalation solution 1.25 mg  1.25 mg Nebulization TID    NIFEdipine (PROCARDIA XL) 24 hr tablet 30 mg  30 mg Oral Daily    nystatin (MYCOSTATIN) oral suspension 500,000 Units  500,000 Units Swish & Swallow 4x Daily    ondansetron (ZOFRAN) injection 4 mg  4 mg Intravenous Q6H PRN    predniSONE tablet 40 mg  40 mg Oral Daily    sodium chloride (OCEAN) 0.65 % nasal spray 1 spray  1 spray Each Nare Q1H PRN     Medications Discontinued During This Encounter   Medication Reason    umeclidinium 62.5 mcg/actuation inhaler AEPB 1 puff     olodaterol HCl (STRIVERDI RESPIMAT) inhaler 2 puff     ipratropium-albuterol (DUO-NEB) 0.5-2.5 mg/3 mL inhalation solution 3 mL     albuterol (PROVENTIL HFA,VENTOLIN HFA) inhaler 2 puff     LORazepam (ATIVAN) injection 0.5 mg     furosemide (LASIX) injection 40 mg     amLODIPine (NORVASC) tablet 5 mg     sodium bicarbonate 75 mEq in sodium chloride 0.45 % 1,000 mL infusion     amLODIPine (NORVASC) tablet 5 mg     sodium bicarbonate 75 mEq in sodium chloride 0.45 % 1,000 mL infusion     methylPREDNISolone sodium succinate (Solu-MEDROL) injection 40 mg     sodium bicarbonate 150 mEq in dextrose 5 % 1,000 mL infusion     dexmedeTOMIDine (Precedex) 400 mcg in sodium chloride 0.9% 100 mL     dextrose 5 % infusion     methylPREDNISolone sodium succinate (Solu-MEDROL) injection 40 mg     aspirin rectal suppository 150 mg     methylPREDNISolone sodium succinate (Solu-MEDROL) injection 40 mg     amLODIPine (NORVASC) tablet 5 mg     hydrALAZINE  (APRESOLINE) injection 10 mg     lactated ringers infusion     hydrALAZINE (APRESOLINE) injection 10 mg     LORazepam (ATIVAN) injection 1 mg     labetalol (NORMODYNE) injection 10 mg        Damina Lynne, DO      This progress note was produced in part using a dictation device which may document imprecise wording from author's original intent.

## 2024-03-31 NOTE — RESPIRATORY THERAPY NOTE
03/31/24 0839   Inhalation Therapy Tx   $ Inhalation Therapy Performed Yes   $ Pulse Oximetry Spot Check Charge Completed   SpO2 91 %  (1 l/m , pt taken off nc  after this check RA baseline)   Pre-Treatment Pulse 89   Pre-Treatment Respirations 22   Duration 15   Delivery Source Air;UDN   Position [employer]   Resp Comments pt on RA baseline, xop/atr

## 2024-03-31 NOTE — ASSESSMENT & PLAN NOTE
Worsening mental status in the setting of metabolic disturbances including severe SEEMA, respiratory failure; toxic component of patient having received benzodiazepines and opiate medications  Significantly improved, patient alert and oriented x3  Monitor mental status, treat underlying conditions  CT head no acute findings

## 2024-03-31 NOTE — PLAN OF CARE
Problem: PAIN - ADULT  Goal: Verbalizes/displays adequate comfort level or baseline comfort level  Description: Interventions:  - Encourage patient to monitor pain and request assistance  - Assess pain using appropriate pain scale  - Administer analgesics based on type and severity of pain and evaluate response  - Implement non-pharmacological measures as appropriate and evaluate response  - Consider cultural and social influences on pain and pain management  - Notify physician/advanced practitioner if interventions unsuccessful or patient reports new pain  Outcome: Progressing     Problem: SAFETY ADULT  Goal: Patient will remain free of falls  Description: INTERVENTIONS:  - Educate patient/family on patient safety including physical limitations  - Instruct patient to call for assistance with activity   - Consult OT/PT to assist with strengthening/mobility   - Keep Call bell within reach  - Keep bed low and locked with side rails adjusted as appropriate  - Keep care items and personal belongings within reach  - Initiate and maintain comfort rounds  - Make Fall Risk Sign visible to staff  - Offer Toileting every 2 Hours, in advance of need  - Initiate/Maintain fall alarm  - Obtain necessary fall risk management equipment: alarm, nonskid footwear, yellow wristband  - Apply yellow socks and bracelet for high fall risk patients  - Consider moving patient to room near nurses station  Outcome: Progressing  Goal: Maintain or return to baseline ADL function  Description: INTERVENTIONS:  -  Assess patient's ability to carry out ADLs; assess patient's baseline for ADL function and identify physical deficits which impact ability to perform ADLs (bathing, care of mouth/teeth, toileting, grooming, dressing, etc.)  - Assess/evaluate cause of self-care deficits   - Assess range of motion  - Assess patient's mobility; develop plan if impaired  - Assess patient's need for assistive devices and provide as appropriate  - Encourage  maximum independence but intervene and supervise when necessary  - Involve family in performance of ADLs  - Assess for home care needs following discharge   - Consider OT consult to assist with ADL evaluation and planning for discharge  - Provide patient education as appropriate  Outcome: Progressing  Goal: Maintains/Returns to pre admission functional level  Description: INTERVENTIONS:  - Perform AM-PAC 6 Click Basic Mobility/ Daily Activity assessment daily.  - Set and communicate daily mobility goal to care team and patient/family/caregiver.   - Collaborate with rehabilitation services on mobility goals if consulted  - Perform Range of Motion 4 times a day.  - Reposition patient every 2 hours.  - Dangle patient 3 times a day  - Stand patient 3 times a day  - Ambulate patient 3 times a day  - Out of bed to chair 3 times a day   - Out of bed for meals 3 times a day  - Out of bed for toileting  - Record patient progress and toleration of activity level   Outcome: Progressing     Problem: DISCHARGE PLANNING  Goal: Discharge to home or other facility with appropriate resources  Description: INTERVENTIONS:  - Identify barriers to discharge w/patient and caregiver  - Arrange for needed discharge resources and transportation as appropriate  - Identify discharge learning needs (meds, wound care, etc.)  - Arrange for interpretive services to assist at discharge as needed  - Refer to Case Management Department for coordinating discharge planning if the patient needs post-hospital services based on physician/advanced practitioner order or complex needs related to functional status, cognitive ability, or social support system  Outcome: Progressing     Problem: Knowledge Deficit  Goal: Patient/family/caregiver demonstrates understanding of disease process, treatment plan, medications, and discharge instructions  Description: Complete learning assessment and assess knowledge base.  Interventions:  - Provide teaching at level of  understanding  - Provide teaching via preferred learning methods  Outcome: Progressing     Problem: INFECTION - ADULT  Goal: Absence or prevention of progression during hospitalization  Description: INTERVENTIONS:  - Assess and monitor for signs and symptoms of infection  - Monitor lab/diagnostic results  - Monitor all insertion sites, i.e. indwelling lines, tubes, and drains  - Monitor endotracheal if appropriate and nasal secretions for changes in amount and color  - Warner appropriate cooling/warming therapies per order  - Administer medications as ordered  - Instruct and encourage patient and family to use good hand hygiene technique  - Identify and instruct in appropriate isolation precautions for identified infection/condition  Outcome: Progressing     Problem: RESPIRATORY - ADULT  Goal: Achieves optimal ventilation and oxygenation  Description: INTERVENTIONS:  - Assess for changes in respiratory status  - Assess for changes in mentation and behavior  - Position to facilitate oxygenation and minimize respiratory effort  - Oxygen administered by appropriate delivery if ordered  - Initiate smoking cessation education as indicated  - Encourage broncho-pulmonary hygiene including cough, deep breathe, Incentive Spirometry  - Assess the need for suctioning and aspirate as needed  - Assess and instruct to report SOB or any respiratory difficulty  - Respiratory Therapy support as indicated  Outcome: Progressing     Problem: GENITOURINARY - ADULT  Goal: Maintains or returns to baseline urinary function  Description: INTERVENTIONS:  - Assess urinary function  - Encourage oral fluids to ensure adequate hydration if ordered  - Administer IV fluids as ordered to ensure adequate hydration  - Administer ordered medications as needed  - Offer frequent toileting  - Follow urinary retention protocol if ordered  Outcome: Progressing  Goal: Absence of urinary retention  Description: INTERVENTIONS:  - Assess patient’s ability  to void and empty bladder  - Monitor I/O  - Bladder scan as needed  - Discuss with physician/AP medications to alleviate retention as needed  - Discuss catheterization for long term situations as appropriate  Outcome: Progressing     Problem: METABOLIC, FLUID AND ELECTROLYTES - ADULT  Goal: Electrolytes maintained within normal limits  Description: INTERVENTIONS:  - Monitor labs and assess patient for signs and symptoms of electrolyte imbalances  - Administer electrolyte replacement as ordered  - Monitor response to electrolyte replacements, including repeat lab results as appropriate  - Instruct patient on fluid and nutrition as appropriate  Outcome: Progressing  Goal: Fluid balance maintained  Description: INTERVENTIONS:  - Monitor labs   - Monitor I/O and WT  - Instruct patient on fluid and nutrition as appropriate  - Assess for signs & symptoms of volume excess or deficit  Outcome: Progressing     Problem: CARDIOVASCULAR - ADULT  Goal: Maintains optimal cardiac output and hemodynamic stability  Description: INTERVENTIONS:  - Monitor I/O, vital signs and rhythm  - Monitor for S/S and trends of decreased cardiac output  - Administer and titrate ordered vasoactive medications to optimize hemodynamic stability  - Assess quality of pulses, skin color and temperature  - Assess for signs of decreased coronary artery perfusion  - Instruct patient to report change in severity of symptoms  Outcome: Progressing  Goal: Absence of cardiac dysrhythmias or at baseline rhythm  Description: INTERVENTIONS:  - Continuous cardiac monitoring, vital signs, obtain 12 lead EKG if ordered  - Administer antiarrhythmic and heart rate control medications as ordered  - Monitor electrolytes and administer replacement therapy as ordered  Outcome: Progressing     Problem: Prexisting or High Potential for Compromised Skin Integrity  Goal: Skin integrity is maintained or improved  Description: INTERVENTIONS:  - Identify patients at risk for skin  breakdown  - Assess and monitor skin integrity  - Assess and monitor nutrition and hydration status  - Monitor labs   - Assess for incontinence   - Turn and reposition patient  - Assist with mobility/ambulation  - Relieve pressure over bony prominences  - Avoid friction and shearing  - Provide appropriate hygiene as needed including keeping skin clean and dry  - Evaluate need for skin moisturizer/barrier cream  - Collaborate with interdisciplinary team   - Patient/family teaching  - Consider wound care consult   Outcome: Progressing     Problem: Nutrition/Hydration-ADULT  Goal: Nutrient/Hydration intake appropriate for improving, restoring or maintaining nutritional needs  Description: Monitor and assess patient's nutrition/hydration status for malnutrition. Collaborate with interdisciplinary team and initiate plan and interventions as ordered.  Monitor patient's weight and dietary intake as ordered or per policy. Utilize nutrition screening tool and intervene as necessary. Determine patient's food preferences and provide high-protein, high-caloric foods as appropriate.     INTERVENTIONS:  - Monitor oral intake, urinary output, labs, and treatment plans  - Assess nutrition and hydration status and recommend course of action  - Evaluate amount of meals eaten  - Assist patient with eating if necessary   - Allow adequate time for meals  - Recommend/ encourage appropriate diets, oral nutritional supplements, and vitamin/mineral supplements  - Order, calculate, and assess calorie counts as needed  - Recommend, monitor, and adjust tube feedings and TPN/PPN based on assessed needs  - Assess need for intravenous fluids  - Provide specific nutrition/hydration education as appropriate  - Include patient/family/caregiver in decisions related to nutrition  Outcome: Progressing

## 2024-03-31 NOTE — ASSESSMENT & PLAN NOTE
Wt Readings from Last 3 Encounters:   03/31/24 42.1 kg (92 lb 13 oz)   03/18/24 43.1 kg (95 lb)   02/22/24 40.8 kg (90 lb)     Possible acute diastolic CHF with mild tricuspid regurgitation, worsening renal and respiratory failure  Patient initiated on dialysis 3/26 2-hours treatment, 2.5-hour treatment; additional treatment today 3/29/24  Monitor daily weights, I's and O's

## 2024-03-31 NOTE — RESPIRATORY THERAPY NOTE
03/31/24 0839   Respiratory Assessment   Resp Comments pt on RA baseline   Oxygen Therapy/Pulse Ox   O2 Device None (Room air)   FiO2 (%) 21   SpO2 91 %  (1 l/m , pt taken off nc  after this check RA baseline)   SpO2 Activity At Rest   $ Pulse Oximetry Spot Check Charge Completed

## 2024-03-31 NOTE — ASSESSMENT & PLAN NOTE
This is an 79-year-old female patient with recently diagnosed COPD, history of CAD, CKD and hypertension who initially presented to the ED with shortness of breath, was noted to have significant audible wheezing with shortness of breath and had to be placed on BiPAP  Recent PFT showed moderate obstructive deficit, moderate DLCO reduction and evidence of hyperinflation and air trapping on lung volumes without a significant bronchodilator response. She was placed on LAMA/LABA inhalers  Per family, patient was using bronchodilators without significant improvement  In the ER she was placed on BiPAP - now weaned to 3L O2  Patient received initial dose of IV Solu-Medrol 125 mg in the ED and has been maintained on a taper - transitioned to prednisone  ABG without evidence of CO2 retention  Continue scheduled DuoNebs   Course of ceftriaxone for COPD exacerbation ending 3/31/24  Treat SEEMA/metabolic acidosis with HD - contributing to patient's respiratory status and improved

## 2024-04-01 ENCOUNTER — TELEPHONE (OUTPATIENT)
Dept: VASCULAR SURGERY | Facility: CLINIC | Age: 79
End: 2024-04-01

## 2024-04-01 ENCOUNTER — APPOINTMENT (INPATIENT)
Dept: CT IMAGING | Facility: HOSPITAL | Age: 79
DRG: 682 | End: 2024-04-01
Payer: MEDICARE

## 2024-04-01 PROBLEM — I15.0 RENOVASCULAR HYPERTENSION: Status: ACTIVE | Noted: 2022-04-22

## 2024-04-01 PROBLEM — R06.02 SHORTNESS OF BREATH: Status: ACTIVE | Noted: 2022-03-18

## 2024-04-01 LAB
ANION GAP SERPL CALCULATED.3IONS-SCNC: 8 MMOL/L (ref 4–13)
ATRIAL RATE: 99 BPM
BASOPHILS # BLD AUTO: 0 THOUSANDS/ÂΜL (ref 0–0.1)
BASOPHILS NFR BLD AUTO: 0 % (ref 0–1)
BUN SERPL-MCNC: 51 MG/DL (ref 5–25)
CALCIUM SERPL-MCNC: 8.4 MG/DL (ref 8.4–10.2)
CHLORIDE SERPL-SCNC: 105 MMOL/L (ref 96–108)
CO2 SERPL-SCNC: 23 MMOL/L (ref 21–32)
CREAT SERPL-MCNC: 1.81 MG/DL (ref 0.6–1.3)
EOSINOPHIL # BLD AUTO: 0 THOUSAND/ÂΜL (ref 0–0.61)
EOSINOPHIL NFR BLD AUTO: 0 % (ref 0–6)
ERYTHROCYTE [DISTWIDTH] IN BLOOD BY AUTOMATED COUNT: 14.4 % (ref 11.6–15.1)
GFR SERPL CREATININE-BSD FRML MDRD: 25 ML/MIN/1.73SQ M
GLUCOSE SERPL-MCNC: 126 MG/DL (ref 65–140)
HCT VFR BLD AUTO: 30.1 % (ref 34.8–46.1)
HGB BLD-MCNC: 9.4 G/DL (ref 11.5–15.4)
IMM GRANULOCYTES # BLD AUTO: 0.03 THOUSAND/UL (ref 0–0.2)
IMM GRANULOCYTES NFR BLD AUTO: 1 % (ref 0–2)
LYMPHOCYTES # BLD AUTO: 0.31 THOUSANDS/ÂΜL (ref 0.6–4.47)
LYMPHOCYTES NFR BLD AUTO: 7 % (ref 14–44)
MCH RBC QN AUTO: 29.6 PG (ref 26.8–34.3)
MCHC RBC AUTO-ENTMCNC: 31.2 G/DL (ref 31.4–37.4)
MCV RBC AUTO: 95 FL (ref 82–98)
MONOCYTES # BLD AUTO: 0.3 THOUSAND/ÂΜL (ref 0.17–1.22)
MONOCYTES NFR BLD AUTO: 6 % (ref 4–12)
NEUTROPHILS # BLD AUTO: 4.11 THOUSANDS/ÂΜL (ref 1.85–7.62)
NEUTS SEG NFR BLD AUTO: 86 % (ref 43–75)
NRBC BLD AUTO-RTO: 0 /100 WBCS
P AXIS: 75 DEGREES
PLATELET # BLD AUTO: 64 THOUSANDS/UL (ref 149–390)
PMV BLD AUTO: 11.9 FL (ref 8.9–12.7)
POTASSIUM SERPL-SCNC: 4.8 MMOL/L (ref 3.5–5.3)
PR INTERVAL: 84 MS
QRS AXIS: 80 DEGREES
QRSD INTERVAL: 74 MS
QT INTERVAL: 342 MS
QTC INTERVAL: 438 MS
RBC # BLD AUTO: 3.18 MILLION/UL (ref 3.81–5.12)
SODIUM SERPL-SCNC: 136 MMOL/L (ref 135–147)
T WAVE AXIS: 82 DEGREES
VENTRICULAR RATE: 99 BPM
WBC # BLD AUTO: 4.75 THOUSAND/UL (ref 4.31–10.16)

## 2024-04-01 PROCEDURE — 80048 BASIC METABOLIC PNL TOTAL CA: CPT | Performed by: FAMILY MEDICINE

## 2024-04-01 PROCEDURE — 99232 SBSQ HOSP IP/OBS MODERATE 35: CPT

## 2024-04-01 PROCEDURE — 94640 AIRWAY INHALATION TREATMENT: CPT

## 2024-04-01 PROCEDURE — 99223 1ST HOSP IP/OBS HIGH 75: CPT | Performed by: SURGERY

## 2024-04-01 PROCEDURE — 92526 ORAL FUNCTION THERAPY: CPT

## 2024-04-01 PROCEDURE — 99233 SBSQ HOSP IP/OBS HIGH 50: CPT

## 2024-04-01 PROCEDURE — 93010 ELECTROCARDIOGRAM REPORT: CPT | Performed by: INTERNAL MEDICINE

## 2024-04-01 PROCEDURE — 74150 CT ABDOMEN W/O CONTRAST: CPT

## 2024-04-01 PROCEDURE — 99232 SBSQ HOSP IP/OBS MODERATE 35: CPT | Performed by: FAMILY MEDICINE

## 2024-04-01 PROCEDURE — NC001 PR NO CHARGE: Performed by: SURGERY

## 2024-04-01 PROCEDURE — 94760 N-INVAS EAR/PLS OXIMETRY 1: CPT

## 2024-04-01 PROCEDURE — 97116 GAIT TRAINING THERAPY: CPT

## 2024-04-01 PROCEDURE — 97530 THERAPEUTIC ACTIVITIES: CPT

## 2024-04-01 PROCEDURE — 97110 THERAPEUTIC EXERCISES: CPT

## 2024-04-01 PROCEDURE — 85025 COMPLETE CBC W/AUTO DIFF WBC: CPT | Performed by: FAMILY MEDICINE

## 2024-04-01 RX ORDER — HYDRALAZINE HYDROCHLORIDE 25 MG/1
25 TABLET, FILM COATED ORAL EVERY 8 HOURS SCHEDULED
Status: DISCONTINUED | OUTPATIENT
Start: 2024-04-01 | End: 2024-04-02 | Stop reason: HOSPADM

## 2024-04-01 RX ORDER — PREDNISONE 10 MG/1
10 TABLET ORAL DAILY
Status: DISCONTINUED | OUTPATIENT
Start: 2024-04-08 | End: 2024-04-02 | Stop reason: HOSPADM

## 2024-04-01 RX ORDER — PREDNISONE 20 MG/1
20 TABLET ORAL DAILY
Status: DISCONTINUED | OUTPATIENT
Start: 2024-04-05 | End: 2024-04-02 | Stop reason: HOSPADM

## 2024-04-01 RX ORDER — NIFEDIPINE 30 MG/1
60 TABLET, EXTENDED RELEASE ORAL DAILY
Status: DISCONTINUED | OUTPATIENT
Start: 2024-04-02 | End: 2024-04-02 | Stop reason: HOSPADM

## 2024-04-01 RX ADMIN — HEPARIN SODIUM 5000 UNITS: 5000 INJECTION, SOLUTION INTRAVENOUS; SUBCUTANEOUS at 21:36

## 2024-04-01 RX ADMIN — HYDRALAZINE HYDROCHLORIDE 25 MG: 25 TABLET ORAL at 21:36

## 2024-04-01 RX ADMIN — BUDESONIDE 0.5 MG: 0.5 INHALANT RESPIRATORY (INHALATION) at 19:54

## 2024-04-01 RX ADMIN — HEPARIN SODIUM 5000 UNITS: 5000 INJECTION, SOLUTION INTRAVENOUS; SUBCUTANEOUS at 06:08

## 2024-04-01 RX ADMIN — ATORVASTATIN CALCIUM 40 MG: 40 TABLET, FILM COATED ORAL at 16:28

## 2024-04-01 RX ADMIN — NYSTATIN 500000 UNITS: 100000 SUSPENSION ORAL at 11:09

## 2024-04-01 RX ADMIN — LEVALBUTEROL HYDROCHLORIDE 1.25 MG: 1.25 SOLUTION RESPIRATORY (INHALATION) at 14:25

## 2024-04-01 RX ADMIN — NIFEDIPINE 30 MG: 30 TABLET, FILM COATED, EXTENDED RELEASE ORAL at 08:46

## 2024-04-01 RX ADMIN — HYDRALAZINE HYDROCHLORIDE 25 MG: 25 TABLET ORAL at 13:06

## 2024-04-01 RX ADMIN — CLOPIDOGREL 75 MG: 75 TABLET ORAL at 08:45

## 2024-04-01 RX ADMIN — FORMOTEROL FUMARATE DIHYDRATE 20 MCG: 20 SOLUTION RESPIRATORY (INHALATION) at 07:42

## 2024-04-01 RX ADMIN — NYSTATIN 500000 UNITS: 100000 SUSPENSION ORAL at 08:46

## 2024-04-01 RX ADMIN — SENNOSIDES 8.6 MG: 8.6 TABLET, FILM COATED ORAL at 21:36

## 2024-04-01 RX ADMIN — SODIUM CHLORIDE, SODIUM LACTATE, POTASSIUM CHLORIDE, AND CALCIUM CHLORIDE 50 ML/HR: .6; .31; .03; .02 INJECTION, SOLUTION INTRAVENOUS at 11:08

## 2024-04-01 RX ADMIN — HEPARIN SODIUM 5000 UNITS: 5000 INJECTION, SOLUTION INTRAVENOUS; SUBCUTANEOUS at 13:06

## 2024-04-01 RX ADMIN — IPRATROPIUM BROMIDE 0.5 MG: 0.5 SOLUTION RESPIRATORY (INHALATION) at 14:25

## 2024-04-01 RX ADMIN — LEVALBUTEROL HYDROCHLORIDE 1.25 MG: 1.25 SOLUTION RESPIRATORY (INHALATION) at 19:54

## 2024-04-01 RX ADMIN — FORMOTEROL FUMARATE DIHYDRATE 20 MCG: 20 SOLUTION RESPIRATORY (INHALATION) at 19:54

## 2024-04-01 RX ADMIN — NYSTATIN 500000 UNITS: 100000 SUSPENSION ORAL at 21:36

## 2024-04-01 RX ADMIN — IPRATROPIUM BROMIDE 0.5 MG: 0.5 SOLUTION RESPIRATORY (INHALATION) at 19:54

## 2024-04-01 RX ADMIN — PREDNISONE 40 MG: 20 TABLET ORAL at 08:46

## 2024-04-01 RX ADMIN — BUDESONIDE 0.5 MG: 0.5 INHALANT RESPIRATORY (INHALATION) at 07:42

## 2024-04-01 RX ADMIN — IPRATROPIUM BROMIDE 0.5 MG: 0.5 SOLUTION RESPIRATORY (INHALATION) at 07:42

## 2024-04-01 RX ADMIN — LEVALBUTEROL HYDROCHLORIDE 1.25 MG: 1.25 SOLUTION RESPIRATORY (INHALATION) at 07:42

## 2024-04-01 RX ADMIN — NYSTATIN 500000 UNITS: 100000 SUSPENSION ORAL at 17:36

## 2024-04-01 NOTE — PROGRESS NOTES
Vascular Attending Note    Formal consultation to follow by AP.    Pt is an 78 yo F w/ HLD, CVA, HTN, dCHF, mlanutrition BMI: 18, carotid stenosis, CAD s/p coronary stent, tobacco use, HLD, CKD.  Patient presented 8 days ago with SOB and BLE edema.  She was dx w/ COPD and dCHF, both new diagnoses and also noted to have worsened SEEMA, ultimately now requiring HD    Cr: peaked at 6.2 (baseline 0.9-1.1 as recently as 3/13/24)  WBC: 4.75  Hgb: 9.4  Plt: 64    -reviewed renal art DU from 5/23/22 which shows R renal atrophy and L renal art stenosis  -reviewed renal art DU (L side only) from 3/28/24 which shows L renal artery stenosis w/ zhanna 530/297 and RAR: 4.9; the kidney is 9.8cm (prior 10.0)  -acute kidney failure likely multifactorial with cardiac dysfunction and renovascular; she has chronic L renal artery stenosis and R renal atresia; although I do not know if the left kidney can be salvaged (it is small already), I do think this is worth trying as the alternative is likely remaining on dialysis lifelong  -I have placed an IR consultation and also ordered a noncontrast CT of the abdomen to allow for renal artery diameter measurements and assessment of calcification; will leave to IR discretion the location for intervention  -thrombocytopenia, acute on chronic; increased risk of bleeding with intervention  -cont plavix and statin

## 2024-04-01 NOTE — PLAN OF CARE
Problem: PAIN - ADULT  Goal: Verbalizes/displays adequate comfort level or baseline comfort level  Description: Interventions:  - Encourage patient to monitor pain and request assistance  - Assess pain using appropriate pain scale  - Administer analgesics based on type and severity of pain and evaluate response  - Implement non-pharmacological measures as appropriate and evaluate response  - Consider cultural and social influences on pain and pain management  - Notify physician/advanced practitioner if interventions unsuccessful or patient reports new pain  Outcome: Progressing     Problem: SAFETY ADULT  Goal: Patient will remain free of falls  Description: INTERVENTIONS:  - Educate patient/family on patient safety including physical limitations  - Instruct patient to call for assistance with activity   - Consult OT/PT to assist with strengthening/mobility   - Keep Call bell within reach  - Keep bed low and locked with side rails adjusted as appropriate  - Keep care items and personal belongings within reach  - Initiate and maintain comfort rounds  - Make Fall Risk Sign visible to staff  - Offer Toileting every 2 Hours, in advance of need  - Initiate/Maintain fall alarm  - Obtain necessary fall risk management equipment: alarm, nonskid footwear, yellow wristband  - Apply yellow socks and bracelet for high fall risk patients  - Consider moving patient to room near nurses station  Outcome: Progressing  Goal: Maintain or return to baseline ADL function  Description: INTERVENTIONS:  -  Assess patient's ability to carry out ADLs; assess patient's baseline for ADL function and identify physical deficits which impact ability to perform ADLs (bathing, care of mouth/teeth, toileting, grooming, dressing, etc.)  - Assess/evaluate cause of self-care deficits   - Assess range of motion  - Assess patient's mobility; develop plan if impaired  - Assess patient's need for assistive devices and provide as appropriate  - Encourage  maximum independence but intervene and supervise when necessary  - Involve family in performance of ADLs  - Assess for home care needs following discharge   - Consider OT consult to assist with ADL evaluation and planning for discharge  - Provide patient education as appropriate  Outcome: Progressing     Problem: DISCHARGE PLANNING  Goal: Discharge to home or other facility with appropriate resources  Description: INTERVENTIONS:  - Identify barriers to discharge w/patient and caregiver  - Arrange for needed discharge resources and transportation as appropriate  - Identify discharge learning needs (meds, wound care, etc.)  - Arrange for interpretive services to assist at discharge as needed  - Refer to Case Management Department for coordinating discharge planning if the patient needs post-hospital services based on physician/advanced practitioner order or complex needs related to functional status, cognitive ability, or social support system  Outcome: Progressing     Problem: Knowledge Deficit  Goal: Patient/family/caregiver demonstrates understanding of disease process, treatment plan, medications, and discharge instructions  Description: Complete learning assessment and assess knowledge base.  Interventions:  - Provide teaching at level of understanding  - Provide teaching via preferred learning methods  Outcome: Progressing

## 2024-04-01 NOTE — ASSESSMENT & PLAN NOTE
SEEMA superimposed on CKD stage IIIa, present on admission, severely worsening renal function with oliguria  S/p hemodialysis catheter placement 3/26/24, HD initiated 3/26, additional treatment 3/27, 3 hr treatment 3/29  Cr stable with still poor urine output, IVF rate decreased , will discuss with nephro if continued HD on discharge is indicated

## 2024-04-01 NOTE — PROGRESS NOTES
"Progress Note - Pulmonary   Laya Brooks 79 y.o. female MRN: [id number]  Unit/Bed#:  Encounter: [id number]    Assessment/Plan:    Acute hypoxic respiratory failure  Acute metabolic encephalopathy   Moderate COPD with acute exacerbation   Lymphopenia   Anion gap metabolic acidosis  SEEMA/CKD  Hyperkalemia   Right renal atrophy   Dysphagia         Pulmonary discussion/recommendations:      Weaned to 1 NC in our presence without desaturations. Maintain saturations greater than or equal to 88%.   Home O2 eval prior to discharge.  Continue prednisone 40 mg po daily today, day#3/3. Taper by 10 mg every 3 days until completion.  Continue all nebulized regimen while inpatient.  Resume Stiolto and prn albuterol at discharge.   Continue HD on a day-to-day basis per nephrology.  Recommend VBS vs esophogram when able to adequately protect her airway during testing. Appreciate speech therapy input.   Patient is stable from a pulmonary standpoint.  Will sign off.  Call with questions.    Chief Complaint:    \"I feel like I don't need anything for my breathing.\"    Subjective:    Patient was seen and examined today.  No overnight events reported.  Patient states that she feels that her breathing is at baseline.  She feels that at 79 years old there is nothing else we could do to improve her breathing.  Feels that the nebulizers are more annoying than they are helpful.  Denies wheezing.  Denies significant cough.  Denies chest pain.     Objective:    Vitals: Blood pressure (!) 196/83, pulse 74, temperature 98.7 °F (37.1 °C), temperature source Temporal, resp. rate 19, height 5' (1.524 m), weight 42.3 kg (93 lb 4.1 oz), SpO2 91%.1L NC,Body mass index is 18.21 kg/m².      Intake/Output Summary (Last 24 hours) at 4/1/2024 1133  Last data filed at 4/1/2024 1108  Gross per 24 hour   Intake 1485 ml   Output 400 ml   Net 1085 ml       Invasive Devices       Peripheral Intravenous Line  Duration             Peripheral IV 03/30/24 " "Dorsal (posterior);Left Forearm 2 days              Hemodialysis Catheter  Duration             HD Temporary Double Catheter 6 days              Drain  Duration             Urethral Catheter 16 Fr. 8 days                    Physical Exam:     Physical Exam  Vitals and nursing note reviewed.   Constitutional:       General: She is not in acute distress.     Appearance: Normal appearance.   HENT:      Head: Normocephalic and atraumatic.      Mouth/Throat:      Mouth: Mucous membranes are moist.      Pharynx: Oropharynx is clear.   Cardiovascular:      Rate and Rhythm: Normal rate and regular rhythm.      Heart sounds: No murmur heard.  Pulmonary:      Effort: Pulmonary effort is normal. No respiratory distress.      Breath sounds: No wheezing, rhonchi or rales.   Musculoskeletal:      Cervical back: Normal range of motion.   Skin:     General: Skin is warm and dry.   Neurological:      General: No focal deficit present.      Mental Status: She is alert. Mental status is at baseline.   Psychiatric:         Mood and Affect: Mood normal.         Behavior: Behavior normal.         Labs: I have personally reviewed pertinent lab results., ABG: No results found for: \"PHART\", \"AOM3ZET\", \"PO2ART\", \"LXC9KKV\", \"N7QSVZGC\", \"BEART\", \"SOURCE\", BNP: No results found for: \"BNP\", CBC:   Lab Results   Component Value Date    WBC 4.75 04/01/2024    HGB 9.4 (L) 04/01/2024    HCT 30.1 (L) 04/01/2024    MCV 95 04/01/2024    PLT 64 (L) 04/01/2024    RBC 3.18 (L) 04/01/2024    MCH 29.6 04/01/2024    MCHC 31.2 (L) 04/01/2024    RDW 14.4 04/01/2024    MPV 11.9 04/01/2024    NRBC 0 04/01/2024   , CMP:   Lab Results   Component Value Date    SODIUM 136 04/01/2024    K 4.8 04/01/2024     04/01/2024    CO2 23 04/01/2024    BUN 51 (H) 04/01/2024    CREATININE 1.81 (H) 04/01/2024    CALCIUM 8.4 04/01/2024    EGFR 25 04/01/2024   , PT/INR: No results found for: \"PT\", \"INR\", Troponin: No results found for: \"TROPONINI\"    Imaging and other " studies: I have personally reviewed pertinent reports.   and I have personally reviewed pertinent films in PACS

## 2024-04-01 NOTE — ASSESSMENT & PLAN NOTE
Wt Readings from Last 3 Encounters:   04/01/24 42.3 kg (93 lb 4.1 oz)   03/18/24 43.1 kg (95 lb)   02/22/24 40.8 kg (90 lb)     Possible acute diastolic CHF with mild tricuspid regurgitation, worsening renal and respiratory failure  Patient initiated on dialysis 3/26 2-hours treatment, 2.5-hour treatment; additional treatment today 3/29/24  Monitor daily weights, I's and O's

## 2024-04-01 NOTE — CONSULTS
Consultation - Vascular Surgery   Laya Brooks 79 y.o. female MRN: [id number]  Unit/Bed#:  Encounter: [id number]    Assessment/Plan     Assessment:    Reason for vascular surgery consultation: severe left renal artery stenosis at the aorta-renal junction, moderate disease proximally noted on renal artery ultrasound.    79-year-old female with end-stage renal failure CKD III requiring hemodialysis, carotid artery stenosis, old CVA, malnutrition (BMI 18.21), CAD s/p PTCA stent, tobacco abuse, and hyperlipidemia admitted with acute respiratory failure on March 23rd with hypoxia secondary to acute exacerbation of COPD and possibly acute diastolic CHF was admitted here to the ED on March 23rd with severe hypoxia and compensatory tachypnea.  Elevated D-dimer, negative workup for PE.  She had worsening mental status and sepsis and metabolic abnormalities including worsening severe acute kidney injury and respiratory failure believed to be secondary to benzodiazepine and opioid medications.    Patient has been followed by nephrology last seen by Dr. Damian Lynne yesterday.  Her creatinine has been more stable over the weekend.  Creatinine this morning 1.81 (2.01 and 2.02).  She was initially treated with IV diuresis upon admission now discontinued.    Patient underwent hemodialysis catheter placement on March 26, hemodialysis initiated that same day with the addition of treatment on March 27 and 29.    Urine output 550 mL total yesterday.    Renal artery ultrasound was performed on March 29, 2024 with addendum noting severe left renal artery stenosis at the aorto-renal junction, moderate disease more proximally.  Vascular surgery consultation has been requested at this time for further recommendations and possible need for intervention that may include renal artery angioplasty and stent.      [facility] - 3/26/2024  CLINICAL:  Indications:  Provider would like to evaluate perfusion to the  left kidney due to rapid  severe SEEMA and history of renal artery disease.  Operative History:  Coronary Angioplasty w/ Stent Placement  Risk Factors  The patient has history of HTN, Hyperlipidemia, CKD and CAD.     FINDINGS:     Unilateral     Impression  PSV (cm/s)  EDV (cm/s)    Sup-Calli Ao                         60                Celiac         >70%               265          19    Px Inf-Tomas Ao  Ectatic             62                Ds Inf-Tomas Ao                      73                Prox. SMA      >70%               517          17       Segment        Righ  Left                              RI  Impression  PSV (cm/s)  EDV (cm/s)   RAR    RI  Kidney (cm)    Ostial Renal         60 - 99%           530         167  8.91                       Prox Renal     0.80  60 - 99%           297          65  4.99                       Mid Renal      0.77                     131          30  2.20                       Dist Renal     0.75                      91          25  1.53                       Celiac Artery                            13           5        0.60                 Kidney                                                                      9.80    Renal                Patent                                                         Hilum                                    13           5        0.60                      CONCLUSION:  Impression  The abdominal aorta is widely patent and ectatic in caliber in the mid segment.     LEFT RENAL:  No evidence of significant arterial occlusive disease in the main renal artery.  Patent renal vein.  Adequate parenchymal flow is noted with a renovascular resistive index of 0.60.  Renal/Aorta Ratio: 8.91. The kidney measures approximately 9.8 x 4.3 cm. Prior:  10.0 x 4.7 cm.     MESENTERIC:  Celiac and superior mesenteric arteries demonstrate elevated velocities,  consistent with significant stenosis.     Compared to previous study on 5/6/2022, there is no significant  change.     SIGNATURE:  Electronically Signed by: JIAN CULVER MD, RPVI on 2024-03-28 02:23:29 PM  ADDENDUM:     3/29/2024 12:16:15 PM:   After further review of the data the patient does have a severe left renal  artery stenosis at the aorto- renal junction.  Moderate disease more proximally  is seen as well.  Signed by JIAN CULVER MD, RPVI on 2024-03-29 12:20:31 PM     Plan:  See separate vascular surgery recommendations by Dr. Darya Castle  Renal artery duplex study in May 2022 showed right renal atrophy and left renal artery stenosis.  More recent renal artery duplex on March 28 now showing left renal artery stenosis.  Acute kidney injury likely multifactorial in the setting of renovascular disease and cardiac dysfunction.  Await recommendations from Dr. Castle whether or not salvage of the left kidney is amenable that may include angiogram and intervention with renal artery angioplasty and stenting, would likely require tertiary transfer to higher level of care.  Continue Plavix and statin therapy.  Nebulizer breathing treatments per pulmonology  Monitor blood pressure, avoid hypotension.  Remainder of medical management per the direction of the attending hospitalist physician    History of Present Illness     HPI:  Laya Brooks is a 79 y.o. female with a history of hypertension, hyperlipidemia, chronic kidney disease and coronary artery disease status post cardiac stent, carotid artery stenosis and prior CVA who was admitted on March 23rd through the ED with acute exacerbation of congestive heart failure, metabolic encephalopathy and recently diagnosed COPD.  Patient has a history of stage III chronic kidney disease with worsening kidney function, she has been receiving hemodialysis as an inpatient acute kidney injury superimposed on CKD present admission with oliguria.  Patient received initial diuresis with worsening kidney function and diuretics now discontinued.  Patient had hemodialysis catheter  placed on March 26 with HD initiated that same day.  Creatinine still remains poor but mostly stable over the weekend.  Renal artery duplex study was performed on March 28 to evaluate perfusion to the left kidney due to rapid severe acute kidney injury and history of known renal artery disease.  Patient has severe left renal artery stenosis at the aorto-renal junction with moderate disease more proximally on duplex evaluation.  Vascular surgery consultation is requested at this time to help steer management and if any intervention would be warranted at this time.    Inpatient consult to Vascular Surgery  Consult performed by: Marlon Fajardo PA-C  Consult ordered by: Malena Hearn MD          Review of Systems   Constitutional:  Positive for fatigue. Negative for activity change, appetite change, chills, diaphoresis, fever and unexpected weight change.   HENT:  Positive for mouth sores. Negative for congestion, drooling, tinnitus, trouble swallowing and voice change.         Oral thrush.   Eyes: Negative.    Respiratory:  Positive for shortness of breath and wheezing. Negative for apnea, cough and chest tightness.    Cardiovascular:  Negative for chest pain, palpitations and leg swelling.   Gastrointestinal:  Negative for abdominal distention, abdominal pain, constipation, diarrhea, nausea and vomiting.   Endocrine: Negative.    Genitourinary:  Negative for difficulty urinating, dysuria and hematuria.   Musculoskeletal:  Negative for back pain and gait problem.   Skin:  Negative for pallor and rash.   Allergic/Immunologic: Negative.    Neurological:  Positive for weakness. Negative for tremors, syncope, speech difficulty, numbness and headaches.   Hematological:  Negative for adenopathy. Does not bruise/bleed easily.   Psychiatric/Behavioral: Negative.  Negative for confusion and decreased concentration.        Historical Information   Past Medical History:   Diagnosis Date    CAD (coronary artery disease)      Cardiac disease     Hypercholesteremia     Hyperlipidemia     Hypertension     Hypertension     Renal disorder      Past Surgical History:   Procedure Laterality Date    CORONARY ANGIOPLASTY WITH STENT PLACEMENT      IR TEMPORARY DIALYSIS CATHETER PLACEMENT  3/26/2024    WRIST SURGERY       Social History   Social History     Substance and Sexual Activity   Alcohol Use Never     Social History     Substance and Sexual Activity   Drug Use No     E-Cigarette/Vaping    E-Cigarette Use Never User      E-Cigarette/Vaping Substances    Nicotine No     THC No     CBD No     Flavoring No      Social History     Tobacco Use   Smoking Status Former   Smokeless Tobacco Never     Family History: History reviewed. No pertinent family history.    Meds/Allergies   current meds:   Current Facility-Administered Medications   Medication Dose Route Frequency    acetaminophen (TYLENOL) tablet 650 mg  650 mg Oral Q4H PRN    albuterol inhalation solution 2.5 mg  2.5 mg Nebulization Q4H PRN    ALPRAZolam (XANAX) tablet 0.5 mg  0.5 mg Oral TID PRN    atorvastatin (LIPITOR) tablet 40 mg  40 mg Oral Daily With Dinner    budesonide (PULMICORT) inhalation solution 0.5 mg  0.5 mg Nebulization Q12H    clopidogrel (PLAVIX) tablet 75 mg  75 mg Oral Daily    formoterol (PERFOROMIST) nebulizer solution 20 mcg  20 mcg Nebulization Q12H    heparin (porcine) subcutaneous injection 5,000 Units  5,000 Units Subcutaneous Q8H JHON    hydrALAZINE (APRESOLINE) tablet 25 mg  25 mg Oral Q8H JHON    ipratropium (ATROVENT) 0.02 % inhalation solution 0.5 mg  0.5 mg Nebulization TID    labetalol (NORMODYNE) injection 10 mg  10 mg Intravenous Q6H PRN    levalbuterol (XOPENEX) inhalation solution 1.25 mg  1.25 mg Nebulization TID    [START ON 4/2/2024] NIFEdipine (PROCARDIA XL) 24 hr tablet 60 mg  60 mg Oral Daily    nystatin (MYCOSTATIN) oral suspension 500,000 Units  500,000 Units Swish & Swallow 4x Daily    ondansetron (ZOFRAN) injection 4 mg  4 mg Intravenous  Q6H PRN    [START ON 4/2/2024] predniSONE tablet 30 mg  30 mg Oral Daily    Followed by    [START ON 4/5/2024] predniSONE tablet 20 mg  20 mg Oral Daily    Followed by    [START ON 4/8/2024] predniSONE tablet 10 mg  10 mg Oral Daily    senna (SENOKOT) tablet 8.6 mg  1 tablet Oral HS    sodium chloride (OCEAN) 0.65 % nasal spray 1 spray  1 spray Each Nare Q1H PRN     Allergies   Allergen Reactions    Influenza Virus Vaccine        Objective   First Vitals:   Blood Pressure: (!) 187/85 (03/23/24 2140)  Pulse: 98 (03/23/24 2140)  Temperature: 97.9 °F (36.6 °C) (03/23/24 2140)  Temp Source: Temporal (03/23/24 2140)  Respirations: (!) 31 (03/23/24 2140)  Height: 5' (152.4 cm) (03/23/24 2140)  Weight - Scale: 44.8 kg (98 lb 12.3 oz) (03/23/24 2140)  SpO2: 97 % (03/23/24 2137)    Current Vitals:   Blood Pressure: (!) 196/83 (04/01/24 0839)  Pulse: 74 (04/01/24 0330)  Temperature: 98.7 °F (37.1 °C) (04/01/24 0731)  Temp Source: Temporal (04/01/24 0731)  Respirations: 19 (04/01/24 0330)  Height: 5' (152.4 cm) (03/25/24 0937)  Weight - Scale: 42.3 kg (93 lb 4.1 oz) (04/01/24 0600)  SpO2: 91 % (04/01/24 0839)      Intake/Output Summary (Last 24 hours) at 4/1/2024 1108  Last data filed at 4/1/2024 1108  Gross per 24 hour   Intake 1485 ml   Output 400 ml   Net 1085 ml       Invasive Devices       Peripheral Intravenous Line  Duration             Peripheral IV 03/30/24 Dorsal (posterior);Left Forearm 2 days              Hemodialysis Catheter  Duration             HD Temporary Double Catheter 6 days              Drain  Duration             Urethral Catheter 16 Fr. 8 days                    BP (!) 198/82   Pulse 80   Temp 98.7 °F (37.1 °C) (Temporal)   Resp 19   Ht 5' (1.524 m)   Wt 42.3 kg (93 lb 4.1 oz)   SpO2 95%   BMI 18.21 kg/m²     Physical Exam  Constitutional:       General: She is not in acute distress.     Appearance: She is not ill-appearing or toxic-appearing.      Comments: She appears malnourished and  underweight.  BMI 18.21.  The patient is conversational.  Mental status much improved according to her nurse.  Patient is oriented.  Chronically ill-appearing.  Facial wrinkling and decreased turgor noted.  She is 1 L of nasal cannula O2.  SpO2 96%.   HENT:      Head: Normocephalic.      Nose: No congestion.      Mouth/Throat:      Mouth: Mucous membranes are moist.      Pharynx: Oropharynx is clear.   Eyes:      General: No scleral icterus.     Conjunctiva/sclera: Conjunctivae normal.      Pupils: Pupils are equal, round, and reactive to light.   Neck:      Vascular: No carotid bruit.      Comments: Right IJ hemodialysis catheter noted.  Cardiovascular:      Rate and Rhythm: Normal rate and regular rhythm.      Pulses: Normal pulses.      Heart sounds: No murmur heard.     No gallop.   Pulmonary:      Effort: Pulmonary effort is normal.      Breath sounds: Wheezing present.   Abdominal:      General: Bowel sounds are normal. There is no distension.      Palpations: Abdomen is soft.      Tenderness: There is no abdominal tenderness.      Comments: Umbilical hernia noted.  Nontender abdomen.  No abdominal lift or heave.  No bruits heard.  Femoral pulses are palpable.  No bruit heard.   Musculoskeletal:         General: No swelling, tenderness, deformity or signs of injury. Normal range of motion.      Cervical back: Normal range of motion.      Right lower leg: No edema.      Left lower leg: No edema.   Skin:     General: Skin is warm and dry.      Capillary Refill: Capillary refill takes less than 2 seconds.      Coloration: Skin is not jaundiced.      Findings: Bruising present. No erythema, lesion or rash.      Comments: Patient has older appearing ecchymosis of both forearms.  Decreased skin turgor.  No jaundice.   Neurological:      General: No focal deficit present.      Mental Status: She is alert and oriented to person, place, and time. Mental status is at baseline.      Cranial Nerves: No cranial nerve  "deficit.      Sensory: No sensory deficit.      Motor: Weakness present.   Psychiatric:         Mood and Affect: Mood normal.         Behavior: Behavior normal.         Thought Content: Thought content normal.         Judgment: Judgment normal.       Lab Results: I have personally reviewed pertinent lab results.  , CBC:   Lab Results   Component Value Date    WBC 4.75 04/01/2024    HGB 9.4 (L) 04/01/2024    HCT 30.1 (L) 04/01/2024    MCV 95 04/01/2024    PLT 64 (L) 04/01/2024    RBC 3.18 (L) 04/01/2024    MCH 29.6 04/01/2024    MCHC 31.2 (L) 04/01/2024    RDW 14.4 04/01/2024    MPV 11.9 04/01/2024    NRBC 0 04/01/2024   , CMP:   Lab Results   Component Value Date    SODIUM 136 04/01/2024    K 4.8 04/01/2024     04/01/2024    CO2 23 04/01/2024    BUN 51 (H) 04/01/2024    CREATININE 1.81 (H) 04/01/2024    CALCIUM 8.4 04/01/2024    EGFR 25 04/01/2024   , Coagulation: No results found for: \"PT\", \"INR\", \"APTT\", Urinalysis: No results found for: \"COLORU\", \"CLARITYU\", \"SPECGRAV\", \"PHUR\", \"LEUKOCYTESUR\", \"NITRITE\", \"PROTEINUA\", \"GLUCOSEU\", \"KETONESU\", \"BILIRUBINUR\", \"BLOODU\"    Imaging: I have personally reviewed pertinent reports.    EKG, Pathology, and Other Studies: I have personally reviewed pertinent reports.      Counseling / Coordination of Care  Total floor / unit time spent today 45 minutes.  Greater than 50% of total time was spent with the patient and / or family counseling and / or coordination of care.  A description of the counseling / coordination of care: Review of the patient's past medical records and diagnostic imaging, personal examination of patient, discussion with the attending hospitalist providers and review of current laboratory values and nephrology consultation all conducted in the vascular surgery evaluation of the patient at this time.    Marlon Fajardo PA-C      "

## 2024-04-01 NOTE — PROGRESS NOTES
[facility]  Progress Note  Name: Laya Brooks I  MRN: [id number]  Unit/Bed#:  I Date of Admission: 3/23/2024   Date of Service: 4/1/2024 I Hospital Day: 9    Assessment/Plan   Acute respiratory failure with hypoxia (HCC)  Assessment & Plan  Appears multifactorial, secondary to SEEMA/metabolic acidosis with compensatory tachypnea, COPD exacerbation, possibly acute diastolic CHF component    Was initially on continuous bipap, now on 1.5 L     * Chronic obstructive pulmonary disease with acute exacerbation (HCC)  Assessment & Plan  This is an 79-year-old female patient with recently diagnosed COPD, history of CAD, CKD and hypertension who initially presented to the ED with shortness of breath, was noted to have significant audible wheezing with shortness of breath and had to be placed on BiPAP  Recent PFT showed moderate obstructive deficit, moderate DLCO reduction and evidence of hyperinflation and air trapping on lung volumes without a significant bronchodilator response. She was placed on LAMA/LABA inhalers  Per family, patient was using bronchodilators without significant improvement  In the ER she was placed on BiPAP - now weaned to 3L O2  Patient received initial dose of IV Solu-Medrol 125 mg in the ED and has been maintained on a taper - transitioned to prednisone 40mg daily, anticipate prolonged taper   Course of ceftriaxone for COPD exacerbation ending 3/31/24      SEEMA (acute kidney injury) (HCC)  Assessment & Plan  SEEMA superimposed on CKD stage IIIa, present on admission, severely worsening renal function with oliguria  S/p hemodialysis catheter placement 3/26/24, HD initiated 3/26, additional treatment 3/27, 3 hr treatment 3/29  Cr stable with still poor urine output, IVF rate decreased , will discuss with nephro if continued HD on discharge is indicated     Renovascular hypertension  Assessment & Plan  Increased nifedipine to 60 mg daily  Add hydralazine 25mg PO q 8    Renal  "artery stenosis (HCC)  Assessment & Plan  Per renal artery ultrasound report 3/29/2024: \"after further review of the data the patient does have a severe left renal artery stenosis at the aorto- renal junction.  Moderate disease more proximally  is seen as well.\"  Vascular consult pending    Toxic metabolic encephalopathy  Assessment & Plan  Worsening mental status in the setting of metabolic disturbances including severe SEEMA, respiratory failure; toxic component of patient having received benzodiazepines and opiate medications  CT head negative  Significantly improved, patient alert and oriented x3      Acute diastolic congestive heart failure (HCC)  Assessment & Plan  Wt Readings from Last 3 Encounters:   04/01/24 42.3 kg (93 lb 4.1 oz)   03/18/24 43.1 kg (95 lb)   02/22/24 40.8 kg (90 lb)     Possible acute diastolic CHF with mild tricuspid regurgitation, worsening renal and respiratory failure  Patient initiated on dialysis 3/26 2-hours treatment, 2.5-hour treatment; additional treatment today 3/29/24  Monitor daily weights, I's and O's    Oral candidiasis  Assessment & Plan  Nystatin swish and swallow               Progress Note - Laya Brooks 79 y.o. female MRN: [id number]    Unit/Bed#:  Encounter: [id number]        Subjective:     Patient seen and examined at bedside  Appears well, breathing at baseline.       Objective:     Vitals:   Vitals:    04/01/24 0839   BP: (!) 196/83   Pulse:    Resp:    Temp:    SpO2: 91%     Body mass index is 18.21 kg/m².    Intake/Output Summary (Last 24 hours) at 4/1/2024 1156  Last data filed at 4/1/2024 1108  Gross per 24 hour   Intake 1485 ml   Output 400 ml   Net 1085 ml       Physical Exam:   BP (!) 196/83   Pulse 74   Temp 98.7 °F (37.1 °C) (Temporal)   Resp 19   Ht 5' (1.524 m)   Wt 42.3 kg (93 lb 4.1 oz)   SpO2 91%   BMI 18.21 kg/m²   General appearance: alert and oriented, in no acute distress  Lungs: mild wheezing, no rales/ronchi  Heart: regular rate " and rhythm, S1, S2 normal, no murmur, click, rub or gallop  Abdomen: soft, non-tender; bowel sounds normal; no masses,  no organomegaly  Extremities: extremities normal, warm and well-perfused; no cyanosis, clubbing, or edema  Pulses: 2+ and symmetric  Neurologic: Grossly normal     Invasive Devices       Peripheral Intravenous Line  Duration             Peripheral IV 03/30/24 Dorsal (posterior);Left Forearm 2 days              Hemodialysis Catheter  Duration             HD Temporary Double Catheter 6 days              Drain  Duration             Urethral Catheter 16 Fr. 8 days                    Results from last 7 days   Lab Units 04/01/24  0619 03/31/24  0450 03/30/24  0539   WBC Thousand/uL 4.75 6.46 9.36   HEMOGLOBIN g/dL 9.4* 9.6* 10.6*   HEMATOCRIT % 30.1* 30.4* 32.6*   PLATELETS Thousands/uL 64* 78* 95*       Results from last 7 days   Lab Units 04/01/24  0619 03/31/24  0450 03/30/24  0539 03/28/24  0548 03/27/24  0517 03/26/24  0456   POTASSIUM mmol/L 4.8 4.3 4.2   < > 4.5 5.4*   CHLORIDE mmol/L 105 105 105   < > 97 99   CO2 mmol/L 23 23 23   < > 22 16*   BUN mg/dL 51* 51* 44*   < > 70* 96*   CREATININE mg/dL 1.81* 2.01* 2.02*   < > 4.47* 6.27*   CALCIUM mg/dL 8.4 8.6 8.8   < > 8.4 8.4   ALK PHOS U/L  --   --   --   --  42 45   ALT U/L  --   --   --   --  11 11   AST U/L  --   --   --   --  14 9*    < > = values in this interval not displayed.       Medication Administration - last 24 hours from 03/31/2024 1156 to 04/01/2024 1156         Date/Time Order Dose Route Action Action by     04/01/2024 0845 EDT clopidogrel (PLAVIX) tablet 75 mg 75 mg Oral Given Aminah Zheng RN     03/31/2024 1724 EDT atorvastatin (LIPITOR) tablet 40 mg 40 mg Oral Given Mary Dillard RN     03/31/2024 1517 EDT ondansetron (ZOFRAN) injection 4 mg 4 mg Intravenous Given Mary Dillard RN     04/01/2024 0608 EDT heparin (porcine) subcutaneous injection 5,000 Units 5,000 Units Subcutaneous Given Darya Hill RN      03/31/2024 2221 EDT heparin (porcine) subcutaneous injection 5,000 Units 5,000 Units Subcutaneous Given Darya Hill, RN     03/31/2024 1439 EDT heparin (porcine) subcutaneous injection 5,000 Units 5,000 Units Subcutaneous Given Mary Dillard, RN     04/01/2024 0742 EDT ipratropium (ATROVENT) 0.02 % inhalation solution 0.5 mg 0.5 mg Nebulization Given Ora Bhatia, RT     03/31/2024 1920 EDT ipratropium (ATROVENT) 0.02 % inhalation solution 0.5 mg 0.5 mg Nebulization Given [place] Gunderman, RT     03/31/2024 1456 EDT ipratropium (ATROVENT) 0.02 % inhalation solution 0.5 mg 0.5 mg Nebulization Given Gabby Craig, RT     04/01/2024 0742 EDT levalbuterol (XOPENEX) inhalation solution 1.25 mg 1.25 mg Nebulization Given Ora Bhatia, RT     03/31/2024 1920 EDT levalbuterol (XOPENEX) inhalation solution 1.25 mg 1.25 mg Nebulization Given Paul Gunderman, RT     03/31/2024 1456 EDT levalbuterol (XOPENEX) inhalation solution 1.25 mg 1.25 mg Nebulization Given Gabby Craig, RT     04/01/2024 0742 EDT budesonide (PULMICORT) inhalation solution 0.5 mg 0.5 mg Nebulization Given Ora Bhatia, RT     03/31/2024 1921 EDT budesonide (PULMICORT) inhalation solution 0.5 mg 0.5 mg Nebulization Given [place] Gunderman, RT     04/01/2024 0742 EDT formoterol (PERFOROMIST) nebulizer solution 20 mcg 20 mcg Nebulization Given Ora Sriram, RT     03/31/2024 1921 EDT formoterol (PERFOROMIST) nebulizer solution 20 mcg 20 mcg Nebulization Given Paul Gunderman, RT     03/31/2024 1439 EDT cefTRIAXone (ROCEPHIN) IVPB (premix in dextrose) 1,000 mg 50 mL 1,000 mg Intravenous New Bag Mary Dillard RN     04/01/2024 0846 EDT predniSONE tablet 40 mg 40 mg Oral Given Aminah Zheng RN     04/01/2024 0846 EDT NIFEdipine (PROCARDIA XL) 24 hr tablet 30 mg 30 mg Oral Given Aminah Zheng RN     04/01/2024 1109 EDT nystatin (MYCOSTATIN) oral suspension 500,000 Units 500,000 Units Swish & Swallow Given Aminah Zheng RN      04/01/2024 0846 EDT nystatin (MYCOSTATIN) oral suspension 500,000 Units 500,000 Units Swish & Swallow Given Aminah Zheng RN     03/31/2024 2221 EDT nystatin (MYCOSTATIN) oral suspension 500,000 Units 500,000 Units Swish & Swallow Given Darya Hill RN     03/31/2024 1724 EDT nystatin (MYCOSTATIN) oral suspension 500,000 Units 500,000 Units Swish & Swallow Given Mary Dillard RN     03/31/2024 1257 EDT nystatin (MYCOSTATIN) oral suspension 500,000 Units 500,000 Units Swish & Swallow Given Mary Dillard RN     03/31/2024 2236 EDT ALPRAZolam (XANAX) tablet 0.5 mg 0.5 mg Oral Given Darya Hill RN     03/31/2024 2221 EDT ALPRAZolam (XANAX) tablet 0.5 mg 0.5 mg Oral Given aDrya Hill RN     03/31/2024 1504 EDT labetalol (NORMODYNE) injection 10 mg 10 mg Intravenous Not Given Mary Dillard RN     04/01/2024 1108 EDT lactated ringers infusion 50 mL/hr Intravenous New Bag Aminah Zheng RN     03/31/2024 1336 EDT lactated ringers infusion 50 mL/hr Intravenous New Bag Mary Dillard RN     03/31/2024 1238 EDT lactated ringers infusion 50 mL/hr Intravenous Rate/Dose Change Mary Dillard RN     03/31/2024 2221 EDT senna (SENOKOT) tablet 8.6 mg 8.6 mg Oral Given Darya Hill RN              Lab, Imaging and other studies: I have personally reviewed pertinent reports.    VTE Pharmacologic Prophylaxis: heparin   VTE Mechanical Prophylaxis: sequential compression device     Darryl Powell MD  4/1/2024,11:56 AM

## 2024-04-01 NOTE — PROGRESS NOTES
NEPHROLOGY PROGRESS NOTE   Laya Brooks 79 y.o. female MRN: [id number]  Unit/Bed#:  Encounter: [id number]    Assessment/Plan:  Hemodialysis dependent acute kidney injury  Last hemodialysis session 3/29 for 1.5 hours  Creatinine 1.81 mg/dL today, trending down  Creatinine 2.0 mg/dL, stable on 3/30 and 3/31.   Continue lactated ringers 50 mL/hr. Continues to have poor PO intake.   Plan to hold HD today. Reassess for possible HD need tomorrow 4/2.     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 Renal artery doppler with potentially hemodynamically significant stenoisis in the left kidney. Potential candidacy for angioplasty to be determined. If candidate nephrologist recommended either MRA or CTA according to consultant recommendations.  Will follow for vascular consult recommendations.    Metabolic acidosis  Bicarb  23 mmol/L today, stable    Hyperkalemia - resolved  Potassium 4.8  mmol/L today, stable    Hypertension in setting of chronic kidney disease  Blood pressure labile, [id number] past 24 hours   Continue nifedipine XL 30 mg daily   Continue labetalol 10 mg prn SBP >170 mmHg  Hydralazine 25 mg daily started by primary service  Will monitor for blood pressure trends    Metabolic encephalopathy-improved  Patient walking in [place] this morning with PT. Aox3, c/o nausea after walking.     ROS    Review of Systems   Constitutional:  Negative for chills and fever.   HENT:  Negative for ear pain and sore throat.    Eyes:  Negative for pain and visual disturbance.   Respiratory:  Negative for cough, shortness of breath and wheezing.    Cardiovascular:  Negative for chest pain, palpitations and leg swelling.   Gastrointestinal:  Positive for nausea. Negative for abdominal pain, constipation, diarrhea and vomiting.        C/o nausea after walking in hallway  with PT   Endocrine: Negative.    Genitourinary:  Negative for dysuria and hematuria.   Musculoskeletal:  Negative for arthralgias, back pain and gait problem.   Skin:  Positive for pallor. Negative for color change and rash.   Neurological:  Negative for dizziness, seizures, syncope, light-headedness and headaches.   Hematological: Negative.    Psychiatric/Behavioral: Negative.     All other systems reviewed and are negative.          Historical Information   Past Medical History:   Diagnosis Date    CAD (coronary artery disease)     Cardiac disease     Hypercholesteremia     Hyperlipidemia     Hypertension     Hypertension     Renal disorder      Past Surgical History:   Procedure Laterality Date    CORONARY ANGIOPLASTY WITH STENT PLACEMENT      IR TEMPORARY DIALYSIS CATHETER PLACEMENT  3/26/2024    WRIST SURGERY       Social History   Social History     Substance and Sexual Activity   Alcohol Use Never     Social History     Substance and Sexual Activity   Drug Use No     Social History     Tobacco Use   Smoking Status Former   Smokeless Tobacco Never       Family History:   History reviewed. No pertinent family history.    Medications:  Pertinent medications were reviewed  Current Facility-Administered Medications   Medication Dose Route Frequency Provider Last Rate    acetaminophen  650 mg Oral Q4H PRN Eleanor Rodriguez MD      albuterol  2.5 mg Nebulization Q4H PRN Eleanor Rodriguez MD      ALPRAZolam  0.5 mg Oral TID PRN Malena Hearn MD      atorvastatin  40 mg Oral Daily With Dinner Eleanor Rodriguez MD      budesonide  0.5 mg Nebulization Q12H Sincere Goldman PA-C      clopidogrel  75 mg Oral Daily Eleanor Rodriguez MD      formoterol  20 mcg Nebulization Q12H Sincere Goldman PA-C      heparin (porcine)  5,000 Units Subcutaneous Q8H [facility] Rodriguez MD      ipratropium  0.5 mg Nebulization TID Eleanor Rodriguez MD      labetalol  10 mg Intravenous Q6H PRN Malena Hearn MD       lactated ringers  50 mL/hr Intravenous Continuous Damian Lynne DO 50 mL/hr (03/31/24 1336)    levalbuterol  1.25 mg Nebulization TID Eleanor Rodriguez MD      NIFEdipine  30 mg Oral Daily Kate Weston DO      nystatin  500,000 Units Swish & Swallow 4x Daily Malena Hearn MD      ondansetron  4 mg Intravenous Q6H PRN Eleanor Rodriguez MD      predniSONE  40 mg Oral Daily Sincree Goldman PA-C      senna  1 tablet Oral HS Malena Hearn MD      sodium chloride  1 spray Each Nare Q1H PRN Malena Hearn MD           Allergies   Allergen Reactions    Influenza Virus Vaccine          Vitals:   BP (!) 173/72   Pulse 74   Temp 98.7 °F (37.1 °C) (Temporal)   Resp 19   Ht 5' (1.524 m)   Wt 42.3 kg (93 lb 4.1 oz)   SpO2 93%   BMI 18.21 kg/m²   Body mass index is 18.21 kg/m².  SpO2: 93 %,   SpO2 Activity: At Rest,   O2 Device: Nasal cannula      Intake/Output Summary (Last 24 hours) at 4/1/2024 1039  Last data filed at 4/1/2024 0350  Gross per 24 hour   Intake 360 ml   Output 400 ml   Net -40 ml     Invasive Devices       Central Venous Catheter Line  Duration             CVC Central Lines 03/26/24 Double 6 days              Peripheral Intravenous Line  Duration             Peripheral IV 03/30/24 Dorsal (posterior);Left Forearm 2 days              Hemodialysis Catheter  Duration             HD Temporary Double Catheter 6 days              Drain  Duration             Urethral Catheter 16 Fr. 7 days                    Physical Exam    Physical Exam  Vitals and nursing note reviewed.   Constitutional:       Appearance: Normal appearance. She is normal weight. She is ill-appearing.   HENT:      Head: Normocephalic and atraumatic.      Nose: Nose normal.      Mouth/Throat:      Mouth: Mucous membranes are moist.      Pharynx: Oropharynx is clear.   Eyes:      Extraocular Movements: Extraocular movements intact.      Conjunctiva/sclera: Conjunctivae normal.      Pupils: Pupils are  "equal, round, and reactive to light.   Cardiovascular:      Rate and Rhythm: Normal rate and regular rhythm.      Pulses: Normal pulses.      Heart sounds: Normal heart sounds.   Pulmonary:      Effort: Pulmonary effort is normal.      Breath sounds: Normal breath sounds.   Abdominal:      General: Abdomen is flat. Bowel sounds are normal.      Palpations: Abdomen is soft.   Musculoskeletal:         General: Normal range of motion.      Cervical back: Normal range of motion and neck supple.      Right lower leg: Edema present.      Left lower leg: Edema present.      Comments: Trace BLE edema   Skin:     General: Skin is warm and dry.      Coloration: Skin is pale.      Findings: Bruising present.      Comments: Ecchymosis noted on forearms   Neurological:      General: No focal deficit present.      Mental Status: She is alert and oriented to person, place, and time.   Psychiatric:         Mood and Affect: Mood normal.         Behavior: Behavior normal.         Diagnostic Data:  Lab: I have personally reviewed pertinent lab results.,   CBC:  Results from last 7 days   Lab Units 04/01/24  0619   WBC Thousand/uL 4.75   HEMOGLOBIN g/dL 9.4*   HEMATOCRIT % 30.1*   PLATELETS Thousands/uL 64*      CMP:   Lab Results   Component Value Date    SODIUM 136 04/01/2024    K 4.8 04/01/2024     04/01/2024    CO2 23 04/01/2024    BUN 51 (H) 04/01/2024    CREATININE 1.81 (H) 04/01/2024    CALCIUM 8.4 04/01/2024    EGFR 25 04/01/2024   ,   PT/INR: No results found for: \"PT\", \"INR\",   Magnesium: No components found for: \"MAG\",  Phosphorous: No results found for: \"PHOS\"    Microbiology:  @[facility],(urinecx:7)@        MANOHAR Hutchison    Portions of the record may have been created with voice recognition software. Occasional wrong word or \"sound a like\" substitutions may have occurred due to the inherent limitations of voice recognition software. Read the chart carefully and recognize, using context, where " substitutions have occurred.

## 2024-04-01 NOTE — PHYSICAL THERAPY NOTE
"                                                                                  PHYSICAL THERAPY NOTE          Patient Name: Laya Brooks  Today's Date: 4/1/2024 04/01/24 1010   PT Last Visit   PT Visit Date 04/01/24   Note Type   Note Type Treatment   Pain Assessment   Pain Assessment Tool 0-10   Pain Score No Pain   Restrictions/Precautions   Weight Bearing Precautions Per Order No   Other Precautions Cognitive;Chair Alarm;Multiple lines;O2;Fall Risk;Impulsive   General   Chart Reviewed Yes   Response to Previous Treatment Patient reporting fatigue but able to participate.   Family/Caregiver Present No   Cognition   Overall Cognitive Status Impaired   Arousal/Participation Alert;Cooperative   Following Commands Follows one step commands without difficulty   Subjective   Subjective \"I just want a Pepsi and a piece of pizza, but I know I can't have it, they are afraid I'll choke\"  Agreeable to therapy.   Bed Mobility   Additional Comments OOB in chair start/end PT session   Transfers   Sit to Stand 4  Minimal assistance   Additional items Assist x 1;Armrests;Increased time required;Verbal cues   Stand to Sit 4  Minimal assistance   Additional items Assist x 1;Armrests;Increased time required;Verbal cues   Toilet transfer 3  Moderate assistance   Additional items Assist x 1;Increased time required;Verbal cues;Standard toilet  (increased A from low surface)   Additional Comments RW used. Cues for safety, sequence and hand placement   Ambulation/Elevation   Gait pattern Short stride;Foward flexed;Inconsistent loyd   Gait Assistance 4  Minimal assist   Additional items Assist x 1;Verbal cues  (+1 lines)   Balance   Static Sitting Fair +   Dynamic Sitting Fair   Static Standing Fair -   Dynamic Standing Fair -   Ambulatory Fair -  (RW)   Endurance Deficit   Endurance Deficit Yes   Endurance Deficit Description SpO2 93-90% with 1 L O2   Activity Tolerance   Activity Tolerance Patient limited by fatigue "   Exercises   Hip Flexion Sitting;20 reps;AROM;Bilateral   Hip Abduction Sitting;20 reps;AROM;Bilateral   Hip Adduction Sitting;20 reps;AROM;Bilateral   Knee AROM Long Arc Quad Sitting;20 reps;AROM;Bilateral   Ankle Pumps Sitting;20 reps;AROM;Bilateral   Assessment   Prognosis Good   Problem List   (Decreased strength; Decreased endurance; Impaired balance; Decreased mobility)   Assessment Pt. seen for PT treatment session this date with interventions consisting of  therapeutic exercises, toilet transfers, transfers and  gait training w/ emphasis on improving pt's ability to ambulate. Pt. Requiring  cues for sequence and safety. In comparison to previous session, Pt. With improvements in activity tolerance.   Pt is in need of continued activity in PT to improve strength balance endurance mobility transfers and ambulation with return to maximize LOF. From PT/mobility standpoint, recommendation at time of d/c would be level II: moderate reosurce intensity  in order to promote return to PLOF and independence.   The patient's -[facility] Basic Mobility Inpatient Short Form Raw Score is 18. A Raw score of greater than 16 suggests the patient may benefit from discharge to home.  Please also refer to physical therapy recommendation for safe DC planning.   Goals   LTG Expiration Date 04/10/24   PT Treatment Day 3   Plan   Treatment/Interventions Functional transfer training;LE strengthening/ROM;Therapeutic exercise;Endurance training;Bed mobility;Gait training;Spoke to nursing   Progress Progressing toward goals   PT Frequency 3-5x/wk   Discharge Recommendation   Rehab Resource Intensity Level, PT II (Moderate Resource Intensity)   AM-PAC Basic Mobility Inpatient   Turning in Flat Bed Without Bedrails 4   Lying on Back to Sitting on Edge of Flat Bed Without Bedrails 3   Moving Bed to Chair 3   Standing Up From Chair Using Arms 3   Walk in Room 3   Climb 3-5 Stairs With Railing 2   Basic Mobility Inpatient Raw Score 18   Basic  Mobility Standardized Score 41.05   [facility] Highest Level Of Mobility   -HL Goal 6: Walk 10 steps or more   -HLM Achieved 7: Walk 25 feet or more   Education   Education Provided Mobility training   Patient Demonstrates verbal understanding;Reinforcement needed   End of Consult   Patient Position at End of Consult Bedside chair;Bed/Chair alarm activated;All needs within reach   End of Consult Comments discussed POC with PT

## 2024-04-01 NOTE — SPEECH THERAPY NOTE
"Speech Language/Pathology  Speech-Language Pathology Progress Note      Patient Name: Laya Brooks    Today's Date: 4/1/2024      Subjective:  Pt was awake and pleasant/cooperative. She was sitting up in chair at bedside. Patient stated \"I can take small sips, no problem \".    Objective:  Pt was seen today for dysphagia therapy. Current diet is puree with nectar thick liquids. Pt was on 1L O2 via nasal cannula. Oral care had already been completed. Focus of today's session was to maximize PO intake safety, determine potential for liquid consistency advancement, and improve use of strategies to minimize risk of aspiration. Textures offered today included thin liquid via cup.    Swallow function:   Bolus formation and AP transfer was reduced and delayed.  Control was suspected to be reduced with posterior spillage. Pharyngeal swallow initiation was present however inconsistently delayed. Hyolaryngeal excursion was reduced however felt stronger than previous sessions. Audible swallow incoordination  continues to be present with thin liquid via cup. Decreased respiratory deglutition most notable with increased volume of liquid. Pt required frequent breaks and had reduced endurance. limited number of trials provided,No s/s aspiration occurred during session today.    SLP provided max cueing/feedback throughout session. Pt was responsive to cueing and demonstrated good understanding and follow through.    Assessment:  Pt appeared more alert and energized during today's session. She frequently is complaining of feeling nauseous and attributes the NTL to be the cause. Pt tolerated 5 trials of thin liquid with 100% accuracy. Utilization of compensatory strategy of small sip was variable, however with maximum verbal cues, pt implemented strategy consistently.    Swallow function is improving with current diet. Liquid consistency advancement is recommended at this time.      Plan:  Change liquid consistency to thin liquids/ " continue puree.  Continue ST follow up. Subsequent sessions to focus on maximizing PO intake safety, determining potential for diet texture advancement, and improving use of strategies to minimize risk of aspiration.

## 2024-04-01 NOTE — ASSESSMENT & PLAN NOTE
This is an 79-year-old female patient with recently diagnosed COPD, history of CAD, CKD and hypertension who initially presented to the ED with shortness of breath, was noted to have significant audible wheezing with shortness of breath and had to be placed on BiPAP  Recent PFT showed moderate obstructive deficit, moderate DLCO reduction and evidence of hyperinflation and air trapping on lung volumes without a significant bronchodilator response. She was placed on LAMA/LABA inhalers  Per family, patient was using bronchodilators without significant improvement  In the ER she was placed on BiPAP - now weaned to 3L O2  Patient received initial dose of IV Solu-Medrol 125 mg in the ED and has been maintained on a taper - transitioned to prednisone 40mg daily, anticipate prolonged taper   Course of ceftriaxone for COPD exacerbation ending 3/31/24

## 2024-04-01 NOTE — ASSESSMENT & PLAN NOTE
"Per renal artery ultrasound report 3/29/2024: \"after further review of the data the patient does have a severe left renal artery stenosis at the aorto- renal junction.  Moderate disease more proximally  is seen as well.\"  Vascular consult pending  "

## 2024-04-01 NOTE — PLAN OF CARE
Problem: PHYSICAL THERAPY ADULT  Goal: Performs mobility at highest level of function for planned discharge setting.  See evaluation for individualized goals.  Description: Treatment/Interventions: Functional transfer training, LE strengthening/ROM, Therapeutic exercise, Endurance training, Bed mobility, Gait training          See flowsheet documentation for full assessment, interventions and recommendations.  Outcome: Progressing  Note: Prognosis: Good  Problem List:  (Decreased strength; Decreased endurance; Impaired balance; Decreased mobility)  Assessment: Pt. seen for PT treatment session this date with interventions consisting of  therapeutic exercises, toilet transfers, transfers and  gait training w/ emphasis on improving pt's ability to ambulate. Pt. Requiring  cues for sequence and safety. In comparison to previous session, Pt. With improvements in activity tolerance.   Pt is in need of continued activity in PT to improve strength balance endurance mobility transfers and ambulation with return to maximize LOF. From PT/mobility standpoint, recommendation at time of d/c would be level II: moderate reosurce intensity  in order to promote return to PLOF and independence.   The patient's AM-[facility] Basic Mobility Inpatient Short Form Raw Score is 18. A Raw score of greater than 16 suggests the patient may benefit from discharge to home.  Please also refer to physical therapy recommendation for safe DC planning.  Barriers to Discharge: Inaccessible home environment, Decreased caregiver support  Barriers to Discharge Comments: pt with increased fatigue, requires assistance for mobility  Rehab Resource Intensity Level, PT: II (Moderate Resource Intensity)    See flowsheet documentation for full assessment.

## 2024-04-01 NOTE — ASSESSMENT & PLAN NOTE
Appears multifactorial, secondary to SEEMA/metabolic acidosis with compensatory tachypnea, COPD exacerbation, possibly acute diastolic CHF component    Was initially on continuous bipap, now on 1.5 L

## 2024-04-01 NOTE — ASSESSMENT & PLAN NOTE
Worsening mental status in the setting of metabolic disturbances including severe SEEMA, respiratory failure; toxic component of patient having received benzodiazepines and opiate medications  CT head negative  Significantly improved, patient alert and oriented x3

## 2024-04-01 NOTE — TELEPHONE ENCOUNTER
----- Message from Darya Castle MD sent at 4/1/2024  2:43 PM EDT -----  This patient is getting a fistula ultrasound in about 3 weeks and then f/u with me.  She is going to need superficialization of her fistula    I was wondering if we can pencil her in to try to have a surgery date available    Thanks    Darya

## 2024-04-02 ENCOUNTER — HOSPITAL ENCOUNTER (INPATIENT)
Facility: HOSPITAL | Age: 79
LOS: 3 days | DRG: 698 | End: 2024-04-05
Attending: INTERNAL MEDICINE | Admitting: INTERNAL MEDICINE
Payer: MEDICARE

## 2024-04-02 VITALS
BODY MASS INDEX: 19.09 KG/M2 | HEIGHT: 60 IN | SYSTOLIC BLOOD PRESSURE: 167 MMHG | RESPIRATION RATE: 20 BRPM | TEMPERATURE: 98.1 F | OXYGEN SATURATION: 91 % | HEART RATE: 83 BPM | WEIGHT: 97.22 LBS | DIASTOLIC BLOOD PRESSURE: 72 MMHG

## 2024-04-02 DIAGNOSIS — N17.9 ACUTE RENAL FAILURE SUPERIMPOSED ON CHRONIC KIDNEY DISEASE, UNSPECIFIED ACUTE RENAL FAILURE TYPE, UNSPECIFIED CKD STAGE  (HCC): Primary | ICD-10-CM

## 2024-04-02 DIAGNOSIS — R13.10 DYSPHAGIA, UNSPECIFIED TYPE: ICD-10-CM

## 2024-04-02 DIAGNOSIS — D75.89 BICYTOPENIA: ICD-10-CM

## 2024-04-02 DIAGNOSIS — Z71.89 GOALS OF CARE, COUNSELING/DISCUSSION: ICD-10-CM

## 2024-04-02 DIAGNOSIS — J44.9 CHRONIC OBSTRUCTIVE PULMONARY DISEASE, UNSPECIFIED COPD TYPE (HCC): ICD-10-CM

## 2024-04-02 DIAGNOSIS — N18.9 ACUTE RENAL FAILURE SUPERIMPOSED ON CHRONIC KIDNEY DISEASE, UNSPECIFIED ACUTE RENAL FAILURE TYPE, UNSPECIFIED CKD STAGE  (HCC): Primary | ICD-10-CM

## 2024-04-02 DIAGNOSIS — I70.1 RENAL ARTERY STENOSIS (HCC): ICD-10-CM

## 2024-04-02 LAB
ANION GAP SERPL CALCULATED.3IONS-SCNC: 8 MMOL/L (ref 4–13)
BASOPHILS # BLD AUTO: 0.01 THOUSANDS/ÂΜL (ref 0–0.1)
BASOPHILS NFR BLD AUTO: 0 % (ref 0–1)
BUN SERPL-MCNC: 52 MG/DL (ref 5–25)
CALCIUM SERPL-MCNC: 8.5 MG/DL (ref 8.4–10.2)
CHLORIDE SERPL-SCNC: 104 MMOL/L (ref 96–108)
CO2 SERPL-SCNC: 24 MMOL/L (ref 21–32)
CREAT SERPL-MCNC: 1.6 MG/DL (ref 0.6–1.3)
EOSINOPHIL # BLD AUTO: 0.01 THOUSAND/ÂΜL (ref 0–0.61)
EOSINOPHIL NFR BLD AUTO: 0 % (ref 0–6)
ERYTHROCYTE [DISTWIDTH] IN BLOOD BY AUTOMATED COUNT: 14.3 % (ref 11.6–15.1)
GFR SERPL CREATININE-BSD FRML MDRD: 30 ML/MIN/1.73SQ M
GLUCOSE SERPL-MCNC: 93 MG/DL (ref 65–140)
HCT VFR BLD AUTO: 32.4 % (ref 34.8–46.1)
HGB BLD-MCNC: 10.6 G/DL (ref 11.5–15.4)
IMM GRANULOCYTES # BLD AUTO: 0.07 THOUSAND/UL (ref 0–0.2)
IMM GRANULOCYTES NFR BLD AUTO: 1 % (ref 0–2)
LYMPHOCYTES # BLD AUTO: 0.83 THOUSANDS/ÂΜL (ref 0.6–4.47)
LYMPHOCYTES NFR BLD AUTO: 10 % (ref 14–44)
MCH RBC QN AUTO: 29.3 PG (ref 26.8–34.3)
MCHC RBC AUTO-ENTMCNC: 32.7 G/DL (ref 31.4–37.4)
MCV RBC AUTO: 90 FL (ref 82–98)
MONOCYTES # BLD AUTO: 0.67 THOUSAND/ÂΜL (ref 0.17–1.22)
MONOCYTES NFR BLD AUTO: 8 % (ref 4–12)
NEUTROPHILS # BLD AUTO: 6.52 THOUSANDS/ÂΜL (ref 1.85–7.62)
NEUTS SEG NFR BLD AUTO: 81 % (ref 43–75)
NRBC BLD AUTO-RTO: 0 /100 WBCS
PLATELET # BLD AUTO: 75 THOUSANDS/UL (ref 149–390)
PMV BLD AUTO: 11.2 FL (ref 8.9–12.7)
POTASSIUM SERPL-SCNC: 4.3 MMOL/L (ref 3.5–5.3)
RBC # BLD AUTO: 3.62 MILLION/UL (ref 3.81–5.12)
SODIUM SERPL-SCNC: 136 MMOL/L (ref 135–147)
WBC # BLD AUTO: 8.11 THOUSAND/UL (ref 4.31–10.16)

## 2024-04-02 PROCEDURE — 94640 AIRWAY INHALATION TREATMENT: CPT

## 2024-04-02 PROCEDURE — 99223 1ST HOSP IP/OBS HIGH 75: CPT | Performed by: INTERNAL MEDICINE

## 2024-04-02 PROCEDURE — 94668 MNPJ CHEST WALL SBSQ: CPT

## 2024-04-02 PROCEDURE — 94760 N-INVAS EAR/PLS OXIMETRY 1: CPT

## 2024-04-02 PROCEDURE — 94664 DEMO&/EVAL PT USE INHALER: CPT

## 2024-04-02 PROCEDURE — 92526 ORAL FUNCTION THERAPY: CPT

## 2024-04-02 PROCEDURE — 85025 COMPLETE CBC W/AUTO DIFF WBC: CPT | Performed by: FAMILY MEDICINE

## 2024-04-02 PROCEDURE — 99232 SBSQ HOSP IP/OBS MODERATE 35: CPT | Performed by: FAMILY MEDICINE

## 2024-04-02 PROCEDURE — NC001 PR NO CHARGE: Performed by: RADIOLOGY

## 2024-04-02 PROCEDURE — 99232 SBSQ HOSP IP/OBS MODERATE 35: CPT | Performed by: INTERNAL MEDICINE

## 2024-04-02 PROCEDURE — 80048 BASIC METABOLIC PNL TOTAL CA: CPT | Performed by: FAMILY MEDICINE

## 2024-04-02 PROCEDURE — 97530 THERAPEUTIC ACTIVITIES: CPT

## 2024-04-02 PROCEDURE — 99232 SBSQ HOSP IP/OBS MODERATE 35: CPT | Performed by: SURGERY

## 2024-04-02 PROCEDURE — 99239 HOSP IP/OBS DSCHRG MGMT >30: CPT | Performed by: FAMILY MEDICINE

## 2024-04-02 PROCEDURE — 97116 GAIT TRAINING THERAPY: CPT

## 2024-04-02 RX ORDER — ONDANSETRON 2 MG/ML
4 INJECTION INTRAMUSCULAR; INTRAVENOUS EVERY 6 HOURS PRN
Status: DISCONTINUED | OUTPATIENT
Start: 2024-04-02 | End: 2024-04-05 | Stop reason: HOSPADM

## 2024-04-02 RX ORDER — ALPRAZOLAM 0.5 MG/1
0.5 TABLET ORAL 3 TIMES DAILY PRN
Status: DISCONTINUED | OUTPATIENT
Start: 2024-04-02 | End: 2024-04-05 | Stop reason: HOSPADM

## 2024-04-02 RX ORDER — PREDNISONE 10 MG/1
10 TABLET ORAL DAILY
Status: CANCELLED | OUTPATIENT
Start: 2024-04-08 | End: 2024-04-11

## 2024-04-02 RX ORDER — LABETALOL HYDROCHLORIDE 5 MG/ML
10 INJECTION, SOLUTION INTRAVENOUS EVERY 6 HOURS PRN
Status: DISCONTINUED | OUTPATIENT
Start: 2024-04-02 | End: 2024-04-05 | Stop reason: HOSPADM

## 2024-04-02 RX ORDER — ALBUTEROL SULFATE 2.5 MG/3ML
2.5 SOLUTION RESPIRATORY (INHALATION) EVERY 4 HOURS PRN
Status: CANCELLED | OUTPATIENT
Start: 2024-04-02

## 2024-04-02 RX ORDER — HEPARIN SODIUM 5000 [USP'U]/ML
5000 INJECTION, SOLUTION INTRAVENOUS; SUBCUTANEOUS EVERY 8 HOURS SCHEDULED
Status: DISCONTINUED | OUTPATIENT
Start: 2024-04-02 | End: 2024-04-04

## 2024-04-02 RX ORDER — ATORVASTATIN CALCIUM 40 MG/1
40 TABLET, FILM COATED ORAL
Status: DISCONTINUED | OUTPATIENT
Start: 2024-04-03 | End: 2024-04-05 | Stop reason: HOSPADM

## 2024-04-02 RX ORDER — ATORVASTATIN CALCIUM 40 MG/1
40 TABLET, FILM COATED ORAL
Status: CANCELLED | OUTPATIENT
Start: 2024-04-02

## 2024-04-02 RX ORDER — SENNOSIDES 8.6 MG
1 TABLET ORAL
Status: CANCELLED | OUTPATIENT
Start: 2024-04-02

## 2024-04-02 RX ORDER — ECHINACEA PURPUREA EXTRACT 125 MG
1 TABLET ORAL
Status: DISCONTINUED | OUTPATIENT
Start: 2024-04-02 | End: 2024-04-05 | Stop reason: HOSPADM

## 2024-04-02 RX ORDER — BUDESONIDE 0.5 MG/2ML
0.5 INHALANT ORAL
Status: DISCONTINUED | OUTPATIENT
Start: 2024-04-02 | End: 2024-04-05

## 2024-04-02 RX ORDER — FORMOTEROL FUMARATE DIHYDRATE 20 UG/2ML
20 SOLUTION RESPIRATORY (INHALATION)
Status: CANCELLED | OUTPATIENT
Start: 2024-04-02

## 2024-04-02 RX ORDER — CLOPIDOGREL BISULFATE 75 MG/1
75 TABLET ORAL DAILY
Status: CANCELLED | OUTPATIENT
Start: 2024-04-03

## 2024-04-02 RX ORDER — NIFEDIPINE 30 MG/1
60 TABLET, EXTENDED RELEASE ORAL DAILY
Status: DISCONTINUED | OUTPATIENT
Start: 2024-04-03 | End: 2024-04-05 | Stop reason: HOSPADM

## 2024-04-02 RX ORDER — MAGNESIUM HYDROXIDE/ALUMINUM HYDROXICE/SIMETHICONE 120; 1200; 1200 MG/30ML; MG/30ML; MG/30ML
30 SUSPENSION ORAL EVERY 4 HOURS PRN
Status: DISCONTINUED | OUTPATIENT
Start: 2024-04-02 | End: 2024-04-05 | Stop reason: HOSPADM

## 2024-04-02 RX ORDER — ECHINACEA PURPUREA EXTRACT 125 MG
1 TABLET ORAL
Status: CANCELLED | OUTPATIENT
Start: 2024-04-02

## 2024-04-02 RX ORDER — LEVALBUTEROL INHALATION SOLUTION 1.25 MG/3ML
1.25 SOLUTION RESPIRATORY (INHALATION)
Status: CANCELLED | OUTPATIENT
Start: 2024-04-02

## 2024-04-02 RX ORDER — HYDRALAZINE HYDROCHLORIDE 25 MG/1
25 TABLET, FILM COATED ORAL EVERY 8 HOURS SCHEDULED
Status: DISCONTINUED | OUTPATIENT
Start: 2024-04-02 | End: 2024-04-04

## 2024-04-02 RX ORDER — ACETAMINOPHEN 325 MG/1
650 TABLET ORAL EVERY 4 HOURS PRN
Status: CANCELLED | OUTPATIENT
Start: 2024-04-02

## 2024-04-02 RX ORDER — BUDESONIDE 0.5 MG/2ML
0.5 INHALANT ORAL
Status: CANCELLED | OUTPATIENT
Start: 2024-04-02

## 2024-04-02 RX ORDER — ALBUTEROL SULFATE 2.5 MG/3ML
2.5 SOLUTION RESPIRATORY (INHALATION) EVERY 4 HOURS PRN
Status: DISCONTINUED | OUTPATIENT
Start: 2024-04-02 | End: 2024-04-05 | Stop reason: HOSPADM

## 2024-04-02 RX ORDER — FAMOTIDINE 20 MG/1
10 TABLET, FILM COATED ORAL DAILY
Status: DISCONTINUED | OUTPATIENT
Start: 2024-04-02 | End: 2024-04-02 | Stop reason: HOSPADM

## 2024-04-02 RX ORDER — FORMOTEROL FUMARATE DIHYDRATE 20 UG/2ML
20 SOLUTION RESPIRATORY (INHALATION)
Status: DISCONTINUED | OUTPATIENT
Start: 2024-04-02 | End: 2024-04-05

## 2024-04-02 RX ORDER — FAMOTIDINE 20 MG/1
10 TABLET, FILM COATED ORAL DAILY
Status: DISCONTINUED | OUTPATIENT
Start: 2024-04-03 | End: 2024-04-05 | Stop reason: HOSPADM

## 2024-04-02 RX ORDER — SENNOSIDES 8.6 MG
1 TABLET ORAL
Status: DISCONTINUED | OUTPATIENT
Start: 2024-04-02 | End: 2024-04-03

## 2024-04-02 RX ORDER — MAGNESIUM HYDROXIDE/ALUMINUM HYDROXICE/SIMETHICONE 120; 1200; 1200 MG/30ML; MG/30ML; MG/30ML
30 SUSPENSION ORAL EVERY 4 HOURS PRN
Status: DISCONTINUED | OUTPATIENT
Start: 2024-04-02 | End: 2024-04-02 | Stop reason: HOSPADM

## 2024-04-02 RX ORDER — HYDRALAZINE HYDROCHLORIDE 25 MG/1
25 TABLET, FILM COATED ORAL EVERY 8 HOURS SCHEDULED
Status: CANCELLED | OUTPATIENT
Start: 2024-04-02

## 2024-04-02 RX ORDER — MAGNESIUM HYDROXIDE/ALUMINUM HYDROXICE/SIMETHICONE 120; 1200; 1200 MG/30ML; MG/30ML; MG/30ML
30 SUSPENSION ORAL EVERY 4 HOURS PRN
Status: CANCELLED | OUTPATIENT
Start: 2024-04-02

## 2024-04-02 RX ORDER — PREDNISONE 20 MG/1
20 TABLET ORAL DAILY
Status: DISCONTINUED | OUTPATIENT
Start: 2024-04-05 | End: 2024-04-05 | Stop reason: HOSPADM

## 2024-04-02 RX ORDER — CLOPIDOGREL BISULFATE 75 MG/1
75 TABLET ORAL DAILY
Status: DISCONTINUED | OUTPATIENT
Start: 2024-04-03 | End: 2024-04-04

## 2024-04-02 RX ORDER — ACETAMINOPHEN 325 MG/1
650 TABLET ORAL EVERY 4 HOURS PRN
Status: DISCONTINUED | OUTPATIENT
Start: 2024-04-02 | End: 2024-04-05 | Stop reason: HOSPADM

## 2024-04-02 RX ORDER — NIFEDIPINE 30 MG/1
60 TABLET, EXTENDED RELEASE ORAL DAILY
Status: CANCELLED | OUTPATIENT
Start: 2024-04-03

## 2024-04-02 RX ORDER — FAMOTIDINE 20 MG/1
10 TABLET, FILM COATED ORAL DAILY
Status: CANCELLED | OUTPATIENT
Start: 2024-04-03

## 2024-04-02 RX ORDER — PREDNISONE 10 MG/1
10 TABLET ORAL DAILY
Status: DISCONTINUED | OUTPATIENT
Start: 2024-04-08 | End: 2024-04-05 | Stop reason: HOSPADM

## 2024-04-02 RX ORDER — ONDANSETRON 2 MG/ML
4 INJECTION INTRAMUSCULAR; INTRAVENOUS EVERY 6 HOURS PRN
Status: CANCELLED | OUTPATIENT
Start: 2024-04-02

## 2024-04-02 RX ORDER — ALPRAZOLAM 0.5 MG/1
0.5 TABLET ORAL 3 TIMES DAILY PRN
Status: CANCELLED | OUTPATIENT
Start: 2024-04-02

## 2024-04-02 RX ORDER — LEVALBUTEROL INHALATION SOLUTION 1.25 MG/3ML
1.25 SOLUTION RESPIRATORY (INHALATION)
Status: DISCONTINUED | OUTPATIENT
Start: 2024-04-02 | End: 2024-04-05 | Stop reason: HOSPADM

## 2024-04-02 RX ORDER — LABETALOL HYDROCHLORIDE 5 MG/ML
10 INJECTION, SOLUTION INTRAVENOUS EVERY 6 HOURS PRN
Status: CANCELLED | OUTPATIENT
Start: 2024-04-02

## 2024-04-02 RX ORDER — PREDNISONE 20 MG/1
20 TABLET ORAL DAILY
Status: CANCELLED | OUTPATIENT
Start: 2024-04-05 | End: 2024-04-08

## 2024-04-02 RX ORDER — HEPARIN SODIUM 5000 [USP'U]/ML
5000 INJECTION, SOLUTION INTRAVENOUS; SUBCUTANEOUS EVERY 8 HOURS SCHEDULED
Status: CANCELLED | OUTPATIENT
Start: 2024-04-02

## 2024-04-02 RX ADMIN — PREDNISONE 30 MG: 20 TABLET ORAL at 08:32

## 2024-04-02 RX ADMIN — NIFEDIPINE 60 MG: 30 TABLET, FILM COATED, EXTENDED RELEASE ORAL at 08:33

## 2024-04-02 RX ADMIN — NYSTATIN 500000 UNITS: 100000 SUSPENSION ORAL at 12:28

## 2024-04-02 RX ADMIN — BUDESONIDE 0.5 MG: 0.5 INHALANT RESPIRATORY (INHALATION) at 07:24

## 2024-04-02 RX ADMIN — HEPARIN SODIUM 5000 UNITS: 5000 INJECTION, SOLUTION INTRAVENOUS; SUBCUTANEOUS at 05:18

## 2024-04-02 RX ADMIN — IPRATROPIUM BROMIDE 0.5 MG: 0.5 SOLUTION RESPIRATORY (INHALATION) at 07:24

## 2024-04-02 RX ADMIN — CLOPIDOGREL 75 MG: 75 TABLET ORAL at 08:33

## 2024-04-02 RX ADMIN — HYDRALAZINE HYDROCHLORIDE 25 MG: 25 TABLET ORAL at 15:04

## 2024-04-02 RX ADMIN — ALPRAZOLAM 0.5 MG: 0.5 TABLET ORAL at 22:09

## 2024-04-02 RX ADMIN — NYSTATIN 500000 UNITS: 100000 SUSPENSION ORAL at 22:08

## 2024-04-02 RX ADMIN — SALINE NASAL SPRAY 1 SPRAY: 1.5 SOLUTION NASAL at 12:02

## 2024-04-02 RX ADMIN — LEVALBUTEROL HYDROCHLORIDE 1.25 MG: 1.25 SOLUTION RESPIRATORY (INHALATION) at 07:24

## 2024-04-02 RX ADMIN — FAMOTIDINE 10 MG: 20 TABLET, FILM COATED ORAL at 12:46

## 2024-04-02 RX ADMIN — LEVALBUTEROL HYDROCHLORIDE 1.25 MG: 1.25 SOLUTION RESPIRATORY (INHALATION) at 21:00

## 2024-04-02 RX ADMIN — IPRATROPIUM BROMIDE 0.5 MG: 0.5 SOLUTION RESPIRATORY (INHALATION) at 14:45

## 2024-04-02 RX ADMIN — IPRATROPIUM BROMIDE 0.5 MG: 0.5 SOLUTION RESPIRATORY (INHALATION) at 21:00

## 2024-04-02 RX ADMIN — BUDESONIDE INHALATION 0.5 MG: 0.5 SUSPENSION RESPIRATORY (INHALATION) at 21:02

## 2024-04-02 RX ADMIN — HEPARIN SODIUM 5000 UNITS: 5000 INJECTION, SOLUTION INTRAVENOUS; SUBCUTANEOUS at 15:03

## 2024-04-02 RX ADMIN — HYDRALAZINE HYDROCHLORIDE 25 MG: 25 TABLET ORAL at 22:08

## 2024-04-02 RX ADMIN — HYDRALAZINE HYDROCHLORIDE 25 MG: 25 TABLET ORAL at 05:17

## 2024-04-02 RX ADMIN — SENNOSIDES 8.6 MG: 8.6 TABLET, FILM COATED ORAL at 22:08

## 2024-04-02 RX ADMIN — NYSTATIN 500000 UNITS: 100000 SUSPENSION ORAL at 17:42

## 2024-04-02 RX ADMIN — HEPARIN SODIUM 5000 UNITS: 5000 INJECTION, SOLUTION INTRAVENOUS; SUBCUTANEOUS at 22:08

## 2024-04-02 RX ADMIN — FORMOTEROL FUMARATE DIHYDRATE 20 MCG: 20 SOLUTION RESPIRATORY (INHALATION) at 21:35

## 2024-04-02 RX ADMIN — ATORVASTATIN CALCIUM 40 MG: 40 TABLET, FILM COATED ORAL at 15:30

## 2024-04-02 RX ADMIN — LEVALBUTEROL HYDROCHLORIDE 1.25 MG: 1.25 SOLUTION RESPIRATORY (INHALATION) at 14:45

## 2024-04-02 RX ADMIN — FORMOTEROL FUMARATE DIHYDRATE 20 MCG: 20 SOLUTION RESPIRATORY (INHALATION) at 07:24

## 2024-04-02 RX ADMIN — NYSTATIN 500000 UNITS: 100000 SUSPENSION ORAL at 08:33

## 2024-04-02 NOTE — ASSESSMENT & PLAN NOTE
Lab Results   Component Value Date    EGFR 30 04/02/2024    EGFR 25 04/01/2024    EGFR 22 03/31/2024    CREATININE 1.60 (H) 04/02/2024    CREATININE 1.81 (H) 04/01/2024    CREATININE 2.01 (H) 03/31/2024     Patient with severe SEEMA, s/p HD 3/26. 3/27. 3/29  Cr stabilized

## 2024-04-02 NOTE — ASSESSMENT & PLAN NOTE
Wt Readings from Last 3 Encounters:   04/02/24 44.1 kg (97 lb 3.6 oz)   03/18/24 43.1 kg (95 lb)   02/22/24 40.8 kg (90 lb)     Possible acute diastolic CHF with mild tricuspid regurgitation, worsening renal and respiratory failure  Patient initiated on dialysis 3/26 2-hours treatment, 2.5-hour treatment; additional treatment today 3/29/24  Monitor daily weights, I's and O's

## 2024-04-02 NOTE — H&P
"[facility]  H&P  Name: Laya Brooks 79 y.o. female I MRN: [id number]  Unit/Bed#: -01 I Date of Admission: 4/2/2024   Date of Service: 4/2/2024 I Hospital Day: 0      Assessment/Plan   * Acute renal failure superimposed on chronic kidney disease  (HCC)  Assessment & Plan  Lab Results   Component Value Date    EGFR 30 04/02/2024    EGFR 25 04/01/2024    EGFR 22 03/31/2024    CREATININE 1.60 (H) 04/02/2024    CREATININE 1.81 (H) 04/01/2024    CREATININE 2.01 (H) 03/31/2024   Patient admitted to [facility] and noted to have SEEMA superimposed on CKD on that admission which worsened   Nephrology followed the patient at [facility] and hemodialysis catheter placed on 3/26/24.   HD initiated on 3/26, patient also received HD sessions on 3/27 and 3/29  HD currently on hold as renal function has been improving  Consult nephrology     Renal artery stenosis (HCC)  Assessment & Plan  Renal artery duplex: \"After further review of the data the patient does have a severe left renal artery stenosis at the aorto- renal junction.  Moderate disease more proximally is seen as well\".  Patient seen in consult by vascular surgery at [facility] who consulted IR for angiogram.   The patient was transferred to [facility] for planned angiogram and possible angioplasty on 4/4  Patient will need to be NPO after midnight tomorrow   IR consult placed  Vascular surgery consulted    COPD (chronic obstructive pulmonary disease) ([facility])  Assessment & Plan  Patient initially admitted to [facility] for COPD exacerbation  Patient currently on prednisone taper, will continue  Patient currently on RA  Respiratory protocol     Dysphagia  Assessment & Plan  Patient on pureed diet with thin liquids  Consult speech    Renovascular hypertension  Assessment & Plan  Continue po hydralazine, nifedipine, and prn IV labetalol  Monitor blood pressure trend        VTE Pharmacologic Prophylaxis:   Moderate Risk (Score 3-4) - Pharmacological DVT Prophylaxis Ordered: " heparin.  Code Status: Level 1 - Full Code       Anticipated Length of Stay: Patient will be admitted on an inpatient basis with an anticipated length of stay of greater than 2 midnights secondary to planned angiogram, monitoring renal function.    Total Time Spent on Date of Encounter in care of patient: 65 mins. This time was spent on one or more of the following: performing physical exam; counseling and coordination of care; obtaining or reviewing history; documenting in the medical record; reviewing/ordering tests, medications or procedures; communicating with other healthcare professionals and discussing with patient's family/caregivers.    Chief Complaint:     History of Present Illness:  Laya Brooks is a 79 y.o. female with a PMH of COPD, CKD, HTN, dysphagia who was transferred from [facility] for plans of angiogram and possible angioplasty of renal artery. The patient was initially admitted to [facility] for COPD exacerbation and thought to have possible CHF exacerbation on 3/24. The patient was noted to have SEEMA which worsened with IV diuresis. Nephrology was consulted and the patient was initiated on hemodialysis. Upon further evaluation the patient was noted to have severe left renal artery stenosis. Vascular surgery was consulted and recommended IR consult given patient with solitary left kidney. IR recommended transfer to [facility] for planned angiogram and angioplasty. The patient underwent HD at [facility] however this is currently on hold as the patient's renal function is improving. The patient's COPD exacerbation is now resolved. The patient currently denies any complaints.     Review of Systems:  Review of Systems   All other systems reviewed and are negative.      Past Medical and Surgical History:   Past Medical History:   Diagnosis Date    CAD (coronary artery disease)     Cardiac disease     Hypercholesteremia     Hyperlipidemia     Hypertension     Hypertension     Renal disorder        Past Surgical  History:   Procedure Laterality Date    CORONARY ANGIOPLASTY WITH STENT PLACEMENT      IR TEMPORARY DIALYSIS CATHETER PLACEMENT  3/26/2024    WRIST SURGERY         Meds/Allergies:  Prior to Admission medications    Medication Sig Start Date End Date Taking? Authorizing Provider   amLODIPine (NORVASC) 5 mg tablet Take 1 tablet (5 mg total) by mouth daily 1/10/24  Yes Ailyn Aranda PA-C   atorvastatin (LIPITOR) 40 mg tablet TAKE 1 TABLET BY MOUTH ONCE DAILY IN THE EVENING. TAKE IN PLACE OF SIMVASTATIN 10/2/23  Yes Ailyn Aranda PA-C   clopidogrel (PLAVIX) 75 mg tablet Take 1 tablet (75 mg total) by mouth daily To prevent stroke and heart attack 8/8/23  Yes Damian Lynne, DO   lisinopril (ZESTRIL) 10 mg tablet Take 1 tablet (10 mg total) by mouth daily 8/8/23  Yes Damian Lynne, DO   albuterol (PROVENTIL HFA,VENTOLIN HFA) 90 mcg/act inhaler Inhale 2 puffs every 6 (six) hours as needed for wheezing    Historical Provider, MD   ergocalciferol (ERGOCALCIFEROL) 1.25 MG (47667 UT) capsule Take 1 capsule (50,000 Units total) by mouth once a week 1/30/24   Damian Lynne DO   tiotropium-olodaterol (Stiolto Respimat) 2.5-2.5 MCG/ACT inhaler Inhale 2 puffs daily 2/22/24   Lisa Kidd MD     I have reviewed home medications using recent Epic encounter.    Allergies:   Allergies   Allergen Reactions    Influenza Virus Vaccine        Social History:  Marital Status: /Civil Union   Occupation: retired  Patient Pre-hospital Living Situation: Home  Patient Pre-hospital Level of Mobility: walks  Patient Pre-hospital Diet Restrictions: pureed diet  Substance Use History:   Social History     Substance and Sexual Activity   Alcohol Use Never     Social History     Tobacco Use   Smoking Status Former   Smokeless Tobacco Never     Social History     Substance and Sexual Activity   Drug Use No       Family History:  History reviewed. No pertinent family history.    Physical Exam:     Vitals:   Blood  Pressure: 151/58 (04/02/24 1625)  Pulse: 85 (04/02/24 1625)  Temperature: 97.8 °F (36.6 °C) (04/02/24 1625)  Temp Source: Oral (04/02/24 1625)  Respirations: 20 (04/02/24 1625)  Height: 5' (152.4 cm) (04/02/24 1625)  SpO2: 91 % (04/02/24 1625)    Physical Exam  Vitals and nursing note reviewed.   Constitutional:       General: She is awake.      Appearance: She is ill-appearing (chronically).   HENT:      Head: Normocephalic and atraumatic.   Cardiovascular:      Rate and Rhythm: Normal rate and regular rhythm.      Heart sounds: Normal heart sounds.   Pulmonary:      Effort: Pulmonary effort is normal.      Breath sounds: Normal breath sounds.   Abdominal:      Palpations: Abdomen is soft.      Tenderness: There is no abdominal tenderness.   Skin:     General: Skin is warm and dry.   Neurological:      General: No focal deficit present.      Mental Status: She is alert and oriented to person, place, and time.   Psychiatric:         Attention and Perception: Attention normal.         Mood and Affect: Mood normal.         Speech: Speech normal.         Behavior: Behavior is cooperative.          Additional Data:     Lab Results:  Results from last 7 days   Lab Units 04/02/24  0737   WBC Thousand/uL 8.11   HEMOGLOBIN g/dL 10.6*   HEMATOCRIT % 32.4*   PLATELETS Thousands/uL 75*   NEUTROS PCT % 81*   LYMPHS PCT % 10*   MONOS PCT % 8   EOS PCT % 0     Results from last 7 days   Lab Units 04/02/24  0737 03/28/24  0548 03/27/24  0517   SODIUM mmol/L 136   < > 132*   POTASSIUM mmol/L 4.3   < > 4.5   CHLORIDE mmol/L 104   < > 97   CO2 mmol/L 24   < > 22   BUN mg/dL 52*   < > 70*   CREATININE mg/dL 1.60*   < > 4.47*   ANION GAP mmol/L 8   < > 13   CALCIUM mg/dL 8.5   < > 8.4   ALBUMIN g/dL  --   --  3.4*   TOTAL BILIRUBIN mg/dL  --   --  0.41   ALK PHOS U/L  --   --  42   ALT U/L  --   --  11   AST U/L  --   --  14   GLUCOSE RANDOM mg/dL 93   < > 142*    < > = values in this interval not displayed.                        Lines/Drains:  Invasive Devices       Peripheral Intravenous Line  Duration             Peripheral IV 03/30/24 Dorsal (posterior);Left Forearm 3 days              Hemodialysis Catheter  Duration             HD Temporary Double Catheter 7 days                        Imaging: No pertinent imaging reviewed.  No orders to display       EKG and Other Studies Reviewed on Admission:   EKG: No EKG obtained.    ** Please Note: This note has been constructed using a voice recognition system. **

## 2024-04-02 NOTE — PROGRESS NOTES
Progress Note - Vascular Surgery   Laya Brooks 79 y.o. female MRN: [id number]  Unit/Bed#:  Encounter: [id number]    Assessment:    Severe left renal artery stenosis at the aorto-renal junction.  Awaiting IR disposition to determine angiogram and angioplasty.    79-year-old female with significant multiple medical comorbidities was seen in vascular surgery consultation because of worsening acute kidney injury now requiring hemodialysis and findings of renal artery duplex study showing severe left renal artery stenosis at the aorto renal junction, moderate disease proximally noted on renal ultrasound.    Patient has a history of end-stage renal failure CKD 3, carotid artery stenosis, old CVA, protein calorie malnutrition, coronary artery disease status post stent, tobacco use and hyperlipidemia was admitted on March 23 with acute respiratory failure and hypoxia secondary to exacerbation of COPD and possible acute diastolic congestive heart failure.  She has been followed by nephrology and receiving inpatient hemodialysis every other day.  She was initially treated with IV diuresis which has since been discontinued.    Progress note from vascular surgery yesterday by Dr. Darya Castle Reviewed the chart with recommendations.  Acute kidney failure likely multifactorial with cardiac dysfunction and renovascular disease.  Patient has chronic left renal artery stenosis and right renal atresia.    Consult was placed with interventional radiology as well as a noncontrast CT of the abdomen to left renal artery diameter measurements and assessment of calcification.  Leave discussion to IR for location of intervention.    BMP today showed creatinine 1.60 (1.81 and 2.01)    UOP yesterday 300 mL.    Plan:  Await IR disposition to determine location for renal angiogram and possible renal angioplasty.  Patient remains on Plavix and aspirin.  Surgical physician assistant representing vascular surgery coverage at this [place]  [facility] will follow from the periphery, reconsult if any problems or questions arise.  Dr Darya Castle Is the vascular surgeon covering this [facility] remotely, she is available via Tiger text message for telephone if any problems or questions arise.  Remainder of medical management per the direction of the attending hospitalist physician, updated Dr. Powell personally.    Subjective/Objective     Chief Complaint: No overnight events.    Subjective: Vascular surgery consultation was performed yesterday.  Patient with critical stenosis of left renal artery.  Creatinine remains improved.  Breathing improved.    Scheduled Meds:  Current Facility-Administered Medications   Medication Dose Route Frequency Provider Last Rate    acetaminophen  650 mg Oral Q4H PRN Eleanor Rodriguez MD      albuterol  2.5 mg Nebulization Q4H PRN Eleanor Rodriguez MD      ALPRAZolam  0.5 mg Oral TID PRN Malena Hearn MD      aluminum-magnesium hydroxide-simethicone  30 mL Oral Q4H PRN Darryl Powell MD      atorvastatin  40 mg Oral Daily With Dinner Eleanor Rodriguez MD      budesonide  0.5 mg Nebulization Q12H Sincere Goldman PA-C      clopidogrel  75 mg Oral Daily Eleanor Rodriguez MD      famotidine  10 mg Oral Daily Darryl Powell MD      formoterol  20 mcg Nebulization Q12H Sincere Goldman PA-C      heparin (porcine)  5,000 Units Subcutaneous Q8H [facility] Eleanor Rodriguez MD      hydrALAZINE  25 mg Oral Q8H [facility] Darryl Powell MD      ipratropium  0.5 mg Nebulization TID Eleanor Rodriguez MD      labetalol  10 mg Intravenous Q6H PRN Malena Hearn MD      levalbuterol  1.25 mg Nebulization TID Eleanor Rodriguez MD      NIFEdipine  60 mg Oral Daily Darryl Powell MD      nystatin  500,000 Units Swish & Swallow 4x Daily Malena Hearn MD      ondansetron  4 mg Intravenous Q6H PRN Eleanor Rodriguez MD      predniSONE  30 mg Oral Daily Sincere Goldman PA-C      Followed by    [START ON  4/5/2024] predniSONE  20 mg Oral Daily Sincere Goldman PA-C      Followed by    [START ON 4/8/2024] predniSONE  10 mg Oral Daily Sincere Goldman PA-C      senna  1 tablet Oral HS Malena Hearn MD      sodium chloride  1 spray Each Nare Q1H PRN Malena Hearn MD       Continuous Infusions:   PRN Meds:.  acetaminophen    albuterol    ALPRAZolam    aluminum-magnesium hydroxide-simethicone    labetalol    ondansetron    sodium chloride      Objective:     Blood pressure (!) 190/84, pulse 74, temperature 98.1 °F (36.7 °C), temperature source Tympanic, resp. rate 20, height 5' (1.524 m), weight 44.1 kg (97 lb 3.6 oz), SpO2 92%.,Body mass index is 18.99 kg/m².    I/O         03/31 0701  04/01 0700 04/01 0701  04/02 0700 04/02 0701  04/03 0700    P.O. 540 840 120    I.V. (mL/kg)  1468.3 (33.3)     Total Intake(mL/kg) 540 (12.8) 2308.3 (52.3) 120 (2.7)    Urine (mL/kg/hr) 550 (0.5) 300 (0.3)     Total Output 550 300     Net -10 +2008.3 +120                 Invasive Devices       Peripheral Intravenous Line  Duration             Peripheral IV 03/30/24 Dorsal (posterior);Left Forearm 3 days              Hemodialysis Catheter  Duration             HD Temporary Double Catheter 7 days                    Physical Exam:   General no acute distress.  Chronically ill-appearing.  Skin decreased turgor, dusky in appearance.  Mild pallor.  ENT oral mucosa is dry.  Pharynx noninflamed.  Tongue midline.  Sclera white OU.  Neck trachea midline, no JVD or bruit.  HD catheter right anterior neck.    Heart regular rate and rhythm.  No murmur or gallop.  Lungs clear to auscultation.  No rales or rhonchi.  Back no CVA tenderness to percussion.  Abdomen positive bowel sounds, soft, nontender.  Nondistended.  No masses.  Extremities moves all 4 symptoms well.  Ambulation observed.  Neurological CN's grossly intact.  Mental status at baseline.  No tremor noted.  Fully oriented x 3 spheres.      Lab, Imaging and other studies:I  "have personally reviewed pertinent lab results.  , CBC:   Lab Results   Component Value Date    WBC 8.11 04/02/2024    HGB 10.6 (L) 04/02/2024    HCT 32.4 (L) 04/02/2024    MCV 90 04/02/2024    PLT 75 (L) 04/02/2024    RBC 3.62 (L) 04/02/2024    MCH 29.3 04/02/2024    MCHC 32.7 04/02/2024    RDW 14.3 04/02/2024    MPV 11.2 04/02/2024    NRBC 0 04/02/2024   , CMP:   Lab Results   Component Value Date    SODIUM 136 04/02/2024    K 4.3 04/02/2024     04/02/2024    CO2 24 04/02/2024    BUN 52 (H) 04/02/2024    CREATININE 1.60 (H) 04/02/2024    CALCIUM 8.5 04/02/2024    EGFR 30 04/02/2024   , Coagulation: No results found for: \"PT\", \"INR\", \"APTT\", Urinalysis: No results found for: \"COLORU\", \"CLARITYU\", \"SPECGRAV\", \"PHUR\", \"LEUKOCYTESUR\", \"NITRITE\", \"PROTEINUA\", \"GLUCOSEU\", \"KETONESU\", \"BILIRUBINUR\", \"BLOODU\"    VTE Pharmacologic Prophylaxis: Heparin  VTE Mechanical Prophylaxis: sequential compression device    Marlon Fajardo PA-C    "

## 2024-04-02 NOTE — ASSESSMENT & PLAN NOTE
Lab Results   Component Value Date    EGFR 30 04/02/2024    EGFR 25 04/01/2024    EGFR 22 03/31/2024    CREATININE 1.60 (H) 04/02/2024    CREATININE 1.81 (H) 04/01/2024    CREATININE 2.01 (H) 03/31/2024   Patient admitted to [facility] and noted to have SEEMA superimposed on CKD on that admission which worsened   Nephrology followed the patient at [facility] and hemodialysis catheter placed on 3/26/24.   HD initiated on 3/26, patient also received HD sessions on 3/27 and 3/29  HD currently on hold as renal function has been improving  Consult nephrology

## 2024-04-02 NOTE — ASSESSMENT & PLAN NOTE
"Renal artery duplex: \"After further review of the data the patient does have a severe left renal artery stenosis at the aorto- renal junction.  Moderate disease more proximally is seen as well\".  Patient seen in consult by vascular surgery at [facility] who consulted IR for angiogram.   The patient was transferred to [facility] for planned angiogram and possible angioplasty on 4/4  Patient will need to be NPO after midnight tomorrow   IR consult placed  Vascular surgery consulted  "

## 2024-04-02 NOTE — PLAN OF CARE
Problem: PAIN - ADULT  Goal: Verbalizes/displays adequate comfort level or baseline comfort level  Description: Interventions:  - Encourage patient to monitor pain and request assistance  - Assess pain using appropriate pain scale  - Administer analgesics based on type and severity of pain and evaluate response  - Implement non-pharmacological measures as appropriate and evaluate response  - Consider cultural and social influences on pain and pain management  - Notify physician/advanced practitioner if interventions unsuccessful or patient reports new pain  Outcome: Progressing     Problem: SAFETY ADULT  Goal: Patient will remain free of falls  Description: INTERVENTIONS:  - Educate patient/family on patient safety including physical limitations  - Instruct patient to call for assistance with activity   - Consult OT/PT to assist with strengthening/mobility   - Keep Call bell within reach  - Keep bed low and locked with side rails adjusted as appropriate  - Keep care items and personal belongings within reach  - Initiate and maintain comfort rounds  - Make Fall Risk Sign visible to staff  - Offer Toileting every 2 Hours, in advance of need  - Initiate/Maintain fall alarm  - Obtain necessary fall risk management equipment: alarm, nonskid footwear, yellow wristband  - Apply yellow socks and bracelet for high fall risk patients  - Consider moving patient to room near nurses station  Outcome: Progressing  Goal: Maintain or return to baseline ADL function  Description: INTERVENTIONS:  -  Assess patient's ability to carry out ADLs; assess patient's baseline for ADL function and identify physical deficits which impact ability to perform ADLs (bathing, care of mouth/teeth, toileting, grooming, dressing, etc.)  - Assess/evaluate cause of self-care deficits   - Assess range of motion  - Assess patient's mobility; develop plan if impaired  - Assess patient's need for assistive devices and provide as appropriate  - Encourage  maximum independence but intervene and supervise when necessary  - Involve family in performance of ADLs  - Assess for home care needs following discharge   - Consider OT consult to assist with ADL evaluation and planning for discharge  - Provide patient education as appropriate  Outcome: Progressing  Goal: Maintains/Returns to pre admission functional level  Description: INTERVENTIONS:  - Perform AM-PAC 6 Click Basic Mobility/ Daily Activity assessment daily.  - Set and communicate daily mobility goal to care team and patient/family/caregiver.   - Collaborate with rehabilitation services on mobility goals if consulted  - Perform Range of Motion 4 times a day.  - Reposition patient every 2 hours.  - Dangle patient 3 times a day  - Stand patient 3 times a day  - Ambulate patient 3 times a day  - Out of bed to chair 3 times a day   - Out of bed for meals 3 times a day  - Out of bed for toileting  - Record patient progress and toleration of activity level   Outcome: Progressing     Problem: DISCHARGE PLANNING  Goal: Discharge to home or other facility with appropriate resources  Description: INTERVENTIONS:  - Identify barriers to discharge w/patient and caregiver  - Arrange for needed discharge resources and transportation as appropriate  - Identify discharge learning needs (meds, wound care, etc.)  - Arrange for interpretive services to assist at discharge as needed  - Refer to Case Management Department for coordinating discharge planning if the patient needs post-hospital services based on physician/advanced practitioner order or complex needs related to functional status, cognitive ability, or social support system  Outcome: Progressing     Problem: Knowledge Deficit  Goal: Patient/family/caregiver demonstrates understanding of disease process, treatment plan, medications, and discharge instructions  Description: Complete learning assessment and assess knowledge base.  Interventions:  - Provide teaching at level of  understanding  - Provide teaching via preferred learning methods  Outcome: Progressing     Problem: INFECTION - ADULT  Goal: Absence or prevention of progression during hospitalization  Description: INTERVENTIONS:  - Assess and monitor for signs and symptoms of infection  - Monitor lab/diagnostic results  - Monitor all insertion sites, i.e. indwelling lines, tubes, and drains  - Monitor endotracheal if appropriate and nasal secretions for changes in amount and color  - [place] appropriate cooling/warming therapies per order  - Administer medications as ordered  - Instruct and encourage patient and family to use good hand hygiene technique  - Identify and instruct in appropriate isolation precautions for identified infection/condition  Outcome: Progressing     Problem: RESPIRATORY - ADULT  Goal: Achieves optimal ventilation and oxygenation  Description: INTERVENTIONS:  - Assess for changes in respiratory status  - Assess for changes in mentation and behavior  - Position to facilitate oxygenation and minimize respiratory effort  - Oxygen administered by appropriate delivery if ordered  - Initiate smoking cessation education as indicated  - Encourage broncho-pulmonary hygiene including cough, deep breathe, Incentive Spirometry  - Assess the need for suctioning and aspirate as needed  - Assess and instruct to report SOB or any respiratory difficulty  - Respiratory Therapy support as indicated  Outcome: Progressing     Problem: GENITOURINARY - ADULT  Goal: Maintains or returns to baseline urinary function  Description: INTERVENTIONS:  - Assess urinary function  - Encourage oral fluids to ensure adequate hydration if ordered  - Administer IV fluids as ordered to ensure adequate hydration  - Administer ordered medications as needed  - Offer frequent toileting  - Follow urinary retention protocol if ordered  Outcome: Progressing  Goal: Absence of urinary retention  Description: INTERVENTIONS:  - Assess patient’s ability  to void and empty bladder  - Monitor I/O  - Bladder scan as needed  - Discuss with physician/AP medications to alleviate retention as needed  - Discuss catheterization for long term situations as appropriate  Outcome: Progressing     Problem: METABOLIC, FLUID AND ELECTROLYTES - ADULT  Goal: Electrolytes maintained within normal limits  Description: INTERVENTIONS:  - Monitor labs and assess patient for signs and symptoms of electrolyte imbalances  - Administer electrolyte replacement as ordered  - Monitor response to electrolyte replacements, including repeat lab results as appropriate  - Instruct patient on fluid and nutrition as appropriate  Outcome: Progressing  Goal: Fluid balance maintained  Description: INTERVENTIONS:  - Monitor labs   - Monitor I/O and WT  - Instruct patient on fluid and nutrition as appropriate  - Assess for signs & symptoms of volume excess or deficit  Outcome: Progressing     Problem: CARDIOVASCULAR - ADULT  Goal: Maintains optimal cardiac output and hemodynamic stability  Description: INTERVENTIONS:  - Monitor I/O, vital signs and rhythm  - Monitor for S/S and trends of decreased cardiac output  - Administer and titrate ordered vasoactive medications to optimize hemodynamic stability  - Assess quality of pulses, skin color and temperature  - Assess for signs of decreased coronary artery perfusion  - Instruct patient to report change in severity of symptoms  Outcome: Progressing  Goal: Absence of cardiac dysrhythmias or at baseline rhythm  Description: INTERVENTIONS:  - Continuous cardiac monitoring, vital signs, obtain 12 lead EKG if ordered  - Administer antiarrhythmic and heart rate control medications as ordered  - Monitor electrolytes and administer replacement therapy as ordered  Outcome: Progressing     Problem: Prexisting or High Potential for Compromised Skin Integrity  Goal: Skin integrity is maintained or improved  Description: INTERVENTIONS:  - Identify patients at risk for skin  breakdown  - Assess and monitor skin integrity  - Assess and monitor nutrition and hydration status  - Monitor labs   - Assess for incontinence   - Turn and reposition patient  - Assist with mobility/ambulation  - Relieve pressure over bony prominences  - Avoid friction and shearing  - Provide appropriate hygiene as needed including keeping skin clean and dry  - Evaluate need for skin moisturizer/barrier cream  - Collaborate with interdisciplinary team   - Patient/family teaching  - Consider wound care consult   Outcome: Progressing     Problem: Nutrition/Hydration-ADULT  Goal: Nutrient/Hydration intake appropriate for improving, restoring or maintaining nutritional needs  Description: Monitor and assess patient's nutrition/hydration status for malnutrition. Collaborate with interdisciplinary team and initiate plan and interventions as ordered.  Monitor patient's weight and dietary intake as ordered or per policy. Utilize nutrition screening tool and intervene as necessary. Determine patient's food preferences and provide high-protein, high-caloric foods as appropriate.     INTERVENTIONS:  - Monitor oral intake, urinary output, labs, and treatment plans  - Assess nutrition and hydration status and recommend course of action  - Evaluate amount of meals eaten  - Assist patient with eating if necessary   - Allow adequate time for meals  - Recommend/ encourage appropriate diets, oral nutritional supplements, and vitamin/mineral supplements  - Order, calculate, and assess calorie counts as needed  - Recommend, monitor, and adjust tube feedings and TPN/PPN based on assessed needs  - Assess need for intravenous fluids  - Provide specific nutrition/hydration education as appropriate  - Include patient/family/caregiver in decisions related to nutrition  Outcome: Progressing

## 2024-04-02 NOTE — NURSING NOTE
Attempted to contact [facility] @ [facility] via ph# provided [phone] . Was on hold/circled phone system x15 minutes. Attempted to leave  with contact information, system disconnected before connecting to  system.

## 2024-04-02 NOTE — PROGRESS NOTES
Progress Note - Nephrology   Laya Brooks 79 y.o. female MRN: [id number]  Unit/Bed#:  Encounter: [id number]    A/P:  1.  Hemodialysis dependent acute kidney injury              Creatinine continues to slowly improve, now down to 1.6 mg/dL.  Will continue to hold hemodialysis at this time and look to potentially improve patient's blood flow with angioplasty of renal artery.  Continue to avoid nephrotoxins and optimize hemodynamics as best as possible.  2.  Chronic kidney disease stage IIIa with baseline creatinine around 1 mg/dL  3.  Renovascular disease              Appreciate vascular and interventional radiology colleagues evaluation and recommendations.  Patient to proceed with angiogram and angioplasty as indicated to further evaluate left renal artery vasculature.  Patient has significant atrophy of the right kidney due to renovascular disease.  With respect to IV contrast load, patient to have minimal contrast as indicated, recommend IV fluids rate at 150 mL/hr 1 hour prior to IV contrast exposure, and continuing at 125 mL/hr for 2 hours following IV contrast exposure, or according to recommendations by nephrologist at excepting facility if the patient is to be transferred.  4.  Metabolic acidosis              Resolved  5.  Hyperkalemia-resolved  6.  Hypertension in setting of chronic kidney disease              Nifedipine increased earlier this morning from 30 up to 60 mg daily.  Hydralazine 25 mg 3 times daily initiated yesterday.  7.  Metabolic encephalopathy-improved              Patient is approaching typical mental status    Case discussed with hospitalist.  Arrangements being made for potential transfer if angiogram and angioplasty is not possible at the [facility].  With respect to risk from the renal standpoint, intervention to open possible collapsing renal artery is preferred with an appropriate risk given long-term consequences and potential need for long-term hemodialysis upon closure  of left renal artery.    Follow up reason for today's visit: Acute kidney injury/chronic kidney disease/renovascular disease/electrolyte disorders    Chronic obstructive pulmonary disease with acute exacerbation (HCC)    Patient Active Problem List   Diagnosis    History of CVA (cerebrovascular accident)    Hypertensive emergency    Malnutrition (HCC)    Premature atrial complexes    Thrombocytopenia (HCC)    Leukopenia    Neutropenia (HCC)    Hyperphosphatemia    Hypercholesterolemia    Carotid artery stenosis    Nocturnal hypoxia    Shortness of breath    Abnormal echocardiogram    CVA (cerebral vascular accident) (HCC)    Atrophy of right kidney    Stage 3a chronic kidney disease (HCC)    Renal vascular disease    Renovascular hypertension    Intracranial atherosclerosis    Weight loss, non-intentional    Hypervitaminosis D    CAD (coronary artery disease)    Celiac artery stenosis (HCC)    S/P right coronary artery (RCA) stent placement    Tobacco use    Wheezing    Dyslipidemia    SEEMA (acute kidney injury) (HCC)    Acute diastolic congestive heart failure (HCC)    Hyponatremia    Chronic obstructive pulmonary disease with acute exacerbation (HCC)    Acute respiratory failure with hypoxia (HCC)    Toxic metabolic encephalopathy    Elevated d-dimer    Dysphagia    Renal artery stenosis (HCC)    Goals of care, counseling/discussion    Oral candidiasis         Subjective:   Patient did not sleep well overnight and is tired otherwise has no acute complaints this morning.    Objective:     Vitals: Blood pressure (!) 190/84, pulse 74, temperature 98.1 °F (36.7 °C), temperature source Tympanic, resp. rate 20, height 5' (1.524 m), weight 44.1 kg (97 lb 3.6 oz), SpO2 92%.,Body mass index is 18.99 kg/m².    Weight (last 2 days)       Date/Time Weight    04/02/24 0535 44.1 (97.22)    04/01/24 0600 42.3 (93.25)    03/31/24 0600 42.1 (92.81)              Intake/Output Summary (Last 24 hours) at 4/2/2024 0912  Last data filed  at 4/2/2024 0517  Gross per 24 hour   Intake 2188.33 ml   Output 300 ml   Net 1888.33 ml     I/O last 3 completed shifts:  In: 2548.3 [P.O.:1080; I.V.:1468.3]  Out: 550 [Urine:550]    HD Temporary Double Catheter (Active)   Reasons to continue HD Cath Treatment Therapy 04/01/24 2100   Goal for Removal Other (comment) 04/01/24 2100   Line Necessity Reviewed Yes, reviewed with provider 04/01/24 0845   Site Assessment WDL 04/01/24 2100   Proximal Lumen Status Capped 04/01/24 2100   Distal Lumen Status Capped 04/01/24 2100   Line Care Connections checked and tightened 03/29/24 1550   Dressing Type Chlorhexidine dressing 04/01/24 2100   Dressing Status Clean;Dry;Intact 04/01/24 2100   Dressing Change Due 04/02/24 04/01/24 2100       Urethral Catheter 16 Fr. (Active)   Amt returned on insertion(mL) 125 mL 03/24/24 1108   Reasons to continue Urinary Catheter  Accurate I&O assessment in critically ill patients (48 hr. max) 04/01/24 2100   Goal for Removal Voiding trial when ambulation improves 04/01/24 2100   Site Assessment Clean;Skin intact 04/01/24 2100   Perez Care Done 04/01/24 2100   Collection Container Standard drainage bag 04/01/24 2100   Securement Method Securing device (Describe) 04/01/24 2100   Output (mL) 200 mL 04/02/24 0517   CAUTI Time-out performed before Urine Culture Yes 03/24/24 1108       Physical Exam: BP (!) 190/84 (BP Location: Left arm)   Pulse 74   Temp 98.1 °F (36.7 °C) (Tympanic)   Resp 20   Ht 5' (1.524 m)   Wt 44.1 kg (97 lb 3.6 oz)   SpO2 92%   BMI 18.99 kg/m²     General Appearance:    Alert, cooperative, no distress, appears stated age   Head:    Normocephalic, without obvious abnormality, atraumatic   Eyes:    Conjunctiva/corneas clear   Ears:    Normal external ears   Nose:   Nares normal, septum midline, mucosa normal, no drainage    or sinus tenderness   Throat:   Lips, mucosa, and tongue normal; teeth and gums normal   Neck:   Supple   Back:     Symmetric, no curvature, ROM  "normal, no CVA tenderness   Lungs:   Reduced but otherwise clear at this time   Chest wall:    No tenderness or deformity   Heart:    Regular rate and rhythm, S1 and S2 normal, no murmur, rub   or gallop   Abdomen:     Soft, non-tender, bowel sounds active   Extremities:   Extremities normal, atraumatic, no cyanosis or edema   Skin:   Skin color, texture, turgor normal, no rashes or lesions   Lymph nodes:   Cervical normal   Neurologic:   CNII-XII intact            Lab, Imaging and other studies: I have personally reviewed pertinent labs.  CBC:   Lab Results   Component Value Date    WBC 8.11 04/02/2024    HGB 10.6 (L) 04/02/2024    HCT 32.4 (L) 04/02/2024    MCV 90 04/02/2024    PLT 75 (L) 04/02/2024    RBC 3.62 (L) 04/02/2024    MCH 29.3 04/02/2024    MCHC 32.7 04/02/2024    RDW 14.3 04/02/2024    MPV 11.2 04/02/2024    NRBC 0 04/02/2024     CMP:   Lab Results   Component Value Date    K 4.3 04/02/2024     04/02/2024    CO2 24 04/02/2024    BUN 52 (H) 04/02/2024    CREATININE 1.60 (H) 04/02/2024    CALCIUM 8.5 04/02/2024    EGFR 30 04/02/2024       .  Results from last 7 days   Lab Units 04/02/24  0737 04/01/24  0619 03/31/24  0450 03/28/24  0548 03/27/24  0517   POTASSIUM mmol/L 4.3 4.8 4.3   < > 4.5   CHLORIDE mmol/L 104 105 105   < > 97   CO2 mmol/L 24 23 23   < > 22   BUN mg/dL 52* 51* 51*   < > 70*   CREATININE mg/dL 1.60* 1.81* 2.01*   < > 4.47*   CALCIUM mg/dL 8.5 8.4 8.6   < > 8.4   ALK PHOS U/L  --   --   --   --  42   ALT U/L  --   --   --   --  11   AST U/L  --   --   --   --  14    < > = values in this interval not displayed.         Phosphorus: No results found for: \"PHOS\"  Magnesium: No results found for: \"MG\"  Urinalysis: No results found for: \"COLORU\", \"CLARITYU\", \"SPECGRAV\", \"PHUR\", \"LEUKOCYTESUR\", \"NITRITE\", \"PROTEINUA\", \"GLUCOSEU\", \"KETONESU\", \"BILIRUBINUR\", \"BLOODU\"  Ionized Calcium: No results found for: \"CAION\"  Coagulation: No results found for: \"PT\", \"INR\", \"APTT\"  Troponin: No " "results found for: \"TROPONINI\"  ABG: No results found for: \"PHART\", \"GOX9DZH\", \"PO2ART\", \"KJQ0XTP\", \"D3GLZXOS\", \"BEART\", \"SOURCE\"  Radiology review:     IMAGING  No results found.    Current Facility-Administered Medications   Medication Dose Route Frequency    acetaminophen (TYLENOL) tablet 650 mg  650 mg Oral Q4H PRN    albuterol inhalation solution 2.5 mg  2.5 mg Nebulization Q4H PRN    ALPRAZolam (XANAX) tablet 0.5 mg  0.5 mg Oral TID PRN    atorvastatin (LIPITOR) tablet 40 mg  40 mg Oral Daily With Dinner    budesonide (PULMICORT) inhalation solution 0.5 mg  0.5 mg Nebulization Q12H    clopidogrel (PLAVIX) tablet 75 mg  75 mg Oral Daily    formoterol (PERFOROMIST) nebulizer solution 20 mcg  20 mcg Nebulization Q12H    heparin (porcine) subcutaneous injection 5,000 Units  5,000 Units Subcutaneous Q8H JHON    hydrALAZINE (APRESOLINE) tablet 25 mg  25 mg Oral Q8H JHON    ipratropium (ATROVENT) 0.02 % inhalation solution 0.5 mg  0.5 mg Nebulization TID    labetalol (NORMODYNE) injection 10 mg  10 mg Intravenous Q6H PRN    levalbuterol (XOPENEX) inhalation solution 1.25 mg  1.25 mg Nebulization TID    NIFEdipine (PROCARDIA XL) 24 hr tablet 60 mg  60 mg Oral Daily    nystatin (MYCOSTATIN) oral suspension 500,000 Units  500,000 Units Swish & Swallow 4x Daily    ondansetron (ZOFRAN) injection 4 mg  4 mg Intravenous Q6H PRN    predniSONE tablet 30 mg  30 mg Oral Daily    Followed by    [START ON 4/5/2024] predniSONE tablet 20 mg  20 mg Oral Daily    Followed by    [START ON 4/8/2024] predniSONE tablet 10 mg  10 mg Oral Daily    senna (SENOKOT) tablet 8.6 mg  1 tablet Oral HS    sodium chloride (OCEAN) 0.65 % nasal spray 1 spray  1 spray Each Nare Q1H PRN     Medications Discontinued During This Encounter   Medication Reason    umeclidinium 62.5 mcg/actuation inhaler AEPB 1 puff     olodaterol HCl (STRIVERDI RESPIMAT) inhaler 2 puff     ipratropium-albuterol (DUO-NEB) 0.5-2.5 mg/3 mL inhalation solution 3 mL     albuterol " (PROVENTIL HFA,VENTOLIN HFA) inhaler 2 puff     LORazepam (ATIVAN) injection 0.5 mg     furosemide (LASIX) injection 40 mg     amLODIPine (NORVASC) tablet 5 mg     sodium bicarbonate 75 mEq in sodium chloride 0.45 % 1,000 mL infusion     amLODIPine (NORVASC) tablet 5 mg     sodium bicarbonate 75 mEq in sodium chloride 0.45 % 1,000 mL infusion     methylPREDNISolone sodium succinate (Solu-MEDROL) injection 40 mg     sodium bicarbonate 150 mEq in dextrose 5 % 1,000 mL infusion     dexmedeTOMIDine (Precedex) 400 mcg in sodium chloride 0.9% 100 mL     dextrose 5 % infusion     methylPREDNISolone sodium succinate (Solu-MEDROL) injection 40 mg     aspirin rectal suppository 150 mg     methylPREDNISolone sodium succinate (Solu-MEDROL) injection 40 mg     amLODIPine (NORVASC) tablet 5 mg     hydrALAZINE (APRESOLINE) injection 10 mg     lactated ringers infusion     hydrALAZINE (APRESOLINE) injection 10 mg     LORazepam (ATIVAN) injection 1 mg     labetalol (NORMODYNE) injection 10 mg     predniSONE tablet 40 mg     predniSONE tablet 30 mg     NIFEdipine (PROCARDIA XL) 24 hr tablet 30 mg        Damian Varvarhoney, DO      This progress note was produced in part using a dictation device which may document imprecise wording from author's original intent.

## 2024-04-02 NOTE — SPEECH THERAPY NOTE
"Speech Language/Pathology  Speech/Language Pathology Progress Note    Patient Name: Laya Brooks  Today's Date: 4/2/2024     Problem List  Principal Problem:    Chronic obstructive pulmonary disease with acute exacerbation (HCC)  Active Problems:    Hypercholesterolemia    Shortness of breath    CVA (cerebral vascular accident) (HCC)    Stage 3a chronic kidney disease (HCC)    Renovascular hypertension    CAD (coronary artery disease)    SEEMA (acute kidney injury) (HCC)    Acute diastolic congestive heart failure (HCC)    Hyponatremia    Acute respiratory failure with hypoxia (HCC)    Toxic metabolic encephalopathy    Elevated d-dimer    Dysphagia    Renal artery stenosis (HCC)    Goals of care, counseling/discussion    Oral candidiasis    Past Medical History  Past Medical History:   Diagnosis Date    CAD (coronary artery disease)     Cardiac disease     Hypercholesteremia     Hyperlipidemia     Hypertension     Hypertension     Renal disorder      Past Surgical History  Past Surgical History:   Procedure Laterality Date    CORONARY ANGIOPLASTY WITH STENT PLACEMENT      IR TEMPORARY DIALYSIS CATHETER PLACEMENT  3/26/2024    WRIST SURGERY         Subjective:  Patient received sitting upright in bedside chair with spouse present.  Patient concerned regarding the amount of food that arrives on her tray, appears to overwhelm her.  Pt's overall fluid intake has improved since being upgraded yesterday to thin liquids.  NSG reports pt with minimal sleep last night, more tired today but appropriate for session.  Requested kitchen to bring her mechanical soft solids for trials.  Pt receptive to diced peaches.    Objective:  Diced peaches:  Patient with appropriate oral reception, AP transport and  swallow strength/ROM appears functional throughout.  Pt noted to be \"mouth breathing\" during trials with visible fatigue between trials.  Pt with good breath support for speaking but does not appear able to coordinate " mastication and breathing during mechanical soft trial.    Assessment:  Patient did well with diced peaches (mechanical soft) trial.  Dysphagia appears to tolerate mechanical soft solids however pt fatigues, demonstrating episodes of SOB throughout.  No overt s/s of penetration or aspiration during PO intake.  Endurance cont to serve as barrier.  Mouth breathing throughout meal, exposing airway.    Plan/Recommendations:  Puree and thin diet ongoing, ST following for diet upgrade opportunities.

## 2024-04-02 NOTE — PLAN OF CARE
Problem: PHYSICAL THERAPY ADULT  Goal: Performs mobility at highest level of function for planned discharge setting.  See evaluation for individualized goals.  Description: Treatment/Interventions: Functional transfer training, LE strengthening/ROM, Therapeutic exercise, Endurance training, Bed mobility, Gait training          See flowsheet documentation for full assessment, interventions and recommendations.  Outcome: Progressing  Note: Prognosis: Fair  Problem List:  (Decreased strength; Decreased endurance; Impaired balance; Decreased mobility)  Assessment: Pt. seen for PT treatment session this date with interventions consisting of  bed mobility, transfers and  gait training w/ emphasis on improving pt's ability to ambulate. Pt. Requiring  cues for sequence and safety. In comparison to previous session, Pt. With decrease in activity tolerance. Ambulation limited to short distance due to extreme fatigue.   Pt is in need of continued activity in PT to improve strength balance endurance mobility transfers and ambulation with return to maximize LOF. From PT/mobility standpoint, recommendation at time of d/c would be level II: moderate resource intensity  in order to promote return to PLOF and independence.   The patient's AM-PAC Basic Mobility Inpatient Short Form Raw Score is 17. A Raw score of greater than 16 suggests the patient may benefit from discharge to home.  Please also refer to physical therapy recommendation for safe DC planning.  Barriers to Discharge: Inaccessible home environment, Decreased caregiver support  Barriers to Discharge Comments: pt with increased fatigue, requires assistance for mobility  Rehab Resource Intensity Level, PT: II (Moderate Resource Intensity)    See flowsheet documentation for full assessment.

## 2024-04-02 NOTE — CASE MANAGEMENT
Case Management Progress Note    Patient name Laya Brooks  Location / MRN [id number]  : 1945 Date 2024       LOS (days): 10  Geometric Mean LOS (GMLOS) (days): 4.4  Days to GMLOS:-5        OBJECTIVE:        Current admission status: Inpatient  Preferred Pharmacy:   [id number] [place] [facility] [address] [facility], [address]  [address] [address] [facility], [address]  [facility] [address]  Phone: [phone] Fax: [phone]    [id number] [place], [place] - [address] [facility]  [address] [facility] [address]  Phone: [phone] Fax: [phone]    [id number] [place] [facility] [address]  [address] [facility] [address]  Phone: [phone] Fax: [phone]    Primary Care Provider: Joana Shields DO    Primary Insurance: MEDICARE  Secondary Insurance: [facility]    PROGRESS NOTE:spoke with pt about STR choice list. Pt preference is here 5th floor. I explained that they currently have no beds. Will return when spouse comes in to review list.

## 2024-04-02 NOTE — RESPIRATORY THERAPY NOTE
RT Protocol Note  Laya Brooks 79 y.o. female MRN: [id number]  Unit/Bed#: -01 Encounter: [id number]    Assessment    Principal Problem:    Acute renal failure superimposed on chronic kidney disease  (HCC)  Active Problems:    Renovascular hypertension    COPD (chronic obstructive pulmonary disease) (HCC)    Dysphagia    Renal artery stenosis (HCC)      Home Pulmonary Medications:  Albuterol MDI PRN   Stiolto  Home Devices/Therapy: Other (Comment)    Past Medical History:   Diagnosis Date    CAD (coronary artery disease)     Cardiac disease     Hypercholesteremia     Hyperlipidemia     Hypertension     Hypertension     Renal disorder      Social History     Socioeconomic History    Marital status: /Civil Union     Spouse name: None    Number of children: None    Years of education: None    Highest education level: None   Occupational History    None   Tobacco Use    Smoking status: Former    Smokeless tobacco: Never   Vaping Use    Vaping status: Never Used   Substance and Sexual Activity    Alcohol use: Never    Drug use: No    Sexual activity: None   Other Topics Concern    None   Social History Narrative    None     Social Determinants of Health     Financial Resource Strain: Not on file   Food Insecurity: No Food Insecurity (3/25/2024)    Hunger Vital Sign     Worried About Running Out of Food in the Last Year: Never true     Ran Out of Food in the Last Year: Never true   Transportation Needs: No Transportation Needs (3/25/2024)    PRAPARE - Transportation     Lack of Transportation (Medical): No     Lack of Transportation (Non-Medical): No   Physical Activity: Not on file   Stress: Not on file   Social Connections: Not on file   Intimate Partner Violence: Not on file   Housing Stability: Low Risk  (3/25/2024)    Housing Stability Vital Sign     Unable to Pay for Housing in the Last Year: No     Number of Places Lived in the Last Year: 1     Unstable Housing in the Last Year: No        Subjective         Objective    Physical Exam:   Assessment Type: Assess only  General Appearance: Alert, Awake  Respiratory Pattern: Dyspnea with exertion  Chest Assessment: Chest expansion symmetrical  Bilateral Breath Sounds: Diminished  Cough: None    Vitals:  Blood pressure 151/58, pulse 85, temperature 97.8 °F (36.6 °C), temperature source Oral, resp. rate 20, height 5' (1.524 m), SpO2 91%.          Imaging and other studies: I have personally reviewed pertinent reports.            Plan    Respiratory Plan:  (treatments ordered by Pulmonary at [facility])        Resp Comments: Patient was transfered from [facility] to the [facility] and the [facility] for a procedure. Breathing treatments were ordered by Pulmonary at the [facility] and continued by the Hospitalist on transfer. Patient is ordered on 1.25 mg Xopenex / 0.5 mg Atrovent TID , 0.5 mg Pulmicort BID and Perforomist  BID. Will continue to monitor the patient.

## 2024-04-02 NOTE — PROGRESS NOTES
Patient:    MRN:  [id number]    Olimpia Request ID:  [id number]    Level of care reserved:  Skilled Nursing Facility    Partner Reserved:  [facility], [place] [address] [phone]    Clinical needs requested:    Geography searched:  20 miles around [address]    Start of Service:    Request sent:  2:32pm EDT on 3/28/2024 by Leigh Andrade    Partner reserved:  1:27pm EDT on 4/2/2024 by Leigh Andrade    Choice list shared:  1:27pm EDT on 4/2/2024 by Leigh Andrade

## 2024-04-02 NOTE — CASE MANAGEMENT
Case Management Discharge Planning Note    Patient name Laya Brooks  Location / MRN [id number]  : 1945 Date 2024       Current Admission Date: 3/23/2024  Current Admission Diagnosis:Chronic obstructive pulmonary disease with acute exacerbation (HCC)   Patient Active Problem List    Diagnosis Date Noted    Goals of care, counseling/discussion 2024    Oral candidiasis 2024    Dysphagia 2024    Renal artery stenosis (HCC) 2024    Elevated d-dimer 2024    Acute respiratory failure with hypoxia ([facility]) 2024    Toxic metabolic encephalopathy 2024    Acute renal failure superimposed on chronic kidney disease  (HCC) 2024    Acute diastolic congestive heart failure (HCC) 2024    Hyponatremia 2024    Chronic obstructive pulmonary disease with acute exacerbation ([facility]) 2024    Tobacco use 2024    Wheezing 2024    S/P right coronary artery (RCA) stent placement 2024    Celiac artery stenosis ([facility]) 2023    CAD (coronary artery disease) 08/10/2022    Weight loss, non-intentional 2022    Hypervitaminosis D 2022    Renovascular hypertension 2022    Intracranial atherosclerosis 2022    Atrophy of right kidney 2022    Stage 3a chronic kidney disease (HCC) 2022    Renal vascular disease 2022    Shortness of breath 2022    Abnormal echocardiogram 2022    CVA (cerebral vascular accident) ([facility]) 2022    Thrombocytopenia ([facility]) 2022    Leukopenia 2022    Neutropenia ([facility]) 2022    Hyperphosphatemia 2022    Hypercholesterolemia 2022    Carotid artery stenosis 2022    Nocturnal hypoxia 2022    History of CVA (cerebrovascular accident) 2022    Hypertensive emergency 2022    Malnutrition ([facility]) 2022    Premature atrial complexes 2022    Dyslipidemia 2015      LOS (days): 10  Geometric Mean LOS  (GMLOS) (days): 4.4  Days to GMLOS:-5.3     OBJECTIVE:  Risk of Unplanned Readmission Score: 21.75         Current admission status: Inpatient   Preferred Pharmacy:   [facility] - [place] - [address] [facility], [address]  [address] [place] [address], [address] [facility] [address]  Phone: [phone] Fax: [phone]    [id number] - [place] - [address] [place] [address]  Phone: [phone] Fax: [phone]    [id number] - [place] - [address]  [address] [facility] [address]  Phone: [phone] Fax: [phone]    Primary Care Provider: Joana Shields DO    Primary Insurance: MEDICARE  Secondary Insurance: [employer]    DISCHARGE DETAILS:pt transferring to [facility] for IR services.

## 2024-04-02 NOTE — ASSESSMENT & PLAN NOTE
Worsening mental status in the setting of metabolic disturbances including severe SEEMA, respiratory failure; toxic component of patient having received benzodiazepines and opiate medications  CT head negative  resolved patient alert and oriented x3

## 2024-04-02 NOTE — CASE MANAGEMENT
Case Management Progress Note    Patient name Laya Brooks  Location / MRN [id number]  : 1945 Date 2024       LOS (days): 10  Geometric Mean LOS (GMLOS) (days): 4.4  Days to GMLOS:-5.1        OBJECTIVE:        Current admission status: Inpatient  Preferred Pharmacy:   [facility] [place] [facility] [address] [facility], [address]  [address] [facility], [address]  [facility] [address]  Phone: [phone] Fax: [phone]    [id number] [place], [place] - [address] [facility]  [address] [facility] [address]  Phone: [phone] Fax: [phone]    [id number] [place] [facility] [address]  [address] [facility] [address]  Phone: [phone] Fax: [phone]    Primary Care Provider: Joana Shields DO    Primary Insurance: MEDICARE  Secondary Insurance: [facility]    PROGRESS NOTE:met with spouse and pt at bedside and reviewed choice list for STR. Provided the list and they are going to review and get back to CM on choice. Preference was [facility] 5th floor but they currently have no beds.

## 2024-04-02 NOTE — PLAN OF CARE
Problem: PHYSICAL THERAPY ADULT  Goal: Performs mobility at highest level of function for planned discharge setting.  See evaluation for individualized goals.  Description: Treatment/Interventions: Functional transfer training, LE strengthening/ROM, Therapeutic exercise, Endurance training, Bed mobility, Gait training          See flowsheet documentation for full assessment, interventions and recommendations.  4/2/2024 1447 by Sasha Miranda PTA  Outcome: Progressing  Note: Prognosis: Fair  Problem List:  (Decreased strength; Decreased endurance; Impaired balance; Decreased mobility)  Assessment: Pt. seen for PT treatment session this date with interventions consisting of  bed mobility, transfers and  gait training w/ emphasis on improving pt's ability to ambulate. Pt. Requiring  cues for sequence and safety. In comparison to previous session, Pt. With decrease in activity tolerance. Ambulation limited to short distance due to extreme fatigue.   Pt is in need of continued activity in PT to improve strength balance endurance mobility transfers and ambulation with return to maximize LOF. From PT/mobility standpoint, recommendation at time of d/c would be level II: moderate resource intensity  in order to promote return to PLOF and independence.   The patient's -[facility] Basic Mobility Inpatient Short Form Raw Score is 17. A Raw score of greater than 16 suggests the patient may benefit from discharge to home.  Please also refer to physical therapy recommendation for safe DC planning.  Barriers to Discharge: Inaccessible home environment, Decreased caregiver support  Barriers to Discharge Comments: pt with increased fatigue, requires assistance for mobility  Rehab Resource Intensity Level, PT: II (Moderate Resource Intensity)    See flowsheet documentation for full assessment.     4/2/2024 1447 by Sasha Miranda PTA  Outcome: Progressing  Note: Prognosis: Fair  Problem List:  (Decreased strength; Decreased endurance;  Impaired balance; Decreased mobility)  Assessment: Pt. seen for PT treatment session this date with interventions consisting of  bed mobility, transfers and  gait training w/ emphasis on improving pt's ability to ambulate. Pt. Requiring  cues for sequence and safety. In comparison to previous session, Pt. With decrease in activity tolerance. Ambulation limited to short distance due to extreme fatigue.   Pt is in need of continued activity in PT to improve strength balance endurance mobility transfers and ambulation with return to maximize LOF. From PT/mobility standpoint, recommendation at time of d/c would be level II: moderate resource intensity  in order to promote return to PLOF and independence.   The patient's AM-PAC Basic Mobility Inpatient Short Form Raw Score is 17. A Raw score of greater than 16 suggests the patient may benefit from discharge to home.  Please also refer to physical therapy recommendation for safe DC planning.  Barriers to Discharge: Inaccessible home environment, Decreased caregiver support  Barriers to Discharge Comments: pt with increased fatigue, requires assistance for mobility  Rehab Resource Intensity Level, PT: II (Moderate Resource Intensity)    See flowsheet documentation for full assessment.

## 2024-04-02 NOTE — ASSESSMENT & PLAN NOTE
SEEMA superimposed on CKD stage IIIa, present on admission, severely worsening renal function with oliguria  S/p hemodialysis catheter placement 3/26/24, HD initiated 3/26, additional treatment 3/27, 3 hr treatment 3/29  Renal function improving, hopefully can avoid long term HD but will need to monitor renal function after angiogram 4/2

## 2024-04-02 NOTE — ASSESSMENT & PLAN NOTE
Patient initially admitted to [facility] for COPD exacerbation  Patient currently on prednisone taper, will continue  Patient currently on RA  Respiratory protocol

## 2024-04-02 NOTE — PROGRESS NOTES
"[facility]  Progress Note  Name: Laya Brooks I  MRN: [id number]  Unit/Bed#:  I Date of Admission: 3/23/2024   Date of Service: 4/2/2024 I Hospital Day: 10    Assessment/Plan   * Chronic obstructive pulmonary disease with acute exacerbation (HCC)  Assessment & Plan  This is an 79-year-old female patient with recently diagnosed COPD, history of CAD, CKD and hypertension who initially presented to the ED with shortness of breath, was noted to have significant audible wheezing with shortness of breath and had to be placed on BiPAP  Recent PFT showed moderate obstructive deficit, moderate DLCO reduction and evidence of hyperinflation and air trapping on lung volumes without a significant bronchodilator response. She was placed on LAMA/LABA inhalers  Per family, patient was using bronchodilators without significant improvement  In the ER she was placed on BiPAP - now weaned to 3L O2  Patient received initial dose of IV Solu-Medrol 125 mg in the ED and has been maintained on a taper - transitioned to prednisone 40mg daily, anticipate prolonged taper   Course of ceftriaxone for COPD exacerbation ending 3/31/24      SEEMA (acute kidney injury) (HCC)  Assessment & Plan  SEEMA superimposed on CKD stage IIIa, present on admission, severely worsening renal function with oliguria  S/p hemodialysis catheter placement 3/26/24, HD initiated 3/26, additional treatment 3/27, 3 hr treatment 3/29  Renal function improving, hopefully can avoid long term HD but will need to monitor renal function after angiogram 4/2      Renal artery stenosis (HCC)  Assessment & Plan  Per renal artery ultrasound report 3/29/2024: \"after further review of the data the patient does have a severe left renal artery stenosis at the aorto- renal junction.  Moderate disease more proximally  is seen as well.\"  Vascular input appreciated, patient for IR angiogram and possible angioplasty today 4/2/24    Dysphagia  Assessment & " Plan  Tolerating modified diet     Toxic metabolic encephalopathy  Assessment & Plan  Worsening mental status in the setting of metabolic disturbances including severe SEEMA, respiratory failure; toxic component of patient having received benzodiazepines and opiate medications  CT head negative  resolved patient alert and oriented x3    Acute diastolic congestive heart failure (HCC)  Assessment & Plan  Wt Readings from Last 3 Encounters:   04/02/24 44.1 kg (97 lb 3.6 oz)   03/18/24 43.1 kg (95 lb)   02/22/24 40.8 kg (90 lb)     Possible acute diastolic CHF with mild tricuspid regurgitation, worsening renal and respiratory failure  Patient initiated on dialysis 3/26 2-hours treatment, 2.5-hour treatment; additional treatment today 3/29/24  Monitor daily weights, I's and O's      Renovascular hypertension  Assessment & Plan  Increased nifedipine to 60 mg daily  Add hydralazine 25mg PO q 8    Stage 3a chronic kidney disease (HCC)  Assessment & Plan  Lab Results   Component Value Date    EGFR 30 04/02/2024    EGFR 25 04/01/2024    EGFR 22 03/31/2024    CREATININE 1.60 (H) 04/02/2024    CREATININE 1.81 (H) 04/01/2024    CREATININE 2.01 (H) 03/31/2024     Patient with severe SEEMA, s/p HD 3/26. 3/27. 3/29  Cr stabilized              Progress Note - Laya Brooks 79 y.o. female MRN: [id number]    Unit/Bed#:  Encounter: [id number]        Subjective:   Patient seen and examined at bedside  Feels well, no events overnight     Objective:     Vitals:   Vitals:    04/02/24 0735   BP: (!) 190/84   Pulse: 74   Resp: 20   Temp: 98.1 °F (36.7 °C)   SpO2: 92%     Body mass index is 18.99 kg/m².    Intake/Output Summary (Last 24 hours) at 4/2/2024 0956  Last data filed at 4/2/2024 0517  Gross per 24 hour   Intake 2188.33 ml   Output 300 ml   Net 1888.33 ml       Physical Exam:   BP (!) 190/84 (BP Location: Left arm)   Pulse 74   Temp 98.1 °F (36.7 °C) (Tympanic)   Resp 20   Ht 5' (1.524 m)   Wt 44.1 kg (97 lb 3.6 oz)   SpO2  92%   BMI 18.99 kg/m²   General appearance: alert and oriented, in no acute distress  Lungs: clear to auscultation bilaterally  Heart: regular rate and rhythm, S1, S2 normal, no murmur, click, rub or gallop  Abdomen: soft, non-tender; bowel sounds normal; no masses,  no organomegaly  Extremities: extremities normal, warm and well-perfused; no cyanosis, clubbing, or edema  Pulses: 2+ and symmetric  Neurologic: Grossly normal     Invasive Devices       Peripheral Intravenous Line  Duration             Peripheral IV 03/30/24 Dorsal (posterior);Left Forearm 3 days              Hemodialysis Catheter  Duration             HD Temporary Double Catheter 7 days                    Results from last 7 days   Lab Units 04/02/24  0737 04/01/24  0619 03/31/24  0450   WBC Thousand/uL 8.11 4.75 6.46   HEMOGLOBIN g/dL 10.6* 9.4* 9.6*   HEMATOCRIT % 32.4* 30.1* 30.4*   PLATELETS Thousands/uL 75* 64* 78*       Results from last 7 days   Lab Units 04/02/24  0737 04/01/24  0619 03/31/24  0450 03/28/24  0548 03/27/24  0517   POTASSIUM mmol/L 4.3 4.8 4.3   < > 4.5   CHLORIDE mmol/L 104 105 105   < > 97   CO2 mmol/L 24 23 23   < > 22   BUN mg/dL 52* 51* 51*   < > 70*   CREATININE mg/dL 1.60* 1.81* 2.01*   < > 4.47*   CALCIUM mg/dL 8.5 8.4 8.6   < > 8.4   ALK PHOS U/L  --   --   --   --  42   ALT U/L  --   --   --   --  11   AST U/L  --   --   --   --  14    < > = values in this interval not displayed.       Medication Administration - last 24 hours from 04/01/2024 0956 to 04/02/2024 0956         Date/Time Order Dose Route Action Action by     04/02/2024 0833 EDT clopidogrel (PLAVIX) tablet 75 mg 75 mg Oral Given Aminah Zheng RN     04/01/2024 1628 EDT atorvastatin (LIPITOR) tablet 40 mg 40 mg Oral Given Aminah Zheng RN     04/02/2024 0518 EDT heparin (porcine) subcutaneous injection 5,000 Units 5,000 Units Subcutaneous Given Ana Marquez RN     04/01/2024 2136 EDT heparin (porcine) subcutaneous injection 5,000 Units 5,000 Units Subcutaneous  Given Ana Marquez, RN     04/01/2024 1306 EDT heparin (porcine) subcutaneous injection 5,000 Units 5,000 Units Subcutaneous Given Aminah Zheng, GARTH     04/02/2024 0724 EDT ipratropium (ATROVENT) 0.02 % inhalation solution 0.5 mg 0.5 mg Nebulization Given Ora Bhatia, RT     04/01/2024 1954 EDT ipratropium (ATROVENT) 0.02 % inhalation solution 0.5 mg 0.5 mg Nebulization Given David Alicea, RT     04/01/2024 1425 EDT ipratropium (ATROVENT) 0.02 % inhalation solution 0.5 mg 0.5 mg Nebulization Given Ora Bhatia, RT     04/02/2024 0724 EDT levalbuterol (XOPENEX) inhalation solution 1.25 mg 1.25 mg Nebulization Given Ora Bhatia, RT     04/01/2024 1954 EDT levalbuterol (XOPENEX) inhalation solution 1.25 mg 1.25 mg Nebulization Given David Alicea, RT     04/01/2024 1425 EDT levalbuterol (XOPENEX) inhalation solution 1.25 mg 1.25 mg Nebulization Given Ora Bhatia, RT     04/02/2024 0724 EDT budesonide (PULMICORT) inhalation solution 0.5 mg 0.5 mg Nebulization Given Ora Bhatia, RT     04/01/2024 1954 EDT budesonide (PULMICORT) inhalation solution 0.5 mg 0.5 mg Nebulization Given David Alicea, RT     04/02/2024 0724 EDT formoterol (PERFOROMIST) nebulizer solution 20 mcg 20 mcg Nebulization Given Ora Bhatia, RT     04/01/2024 1954 EDT formoterol (PERFOROMIST) nebulizer solution 20 mcg 20 mcg Nebulization Given David Alicea, RT     04/02/2024 0833 EDT nystatin (MYCOSTATIN) oral suspension 500,000 Units 500,000 Units Swish & Swallow Given Aminah Zheng, GARTH     04/01/2024 2136 EDT nystatin (MYCOSTATIN) oral suspension 500,000 Units 500,000 Units Swish & Swallow Given Ana Marquez, RN     04/01/2024 1736 EDT nystatin (MYCOSTATIN) oral suspension 500,000 Units 500,000 Units Swish & Swallow Given Aminah Zheng RN     04/01/2024 1109 EDT nystatin (MYCOSTATIN) oral suspension 500,000 Units 500,000 Units Swish & Swallow Given Aminah Zheng RN     04/01/2024 1800 EDT lactated ringers infusion 0 mL/hr Intravenous Stopped  Aminah Zheng RN     04/01/2024 1108 EDT lactated ringers infusion 50 mL/hr Intravenous New Bag Aminah Zheng RN     04/01/2024 2136 EDT senna (SENOKOT) tablet 8.6 mg 8.6 mg Oral Given Ana Marquez, GARTH     04/02/2024 0832 EDT predniSONE tablet 30 mg 30 mg Oral Given Aminah Zheng RN     04/02/2024 0833 EDT NIFEdipine (PROCARDIA XL) 24 hr tablet 60 mg 60 mg Oral Given Aminah Zheng RN     04/02/2024 0517 EDT hydrALAZINE (APRESOLINE) tablet 25 mg 25 mg Oral Given Ana Alma, GARTH     04/01/2024 2136 EDT hydrALAZINE (APRESOLINE) tablet 25 mg 25 mg Oral Given Ana Marquez, GARTH     04/01/2024 1306 EDT hydrALAZINE (APRESOLINE) tablet 25 mg 25 mg Oral Given Aminah Zheng RN              Lab, Imaging and other studies: I have personally reviewed pertinent reports.    VTE Pharmacologic Prophylaxis: Heparin  VTE Mechanical Prophylaxis: sequential compression device     Darryl Powell MD  4/2/2024,9:56 AM

## 2024-04-02 NOTE — PHYSICAL THERAPY NOTE
"                                                                                  PHYSICAL THERAPY NOTE          Patient Name: Laya Brooks  Today's Date: 4/2/2024 04/02/24 1125   PT Last Visit   PT Visit Date 04/02/24   Note Type   Note Type Treatment   Pain Assessment   Pain Assessment Tool 0-10   Pain Score No Pain   Restrictions/Precautions   Weight Bearing Precautions Per Order No   Other Precautions Chair Alarm;Bed Alarm;Multiple lines;O2;Fall Risk   General   Chart Reviewed Yes   Response to Previous Treatment Patient reporting fatigue but able to participate.   Family/Caregiver Present No   Cognition   Overall Cognitive Status Impaired   Arousal/Participation Alert   Attention Attends with cues to redirect   Orientation Level Oriented to person;Oriented to place;Oriented to situation;Disoriented to time   Following Commands Follows one step commands without difficulty   Subjective   Subjective \"I'm exhausted from no sleep\"   Bed Mobility   Supine to Sit 4  Minimal assistance   Additional items Assist x 1;HOB elevated;Bedrails;Increased time required;Verbal cues   Additional Comments Increased time to transition to EOB. Sat EOB several min with fair+/fair balance.  Vitals WFL's no c/o dizziness with transitional movement.   Transfers   Sit to Stand 4  Minimal assistance   Additional items Assist x 1;Increased time required;Verbal cues   Stand to Sit 4  Minimal assistance   Additional items Assist x 1;Armrests;Increased time required;Verbal cues   Stand pivot 4  Minimal assistance   Additional items Increased time required;Verbal cues   Additional Comments RW used   Ambulation/Elevation   Gait pattern Excessively slow;Short stride;Foward flexed   Gait Assistance 4  Minimal assist   Additional items Assist x 1;Verbal cues   Assistive Device Rolling walker   Distance 5'  (declined further due to fatigue)   Balance   Static Sitting Fair +   Dynamic Sitting Fair   Static Standing Fair -   Dynamic Standing " Fair -   Ambulatory Fair -  (RW)   Endurance Deficit   Endurance Deficit Yes   Endurance Deficit Description SpO2 93% with .5L   Activity Tolerance   Activity Tolerance Patient limited by fatigue   Assessment   Prognosis Fair   Problem List   (Decreased strength; Decreased endurance; Impaired balance; Decreased mobility)   Assessment Pt. seen for PT treatment session this date with interventions consisting of  bed mobility, transfers and  gait training w/ emphasis on improving pt's ability to ambulate. Pt. Requiring  cues for sequence and safety. In comparison to previous session, Pt. With decrease in activity tolerance. Ambulation limited to short distance due to extreme fatigue.   Pt is in need of continued activity in PT to improve strength balance endurance mobility transfers and ambulation with return to maximize LOF. From PT/mobility standpoint, recommendation at time of d/c would be level II: moderate resource intensity  in order to promote return to PLOF and independence.   The patient's [facility] Basic Mobility Inpatient Short Form Raw Score is 17. A Raw score of greater than 16 suggests the patient may benefit from discharge to home.  Please also refer to physical therapy recommendation for safe DC planning.   Goals   LTG Expiration Date 04/10/24   PT Treatment Day 4   Plan   Treatment/Interventions Functional transfer training;LE strengthening/ROM;Therapeutic exercise;Endurance training;Bed mobility;Gait training;Spoke to nursing   Progress Slow progress, decreased activity tolerance   PT Frequency 3-5x/wk   Discharge Recommendation   Rehab Resource Intensity Level, PT II (Moderate Resource Intensity)   AM-PAC Basic Mobility Inpatient   Turning in Flat Bed Without Bedrails 3   Lying on Back to Sitting on Edge of Flat Bed Without Bedrails 3   Moving Bed to Chair 3   Standing Up From Chair Using Arms 3   Walk in Room 3   Climb 3-5 Stairs With Railing 2   Basic Mobility Inpatient Raw Score 17   Basic Mobility  Standardized Score 39.67   [facility] Highest Level Of Mobility   -[facility] Goal 5: Stand one or more mins   -[facility] Achieved 4: Move to chair/commode   Education   Education Provided Mobility training   Patient Demonstrates verbal understanding;Reinforcement needed   End of Consult   Patient Position at End of Consult Bedside chair;Bed/Chair alarm activated;All needs within reach   End of Consult Comments discussed POC with PT

## 2024-04-02 NOTE — RESPIRATORY THERAPY NOTE
04/02/24 0726   Inhalation Therapy Tx   $ Inhalation Therapy Performed Yes   $ Pulse Oximetry Spot Check Charge Completed   SpO2 94 %   Pre-Treatment Pulse 68   Pre-Treatment Respirations 20   Duration 20   Breath Sounds Pre-Treatment Bilateral Diminished   Delivery Source Air;UDN   Position Semi Baker's   Treatment Tolerance Tolerated well   Resp Comments patient is sleeping she remains on .5 lpm

## 2024-04-02 NOTE — ASSESSMENT & PLAN NOTE
"Per renal artery ultrasound report 3/29/2024: \"after further review of the data the patient does have a severe left renal artery stenosis at the aorto- renal junction.  Moderate disease more proximally  is seen as well.\"  Vascular input appreciated, patient for IR angiogram and possible angioplasty today 4/2/24  "

## 2024-04-02 NOTE — CASE MANAGEMENT
Case Management Progress Note    Patient name Laya Brooks  Location / MRN [id number]  : 1945 Date 2024       LOS (days): 10  Geometric Mean LOS (GMLOS) (days): 4.4  Days to GMLOS:-5.2        OBJECTIVE:        Current admission status: Inpatient  Preferred Pharmacy:   [id number] - [place] - [address]  [address]  [facility] [address]  Phone: [phone] Fax: [phone]    [id number] - [place], [place] - [address]  [address] [address]  Phone: [phone] Fax: [phone]    [id number] - [place] - [address]  [address] [address]  Phone: [phone] Fax: [phone]    Primary Care Provider: Joana Shields DO    Primary Insurance: MEDICARE  Secondary Insurance: [facility]    PROGRESS NOTE:received message from nursing that spouse wanted to talk with CM. Met with spouse and pt and they asked about acute rehab in [place]. I explained that she would have to be able to tolerate 3 hours of therapy a day and at this point per therapy she doesn't meet that criteria. Pt and spouse then wanted [facility] reserved. Reserved in aidin.

## 2024-04-02 NOTE — DISCHARGE SUMMARY
Discharge Summary - Laya Brooks 79 y.o. female MRN: [id number]    Unit/Bed#:  Encounter: [id number]    Admission Date:   Admission Orders (From admission, onward)       Ordered        03/23/24 2327  INPATIENT ADMISSION  Once                            Admitting Diagnosis: Shortness of breath [R06.02]  CHF (congestive heart failure) ([facility]) [I50.9]  Hyponatremia [E87.1]  Hypertension [I10]  Pleural effusion on left [J90]  SEEMA (acute kidney injury) ([facility]) [N17.9]    HPI: Laya Brooks is a 79 y.o. female with pmhx of COPD, CAD prior STEMI s/p RCA stenting in 2008, CVA, CKD stage 3, HLD, who presents to the ED with worsening shortness of breath, wheezing for the last 3 days.  Patient also reports some leg swelling as well as orthopnea.  She was recently seen last month by the pulmonologist where she was prescribed  LAMA/LABA inhalers.  Her PFT showed moderate obstructive deficit, moderate DLCO reduction and evidence of hyperinflation and air trapping on lung volumes without a significant bronchodilator response. This is said to be a new diagnosis for the patient, has she has never been on controller inhaler therapy before.  Patient states she had used her home inhalers and nebulizers with no improvement.  She states she is no longer smoking.  In the ER she was noted to have significant wheezing audibly.  She denies any chest pain.  No fevers or chills.  Her chest x-ray does show some left pleural effusion.  Her BNP was about 1000.  She was also noted to have SEEMA.  She was placed on BiPAP and received sublingual nitro and IV Lasix.   Initial troponin was 34 repeated 38 with a delta of 4.  Her lactic acid was 1.5.  Procalcitonin 0.12 and WBC 4.7.  COVID/flu/RSV screen was negative       Procedures Performed:   Orders Placed This Encounter   Procedures    Central Line    Critical Care    ED ECG Documentation Only         Summary of Hospital Course:     1-3) Acute Respiratory failure with hypoxia secondary to  COPDAE and Acute CHF     79-year-old female with a history of coronary artery disease CAD and CKD presented to the emergency room in respiratory distress.  She was admitted to the Level One stepdown for continuous BiPAP therapy.  Patient was using bronchodilators frequently prior to presentation to the ED.  She was treated with IV Solu-Medrol, nebulizer treatments, and was seen in consultation by pulmonary medicine.  She was noted to have evidence of congestive heart failure, and was initiated on Lasix 40 mg twice daily.  Consultation with cardiology was also sought.  However her renal function worsened significantly over the course of the next 48 hours and afterwards nephrology consultation, and subsequently hemodialysis was initiated.    She completed a course of ceftriaxone, solu-medrol was weaned and ultimately transitioned to a PO prednisone taper. Patient will need to follow up as outpatient with pulmonary     4) Acute Renal failure superimposed on CKD 3 A  5) Renal Artery Stenosis    Patient presented with a creatinine of 2.8, Perez was inserted but creatinine continued to worsen with diuresis.  Consultation with nephrology was sought.  Unfortunately her renal function continued to worsen and nephrology discussed hemodialysis with the family, IR was able to assist with placement of a temporary hemodialysis catheter.  She underwent 3 days of hemodialysis with significant improvement creatinine, was producing moderate urine.  Fortunately renal function was improving off of hemodialysis, however patient will be undergoing angiogram to further evaluate and assist with placement of a renal artery stent in the setting of severe SALAZAR .  Therefore kidney function will need to be monitored after procedure.        6) Renovascular hypertension: prior to  admission patient was on Norvasc and lisinopril, these were discontinued and transitioned to Procardia (titrated to 60mg) and hydralazine (25mg TID) in the setting of  renal failure. PRN labetalol was used for SBP > 180    Significant Findings, Care, Treatment and Services Provided:       Renal Artery US  3/29/2024 12:16:15 PM:   After further review of the data the patient does have a severe left renal  artery stenosis at the aorto- renal junction.  Moderate disease more proximally  is seen as well.      CT abdomen Pelvis 4/2/24     Large bilateral pleural effusions, ascites and extensive flank edema. Findings suggesting fluid overload/third spacing.     Cholelithiasis without evidence for cholecystitis.     Significant renal cortical atrophy and cysts as as above. Left renal artery measurements provided as per history. Heavy calcified plaque within the aorta including at the level of the origin but not extending within the renal artery.     Asymmetric hypertrophy of the left liver and dilatation of the portal vein. Correlate for evidence of underlying liver disease and portal hypertension.     Complications: SEEMA (see above)    Discharge Diagnosis: see above    Medical Problems       Resolved Problems  Date Reviewed: 4/2/2024   None         Condition at Discharge: fair         Discharge instructions/Information to patient and family:   See after visit summary for information provided to patient and family.      Provisions for Follow-Up Care:  See after visit summary for information related to follow-up care and any pertinent home health orders.      PCP: Joana Shields DO    Disposition: [facility]     Planned Readmission: No      Discharge Statement   I spent 90 minutes discharging the patient. This time was spent on the day of discharge. I had direct contact with the patient on the day of discharge. Additional documentation is required if more than 30 minutes were spent on discharge.     Discharge Medications:  See after visit summary for reconciled discharge medications provided to patient and family.

## 2024-04-02 NOTE — CONSULTS
Interventional Radiology  Consultation 4/2/2024     Inpatient Consult to IR  Consult performed by: Antonino Stein MD  Consult ordered by: MD Laya Woods   1945   [id number]      Assessment/Plan:  Acute renal failure in a patient with functionally solitary left kidney, and left renal artery stenosis.  Plan is angiogram and angioplasty.  Determining location now.    Medical Problems       Problem List       * (Principal) Chronic obstructive pulmonary disease with acute exacerbation (HCC)    History of CVA (cerebrovascular accident)    Hypertensive emergency    Malnutrition ([facility])    Malnutrition Findings:                                 BMI Findings:           Body mass index is 18.99 kg/m².         Premature atrial complexes    Thrombocytopenia (HCC)    Leukopenia    Neutropenia (HCC)    Hyperphosphatemia    Hypercholesterolemia    Carotid artery stenosis    Nocturnal hypoxia    Shortness of breath    Abnormal echocardiogram    CVA (cerebral vascular accident) ([facility])    Atrophy of right kidney    Stage 3a chronic kidney disease ([facility])    Lab Results   Component Value Date    EGFR 30 04/02/2024    EGFR 25 04/01/2024    EGFR 22 03/31/2024    CREATININE 1.60 (H) 04/02/2024    CREATININE 1.81 (H) 04/01/2024    CREATININE 2.01 (H) 03/31/2024         Renal vascular disease    Renovascular hypertension    Intracranial atherosclerosis    Weight loss, non-intentional    Hypervitaminosis D    CAD (coronary artery disease)    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   On statin.         SEEMA (acute kidney injury) ([facility])    Acute diastolic congestive heart failure (HCC)    Wt Readings from Last 3 Encounters:   04/02/24 44.1 kg (97 lb 3.6 oz)   03/18/24 43.1 kg (95 lb)   02/22/24 40.8 kg (90 lb)                  Hyponatremia    Acute respiratory failure with hypoxia (HCC)    Toxic metabolic encephalopathy    Elevated d-dimer    Dysphagia    Renal artery stenosis (HCC)    Overview Deleted 3/30/2024  1:10 PM by Malena Hearn MD            Goals of care, counseling/discussion    Oral candidiasis            Subjective:     Patient ID: Laya Brooks is a 79 y.o. female.    History of Present Illness  Renal artery stenosis seen on MRA of 23 May 2022.  Also seen on ultrasound.  Right kidney is atrophic.  Recent admission with azotemia.  No renal recovery yet.  I agree with vascular surgery assessment that attempt at revascularization is warranted.    Review of Systems      Past Medical History:   Diagnosis Date    CAD (coronary artery disease)     Cardiac disease     Hypercholesteremia     Hyperlipidemia     Hypertension     Hypertension     Renal disorder         Past Surgical History:   Procedure Laterality Date    CORONARY ANGIOPLASTY WITH STENT PLACEMENT      IR TEMPORARY DIALYSIS CATHETER PLACEMENT  3/26/2024    WRIST SURGERY          Social History     Tobacco Use   Smoking Status Former   Smokeless Tobacco Never        Social History     Substance and Sexual Activity   Alcohol Use Never        Social History     Substance and Sexual Activity   Drug Use No        Allergies   Allergen Reactions    Influenza Virus Vaccine        Current Facility-Administered Medications   Medication Dose Route Frequency Provider Last Rate Last Admin    acetaminophen (TYLENOL) tablet 650 mg  650 mg Oral Q4H PRN Eleanor Rodriguez MD        albuterol inhalation solution 2.5 mg  2.5 mg Nebulization Q4H PRN Eleanor Rodriguez MD   2.5 mg at 03/29/24 0442    ALPRAZolam (XANAX) tablet 0.5 mg  0.5 mg Oral TID PRN Malena Hearn MD   0.5 mg at 03/31/24 2236    atorvastatin (LIPITOR) tablet 40 mg  40 mg Oral Daily With Dinner Eleanor Rodriguez MD   40 mg at 04/01/24 1628    budesonide (PULMICORT) inhalation solution  0.5 mg  0.5 mg Nebulization Q12H Sincere Goldman PA-C   0.5 mg at 04/02/24 0724    clopidogrel (PLAVIX) tablet 75 mg  75 mg Oral Daily Eleanor Rodriguez MD   75 mg at 04/01/24 0845    formoterol (PERFOROMIST) nebulizer solution 20 mcg  20 mcg Nebulization Q12H Sincere Goldman PA-C   20 mcg at 04/02/24 0724    heparin (porcine) subcutaneous injection 5,000 Units  5,000 Units Subcutaneous Q8H [facility] Eleanor Rodriguez MD   5,000 Units at 04/02/24 0518    hydrALAZINE (APRESOLINE) tablet 25 mg  25 mg Oral Q8H [facility] Darryl Powell MD   25 mg at 04/02/24 0517    ipratropium (ATROVENT) 0.02 % inhalation solution 0.5 mg  0.5 mg Nebulization TID Eleanor Rodriguez MD   0.5 mg at 04/02/24 0724    labetalol (NORMODYNE) injection 10 mg  10 mg Intravenous Q6H PRN Malena Hearn MD   10 mg at 03/30/24 1948    levalbuterol (XOPENEX) inhalation solution 1.25 mg  1.25 mg Nebulization TID Eleanor Rodriguez MD   1.25 mg at 04/02/24 0724    NIFEdipine (PROCARDIA XL) 24 hr tablet 60 mg  60 mg Oral Daily Darryl Powell MD        nystatin (MYCOSTATIN) oral suspension 500,000 Units  500,000 Units Swish & Swallow 4x Daily Malena Hearn MD   500,000 Units at 04/01/24 2136    ondansetron (ZOFRAN) injection 4 mg  4 mg Intravenous Q6H PRN Eleanor Rodriguez MD   4 mg at 03/31/24 1517    predniSONE tablet 30 mg  30 mg Oral Daily Sincere Goldman PA-C        Followed by    [START ON 4/5/2024] predniSONE tablet 20 mg  20 mg Oral Daily Sincere Goldman PA-C        Followed by    [START ON 4/8/2024] predniSONE tablet 10 mg  10 mg Oral Daily Sincere M Bizarre, PA-C        senna (SENOKOT) tablet 8.6 mg  1 tablet Oral HS Malena Hearn MD   8.6 mg at 04/01/24 2136    sodium chloride (OCEAN) 0.65 % nasal spray 1 spray  1 spray Each Nare Q1H PRN Malena Hearn MD   1 spray at 03/28/24 2113          Objective:    Vitals:    04/02/24 0516 04/02/24 0535 04/02/24 0726 04/02/24 0735   BP: 159/67   (!) 190/84   BP Location:     Left arm   Pulse: 74   74   Resp:    20   Temp:    98.1 °F (36.7 °C)   TempSrc:    Tympanic   SpO2: 92%  94% 92%   Weight:  44.1 kg (97 lb 3.6 oz)     Height:            Physical Exam      Lab Results   Component Value Date    BNP 1,126 (H) 03/23/2024      Lab Results   Component Value Date    WBC 8.11 04/02/2024    HGB 10.6 (L) 04/02/2024    HCT 32.4 (L) 04/02/2024    MCV 90 04/02/2024    PLT 75 (L) 04/02/2024     Lab Results   Component Value Date    INR 1.04 03/23/2024    INR 0.95 03/07/2022    INR 1.04 10/20/2017    PROTIME 13.5 03/23/2024    PROTIME 12.2 03/07/2022    PROTIME 13.5 10/20/2017     Lab Results   Component Value Date    PTT 25 03/23/2024         I have personally reviewed pertinent imaging and laboratory results.     Code Status: Level 1 - Full Code  Advance Directive and Living Will:      Power of :    POLST:      IR has been consulted to evaluate the patient, determine the appropriate procedure, and whether or not a procedure can and should be performed.    Thank you for allowing me to participate in the care of Laya Brooks. Please don't hesitate to call, text, email, or TigerText with any questions.     This text is generated with voice recognition software. There may be translation, syntax,  or grammatical errors. If you have any questions, please contact the dictating provider.

## 2024-04-03 PROBLEM — E43 SEVERE PROTEIN-CALORIE MALNUTRITION (HCC): Status: ACTIVE | Noted: 2022-03-07

## 2024-04-03 PROBLEM — D75.89 BICYTOPENIA: Status: ACTIVE | Noted: 2024-04-03

## 2024-04-03 LAB
ANION GAP SERPL CALCULATED.3IONS-SCNC: 7 MMOL/L (ref 4–13)
BASOPHILS # BLD AUTO: 0.01 THOUSANDS/ÂΜL (ref 0–0.1)
BASOPHILS NFR BLD AUTO: 0 % (ref 0–1)
BUN SERPL-MCNC: 50 MG/DL (ref 5–25)
CALCIUM SERPL-MCNC: 7.9 MG/DL (ref 8.4–10.2)
CHLORIDE SERPL-SCNC: 106 MMOL/L (ref 96–108)
CO2 SERPL-SCNC: 22 MMOL/L (ref 21–32)
CREAT SERPL-MCNC: 1.57 MG/DL (ref 0.6–1.3)
EOSINOPHIL # BLD AUTO: 0 THOUSAND/ÂΜL (ref 0–0.61)
EOSINOPHIL NFR BLD AUTO: 0 % (ref 0–6)
ERYTHROCYTE [DISTWIDTH] IN BLOOD BY AUTOMATED COUNT: 14.4 % (ref 11.6–15.1)
GFR SERPL CREATININE-BSD FRML MDRD: 30 ML/MIN/1.73SQ M
GLUCOSE SERPL-MCNC: 121 MG/DL (ref 65–140)
HCT VFR BLD AUTO: 30.3 % (ref 34.8–46.1)
HGB BLD-MCNC: 9.8 G/DL (ref 11.5–15.4)
IMM GRANULOCYTES # BLD AUTO: 0.02 THOUSAND/UL (ref 0–0.2)
IMM GRANULOCYTES NFR BLD AUTO: 1 % (ref 0–2)
LYMPHOCYTES # BLD AUTO: 0.47 THOUSANDS/ÂΜL (ref 0.6–4.47)
LYMPHOCYTES NFR BLD AUTO: 13 % (ref 14–44)
MCH RBC QN AUTO: 28.9 PG (ref 26.8–34.3)
MCHC RBC AUTO-ENTMCNC: 32.3 G/DL (ref 31.4–37.4)
MCV RBC AUTO: 89 FL (ref 82–98)
MONOCYTES # BLD AUTO: 0.34 THOUSAND/ÂΜL (ref 0.17–1.22)
MONOCYTES NFR BLD AUTO: 9 % (ref 4–12)
NEUTROPHILS # BLD AUTO: 2.82 THOUSANDS/ÂΜL (ref 1.85–7.62)
NEUTS SEG NFR BLD AUTO: 77 % (ref 43–75)
NRBC BLD AUTO-RTO: 0 /100 WBCS
PLATELET # BLD AUTO: 66 THOUSANDS/UL (ref 149–390)
PMV BLD AUTO: 10.7 FL (ref 8.9–12.7)
POTASSIUM SERPL-SCNC: 4.9 MMOL/L (ref 3.5–5.3)
RBC # BLD AUTO: 3.39 MILLION/UL (ref 3.81–5.12)
SODIUM SERPL-SCNC: 135 MMOL/L (ref 135–147)
WBC # BLD AUTO: 3.66 THOUSAND/UL (ref 4.31–10.16)

## 2024-04-03 PROCEDURE — 99232 SBSQ HOSP IP/OBS MODERATE 35: CPT | Performed by: INTERNAL MEDICINE

## 2024-04-03 PROCEDURE — 94640 AIRWAY INHALATION TREATMENT: CPT

## 2024-04-03 PROCEDURE — 97167 OT EVAL HIGH COMPLEX 60 MIN: CPT

## 2024-04-03 PROCEDURE — 99222 1ST HOSP IP/OBS MODERATE 55: CPT | Performed by: INTERNAL MEDICINE

## 2024-04-03 PROCEDURE — 92610 EVALUATE SWALLOWING FUNCTION: CPT

## 2024-04-03 PROCEDURE — NC001 PR NO CHARGE: Performed by: RADIOLOGY

## 2024-04-03 PROCEDURE — 85025 COMPLETE CBC W/AUTO DIFF WBC: CPT | Performed by: PHYSICIAN ASSISTANT

## 2024-04-03 PROCEDURE — 80048 BASIC METABOLIC PNL TOTAL CA: CPT | Performed by: PHYSICIAN ASSISTANT

## 2024-04-03 PROCEDURE — 94760 N-INVAS EAR/PLS OXIMETRY 1: CPT

## 2024-04-03 PROCEDURE — 97163 PT EVAL HIGH COMPLEX 45 MIN: CPT

## 2024-04-03 RX ORDER — POLYETHYLENE GLYCOL 3350 17 G/17G
17 POWDER, FOR SOLUTION ORAL 2 TIMES DAILY
Status: DISCONTINUED | OUTPATIENT
Start: 2024-04-03 | End: 2024-04-05 | Stop reason: HOSPADM

## 2024-04-03 RX ORDER — SENNOSIDES 8.6 MG
1 TABLET ORAL 2 TIMES DAILY
Status: DISCONTINUED | OUTPATIENT
Start: 2024-04-03 | End: 2024-04-05 | Stop reason: HOSPADM

## 2024-04-03 RX ADMIN — SENNOSIDES 8.6 MG: 8.6 TABLET, FILM COATED ORAL at 17:21

## 2024-04-03 RX ADMIN — POLYETHYLENE GLYCOL 3350 17 G: 17 POWDER, FOR SOLUTION ORAL at 21:28

## 2024-04-03 RX ADMIN — FAMOTIDINE 10 MG: 20 TABLET ORAL at 09:59

## 2024-04-03 RX ADMIN — NYSTATIN 500000 UNITS: 100000 SUSPENSION ORAL at 09:58

## 2024-04-03 RX ADMIN — IPRATROPIUM BROMIDE 0.5 MG: 0.5 SOLUTION RESPIRATORY (INHALATION) at 19:59

## 2024-04-03 RX ADMIN — HEPARIN SODIUM 5000 UNITS: 5000 INJECTION, SOLUTION INTRAVENOUS; SUBCUTANEOUS at 21:22

## 2024-04-03 RX ADMIN — HYDRALAZINE HYDROCHLORIDE 25 MG: 25 TABLET ORAL at 05:16

## 2024-04-03 RX ADMIN — NYSTATIN 500000 UNITS: 100000 SUSPENSION ORAL at 17:21

## 2024-04-03 RX ADMIN — ALPRAZOLAM 0.5 MG: 0.5 TABLET ORAL at 21:23

## 2024-04-03 RX ADMIN — IPRATROPIUM BROMIDE 0.5 MG: 0.5 SOLUTION RESPIRATORY (INHALATION) at 07:22

## 2024-04-03 RX ADMIN — NYSTATIN 500000 UNITS: 100000 SUSPENSION ORAL at 21:22

## 2024-04-03 RX ADMIN — ATORVASTATIN CALCIUM 40 MG: 40 TABLET, FILM COATED ORAL at 16:48

## 2024-04-03 RX ADMIN — BUDESONIDE INHALATION 0.5 MG: 0.5 SUSPENSION RESPIRATORY (INHALATION) at 19:59

## 2024-04-03 RX ADMIN — LEVALBUTEROL HYDROCHLORIDE 1.25 MG: 1.25 SOLUTION RESPIRATORY (INHALATION) at 19:58

## 2024-04-03 RX ADMIN — LEVALBUTEROL HYDROCHLORIDE 1.25 MG: 1.25 SOLUTION RESPIRATORY (INHALATION) at 13:55

## 2024-04-03 RX ADMIN — IPRATROPIUM BROMIDE 0.5 MG: 0.5 SOLUTION RESPIRATORY (INHALATION) at 13:55

## 2024-04-03 RX ADMIN — LEVALBUTEROL HYDROCHLORIDE 1.25 MG: 1.25 SOLUTION RESPIRATORY (INHALATION) at 07:22

## 2024-04-03 RX ADMIN — FORMOTEROL FUMARATE DIHYDRATE 20 MCG: 20 SOLUTION RESPIRATORY (INHALATION) at 07:41

## 2024-04-03 RX ADMIN — NIFEDIPINE 60 MG: 30 TABLET, FILM COATED, EXTENDED RELEASE ORAL at 09:59

## 2024-04-03 RX ADMIN — NYSTATIN 500000 UNITS: 100000 SUSPENSION ORAL at 14:27

## 2024-04-03 RX ADMIN — POLYETHYLENE GLYCOL 3350 17 G: 17 POWDER, FOR SOLUTION ORAL at 14:27

## 2024-04-03 RX ADMIN — HYDRALAZINE HYDROCHLORIDE 25 MG: 25 TABLET ORAL at 14:27

## 2024-04-03 RX ADMIN — BUDESONIDE INHALATION 0.5 MG: 0.5 SUSPENSION RESPIRATORY (INHALATION) at 07:22

## 2024-04-03 RX ADMIN — FORMOTEROL FUMARATE DIHYDRATE 20 MCG: 20 SOLUTION RESPIRATORY (INHALATION) at 20:03

## 2024-04-03 RX ADMIN — PREDNISONE 30 MG: 20 TABLET ORAL at 09:58

## 2024-04-03 RX ADMIN — HEPARIN SODIUM 5000 UNITS: 5000 INJECTION, SOLUTION INTRAVENOUS; SUBCUTANEOUS at 05:16

## 2024-04-03 RX ADMIN — HYDRALAZINE HYDROCHLORIDE 25 MG: 25 TABLET ORAL at 21:23

## 2024-04-03 RX ADMIN — CLOPIDOGREL 75 MG: 75 TABLET ORAL at 09:58

## 2024-04-03 RX ADMIN — HEPARIN SODIUM 5000 UNITS: 5000 INJECTION, SOLUTION INTRAVENOUS; SUBCUTANEOUS at 14:27

## 2024-04-03 NOTE — PROGRESS NOTES
Patient:    MRN:  [id number]    Aidin Request ID:  [id number]    Level of care reserved:  Skilled Nursing Facility    Partner Reserved:  [facility], [place] [address] [phone]    Clinical needs requested:    Geography searched:  20 miles around [id number]    Start of Service:    Request sent:  7:56am EDT on 4/3/2024 by Funmi Cantu    Partner reserved:  12:29pm EDT on 4/3/2024 by Funmi Cantu    Choice list shared:  12:27pm EDT on 4/3/2024 by Funmi Cantu

## 2024-04-03 NOTE — Clinical Note
Nsg staff verbally cleared pt for OT evaluation.  Pt received  supine in bed on this date reporting no pain + is agreeable to OT session @ this time. @ exit, pt remains in  {pmlocation:99187} c all lines intact, all needs met, call bell in hand + nsg aware of location/disposition.     @ {pmloc:65079} {pmbody:99774}     During evaluation, {pmwho:35639} present in room.    Pt lives in  {pmhome:35375} {pmwho:41152} + @ baseline, performs ADLs  {pmlevels:11435}, IADLs {pmlevels:04221} + fxl mobility {pmlevels:32660} {pmad:96505}. ({pmplus:94295}) . {pmemp:59149}        Given fxl performance skills + medical complexity, therapist suspecting via clinical judgement + skilled analysis; pt currently requires stated assist above to perform each area of ADL d/t limitations including: {pmdeficits:86807}      {pmdnt:00229}      Pt performed short distance ADL related fxl mobility {pmfxlmob:03824} {pmassistlevels:69754} {pmad:87750} simulating toileting distances.   {pmpain:19899} overt LOB demonstrated + pt {pmc:12990} {pmd:09298} /  {pmpain:89736} SOB/ {pmpain:94641} dizziness during transitional movements. Reports feeling {pmst:35894}. {pmgf:94157} safety awareness noted while navigating t/o room. {pmfxl:27562}    @ {pmloc:54701} {pmbody:31768}         PT/OT co-eval on this date d/t medical complexity, ambulatory dysfunction c high fall risk, various impeding lines + requirement for skilled interdisciplinary analysis of appropriate d/c recommendations. PT/OT POC/goals I'ly determined per respective discipline. ({pmpt:94600})    Medical staff made aware of current fxn, recommendations for d/c planning, fall risk + pt's location upon exit. (***)    None reported.    No acute changes reported since hospitalization.      SBT administered: scored *** indicating {pmsbt:05743} cognitive impairment.    1: 0 OR 1 (year)                ___ x 4 =  2: 0 OR 1 (month)                 ___ x 3 =  3: 0 OR 1 (time)                 ___ x 3  =  4: 0, 1 OR 2 (20-1)     ___ x 2 =  5: 0, 1 OR 2  (months)    ___ x 2 =  6: 0, 1, 2, 3, 4 or 5 (name/add)  ___ x 2 =    0-4= normal cognition   5-9= questionable impairment   >10= impairment consistent c dementia          Patient is a 79 y.o. female seen for OT evaluation s/p admit to  {PM location:37856} on 4/2/2024 w/Acute renal failure superimposed on chronic kidney disease  (HCC) + commorbidities/PMHx (as listed in medical record) affecting patient's functional performance c ADL tasks at time of assessment. OT orders placed for evaluation and treatment to assess pt's ADLs, cognitive status + performance during functional tasks in order to formulate appropriate d/c recommendations.     Therapist performed at least two patient identifiers during session including name and wristband. Personal factors affecting patient at time of initial evaluation include: {NCPERSONALFACTORS:20626}.   Pt's clinical presentation is currently {MSLstable:84468} given new onset deficits that effect pt's occupational performance and ability to safely return to PLOF including {NCOTdeficits:49965} combined with medical complications of {NCMEDICALCOMPLICATIONS:08808}. This evaluation required an extensive review of medical and/or therapy records and additional review of physical, cognitive and psychosocial history related to functional performance. Based upon functional performance deficits and assessments, this evaluation has been identified as a  {pmcomplex:20081} complexity evaluation.      Patient to benefit from continued Occupational Therapy treatment while in the hospital to address aforementioned deficits and maximize level of functional independence with ADLs and functional mobility. Pt currently {pmdc:08503}. From OT standpoint, recommendation at time of d/c would be {RV recommendation:57154}.         as pt appears to be at baseline level of function. Pt may benefit from rehab if  wife is unable to provide physical assist for  mobility and ADLs    @ this time, pt presents @ baseline fxn including I c ADLs/fxl mobility. D/t aforementioned factors, no further skilled occupational therapy services warranted during hospitalization/ following return home.

## 2024-04-03 NOTE — ASSESSMENT & PLAN NOTE
BMI of 20.58 in the setting of decreased appetite/oral intake and evidence of muscle wasting/atrophy and exam  Appreciate speech therapy input with advancement of diet to a regular texture with thin liquids  Continue nutritional supplementation with meals

## 2024-04-03 NOTE — SPEECH THERAPY NOTE
Speech Language/Pathology  Speech/Language Pathology  Assessment    Patient Name: Laya Brooks  Today's Date: 4/3/2024     Problem List  Principal Problem:    Acute renal failure superimposed on chronic kidney disease  (HCC)  Active Problems:    Renovascular hypertension    COPD (chronic obstructive pulmonary disease) (HCC)    Dysphagia    Renal artery stenosis (HCC)    Past Medical History  Past Medical History:   Diagnosis Date    CAD (coronary artery disease)     Cardiac disease     Hypercholesteremia     Hyperlipidemia     Hypertension     Hypertension     Renal disorder      Past Surgical History  Past Surgical History:   Procedure Laterality Date    CORONARY ANGIOPLASTY WITH STENT PLACEMENT      IR TEMPORARY DIALYSIS CATHETER PLACEMENT  3/26/2024    WRIST SURGERY        Bedside Swallow Evaluation:    Summary:  Pt presented w/ mild oral and ? Mild pharyngeal dysphagia. Positioned upright and alert. She accepted trials of puree, soft solids, and thin liquids via straw with single sips. Mastication was mildly prolonged however eventual functional breakdown. Pt does intermittently breath through mouth with trials. Cued to coordinate breath support with intake. Mastication was mildly prolonged however functional. Bolus control, formation, and transfer were WNL. Swallows appeared prompt. Laryngeal rise upon palpation appeared adequate. No overt s/s of aspiration. SaO2 sats stable on room air. Pt expressed dislike of puree consistencies. Reported she is selective on intake. Stated she will not eat much breakfast or lunch typically but does have increased intake at dinner. Expressed frustration over receiving too much po on tray in which pt feels gets wasted during meals. ST reassured pt. Pt denied taking meal supplements prior to admission, however did report she does snack throughout the day.     Recommendations:  Diet:Dysphagia 2 mech soft   Liquid:Thin   Meds:As tolerated   Supervision:Intermittent    Positioning:Upright  Strategies: Slow rate, small sips, alternate liquids with solids.  Pt to take PO/Meds only when fully alert and upright.   Oral care:  Aspiration precautions  Reflux precautions  Therapy Prognosis:  Prognosis considerations:  Frequency:2-5 times weekly as indicated       Consider consult w/:  Rehab  Nutrition    Goal(s):  Dysphagia LTG  -Patient will demonstrate safe and effective oral intake (without overt s/s significant oral/pharyngeal dysphagia including s/s penetration or aspiration) for the highest appropriate diet level.     1.Pt will tolerate least restrictive diet w/out s/s aspiration or oral/pharyngeal difficulties.   2.Pt will will effectively manipulate/masticate and transfer solids w/out s/s dysphagia/aspiration.   3.Pt will tolerate thin liquids w/out s/s aspiration.   -If indicated, patient will comply with a Video/Modified Barium Swallow study for more complete assessment of swallowing anatomy/physiology/aspiration risk and to assess efficacy of treatment techniques so as to best guide treatment plan     H&P/Admit info/ pertinent provider notes: (PMH noted above)  Laya Brooks is a 79 y.o. female with a PMH of COPD, CKD, HTN, dysphagia who was transferred from [facility] for plans of angiogram and possible angioplasty of renal artery. The patient was initially admitted to [facility] for COPD exacerbation and thought to have possible CHF exacerbation on 3/24. The patient was noted to have SEEMA which worsened with IV diuresis. Nephrology was consulted and the patient was initiated on hemodialysis. Upon further evaluation the patient was noted to have severe left renal artery stenosis. Vascular surgery was consulted and recommended IR consult given patient with solitary left kidney. IR recommended transfer to [facility] for planned angiogram and angioplasty. The patient underwent HD at [facility] however this is currently on hold as the patient's renal function is improving. The patient's COPD  "exacerbation is now resolved. The patient currently denies any complaints.      Special Studies:  CT abdomen wo contrast (04/03/2024)   IMPRESSION:  Large bilateral pleural effusions, ascites and extensive flank edema. Findings suggesting fluid overload/third spacing.  Cholelithiasis without evidence for cholecystitis.  Significant renal cortical atrophy and cysts as as above. Left renal artery measurements provided as per history. Heavy calcified plaque within the aorta including at the level of the origin but not extending within the renal artery.  Asymmetric hypertrophy of the left liver and dilatation of the portal vein. Correlate for evidence of underlying liver disease and portal hypertension.    Procalcitonin: 0.12                          04/02/2024   WBC: 3.66                           04/03/2024     Code Status Level 1 full code     Previous MBS: none     Patient's goal:\" I got to an [employer] restaurant and I am in heaven\"    Did the pt report pain?  If yes, was nursing notified/was it addressed?    Reason for consult:  R/o aspiration  Determine safest and least restrictive diet  respiratory compromise  h/o dysphagia     Precautions:  Aspiration    Food Allergies: No known    Current Diet: Dysphagia 1 pureed and thin liquids    Premorbid diet: Regular and thin    O2 requirement: Room air    Social/Prior living Lives with spouse    Voice/Speech: WNL   Follows commands: Basic    Cognitive status: Alert      Oral [facility] exam:  Dentition:Adequate   Lips (VII):WNL  Tongue (XII):midline   Mandible (V):adequate ROM  Face/oral sensation (V):WNL, symmetrical   Velum (X):WNL      Items administered:  Soft solids, puree, and thin liquids via straw     Oral stage:  Lip closure:WNL  Mastication: mildly prolonged   Bolus formation:WNL  Bolus control:WNL  Transfer:WNL    Pharyngeal stage:  Swallow promptness: prompt   Laryngeal rise:adequate   No overt s/s aspiration    Esophageal stage:  No s/s reported  H/o " GERD    Aspiration precautions posted    Results d/w:  Pt, physician,

## 2024-04-03 NOTE — ASSESSMENT & PLAN NOTE
Patient admitted to the [facility] and noted to have SEEMA superimposed on CKD on that admission which worsened   Nephrology followed and ultimately initiated on hemodialysis on 3/26 -> currently held as renal function transiently improved  Limit/avoid nephrotoxins as possible  Monitor renal function and urine output

## 2024-04-03 NOTE — NURSING NOTE
Patient's saturation dropped to 89. Clear lung sounds bilaterally. Placed patient on 2.5L NC, saturation currently at 91 percent. Patient noticed nasal congestion after respiratory treatment. Dr. Felix is aware of the situation.

## 2024-04-03 NOTE — ASSESSMENT & PLAN NOTE
Initially admitted to the [facility] for COPD exacerbation  Currently on a Prednisone taper - c/w Pulmicort/Perforomist and nebulizer treatments

## 2024-04-03 NOTE — NUTRITION
04/03/24 1424   Biochemical Data,Medical Tests, and Procedures   Biochemical Data/Medical Tests/Procedures Lab values reviewed;Meds reviewed   Labs (Comment) /3/24 hemoglobin 9.8, hematocrit 30.3, BUN 50, creatinine 1.57, calcium 7.9   Meds (Comment) Atorvastatin, Plavix, Pepcid, heparin, prednisone   Nutrition-Focused Physical Exam   Nutrition-Focused Physical Exam Findings RN skin assessment reviewed;Edema;No skin issues documented  (Trace bilateral lower extremity edema)   Medical-Related Concerns acute renal failure superimposed on CKD, renovascular hypertension, COPD, dysphagia, malnutrition, prior CVA, nonintentional weight loss, tobacco use, oral candidiasis   Current PO Intake   Current Diet Order Mechanical soft diet, thin liquids   Current Meal Intake 25-50%;%   Estimated calorie intake compared to estimated need Anticipate nutrient needs are not met   PES Statement   Oral or Nutritional Support Intake (2) Inadequate oral intake NI-2.1   Related to Decreased appetite   As evidenced by: Per patient/family interview;Intake < estimated needs   Recommendations/Interventions   Malnutrition/BMI Present No  (Will continue to monitor, 2 criteria not met at this time)   Summary ST consulted.  [facility] [place] with renal artery stenosis requiring IR evaluation.  Past medical history significant for acute renal failure superimposed on CKD, renovascular hypertension, COPD, dysphagia, malnutrition, prior CVA, nonintentional weight loss, tobacco use, oral candidiasis.  Weight history reviewed.  Weight trending up in past week.  Possibly impacted by edema.  Trace bilateral lower extremity edema.  Weight appears to be stable in 90#s range.  No pressure areas.  Diet advanced from puréed diet, thin liquids to Mechanical Soft diet, thin liquids late this morning per ST recommendation.  Mighty shake twice daily, Magic cup vanilla once daily.  Meal completion 25%, 100%.  Met with patient at bedside.  Patient  "wrapped in blankets and keeps her eyes closed throughout nutrition interview. NFPE deferred.  In regard to appetite she reports \"not really\" having 1, \"I am eating better.\"  She reports usually having coffee and toast for breakfast, skips lunch, eats out at an Italian restaurant for dinner.  Denies dysphagia.  Discussed diet as prescribed.  Discussed nutrition supplements.  Agreeable to adjusting supplements to ensure compact chocolate 3 times daily.  RD to follow as hospital course continues.   Interventions/Recommendations Continue current diet order;Supplement adjust;Monitor I & O's   Education Assessment   Education Education not indicated at this time   Patient Nutrition Goals   Goal Meet PO needs;Increase PO intake       "

## 2024-04-03 NOTE — CONSULTS
CONSULTATION-NEPHROLOGY   Laya FELISA Brooks 79 y.o. female MRN: [id number]  Unit/Bed#: -01 Encounter: [id number]        Assessment and Plan:      1.Hemodialysis dependent acute kidney injury  FeNa < 1% which favored a prerenal state, which can be seen in the setting of volume depletion or renal artery stenosis.  Her imaging is impressive for renal artery stenosis as a contribution to her kidney injury.  Her last hemodialysis treatment was a partial treatment on 3/29 for approximately 1.5 hours.  Treatment was abbreviated due to tachycardia and concerns for volume depletion.  No emergent need for hemodialysis today, creatinine trends are stable at 1.57 Mg per deciliter.  UOP inaccurate likely, try to follow as best we can.  She does have clearance and is maintaining her own creatinine.  No concern for overt uremia at this time.  Plan for angiogram on 4/4, will provide  mL 1 hour pre and  ml per 2 hours postcontrast exposure.She is at least moderate risk for SEEMA from angiogram and continued need for RRT.    2.  CKD 3A with baseline creatinine around 1 mg/dL with functional solitary left kidney.  Follow creatinine trends closely monitor for renal recovery.    3.  Renovascular disease  Interventional radiology and vascular surgery consults appreciated.  Plan for angiography and potential revascularization on 4/4.  Patient has a functional solitary left kidney.  Will need intravenous fluids as above to minimize any contrast injury.  Continue Lipitor to help with renal vascular disease.    4.  Hypertension in the setting of CKD    Continue current blood pressure regiment.  Nifedipine has increased with improvement in her blood pressure trends.  Current regiment: Nifedipine 60 mg/day/hydralazine 25 mg every 8.  No ACE/ARB's/MRA while monitoring for renal recovery.    5.Volume status  Patient appears compensated from volume status perspective at this point.  No emergency for dialysis.  Avoid diuretics and  encourage oral intake.    6.  Anemia/thrombocytopenia  Follow indications for transfusion, hemoglobin here was stable in the 9.8 range.  No indication for JANET at present.      HPI:    Laya Brooks is a 79 y.o. female with history of CKD 3 with baseline creatinine 1 mg/dL presented as a transfer from [facility] for angiogram with angioplasty to evaluate left renal artery.  Patient has significant atrophy of the right kidney due to renovascular disease with concern for significant stenosis of the left renal artery as well contributing to her kidney failure.  She was started on hemodialysis last week for total of 3 treatments.  Her last treatment was on 3/29 for approximately 1.5 hours.  Her creatinine trends have been improving and we are monitoring off hemodialysis.  Patient's blood pressure trends have significantly improved in the past 24 hours but she has had difficulty with uncontrolled hypertension while hospitalized.    Reason for Consult: Acute kidney failure on CKD, renal artery stenosis    Review of Systems:  Decreased appetite, urine output  A complete 10-point review of systems was performed. Aside from what was mentioned in the HPI, it is otherwise negative.      Historical Information   Past Medical History:   Diagnosis Date    CAD (coronary artery disease)     Cardiac disease     Hypercholesteremia     Hyperlipidemia     Hypertension     Hypertension     Renal disorder      Past Surgical History:   Procedure Laterality Date    CORONARY ANGIOPLASTY WITH STENT PLACEMENT      IR TEMPORARY DIALYSIS CATHETER PLACEMENT  3/26/2024    WRIST SURGERY       Social History   Social History     Substance and Sexual Activity   Alcohol Use Never     Social History     Substance and Sexual Activity   Drug Use No     Social History     Tobacco Use   Smoking Status Former   Smokeless Tobacco Never       Family History:   History reviewed. No pertinent family history.    Medications:  Pertinent  medications were reviewed  Current Facility-Administered Medications   Medication Dose Route Frequency Provider Last Rate    acetaminophen  650 mg Oral Q4H PRN Aries Morgan, PA-JACOBO      albuterol  2.5 mg Nebulization Q4H PRN Aries Morgan, PA-JACOBO      ALPRAZolam  0.5 mg Oral TID PRN Aries Morgan, PA-C      aluminum-magnesium hydroxide-simethicone  30 mL Oral Q4H PRN Aries Morgan, PA-C      atorvastatin  40 mg Oral Daily With Dinner Aries Morgan, PA-C      budesonide  0.5 mg Nebulization Q12H Aries Morgan, PA-C      clopidogrel  75 mg Oral Daily Aries Morgan, PA-C      famotidine  10 mg Oral Daily Aries Mendzbigniew, PA-C      formoterol  20 mcg Nebulization Q12H Aries Morgan, PA-C      heparin (porcine)  5,000 Units Subcutaneous Q8H [facility] Aries Morgan, PA-C      hydrALAZINE  25 mg Oral Q8H [facility] Aries Morgan, PA-C      ipratropium  0.5 mg Nebulization TID Aries Morgan, PA-JACOBO      labetalol  10 mg Intravenous Q6H PRN Aries Morgan, PA-C      levalbuterol  1.25 mg Nebulization TID Aries Morgan, PA-JACOBO      NIFEdipine  60 mg Oral Daily Aries Morgan, PA-JACOBO      nystatin  500,000 Units Swish & Swallow 4x Daily Aries Morgan, PA-JACOBO      ondansetron  4 mg Intravenous Q6H PRN Aries Morgan, PA-JACOBO      predniSONE  30 mg Oral Daily Aries Morgan PA-C      Followed by    [START ON 4/5/2024] predniSONE  20 mg Oral Daily Aries Morgan PA-C      Followed by    [START ON 4/8/2024] predniSONE  10 mg Oral Daily Aries Morgan, JOHN      senna  1 tablet Oral HS Aries Morgan, JOHN      sodium chloride  1 spray Each Nare Q1H PRN Aries Morgan PA-C           Allergies   Allergen Reactions    Influenza Virus Vaccine          Vitals:   /72   Pulse 76   Temp 97.6 °F (36.4 °C) (Axillary)   Resp 14   Ht 5' (1.524 m)   Wt 47.8 kg (105 lb 6.1 oz)   SpO2 92%   BMI 20.58 kg/m²   Body mass index is 20.58 kg/m².  SpO2: 92 %,   SpO2 Activity: At Rest,   O2 Device: None (Room air)      Intake/Output Summary (Last 24  "hours) at 4/3/2024 1019  Last data filed at 4/3/2024 0750  Gross per 24 hour   Intake 740 ml   Output 100 ml   Net 640 ml     Invasive Devices       Peripheral Intravenous Line  Duration             Peripheral IV 03/30/24 Dorsal (posterior);Left Forearm 4 days              Hemodialysis Catheter  Duration             HD Temporary Double Catheter 8 days                    Physical Exam:  General: conscious, cooperative, in no acute distress  Eyes: conjunctivae pink, anicteric sclerae  ENT: lips and mucous membranes moist  Neck: supple, no JVD, no masses  Chest: clear breath sounds bilateral, no crackles, ronchus or wheezings  CVS: distinct S1 & S2, normal rate, regular rhythm  Abdomen: soft, non-tender, non-distended, normoactive bowel sounds  Extremities: no edema of both legs  Skin: no rash  Neuro: awake, alert, oriented      Diagnostic Data:  Lab: I have personally reviewed pertinent lab results.,   CBC:  Results from last 7 days   Lab Units 04/03/24  0614   WBC Thousand/uL 3.66*   HEMOGLOBIN g/dL 9.8*   HEMATOCRIT % 30.3*   PLATELETS Thousands/uL 66*      CMP:   Lab Results   Component Value Date    SODIUM 135 04/03/2024    K 4.9 04/03/2024     04/03/2024    CO2 22 04/03/2024    BUN 50 (H) 04/03/2024    CREATININE 1.57 (H) 04/03/2024    CALCIUM 7.9 (L) 04/03/2024    EGFR 30 04/03/2024   ,   PT/INR: No results found for: \"PT\", \"INR\",   Magnesium: No components found for: \"MAG\",  Phosphorous: No results found for: \"PHOS\"    Microbiology:  @LAB[facility],(urinecx:7)@        Kate Weston,     Portions of the record may have been created with voice recognition software. Occasional wrong word or \"sound a like\" substitutions may have occurred due to the inherent limitations of voice recognition software. Read the chart carefully and recognize, using context, where substitutions have occurred.      "

## 2024-04-03 NOTE — PLAN OF CARE
Problem: Prexisting or High Potential for Compromised Skin Integrity  Goal: Skin integrity is maintained or improved  Description: INTERVENTIONS:  - Identify patients at risk for skin breakdown  - Assess and monitor skin integrity  - Assess and monitor nutrition and hydration status  - Monitor labs   - Assess for incontinence   - Turn and reposition patient  - Assist with mobility/ambulation  - Relieve pressure over bony prominences  - Avoid friction and shearing  - Provide appropriate hygiene as needed including keeping skin clean and dry  - Evaluate need for skin moisturizer/barrier cream  - Collaborate with interdisciplinary team   - Patient/family teaching  - Consider wound care consult   Outcome: Progressing     Problem: PAIN - ADULT  Goal: Verbalizes/displays adequate comfort level or baseline comfort level  Description: Interventions:  - Encourage patient to monitor pain and request assistance  - Assess pain using appropriate pain scale  - Administer analgesics based on type and severity of pain and evaluate response  - Implement non-pharmacological measures as appropriate and evaluate response  - Consider cultural and social influences on pain and pain management  - Notify physician/advanced practitioner if interventions unsuccessful or patient reports new pain  Outcome: Progressing     Problem: INFECTION - ADULT  Goal: Absence or prevention of progression during hospitalization  Description: INTERVENTIONS:  - Assess and monitor for signs and symptoms of infection  - Monitor lab/diagnostic results  - Monitor all insertion sites, i.e. indwelling lines, tubes, and drains  - Monitor endotracheal if appropriate and nasal secretions for changes in amount and color  - Gilman appropriate cooling/warming therapies per order  - Administer medications as ordered  - Instruct and encourage patient and family to use good hand hygiene technique  - Identify and instruct in appropriate isolation precautions for  identified infection/condition  Outcome: Progressing  Goal: Absence of fever/infection during neutropenic period  Description: INTERVENTIONS:  - Monitor WBC    Outcome: Progressing     Problem: SAFETY ADULT  Goal: Patient will remain free of falls  Description: INTERVENTIONS:  - Educate patient/family on patient safety including physical limitations  - Instruct patient to call for assistance with activity   - Consult OT/PT to assist with strengthening/mobility   - Keep Call bell within reach  - Keep bed low and locked with side rails adjusted as appropriate  - Keep care items and personal belongings within reach  - Initiate and maintain comfort rounds  - Make Fall Risk Sign visible to staff  - Offer Toileting every 2 Hours, in advance of need  - Initiate/Maintain bed alarm  - Obtain necessary fall risk management equipment: non-skid socks  - Apply yellow socks and bracelet for high fall risk patients  - Consider moving patient to room near nurses station  Outcome: Progressing  Goal: Maintain or return to baseline ADL function  Description: INTERVENTIONS:  -  Assess patient's ability to carry out ADLs; assess patient's baseline for ADL function and identify physical deficits which impact ability to perform ADLs (bathing, care of mouth/teeth, toileting, grooming, dressing, etc.)  - Assess/evaluate cause of self-care deficits   - Assess range of motion  - Assess patient's mobility; develop plan if impaired  - Assess patient's need for assistive devices and provide as appropriate  - Encourage maximum independence but intervene and supervise when necessary  - Involve family in performance of ADLs  - Assess for home care needs following discharge   - Consider OT consult to assist with ADL evaluation and planning for discharge  - Provide patient education as appropriate  Outcome: Progressing  Goal: Maintains/Returns to pre admission functional level  Description: INTERVENTIONS:  - Perform AM-PAC 6 Click Basic Mobility/  Daily Activity assessment daily.  - Set and communicate daily mobility goal to care team and patient/family/caregiver.   - Collaborate with rehabilitation services on mobility goals if consulted  - Perform Range of Motion 3 times a day.  - Reposition patient every 2 hours.  - Dangle patient 3 times a day  - Stand patient 2 times a day  - Ambulate patient 2 times a day  - Out of bed to chair 3 times a day   - Out of bed for meals 3 times a day  - Out of bed for toileting  - Record patient progress and toleration of activity level   Outcome: Progressing     Problem: DISCHARGE PLANNING  Goal: Discharge to home or other facility with appropriate resources  Description: INTERVENTIONS:  - Identify barriers to discharge w/patient and caregiver  - Arrange for needed discharge resources and transportation as appropriate  - Identify discharge learning needs (meds, wound care, etc.)  - Arrange for interpretive services to assist at discharge as needed  - Refer to Case Management Department for coordinating discharge planning if the patient needs post-hospital services based on physician/advanced practitioner order or complex needs related to functional status, cognitive ability, or social support system  Outcome: Progressing     Problem: Knowledge Deficit  Goal: Patient/family/caregiver demonstrates understanding of disease process, treatment plan, medications, and discharge instructions  Description: Complete learning assessment and assess knowledge base.  Interventions:  - Provide teaching at level of understanding  - Provide teaching via preferred learning methods  Outcome: Progressing     Problem: METABOLIC, FLUID AND ELECTROLYTES - ADULT  Goal: Electrolytes maintained within normal limits  Description: INTERVENTIONS:  - Monitor labs and assess patient for signs and symptoms of electrolyte imbalances  - Administer electrolyte replacement as ordered  - Monitor response to electrolyte replacements, including repeat lab  results as appropriate  - Instruct patient on fluid and nutrition as appropriate  Outcome: Progressing  Goal: Fluid balance maintained  Description: INTERVENTIONS:  - Monitor labs   - Monitor I/O and WT  - Instruct patient on fluid and nutrition as appropriate  - Assess for signs & symptoms of volume excess or deficit  Outcome: Progressing  Goal: Glucose maintained within target range  Description: INTERVENTIONS:  - Monitor Blood Glucose as ordered  - Assess for signs and symptoms of hyperglycemia and hypoglycemia  - Administer ordered medications to maintain glucose within target range  - Assess nutritional intake and initiate nutrition service referral as needed  Outcome: Progressing

## 2024-04-03 NOTE — ASSESSMENT & PLAN NOTE
Continue Hydralazine/Procardia XL - additional PRN IV Labetalol on board for BP spikes  Low sodium restriction

## 2024-04-03 NOTE — TELEMEDICINE
INTERPROFESSIONAL (ELECTRONIC OR PHONE) CONSULTATION - Interventional Radiology  Laya Brooks 79 y.o. female MRN: [id number]  Unit/Bed#: -01 Encounter: [id number]    IR has been consulted regarding the care of Laya Brooks.    We were consulted by inpatient team concerning this patient with renal failure    Inpatient Consult to IR  Consult performed by: China Montes De Oca MD  Consult ordered by: Aries Morgan PA-C        04/03/24      Assessment/Recommendation:     79-year-old with new initiation of dialysis in the setting of renal failure    Please see consultation by my colleague yesterday at the [facility]    Functional solitary left kidney    Imaging from 2 years ago including MRA and vascular duplex with high-grade stenosis     Now we have a noncontrast CT but no updated imaging    Plans were for angiography and if possible revascularization    I will place order for inpatient procedure  I will place n.p.o. after midnight order    Consider aspirin/statin initiation        Total time spent in review of data, discussion with requesting provider and rendering advice was 13 min     Thank you for allowing Interventional Radiology to participate in the care of Laya Brooks. Please don't hesitate to call or TigerText us with any questions.     China Montes De Oca MD      Past Medical History:  Past Medical History:   Diagnosis Date    CAD (coronary artery disease)     Cardiac disease     Hypercholesteremia     Hyperlipidemia     Hypertension     Hypertension     Renal disorder        Past Surgical History:  Past Surgical History:   Procedure Laterality Date    CORONARY ANGIOPLASTY WITH STENT PLACEMENT      IR TEMPORARY DIALYSIS CATHETER PLACEMENT  3/26/2024    WRIST SURGERY         Social History:  Social History     Substance and Sexual Activity   Alcohol Use Never     Social History     Substance and Sexual Activity   Drug Use No     Social History     Tobacco Use   Smoking Status Former   Smokeless Tobacco  Never       Family History:  History reviewed. No pertinent family history.    Allergies:  Allergies   Allergen Reactions    Influenza Virus Vaccine        Medications:  Medications Prior to Admission   Medication    amLODIPine (NORVASC) 5 mg tablet    atorvastatin (LIPITOR) 40 mg tablet    clopidogrel (PLAVIX) 75 mg tablet    lisinopril (ZESTRIL) 10 mg tablet    albuterol (PROVENTIL HFA,VENTOLIN HFA) 90 mcg/act inhaler    ergocalciferol (ERGOCALCIFEROL) 1.25 MG (31741 UT) capsule    tiotropium-olodaterol (Stiolto Respimat) 2.5-2.5 MCG/ACT inhaler     Current Facility-Administered Medications   Medication Dose Route Frequency    acetaminophen (TYLENOL) tablet 650 mg  650 mg Oral Q4H PRN    albuterol inhalation solution 2.5 mg  2.5 mg Nebulization Q4H PRN    ALPRAZolam (XANAX) tablet 0.5 mg  0.5 mg Oral TID PRN    aluminum-magnesium hydroxide-simethicone (MAALOX) oral suspension 30 mL  30 mL Oral Q4H PRN    atorvastatin (LIPITOR) tablet 40 mg  40 mg Oral Daily With Dinner    budesonide (PULMICORT) inhalation solution 0.5 mg  0.5 mg Nebulization Q12H    clopidogrel (PLAVIX) tablet 75 mg  75 mg Oral Daily    famotidine (PEPCID) tablet 10 mg  10 mg Oral Daily    formoterol (PERFOROMIST) nebulizer solution 20 mcg  20 mcg Nebulization Q12H    heparin (porcine) subcutaneous injection 5,000 Units  5,000 Units Subcutaneous Q8H JHON    hydrALAZINE (APRESOLINE) tablet 25 mg  25 mg Oral Q8H JHON    ipratropium (ATROVENT) 0.02 % inhalation solution 0.5 mg  0.5 mg Nebulization TID    labetalol (NORMODYNE) injection 10 mg  10 mg Intravenous Q6H PRN    levalbuterol (XOPENEX) inhalation solution 1.25 mg  1.25 mg Nebulization TID    NIFEdipine (PROCARDIA XL) 24 hr tablet 60 mg  60 mg Oral Daily    nystatin (MYCOSTATIN) oral suspension 500,000 Units  500,000 Units Swish & Swallow 4x Daily    ondansetron (ZOFRAN) injection 4 mg  4 mg Intravenous Q6H PRN    predniSONE tablet 30 mg  30 mg Oral Daily    Followed by    [START ON 4/5/2024]  "predniSONE tablet 20 mg  20 mg Oral Daily    Followed by    [START ON 4/8/2024] predniSONE tablet 10 mg  10 mg Oral Daily    senna (SENOKOT) tablet 8.6 mg  1 tablet Oral HS    sodium chloride (OCEAN) 0.65 % nasal spray 1 spray  1 spray Each Nare Q1H PRN       Vitals:  /72   Pulse 76   Temp 97.6 °F (36.4 °C) (Axillary)   Resp 14   Ht 5' (1.524 m)   Wt 47.8 kg (105 lb 6.1 oz)   SpO2 92%   BMI 20.58 kg/m²   Body mass index is 20.58 kg/m².  Weight (last 2 days)       Date/Time Weight    04/03/24 0600 47.8 (105.38)            I/Os:    Intake/Output Summary (Last 24 hours) at 4/3/2024 0911  Last data filed at 4/3/2024 0750  Gross per 24 hour   Intake 740 ml   Output 100 ml   Net 640 ml       Lab Results and Cultures:   CBC with diff:   Lab Results   Component Value Date    WBC 3.66 (L) 04/03/2024    HGB 9.8 (L) 04/03/2024    HCT 30.3 (L) 04/03/2024    MCV 89 04/03/2024    PLT 66 (L) 04/03/2024    RBC 3.39 (L) 04/03/2024    MCH 28.9 04/03/2024    MCHC 32.3 04/03/2024    RDW 14.4 04/03/2024    MPV 10.7 04/03/2024    NRBC 0 04/03/2024      BMP/CMP:  Lab Results   Component Value Date    K 4.9 04/03/2024     04/03/2024    CO2 22 04/03/2024    BUN 50 (H) 04/03/2024    CREATININE 1.57 (H) 04/03/2024    CALCIUM 7.9 (L) 04/03/2024    AST 14 03/27/2024    ALT 11 03/27/2024    ALKPHOS 42 03/27/2024    EGFR 30 04/03/2024   ,     Coags:   Lab Results   Component Value Date    PTT 25 03/23/2024    INR 1.04 03/23/2024   ,          HgbA1c:   Lab Results   Component Value Date    HGBA1C 6.0 (H) 11/14/2022    HGBA1C 6.2 (H) 03/08/2022    HGBA1C 6.6 (H) 04/12/2017       Blood Culture: No results found for: \"BLOODCX\",   Urinalysis:   Lab Results   Component Value Date    COLORU Yellow 03/29/2024    CLARITYU Clear 03/29/2024    SPECGRAV >=1.030 03/29/2024    PHUR 5.5 03/29/2024    PHUR 5.0 03/03/2016    LEUKOCYTESUR Trace (A) 03/29/2024    NITRITE Negative 03/29/2024    GLUCOSEU Negative 03/29/2024    KETONESU Negative " "03/29/2024    BILIRUBINUR Negative 03/29/2024    BLOODU Negative 03/29/2024   ,   Urine Culture:   Lab Results   Component Value Date    URINECX (A) 03/07/2022     >100,000 cfu/ml Beta Hemolytic Streptococcus Group B    URINECX 30,000-39,000 cfu/ml 03/07/2022   ,   Wound Culure:  No results found for: \"WOUNDCULT\"    Imaging Studies: I have personally reviewed pertinent films in PACS    "

## 2024-04-03 NOTE — ASSESSMENT & PLAN NOTE
"Renal artery duplex noting: \"severe left renal artery stenosis at the aorto-renal junction...Moderate disease more proximally is seen as well\".  Vascular surgery following  Transferred to the [facility] for IR guided angiogram with possible angioplasty tomorrow  BP monitoring  "

## 2024-04-03 NOTE — CASE MANAGEMENT
Case Management Discharge Planning Note    Patient name Laya Brooks  Location /-01 MRN [id number]  : 1945 Date 4/3/2024       Current Admission Date: 2024  Current Admission Diagnosis:Acute renal failure superimposed on chronic kidney disease  (HCC)   Patient Active Problem List    Diagnosis Date Noted    Goals of care, counseling/discussion 2024    Oral candidiasis 2024    Dysphagia 2024    Renal artery stenosis (HCC) 2024    Elevated d-dimer 2024    Acute respiratory failure with hypoxia ([facility]) 2024    Toxic metabolic encephalopathy 2024    Acute renal failure superimposed on chronic kidney disease  ([facility]) 2024    Acute diastolic congestive heart failure ([facility]) 2024    Hyponatremia 2024    COPD (chronic obstructive pulmonary disease) ([facility]) 2024    Tobacco use 2024    Wheezing 2024    S/P right coronary artery (RCA) stent placement 2024    Celiac artery stenosis ([facility]) 2023    CAD (coronary artery disease) 08/10/2022    Weight loss, non-intentional 2022    Hypervitaminosis D 2022    Renovascular hypertension 2022    Intracranial atherosclerosis 2022    Atrophy of right kidney 2022    Stage 3a chronic kidney disease (HCC) 2022    Renal vascular disease 2022    Shortness of breath 2022    Abnormal echocardiogram 2022    CVA (cerebral vascular accident) ([facility]) 2022    Thrombocytopenia ([facility]) 2022    Leukopenia 2022    Neutropenia ([facility]) 2022    Hyperphosphatemia 2022    Hypercholesterolemia 2022    Carotid artery stenosis 2022    Nocturnal hypoxia 2022    History of CVA (cerebrovascular accident) 2022    Hypertensive emergency 2022    Malnutrition ([facility]) 2022    Premature atrial complexes 2022    Dyslipidemia 2015      LOS (days): 1  Geometric Mean LOS (GMLOS) (days):   Days to  GMLOS:     OBJECTIVE:  Risk of Unplanned Readmission Score: 24.01     Current admission status: Inpatient   Preferred Pharmacy:   [id number] - [place] - [address], [address]  [address], [address]  [place] [address]  Phone: [phone] Fax: [phone]    [id number] - [place], [place] - [address]  [address] [facility] [address]  Phone: [phone] Fax: [phone]    [id number] - [place] - [address]  [address] [address]  Phone: [phone] Fax: [phone]    Primary Care Provider: Joana Shields DO    Primary Insurance: MEDICARE  Secondary Insurance: [facility]    DISCHARGE DETAILS:  Pt was transferred from [facility] for IR procedure.  See CM assessment from [facility] on 3/25/24.  Pt had already been accepted to [facility] upon DC.  Made another referral via [facility] to [facility] to let them know pt is now at [facility].  MADe pt aware of same.

## 2024-04-03 NOTE — NURSING NOTE
Room air; sats stable, becomes mildly dyspnic with exertion(ambulated to BR). Voided 100cc kristal urine. Denies pain; RIJ Hemocath intact with ports capped.

## 2024-04-03 NOTE — OCCUPATIONAL THERAPY NOTE
Occupational Therapy Evaluation     Patient Name: Laya Brooks  Today's Date: 4/3/2024  Problem List  Principal Problem:    Acute renal failure superimposed on chronic kidney disease  (HCC)  Active Problems:    Renovascular hypertension    COPD (chronic obstructive pulmonary disease) (HCC)    Dysphagia    Renal artery stenosis (HCC)    Past Medical History  Past Medical History:   Diagnosis Date    CAD (coronary artery disease)     Cardiac disease     Hypercholesteremia     Hyperlipidemia     Hypertension     Hypertension     Renal disorder      Past Surgical History  Past Surgical History:   Procedure Laterality Date    CORONARY ANGIOPLASTY WITH STENT PLACEMENT      IR TEMPORARY DIALYSIS CATHETER PLACEMENT  3/26/2024    WRIST SURGERY             04/03/24 1131   OT Last Visit   OT Visit Date 04/03/24   Note Type   Note type Evaluation   Additional Comments Nsg staff verbally cleared pt for OT evaluation.  Pt received  supine in bed on this date reporting no pain + is agreeable to OT session @ this time. @ exit, pt remains in  supine in bed c all lines intact, all needs met, call bell in hand + nsg aware of location/disposition.   Pain Assessment   Pain Assessment Tool 0-10   Pain Score No Pain   Restrictions/Precautions   Weight Bearing Precautions Per Order No   Other Precautions Chair Alarm;Bed Alarm;Fall Risk   Home Living   Type of Home House   Home Layout One level   Bathroom Shower/Tub Tub/shower unit   Bathroom Toilet Standard   Bathroom Accessibility Accessible   Home Equipment Walker   Prior Function   Level of [place] Independent with ADLs;Independent with functional mobility;Needs assistance with IADLS   Lives With Spouse   Receives Help From Family   Falls in the last 6 months 1 to 4  (2x)   Vocational Retired   Lifestyle   Autonomy Pt lives in  1 story home c  + @ baseline, performs ADLs  I'ly, IADLs with A + fxl mobility I'ly with RW.   Reciprocal Relationships    Service to  "Others Retired   Intrinsic Gratification Currently requesting to sleep   Subjective   Subjective \"I just feel so sleepy + wish we could lock that door to keep everyone out\"   ADL   Eating Assistance 7  Independent   Grooming Assistance 7  Independent   UB Bathing Assistance 5  Supervision/Setup   LB Bathing Assistance 3  Moderate Assistance   UB Dressing Assistance 5  Supervision/Setup   LB Dressing Assistance 3  Moderate Assistance   Toileting Assistance  3  Moderate Assistance   Additional Comments Given fxl performance skills + medical complexity, therapist suspecting via clinical judgement + skilled analysis; pt currently requires stated assist above to perform each area of ADL d/t limitations including: generalized muscle weakness, sitting/standing balance deficits, fxl activity tolerance limitations, difficulty performing bend/reach-anterior trunk flexion, requires external assist to complete transitional movements, decreased core strength, decreased postural control, instability in stance, compromised pulmonary status, BUE strength, and high fall risk   Bed Mobility   Supine to Sit 3  Moderate assistance   Additional items HOB elevated;Verbal cues;LE management;Increased time required;Assist x 1   Sit to Supine 4  Minimal assistance   Additional items Assist x 2   Transfers   Sit to Stand 4  Minimal assistance   Additional items Assist x 1;Increased time required;Verbal cues   Stand to Sit 4  Minimal assistance   Additional items Assist x 1;Verbal cues;Increased time required   Functional Mobility   Additional Comments Pt performed short distance ADL related fxl mobility in room c CGA  /RW simulating toileting distances.   No overt LOB demonstrated + pt denies pain /  moderate SOB/ denies dizziness during transitional movements. Reports feeling unsteady on BLE. F safety awareness noted while navigating t/o room. Safely returns to bed for remainder of session.  (Significant dyspnea observed. PLB techniques " provided to rebound from 84% SPO2; successful to 89%. Nsg notified.)   Balance   Static Sitting Fair +   Dynamic Sitting Fair   Static Standing Fair -   Dynamic Standing Fair -   Ambulatory Fair -   Activity Tolerance   Activity Tolerance Patient limited by fatigue   Medical Staff Made Aware PT/OT co-eval on this date d/t medical complexity, ambulatory dysfunction c high fall risk, various impeding lines + requirement for skilled interdisciplinary analysis of appropriate d/c recommendations. PT/OT POC/goals I'ly determined per respective discipline. (Nasreen)   Nurse Made Aware Medical staff made aware of current fxn, recommendations for d/c planning, fall risk + pt's location upon exit. (Charissa)   RUE Assessment   RUE Assessment WFL  (3+/5)   LUE Assessment   LUE Assessment WFL  (3+/5)   Hand Function   Gross Motor Coordination Functional   Fine Motor Coordination Functional   Vision-Basic Assessment   Current Vision Wears glasses for distance only   Psychosocial   Psychosocial (WDL) WDL   Patient Behaviors/Mood   (Fatiged)   Cognition   Arousal/Participation Lethargic   Attention Attends with cues to redirect   Orientation Level Oriented X4;Oriented to person;Oriented to place;Oriented to time;Oriented to situation   Memory Decreased long term memory;Decreased short term memory;Decreased recall of recent events   Following Commands Follows one step commands without difficulty   Assessment   Limitation Decreased ADL status;Decreased UE ROM;Decreased UE strength;Decreased Safe judgement during ADL;Decreased endurance;Decreased self-care trans;Decreased high-level ADLs   Assessment Patient is a 79 y.o. female seen for OT evaluation s/p admit to  [facility] on 4/2/2024 w/Acute renal failure superimposed on chronic kidney disease  (HCC) + commorbidities/PMHx (as listed in medical record) affecting patient's functional performance c ADL tasks at time of assessment. OT orders placed for evaluation and treatment to  assess pt's ADLs, cognitive status + performance during functional tasks in order to formulate appropriate d/c recommendations.     Therapist performed at least two patient identifiers during session including name and wristband. Personal factors affecting patient at time of initial evaluation include: limited home support, inability to ambulate household distances, and decreased initiation and engagement.   Pt's clinical presentation is currently unstable/unpredictable given new onset deficits that effect pt's occupational performance and ability to safely return to OF including decrease activity tolerance, decrease standing balance, decrease sitting balance, decrease performance during ADL tasks, decrease UB MS, decrease generalized strength, decrease activity engagement, and decrease performance during functional transfers combined with medical complications of hypertension , abnormal renal lab values, low SpO2 values, and need for input for mobility technique/safety. This evaluation required an extensive review of medical and/or therapy records and additional review of physical, cognitive and psychosocial history related to functional performance. Based upon functional performance deficits and assessments, this evaluation has been identified as a  high complexity evaluation.      Patient to benefit from continued Occupational Therapy treatment while in the hospital to address aforementioned deficits and maximize level of functional independence with ADLs and functional mobility. Pt currently requires A to facilitate appropriate d/c plan. From OT standpoint, recommendation at time of d/c would be Short Term Rehab.   Goals   Patient Goals To sleep   Plan   Treatment Interventions ADL retraining;Functional transfer training;UE strengthening/ROM;Endurance training;Cognitive reorientation;Patient/family training;Equipment evaluation/education;Compensatory technique education;Energy conservation;Activityengagement    Goal Expiration Date 04/13/24   OT Treatment Day 0   OT Frequency 3-5x/wk   Discharge Recommendation   Rehab Resource Intensity Level, OT II (Moderate Resource Intensity)   AM-PAC Daily Activity Inpatient   Lower Body Dressing 2   Bathing 2   Toileting 2   Upper Body Dressing 3   Grooming 4   Eating 4   Daily Activity Raw Score 17   Daily Activity Standardized Score (Calc for Raw Score >=11) 37.26   AM-PAC Applied Cognition Inpatient   Following a Speech/Presentation 4   Understanding Ordinary Conversation 3   Taking Medications 2   Remembering Where Things Are Placed or Put Away 2   Remembering List of 4-5 Errands 2   Taking Care of Complicated Tasks 2   Applied Cognition Raw Score 15   Applied Cognition Standardized Score 33.54   Barthel Index   Feeding 10   Bathing 0   Grooming Score 5   Dressing Score 5   Bladder Score 5   Bowels Score 5   Toilet Use Score 5   Transfers (Bed/Chair) Score 10   Mobility (Level Surface) Score 0   Stairs Score 0   Barthel Index Score 45   End of Consult   Patient Position at End of Consult Supine       The patient's raw score on the AM-PAC Daily Activity inpatient short form is 17, standardized score is 37.26, less than 39.4. Patients at this level are likely to benefit from DC to post-acute rehabilitation services. Please refer to the recommendation of the Occupational Therapist for safe DC planning.        GOALS:    *ADL transfers with (I) for inc'd independence with ADLs/purposeful tasks    *UB ADL with (I) for inc'd independence with self cares    *LB ADL with Min (A) using AE prn for inc'd independence with self cares    *Toileting with Min (A) for clothing management and hygiene for return to PLOF with personal care    *Increase stand tolerance x 2  m for inc'd tolerance with standing purposeful tasks    *Participate in 10m UE therex to increase overall stamina/activity tolerance for purposeful tasks    *Bed mobility- Min (A) for inc'd independence to manage own comfort and  initiate EOB & OOB purposeful tasks    *Patient will verbalize 3 safety awareness/ principles to prevent falls in the home setting.     *Patient will verbalize and demonstrate use of energy conservation/deep breathing techniques and work simplification skills during functional activities with no verbal cues.     *Patient will increase OOB/sitting tolerance to 2-4 hours per day to increase participation in self-care and leisure tasks with no s/s of exertion.     *Patient will engage in ongoing cognitive assessment to assist with safe discharge planning/recommendations.       Dionne Perez, OTR/L

## 2024-04-03 NOTE — PLAN OF CARE
Problem: PHYSICAL THERAPY ADULT  Goal: Performs mobility at highest level of function for planned discharge setting.  See evaluation for individualized goals.  Description: Treatment/Interventions: Functional transfer training, Elevations, Therapeutic exercise, Endurance training, Gait training, Bed mobility, Equipment eval/education  Equipment Recommended: Walker       See flowsheet documentation for full assessment, interventions and recommendations.  Outcome: Progressing  Note: Prognosis: Fair     Assessment: Pt is 79 y.o. female seen for PT evaluation s/p admit to [facility] on 4/2/2024 w/ Acute renal failure superimposed on chronic kidney disease  (HCC). PT consulted to assess pt's functional mobility and d/c needs. Order placed for PT eval and tx, w/ up as tolerated order. Pt agreeable to PT  session upon arrival, pt found supine in bed.  PTA, pt was independent w/ all functional mobility w/ no Ad, has 1 ADONIS, lives w/ spouse in 1 level home, and retired.  Pt to benefit from continued PT tx to address deficits and maximize level of functional independent mobility and consistency. Upon conclusion pt  supine in bed. Complexity: Comorbidities affecting pt's physical performance at time of assessment include: COPD, htn, and CAD. Personal factors affecting pt at time of IE include: advanced age, limited mobility, and steps to enter home. Please find objective findings from PT assessment regarding body systems outlined above with impairments and limitations including impaired balance, decreased endurance, gait deviations, decreased activity tolerance, decreased functional mobility tolerance, and fall risk.  Pt's clinical presentation is currently unstable/unpredictable seen in pt's presentation of abnormal renal values, abnormal H&H, abnormal WBC count, abnormal calcium levels, low SaO2 levels, new onset of O2 use, and multiple readmissions. The patient's AM-PAC Basic Mobility Inpatient Short Form Raw Score  is 17.  Based on patient presentations and impairments, pt would most appropriately benefit from Level 2 resource intensity upon discharge. Please also refer to the recommendation of the Physical Therapist for safe discharge planning. RN verbalized pt appropriate for PT session. Pt seen as a co-eval with OT due to the patient's co-morbidities, clinically unstable presentation, and present impairments which are a regression from the patient's baseline.  Barriers to Discharge: Inaccessible home environment, Decreased caregiver support     Rehab Resource Intensity Level, PT: II (Moderate Resource Intensity)    See flowsheet documentation for full assessment.

## 2024-04-03 NOTE — PROGRESS NOTES
"[facility]  Progress Note  Name: Laya Brooks I  MRN: [id number]  Unit/Bed#: -01 I Date of Admission: 4/2/2024   Date of Service: 4/3/2024 I Hospital Day: 1      Assessment & Plan:    * Acute renal failure superimposed on chronic kidney disease  (HCC)  Assessment & Plan  Patient admitted to the [facility] and noted to have SEEMA superimposed on CKD on that admission which worsened   Nephrology followed and ultimately initiated on hemodialysis on 3/26 -> currently held as renal function transiently improved  Limit/avoid nephrotoxins as possible  Monitor renal function and urine output    Renal artery stenosis (HCC)  Assessment & Plan  Renal artery duplex noting: \"severe left renal artery stenosis at the aorto-renal junction...Moderate disease more proximally is seen as well\".  Vascular surgery following  Transferred to the [facility] for IR guided angiogram with possible angioplasty tomorrow  BP monitoring    COPD (chronic obstructive pulmonary disease) ([facility])  Assessment & Plan  Initially admitted to the [facility] for COPD exacerbation  Currently on a Prednisone taper - c/w Pulmicort/Perforomist and nebulizer treatments    Renovascular hypertension  Assessment & Plan  Continue Hydralazine/Procardia XL - additional PRN IV Labetalol on board for BP spikes  Low sodium restriction    CAD (coronary artery disease)  Assessment & Plan  Status post prior stenting  Continue Plavix/statin    Severe protein-calorie malnutrition (HCC)  Assessment & Plan  BMI of 20.58 in the setting of decreased appetite/oral intake and evidence of muscle wasting/atrophy and exam  Appreciate speech therapy input with advancement of diet to a regular texture with thin liquids  Continue nutritional supplementation with meals    Bicytopenia  Assessment & Plan  Anemia/thrombocytopenia present   Monitor CBC and transfuse as necessary      DVT Prophylaxis:  Heparin SC    AM-PAC Basic Mobility:  Basic Mobility " Inpatient Raw Score: 17  -HL Achieved: 7: Walk 25 feet or more  -HL Goal: 5: Stand one or more mins    Patient Centered Rounds:  I have performed bedside rounds and discussed plan of care with nursing today.  Discussions with Specialists or Other Care Team Provider:  see above assessments if applicable    Education and Discussions with Family / Patient:  Patient, along with , at bedside today    Time Spent for Care:  35 minutes. More than 50% of total time spent on counseling and coordination of care, on one or more of the following: performing physical exam; counseling and coordination of care, obtaining or reviewing history, documenting in the medical record, reviewing/ordering tests/medications/procedures, and communicating with other healthcare professionals.    Current Length of Stay: 1 day(s)  Current Patient Status: Inpatient   Certification Statement:  Patient will continue to require additional hospital stay due to assessments as noted above.    Code Status: Level 1 - Full Code        Subjective:     Encountered earlier in the day.  Remains weak and fatigued.  Awaiting IR intervention tomorrow.  Still complains of decreased appetite.        Objective:     Vitals:   Temp (24hrs), Av.8 °F (36.6 °C), Min:97.6 °F (36.4 °C), Max:98.1 °F (36.7 °C)    Temp:  [97.6 °F (36.4 °C)-98.1 °F (36.7 °C)] 98.1 °F (36.7 °C)  HR:  [] 78  Resp:  [14-20] 18  BP: (140-167)/(61-87) 140/61  SpO2:  [91 %-98 %] 93 %  Body mass index is 20.58 kg/m².     Input and Output Summary (last 24 hours):       Intake/Output Summary (Last 24 hours) at 4/3/2024 1715  Last data filed at 4/3/2024 1648  Gross per 24 hour   Intake 920 ml   Output 300 ml   Net 620 ml       Physical Exam:     GENERAL Weak/fatigued   HEAD   Normocephalic - atraumatic   EYES Nonicteric   MOUTH   Mucosa moist   NECK   Supple - full range of motion   CARDIAC Rate controlled   PULMONARY Mildly diminished at the bases but fairly clear otherwise -  resolving wheezing   ABDOMEN Nontender/nondistended   MUSCULOSKELETAL   Motor strength/range of motion mildly deconditioned   NEUROLOGIC   Alert/oriented at baseline   SKIN   Chronic wrinkles/blemishes    PSYCHIATRIC   Mood/affect stable         Additional Data:     Labs & Recent Cultures:    Results from last 7 days   Lab Units 04/03/24  0614   WBC Thousand/uL 3.66*   HEMOGLOBIN g/dL 9.8*   HEMATOCRIT % 30.3*   PLATELETS Thousands/uL 66*   NEUTROS PCT % 77*   LYMPHS PCT % 13*   MONOS PCT % 9   EOS PCT % 0     Results from last 7 days   Lab Units 04/03/24  0435   POTASSIUM mmol/L 4.9   CHLORIDE mmol/L 106   CO2 mmol/L 22   BUN mg/dL 50*   CREATININE mg/dL 1.57*   CALCIUM mg/dL 7.9*                                 Lines/Drains:  Invasive Devices       Peripheral Intravenous Line  Duration             Peripheral IV 03/30/24 Dorsal (posterior);Left Forearm 4 days    Peripheral IV 04/03/24 Right Antecubital <1 day              Hemodialysis Catheter  Duration             HD Temporary Double Catheter 8 days                      Last 24 Hours Medication List:   Current Facility-Administered Medications   Medication Dose Route Frequency Provider Last Rate    acetaminophen  650 mg Oral Q4H PRN JUAN Sparsk-JACOBO      albuterol  2.5 mg Nebulization Q4H PRN JUAN Sparks-JACOBO      ALPRAZolam  0.5 mg Oral TID PRN JUAN Sparks-JACOBO      aluminum-magnesium hydroxide-simethicone  30 mL Oral Q4H PRN JUAN Sparks-JACOBO      atorvastatin  40 mg Oral Daily With Dinner JUAN Sparks-JACOBO      budesonide  0.5 mg Nebulization Q12H JUAN Sparks-JACOBO      clopidogrel  75 mg Oral Daily JUAN Sparks-JACOBO      famotidine  10 mg Oral Daily JUAN Sparks-JACOBO      formoterol  20 mcg Nebulization Q12H JUAN Sparks-JACOBO      heparin (porcine)  5,000 Units Subcutaneous Q8H JHON JUAN Sparks-JACOBO      hydrALAZINE  25 mg Oral Q8H [facility] JUAN Sparks-JACOBO      ipratropium  0.5 mg Nebulization TID Aries Morgan PA-C       labetalol  10 mg Intravenous Q6H PRN Aries Morgan PA-C      levalbuterol  1.25 mg Nebulization TID Aries Morgan PA-C      NIFEdipine  60 mg Oral Daily Aries Morgan PA-C      nystatin  500,000 Units Swish & Swallow 4x Daily Aries Morgan PA-C      ondansetron  4 mg Intravenous Q6H PRN Aries Morgan PA-C      polyethylene glycol  17 g Oral BID Francoise Felix MD      predniSONE  30 mg Oral Daily Aries Morgan PA-C      Followed by    [START ON 4/5/2024] predniSONE  20 mg Oral Daily Aries Morgan PA-C      Followed by    [START ON 4/8/2024] predniSONE  10 mg Oral Daily Aries Morgan PA-C      senna  1 tablet Oral BID Francoise Felix MD      sodium chloride  1 spray Each Nare Q1H PRN JOHN Sparks MD   Hospitalist - [facility] Internal Medicine        ** Please Note: This note is constructed using a voice recognition dictation system.  An occasional wrong word/phrase or “sound-a-like” substitution may have been picked up by dictation device due to the inherent limitations of voice recognition software.  Read the chart carefully and recognize, using reasonable context, where substitutions may have occurred.**

## 2024-04-03 NOTE — PLAN OF CARE
Problem: METABOLIC, FLUID AND ELECTROLYTES - ADULT  Goal: Electrolytes maintained within normal limits  Description: INTERVENTIONS:  - Monitor labs and assess patient for signs and symptoms of electrolyte imbalances  - Administer electrolyte replacement as ordered  - Monitor response to electrolyte replacements, including repeat lab results as appropriate  - Instruct patient on fluid and nutrition as appropriate  Outcome: Progressing  Goal: Fluid balance maintained  Description: INTERVENTIONS:  - Monitor labs   - Monitor I/O and WT  - Instruct patient on fluid and nutrition as appropriate  - Assess for signs & symptoms of volume excess or deficit  Outcome: Progressing  Goal: Glucose maintained within target range  Description: INTERVENTIONS:  - Monitor Blood Glucose as ordered  - Assess for signs and symptoms of hyperglycemia and hypoglycemia  - Administer ordered medications to maintain glucose within target range  - Assess nutritional intake and initiate nutrition service referral as needed  Outcome: Progressing   Labs as ordered  Hemo schedule as ordered

## 2024-04-04 ENCOUNTER — APPOINTMENT (INPATIENT)
Dept: RADIOLOGY | Facility: HOSPITAL | Age: 79
DRG: 698 | End: 2024-04-04
Payer: MEDICARE

## 2024-04-04 ENCOUNTER — APPOINTMENT (INPATIENT)
Dept: INTERVENTIONAL RADIOLOGY/VASCULAR | Facility: HOSPITAL | Age: 79
DRG: 698 | End: 2024-04-04
Attending: RADIOLOGY
Payer: MEDICARE

## 2024-04-04 PROBLEM — J96.11 CHRONIC RESPIRATORY FAILURE WITH HYPOXIA (HCC): Status: ACTIVE | Noted: 2024-04-04

## 2024-04-04 PROBLEM — L76.82 BLEEDING AT INSERTION SITE: Status: ACTIVE | Noted: 2024-04-04

## 2024-04-04 PROBLEM — N18.31 ACUTE RENAL FAILURE SUPERIMPOSED ON STAGE 3A CHRONIC KIDNEY DISEASE (HCC): Status: ACTIVE | Noted: 2024-03-24

## 2024-04-04 PROBLEM — R06.89 ACUTE RESPIRATORY INSUFFICIENCY: Status: ACTIVE | Noted: 2024-04-04

## 2024-04-04 LAB
2HR DELTA HS TROPONIN: -2 NG/L
ANION GAP SERPL CALCULATED.3IONS-SCNC: 10 MMOL/L (ref 4–13)
ANION GAP SERPL CALCULATED.3IONS-SCNC: 8 MMOL/L (ref 4–13)
APTT PPP: 26 SECONDS (ref 23–37)
ATRIAL RATE: 76 BPM
BASOPHILS # BLD AUTO: 0 THOUSANDS/ÂΜL (ref 0–0.1)
BASOPHILS NFR BLD AUTO: 0 % (ref 0–1)
BUN SERPL-MCNC: 46 MG/DL (ref 5–25)
BUN SERPL-MCNC: 46 MG/DL (ref 5–25)
CA-I BLD-SCNC: 1.11 MMOL/L (ref 1.12–1.32)
CALCIUM SERPL-MCNC: 7.9 MG/DL (ref 8.4–10.2)
CALCIUM SERPL-MCNC: 8 MG/DL (ref 8.4–10.2)
CARDIAC TROPONIN I PNL SERPL HS: 22 NG/L
CARDIAC TROPONIN I PNL SERPL HS: 24 NG/L
CHLORIDE SERPL-SCNC: 105 MMOL/L (ref 96–108)
CHLORIDE SERPL-SCNC: 106 MMOL/L (ref 96–108)
CO2 SERPL-SCNC: 19 MMOL/L (ref 21–32)
CO2 SERPL-SCNC: 22 MMOL/L (ref 21–32)
CREAT SERPL-MCNC: 1.44 MG/DL (ref 0.6–1.3)
CREAT SERPL-MCNC: 1.44 MG/DL (ref 0.6–1.3)
EOSINOPHIL # BLD AUTO: 0 THOUSAND/ÂΜL (ref 0–0.61)
EOSINOPHIL NFR BLD AUTO: 0 % (ref 0–6)
ERYTHROCYTE [DISTWIDTH] IN BLOOD BY AUTOMATED COUNT: 14.4 % (ref 11.6–15.1)
ERYTHROCYTE [DISTWIDTH] IN BLOOD BY AUTOMATED COUNT: 14.5 % (ref 11.6–15.1)
FERRITIN SERPL-MCNC: 102 NG/ML (ref 11–307)
FOLATE SERPL-MCNC: 5.8 NG/ML
GFR SERPL CREATININE-BSD FRML MDRD: 34 ML/MIN/1.73SQ M
GFR SERPL CREATININE-BSD FRML MDRD: 34 ML/MIN/1.73SQ M
GLUCOSE SERPL-MCNC: 125 MG/DL (ref 65–140)
GLUCOSE SERPL-MCNC: 178 MG/DL (ref 65–140)
GLUCOSE SERPL-MCNC: 261 MG/DL (ref 65–140)
HCT VFR BLD AUTO: 30.5 % (ref 34.8–46.1)
HCT VFR BLD AUTO: 34.5 % (ref 34.8–46.1)
HGB BLD-MCNC: 10.1 G/DL (ref 11.5–15.4)
HGB BLD-MCNC: 11.2 G/DL (ref 11.5–15.4)
IMM GRANULOCYTES # BLD AUTO: 0.02 THOUSAND/UL (ref 0–0.2)
IMM GRANULOCYTES NFR BLD AUTO: 0 % (ref 0–2)
INR PPP: 1.1 (ref 0.84–1.19)
IRON SATN MFR SERPL: 38 % (ref 15–50)
IRON SERPL-MCNC: 87 UG/DL (ref 50–212)
LYMPHOCYTES # BLD AUTO: 0.7 THOUSANDS/ÂΜL (ref 0.6–4.47)
LYMPHOCYTES NFR BLD AUTO: 12 % (ref 14–44)
MAGNESIUM SERPL-MCNC: 1.7 MG/DL (ref 1.9–2.7)
MCH RBC QN AUTO: 29.2 PG (ref 26.8–34.3)
MCH RBC QN AUTO: 29.7 PG (ref 26.8–34.3)
MCHC RBC AUTO-ENTMCNC: 32.5 G/DL (ref 31.4–37.4)
MCHC RBC AUTO-ENTMCNC: 33.1 G/DL (ref 31.4–37.4)
MCV RBC AUTO: 90 FL (ref 82–98)
MCV RBC AUTO: 90 FL (ref 82–98)
MONOCYTES # BLD AUTO: 0.51 THOUSAND/ÂΜL (ref 0.17–1.22)
MONOCYTES NFR BLD AUTO: 9 % (ref 4–12)
NEUTROPHILS # BLD AUTO: 4.73 THOUSANDS/ÂΜL (ref 1.85–7.62)
NEUTS SEG NFR BLD AUTO: 79 % (ref 43–75)
NRBC BLD AUTO-RTO: 0 /100 WBCS
P AXIS: 76 DEGREES
PHOSPHATE SERPL-MCNC: 3.1 MG/DL (ref 2.3–4.1)
PLATELET # BLD AUTO: 81 THOUSANDS/UL (ref 149–390)
PLATELET # BLD AUTO: 99 THOUSANDS/UL (ref 149–390)
PMV BLD AUTO: 11.5 FL (ref 8.9–12.7)
PMV BLD AUTO: 11.9 FL (ref 8.9–12.7)
POTASSIUM SERPL-SCNC: 4.3 MMOL/L (ref 3.5–5.3)
POTASSIUM SERPL-SCNC: 4.6 MMOL/L (ref 3.5–5.3)
PR INTERVAL: 80 MS
PROTHROMBIN TIME: 14.4 SECONDS (ref 11.6–14.5)
QRS AXIS: 72 DEGREES
QRSD INTERVAL: 70 MS
QT INTERVAL: 404 MS
QTC INTERVAL: 454 MS
RBC # BLD AUTO: 3.4 MILLION/UL (ref 3.81–5.12)
RBC # BLD AUTO: 3.84 MILLION/UL (ref 3.81–5.12)
SODIUM SERPL-SCNC: 135 MMOL/L (ref 135–147)
SODIUM SERPL-SCNC: 135 MMOL/L (ref 135–147)
T WAVE AXIS: 57 DEGREES
TIBC SERPL-MCNC: 232 UG/DL (ref 250–450)
UIBC SERPL-MCNC: 145 UG/DL (ref 155–355)
VENTRICULAR RATE: 76 BPM
VIT B12 SERPL-MCNC: 249 PG/ML (ref 180–914)
WBC # BLD AUTO: 5.96 THOUSAND/UL (ref 4.31–10.16)
WBC # BLD AUTO: 7.93 THOUSAND/UL (ref 4.31–10.16)

## 2024-04-04 PROCEDURE — C1769 GUIDE WIRE: HCPCS

## 2024-04-04 PROCEDURE — 71045 X-RAY EXAM CHEST 1 VIEW: CPT

## 2024-04-04 PROCEDURE — 82746 ASSAY OF FOLIC ACID SERUM: CPT | Performed by: NURSE PRACTITIONER

## 2024-04-04 PROCEDURE — 99233 SBSQ HOSP IP/OBS HIGH 50: CPT | Performed by: INTERNAL MEDICINE

## 2024-04-04 PROCEDURE — 99152 MOD SED SAME PHYS/QHP 5/>YRS: CPT

## 2024-04-04 PROCEDURE — 80048 BASIC METABOLIC PNL TOTAL CA: CPT | Performed by: NURSE PRACTITIONER

## 2024-04-04 PROCEDURE — 82607 VITAMIN B-12: CPT | Performed by: NURSE PRACTITIONER

## 2024-04-04 PROCEDURE — 82330 ASSAY OF CALCIUM: CPT | Performed by: NURSE PRACTITIONER

## 2024-04-04 PROCEDURE — C1887 CATHETER, GUIDING: HCPCS

## 2024-04-04 PROCEDURE — 82948 REAGENT STRIP/BLOOD GLUCOSE: CPT

## 2024-04-04 PROCEDURE — 85027 COMPLETE CBC AUTOMATED: CPT | Performed by: NURSE PRACTITIONER

## 2024-04-04 PROCEDURE — 93005 ELECTROCARDIOGRAM TRACING: CPT

## 2024-04-04 PROCEDURE — 83550 IRON BINDING TEST: CPT | Performed by: NURSE PRACTITIONER

## 2024-04-04 PROCEDURE — 83735 ASSAY OF MAGNESIUM: CPT | Performed by: NURSE PRACTITIONER

## 2024-04-04 PROCEDURE — 85610 PROTHROMBIN TIME: CPT | Performed by: NURSE PRACTITIONER

## 2024-04-04 PROCEDURE — 84484 ASSAY OF TROPONIN QUANT: CPT | Performed by: NURSE PRACTITIONER

## 2024-04-04 PROCEDURE — C1894 INTRO/SHEATH, NON-LASER: HCPCS

## 2024-04-04 PROCEDURE — 84100 ASSAY OF PHOSPHORUS: CPT | Performed by: NURSE PRACTITIONER

## 2024-04-04 PROCEDURE — 80048 BASIC METABOLIC PNL TOTAL CA: CPT | Performed by: INTERNAL MEDICINE

## 2024-04-04 PROCEDURE — C1725 CATH, TRANSLUMIN NON-LASER: HCPCS

## 2024-04-04 PROCEDURE — 83540 ASSAY OF IRON: CPT | Performed by: NURSE PRACTITIONER

## 2024-04-04 PROCEDURE — 77002 NEEDLE LOCALIZATION BY XRAY: CPT

## 2024-04-04 PROCEDURE — 85730 THROMBOPLASTIN TIME PARTIAL: CPT | Performed by: NURSE PRACTITIONER

## 2024-04-04 PROCEDURE — 94640 AIRWAY INHALATION TREATMENT: CPT

## 2024-04-04 PROCEDURE — 36251 INS CATH REN ART 1ST UNILAT: CPT | Performed by: RADIOLOGY

## 2024-04-04 PROCEDURE — C1760 CLOSURE DEV, VASC: HCPCS

## 2024-04-04 PROCEDURE — 99153 MOD SED SAME PHYS/QHP EA: CPT

## 2024-04-04 PROCEDURE — 76937 US GUIDE VASCULAR ACCESS: CPT

## 2024-04-04 PROCEDURE — 99232 SBSQ HOSP IP/OBS MODERATE 35: CPT | Performed by: SURGERY

## 2024-04-04 PROCEDURE — 93010 ELECTROCARDIOGRAM REPORT: CPT | Performed by: INTERNAL MEDICINE

## 2024-04-04 PROCEDURE — 85025 COMPLETE CBC W/AUTO DIFF WBC: CPT | Performed by: INTERNAL MEDICINE

## 2024-04-04 PROCEDURE — 76937 US GUIDE VASCULAR ACCESS: CPT | Performed by: RADIOLOGY

## 2024-04-04 PROCEDURE — B417YZZ FLUOROSCOPY OF LEFT RENAL ARTERY USING OTHER CONTRAST: ICD-10-PCS | Performed by: RADIOLOGY

## 2024-04-04 PROCEDURE — 94760 N-INVAS EAR/PLS OXIMETRY 1: CPT

## 2024-04-04 PROCEDURE — 82728 ASSAY OF FERRITIN: CPT | Performed by: NURSE PRACTITIONER

## 2024-04-04 RX ORDER — LIDOCAINE HYDROCHLORIDE 10 MG/ML
INJECTION, SOLUTION EPIDURAL; INFILTRATION; INTRACAUDAL; PERINEURAL AS NEEDED
Status: COMPLETED | OUTPATIENT
Start: 2024-04-04 | End: 2024-04-04

## 2024-04-04 RX ORDER — CHLORHEXIDINE GLUCONATE ORAL RINSE 1.2 MG/ML
15 SOLUTION DENTAL EVERY 12 HOURS SCHEDULED
Status: DISCONTINUED | OUTPATIENT
Start: 2024-04-04 | End: 2024-04-05 | Stop reason: HOSPADM

## 2024-04-04 RX ORDER — FENTANYL CITRATE 50 UG/ML
INJECTION, SOLUTION INTRAMUSCULAR; INTRAVENOUS AS NEEDED
Status: COMPLETED | OUTPATIENT
Start: 2024-04-04 | End: 2024-04-04

## 2024-04-04 RX ORDER — SODIUM CHLORIDE 9 MG/ML
150 INJECTION, SOLUTION INTRAVENOUS ONCE
Status: DISCONTINUED | OUTPATIENT
Start: 2024-04-04 | End: 2024-04-04

## 2024-04-04 RX ORDER — HEPARIN SODIUM 1000 [USP'U]/ML
INJECTION, SOLUTION INTRAVENOUS; SUBCUTANEOUS AS NEEDED
Status: COMPLETED | OUTPATIENT
Start: 2024-04-04 | End: 2024-04-04

## 2024-04-04 RX ORDER — HYDRALAZINE HYDROCHLORIDE 25 MG/1
25 TABLET, FILM COATED ORAL EVERY 8 HOURS SCHEDULED
Status: DISCONTINUED | OUTPATIENT
Start: 2024-04-04 | End: 2024-04-05

## 2024-04-04 RX ORDER — INSULIN LISPRO 100 [IU]/ML
1-5 INJECTION, SOLUTION INTRAVENOUS; SUBCUTANEOUS
Status: DISCONTINUED | OUTPATIENT
Start: 2024-04-04 | End: 2024-04-05

## 2024-04-04 RX ORDER — SODIUM CHLORIDE 9 MG/ML
100 INJECTION, SOLUTION INTRAVENOUS CONTINUOUS
Status: DISCONTINUED | OUTPATIENT
Start: 2024-04-04 | End: 2024-04-04

## 2024-04-04 RX ORDER — HYDRALAZINE HYDROCHLORIDE 25 MG/1
50 TABLET, FILM COATED ORAL EVERY 8 HOURS SCHEDULED
Status: DISCONTINUED | OUTPATIENT
Start: 2024-04-04 | End: 2024-04-04

## 2024-04-04 RX ORDER — SODIUM CHLORIDE 9 MG/ML
150 INJECTION, SOLUTION INTRAVENOUS CONTINUOUS
Status: DISCONTINUED | OUTPATIENT
Start: 2024-04-04 | End: 2024-04-04

## 2024-04-04 RX ORDER — MAGNESIUM SULFATE HEPTAHYDRATE 40 MG/ML
2 INJECTION, SOLUTION INTRAVENOUS ONCE
Status: COMPLETED | OUTPATIENT
Start: 2024-04-04 | End: 2024-04-05

## 2024-04-04 RX ORDER — FUROSEMIDE 10 MG/ML
80 INJECTION INTRAMUSCULAR; INTRAVENOUS ONCE
Status: COMPLETED | OUTPATIENT
Start: 2024-04-04 | End: 2024-04-04

## 2024-04-04 RX ORDER — PROTAMINE SULFATE 10 MG/ML
50 INJECTION, SOLUTION INTRAVENOUS ONCE
Status: DISCONTINUED | OUTPATIENT
Start: 2024-04-04 | End: 2024-04-04

## 2024-04-04 RX ORDER — LORAZEPAM 2 MG/ML
0.25 INJECTION INTRAMUSCULAR ONCE
Status: COMPLETED | OUTPATIENT
Start: 2024-04-04 | End: 2024-04-04

## 2024-04-04 RX ORDER — SODIUM CHLORIDE 9 MG/ML
125 INJECTION, SOLUTION INTRAVENOUS CONTINUOUS
Status: DISCONTINUED | OUTPATIENT
Start: 2024-04-04 | End: 2024-04-04

## 2024-04-04 RX ORDER — IODIXANOL 270 MG/ML
INJECTION, SOLUTION INTRAVASCULAR AS NEEDED
Status: COMPLETED | OUTPATIENT
Start: 2024-04-04 | End: 2024-04-04

## 2024-04-04 RX ORDER — MIDAZOLAM HYDROCHLORIDE 2 MG/2ML
INJECTION, SOLUTION INTRAMUSCULAR; INTRAVENOUS AS NEEDED
Status: COMPLETED | OUTPATIENT
Start: 2024-04-04 | End: 2024-04-04

## 2024-04-04 RX ORDER — HYDRALAZINE HYDROCHLORIDE 20 MG/ML
INJECTION INTRAMUSCULAR; INTRAVENOUS AS NEEDED
Status: COMPLETED | OUTPATIENT
Start: 2024-04-04 | End: 2024-04-04

## 2024-04-04 RX ORDER — INSULIN LISPRO 100 [IU]/ML
1-5 INJECTION, SOLUTION INTRAVENOUS; SUBCUTANEOUS
Status: DISCONTINUED | OUTPATIENT
Start: 2024-04-05 | End: 2024-04-05

## 2024-04-04 RX ORDER — FUROSEMIDE 10 MG/ML
60 INJECTION INTRAMUSCULAR; INTRAVENOUS ONCE
Status: COMPLETED | OUTPATIENT
Start: 2024-04-04 | End: 2024-04-04

## 2024-04-04 RX ADMIN — LIDOCAINE HYDROCHLORIDE 10 ML: 10 INJECTION, SOLUTION EPIDURAL; INFILTRATION; INTRACAUDAL; PERINEURAL at 11:48

## 2024-04-04 RX ADMIN — POLYETHYLENE GLYCOL 3350 17 G: 17 POWDER, FOR SOLUTION ORAL at 21:11

## 2024-04-04 RX ADMIN — FUROSEMIDE 80 MG: 10 INJECTION, SOLUTION INTRAMUSCULAR; INTRAVENOUS at 16:54

## 2024-04-04 RX ADMIN — INSULIN LISPRO 2 UNITS: 100 INJECTION, SOLUTION INTRAVENOUS; SUBCUTANEOUS at 21:52

## 2024-04-04 RX ADMIN — PREDNISONE 30 MG: 20 TABLET ORAL at 09:02

## 2024-04-04 RX ADMIN — NIFEDIPINE 60 MG: 30 TABLET, FILM COATED, EXTENDED RELEASE ORAL at 09:03

## 2024-04-04 RX ADMIN — CLOPIDOGREL 75 MG: 75 TABLET ORAL at 09:02

## 2024-04-04 RX ADMIN — FAMOTIDINE 10 MG: 20 TABLET ORAL at 09:02

## 2024-04-04 RX ADMIN — FENTANYL CITRATE 25 MCG: 50 INJECTION INTRAMUSCULAR; INTRAVENOUS at 10:43

## 2024-04-04 RX ADMIN — ALPRAZOLAM 0.5 MG: 0.5 TABLET ORAL at 21:59

## 2024-04-04 RX ADMIN — NYSTATIN 500000 UNITS: 100000 SUSPENSION ORAL at 09:02

## 2024-04-04 RX ADMIN — HEPARIN SODIUM 5000 UNITS: 5000 INJECTION, SOLUTION INTRAVENOUS; SUBCUTANEOUS at 06:02

## 2024-04-04 RX ADMIN — ATORVASTATIN CALCIUM 40 MG: 40 TABLET, FILM COATED ORAL at 18:01

## 2024-04-04 RX ADMIN — SENNOSIDES 8.6 MG: 8.6 TABLET, FILM COATED ORAL at 09:02

## 2024-04-04 RX ADMIN — MAGNESIUM SULFATE HEPTAHYDRATE 2 G: 40 INJECTION, SOLUTION INTRAVENOUS at 20:49

## 2024-04-04 RX ADMIN — NYSTATIN 500000 UNITS: 100000 SUSPENSION ORAL at 18:01

## 2024-04-04 RX ADMIN — FUROSEMIDE 60 MG: 10 INJECTION, SOLUTION INTRAMUSCULAR; INTRAVENOUS at 10:08

## 2024-04-04 RX ADMIN — LORAZEPAM 0.25 MG: 2 INJECTION INTRAMUSCULAR; INTRAVENOUS at 15:23

## 2024-04-04 RX ADMIN — CHLORHEXIDINE GLUCONATE 15 ML: 1.2 RINSE ORAL at 15:23

## 2024-04-04 RX ADMIN — SENNOSIDES 8.6 MG: 8.6 TABLET, FILM COATED ORAL at 18:01

## 2024-04-04 RX ADMIN — HYDRALAZINE HYDROCHLORIDE 25 MG: 25 TABLET ORAL at 06:05

## 2024-04-04 RX ADMIN — CHLORHEXIDINE GLUCONATE 15 ML: 1.2 RINSE ORAL at 21:20

## 2024-04-04 RX ADMIN — MIDAZOLAM HYDROCHLORIDE 0.5 MG: 1 INJECTION, SOLUTION INTRAMUSCULAR; INTRAVENOUS at 10:43

## 2024-04-04 RX ADMIN — IODIXANOL 60 ML: 270 INJECTION, SOLUTION INTRAVASCULAR at 11:00

## 2024-04-04 RX ADMIN — FENTANYL CITRATE 25 MCG: 50 INJECTION INTRAMUSCULAR; INTRAVENOUS at 11:44

## 2024-04-04 RX ADMIN — PROTAMINE SULFATE 50 MG: 10 INJECTION, SOLUTION INTRAVENOUS at 12:09

## 2024-04-04 RX ADMIN — LEVALBUTEROL HYDROCHLORIDE 1.25 MG: 1.25 SOLUTION RESPIRATORY (INHALATION) at 19:50

## 2024-04-04 RX ADMIN — FENTANYL CITRATE 25 MCG: 50 INJECTION INTRAMUSCULAR; INTRAVENOUS at 11:05

## 2024-04-04 RX ADMIN — HEPARIN SODIUM 5000 UNITS: 1000 INJECTION INTRAVENOUS; SUBCUTANEOUS at 11:10

## 2024-04-04 RX ADMIN — NYSTATIN 500000 UNITS: 100000 SUSPENSION ORAL at 21:11

## 2024-04-04 RX ADMIN — HYDRALAZINE HYDROCHLORIDE 25 MG: 25 TABLET ORAL at 21:11

## 2024-04-04 RX ADMIN — MIDAZOLAM HYDROCHLORIDE 0.5 MG: 1 INJECTION, SOLUTION INTRAMUSCULAR; INTRAVENOUS at 11:05

## 2024-04-04 RX ADMIN — POLYETHYLENE GLYCOL 3350 17 G: 17 POWDER, FOR SOLUTION ORAL at 09:03

## 2024-04-04 RX ADMIN — LIDOCAINE HYDROCHLORIDE 20 ML: 10 INJECTION, SOLUTION EPIDURAL; INFILTRATION; INTRACAUDAL; PERINEURAL at 10:44

## 2024-04-04 RX ADMIN — HYDRALAZINE HYDROCHLORIDE 10 MG: 20 INJECTION INTRAMUSCULAR; INTRAVENOUS at 12:41

## 2024-04-04 NOTE — ASSESSMENT & PLAN NOTE
Malnutrition Findings:                                 BMI Findings:           Body mass index is 22.56 kg/m².     Continue nutritional supplementation   Nutrition consult

## 2024-04-04 NOTE — PROCEDURES
INTERVENTIONAL RADIOLOGY PROCEDURE NOTE    Date: 4/4/2024    Procedure: IR RENAL ANGIOGRAM     Preoperative diagnosis:   1. Acute renal failure superimposed on chronic kidney disease, unspecified acute renal failure type, unspecified CKD stage  (HCC)    2. Renal artery stenosis (HCC)         Postoperative diagnosis: Same.    Surgeon: Antonino Stein MD     Assistant: None. No qualified resident was available.    Blood loss: 20 cc    Specimens: None    Findings: There is a large aortic plaque.  This includes the caudal aspect of the left renal artery.  This is the typical situation we see with renal artery stenosis.  It is more a disease of the aorta, then the renal artery proper.  I was able to cross the stenosis with a 0.035 inch wire.  She was anticoagulated with heparin bolus.  I could not get a the S2, Cobra, Gant, or hockey-stick to cross.    I had a 6 Uzbek sheath at the ostium.  I could not get a glide catheter to cross.  A 4 Uzbek system would not cross.  After trying, just about everything on the shelf, I elected to stop the procedure.  Suture mediated device failed at the left groin.  She was given protamine, and heparin was reversed.    The left groin sheath was removed and manual hemostasis was obtained.    Please keep the blood pressure under 150.    If this lesion is to be treated, I think we will need an arm approach and low-profile platform leg 0.018 inch, or 0.014 inch.    Complications: None immediate.    Anesthesia: conscious sedation and local

## 2024-04-04 NOTE — ACP (ADVANCE CARE PLANNING)
Critical Care Advanced Care Planning Note  Laya Brooks 79 y.o. female MRN: [id number]  Unit/Bed#: ICU 03-01 Encounter: [id number]    Laya Brooks is a 79 y.o. female requiring critical care evaluation and advanced care planning. The patient has chronic comorbidities, including but not limited to HTN, HLD, CAD w/STEMI in 2008 s/p RCA stenting, intracranial atherosclerosis, carotid artery stenosis, prior CVA, renal artery stenosis w/R atrophic kidney, CKD3A, COPD, anxiety, which is now further complicated by the following acute conditions: acute renal failure on CKD, renal artery stenosis w/atrophic R kidney, bleeding at insertion site, bicytopenia, malnutrition, dysphagia.  Due to the severity of the patient's acute condition and/or the extent of chronic conditions, additional conversations pertaining to advanced care planning were required. Today's discussion, which was held in a face-to-face meeting, included  , Moe, and patient , and it was established that all stake holders understood the rationale for the advanced care planning. The patient was able to participate in the discussion.    Summary of Discussion:  I met with patient and patient's  at bedside to discuss GOC given chronic comorbidities and now worsening renal function. We discussed remaining FULL code vs DNR vs DNR/DNI status and what each of those mean. At this time, patient would like to remain FULL code, however if she should need to be intubated, would only want trial intubation. If trial intubation failed, then would want to be transitioned to comfort measures only.  at bedside and agreeable to this.       Total time spent, (12) minutes (1632 to 1644).      CODE STATUS: FULL CODE - Level 1  POA:    POLST:      SIGNATURE: MANOHAR Shahid  DATE: April 4, 2024  TIME: 5:04 PM

## 2024-04-04 NOTE — ASSESSMENT & PLAN NOTE
Previously taking ACE-I and CCB as outpatient - now on Procardia and scheduled Hydralazine  PRN Labetalol for SBP > 150 in setting of recent renal artery revascularization attempt

## 2024-04-04 NOTE — PROGRESS NOTES
Pt. Arrived to unit just after 1300.  Pt. Awake and alert with VSS.    Left groin IR puncture site soft with no ecchymosis. There was a scant amount of serosanguineous drainage noted under the dressing.  Pt. Had strong femoral pulse and Doppler pedal pulses.  Pt. Denied pain.  Pt. Post IR instructions reviewed as she is to lay flat with her LLE flat for four hours.  PT. Verbalized an understanding of instructions.    Skin assessment completed, pt. Had a bruise on her LLQ which Linda MENESES, from IR stated was present prior to the procedure.  Bruises noted on arms but skin otherwise intact.  Laya Howard NP also at bedside.    1400-  Frequent IR site checks completed and documented in flowsheets.  Site remains soft and non-tender with no ecchymosis.  Left pedal and posterial tibial pulses can be found with Doppler.  Left femoral pulse strong.  Pt. Has no pain or tingling and can move her foot.  No sensation loss noted in LLE.  PT. Requesting her  to be notified and to be told not to come to the hospital.  MARIELOS Howard was on the phone with her .      1500-  Pt. Complaining of left upper back pain.  Pt. Noted to be tachycardic.  L groin site did have some ecchymosis but no noted bleeding.  Pt. Also stating that the pain was radiating to her chest.  EKG done and MANOHAR Shahid called to bedside.    1530-  Pt. Log rolled and repositioned with wedges for comfort.  Pt. Given Ativan for anxiety.  Symptoms improved.      1600-  L groin site intact with ecchymosis.  Pt. Wanting to sit up and eat.      1715-  PT. Repositioned to back and HOB elevated 15 degrees.    1745-  HOB elevated to 30, groin site checked then HOB elevated to 45 degrees so that swallow evaluation could be completed.      1900-  Pt. Has no complaints but is tired.  Left groin site soft, non-tender with ecchymosis stable.  Dressing has no new drainage.  Left pedal and tibial pulses doppler only.

## 2024-04-04 NOTE — ASSESSMENT & PLAN NOTE
PFTs from 02/2024 revealed FEV1 61% - moderate obstruction  Follows with Pulmonology - recently started on Stiolto and PRN Albuterol inhalers as this is newer diagnosis  Continue Xopenex/Atrovent/Pulmicort/Performist nebs  Continue steroid taper for ? COPD exacerbation that was started at [facility]  Aggressive pulmonary hygiene

## 2024-04-04 NOTE — DISCHARGE INSTRUCTIONS
[facility]  Interventional Radiology  [phone]                 Moderate Sedation   WHAT YOU NEED TO KNOW:   Moderate sedation, or conscious sedation, is medicine used during procedures to help you feel relaxed and calm. You will be awake and able to follow directions without anxiety or pain. You will remember little to none of the procedure. You may feel tired, weak, or unsteady on your feet after you get sedation. You may also have trouble concentrating or short-term memory loss. These symptoms should go away in 24 hours or less.   DISCHARGE INSTRUCTIONS:   Call 911 or have someone else call for any of the following:   You have sudden trouble breathing.     You cannot be woken.  Seek care immediately if:   You have a severe headache or dizziness.     Your heart is beating faster than usual.  Contact your healthcare provider if:   You have a fever.     You have nausea or are vomiting for more than 8 hours after the procedure.      Your skin is itchy, swollen, or you have a rash.     You have questions or concerns about your condition or care.  Self-care:   Have someone stay with you for 24 hours. This person can drive you to errands and help you do things around the house. This person can also watch for problems.      Rest and do quiet activities for 24 hours. Do not exercise, ride a bike, or play sports. Stand up slowly to prevent dizziness and falls. Take short walks around the house with another person. Slowly return to your usual activities the next day.      Do not drive or use dangerous machines or tools for 24 hours. You may injure yourself or others. Examples include a lawnmower, saw, or drill. Do not return to work for 24 hours if you use dangerous machines or tools for work.      Do not make important decisions for 24 hours. For example, do not sign important papers or invest money.      Drink liquids as directed. Liquids help flush the sedation medicine out of your body.  Ask how much liquid to drink each day and which liquids are best for you.      Eat small, frequent meals to prevent nausea and vomiting. Start with clear liquids such as juice or broth. If you do not vomit after clear liquids, you can eat your usual foods.      Do not drink alcohol or take medicines that make you drowsy. This includes medicines that help you sleep and anxiety medicines. Ask your healthcare provider if it is safe for you to take pain medicine.  Follow up with your healthcare provider as directed: Write down your questions so you remember to ask them during your visits.   © 2017 [employer] Information is for End User's use only and may not be sold, redistributed or otherwise used for commercial purposes. All illustrations and images included in CareNotes® are the copyrighted property of [employer] or [employer].  The above information is an  only. It is not intended as medical advice for individual conditions or treatments. Talk to your doctor, nurse or pharmacist before following any medical regimen to see if it is safe and effective for you.                ARTERIOGRAM    WHAT YOU SHOULD KNOW:   An angiogram is a procedure to look at arteries in your body. Arteries are the blood vessels that carry blood from your heart to your body.     AFTER YOU LEAVE:     Self-care:   Limit activity: Rest for the remainder of the day of your procedure.Have some one with you until the next morning. Keep your arm or leg straight as much as possible.Rest as much as possible, sitting lying or reclining. Walk only to go to the bathroom, to bed or to eat. If the angiogram catheter was put in your leg, use the stairs as little as possible. No driving for 24-48 hours. No heavy lifting, >10 lbs. Or strenuous activity for 48 hours.    Keep your wound clean and dry.  Remove band aid/ dressing tomorrow. You may shower 24 hours after your procedure. Shower and wash groin area or  wrist area gently with soap and water: beginning tomorrow. Rinse and pat Dry. Apply new water seal band aid. Repeat this process for 5 days.  If there is any drainage from the puncture site, you should put on a clean bandage. No Powders, creams, lotions or antibiotic ointments for 5 days.  No tub baths, hot tubs or swimming for 5 days.    Watch for bleeding and bruising: It is normal to have a bruise and soreness where the angiogram catheter went in.  Medication: If your angiogram was performed to treat blockages in your leg arteries, it is strongly recommended that you take both an antiplatelet medication (like aspirin or Plavix) to prevent clotting AND a statin drug (like Lipitor or Crestor), even if you have normal cholesterol. If these drugs are not ordered for you please contact either your Vascular Surgery office or the Interventional Radiology Dept during normal daytime working hours. See Interventional Radiology telephone numbers below.  You Should Have Follow up with the vascular surgeon   call [phone] with questions  Diet:   You may resume your regular diet, Sips of flat soda will help with mild nausea.  Drink more liquids than usual for the next 24 hours        IMMEDIATELY Contact Interventional Radiology at [phone] if any of the following occur:  If your bruise gets larger or if you notice any active bleeding. APPLY DIRECT PRESSURE TO THE BLEEDING SITE.   If you notice increased swelling or have increased pain at the puncture site   If you have any numbness or pain in the extremity of the puncture site   If that extremity seems cold or pale.    You have fever greater than 101  Persistent nausea or vomitting    Follow up with your primary healthcare provider  as directed: Write down your questions so you remember to ask them during your visits.

## 2024-04-04 NOTE — PROGRESS NOTES
Progress Note - Nephrology   Laya Brooks 79 y.o. female MRN: [id number]  Unit/Bed#: -01 Encounter: [id number]    A/P:  1.Hemodialysis dependent acute kidney injury  FeNa < 1% which favored a prerenal state, which can be seen in the setting of volume depletion or renal artery stenosis.  Her imaging is impressive for renal artery stenosis as a contribution to her kidney injury.  Her last hemodialysis treatment was a partial treatment on 3/29 for approximately 1.5 hours.  Treatment was abbreviated due to tachycardia and concerns for volume depletion.    Patient seen and examined this AM and she had concern for volume overload with SOB/PINZON, increased respiratory effort, increased O2 requirement since yesterday, edema and decreased BS on exam. Stat CXR ordered and showed concern for increased pleural effusions from previous CXR 3/29.     IVF pre/post angiogram abruptly cancelled and lasix 60mg IV x1 ordered. I informed primary and IR regarding my concerns and need for lasix administration. IR to assess tolerability if patient can lay flat to complete procedure.     Follow volume status closely post angiogram, assess response to lasix trial. She is at high risk for post contrast injury given worsening volume status/lasix administration prior to angiogram. Goal to maintain euvolemic as best we can.   Follow renal indices closely after angiogram for continued HD needs. Leave dialysis catheter in place at least over the next 48-72 hr.  Continue to assess exit site for any concerns.      2.  CKD 3A with baseline creatinine around 1 mg/dL with functional solitary left kidney.  Follow creatinine trends closely monitor for renal recovery.     3.  Renovascular disease  Interventional radiology and vascular surgery consults appreciated.  Plan for angiography and potential revascularization on 4/4.  Patient has a functional solitary left kidney.  No IVF pre/post due to volume overload as above.    4.  Hypertension in the  setting of CKD     Continue current blood pressure regiment.  Nifedipine has increased with improvement in her blood pressure trends.  Current regiment: Nifedipine 60 mg/day/hydralazine 25 mg every 8.  No ACE/ARB's/MRA while monitoring for renal recovery.    Follow on IV lasix administration.      5.Volume status    In the past 24 hours she had worsening hypoxia, increased O2, worsening edema and SOB/PINZON. Lasix 60mg IV and assess response.     6.  Anemia/thrombocytopenia  Follow indications for transfusion, hemoglobin here was stable in the 10.1  range.  No indication for JANET at present.         Follow up reason for today's visit:     Acute renal failure superimposed on chronic kidney disease  (HCC)      Subjective:   Patient seen and examined today.   Patient had increased work of breathing today and started requiring O2 yesterday afternoon. She has worsening leg swelling and feels weak/SOB.    A complete 10 point review of systems was performed and is otherwise negative.    Objective:     Vitals: Blood pressure 161/72, pulse 69, temperature 97.9 °F (36.6 °C), temperature source Oral, resp. rate 19, height 5' (1.524 m), weight 48.8 kg (107 lb 9.6 oz), SpO2 95%.,Body mass index is 21.01 kg/m².    Weight (last 2 days)       Date/Time Weight    04/04/24 0600 48.8 (107.6)    04/03/24 0600 47.8 (105.38)              Intake/Output Summary (Last 24 hours) at 4/4/2024 1233  Last data filed at 4/4/2024 1226  Gross per 24 hour   Intake 830 ml   Output 800 ml   Net 30 ml     I/O last 3 completed shifts:  In: 1280 [P.O.:1280]  Out: 700 [Urine:700]    HD Temporary Double Catheter (Active)   Reasons to continue HD Cath Treatment Therapy 04/03/24 2000   Goal for Removal N/A- chronic HD catheter 04/03/24 2000   Line Necessity Reviewed Yes, reviewed with provider 04/03/24 1116   Site Assessment WDL 04/03/24 2000   Proximal Lumen Status Capped 04/03/24 2000   Distal Lumen Status Blood return noted 04/03/24 2000   Line Care  Connections checked and tightened 03/29/24 7380   Dressing Type Chlorhexidine dressing 04/03/24 2000   Dressing Status Clean;Dry;Intact 04/03/24 2000   Dressing Intervention 7 day central line dressing changed 04/03/24 1116   Dressing Change Due 04/09/24 04/03/24 1116       Physical Exam: /72   Pulse 69   Temp 97.9 °F (36.6 °C) (Oral)   Resp 19   Ht 5' (1.524 m)   Wt 48.8 kg (107 lb 9.6 oz)   SpO2 95%   BMI 21.01 kg/m²     General Appearance:    Alert, cooperative, no distress, appears stated age, ill appearing    Head:    Normocephalic, without obvious abnormality, atraumatic   Eyes:    Conjunctiva/corneas clear   Ears:    Normal external ears   Nose:   Nares normal, septum midline, mucosa normal, no drainage    or sinus tenderness   Throat:   Lips, mucosa, and tongue normal; teeth and gums normal   Neck:    Back:      Lungs:     Decrease BS bilaterally, increased work of breathing, NC 2 L    Chest wall:    No tenderness or deformity   Heart:    Regular, no rub    Abdomen:     Soft, non-tender, bowel sounds active   Extremities:  +2 pretibial edema    Skin:   Skin color, texture, turgor normal, no rashes or lesions   Lymph nodes:     Neurologic:   CNII-XII intact            Lab, Imaging and other studies: I have personally reviewed pertinent labs.  CBC:   Lab Results   Component Value Date    WBC 5.96 04/04/2024    HGB 10.1 (L) 04/04/2024    HCT 30.5 (L) 04/04/2024    MCV 90 04/04/2024    PLT 81 (L) 04/04/2024    RBC 3.40 (L) 04/04/2024    MCH 29.7 04/04/2024    MCHC 33.1 04/04/2024    RDW 14.4 04/04/2024    MPV 11.9 04/04/2024    NRBC 0 04/04/2024     CMP:   Lab Results   Component Value Date    K 4.3 04/04/2024     04/04/2024    CO2 22 04/04/2024    BUN 46 (H) 04/04/2024    CREATININE 1.44 (H) 04/04/2024    CALCIUM 8.0 (L) 04/04/2024    EGFR 34 04/04/2024       .  Results from last 7 days   Lab Units 04/04/24  0607 04/03/24  0435 04/02/24  0737   POTASSIUM mmol/L 4.3 4.9 4.3   CHLORIDE mmol/L  "105 106 104   CO2 mmol/L 22 22 24   BUN mg/dL 46* 50* 52*   CREATININE mg/dL 1.44* 1.57* 1.60*   CALCIUM mg/dL 8.0* 7.9* 8.5         Phosphorus: No results found for: \"PHOS\"  Magnesium: No results found for: \"MG\"  Urinalysis: No results found for: \"COLORU\", \"CLARITYU\", \"SPECGRAV\", \"PHUR\", \"LEUKOCYTESUR\", \"NITRITE\", \"PROTEINUA\", \"GLUCOSEU\", \"KETONESU\", \"BILIRUBINUR\", \"BLOODU\"  Ionized Calcium: No results found for: \"CAION\"  Coagulation: No results found for: \"PT\", \"INR\", \"APTT\"  Troponin: No results found for: \"TROPONINI\"  ABG: No results found for: \"PHART\", \"EQR6YYD\", \"PO2ART\", \"GOD0LWP\", \"B0IGKBSC\", \"BEART\", \"SOURCE\"  Radiology review:     IMAGING  Procedure: CT abdomen wo contrast    Result Date: 4/2/2024  Narrative: CT - ABDOMEN WITHOUT IV CONTRAST INDICATION: evaluate renal artery for diameter and calcification; planning for L renal artery intervention. COMPARISON: 3/25/2024 ultrasound, 5/23/2022 MRA. : TECHNIQUE: CT examination of the abdomen was performed without intravenous contrast. Multiplanar 2D reformatted images were created from the source data. This examination, like all CT scans performed in the [facility], was performed utilizing techniques to minimize radiation dose exposure, including the use of iterative reconstruction and automated exposure control. Radiation dose length product (DLP) for this visit: 373.99 mGy-cm Enteric Contrast: Not administered. FINDINGS: LOWER CHEST: Large bilateral dependent pleural effusions, right greater than left. Associated atelectatic changes in both lower lobes. LIVER/BILIARY TREE: 8 mm rounded well-defined hypodensity right posterior segment image 2/35. Stable compared to MRI. Mild relative hypertrophy of the left liver and relative dilatation of the main portal vein. Correlate with clinical and liver disease. GALLBLADDER: Cholelithiasis without findings of acute cholecystitis. SPLEEN: Unremarkable. PANCREAS: Unremarkable. ADRENAL GLANDS: " Unremarkable. KIDNEYS/VISUALIZED URETERS: Marked atrophy of the right kidney, cortical thickness of approximately 6 mm. Multiple renal cortical cysts present. Left kidney within normal limits for age. Right renal artery poorly visualized at its origin as it abuts the IVC at this level and cannot be differentiated in the absence of contrast. Left renal artery is approximately 5 mm in the axial plane at the origin, image 2/47 and 5 mm on the coronal reformat image 601/67. There is no calcified plaque seen within the proximal and mid segments of the main left renal artery but there is heavy calcified plaque throughout the aorta including at the level of the origin. STOMACH AND VISUALIZED BOWEL: Unremarkable. ABDOMINAL CAVITY: Mild ascites. No pneumoperitoneum. No lymphadenopathy. VESSELS: Unremarkable for patient's age. ABDOMINAL WALL: Diffuse significant flank edema suggesting third spacing of fluid. BONES: No acute fracture or suspicious osseous lesion.     Impression: Large bilateral pleural effusions, ascites and extensive flank edema. Findings suggesting fluid overload/third spacing. Cholelithiasis without evidence for cholecystitis. Significant renal cortical atrophy and cysts as as above. Left renal artery measurements provided as per history. Heavy calcified plaque within the aorta including at the level of the origin but not extending within the renal artery. Asymmetric hypertrophy of the left liver and dilatation of the portal vein. Correlate for evidence of underlying liver disease and portal hypertension. Workstation performed: [id number]       Current Facility-Administered Medications   Medication Dose Route Frequency    acetaminophen (TYLENOL) tablet 650 mg  650 mg Oral Q4H PRN    albuterol inhalation solution 2.5 mg  2.5 mg Nebulization Q4H PRN    ALPRAZolam (XANAX) tablet 0.5 mg  0.5 mg Oral TID PRN    aluminum-magnesium hydroxide-simethicone (MAALOX) oral suspension 30 mL  30 mL Oral Q4H PRN     atorvastatin (LIPITOR) tablet 40 mg  40 mg Oral Daily With Dinner    budesonide (PULMICORT) inhalation solution 0.5 mg  0.5 mg Nebulization Q12H    clopidogrel (PLAVIX) tablet 75 mg  75 mg Oral Daily    famotidine (PEPCID) tablet 10 mg  10 mg Oral Daily    fentaNYL injection   Intravenous PRN    formoterol (PERFOROMIST) nebulizer solution 20 mcg  20 mcg Nebulization Q12H    heparin (porcine) injection   Intravenous PRN    heparin (porcine) subcutaneous injection 5,000 Units  5,000 Units Subcutaneous Q8H JHON    hydrALAZINE (APRESOLINE) tablet 25 mg  25 mg Oral Q8H JHON    iodixanol (VISIPAQUE) 270 MG/ML injection   Intravenous PRN    ipratropium (ATROVENT) 0.02 % inhalation solution 0.5 mg  0.5 mg Nebulization TID    labetalol (NORMODYNE) injection 10 mg  10 mg Intravenous Q6H PRN    levalbuterol (XOPENEX) inhalation solution 1.25 mg  1.25 mg Nebulization TID    lidocaine (PF) (XYLOCAINE-MPF) 1 % injection    PRN    midazolam (VERSED) injection    PRN    NIFEdipine (PROCARDIA XL) 24 hr tablet 60 mg  60 mg Oral Daily    nystatin (MYCOSTATIN) oral suspension 500,000 Units  500,000 Units Swish & Swallow 4x Daily    ondansetron (ZOFRAN) injection 4 mg  4 mg Intravenous Q6H PRN    polyethylene glycol (MIRALAX) packet 17 g  17 g Oral BID    [START ON 4/5/2024] predniSONE tablet 20 mg  20 mg Oral Daily    Followed by    [START ON 4/8/2024] predniSONE tablet 10 mg  10 mg Oral Daily    senna (SENOKOT) tablet 8.6 mg  1 tablet Oral BID    sodium chloride (OCEAN) 0.65 % nasal spray 1 spray  1 spray Each Nare Q1H PRN     Medications Discontinued During This Encounter   Medication Reason    senna (SENOKOT) tablet 8.6 mg     sodium chloride 0.9 % infusion     sodium chloride 0.9 % infusion     sodium chloride 0.9 % infusion     sodium chloride 0.9 % infusion     sodium chloride 0.9 % infusion     protamine injection 50 mg        Kate Weston DO      This progress note was produced in part using a dictation device which may  document imprecise wording from author's original intent.

## 2024-04-04 NOTE — PROGRESS NOTES
Revascularization of her left kidney seems to be a good idea.  We do not have the devices on the shelf here to do it.  I think revascularization will be challenging, and will require a full spectrum of devices.  I would recommend transfer to [place].

## 2024-04-04 NOTE — CONSULTS
Counts include 234 beds at the [facility]  ICU Acceptance Note  Name: Laya Brooks 79 y.o. female I MRN: [id number]  Unit/Bed#: ICU 03-01 I Date of Admission: 4/2/2024   Date of Service: 4/4/2024 I Hospital Day: 2    Consults: Upgraded to ICU for close monitoring given bleeding post IR procedure    Assessment/Plan   * Acute renal failure superimposed on stage 3a chronic kidney disease (HCC)  Assessment & Plan  Lab Results   Component Value Date    EGFR 34 04/04/2024    EGFR 34 04/04/2024    EGFR 30 04/03/2024    CREATININE 1.44 (H) 04/04/2024    CREATININE 1.44 (H) 04/04/2024    CREATININE 1.57 (H) 04/03/2024     Creatinine peaked at 6.27 at [facility] - now around 1.4 - 1.5  Baseline creatinine appears to be around 0.9 - 1.1  Suspect this is 2/2 significant bilateral renal artery stenosis w/atrophic R kidney   Has received IHD x3 since admission to [facility] - last treatment was on 03/29  Is s/p IV Lasix 60 mg x1 this AM prior to renal artery stenting attempt - may need additional IV Lasix this afternoon   Nephrology following - may need RRT if revascularization attempt unsuccessful  Avoid nephrotoxic agents and fluctuations in BP  Trend renal indices  Strict I/O  Daily weights    Bleeding at insertion site  Assessment & Plan  Had bleeding/oozing at sheath site post IR intervention - was reversed with Protamine, sheath pulled, and manual pressure held   Holding Plavix and DVT ppx for now  Upgraded to ICU for close monitoring of site  Will send labs now to include coags and CBC  Frequent vascular checks    Renal artery stenosis (HCC)  Assessment & Plan  Imaging from 03/28 revealed severe L renal artery stenosis at aorto-renal junction w/moderate disease   seen more proximally as well  Has known R renal artery stenosis w/subsequent renal atrophy  Now s/p IR renal artery angiogram for potential stenting, however was unsuccessful due to large aortic plaque - if candidate, may require transfer to [facility] for alternate stenting  options    Renovascular hypertension  Assessment & Plan  Previously taking ACE-I and CCB as outpatient - now on Procardia and scheduled Hydralazine  PRN Labetalol for SBP > 150 in setting of recent renal artery revascularization attempt    Atrophy of right kidney  Assessment & Plan  In setting of R renal artery stenosis   Nephrology following - patient may ultimately require RRT    Bicytopenia  Assessment & Plan  With anemia/thrombocytopenia since admission to [facility]  Possibly in setting of nutritional deficiency vs CKD  Check iron panel, B12, folate  Holding Plavix/DVT ppx for now in setting of bleeding post procedure  Trend Hgb/Plt and transfuse for Hgb < 7 and Plt < 20,000    Severe protein-calorie malnutrition (HCC)  Assessment & Plan  Malnutrition Findings:                                 BMI Findings:           Body mass index is 22.56 kg/m².     Continue nutritional supplementation   Nutrition consult    Acute respiratory insufficiency  Assessment & Plan  Currently requiring 2L NC   Suspect this is 2/2 acute disease process as well as volume overload in setting of CKD  Continue w/diuresis PRN as noted above  Aggressive pulmonary hygiene  Wean O2 for SpO2 > 88%    COPD (chronic obstructive pulmonary disease) (HCC)  Assessment & Plan  PFTs from 02/2024 revealed FEV1 61% - moderate obstruction  Follows with Pulmonology - recently started on Stiolto and PRN Albuterol inhalers as this is newer diagnosis  Continue Xopenex/Atrovent/Pulmicort/Performist nebs  Continue steroid taper for ? COPD exacerbation that was started at [facility]  Aggressive pulmonary hygiene    Dysphagia  Assessment & Plan  Continue dysphagia diet per Speech recommendations    CAD (coronary artery disease)  Assessment & Plan  Prior STEMI in 2008 s/p RCA stenting  Holding home Plavix given bleeding post IR procedure  Continue home statin  Continue cardiac monitoring    Hypercholesterolemia  Assessment & Plan  Continue home statin           History of  Present Illness     HPI: Laya Brooks is a 79 y.o. female w/PMHx of HTN, HLD, CAD w/STEMI in 2008 s/p RCA stenting, intracranial atherosclerosis, carotid artery stenosis, prior CVA, renal artery stenosis w/R atrophic kidney, CKD3A, COPD, anxiety who was initially admitted to [facility] on 03/24 for COPD exacerbation and SEEMA on CKD. Patient was placed on steroids and nebs for COPD exacerbation. Nephrology was consulted for SEEMA on CKD and was initiated on IHD at [facility]. She received 3 treatments (last on 03/29), however has not required additional treatments since then. Patient was transferred from [facility] to [facility] on 04/02 for IR angiogram/intravascular renal artery intervention given significant renal artery stenosis, R kidney atrophy. Patient underwent procedure today and was noted to have significant bleeding at insertion site. Unfortunately, attempt was unsuccessful. Patient was upgraded to ICU for further monitoring.     History obtained from chart review and the patient.  Review of Systems   Constitutional:  Negative for chills and fever.   HENT:  Negative for congestion.    Eyes:  Negative for visual disturbance.   Respiratory:  Negative for cough, shortness of breath and wheezing.    Cardiovascular:  Negative for chest pain.   Gastrointestinal:  Negative for abdominal pain, nausea and vomiting.   Genitourinary:  Negative for difficulty urinating.   Musculoskeletal:  Negative for back pain.   Neurological:  Negative for dizziness, light-headedness and headaches.   Psychiatric/Behavioral:  Negative for confusion.      Disposition: Critical care   Historical Information   Past Medical History:  No date: CAD (coronary artery disease)  No date: Cardiac disease  No date: Hypercholesteremia  No date: Hyperlipidemia  No date: Hypertension  No date: Hypertension  No date: Renal disorder Past Surgical History:  No date: CORONARY ANGIOPLASTY WITH STENT PLACEMENT  3/26/2024: IR TEMPORARY DIALYSIS CATHETER PLACEMENT  No date:  WRIST SURGERY   Current Outpatient Medications   Medication Instructions    albuterol (PROVENTIL HFA,VENTOLIN HFA) 90 mcg/act inhaler 2 puffs, Inhalation, Every 6 hours PRN    amLODIPine (NORVASC) 5 mg, Oral, Daily    atorvastatin (LIPITOR) 40 mg tablet TAKE 1 TABLET BY MOUTH ONCE DAILY IN THE EVENING. TAKE IN PLACE OF SIMVASTATIN    clopidogrel (PLAVIX) 75 mg, Oral, Daily, To prevent stroke and heart attack    ergocalciferol (ERGOCALCIFEROL) 50,000 Units, Oral, Weekly    lisinopril (ZESTRIL) 10 mg, Oral, Daily    tiotropium-olodaterol (Stiolto Respimat) 2.5-2.5 MCG/ACT inhaler 2 puffs, Inhalation, Daily    Allergies   Allergen Reactions    Influenza Virus Vaccine       Social History     Tobacco Use    Smoking status: Former    Smokeless tobacco: Never   Vaping Use    Vaping status: Never Used   Substance Use Topics    Alcohol use: Never    Drug use: No    History reviewed. No pertinent family history.     Objective                            Vitals I/O      Most Recent Min/Max in 24hrs   Temp (!) 96.6 °F (35.9 °C) Temp  Min: 95.3 °F (35.2 °C)  Max: 98 °F (36.7 °C)   Pulse 72 Pulse  Min: 60  Max: 94   Resp (!) 31 Resp  Min: 14  Max: 39   /52 BP  Min: 100/49  Max: 174/73   O2 Sat 91 % SpO2  Min: 90 %  Max: 98 %      Intake/Output Summary (Last 24 hours) at 4/4/2024 1644  Last data filed at 4/4/2024 1501  Gross per 24 hour   Intake 650 ml   Output 1000 ml   Net -350 ml       Diet Cardiovascular; Cardiac; Fluid Restriction 1500 ML, Dysphagia 2-Mechanical Soft; Thin Liquid       Physical Exam   Physical Exam  Vitals and nursing note reviewed.   Eyes:      Extraocular Movements: Extraocular movements intact.      Pupils: Pupils are equal, round, and reactive to light.   Skin:     General: Skin is warm and dry.      Capillary Refill: Capillary refill takes more than 3 seconds.      Coloration: Skin is pale.      Comments: L femoral insertion site w/gauze and tegaderm in place - no hematoma, bruising   Neck:       Vascular: Central line present.   Cardiovascular:      Rate and Rhythm: Normal rate and regular rhythm.      Pulses:           Radial pulses are 1+ on the right side and 1+ on the left side.        Dorsalis pedis pulses are detected w/ Doppler on the right side and detected w/ Doppler on the left side.      Heart sounds: Normal heart sounds.   Musculoskeletal:      Right lower le+ Edema present.      Left lower le+ Edema present.   Abdominal: General: Bowel sounds are normal. There is no distension.      Palpations: Abdomen is soft.      Tenderness: There is no abdominal tenderness.   Constitutional:       General: She is awake.      Appearance: She is cachectic. She is ill-appearing.      Interventions: Nasal cannula in place.   Pulmonary:      Effort: Tachypnea present.      Breath sounds: Decreased breath sounds present.   Psychiatric:         Behavior: Behavior is cooperative.   Neurological:      Mental Status: She is alert and oriented to person, place and time.   Genitourinary/Anorectal:  external catheter present.          Diagnostic Studies      EKG: sinus rhythm  Imaging:  I have personally reviewed pertinent reports.   and I have personally reviewed pertinent films in PACS     Medications:  Scheduled PRN   atorvastatin, 40 mg, Daily With Dinner  budesonide, 0.5 mg, Q12H  chlorhexidine, 15 mL, Q12H JHON  famotidine, 10 mg, Daily  formoterol, 20 mcg, Q12H  furosemide, 80 mg, Once  hydrALAZINE, 25 mg, Q8H JHON  ipratropium, 0.5 mg, TID  levalbuterol, 1.25 mg, TID  NIFEdipine, 60 mg, Daily  nystatin, 500,000 Units, 4x Daily  polyethylene glycol, 17 g, BID  [START ON 2024] predniSONE, 20 mg, Daily   Followed by  [START ON 2024] predniSONE, 10 mg, Daily  senna, 1 tablet, BID      acetaminophen, 650 mg, Q4H PRN  albuterol, 2.5 mg, Q4H PRN  ALPRAZolam, 0.5 mg, TID PRN  aluminum-magnesium hydroxide-simethicone, 30 mL, Q4H PRN  labetalol, 10 mg, Q6H PRN  ondansetron, 4 mg, Q6H PRN  sodium chloride, 1  spray, Q1H PRN       Continuous          Labs:    CBC    Recent Labs     04/04/24  0607 04/04/24  1515   WBC 5.96 7.93   HGB 10.1* 11.2*   HCT 30.5* 34.5*   PLT 81* 99*     BMP    Recent Labs     04/04/24  0607 04/04/24  1515   SODIUM 135 135   K 4.3 4.6    106   CO2 22 19*   AGAP 8 10   BUN 46* 46*   CREATININE 1.44* 1.44*   CALCIUM 8.0* 7.9*       Coags    Recent Labs     04/04/24  1515   INR 1.10   PTT 26        Additional Electrolytes  Recent Labs     04/04/24  1515   MG 1.7*   PHOS 3.1   CAIONIZED 1.11*          Blood Gas    No recent results  No recent results LFTs  No recent results    Infectious  No recent results  Glucose  Recent Labs     04/03/24  0435 04/04/24  0607 04/04/24  1515   GLUC 121 125 178*             Critical Care Time Statement: Upon my evaluation, this patient had a high probability of imminent or life-threatening deterioration due to insertion site bleeding, ARF on CKD, renal artery stenosis, which required my direct attention, intervention, and personal management.  I spent a total of 42 minutes directly providing critical care services, including interpretation of complex medical databases, evaluating for the presence of life-threatening injuries or illnesses, management of organ system failure(s) , complex medical decision making (to support/prevent further life-threatening deterioration)., and interpretation of hemodynamic data. This time is exclusive of procedures, teaching, family meetings, and any prior time recorded by providers other than myself.     MANOHAR Shahid

## 2024-04-04 NOTE — CONSULTS
Consultation - General Surgery   Laya Brooks 79 y.o. female MRN: [id number]  Unit/Bed#: -01 Encounter: [id number]    Assessment/Plan     Assessment:  80 yo F w/ HLD, CVA, HTN, dCHF, mlanutrition BMI: 18, carotid stenosis, CAD s/p coronary stent, tobacco use, HLD, CKD presents with renal artery stenosis   -IR planning for renal artery stent placement today   -abdomen is soft, flat, nontender   -afebrile, VSS   -no leukocytosis   -Hgb 10.1, stable   -Cr 1.44 (1.57), SEEMA on CKD but Cr improving    Plan:  -IR today for renal artery stent placement  -NPO for procedure  -avoid nephrotoxins, monitor urine output  -trend labs and vitals  -appreciate nephrology recs  -medical management per SLIM  -updated on call vascular surgeon Dr. David Cantrell via TT; vascular plan per attending attestation    History of Present Illness     HPI:  Laya Brooks is a 79 y.o. female who presents with renal artery stenosis. Patient presented to [facility] and was found to have severe SEEMA on CKD which required HD. Renal US showed severe left renal artery stenosis. Patient was transferred to [facility] for IR intervention with renal artery stent placement. This morning patient has no complaints and is feeling well.     Inpatient consult to Vascular Surgery  Consult performed by: Georgina Washington PA-C  Consult ordered by: Aries Morgan PA-C          Review of Systems   Constitutional: Negative.    HENT: Negative.     Respiratory: Negative.     Cardiovascular: Negative.    Gastrointestinal: Negative.    Genitourinary: Negative.    Musculoskeletal: Negative.    Skin: Negative.    Neurological: Negative.    Psychiatric/Behavioral: Negative.         Historical Information   Past Medical History:   Diagnosis Date    CAD (coronary artery disease)     Cardiac disease     Hypercholesteremia     Hyperlipidemia     Hypertension     Hypertension     Renal disorder      Past Surgical History:   Procedure Laterality Date    CORONARY  ANGIOPLASTY WITH STENT PLACEMENT      IR TEMPORARY DIALYSIS CATHETER PLACEMENT  3/26/2024    WRIST SURGERY       Social History   Social History     Substance and Sexual Activity   Alcohol Use Never     Social History     Substance and Sexual Activity   Drug Use No     E-Cigarette/Vaping    E-Cigarette Use Never User      E-Cigarette/Vaping Substances    Nicotine No     THC No     CBD No     Flavoring No      Social History     Tobacco Use   Smoking Status Former   Smokeless Tobacco Never     Family History: non-contributory    Meds/Allergies   all current active meds have been reviewed  Allergies   Allergen Reactions    Influenza Virus Vaccine        Objective   First Vitals:   Blood Pressure: 151/58 (04/02/24 1625)  Pulse: 85 (04/02/24 1625)  Temperature: 97.8 °F (36.6 °C) (04/02/24 1625)  Temp Source: Oral (04/02/24 1625)  Respirations: 20 (04/02/24 1625)  Height: 5' (152.4 cm) (04/02/24 1625)  Weight - Scale: 47.8 kg (105 lb 6.1 oz) (04/03/24 0600)  SpO2: 91 % (04/02/24 1625)    Current Vitals:   Blood Pressure: 138/60 (04/04/24 0703)  Pulse: 72 (04/04/24 0703)  Temperature: 97.9 °F (36.6 °C) (04/04/24 0703)  Temp Source: Axillary (04/03/24 1521)  Respirations: 18 (04/04/24 0703)  Height: 5' (152.4 cm) (04/02/24 1625)  Weight - Scale: 48.8 kg (107 lb 9.6 oz) (04/04/24 0600)  SpO2: 95 % (04/04/24 0854)      Intake/Output Summary (Last 24 hours) at 4/4/2024 1018  Last data filed at 4/4/2024 0925  Gross per 24 hour   Intake 780 ml   Output 800 ml   Net -20 ml       Invasive Devices       Peripheral Intravenous Line  Duration             Peripheral IV 04/03/24 Right Antecubital <1 day              Hemodialysis Catheter  Duration             HD Temporary Double Catheter 9 days                    Physical Exam  Constitutional:       Appearance: Normal appearance.   HENT:      Head: Normocephalic and atraumatic.      Nose: Nose normal.      Mouth/Throat:      Mouth: Mucous membranes are moist.      Pharynx: Oropharynx  is clear.   Eyes:      Conjunctiva/sclera: Conjunctivae normal.      Pupils: Pupils are equal, round, and reactive to light.   Cardiovascular:      Rate and Rhythm: Normal rate.      Pulses: Normal pulses.      Heart sounds: Normal heart sounds.   Pulmonary:      Effort: Pulmonary effort is normal.   Abdominal:      General: Abdomen is flat.      Palpations: Abdomen is soft.      Tenderness: There is no abdominal tenderness.   Musculoskeletal:         General: Normal range of motion.   Skin:     General: Skin is warm and dry.   Neurological:      General: No focal deficit present.      Mental Status: She is alert.   Psychiatric:         Mood and Affect: Mood normal.         Lab Results: CBC:   Lab Results   Component Value Date    WBC 5.96 04/04/2024    HGB 10.1 (L) 04/04/2024    HCT 30.5 (L) 04/04/2024    MCV 90 04/04/2024    PLT 81 (L) 04/04/2024    RBC 3.40 (L) 04/04/2024    MCH 29.7 04/04/2024    MCHC 33.1 04/04/2024    RDW 14.4 04/04/2024    MPV 11.9 04/04/2024    NRBC 0 04/04/2024   , CMP:   Lab Results   Component Value Date    SODIUM 135 04/04/2024    K 4.3 04/04/2024     04/04/2024    CO2 22 04/04/2024    BUN 46 (H) 04/04/2024    CREATININE 1.44 (H) 04/04/2024    CALCIUM 8.0 (L) 04/04/2024    EGFR 34 04/04/2024       Counseling / Coordination of Care  Total floor / unit time spent today 20 minutes.  Greater than 50% of total time was spent with the patient and / or family counseling and / or coordination of care.    Georgina Washintgon PA-C

## 2024-04-04 NOTE — ASSESSMENT & PLAN NOTE
Prior STEMI in 2008 s/p RCA stenting  Holding home Plavix given bleeding post IR procedure  Continue home statin  Continue cardiac monitoring

## 2024-04-04 NOTE — ASSESSMENT & PLAN NOTE
With anemia/thrombocytopenia since admission to [facility]  Possibly in setting of nutritional deficiency vs CKD  Check iron panel, B12, folate  Holding Plavix/DVT ppx for now in setting of bleeding post procedure  Trend Hgb/Plt and transfuse for Hgb < 7 and Plt < 20,000

## 2024-04-04 NOTE — ASSESSMENT & PLAN NOTE
Lab Results   Component Value Date    EGFR 34 04/04/2024    EGFR 34 04/04/2024    EGFR 30 04/03/2024    CREATININE 1.44 (H) 04/04/2024    CREATININE 1.44 (H) 04/04/2024    CREATININE 1.57 (H) 04/03/2024     Creatinine peaked at 6.27 at [facility] - now around 1.4 - 1.5  Baseline creatinine appears to be around 0.9 - 1.1  Suspect this is 2/2 significant bilateral renal artery stenosis w/atrophic R kidney   Has received IHD x3 since admission to [facility] - last treatment was on 03/29  Is s/p IV Lasix 60 mg x1 this AM prior to renal artery stenting attempt - may need additional IV Lasix this afternoon   Nephrology following - may need RRT if revascularization attempt unsuccessful  Avoid nephrotoxic agents and fluctuations in BP  Trend renal indices  Strict I/O  Daily weights

## 2024-04-04 NOTE — PHYSICAL THERAPY NOTE
04/04/24 1559   PT Last Visit   PT Visit Date 04/04/24   Note Type   Note Type Cancelled Session   Cancel Reasons Medical status  (Pt transferred to ICU after procedure.  Pt is also on strict bedrest.  Will continue to follow.)

## 2024-04-04 NOTE — QUICK NOTE
Patient belongings brought over from Med Surg 2:    Bra  Underwear  Tshirt  Pants  Glasses  Brush  Greeting cards

## 2024-04-04 NOTE — ASSESSMENT & PLAN NOTE
Currently requiring 2L NC   Suspect this is 2/2 acute disease process as well as volume overload in setting of CKD  Continue w/diuresis PRN as noted above  Aggressive pulmonary hygiene  Wean O2 for SpO2 > 88%

## 2024-04-04 NOTE — ASSESSMENT & PLAN NOTE
Had bleeding/oozing at sheath site post IR intervention - was reversed with Protamine, sheath pulled, and manual pressure held   Holding Plavix and DVT ppx for now  Upgraded to ICU for close monitoring of site  Will send labs now to include coags and CBC  Frequent vascular checks

## 2024-04-04 NOTE — ASSESSMENT & PLAN NOTE
Imaging from 03/28 revealed severe L renal artery stenosis at aorto-renal junction w/moderate disease   seen more proximally as well  Has known R renal artery stenosis w/subsequent renal atrophy  Now s/p IR renal artery angiogram for potential stenting, however was unsuccessful due to large aortic plaque - if candidate, may require transfer to B for alternate stenting options

## 2024-04-05 ENCOUNTER — HOSPITAL ENCOUNTER (INPATIENT)
Facility: HOSPITAL | Age: 79
LOS: 5 days | Discharge: NURSING, REHAB OR LONG-TERM CARE | DRG: 981 | End: 2024-04-10
Attending: INTERNAL MEDICINE | Admitting: INTERNAL MEDICINE
Payer: MEDICARE

## 2024-04-05 VITALS
HEART RATE: 82 BPM | RESPIRATION RATE: 35 BRPM | WEIGHT: 115.96 LBS | HEIGHT: 60 IN | DIASTOLIC BLOOD PRESSURE: 87 MMHG | SYSTOLIC BLOOD PRESSURE: 182 MMHG | BODY MASS INDEX: 22.77 KG/M2 | TEMPERATURE: 97 F | OXYGEN SATURATION: 92 %

## 2024-04-05 DIAGNOSIS — N26.1 ATROPHY OF RIGHT KIDNEY: ICD-10-CM

## 2024-04-05 DIAGNOSIS — N18.31 ACUTE RENAL FAILURE SUPERIMPOSED ON STAGE 3A CHRONIC KIDNEY DISEASE, UNSPECIFIED ACUTE RENAL FAILURE TYPE (HCC): ICD-10-CM

## 2024-04-05 DIAGNOSIS — N17.9 ACUTE RENAL FAILURE SUPERIMPOSED ON STAGE 3A CHRONIC KIDNEY DISEASE, UNSPECIFIED ACUTE RENAL FAILURE TYPE (HCC): ICD-10-CM

## 2024-04-05 DIAGNOSIS — I70.1 RENAL ARTERY STENOSIS (HCC): Primary | ICD-10-CM

## 2024-04-05 DIAGNOSIS — I15.0 RENOVASCULAR HYPERTENSION: ICD-10-CM

## 2024-04-05 PROBLEM — K59.00 CONSTIPATION: Status: ACTIVE | Noted: 2024-04-05

## 2024-04-05 LAB
ALBUMIN SERPL BCP-MCNC: 2.8 G/DL (ref 3.5–5)
ALP SERPL-CCNC: 40 U/L (ref 34–104)
ALT SERPL W P-5'-P-CCNC: 14 U/L (ref 7–52)
ANION GAP SERPL CALCULATED.3IONS-SCNC: 12 MMOL/L (ref 4–13)
ANION GAP SERPL CALCULATED.3IONS-SCNC: 7 MMOL/L (ref 4–13)
AST SERPL W P-5'-P-CCNC: 12 U/L (ref 13–39)
BILIRUB SERPL-MCNC: 0.49 MG/DL (ref 0.2–1)
BUN SERPL-MCNC: 43 MG/DL (ref 5–25)
BUN SERPL-MCNC: 44 MG/DL (ref 5–25)
CA-I BLD-SCNC: 1.02 MMOL/L (ref 1.12–1.32)
CALCIUM ALBUM COR SERPL-MCNC: 8.6 MG/DL (ref 8.3–10.1)
CALCIUM SERPL-MCNC: 7.6 MG/DL (ref 8.4–10.2)
CALCIUM SERPL-MCNC: 8.3 MG/DL (ref 8.4–10.2)
CHLORIDE SERPL-SCNC: 102 MMOL/L (ref 96–108)
CHLORIDE SERPL-SCNC: 107 MMOL/L (ref 96–108)
CO2 SERPL-SCNC: 22 MMOL/L (ref 21–32)
CO2 SERPL-SCNC: 22 MMOL/L (ref 21–32)
CREAT SERPL-MCNC: 1.45 MG/DL (ref 0.6–1.3)
CREAT SERPL-MCNC: 1.61 MG/DL (ref 0.6–1.3)
ERYTHROCYTE [DISTWIDTH] IN BLOOD BY AUTOMATED COUNT: 14.5 % (ref 11.6–15.1)
GFR SERPL CREATININE-BSD FRML MDRD: 29 ML/MIN/1.73SQ M
GFR SERPL CREATININE-BSD FRML MDRD: 33 ML/MIN/1.73SQ M
GLUCOSE SERPL-MCNC: 125 MG/DL (ref 65–140)
GLUCOSE SERPL-MCNC: 179 MG/DL (ref 65–140)
GLUCOSE SERPL-MCNC: 196 MG/DL (ref 65–140)
GLUCOSE SERPL-MCNC: 216 MG/DL (ref 65–140)
GLUCOSE SERPL-MCNC: 91 MG/DL (ref 65–140)
GLUCOSE SERPL-MCNC: 92 MG/DL (ref 65–140)
HCT VFR BLD AUTO: 29.7 % (ref 34.8–46.1)
HGB BLD-MCNC: 9.6 G/DL (ref 11.5–15.4)
MAGNESIUM SERPL-MCNC: 2.1 MG/DL (ref 1.9–2.7)
MAGNESIUM SERPL-MCNC: 2.3 MG/DL (ref 1.9–2.7)
MCH RBC QN AUTO: 29.5 PG (ref 26.8–34.3)
MCHC RBC AUTO-ENTMCNC: 32.3 G/DL (ref 31.4–37.4)
MCV RBC AUTO: 91 FL (ref 82–98)
PHOSPHATE SERPL-MCNC: 3.3 MG/DL (ref 2.3–4.1)
PLATELET # BLD AUTO: 78 THOUSANDS/UL (ref 149–390)
PMV BLD AUTO: 12 FL (ref 8.9–12.7)
POTASSIUM SERPL-SCNC: 4.2 MMOL/L (ref 3.5–5.3)
POTASSIUM SERPL-SCNC: 4.2 MMOL/L (ref 3.5–5.3)
PROT SERPL-MCNC: 4.4 G/DL (ref 6.4–8.4)
RBC # BLD AUTO: 3.25 MILLION/UL (ref 3.81–5.12)
SODIUM SERPL-SCNC: 136 MMOL/L (ref 135–147)
SODIUM SERPL-SCNC: 136 MMOL/L (ref 135–147)
WBC # BLD AUTO: 4.78 THOUSAND/UL (ref 4.31–10.16)

## 2024-04-05 PROCEDURE — 99233 SBSQ HOSP IP/OBS HIGH 50: CPT | Performed by: INTERNAL MEDICINE

## 2024-04-05 PROCEDURE — 82948 REAGENT STRIP/BLOOD GLUCOSE: CPT

## 2024-04-05 PROCEDURE — 94640 AIRWAY INHALATION TREATMENT: CPT

## 2024-04-05 PROCEDURE — 94760 N-INVAS EAR/PLS OXIMETRY 1: CPT

## 2024-04-05 PROCEDURE — 99232 SBSQ HOSP IP/OBS MODERATE 35: CPT | Performed by: SURGERY

## 2024-04-05 PROCEDURE — 94664 DEMO&/EVAL PT USE INHALER: CPT

## 2024-04-05 PROCEDURE — 80053 COMPREHEN METABOLIC PANEL: CPT | Performed by: NURSE PRACTITIONER

## 2024-04-05 PROCEDURE — 83735 ASSAY OF MAGNESIUM: CPT | Performed by: NURSE PRACTITIONER

## 2024-04-05 PROCEDURE — 83735 ASSAY OF MAGNESIUM: CPT

## 2024-04-05 PROCEDURE — 82330 ASSAY OF CALCIUM: CPT | Performed by: NURSE PRACTITIONER

## 2024-04-05 PROCEDURE — 85027 COMPLETE CBC AUTOMATED: CPT | Performed by: NURSE PRACTITIONER

## 2024-04-05 PROCEDURE — 84100 ASSAY OF PHOSPHORUS: CPT | Performed by: NURSE PRACTITIONER

## 2024-04-05 PROCEDURE — 92610 EVALUATE SWALLOWING FUNCTION: CPT

## 2024-04-05 PROCEDURE — 80048 BASIC METABOLIC PNL TOTAL CA: CPT

## 2024-04-05 PROCEDURE — NC001 PR NO CHARGE: Performed by: RADIOLOGY

## 2024-04-05 PROCEDURE — NC001 PR NO CHARGE: Performed by: INTERNAL MEDICINE

## 2024-04-05 RX ORDER — SENNOSIDES 8.6 MG
1 TABLET ORAL 2 TIMES DAILY
Status: DISCONTINUED | OUTPATIENT
Start: 2024-04-05 | End: 2024-04-05

## 2024-04-05 RX ORDER — LEVALBUTEROL INHALATION SOLUTION 1.25 MG/3ML
1.25 SOLUTION RESPIRATORY (INHALATION)
Status: CANCELLED | OUTPATIENT
Start: 2024-04-05

## 2024-04-05 RX ORDER — ALBUTEROL SULFATE 2.5 MG/3ML
2.5 SOLUTION RESPIRATORY (INHALATION) EVERY 4 HOURS PRN
Status: DISCONTINUED | OUTPATIENT
Start: 2024-04-05 | End: 2024-04-06

## 2024-04-05 RX ORDER — BISACODYL 10 MG
10 SUPPOSITORY, RECTAL RECTAL DAILY
Status: DISCONTINUED | OUTPATIENT
Start: 2024-04-05 | End: 2024-04-05 | Stop reason: HOSPADM

## 2024-04-05 RX ORDER — ONDANSETRON 2 MG/ML
4 INJECTION INTRAMUSCULAR; INTRAVENOUS EVERY 6 HOURS PRN
Status: CANCELLED | OUTPATIENT
Start: 2024-04-05

## 2024-04-05 RX ORDER — PREDNISONE 10 MG/1
10 TABLET ORAL DAILY
Status: DISCONTINUED | OUTPATIENT
Start: 2024-04-08 | End: 2024-04-10 | Stop reason: HOSPADM

## 2024-04-05 RX ORDER — POLYETHYLENE GLYCOL 3350 17 G/17G
17 POWDER, FOR SOLUTION ORAL 2 TIMES DAILY
Status: CANCELLED | OUTPATIENT
Start: 2024-04-05

## 2024-04-05 RX ORDER — ALPRAZOLAM 0.5 MG/1
0.5 TABLET ORAL 3 TIMES DAILY PRN
Status: DISCONTINUED | OUTPATIENT
Start: 2024-04-05 | End: 2024-04-10 | Stop reason: HOSPADM

## 2024-04-05 RX ORDER — FAMOTIDINE 20 MG/1
10 TABLET, FILM COATED ORAL DAILY
Status: CANCELLED | OUTPATIENT
Start: 2024-04-06

## 2024-04-05 RX ORDER — FAMOTIDINE 20 MG/1
10 TABLET, FILM COATED ORAL DAILY
Status: DISCONTINUED | OUTPATIENT
Start: 2024-04-06 | End: 2024-04-10

## 2024-04-05 RX ORDER — LACTULOSE 10 G/15ML
20 SOLUTION ORAL ONCE
Status: COMPLETED | OUTPATIENT
Start: 2024-04-05 | End: 2024-04-05

## 2024-04-05 RX ORDER — BISACODYL 10 MG
10 SUPPOSITORY, RECTAL RECTAL DAILY
Status: CANCELLED | OUTPATIENT
Start: 2024-04-06

## 2024-04-05 RX ORDER — MAGNESIUM HYDROXIDE/ALUMINUM HYDROXICE/SIMETHICONE 120; 1200; 1200 MG/30ML; MG/30ML; MG/30ML
30 SUSPENSION ORAL EVERY 4 HOURS PRN
Status: DISCONTINUED | OUTPATIENT
Start: 2024-04-05 | End: 2024-04-10 | Stop reason: HOSPADM

## 2024-04-05 RX ORDER — FUROSEMIDE 10 MG/ML
80 INJECTION INTRAMUSCULAR; INTRAVENOUS ONCE
Status: COMPLETED | OUTPATIENT
Start: 2024-04-05 | End: 2024-04-05

## 2024-04-05 RX ORDER — LABETALOL HYDROCHLORIDE 5 MG/ML
10 INJECTION, SOLUTION INTRAVENOUS EVERY 6 HOURS PRN
Status: CANCELLED | OUTPATIENT
Start: 2024-04-05

## 2024-04-05 RX ORDER — NIFEDIPINE 30 MG/1
60 TABLET, EXTENDED RELEASE ORAL DAILY
Status: CANCELLED | OUTPATIENT
Start: 2024-04-06

## 2024-04-05 RX ORDER — HYDRALAZINE HYDROCHLORIDE 25 MG/1
25 TABLET, FILM COATED ORAL EVERY 8 HOURS SCHEDULED
Status: CANCELLED | OUTPATIENT
Start: 2024-04-05

## 2024-04-05 RX ORDER — PREDNISONE 20 MG/1
20 TABLET ORAL DAILY
Status: COMPLETED | OUTPATIENT
Start: 2024-04-06 | End: 2024-04-07

## 2024-04-05 RX ORDER — ALBUTEROL SULFATE 2.5 MG/3ML
2.5 SOLUTION RESPIRATORY (INHALATION) EVERY 4 HOURS PRN
Status: CANCELLED | OUTPATIENT
Start: 2024-04-05

## 2024-04-05 RX ORDER — INSULIN LISPRO 100 [IU]/ML
1-5 INJECTION, SOLUTION INTRAVENOUS; SUBCUTANEOUS
Status: CANCELLED | OUTPATIENT
Start: 2024-04-05

## 2024-04-05 RX ORDER — ONDANSETRON 2 MG/ML
4 INJECTION INTRAMUSCULAR; INTRAVENOUS EVERY 6 HOURS PRN
Status: DISCONTINUED | OUTPATIENT
Start: 2024-04-05 | End: 2024-04-10 | Stop reason: HOSPADM

## 2024-04-05 RX ORDER — SENNOSIDES 8.6 MG
1 TABLET ORAL 2 TIMES DAILY
Status: CANCELLED | OUTPATIENT
Start: 2024-04-05

## 2024-04-05 RX ORDER — NIFEDIPINE 30 MG/1
60 TABLET, EXTENDED RELEASE ORAL DAILY
Status: DISCONTINUED | OUTPATIENT
Start: 2024-04-06 | End: 2024-04-09

## 2024-04-05 RX ORDER — PREDNISONE 10 MG/1
10 TABLET ORAL DAILY
Status: CANCELLED | OUTPATIENT
Start: 2024-04-08 | End: 2024-04-11

## 2024-04-05 RX ORDER — ACETAMINOPHEN 325 MG/1
650 TABLET ORAL EVERY 4 HOURS PRN
Status: DISCONTINUED | OUTPATIENT
Start: 2024-04-05 | End: 2024-04-10 | Stop reason: HOSPADM

## 2024-04-05 RX ORDER — ATORVASTATIN CALCIUM 40 MG/1
40 TABLET, FILM COATED ORAL
Status: CANCELLED | OUTPATIENT
Start: 2024-04-05

## 2024-04-05 RX ORDER — MAGNESIUM HYDROXIDE/ALUMINUM HYDROXICE/SIMETHICONE 120; 1200; 1200 MG/30ML; MG/30ML; MG/30ML
30 SUSPENSION ORAL EVERY 4 HOURS PRN
Status: CANCELLED | OUTPATIENT
Start: 2024-04-05

## 2024-04-05 RX ORDER — BISACODYL 10 MG
10 SUPPOSITORY, RECTAL RECTAL DAILY
Status: DISCONTINUED | OUTPATIENT
Start: 2024-04-06 | End: 2024-04-09

## 2024-04-05 RX ORDER — SENNOSIDES 8.6 MG
1 TABLET ORAL
Status: DISCONTINUED | OUTPATIENT
Start: 2024-04-06 | End: 2024-04-10

## 2024-04-05 RX ORDER — INSULIN LISPRO 100 [IU]/ML
1-5 INJECTION, SOLUTION INTRAVENOUS; SUBCUTANEOUS
Status: DISCONTINUED | OUTPATIENT
Start: 2024-04-05 | End: 2024-04-10 | Stop reason: HOSPADM

## 2024-04-05 RX ORDER — LEVALBUTEROL INHALATION SOLUTION 1.25 MG/3ML
1.25 SOLUTION RESPIRATORY (INHALATION)
Status: DISCONTINUED | OUTPATIENT
Start: 2024-04-05 | End: 2024-04-06

## 2024-04-05 RX ORDER — PREDNISONE 20 MG/1
20 TABLET ORAL DAILY
Status: CANCELLED | OUTPATIENT
Start: 2024-04-06 | End: 2024-04-08

## 2024-04-05 RX ORDER — HYDRALAZINE HYDROCHLORIDE 25 MG/1
50 TABLET, FILM COATED ORAL EVERY 8 HOURS SCHEDULED
Status: DISCONTINUED | OUTPATIENT
Start: 2024-04-05 | End: 2024-04-05 | Stop reason: HOSPADM

## 2024-04-05 RX ORDER — INSULIN LISPRO 100 [IU]/ML
1-5 INJECTION, SOLUTION INTRAVENOUS; SUBCUTANEOUS
Status: DISCONTINUED | OUTPATIENT
Start: 2024-04-05 | End: 2024-04-05 | Stop reason: HOSPADM

## 2024-04-05 RX ORDER — LEVALBUTEROL INHALATION SOLUTION 1.25 MG/3ML
1.25 SOLUTION RESPIRATORY (INHALATION)
Status: DISCONTINUED | OUTPATIENT
Start: 2024-04-05 | End: 2024-04-05

## 2024-04-05 RX ORDER — ATORVASTATIN CALCIUM 40 MG/1
40 TABLET, FILM COATED ORAL
Status: DISCONTINUED | OUTPATIENT
Start: 2024-04-05 | End: 2024-04-10 | Stop reason: HOSPADM

## 2024-04-05 RX ORDER — LABETALOL HYDROCHLORIDE 5 MG/ML
10 INJECTION, SOLUTION INTRAVENOUS EVERY 6 HOURS PRN
Status: DISCONTINUED | OUTPATIENT
Start: 2024-04-05 | End: 2024-04-10 | Stop reason: HOSPADM

## 2024-04-05 RX ORDER — ALPRAZOLAM 0.5 MG/1
0.5 TABLET ORAL 3 TIMES DAILY PRN
Status: CANCELLED | OUTPATIENT
Start: 2024-04-05

## 2024-04-05 RX ORDER — CALCIUM GLUCONATE 20 MG/ML
2 INJECTION, SOLUTION INTRAVENOUS ONCE
Status: COMPLETED | OUTPATIENT
Start: 2024-04-05 | End: 2024-04-05

## 2024-04-05 RX ORDER — POLYETHYLENE GLYCOL 3350 17 G/17G
17 POWDER, FOR SOLUTION ORAL 2 TIMES DAILY
Status: DISCONTINUED | OUTPATIENT
Start: 2024-04-05 | End: 2024-04-09

## 2024-04-05 RX ORDER — ECHINACEA PURPUREA EXTRACT 125 MG
1 TABLET ORAL
Status: CANCELLED | OUTPATIENT
Start: 2024-04-05

## 2024-04-05 RX ORDER — ACETAMINOPHEN 325 MG/1
650 TABLET ORAL EVERY 4 HOURS PRN
Status: CANCELLED | OUTPATIENT
Start: 2024-04-05

## 2024-04-05 RX ORDER — ECHINACEA PURPUREA EXTRACT 125 MG
1 TABLET ORAL
Status: DISCONTINUED | OUTPATIENT
Start: 2024-04-05 | End: 2024-04-10 | Stop reason: HOSPADM

## 2024-04-05 RX ADMIN — PREDNISONE 20 MG: 20 TABLET ORAL at 09:21

## 2024-04-05 RX ADMIN — HYDRALAZINE HYDROCHLORIDE 25 MG: 25 TABLET ORAL at 05:44

## 2024-04-05 RX ADMIN — NYSTATIN 500000 UNITS: 100000 SUSPENSION ORAL at 21:53

## 2024-04-05 RX ADMIN — BISACODYL 10 MG: 10 SUPPOSITORY RECTAL at 09:19

## 2024-04-05 RX ADMIN — SENNOSIDES 8.6 MG: 8.6 TABLET, FILM COATED ORAL at 09:21

## 2024-04-05 RX ADMIN — IPRATROPIUM BROMIDE 0.5 MG: 0.5 SOLUTION RESPIRATORY (INHALATION) at 19:44

## 2024-04-05 RX ADMIN — LEVALBUTEROL HYDROCHLORIDE 1.25 MG: 1.25 SOLUTION RESPIRATORY (INHALATION) at 07:38

## 2024-04-05 RX ADMIN — FAMOTIDINE 10 MG: 20 TABLET ORAL at 09:26

## 2024-04-05 RX ADMIN — LEVALBUTEROL HYDROCHLORIDE 1.25 MG: 1.25 SOLUTION RESPIRATORY (INHALATION) at 19:44

## 2024-04-05 RX ADMIN — FUROSEMIDE 80 MG: 10 INJECTION, SOLUTION INTRAMUSCULAR; INTRAVENOUS at 17:05

## 2024-04-05 RX ADMIN — POLYETHYLENE GLYCOL 3350 17 G: 17 POWDER, FOR SOLUTION ORAL at 09:19

## 2024-04-05 RX ADMIN — FUROSEMIDE 80 MG: 10 INJECTION, SOLUTION INTRAMUSCULAR; INTRAVENOUS at 09:58

## 2024-04-05 RX ADMIN — IPRATROPIUM BROMIDE 0.5 MG: 0.5 SOLUTION RESPIRATORY (INHALATION) at 15:46

## 2024-04-05 RX ADMIN — CALCIUM GLUCONATE 2 G: 20 INJECTION, SOLUTION INTRAVENOUS at 07:08

## 2024-04-05 RX ADMIN — NIFEDIPINE 60 MG: 30 TABLET, FILM COATED, EXTENDED RELEASE ORAL at 09:21

## 2024-04-05 RX ADMIN — NYSTATIN 500000 UNITS: 100000 SUSPENSION ORAL at 17:07

## 2024-04-05 RX ADMIN — LACTULOSE 20 G: 20 SOLUTION ORAL at 09:19

## 2024-04-05 RX ADMIN — NYSTATIN 500000 UNITS: 100000 SUSPENSION ORAL at 09:19

## 2024-04-05 RX ADMIN — ATORVASTATIN CALCIUM 40 MG: 40 TABLET, FILM COATED ORAL at 17:08

## 2024-04-05 RX ADMIN — IPRATROPIUM BROMIDE 0.5 MG: 0.5 SOLUTION RESPIRATORY (INHALATION) at 07:38

## 2024-04-05 RX ADMIN — INSULIN LISPRO 1 UNITS: 100 INJECTION, SOLUTION INTRAVENOUS; SUBCUTANEOUS at 21:53

## 2024-04-05 RX ADMIN — CHLORHEXIDINE GLUCONATE 15 ML: 1.2 RINSE ORAL at 09:19

## 2024-04-05 RX ADMIN — NYSTATIN 500000 UNITS: 100000 SUSPENSION ORAL at 11:44

## 2024-04-05 RX ADMIN — LEVALBUTEROL HYDROCHLORIDE 1.25 MG: 1.25 SOLUTION RESPIRATORY (INHALATION) at 15:46

## 2024-04-05 RX ADMIN — HYDRALAZINE HYDROCHLORIDE 50 MG: 25 TABLET ORAL at 13:31

## 2024-04-05 NOTE — SPEECH THERAPY NOTE
Speech Language/Pathology  Speech-Language Pathology Bedside Swallow Evaluation      Patient Name: Laya Brooks    Today's Date: 4/5/2024     Problem List  Principal Problem:    Acute renal failure superimposed on stage 3a chronic kidney disease (HCC)  Active Problems:    Severe protein-calorie malnutrition (HCC)    Hypercholesterolemia    Atrophy of right kidney    Renovascular hypertension    CAD (coronary artery disease)    COPD (chronic obstructive pulmonary disease) (HCC)    Bicytopenia    Bleeding at insertion site    Acute respiratory insufficiency      Past Medical History  Past Medical History:   Diagnosis Date    CAD (coronary artery disease)     Cardiac disease     Hypercholesteremia     Hyperlipidemia     Hypertension     Hypertension     Renal disorder        Past Surgical History  Past Surgical History:   Procedure Laterality Date    CORONARY ANGIOPLASTY WITH STENT PLACEMENT      IR RENAL ANGIOGRAM  4/4/2024    IR TEMPORARY DIALYSIS CATHETER PLACEMENT  3/26/2024    WRIST SURGERY         Summary   Pt presented with functional appearing oral and pharyngeal stage swallowing skills with limited materials administered today.  Able to tolerate soft solids and thin liquids with slow mastication and transfers and prompt swallows.  Increased gag sounds with intake.  No reduced coordination of breathing and swallowing today.       Recommended Diet: soft/level 3 diet and thin liquids   Recommended Form of Meds: whole with puree and crushed with puree   Aspiration precautions and swallowing strategies: upright posture, small bites/sips, and alternating bites and sips  Other Recommendations: Continue frequent oral care,will follow as able        Current Medical Status  Pt is a 79 y.o. female who presented to [facility] as a transfer from [facility] 4/5/24 for additional intervention on her renal artery stenosis.     Pt w/ recent hospitalized at [facility], transferred from [facility] to [place] on  4/2/24 for plans of angiogram and possible angioplasty of renal artery. The patient was initially admitted to [facility] for COPD exacerbation and thought to have possible CHF exacerbation on 3/24. The patient was noted to have SEEMA which worsened with IV diuresis. Nephrology was consulted and the patient was initiated on hemodialysis. Upon further evaluation the patient was noted to have severe left renal artery stenosis. Vascular surgery was consulted and recommended IR consult given patient with solitary left kidney. IR recommended transfer to [facility] for planned angiogram and angioplasty. The patient underwent HD at [facility] however this is currently on hold as the patient's renal function is improving. The patient's COPD exacerbation is now resolved.       Current Precautions:  Fall  Aspiration    Allergies:  No known food allergies    Past medical history:  Please see H&P for details    Special Studies:  CXR-Bilateral small pleural effusions with adjacent subsegmental atelectasis.     Social/Education/Vocational Hx:  Pt lives with family    Swallow Information   Current Risks for Dysphagia & Aspiration: known history of dysphagia  Current Symptoms/Concerns: change in respiratory status and noted change in respiration while eating/drinking.  Current Diet: NPO   Baseline Diet: mechanically altered/level 2 diet and thin liquids      Baseline Assessment   Behavior/Cognition: alert  Speech/Language Status: able to participate in conversation and able to follow commands  Patient Positioning: upright in bed  Pain Status/Interventions/Response to Interventions:   No report of or nonverbal indications of pain.       Swallow Mechanism Exam  Facial: symmetrical  Labial: WFL  Lingual: WFL  Velum: unable to visualize  Mandible: adequate ROM  Dentition: adequate  Vocal quality:clear/adequate   Volitional Cough: strong/productive   Respiratory Status: on RA         Consistencies Assessed and Performance   Consistencies Administered: thin  liquids, puree, mechanical soft solids, and soft solids    Oral Stage: minimal-mildly prolonged manipulation but functional w/ the material that was offered.   Mastication was adequate with the materials administered today.  Bolus formation and transfer were functional with no significant oral residue noted.  No overt s/s reduced oral control.    Pharyngeal Stage: suspected and multiple swallows  Swallow Mechanics:  Swallowing initiation appeared prompt.  Laryngeal rise was palpated and judged to be within functional limits.  No coughing, throat clearing, change in vocal quality or respiratory status noted today.     Esophageal Concerns: none reported-GERD    Strategies and Efficacy: -    Summary and Recommendations (see above)    Results Reviewed with: patient, RN, and MD     Treatment Recommended: yes     Frequency of treatment: as able     Patient Stated Goal: to get bread    Dysphagia LTG  -Patient will demonstrate safe and effective oral intake (without overt s/s significant oral/pharyngeal dysphagia including s/s penetration or aspiration) for the highest appropriate diet level.     Short Term Goals:    -Pt will tolerate Dysphagia 3/advanced (dental soft) diet and  thin liquid with no significant s/s oral or pharyngeal dysphagia across 1-3 diagnostic session/s.    -Patient will tolerate trials of upgraded food and/or liquid texture with no significant s/s of oral or pharyngeal dysphagia including aspiration across 1-3 diagnostic sessions     -Patient will comply with a Video/Modified Barium Swallow study for more complete assessment of swallowing anatomy/physiology/aspiration risk and to assess efficacy of treatment techniques so as to best guide treatment plan    Speech Therapy Prognosis   Prognosis: fair    Prognosis Considerations: medical status, prior medical history, and cognitive status

## 2024-04-05 NOTE — ASSESSMENT & PLAN NOTE
Imaging from 03/28 revealed severe L renal artery stenosis at aorto-renal junction w/moderate disease   seen more proximally as well  Has known R renal artery stenosis w/subsequent renal atrophy  Now s/p IR renal artery angiogram for potential stenting, however was unsuccessful due to large aortic plaque - if candidate, may require transfer to B for alternate stenting options  If unsuccessful stent placement, will likely HD dependent

## 2024-04-05 NOTE — PLAN OF CARE
Problem: Prexisting or High Potential for Compromised Skin Integrity  Goal: Skin integrity is maintained or improved  Description: INTERVENTIONS:  - Identify patients at risk for skin breakdown  - Assess and monitor skin integrity  - Assess and monitor nutrition and hydration status  - Monitor labs   - Assess for incontinence   - Turn and reposition patient  - Assist with mobility/ambulation  - Relieve pressure over bony prominences  - Avoid friction and shearing  - Provide appropriate hygiene as needed including keeping skin clean and dry  - Evaluate need for skin moisturizer/barrier cream  - Collaborate with interdisciplinary team   - Patient/family teaching  - Consider wound care consult   4/5/2024 1517 by Linda Almaraz RN  Outcome: Progressing  4/5/2024 1517 by Linda Almaraz RN  Outcome: Progressing     Problem: PAIN - ADULT  Goal: Verbalizes/displays adequate comfort level or baseline comfort level  Description: Interventions:  - Encourage patient to monitor pain and request assistance  - Assess pain using appropriate pain scale  - Administer analgesics based on type and severity of pain and evaluate response  - Implement non-pharmacological measures as appropriate and evaluate response  - Consider cultural and social influences on pain and pain management  - Notify physician/advanced practitioner if interventions unsuccessful or patient reports new pain  Outcome: Progressing     Problem: INFECTION - ADULT  Goal: Absence or prevention of progression during hospitalization  Description: INTERVENTIONS:  - Assess and monitor for signs and symptoms of infection  - Monitor lab/diagnostic results  - Monitor all insertion sites, i.e. indwelling lines, tubes, and drains  - Monitor endotracheal if appropriate and nasal secretions for changes in amount and color  - [place] appropriate cooling/warming therapies per order  - Administer medications as ordered  - Instruct and encourage patient and family to use good  hand hygiene technique  - Identify and instruct in appropriate isolation precautions for identified infection/condition  Outcome: Progressing  Goal: Absence of fever/infection during neutropenic period  Description: INTERVENTIONS:  - Monitor WBC    Outcome: Progressing     Problem: SAFETY ADULT  Goal: Patient will remain free of falls  Description: INTERVENTIONS:  - Educate patient/family on patient safety including physical limitations  - Instruct patient to call for assistance with activity   - Consult OT/PT to assist with strengthening/mobility   - Keep Call bell within reach  - Keep bed low and locked with side rails adjusted as appropriate  - Keep care items and personal belongings within reach  - Initiate and maintain comfort rounds  - Make Fall Risk Sign visible to staff  - Apply yellow socks and bracelet for high fall risk patients  - Consider moving patient to room near nurses station  Outcome: Progressing  Goal: Maintain or return to baseline ADL function  Description: INTERVENTIONS:  -  Assess patient's ability to carry out ADLs; assess patient's baseline for ADL function and identify physical deficits which impact ability to perform ADLs (bathing, care of mouth/teeth, toileting, grooming, dressing, etc.)  - Assess/evaluate cause of self-care deficits   - Assess range of motion  - Assess patient's mobility; develop plan if impaired  - Assess patient's need for assistive devices and provide as appropriate  - Encourage maximum independence but intervene and supervise when necessary  - Involve family in performance of ADLs  - Assess for home care needs following discharge   - Consider OT consult to assist with ADL evaluation and planning for discharge  - Provide patient education as appropriate  Outcome: Progressing  Goal: Maintains/Returns to pre admission functional level  Description: INTERVENTIONS:  - Perform AM-PAC 6 Click Basic Mobility/ Daily Activity assessment daily.  - Set and communicate daily  mobility goal to care team and patient/family/caregiver.   - Collaborate with rehabilitation services on mobility goals if consulted  - Out of bed for toileting  - Record patient progress and toleration of activity level   Outcome: Progressing     Problem: DISCHARGE PLANNING  Goal: Discharge to home or other facility with appropriate resources  Description: INTERVENTIONS:  - Identify barriers to discharge w/patient and caregiver  - Arrange for needed discharge resources and transportation as appropriate  - Identify discharge learning needs (meds, wound care, etc.)  - Arrange for interpretive services to assist at discharge as needed  - Refer to Case Management Department for coordinating discharge planning if the patient needs post-hospital services based on physician/advanced practitioner order or complex needs related to functional status, cognitive ability, or social support system  Outcome: Progressing     Problem: Knowledge Deficit  Goal: Patient/family/caregiver demonstrates understanding of disease process, treatment plan, medications, and discharge instructions  Description: Complete learning assessment and assess knowledge base.  Interventions:  - Provide teaching at level of understanding  - Provide teaching via preferred learning methods  Outcome: Progressing

## 2024-04-05 NOTE — CONSULTS
Consultation - Palliative and Supportive Care   Laya Brooks 79 y.o. female [id number]  Assessment  Present on Admission:   Acute renal failure superimposed on stage 3a chronic kidney disease (HCC)   Renal artery stenosis (HCC)   COPD (chronic obstructive pulmonary disease) (HCC)   Renovascular hypertension   CAD (coronary artery disease)   Severe protein-calorie malnutrition (HCC)   Bicytopenia   Acute respiratory insufficiency   Hypercholesterolemia   Atrophy of right kidney   Dysphagia     Active issues addressed today:  Acute renal failure superimposed on stage 3a chronic kidney disease (HCC)  COPD (chronic obstructive pulmonary disease) (HCC)  Dysphagia  Severe protein-calorie malnutrition (HCC)  Palliative care consult  Goals of care counseling    Plan  Symptom management  Defer to primary treatment team.   Fatigue is primary complaints  Denies pain    2.   Goals  Level 1 code status. Full code. Disease focused care. No limits placed.   She feels she has lived a fulfilling life and does not want aggressive interventions to prolong life if it significantly affects her function and QoL.   She does not want to be bedridden.   Does not want long-term ventilation, feeding tube. She is open to trial of these interventions if hope for reasonable recovery.   CODE status - pt says she does not want chest compressions or intubation though he  has differing opinion. Further discussion needed as our talk today was cut short d/t transport team arriving to transfer patient to [facility].   Does have living will - not in EMR     3.  Social support  Patient is supported by spouse, 4 children  Supportive listening provided  Normalized experience of patient/family  Provided anxiety containment  Provided anticipatory guidance    4.  Follow up  Palliative Care can continue to follow for ongoing GOC discussions at [facility]. Please consider placing consultation to palliative care.   Please reach out to on-call provider via [employer]  Connect if questions or concerns arise.              Decisional apparatus:  Patient is competent on exam today.  If competency is lost, patient's substitute decision maker would default to spouse by [place] Act 169.  Advance Directive/Living Will: Yes, not in EMR  POLST: No  POA Forms: Yes    We appreciate the invitation to be involved in this patient's care.  We will continue to follow throughout this hospitalization.  Please do not hesitate to reach our on call provider through our clinic answering service at [phone] should you have acute symptom control concerns.    Kevin Lopez PA-C  Palliative and Supportive Care  Clinic/Answering Service: [phone]  You can find me on Live[employer]!     Identification:  Inpatient consult to Palliative Care  Consult performed by: Kevin Lopez PA-C  Consult ordered by: MANOHAR Maurice        Physician Requesting Consult:Antonino Ernst MD  Reason for Consult / Principal Problem: GOC counseling secondary to renal failure  Date of admission:4/2/2024  Length of stay:3    History of Present Illness    Laya Brooks is a 79 y.o. female who presents with a palliative diagnosis of renal failure superimposed on CKD3, renal artery stenosis requiring stent placement, CAD, severe COPD, malnutrition.   She was brought into the ED at [facility] on 04/02/2024 from  [place] secondary to renal artery stenosis and worsening renal function for IR evaluation/intervention and renal function monitoring. She had presented initially to [facility] for COPD exacerbation. IR, nephrology and vasc surg consulted on case.  Patient to be transferred to [facility] today. PSC team consulted for GOC.     Met with patient who was resting comfortably in bed. Introduced PSC services and discussed goals of care. She feels that she has lived a long and fulfilling life. She does not desire to prolong her life through aggressive means if it meant significant decrease in her functional status. She would not want  "to be bedridden. She is open to surgical procedures, temporary feeding tubes or intubation. She would not want these interventions long-term. She is open to dialysis if needed but is unsure whether she would want to continue this long-term.     In regards to CODE status, she had said she would not want her ribs to be cracked or a tube shoved down her throat. She remains full code however because her  has convinced her to not change this. She states, \"I feel like he hopes I live forever.\" Her  was not present at today's meeting. Our conversation was unfortunately interrupted by the transport team who will be taking her to [facility]. Patient was appreciative of our discussion today and welcomes further palliative care support.       Review of Systems   Constitutional:  Positive for fatigue. Negative for activity change and appetite change.   HENT:  Negative for mouth sores, trouble swallowing and voice change.    Eyes: Negative.    Respiratory:  Negative for shortness of breath.    Cardiovascular:  Negative for chest pain.   Gastrointestinal:  Negative for abdominal pain, constipation, diarrhea, nausea and vomiting.   Genitourinary: Negative.    Musculoskeletal:  Negative for arthralgias, back pain, gait problem and myalgias.   Skin:  Negative for pallor.   Neurological:  Negative for dizziness, weakness, light-headedness and headaches.   Hematological: Negative.    Psychiatric/Behavioral:  Negative for confusion and sleep disturbance. The patient is not nervous/anxious.        Past Medical History:   Diagnosis Date    CAD (coronary artery disease)     Cardiac disease     Hypercholesteremia     Hyperlipidemia     Hypertension     Hypertension     Renal disorder      Past Surgical History:   Procedure Laterality Date    CORONARY ANGIOPLASTY WITH STENT PLACEMENT      IR RENAL ANGIOGRAM  4/4/2024    IR TEMPORARY DIALYSIS CATHETER PLACEMENT  3/26/2024    WRIST SURGERY       Social History     Socioeconomic History "    Marital status: /Civil Union     Spouse name: Not on file    Number of children: Not on file    Years of education: Not on file    Highest education level: Not on file   Occupational History    Not on file   Tobacco Use    Smoking status: Former    Smokeless tobacco: Never   Vaping Use    Vaping status: Never Used   Substance and Sexual Activity    Alcohol use: Never    Drug use: No    Sexual activity: Not on file   Other Topics Concern    Not on file   Social History Narrative    Not on file     Social Determinants of Health     Financial Resource Strain: Not on file   Food Insecurity: No Food Insecurity (3/25/2024)    Hunger Vital Sign     Worried About Running Out of Food in the Last Year: Never true     Ran Out of Food in the Last Year: Never true   Transportation Needs: No Transportation Needs (3/25/2024)    PRAPARE - Transportation     Lack of Transportation (Medical): No     Lack of Transportation (Non-Medical): No   Physical Activity: Not on file   Stress: Not on file   Social Connections: Not on file   Intimate Partner Violence: Not on file   Housing Stability: Low Risk  (3/25/2024)    Housing Stability Vital Sign     Unable to Pay for Housing in the Last Year: No     Number of Places Lived in the Last Year: 1     Unstable Housing in the Last Year: No     History reviewed. No pertinent family history.    Medications:  all current active meds have been reviewed, current meds:   Current Facility-Administered Medications   Medication Dose Route Frequency    acetaminophen (TYLENOL) tablet 650 mg  650 mg Oral Q4H PRN    albuterol inhalation solution 2.5 mg  2.5 mg Nebulization Q4H PRN    ALPRAZolam (XANAX) tablet 0.5 mg  0.5 mg Oral TID PRN    aluminum-magnesium hydroxide-simethicone (MAALOX) oral suspension 30 mL  30 mL Oral Q4H PRN    atorvastatin (LIPITOR) tablet 40 mg  40 mg Oral Daily With Dinner    bisacodyl (DULCOLAX) rectal suppository 10 mg  10 mg Rectal Daily    chlorhexidine (PERIDEX) 0.12 %  oral rinse 15 mL  15 mL Mouth/Throat Q12H JHON    famotidine (PEPCID) tablet 10 mg  10 mg Oral Daily    hydrALAZINE (APRESOLINE) tablet 50 mg  50 mg Oral Q8H JHON    insulin lispro (HumALOG/ADMELOG) 100 units/mL subcutaneous injection 1-5 Units  1-5 Units Subcutaneous 4x Daily (AC & HS)    ipratropium (ATROVENT) 0.02 % inhalation solution 0.5 mg  0.5 mg Nebulization TID    labetalol (NORMODYNE) injection 10 mg  10 mg Intravenous Q6H PRN    levalbuterol (XOPENEX) inhalation solution 1.25 mg  1.25 mg Nebulization TID    NIFEdipine (PROCARDIA XL) 24 hr tablet 60 mg  60 mg Oral Daily    nystatin (MYCOSTATIN) oral suspension 500,000 Units  500,000 Units Swish & Swallow 4x Daily    ondansetron (ZOFRAN) injection 4 mg  4 mg Intravenous Q6H PRN    polyethylene glycol (MIRALAX) packet 17 g  17 g Oral BID    predniSONE tablet 20 mg  20 mg Oral Daily    Followed by    [START ON 4/8/2024] predniSONE tablet 10 mg  10 mg Oral Daily    senna (SENOKOT) tablet 8.6 mg  1 tablet Oral BID    sodium chloride (OCEAN) 0.65 % nasal spray 1 spray  1 spray Each Nare Q1H PRN   , and PTA meds:   Prior to Admission Medications   Prescriptions Last Dose Informant Patient Reported? Taking?   albuterol (PROVENTIL HFA,VENTOLIN HFA) 90 mcg/act inhaler   Yes No   Sig: Inhale 2 puffs every 6 (six) hours as needed for wheezing   amLODIPine (NORVASC) 5 mg tablet 4/1/2024  No Yes   Sig: Take 1 tablet (5 mg total) by mouth daily   atorvastatin (LIPITOR) 40 mg tablet 4/1/2024  No Yes   Sig: TAKE 1 TABLET BY MOUTH ONCE DAILY IN THE EVENING. TAKE IN PLACE OF SIMVASTATIN   clopidogrel (PLAVIX) 75 mg tablet 4/1/2024  No Yes   Sig: Take 1 tablet (75 mg total) by mouth daily To prevent stroke and heart attack   ergocalciferol (ERGOCALCIFEROL) 1.25 MG (59987 UT) capsule   No No   Sig: Take 1 capsule (50,000 Units total) by mouth once a week   lisinopril (ZESTRIL) 10 mg tablet 4/1/2024  No Yes   Sig: Take 1 tablet (10 mg total) by mouth daily   tiotropium-olodaterol  (Stiolto Respimat) 2.5-2.5 MCG/ACT inhaler   No No   Sig: Inhale 2 puffs daily      Facility-Administered Medications: None       Allergies   Allergen Reactions    Influenza Virus Vaccine        Objective:  BP (!) 182/87   Pulse 82   Temp (!) 97 °F (36.1 °C) (Tympanic)   Resp (!) 35   Ht 5' (1.524 m)   Wt 52.6 kg (115 lb 15.4 oz)   SpO2 92%   BMI 22.65 kg/m²     Physical Exam  Vitals and nursing note reviewed.   Constitutional:       General: She is not in acute distress.     Appearance: She is cachectic. She is ill-appearing.      Interventions: Nasal cannula in place.      Comments: frail   HENT:      Head: Normocephalic and atraumatic.      Right Ear: External ear normal.      Left Ear: External ear normal.      Nose: Nose normal.      Mouth/Throat:      Pharynx: Oropharynx is clear.   Eyes:      Conjunctiva/sclera: Conjunctivae normal.   Cardiovascular:      Rate and Rhythm: Normal rate.   Pulmonary:      Effort: Pulmonary effort is normal. Tachypnea present. No respiratory distress.   Abdominal:      General: Abdomen is flat.   Musculoskeletal:         General: No deformity.      Cervical back: Normal range of motion.   Skin:     General: Skin is warm and dry.      Capillary Refill: Capillary refill takes less than 2 seconds.      Findings: Bruising present.   Neurological:      Mental Status: She is alert and oriented to person, place, and time.   Psychiatric:         Mood and Affect: Mood normal.         Behavior: Behavior normal.         Lab Results: I have personally reviewed pertinent labs., CBC:   Lab Results   Component Value Date    WBC 4.78 04/05/2024    HGB 9.6 (L) 04/05/2024    HCT 29.7 (L) 04/05/2024    MCV 91 04/05/2024    PLT 78 (L) 04/05/2024    RBC 3.25 (L) 04/05/2024    MCH 29.5 04/05/2024    MCHC 32.3 04/05/2024    RDW 14.5 04/05/2024    MPV 12.0 04/05/2024   , CMP:   Lab Results   Component Value Date    SODIUM 136 04/05/2024    K 4.2 04/05/2024     04/05/2024    CO2 22  04/05/2024    BUN 44 (H) 04/05/2024    CREATININE 1.45 (H) 04/05/2024    CALCIUM 7.6 (L) 04/05/2024    AST 12 (L) 04/05/2024    ALT 14 04/05/2024    ALKPHOS 40 04/05/2024    EGFR 33 04/05/2024     Imaging Studies: I have personally reviewed pertinent reports.  IR renal angiogram    Result Date: 4/5/2024  Narrative: Examination: Renal angiogram and stent placement, not performed HISTORY: True vasculopath with ischemic insults in multiple vascular territories. She has a previously documented left renal artery stenosis. This is functionally a solitary kidney. She recently went into acute renal failure. She has been dialyzed several times. There is still some renal function. Trying to hold off on dialysis. She is not tolerating fluid boluses well TECHNIQUE: The patient was brought to radiology.  Informed consent was obtained.  The left groin was prepped and draped in the usual sterile fashion.  Timeout was performed.  Lidocaine was given as local anesthesia. Using sonographic guidance, an 18-gauge needle was advanced into the common femoral artery.  Images were obtained. Using fluoroscopic guidance, a wire was advanced into the external iliac artery. The common femoral was quite calcified. Roadmap guidance was used to navigate the common femoral. The external iliac was diffusely calcified. Roadmap guidance was used here as well A 6 Belgian frazier sheath was placed from the left groin into the infrarenal abdominal aorta. A 5 Belgian VS 2 catheter was used to select the left renal artery. A tattoo wire was advanced through the renal artery. The VS 2 catheter would not seat into the renal artery. The patient was systemically heparinized. I then tried 5 Belgian Cobra 2 catheter, Gnat catheter, and Berenstein. They would not pass the lesion. 4 Belgian glide catheter would not cross the lesion. A Tie Siding crossing catheter would not cross the lesion. Attempts to advance the dilator of the 6 Belgian sheath across the lesion  failed. The catheter was removed. The sheath was exchanged for a Perclose device. The Perclose failed. She was given protamine, and reverse. The sheath was removed. Prompt hemostasis was obtained. I want to see her that evening. The groin has some bruising from the Perclose exchange. There was no hematoma. COMPARISON: Previous MRI FINDINGS: There is a single left renal artery. This is downward angled, at 51 degrees. Once the stiff part of the wire got into the renal artery, it was much closer to 90 degrees. The dominant division had a prominent knuckle. The tattoo wire would not advance through this knuckle. This can be decreased purchase. I couldn't get anything in an 035 platform to cross over the high-grade stenosis at the ostium. This is part of a large aortic plaque. I abandon the procedure in favor of an approach from above the diaphragm. This could be brachial or radial. I would favor a small diameter wire, and low-profile system, at least 4 predilation.     Impression: Technically successful renal angiogram using sonographic and fluoroscopic guidance. Technically unsuccessful angioplasty and stenting. I recommended she go to about the more they have a better variety of long devices for upper extremity approach This is the end of the clinically relevant portion of this report.  Subsequent information is for compliance, documentation, and coding purposes. In the process of informed consent, information was communicated, verbally, to the patient regarding the procedure.  The patient was informed of; the name of the procedure, nature of the procedure, nature of the condition, risks, benefits, alternatives, and potential complications, as well as the consequences of not having the procedure.  All the patient's questions were answered.  Informed consent was signed. Automated exposure control was utilized, and dose was minimized, in accordance with the application of the ALARA technique. Chlorhexidine and alcohol  "was used for cleansing and sterile preparation of the skin.  This was allowed to dry prior to the application of sterile draping. Timeout was performed, with all team members present, and in agreement.  Confirmation of patient, procedure, laterally, allergies, and all needed equipment was performed. The patient was examined, and is in satisfactory condition for planned moderate sedation. Mallampati score: 2 Intravenous conscious sedation was provided.  There was continuous monitoring of blood pressure, heart rate, respiratory rate, and oxygen saturation by an independent trained observer. Conscious sedation time: 105 minutes Versed dose: 1 milligrams Fentanyl dose: 75 micrograms After the procedure, the patient was recovered, and return to their baseline status, and was deemed fit for discharge from IR Fluoroscopy time: 18.4 Minutes Radiation dose: 347 milliGray CONTRAST DOSE: 6 cc Visipaque 320 PROCEDURE: See report title Preprocedure diagnosis: Please see \"history \", above Postprocedure diagnosis: Same Antibiotics: None Specimen: None Estimated blood loss: Less than 5 mL Anesthesia: Local and monitored intravenous conscious sedation Ultrasound was used to evaluate the above-named target as a potential access.  Static and real-time images of needle entry were obtained, and archived. Workstation performed: [id number]     XR chest portable    Result Date: 4/4/2024  Narrative: XR CHEST PORTABLE INDICATION: SOB. COPD, dyspnea. COMPARISON: Chest radiograph 3/29/2024. FINDINGS: Dialysis catheter with tip projecting over the cavoatrial junction. Bibasilar subsegmental atelectasis. Small bilateral left greater than right pleural effusions. Unremarkable cardiomediastinal silhouette.  Atherosclerotic vascular calcifications noted. Bones are unremarkable for age. Normal upper abdomen.     Impression: Bilateral small pleural effusions with adjacent subsegmental atelectasis. Resident: FLAVIO ROSS I, the attending " radiologist, have reviewed the images and agree with the final report above. Workstation performed: [id number]     CT abdomen wo contrast    Result Date: 4/2/2024  Narrative: CT - ABDOMEN WITHOUT IV CONTRAST INDICATION: evaluate renal artery for diameter and calcification; planning for L renal artery intervention. COMPARISON: 3/25/2024 ultrasound, 5/23/2022 MRA. : TECHNIQUE: CT examination of the abdomen was performed without intravenous contrast. Multiplanar 2D reformatted images were created from the source data. This examination, like all CT scans performed in the [facility], was performed utilizing techniques to minimize radiation dose exposure, including the use of iterative reconstruction and automated exposure control. Radiation dose length product (DLP) for this visit: 373.99 mGy-cm Enteric Contrast: Not administered. FINDINGS: LOWER CHEST: Large bilateral dependent pleural effusions, right greater than left. Associated atelectatic changes in both lower lobes. LIVER/BILIARY TREE: 8 mm rounded well-defined hypodensity right posterior segment image 2/35. Stable compared to MRI. Mild relative hypertrophy of the left liver and relative dilatation of the main portal vein. Correlate with clinical and liver disease. GALLBLADDER: Cholelithiasis without findings of acute cholecystitis. SPLEEN: Unremarkable. PANCREAS: Unremarkable. ADRENAL GLANDS: Unremarkable. KIDNEYS/VISUALIZED URETERS: Marked atrophy of the right kidney, cortical thickness of approximately 6 mm. Multiple renal cortical cysts present. Left kidney within normal limits for age. Right renal artery poorly visualized at its origin as it abuts the IVC at this level and cannot be differentiated in the absence of contrast. Left renal artery is approximately 5 mm in the axial plane at the origin, image 2/47 and 5 mm on the coronal reformat image 601/67. There is no calcified plaque seen within the proximal and mid segments of the main  left renal artery but there is heavy calcified plaque throughout the aorta including at the level of the origin. STOMACH AND VISUALIZED BOWEL: Unremarkable. ABDOMINAL CAVITY: Mild ascites. No pneumoperitoneum. No lymphadenopathy. VESSELS: Unremarkable for patient's age. ABDOMINAL WALL: Diffuse significant flank edema suggesting third spacing of fluid. BONES: No acute fracture or suspicious osseous lesion.     Impression: Large bilateral pleural effusions, ascites and extensive flank edema. Findings suggesting fluid overload/third spacing. Cholelithiasis without evidence for cholecystitis. Significant renal cortical atrophy and cysts as as above. Left renal artery measurements provided as per history. Heavy calcified plaque within the aorta including at the level of the origin but not extending within the renal artery. Asymmetric hypertrophy of the left liver and dilatation of the portal vein. Correlate for evidence of underlying liver disease and portal hypertension. Workstation performed: [id number]     XR chest 1 view    Result Date: 3/29/2024  Narrative: XR CHEST 1 VIEW INDICATION: Shortness of breath. COMPARISON: 3/26/2024, CT chest 3/24/2024 FINDINGS: Monitoring leads and clips project over the chest. -Right IJ central line tip terminates in the region of the distal SVC. Small bilateral pleural effusions with left retrocardiac opacity, the latter possibly representing atelectasis or infiltrate. No pneumothorax. Normal cardiomediastinal silhouette. Bones are unremarkable for age. Normal upper abdomen.     Impression: Small bilateral pleural effusions with left retrocardiac opacity, the latter possibly representing atelectasis or infiltrate. Workstation performed: [id number]     IR temporary dialysis catheter placement    Result Date: 3/29/2024  Narrative: Examination: Bedside placement of central line, with sonographic guidance. HISTORY: ICU patient, . To start dialysis TECHNIQUE: Informed consent was  obtained.  The right neck and chest were prepped and draped in the usual sterile fashion.  Timeout was performed.  Lidocaine was given as local anesthesia.  Using ultrasound guidance, a 21 gauge needle was used to cannulate the Internal jugular vein.  A wire was advanced.  The needle was exchanged, over the wire, for serial dilators.  A central line was placed to 20 centimeters.  The line was secured in place with suture. The patient tolerated the procedure well.  There were no immediate complications or complaints. FINDINGS: Ultrasound images show, good caliber jugular vein Subsequent chest x-ray showed final catheter tip position of Juncture of superior vena cava and right atrium.     Impression: Technically successful ultrasound guided placement of central venous access catheter. This is the end of the clinically relevant portion of this report.  Subsequent information is for compliance, documentation, and coding purposes. In the process of informed consent, information was communicated, verbally, to the patient regarding the procedure.  The patient was informed of; the name of the procedure, nature of the procedure, nature of the condition, risks, benefits, alternatives, and potential complications, as well as the consequences of not having the procedure.  All the patient's questions were answered.  Informed consent was signed.  Quoted risks include bleeding, infection, inadvertent placement of a line tip outside of the central system, and pneumothorax. Chlorhexidine and alcohol was used for cleansing and sterile preparation of the skin.  This was allowed to dry prior to the application of sterile draping. Timeout was performed, with all team members present, and in agreement.  Confirmation of patient, procedure, laterally, allergies, and all needed equipment was performed. When ultrasound was used for needle entry guidance, into the vein, static and real-time images of needle entry are obtained, and archived.  "PROCEDURE: Central line PREPROCEDURE DIAGNOSIS: Please see \"history \", above POSTPROCEDURE DIAGNOSIS: Same ANTIBIOTICS: None SPECIMEN: None ESTIMATED BLOOD LOSS: None ANESTHESIA: Local Workstation performed: [id number]     VAS renal artery limited    Result Date: 3/29/2024  Narrative:  [facility] REPORT CLINICAL: Indications: Provider would like to evaluate perfusion to the left kidney due to rapid severe SEEMA and history of renal artery disease. Operative History: Coronary Angioplasty w/ Stent Placement Risk Factors The patient has history of HTN, Hyperlipidemia, CKD and CAD.  FINDINGS:  Unilateral     Impression  PSV (cm/s)  EDV (cm/s)  Sup-Calli Ao                         60              Celiac         >70%               265          19  Px Inf-Tomas Ao  Ectatic             62              Ds Inf-Tomas Ao                      73              Prox. SMA      >70%               517          17   Segment        Righ  Left                            RI  Impression  PSV (cm/s)  EDV (cm/s)   RAR    RI  Kidney (cm)  Ostial Renal         60 - 99%           530         167  8.91                     Prox Renal     0.80  60 - 99%           297          65  4.99                     Mid Renal      0.77                     131          30  2.20                     Dist Renal     0.75                      91          25  1.53                     Celiac Artery                            13           5        0.60               Kidney                                                                      9.80  Renal                Patent                                                       Hilum                                    13           5        0.60                  CONCLUSION: Impression The abdominal aorta is widely patent and ectatic in caliber in the mid segment.  LEFT RENAL: No evidence of significant arterial occlusive disease in the main renal artery. Patent renal vein. Adequate parenchymal flow is noted with a " renovascular resistive index of 0.60. Renal/Aorta Ratio: 8.91. The kidney measures approximately 9.8 x 4.3 cm. Prior: 10.0 x 4.7 cm.  MESENTERIC: Celiac and superior mesenteric arteries demonstrate elevated velocities, consistent with significant stenosis.  Compared to previous study on 5/6/2022, there is no significant change.  SIGNATURE: Electronically Signed by: JIAN CULVER MD, RPVI on 2024-03-28 02:23:29 PM ADDENDUM:  3/29/2024 12:16:15 PM: After further review of the data the patient does have a severe left renal artery stenosis at the aorto- renal junction.  Moderate disease more proximally is seen as well. Signed by JIAN CULVER MD, RPVI on 2024-03-29 12:20:31 PM     VAS VENOUS DUPLEX - LOWER LIMB BILATERAL    Result Date: 3/26/2024  Narrative:  [facility] REPORT CLINICAL: Indications: Physician wants to determine patency of the venous system secondary to elevated d-dimer. Operative History: Coronary Angioplasty w/ Stent Placement Risk Factors The patient has history of HTN, Hyperlipidemia, CKD and CAD.   CONCLUSION:  Impression: RIGHT LOWER LIMB: No evidence of acute or chronic deep vein thrombosis. No evidence of superficial thrombophlebitis noted. Doppler evaluation shows a normal response to augmentation maneuvers. Popliteal, posterior tibial and anterior tibial arterial Doppler waveform's are monophasic.  LEFT LOWER LIMB: No evidence of acute or chronic deep vein thrombosis. No evidence of superficial thrombophlebitis noted. Doppler evaluation shows a normal response to augmentation maneuvers. Popliteal, posterior tibial and anterior tibial arterial Doppler waveform's are monophasic.  Technical findings were given to bedside nurse.  SIGNATURE: Electronically Signed by: OSEAS MENEZES MD on 2024-03-26 04:34:20 PM    XR chest portable    Result Date: 3/26/2024  Narrative: XR CHEST PORTABLE INDICATION: line placement. COMPARISON: 3/25/2024 FINDINGS: There is placement of a right internal  jugular central venous catheter with tip over the caval atrial junction. Clear lungs. No pneumothorax or pleural effusion. Normal cardiomediastinal silhouette. Bones are unremarkable for age. Normal upper abdomen.     Impression: No pneumothorax status post line placement. Workstation performed: [id number]     CT head wo contrast    Result Date: 3/26/2024  Narrative: CT BRAIN - WITHOUT CONTRAST INDICATION:   confusion. COMPARISON: 3/7/2022. TECHNIQUE:  CT examination of the brain was performed.  Multiplanar 2D reformatted images were created from the source data. Radiation dose length product (DLP) for this visit:  891.51 mGy-cm .  This examination, like all CT scans performed in the [facility], was performed utilizing techniques to minimize radiation dose exposure, including the use of iterative  reconstruction and automated exposure control. IMAGE QUALITY:  Diagnostic. FINDINGS: PARENCHYMA:  No intracranial mass, mass effect decreased attenuation is noted in periventricular and subcortical white matter demonstrating an appearance that is statistically most likely to represent mild microangiopathic change. No CT signs of acute infarction.  No intracranial mass, mass effect or midline shift.  No acute parenchymal hemorrhage. No midline shift. No CT signs of acute infarction.  No acute parenchymal hemorrhage. VENTRICLES AND EXTRA-AXIAL SPACES:  Ventricles and extra-axial CSF spaces are prominent commensurate with the degree of volume loss.  No hydrocephalus.  No acute extra-axial hemorrhage. VISUALIZED ORBITS: Normal visualized orbits. PARANASAL SINUSES: Normal visualized paranasal sinuses. CALVARIUM AND EXTRACRANIAL SOFT TISSUES:  Normal.     Impression: No acute intracranial abnormality.  Chronic microangiopathic changes. Workstation performed: [id number]     XR chest portable ICU    Result Date: 3/26/2024  Narrative: XR CHEST PORTABLE ICU INDICATION: respiratory failure. COMPARISON: Chest radiograph  March 23, 2024 FINDINGS: Pulmonary emphysema. No focal consolidation. Decreased size of the small left pleural effusion. No significant change in size of a small right pleural effusion. Normal cardiomediastinal silhouette. Bones are unremarkable for age. Normal upper abdomen.     Impression: Decreased size of the now small left pleural effusion. No significant change in size of the small right pleural effusion. Workstation performed: [id number]     US kidney and bladder    Result Date: 3/25/2024  Narrative: RENAL ULTRASOUND INDICATION: oliguric zheng. COMPARISON: Renal ultrasound 1/15/2013. TECHNIQUE: Ultrasound of the retroperitoneum was performed with a curvilinear transducer utilizing volumetric sweeps and still imaging techniques. Challenging exam due to patient condition and positioning. FINDINGS: KIDNEYS: Right kidney: 7.6 x 3.3 x 3.0 cm. Volume 39.8 mL Left kidney: 9.3 x 4.0 x 4.1 cm. Volume 79.0 mL Right kidney Limited visualization of the right kidney due to patient condition; still images obtained from cine sweeps. Atrophic and markedly echogenic. An interpolar cortical cyst measures 3.1 x 2.0 x 3.1 cm and demonstrates 2 thin internal septations and no internal Doppler flow, likely representing a Bosniak II renal cyst. No hydronephrosis. No shadowing calculi. No perinephric fluid collections. Left kidney Normal echogenicity and contour. No mass is identified. No hydronephrosis. No shadowing calculi. No perinephric fluid collections. URETERS: Nonvisualized. BLADDER: Decompressed with a Perez.     Impression: Chronically atrophic, echogenic right kidney. Normal left kidney. No calculi or hydronephrosis. Workstation performed: [id number]     Echo complete    Result Date: 3/25/2024  Narrative:   Left Ventricle: Left ventricular cavity size is normal. Wall thickness is increased. There is borderline concentric hypertrophy. The left ventricular ejection fraction is 55%. Systolic function is normal. Wall motion is  normal. Diastolic function is normal.   Left Atrium: The atrium is mildly dilated.   Aortic Valve: There is aortic valve sclerosis.   Mitral Valve: There is moderate annular calcification.   Tricuspid Valve: There is mild regurgitation.     CT chest wo contrast    Result Date: 3/24/2024  Narrative: CT CHEST WITHOUT IV CONTRAST INDICATION: shortness of breath. Patient is a former smoker. COMPARISON: Chest CT from 10/20/2017. TECHNIQUE: CT examination of the chest was performed without intravenous contrast. Multiplanar 2D reformatted images were created from the source data. This examination, like all CT scans performed in the [facility], was performed utilizing techniques to minimize radiation dose exposure, including the use of iterative reconstruction and automated exposure control. Radiation dose length product (DLP) for this visit: 164.3 mGy-cm FINDINGS: LUNGS: Moderate emphysema. No evidence of pneumonia. Compressive atelectasis of left lower lobe secondary to pleural effusion. There is no tracheal or endobronchial lesion. PLEURA: Small bilateral water density pleural effusions. HEART/GREAT VESSELS: Heart is unremarkable for patient's age. No thoracic aortic aneurysm. MEDIASTINUM AND RAINE: Unremarkable. CHEST WALL AND LOWER NECK: Unremarkable. VISUALIZED STRUCTURES IN THE UPPER ABDOMEN: Unremarkable. OSSEOUS STRUCTURES: No acute fracture or destructive osseous lesion.     Impression: Small bilateral water density pleural effusions with compressive atelectasis of left lower lobe. Moderate emphysema. No evidence of pneumonia. Workstation performed: [id number]     XR chest portable    Result Date: 3/24/2024  Narrative: XR CHEST PORTABLE INDICATION: sob. COMPARISON: 3/7/2024 FINDINGS: No evidence for pneumothorax. New moderate left and small right pleural effusions with bibasilar atelectasis and bilateral lower lobe hazy pulmonary infiltrates noted suggesting pneumonia. Mildly enlarged cardiac  silhouette, unchanged. Calcific atherosclerosis of the aortic arch region again seen. Bones are unremarkable for age. Normal upper abdomen.     Impression: New moderate left and small right pleural effusions with bibasilar atelectasis and bilateral lower lobe hazy pulmonary infiltrates noted suggesting pneumonia. Workstation performed: [id number]     XR chest pa & lateral    Result Date: 3/7/2024  Narrative: CHEST INDICATION:   Chronic obstructive pulmonary disease, unspecified. COMPARISON:  None. EXAM PERFORMED/VIEWS:  XR CHEST PA & LATERAL FINDINGS: Cardiomediastinal silhouette appears unremarkable. The lungs are clear.  No pneumothorax or pleural effusion. Hyperinflated appearing lungs with flattened diaphragms, increased clear space and parenchymal attenuation. Osseous structures appear within normal limits for patient age.     Impression: No acute cardiopulmonary disease. Hyperinflated. No significant change from prior study 1/29/2024 Electronically signed: 03/07/2024 04:10 PM Sammy Squires MD       EKG, Pathology, and Other Studies: I have personally reviewed pertinent reports.  ECG 12 lead  Status: Final result     ECG 12 lead  Order: [id number]   Status: Final result       Visible to patient: No (inaccessible in [facility])       Next appt: 05/03/2024 at 01:40 PM in Pulmonology (Lisa Kidd MD)    0 Result Notes            Component  Ref Range & Units 4/4/24 1457 3/27/24 1426 3/23/24 2149 10/17/23 1038 4/6/22 0953 3/7/22 1312 10/20/17 1441   Ventricular Rate  BPM 76 99 92   76 104   Atrial Rate  BPM 76 99 92   76 104   TX Interval  ms 80 84 84 00:04:04 R 00:03:00 R 116 126   QRSD Interval  ms 70 74 80   80 82   QT Interval  ms 404 342 370   386 350   QTC Interval  ms 454 438 457   434 460   P Axis  degrees 76 75 82   84 78   QRS Axis  degrees 72 80 85   69 61   T Wave Axis  degrees 57 82 27   61 40   MAX. SYSTOLIC BP    190 R 138 R     ECG Interp during Ex          Ex Summary Comment         "  Chest Pain Statement    none non-limiting     Overall Hr Response To Exercise          Overall BP Response To Exercise          Max Diastolic Bp    90 R 76 R     Max Heart Rate    103 R 137 R     Max Predicted Heart Rate    140 R 141 R     Reason for Termination    PT REQUEST TO STOP TEST, DYSPNEA, DIZZINESS Test Complete..     Test Indication    r/o chronotropic incompetence Dyspnea     Target Hr Formular    (220 - Age)*85% (220 - Age)*85%     Arrhy During Ex          ECG Interp Before Ex                     Narrative & Impression    Sinus rhythm with short ID with Premature supraventricular complexes  Premature ventricular complexes  Abnormal EKG  When compared with ECG of 27-MAR-2024 14:26,  No significant change was found  Confirmed by Kristian Lowe ([id number]) on 4/4/2024 4:59:56 PM             Counseling / Coordination of Care  Total floor / unit time spent today 60 minutes. Greater than 50% of total time was spent with the patient and / or family counseling and / or coordination of care. A description of the counseling / coordination of care: Reviewed chart, provided medical updates, determined goals of care, discussed palliative care and symptom management, discussed code status, provided anticipatory guidance, determined competency and POA/HCA, determined social/family support, provided psychosocial support. Reviewed with ICU team, RN.      Portions of this document may have been created using dictation software and as such some \"sound alike\" terms may have been generated by the system. Do not hesitate to contact me with any questions or clarifications.   "

## 2024-04-05 NOTE — PLAN OF CARE
Pt. States that she doesn't understand how she got so sick and that she doesn't feel sick.    Left groin puncture wound slightly oozing but site is WNL and soft.    PT. Had BM this am post medication and suppository.  Pt. Transferred to [facility].

## 2024-04-05 NOTE — PROGRESS NOTES
Patient arrived to unit with transport team, noted to be significantly short of breath, spO2 87% on 2L, titrated to 6L NC to maintain >90%.     Also noted to have L groin puncture with bloody saturated dressing, blood pooled underneath patient.    [facility] admitting team paged to bedside. Pending evaluation and new orders. Will continue to monitor.

## 2024-04-05 NOTE — ASSESSMENT & PLAN NOTE
Lab Results   Component Value Date    EGFR 33 04/05/2024    EGFR 34 04/04/2024    EGFR 34 04/04/2024    CREATININE 1.45 (H) 04/05/2024    CREATININE 1.44 (H) 04/04/2024    CREATININE 1.44 (H) 04/04/2024     Creatinine peaked at 6.27 at [facility] - now around 1.4 - 1.5  Baseline creatinine appears to be around 0.9 - 1.1  Suspect this is 2/2 significant bilateral renal artery stenosis w/atrophic R kidney   Has received IHD x3 since admission to [facility] - last treatment was on 03/29  Is s/p IV Lasix 60 mg x1 yesterday AM and IV Lasix 80 mg x1 yesterday PM w/good response - received IV Lasix 80 mg x1 this AM  Nephrology following - may require additional RRT  Avoid nephrotoxic agents and fluctuations in BP  Trend renal indices  Strict I/O   Daily weights

## 2024-04-05 NOTE — ASSESSMENT & PLAN NOTE
Malnutrition Findings:                                 BMI Findings:           Body mass index is 22.65 kg/m².     Continue nutritional supplementation   Nutrition consult

## 2024-04-05 NOTE — ASSESSMENT & PLAN NOTE
PFTs from 02/2024 revealed FEV1 61% - moderate obstruction  Follows with Pulmonology - recently started on Stiolto and PRN Albuterol inhalers as this is newer diagnosis    Plan:  -Completed steroid taper  -continue home inhalers

## 2024-04-05 NOTE — ASSESSMENT & PLAN NOTE
In setting of R renal artery stenosis   Nephrology following - patient may ultimately require RRT if L renal artery unable to be stented

## 2024-04-05 NOTE — TRANSPORTATION MEDICAL NECESSITY
"Section I - General Information    Name of Patient: Laya Brooks                 : 1945    Medicare #: [id number]  Transport Date: 24 (PCS is valid for round trips on this date and for all repetitive trips in the 60-day range as noted below.)  Origin: [facility] INTENSIVE CARE UNIT                                                         Destination: [facility]  Is the pt's stay covered under Medicare Part A (PPS/DRG)   [x]     Closest appropriate facility? If no, why is transport to more distant facility required? Yes  If hospice pt, is this transport related to pt's terminal illness? NA       Section II - Medical Necessity Questionnaire  Ambulance transportation is medically necessary only if other means of transport are contraindicated or would be potentially harmful to the patient. To meet this requirement, the patient must either be \"bed confined\" or suffer from a condition such that transport by means other than ambulance is contraindicated by the patient's condition. The following questions must be answered by the medical professional signing below for this form to be valid:    1)  Describe the MEDICAL CONDITION (physical and/or mental) of this patient AT THE TIME OF AMBULANCE TRANSPORT that requires the patient to be transported in an ambulance and why transport by other means is contraindicated by the patient's condition:Kidney Failure    2) Is the patient \"bed confined\" as defined below?     Yes  To be \"be confined\" the patient must satisfy all three of the following conditions: (1) unable to get up from bed without Assistance; AND (2) unable to ambulate; AND (3) unable to sit in a chair or wheelchair.    3) Can this patient safely be transported by car or wheelchair van (i.e., seated during transport without a medical attendant or monitoring)?   No    4) In addition to completing questions 1-3 above, please check any of the following conditions that apply*: "   *Note: supporting documentation for any boxes checked must be maintained in the patient's medical records.  If hosp-hosp transfer, describe services needed at 2nd facility not available at 1st facility?   Medical attendant required   Requires oxygen-unable to self administer  Hemodynamic monitoring required en route  Cardiac monitoring required en route       Section III - Signature of Physician or Healthcare Professional  I certify that the above information is true and correct based on my evaluation of this patient, and represent that the patient requires transport by ambulance and that other forms of transport are contraindicated. I understand that this information will be used by the Centers for Medicare and Medicaid Services (CMS) to support the determination of medical necessity for ambulance services, and I represent that I have personal knowledge of the patient's condition at time of transport.    [x]  If this box is checked, I also certify that the patient is physically or mentally incapable of signing the ambulance service's claim and that the institution with which I am affiliated has furnished care, services, or assistance to the patient.    My signature below is made on behalf of the patient pursuant to 42 CFR §424.36(b)(4). In accordance with 42 CFR §424.37, the specific reason(s) that the patient is physically or mentally incapable of signing the claim form is as follows:      Signature of Physician* or Healthcare Professional______________________________________________________________  Signature Date 04/05/24 (For scheduled repetitive transports, this form is not valid for transports performed more than 60 days after this date)    Printed Name & Credentials of Physician or Healthcare Professional (MD, , RN, etc.)_____Keysha Cantu RN___________________________  *Form must be signed by patient's attending physician for scheduled, repetitive transports. For non-repetitive, unscheduled ambulance  transports, if unable to obtain the signature of the attending physician, any of the following may sign (choose appropriate option below)  [] Physician Assistant []  Clinical Nurse Specialist []  Registered Nurse  []  Nurse Practitioner  [x] Discharge Planner

## 2024-04-05 NOTE — QUICK NOTE
[facility] IR team aware of patient and needs. Dr Landry and MANOHAR Rodas aware.    Please re-consult IR when patient arrives at [facility] for procedure planning.

## 2024-04-05 NOTE — QUICK NOTE
Patient's  updated via telephone regarding plan for transfer and overall patient status. All questions/concerns were answered and addressed.

## 2024-04-05 NOTE — PROGRESS NOTES
Progress Note - Vascular Surgery   Laya Brooks 79 y.o. female MRN: [id number]  Unit/Bed#: ICU 03-01 Encounter: [id number]    Assessment:  78 yo F w/ HLD, CVA, HTN, dCHF, malnutrition BMI: 18, carotid stenosis, CAD s/p coronary stent, tobacco use, HLD, CKD presents with renal artery stenosis   Afebrile, vitals stable on 2L NC  Cr 1.45 (1.44)  WBC 4.78   Hgb 9.6 (11.2)  PPD# 1 s/p L rental artery angiogram with unsuccessful renal stenting     Plan:  Plan for transfer to hospitals for second attempt of revascularization of L renal artery  Appreciate nephrology recommendations   Avoid nephrotoxins, monitor urine output  Monitor labs and vitals   NPO   Analgesia and antiemetics prn   Rest of medical management per CC  Updated Dr. Gill via TT    Subjective/Objective   Subjective: Patient reports feeling very fatigued. She denies pain. She reports she hasn't had a bowel movement in about two weeks. She states she is barely urinating.     Objective:   Blood pressure (!) 174/74, pulse 78, temperature (!) 97 °F (36.1 °C), temperature source Tympanic, resp. rate (!) 35, height 5' (1.524 m), weight 52.6 kg (115 lb 15.4 oz), SpO2 94%.,Body mass index is 22.65 kg/m².      Intake/Output Summary (Last 24 hours) at 4/5/2024 1048  Last data filed at 4/5/2024 0800  Gross per 24 hour   Intake 425 ml   Output 650 ml   Net -225 ml       Invasive Devices       Peripheral Intravenous Line  Duration             Peripheral IV 04/03/24 Right Antecubital 1 day              Hemodialysis Catheter  Duration             HD Temporary Double Catheter 10 days                    Physical Exam  Vitals reviewed.   Constitutional:       General: She is not in acute distress.     Appearance: She is underweight. She is ill-appearing.   HENT:      Head: Normocephalic and atraumatic.   Cardiovascular:      Rate and Rhythm: Normal rate and regular rhythm.   Pulmonary:      Effort: Pulmonary effort is normal. No respiratory distress.   Abdominal:       General: Abdomen is flat. There is no distension.      Palpations: Abdomen is soft.      Tenderness: There is no abdominal tenderness. There is no guarding.   Musculoskeletal:      Right lower leg: No edema.      Left lower leg: No edema.   Skin:     General: Skin is warm and dry.      Comments: L groin site dressing clean, dry and intact. No surrounding ecchymosis or hematoma present.    Neurological:      General: No focal deficit present.      Mental Status: She is alert. Mental status is at baseline.   Psychiatric:         Mood and Affect: Mood normal.         Behavior: Behavior normal.          Lab, Imaging and other studies:I have personally reviewed pertinent lab results.  , CBC:   Lab Results   Component Value Date    WBC 4.78 04/05/2024    HGB 9.6 (L) 04/05/2024    HCT 29.7 (L) 04/05/2024    MCV 91 04/05/2024    PLT 78 (L) 04/05/2024    RBC 3.25 (L) 04/05/2024    MCH 29.5 04/05/2024    MCHC 32.3 04/05/2024    RDW 14.5 04/05/2024    MPV 12.0 04/05/2024   , CMP:   Lab Results   Component Value Date    SODIUM 136 04/05/2024    K 4.2 04/05/2024     04/05/2024    CO2 22 04/05/2024    BUN 44 (H) 04/05/2024    CREATININE 1.45 (H) 04/05/2024    CALCIUM 7.6 (L) 04/05/2024    AST 12 (L) 04/05/2024    ALT 14 04/05/2024    ALKPHOS 40 04/05/2024    EGFR 33 04/05/2024     VTE Pharmacologic Prophylaxis: defer to primary  VTE Mechanical Prophylaxis: sequential compression device    Mishel Brandon PA-C

## 2024-04-05 NOTE — NURSING NOTE
Pt. Transferred to [facility] at about 1400.  Pt.  called and he was given the room number and phone number for [facility] [id number] where pt. Is going.

## 2024-04-05 NOTE — ASSESSMENT & PLAN NOTE
With anemia/thrombocytopenia since admission to [facility]  Possibly in setting of nutritional deficiency vs CKD  Holding Plavix/DVT ppx for now in setting of bleeding post procedure/possible procedure today at [facility]  Trend Hgb/Plt and transfuse for Hgb < 7 and Plt < 20,000

## 2024-04-05 NOTE — ASSESSMENT & PLAN NOTE
With anemia/thrombocytopenia since admission to [facility]  Possibly in setting of nutritional deficiency vs CKD  iron panel, B12, folate levels unremarkable  Holding Plavix/DVT ppx for now in setting of bleeding post procedure  Trend Hgb/Plt and transfuse for Hgb < 7 and Plt < 20,000

## 2024-04-05 NOTE — PLAN OF CARE
Problem: Prexisting or High Potential for Compromised Skin Integrity  Goal: Skin integrity is maintained or improved  Description: INTERVENTIONS:  - Identify patients at risk for skin breakdown  - Assess and monitor skin integrity  - Assess and monitor nutrition and hydration status  - Monitor labs   - Assess for incontinence   - Turn and reposition patient  - Assist with mobility/ambulation  - Relieve pressure over bony prominences  - Avoid friction and shearing  - Provide appropriate hygiene as needed including keeping skin clean and dry  - Evaluate need for skin moisturizer/barrier cream  - Collaborate with interdisciplinary team   - Patient/family teaching  - Consider wound care consult   Outcome: Progressing     Problem: INFECTION - ADULT  Goal: Absence or prevention of progression during hospitalization  Description: INTERVENTIONS:  - Assess and monitor for signs and symptoms of infection  - Monitor lab/diagnostic results  - Monitor all insertion sites, i.e. indwelling lines, tubes, and drains  - Monitor endotracheal if appropriate and nasal secretions for changes in amount and color  - [place] appropriate cooling/warming therapies per order  - Administer medications as ordered  - Instruct and encourage patient and family to use good hand hygiene technique  - Identify and instruct in appropriate isolation precautions for identified infection/condition  Outcome: Progressing     Problem: SAFETY ADULT  Goal: Patient will remain free of falls  Description: INTERVENTIONS:  - Educate patient/family on patient safety including physical limitations  - Instruct patient to call for assistance with activity   - Consult OT/PT to assist with strengthening/mobility   - Keep Call bell within reach  - Keep bed low and locked with side rails adjusted as appropriate  - Keep care items and personal belongings within reach  - Initiate and maintain comfort rounds  - Make Fall Risk Sign visible to staff  - Offer Toileting every  2 Hours, in advance of need  - Initiate/Maintain bed alarm  - Obtain necessary fall risk management equipment  - Apply yellow socks and bracelet for high fall risk patients  - Consider moving patient to room near nurses station  Outcome: Progressing     Problem: Nutrition/Hydration-ADULT  Goal: Nutrient/Hydration intake appropriate for improving, restoring or maintaining nutritional needs  Description: Monitor and assess patient's nutrition/hydration status for malnutrition. Collaborate with interdisciplinary team and initiate plan and interventions as ordered.  Monitor patient's weight and dietary intake as ordered or per policy. Utilize nutrition screening tool and intervene as necessary. Determine patient's food preferences and provide high-protein, high-caloric foods as appropriate.     INTERVENTIONS:  - Monitor oral intake, urinary output, labs, and treatment plans  - Assess nutrition and hydration status and recommend course of action  - Evaluate amount of meals eaten  - Assist patient with eating if necessary   - Allow adequate time for meals  - Recommend/ encourage appropriate diets, oral nutritional supplements, and vitamin/mineral supplements  - Order, calculate, and assess calorie counts as needed  - Recommend, monitor, and adjust tube feedings and TPN/PPN based on assessed needs  - Assess need for intravenous fluids  - Provide specific nutrition/hydration education as appropriate  - Include patient/family/caregiver in decisions related to nutrition  Outcome: Progressing     Problem: HEMATOLOGIC - ADULT  Goal: Maintains hematologic stability  Description: INTERVENTIONS  - Assess for signs and symptoms of bleeding or hemorrhage  - Monitor labs  - Administer supportive blood products/factors as ordered and appropriate  Outcome: Progressing

## 2024-04-05 NOTE — ASSESSMENT & PLAN NOTE
Imaging from 03/28 revealed severe L renal artery stenosis at aorto-renal junction w/moderate disease   seen more proximally as well  Has known R renal artery stenosis w/subsequent renal atrophy  Now s/p IR L renal artery angiogram for revascularization, however was unsuccessful due to large aortic plaque - plan for transfer to [facility] for 2nd attempt at L renal artery revascularization   If unsuccessful stent placement, will likely require RRT

## 2024-04-05 NOTE — PROGRESS NOTES
0800-  Assessment completed.  Pt. Denies pain.  Left groin puncture site has new dressing from previous shift.  Left pedal pulse doppler noted.  Pt. Has bruising around site but it is soft with no tenderness.  1130-  Pt. Left groins puncture site has some draingage, dressing changed.  Site looks intact and puncture wound is approximated.  Site unchanged, MANOHAR Shahid aware of scant drainage.  Pt. Had BM.  1200-  Pt. Assessment unchanged.  1330-  PT. Hypertension continues.  Spoke with Dr. Ernst, Hydralazine dose to be increased and given at regular time.  1350-  Pt. Moved to stretcher and taken by transport to [facility] room 828.  GARTH Mckeon called and report given.

## 2024-04-05 NOTE — PROGRESS NOTES
Progress Note - Nephrology   Laya Brooks 79 y.o. female MRN: [id number]  Unit/Bed#: ICU 03-01 Encounter: [id number]    A/P:  1.  Hemodialysis dependent acute kidney injury  Etiology suspected to be multifactorial with prerenal state and bilateral renal artery stenosis.  Last hemodialysis 3/29 for abbreviated treatment approximately 1.5 hours.  Treatment was abbreviated due to hemodynamic instability with tachycardia.    Patient underwent IR renal angiogram on 4/4 and there was noted to be a large aortic plaque that included the caudal aspect of the left renal artery.  A wire was able to cross the stenosis however no further intervention could be done due to the severe stenosis.    Patient was transferred to the ICU's postprocedure after left groin sheath was removed and manual hemostasis obtained.  Patient was given protamine to reverse heparin effect.  She did have some bleeding at left groin which has resolved.    Plan to transfer patient to [place] for further IR evaluation of left renal artery stenosis.    Patient's creatinine trends are holding at 1.4.  Her urine output is nonoliguric.  She continues to examine hypervolemic with decreased breath sounds and lower extremity edema in combination with increased respiratory effort and shortness of breath.  Recommend Lasix 80 mg IV to promote urine output maintain euvolemia as best we can.    Patient continues to be at risk for contrast acute kidney injury with her renal angiogram and left renal artery stenosis.  She understands that either way there is a risk that she progresses to need hemodialysis in the short and long run.  She appears very fatigued and expresses that she does not want to continue with aggressive therapy.  However, her  has a different opinion and has been encouraging her to maintain full code.  Recommend ongoing goals of care discussions with palliative care consult.    Follow for hemodialysis indications, no emergent indication as  of today.  Would maintain temporary dialysis catheter in the short-term while we monitor for renal recovery.  2.  Volume overload in the setting of acute kidney injury and bilateral renal artery stenosis  Recommend Lasix 80 mg IV x 1.    Follow volume status/urine output closely.    3.  CKD 3A with baseline creatinine around 1 Mg per deciliter with solitary functioning left kidney.  Follow creatinine trends for renal recovery as best we can.    4.  Renovascular hypertension  Follow blood trends closely with ongoing diuresis.  Continue nifedipine 60 mg/day and hydralazine 50 mg every 8.  Avoid hypotension.  Continue with PRN IV labetalol in the setting of acute kidney injury/volume overload.    5.  Acute hypoxic respiratory failure due to volume overload and moderate COPD with acute exacerbation  Plan to diurese with Lasix 80 mg IV x 1.  Taper steroids per primary team.    6.  Acute blood loss/bleeding in the setting of IR intervention, resolved after protamine and and removal of sheath with no hematoma.  Continue to trend hemoglobin and transfuse at the discretion of primary team.    Follow up reason for today's visit:     Acute renal failure superimposed on stage 3a chronic kidney disease (HCC)      Subjective:   Patient seen and examined today.  She reports feeling weak, short of breath and fatigue.  She is wondering if she can tolerate the journey to [place].  She is very weak and tired does not want to continue with aggressive interventions but is agreeable to transfer.  Discussed care with critical care team.  Patient had wanted to be DNR/DNI however, her  disagreed and eventually talked patient into being full code.  Patient does not want long-term dialysis.  A complete 10 point review of systems was performed and is otherwise negative.     Objective:     Vitals: Blood pressure 167/70, pulse 66, temperature 97.7 °F (36.5 °C), temperature source Temporal, resp. rate (!) 33, height 5' (1.524 m), weight  52.6 kg (115 lb 15.4 oz), SpO2 97%.,Body mass index is 22.65 kg/m².    Weight (last 2 days)       Date/Time Weight    04/05/24 0546 52.6 (115.96)    04/04/24 1256 52.4 (115.52)    04/04/24 0600 48.8 (107.6)    04/03/24 0600 47.8 (105.38)              Intake/Output Summary (Last 24 hours) at 4/5/2024 1219  Last data filed at 4/5/2024 0800  Gross per 24 hour   Intake 425 ml   Output 650 ml   Net -225 ml     I/O last 3 completed shifts:  In: 605 [P.O.:485; IV Piggyback:120]  Out: 1250 [Urine:1250]    HD Temporary Double Catheter (Active)   Reasons to continue HD Cath Treatment Therapy 04/04/24 2000   Goal for Removal No longer needed- Will place order to discontinue 04/04/24 2000   Line Necessity Reviewed Yes, reviewed with provider 04/04/24 2000   Site Assessment WDL;Clean;Dry;Intact 04/04/24 2000   Proximal Lumen Status Heparin locked 04/04/24 2000   Distal Lumen Status Heparin locked 04/04/24 2000   Line Care Connections checked and tightened 04/04/24 2000   Dressing Type Chlorhexidine dressing 04/04/24 2000   Dressing Status Clean;Dry;Intact 04/04/24 2000   Dressing Intervention 7 day central line dressing changed 04/03/24 1116   Dressing Change Due 04/11/24 04/04/24 2000       Physical Exam: /70   Pulse 66   Temp 97.7 °F (36.5 °C) (Temporal)   Resp (!) 33   Ht 5' (1.524 m)   Wt 52.6 kg (115 lb 15.4 oz)   SpO2 97%   BMI 22.65 kg/m²     General Appearance:    Alert, cooperative, no distress, chronically ill-appearing   Head:    Normocephalic, without obvious abnormality, atraumatic   Eyes:    Conjunctiva/corneas clear   Ears:    Normal external ears   Nose:   Nares normal, septum midline, mucosa normal, no drainage    or sinus tenderness   Throat:   Lips, mucosa, and tongue normal; teeth and gums normal   Neck:     Back:     Symmetric, no curvature, ROM normal, no CVA tenderness   Lungs:   Decreased breath sounds bilaterally, increased respiratory effort, on nasal cannula   Chest wall:    No tenderness  "or deformity   Heart:  Regular, no rub, right IJ temporary dialysis catheter   Abdomen:     Soft, non-tender, bowel sounds active   Extremities: 1+ pretibial edema   Skin:   Skin color, texture, turgor normal, no rashes or lesions   Lymph nodes:   Cervical normal   Neurologic: No focal deficit noted            Lab, Imaging and other studies: I have personally reviewed pertinent labs.  CBC:   Lab Results   Component Value Date    WBC 4.78 04/05/2024    HGB 9.6 (L) 04/05/2024    HCT 29.7 (L) 04/05/2024    MCV 91 04/05/2024    PLT 78 (L) 04/05/2024    RBC 3.25 (L) 04/05/2024    MCH 29.5 04/05/2024    MCHC 32.3 04/05/2024    RDW 14.5 04/05/2024    MPV 12.0 04/05/2024     CMP:   Lab Results   Component Value Date    K 4.2 04/05/2024     04/05/2024    CO2 22 04/05/2024    BUN 44 (H) 04/05/2024    CREATININE 1.45 (H) 04/05/2024    CALCIUM 7.6 (L) 04/05/2024    AST 12 (L) 04/05/2024    ALT 14 04/05/2024    ALKPHOS 40 04/05/2024    EGFR 33 04/05/2024       .  Results from last 7 days   Lab Units 04/05/24  0538 04/04/24  1515 04/04/24  0607   POTASSIUM mmol/L 4.2 4.6 4.3   CHLORIDE mmol/L 107 106 105   CO2 mmol/L 22 19* 22   BUN mg/dL 44* 46* 46*   CREATININE mg/dL 1.45* 1.44* 1.44*   CALCIUM mg/dL 7.6* 7.9* 8.0*   ALK PHOS U/L 40  --   --    ALT U/L 14  --   --    AST U/L 12*  --   --          Phosphorus:   Lab Results   Component Value Date    PHOS 3.3 04/05/2024     Magnesium:   Lab Results   Component Value Date    MG 2.3 04/05/2024     Urinalysis: No results found for: \"COLORU\", \"CLARITYU\", \"SPECGRAV\", \"PHUR\", \"LEUKOCYTESUR\", \"NITRITE\", \"PROTEINUA\", \"GLUCOSEU\", \"KETONESU\", \"BILIRUBINUR\", \"BLOODU\"  Ionized Calcium: No results found for: \"CAION\"  Coagulation:   Lab Results   Component Value Date    INR 1.10 04/04/2024     Troponin: No results found for: \"TROPONINI\"  ABG: No results found for: \"PHART\", \"YDU2CAP\", \"PO2ART\", \"QNP1RUO\", \"W1FSWDHA\", \"BEART\", \"SOURCE\"  Radiology review:     IMAGING  Procedure: IR renal " angiogram    Result Date: 4/5/2024  Narrative: Examination: Renal angiogram and stent placement, not performed HISTORY: True vasculopath with ischemic insults in multiple vascular territories. She has a previously documented left renal artery stenosis. This is functionally a solitary kidney. She recently went into acute renal failure. She has been dialyzed several times. There is still some renal function. Trying to hold off on dialysis. She is not tolerating fluid boluses well TECHNIQUE: The patient was brought to radiology.  Informed consent was obtained.  The left groin was prepped and draped in the usual sterile fashion.  Timeout was performed.  Lidocaine was given as local anesthesia. Using sonographic guidance, an 18-gauge needle was advanced into the common femoral artery.  Images were obtained. Using fluoroscopic guidance, a wire was advanced into the external iliac artery. The common femoral was quite calcified. Roadmap guidance was used to navigate the common femoral. The external iliac was diffusely calcified. Roadmap guidance was used here as well A 6 Barbadian frazier sheath was placed from the left groin into the infrarenal abdominal aorta. A 5 Barbadian VS 2 catheter was used to select the left renal artery. A tattoo wire was advanced through the renal artery. The VS 2 catheter would not seat into the renal artery. The patient was systemically heparinized. I then tried 5 Barbadian Cobra 2 catheter, Gant catheter, and Berenstein. They would not pass the lesion. 4 Barbadian glide catheter would not cross the lesion. A [place] crossing catheter would not cross the lesion. Attempts to advance the dilator of the 6 Barbadian sheath across the lesion failed. The catheter was removed. The sheath was exchanged for a Perclose device. The Perclose failed. She was given protamine, and reverse. The sheath was removed. Prompt hemostasis was obtained. I want to see her that evening. The groin has some bruising from the Perclose  exchange. There was no hematoma. COMPARISON: Previous MRI FINDINGS: There is a single left renal artery. This is downward angled, at 51 degrees. Once the stiff part of the wire got into the renal artery, it was much closer to 90 degrees. The dominant division had a prominent knuckle. The tattoo wire would not advance through this knuckle. This can be decreased purchase. I couldn't get anything in an 035 platform to cross over the high-grade stenosis at the ostium. This is part of a large aortic plaque. I abandon the procedure in favor of an approach from above the diaphragm. This could be brachial or radial. I would favor a small diameter wire, and low-profile system, at least 4 predilation.     Impression: Technically successful renal angiogram using sonographic and fluoroscopic guidance. Technically unsuccessful angioplasty and stenting. I recommended she go to about the more they have a better variety of long devices for upper extremity approach This is the end of the clinically relevant portion of this report.  Subsequent information is for compliance, documentation, and coding purposes. In the process of informed consent, information was communicated, verbally, to the patient regarding the procedure.  The patient was informed of; the name of the procedure, nature of the procedure, nature of the condition, risks, benefits, alternatives, and potential complications, as well as the consequences of not having the procedure.  All the patient's questions were answered.  Informed consent was signed. Automated exposure control was utilized, and dose was minimized, in accordance with the application of the ALARA technique. Chlorhexidine and alcohol was used for cleansing and sterile preparation of the skin.  This was allowed to dry prior to the application of sterile draping. Timeout was performed, with all team members present, and in agreement.  Confirmation of patient, procedure, laterally, allergies, and all needed  "equipment was performed. The patient was examined, and is in satisfactory condition for planned moderate sedation. Mallampati score: 2 Intravenous conscious sedation was provided.  There was continuous monitoring of blood pressure, heart rate, respiratory rate, and oxygen saturation by an independent trained observer. Conscious sedation time: 105 minutes Versed dose: 1 milligrams Fentanyl dose: 75 micrograms After the procedure, the patient was recovered, and return to their baseline status, and was deemed fit for discharge from IR Fluoroscopy time: 18.4 Minutes Radiation dose: 347 milliGray CONTRAST DOSE: 6 cc Visipaque 320 PROCEDURE: See report title Preprocedure diagnosis: Please see \"history \", above Postprocedure diagnosis: Same Antibiotics: None Specimen: None Estimated blood loss: Less than 5 mL Anesthesia: Local and monitored intravenous conscious sedation Ultrasound was used to evaluate the above-named target as a potential access.  Static and real-time images of needle entry were obtained, and archived. Workstation performed: [id number]     Procedure: XR chest portable    Result Date: 4/4/2024  Narrative: XR CHEST PORTABLE INDICATION: SOB. COPD, dyspnea. COMPARISON: Chest radiograph 3/29/2024. FINDINGS: Dialysis catheter with tip projecting over the cavoatrial junction. Bibasilar subsegmental atelectasis. Small bilateral left greater than right pleural effusions. Unremarkable cardiomediastinal silhouette.  Atherosclerotic vascular calcifications noted. Bones are unremarkable for age. Normal upper abdomen.     Impression: Bilateral small pleural effusions with adjacent subsegmental atelectasis. Resident: FLAVIO ROSS I, the attending radiologist, have reviewed the images and agree with the final report above. Workstation performed: [id number]       Current Facility-Administered Medications   Medication Dose Route Frequency    acetaminophen (TYLENOL) tablet 650 mg  650 mg Oral Q4H PRN    albuterol " inhalation solution 2.5 mg  2.5 mg Nebulization Q4H PRN    ALPRAZolam (XANAX) tablet 0.5 mg  0.5 mg Oral TID PRN    aluminum-magnesium hydroxide-simethicone (MAALOX) oral suspension 30 mL  30 mL Oral Q4H PRN    atorvastatin (LIPITOR) tablet 40 mg  40 mg Oral Daily With Dinner    bisacodyl (DULCOLAX) rectal suppository 10 mg  10 mg Rectal Daily    chlorhexidine (PERIDEX) 0.12 % oral rinse 15 mL  15 mL Mouth/Throat Q12H JHON    famotidine (PEPCID) tablet 10 mg  10 mg Oral Daily    hydrALAZINE (APRESOLINE) tablet 50 mg  50 mg Oral Q8H JHON    insulin lispro (HumALOG/ADMELOG) 100 units/mL subcutaneous injection 1-5 Units  1-5 Units Subcutaneous 4x Daily (AC & HS)    ipratropium (ATROVENT) 0.02 % inhalation solution 0.5 mg  0.5 mg Nebulization TID    labetalol (NORMODYNE) injection 10 mg  10 mg Intravenous Q6H PRN    levalbuterol (XOPENEX) inhalation solution 1.25 mg  1.25 mg Nebulization TID    NIFEdipine (PROCARDIA XL) 24 hr tablet 60 mg  60 mg Oral Daily    nystatin (MYCOSTATIN) oral suspension 500,000 Units  500,000 Units Swish & Swallow 4x Daily    ondansetron (ZOFRAN) injection 4 mg  4 mg Intravenous Q6H PRN    polyethylene glycol (MIRALAX) packet 17 g  17 g Oral BID    predniSONE tablet 20 mg  20 mg Oral Daily    Followed by    [START ON 4/8/2024] predniSONE tablet 10 mg  10 mg Oral Daily    senna (SENOKOT) tablet 8.6 mg  1 tablet Oral BID    sodium chloride (OCEAN) 0.65 % nasal spray 1 spray  1 spray Each Nare Q1H PRN     Medications Discontinued During This Encounter   Medication Reason    senna (SENOKOT) tablet 8.6 mg     sodium chloride 0.9 % infusion     sodium chloride 0.9 % infusion     sodium chloride 0.9 % infusion     sodium chloride 0.9 % infusion     sodium chloride 0.9 % infusion     protamine injection 50 mg     clopidogrel (PLAVIX) tablet 75 mg     heparin (porcine) subcutaneous injection 5,000 Units     hydrALAZINE (APRESOLINE) tablet 25 mg     hydrALAZINE (APRESOLINE) tablet 50 mg     insulin lispro  (HumALOG/ADMELOG) 100 units/mL subcutaneous injection 1-5 Units     insulin lispro (HumALOG/ADMELOG) 100 units/mL subcutaneous injection 1-5 Units     budesonide (PULMICORT) inhalation solution 0.5 mg     formoterol (PERFOROMIST) nebulizer solution 20 mcg     hydrALAZINE (APRESOLINE) tablet 25 mg        Kate Weston, DO      This progress note was produced in part using a dictation device which may document imprecise wording from author's original intent.

## 2024-04-05 NOTE — RESPIRATORY THERAPY NOTE
04/05/24 1548   Respiratory Protocol   Protocol Initiated? Yes   Protocol Selection Respiratory   Language Barrier? No   Medical & Social History Reviewed? Yes   Diagnostic Studies Reviewed? Yes   Physical Assessment Performed? Yes   Respiratory Plan Mild Distress pathway   Respiratory Assessment   Assessment Type Pre-treatment   General Appearance Alert;Awake   Respiratory Pattern Dyspnea with exertion   Chest Assessment Chest expansion symmetrical   Bilateral Breath Sounds Diminished   R Breath Sounds Diminished   L Breath Sounds Diminished   Resp Comments Assessment preformed as per protocol. Bilateral breath sounds diminished, pt stated to have mild shortness of breath. Pt also complained of abdominal pain due to constipation. Neb started/ given at this time, will contionue to monitor pt. Pt also recieved on 5L NC. SpO2 95%.

## 2024-04-05 NOTE — DISCHARGE SUMMARY
[facility]  Discharge- Laya Brooks 1945, 79 y.o. female MRN: [id number]  Unit/Bed#: ICU 03-01 Encounter: [id number]  Primary Care Provider: Joana Shields DO   Date and time admitted to hospital: 4/2/2024  4:18 PM    Acute renal failure superimposed on stage 3a chronic kidney disease (HCC)  Assessment & Plan  Lab Results   Component Value Date    EGFR 33 04/05/2024    EGFR 34 04/04/2024    EGFR 34 04/04/2024    CREATININE 1.45 (H) 04/05/2024    CREATININE 1.44 (H) 04/04/2024    CREATININE 1.44 (H) 04/04/2024     Creatinine peaked at 6.27 at [facility] - now around 1.4 - 1.5  Baseline creatinine appears to be around 0.9 - 1.1  Suspect this is 2/2 significant bilateral renal artery stenosis w/atrophic R kidney   Has received IHD x3 since admission to [facility] - last treatment was on 03/29  Is s/p IV Lasix 60 mg x1 yesterday AM and IV Lasix 80 mg x1 yesterday PM w/good response - received IV Lasix 80 mg x1 this AM  Nephrology following - may require additional RRT  Avoid nephrotoxic agents and fluctuations in BP  Trend renal indices  Strict I/O   Daily weights    Bleeding at insertion site  Assessment & Plan  Had bleeding/oozing at sheath site post IR intervention - was reversed with Protamine, sheath pulled, and manual pressure held   Holding Plavix and DVT ppx for now  Frequent vascular checks  Monitor site for hematoma, expanding ecchymosis (evidence of bleeding)    Renal artery stenosis (HCC)  Assessment & Plan  Imaging from 03/28 revealed severe L renal artery stenosis at aorto-renal junction w/moderate disease   seen more proximally as well  Has known R renal artery stenosis w/subsequent renal atrophy  Now s/p IR L renal artery angiogram for revascularization, however was unsuccessful due to large aortic plaque - plan for transfer to [facility] for 2nd attempt at L renal artery revascularization   If unsuccessful stent placement, will likely require RRT    Renovascular  hypertension  Assessment & Plan  Previously taking ACE-I and CCB as outpatient  Now on Procardia and scheduled Hydralazine - Hydralazine increased today to 50 mg Q8H  PRN Labetalol for SBP > 170     Atrophy of right kidney  Assessment & Plan  In setting of R renal artery stenosis   Nephrology following - patient may ultimately require RRT if L renal artery unable to be stented    Bicytopenia  Assessment & Plan  With anemia/thrombocytopenia since admission to [facility]  Possibly in setting of nutritional deficiency vs CKD  Holding Plavix/DVT ppx for now in setting of bleeding post procedure/possible procedure today at [facility]  Trend Hgb/Plt and transfuse for Hgb < 7 and Plt < 20,000    Severe protein-calorie malnutrition (HCC)  Assessment & Plan  Malnutrition Findings:                                 BMI Findings:           Body mass index is 22.65 kg/m².     Continue nutritional supplementation   Nutrition consult    Acute respiratory insufficiency  Assessment & Plan  Currently requiring 2L NC   Suspect this is 2/2 acute disease process as well as volume overload in setting of CKD  Continue w/diuresis PRN as noted above  Aggressive pulmonary hygiene  Wean O2 for SpO2 > 88%    COPD (chronic obstructive pulmonary disease) ([facility])  Assessment & Plan  PFTs from 02/2024 revealed FEV1 61% - moderate obstruction  Follows with Pulmonology - recently started on Stiolto and PRN Albuterol inhalers as this is newer diagnosis  Continue Xopenex/Atrovent/Pulmicort/Performist nebs  Continue steroid taper for ? COPD exacerbation that was started at [facility]  Aggressive pulmonary hygiene    Dysphagia  Assessment & Plan  Continue dysphagia diet per Speech recommendations    CAD (coronary artery disease)  Assessment & Plan  Prior STEMI in 2008 s/p RCA stenting  Holding home Plavix given bleeding post IR procedure/possible procedure at [facility] today  Continue home statin  Continue cardiac monitoring    Hypercholesterolemia  Assessment & Plan  Continue  "home statin              Medical Problems       Resolved Problems  Date Reviewed: 4/5/2024   None         Admission Date:   Admission Orders (From admission, onward)       Ordered        04/02/24 1647  INPATIENT ADMISSION  Once                            Admitting Diagnosis: Chronic obstructive pulmonary disease with (acute) exacerbation (HCC) [J44.1]    HPI: As per Aries Morgan PA-C ------    \"Laya Brooks is a 79 y.o. female with a PMH of COPD, CKD, HTN, dysphagia who was transferred from [facility] for plans of angiogram and possible angioplasty of renal artery. The patient was initially admitted to [facility] for COPD exacerbation and thought to have possible CHF exacerbation on 3/24. The patient was noted to have SEEMA which worsened with IV diuresis. Nephrology was consulted and the patient was initiated on hemodialysis. Upon further evaluation the patient was noted to have severe left renal artery stenosis. Vascular surgery was consulted and recommended IR consult given patient with solitary left kidney. IR recommended transfer to [facility] for planned angiogram and angioplasty. The patient underwent HD at [facility] however this is currently on hold as the patient's renal function is improving. The patient's COPD exacerbation is now resolved. The patient currently denies any complaints.\"    Procedures Performed: No orders of the defined types were placed in this encounter.      Summary of Hospital Course: Laya Brooks is a 79 y.o. female w/PMHx of HTN, HLD, CAD w/STEMI in 2008 s/p RCA stenting, intracranial atherosclerosis, carotid artery stenosis, prior CVA, renal artery stenosis w/R atrophic kidney, CKD3A, COPD, anxiety who was initially admitted to [facility] on 03/24 for COPD exacerbation and SEEMA on CKD. Patient was placed on steroids and nebs for COPD exacerbation. Nephrology was consulted for SEEMA on CKD and was initiated on IHD at [facility]. She received 3 treatments (last on 03/29), however has not required additional " treatments since then. Patient was transferred from [facility] to [facility] on 04/02 for IR angiogram/intravascular renal artery intervention given significant renal artery stenosis, R kidney atrophy. Patient underwent L renal artery angiogram today for revascularization, however procedure was unsuccessful. There was significant bleeding at sheath site prior to removal, so patient was given IV Protamine and was upgraded to critical care for closer monitoring post procedure. Patient remained stable while in ICU. Ultimately, it was decided that patient should be transferred to [facility] for 2nd attempt at L renal artery revascularization by IR as this will likely keep patient from needing permanent RRT. Patient and  agreed to transfer.     Significant Findings, Care, Treatment and Services Provided:   04/04: CXR - Bilateral small pleural effusions with adjacent subsegmental atelectasis.     04/04: IR Renal Angiogram -   Technically successful renal angiogram using sonographic and fluoroscopic guidance.  Technically unsuccessful angioplasty and stenting. I recommended she go to about the more they have a better variety of long devices for upper extremity approach    Complications: Bleeding at insertion site post procedure, however remained stable upon transfer to ICU    Condition at Discharge: stable         Discharge instructions/Information to patient and family:   See after visit summary for information provided to patient and family.      Provisions for Follow-Up Care:  See after visit summary for information related to follow-up care and any pertinent home health orders.      PCP: Joana Shields DO    Disposition:  [facility]    Planned Readmission: Yes [facility] for IR renal artery revascularization    Discharge Statement   I spent 30 minutes discharging the patient. This time was spent on the day of discharge. I had direct contact with the patient on the day of discharge. Additional documentation is required if more than 30  minutes were spent on discharge.     Discharge Medications:  See after visit summary for reconciled discharge medications provided to patient and family.

## 2024-04-05 NOTE — ASSESSMENT & PLAN NOTE
LDL goal <70 due to intracranial stenosis  Most recent lipid panel with     Plan:  -Recommend repeat in 2-3 months. If it still remains elevated would defer to PCP about addition of Repatha versus Zetia  -Continue atorvastatin 40mg at this time.

## 2024-04-05 NOTE — CONSULTS
This is not a new consult. Please see original consultation by nephrology completed on 4/3.   Nephrology to see in follow up tomorrow.

## 2024-04-05 NOTE — ASSESSMENT & PLAN NOTE
Lab Results   Component Value Date    EGFR 34 04/04/2024    EGFR 34 04/04/2024    EGFR 30 04/03/2024    CREATININE 1.44 (H) 04/04/2024    CREATININE 1.44 (H) 04/04/2024    CREATININE 1.57 (H) 04/03/2024     Creatinine peaked at 6.27 at [facility] - now around 1.4 - 1.5  Baseline creatinine appears to be around 0.9 - 1.1  Suspect this is 2/2 significant bilateral renal artery stenosis w/atrophic R kidney   Has received IHD x3 since admission to [facility] - last treatment was on 03/29  Is s/p IV Lasix 60 mg x1 this AM prior to renal artery stenting attempt and 80 IV lasix in the afternoon  Nephrology following - may need RRT if revascularization attempt unsuccessful  Avoid nephrotoxic agents and fluctuations in BP  Trend renal indices  Strict I/O with pure wick  Daily weights

## 2024-04-05 NOTE — TELEMEDICINE
e-Consult (IPC)  - Interventional Radiology  Laya Brooks 79 y.o. female MRN: [id number]  Unit/Bed#: [facility] 828-01 Encounter: [id number]          Interventional Radiology has been consulted to evaluate Laya Brooks      Inpatient Consult to IR  Consult performed by: Dominic Landry MD  Consult ordered by: Melody Hunter DO        04/05/24    Assessment/Recommendation:   Unsuccessful prior attempt in left renal artery stenting.   Transferred to B for re-attempt.  Order in.  Timing unclear, likely next week.    Please call IR  Monday morning to verify timing and adjust the NPO accordingly.    >5 min, >50% time spent reviewing/analyzing record, written report will be generated in the EMR.     Thank you for allowing Interventional Radiology to participate in the care of Laya Brooks. Please don't hesitate to call or TigerText us with any questions.     Dominic Landry MD

## 2024-04-05 NOTE — ASSESSMENT & PLAN NOTE
Had bleeding/oozing at sheath site post IR intervention - was reversed with Protamine, sheath pulled, and manual pressure held   4/5: Mild oozing at site. resolved    Plan:  -restart home plavix

## 2024-04-05 NOTE — ASSESSMENT & PLAN NOTE
Previously taking ACE-I and CCB as outpatient  Now on Procardia and scheduled Hydralazine - Hydralazine increased today to 50 mg Q8H  PRN Labetalol for SBP > 170

## 2024-04-05 NOTE — ASSESSMENT & PLAN NOTE
-Prior STEMI in 2008 s/p RCA stenting    Plan:  -plavix held during post procedural bleeding  - restart plavix  - cont statin

## 2024-04-05 NOTE — ASSESSMENT & PLAN NOTE
PFTs from 02/2024 revealed FEV1 61% - moderate obstruction  Follows with Pulmonology - recently started on Stiolto and PRN Albuterol inhalers as this is newer diagnosis  Continue Xopenex/Atrovent/Pulmicort/Performist nebs  Continue steroid taper for ? COPD exacerbation that was started at [facility]  Aggressive pulmonary hygiene

## 2024-04-05 NOTE — ASSESSMENT & PLAN NOTE
Had bleeding/oozing at sheath site post IR intervention - was reversed with Protamine, sheath pulled, and manual pressure held   Holding Plavix and DVT ppx for now  Upgraded to ICU for close monitoring of site  Monitor CBC and INR  Frequent vascular checks

## 2024-04-05 NOTE — ASSESSMENT & PLAN NOTE
Prior STEMI in 2008 s/p RCA stenting  Holding home Plavix given bleeding post IR procedure/possible procedure at [facility] today  Continue home statin  Continue cardiac monitoring

## 2024-04-05 NOTE — H&P
INTERNAL MEDICINE RESIDENCY ADMISSION H&P     Name: Laya Brooks   Age & Sex: 79 y.o. female   MRN: [id number]  Unit/Bed#: ICU 03-01   Encounter: [id number]  Primary Care Provider: Joana Shields DO    Code Status: Level 1 - Full Code  Admission Status: INPATIENT   Disposition: Patient requires Med/Surg with Telemetry    Admit to team: SOD Team C     ASSESSMENT/PLAN     Active Problems:    Severe protein-calorie malnutrition (HCC)    Hypercholesterolemia    Atrophy of right kidney    Renovascular hypertension    CAD (coronary artery disease)    Acute renal failure superimposed on stage 3a chronic kidney disease (HCC)    COPD (chronic obstructive pulmonary disease) (HCC)    Dysphagia    Renal artery stenosis (HCC)    Bicytopenia    Bleeding at insertion site    Acute respiratory insufficiency      No new Assessment & Plan notes have been filed under this hospital service since the last note was generated.  Service: Internal Medicine      VTE Pharmacologic Prophylaxis: Heparin  VTE Mechanical Prophylaxis: sequential compression device    CHIEF COMPLAINT   3 day history of shortness of breath and 1 week history of minor leg swelling bilaterally.     HISTORY OF PRESENT ILLNESS   Ema Brooks is an 78 y/o F with PMHx of COPD, CAD prior STEMI s/p RCA stenting in 2008, CVA, CKD stage 3, atrophic R kidney 2/2 severe SALAZAR, HLD who initially presented to [facility] ED on 3/23 for COPD exacerbation, was later to have left renal artery blockage, and then transferred to [place] for IR procedure, now being transferred to [facility] for further intervention.     At [facility], she was initially admitted to Level 1 stepdown for continuous BiPAP and received IV Solu-Medrol, nebs, and was seen by pulm. At this point, she was noted to have evidence of CHF. Cardiology was consulted, and she was started on Lasix 40 mg BID. Additionally, upon presentation, her creatinine was 2.8. A Perez was inserted but creatinine continued to worsen  with diuresis over the next two days, requiring a nephrology consult and HD through temporary port placed by IR. She underwent 3 days of HD with significant improvement in creatinine, producing moderate urine.    Her home Norvasc and lisinopril were discontinued and transitioned to Procardia 60 mg and hydralazine 25 mg TID. She also completed a course of ceftriaxone. Solu-Medrol was later weaned and then transitioned to a PO prednisone taper.     Upon transfer to [place] on , patient was on room air, voiding with issue, mildly dyspneic on exertion. On , IR renal angiogram was performed with attempted placement of stent in L renal artery; however, successfully angioplasty and stenting. IR's recommendation at this time was to be transferred to [place] for more suitable technical instruments to achieve successful stenting.     REVIEW OF SYSTEMS     Review of Systems  OBJECTIVE     Vitals:    24 1130 24 1200 24 1330 24 1331   BP: 167/70 (!) 188/77 (!) 182/78 (!) 182/87   BP Location:  Left arm     Pulse: 66 72 80 82   Resp: (!) 33 21 (!) 38 (!) 35   Temp:  (!) 97 °F (36.1 °C)     TempSrc:  Tympanic     SpO2: 97% 97% 92% 92%   Weight:       Height:          Temperature:   Temp (24hrs), Av.2 °F (36.2 °C), Min:96.6 °F (35.9 °C), Max:97.7 °F (36.5 °C)    Temperature: (!) 97 °F (36.1 °C)  Intake & Output:  I/O         / 0701   0700  07 07 0701   0700    P.O. 840 245 60    IV Piggyback  120     Total Intake(mL/kg) 840 (17.2) 365 (6.9) 60 (1.1)    Urine (mL/kg/hr) 600 (0.5) 850 (0.7)     Stool  0     Total Output 600 850     Net +240 -485 +60           Unmeasured Stool Occurrence  0 x           Weights:   IBW (Ideal Body Weight): 45.5 kg    Body mass index is 22.65 kg/m².  Weight (last 2 days)       Date/Time Weight    24 0546 52.6 (115.96)    24 1256 52.4 (115.52)    24 0600 48.8 (107.6)    24 0600 47.8 (105.38)           Physical Exam  PAST MEDICAL HISTORY     Past Medical History:   Diagnosis Date    CAD (coronary artery disease)     Cardiac disease     Hypercholesteremia     Hyperlipidemia     Hypertension     Hypertension     Renal disorder      PAST SURGICAL HISTORY     Past Surgical History:   Procedure Laterality Date    CORONARY ANGIOPLASTY WITH STENT PLACEMENT      IR RENAL ANGIOGRAM  4/4/2024    IR TEMPORARY DIALYSIS CATHETER PLACEMENT  3/26/2024    WRIST SURGERY       SOCIAL & FAMILY HISTORY     Social History     Substance and Sexual Activity   Alcohol Use Never       Social History     Substance and Sexual Activity   Drug Use No     Social History     Tobacco Use   Smoking Status Former   Smokeless Tobacco Never     History reviewed. No pertinent family history.  LABORATORY DATA     Labs: I have personally reviewed pertinent reports.    Results from last 7 days   Lab Units 04/05/24  0538 04/04/24  1515 04/04/24  0607 04/03/24  0614 04/02/24  0737   WBC Thousand/uL 4.78 7.93 5.96 3.66* 8.11   HEMOGLOBIN g/dL 9.6* 11.2* 10.1* 9.8* 10.6*   HEMATOCRIT % 29.7* 34.5* 30.5* 30.3* 32.4*   PLATELETS Thousands/uL 78* 99* 81* 66* 75*   NEUTROS PCT %  --   --  79* 77* 81*   MONOS PCT %  --   --  9 9 8   EOS PCT %  --   --  0 0 0      Results from last 7 days   Lab Units 04/05/24  0538 04/04/24  1515 04/04/24  0607   POTASSIUM mmol/L 4.2 4.6 4.3   CHLORIDE mmol/L 107 106 105   CO2 mmol/L 22 19* 22   BUN mg/dL 44* 46* 46*   CREATININE mg/dL 1.45* 1.44* 1.44*   CALCIUM mg/dL 7.6* 7.9* 8.0*   ALK PHOS U/L 40  --   --    ALT U/L 14  --   --    AST U/L 12*  --   --      Results from last 7 days   Lab Units 04/05/24  0538 04/04/24  1515   MAGNESIUM mg/dL 2.3 1.7*     Results from last 7 days   Lab Units 04/05/24  0538 04/04/24  1515   PHOSPHORUS mg/dL 3.3 3.1      Results from last 7 days   Lab Units 04/04/24  1515   INR  1.10   PTT seconds 26             Micro:  Lab Results   Component Value Date    URINECX (A) 03/07/2022     >100,000  cfu/ml Beta Hemolytic Streptococcus Group B    URINECX 30,000-39,000 cfu/ml 03/07/2022     IMAGING & DIAGNOSTIC TESTS     Imaging: I have personally reviewed pertinent reports.    IR renal angiogram    Result Date: 4/5/2024  Impression: Technically successful renal angiogram using sonographic and fluoroscopic guidance. Technically unsuccessful angioplasty and stenting. I recommended she go to about the more they have a better variety of long devices for upper extremity approach This is the end of the clinically relevant portion of this report.  Subsequent information is for compliance, documentation, and coding purposes. In the process of informed consent, information was communicated, verbally, to the patient regarding the procedure.  The patient was informed of; the name of the procedure, nature of the procedure, nature of the condition, risks, benefits, alternatives, and potential complications, as well as the consequences of not having the procedure.  All the patient's questions were answered.  Informed consent was signed. Automated exposure control was utilized, and dose was minimized, in accordance with the application of the ALARA technique. Chlorhexidine and alcohol was used for cleansing and sterile preparation of the skin.  This was allowed to dry prior to the application of sterile draping. Timeout was performed, with all team members present, and in agreement.  Confirmation of patient, procedure, laterally, allergies, and all needed equipment was performed. The patient was examined, and is in satisfactory condition for planned moderate sedation. Mallampati score: 2 Intravenous conscious sedation was provided.  There was continuous monitoring of blood pressure, heart rate, respiratory rate, and oxygen saturation by an independent trained observer. Conscious sedation time: 105 minutes Versed dose: 1 milligrams Fentanyl dose: 75 micrograms After the procedure, the patient was recovered, and return to their  "baseline status, and was deemed fit for discharge from IR Fluoroscopy time: 18.4 Minutes Radiation dose: 347 milliGray CONTRAST DOSE: 6 cc Visipaque 320 PROCEDURE: See report title Preprocedure diagnosis: Please see \"history \", above Postprocedure diagnosis: Same Antibiotics: None Specimen: None Estimated blood loss: Less than 5 mL Anesthesia: Local and monitored intravenous conscious sedation Ultrasound was used to evaluate the above-named target as a potential access.  Static and real-time images of needle entry were obtained, and archived. Workstation performed: [id number]     XR chest portable    Result Date: 4/4/2024  Impression: Bilateral small pleural effusions with adjacent subsegmental atelectasis. Resident: FLAVIO ROSS I, the attending radiologist, have reviewed the images and agree with the final report above. Workstation performed: [id number]     EKG, Pathology, and Other Studies: I have personally reviewed pertinent reports.     ALLERGIES     Allergies   Allergen Reactions    Influenza Virus Vaccine      MEDICATIONS PRIOR TO ARRIVAL     Prior to Admission medications    Medication Sig Start Date End Date Taking? Authorizing Provider   amLODIPine (NORVASC) 5 mg tablet Take 1 tablet (5 mg total) by mouth daily 1/10/24  Yes Ailyn Aranda PA-C   atorvastatin (LIPITOR) 40 mg tablet TAKE 1 TABLET BY MOUTH ONCE DAILY IN THE EVENING. TAKE IN PLACE OF SIMVASTATIN 10/2/23  Yes Ailyn Aranda PA-C   clopidogrel (PLAVIX) 75 mg tablet Take 1 tablet (75 mg total) by mouth daily To prevent stroke and heart attack 8/8/23  Yes Damian Varlatanyas, DO   lisinopril (ZESTRIL) 10 mg tablet Take 1 tablet (10 mg total) by mouth daily 8/8/23  Yes Damian Varvarelis, DO   albuterol (PROVENTIL HFA,VENTOLIN HFA) 90 mcg/act inhaler Inhale 2 puffs every 6 (six) hours as needed for wheezing    Historical Provider, MD   ergocalciferol (ERGOCALCIFEROL) 1.25 MG (98439 UT) capsule Take 1 capsule (50,000 Units total) by " mouth once a week 1/30/24   Damian Lynne,    tiotropium-olodaterol (Stiolto Respimat) 2.5-2.5 MCG/ACT inhaler Inhale 2 puffs daily 2/22/24   Lisa Kidd MD     MEDICATIONS ADMINISTERED IN LAST 24 HOURS     Medication Administration - last 24 hours from 04/04/2024 1402 to 04/05/2024 1402         Date/Time Order Dose Route Action Action by     04/04/2024 1801 EDT atorvastatin (LIPITOR) tablet 40 mg 40 mg Oral Given Madison Claudio RN     04/05/2024 0738 EDT ipratropium (ATROVENT) 0.02 % inhalation solution 0.5 mg 0.5 mg Nebulization Given Ann Marie Carreno, RT     04/04/2024 1953 EDT ipratropium (ATROVENT) 0.02 % inhalation solution 0.5 mg 0.5 mg Nebulization Not Given Malini Sparrow, RT     04/05/2024 0738 EDT levalbuterol (XOPENEX) inhalation solution 1.25 mg 1.25 mg Nebulization Given Ann Marie Carreno, RT     04/04/2024 1950 EDT levalbuterol (XOPENEX) inhalation solution 1.25 mg 1.25 mg Nebulization Given Malini Sparrow, RT     04/04/2024 1952 EDT budesonide (PULMICORT) inhalation solution 0.5 mg 0.5 mg Nebulization Not Given Malini Sparrow, RT     04/04/2024 1953 EDT formoterol (PERFOROMIST) nebulizer solution 20 mcg 20 mcg Nebulization Not Given Malini Sparrow, RT     04/05/2024 1144 EDT nystatin (MYCOSTATIN) oral suspension 500,000 Units 500,000 Units Swish & Swallow Given Madison Claudio RN     04/05/2024 0919 EDT nystatin (MYCOSTATIN) oral suspension 500,000 Units 500,000 Units Swish & Swallow Given Madison Claudio RN     04/04/2024 2111 EDT nystatin (MYCOSTATIN) oral suspension 500,000 Units 500,000 Units Swish & Swallow Given Katherine Rodriguez     04/04/2024 1801 EDT nystatin (MYCOSTATIN) oral suspension 500,000 Units 500,000 Units Swish & Swallow Given Madison Claudio RN     04/04/2024 2159 EDT ALPRAZolam (XANAX) tablet 0.5 mg 0.5 mg Oral Given Katherine Rodriguez     04/05/2024 1332 EDT labetalol (NORMODYNE) injection 10 mg 10 mg Intravenous Not Given Madison Claudio RN     04/05/2024 0921 EDT predniSONE tablet 20  mg 20 mg Oral Given Madisonher FELISA Claudio RN     04/05/2024 0921 EDT NIFEdipine (PROCARDIA XL) 24 hr tablet 60 mg 60 mg Oral Given Madisonher FELISA Claudio RN     04/05/2024 0926 EDT famotidine (PEPCID) tablet 10 mg 10 mg Oral Given Madisonher FELISA Claudio RN     04/05/2024 0921 EDT senna (SENOKOT) tablet 8.6 mg 8.6 mg Oral Given Madisonher FELISA Claudio RN     04/04/2024 1801 EDT senna (SENOKOT) tablet 8.6 mg 8.6 mg Oral Given Madisonher FELISA Claudio RN     04/05/2024 0919 EDT polyethylene glycol (MIRALAX) packet 17 g 17 g Oral Given Madisonher FELISA Claudio RN     04/04/2024 2111 EDT polyethylene glycol (MIRALAX) packet 17 g 17 g Oral Given [facility]     04/05/2024 0919 EDT chlorhexidine (PERIDEX) 0.12 % oral rinse 15 mL 15 mL Mouth/Throat Given Madisonher FELISA Claudio RN     04/04/2024 2120 EDT chlorhexidine (PERIDEX) 0.12 % oral rinse 15 mL 15 mL Mouth/Throat Given [facility]     04/04/2024 1523 EDT chlorhexidine (PERIDEX) 0.12 % oral rinse 15 mL 15 mL Mouth/Throat Given Madisonher FELISA Claudio RN     04/04/2024 1523 EDT hydrALAZINE (APRESOLINE) tablet 50 mg 0 mg Oral Hold Madisonher FELISA Claudio RN     04/04/2024 1523 EDT LORazepam (ATIVAN) injection 0.25 mg 0.25 mg Intravenous Given Madisonher FELISA Claudio RN     04/04/2024 1654 EDT furosemide (LASIX) injection 80 mg 80 mg Intravenous Given Madison FELISA Claudio RN     04/05/2024 0544 EDT hydrALAZINE (APRESOLINE) tablet 25 mg 25 mg Oral Given [facility]     04/04/2024 2111 EDT hydrALAZINE (APRESOLINE) tablet 25 mg 25 mg Oral Given [facility]     04/05/2024 0104 EDT magnesium sulfate 2 g/50 mL IVPB (premix) 2 g 0 g Intravenous Stopped [facility]     04/04/2024 2049 EDT magnesium sulfate 2 g/50 mL IVPB (premix) 2 g 2 g Intravenous New Bag Katherine Rodriguez     04/04/2024 2152 EDT insulin lispro (HumALOG/ADMELOG) 100 units/mL subcutaneous injection 1-5 Units 2 Units Subcutaneous Given Katherine Rodriguez     04/05/2024 1144 EDT insulin lispro (HumALOG/ADMELOG) 100 units/mL subcutaneous injection 1-5 Units -- Subcutaneous Not  Given Madison Claudio RN     04/05/2024 0818 EDT insulin lispro (HumALOG/ADMELOG) 100 units/mL subcutaneous injection 1-5 Units -- Subcutaneous Not Given Madison Claudio RN     04/05/2024 0708 EDT calcium gluconate 2 g in sodium chloride 0.9% 100 mL (premix) 2 g Intravenous New Kathrin Rodriguez     04/05/2024 0919 EDT bisacodyl (DULCOLAX) rectal suppository 10 mg 10 mg Rectal Given Madison Claudio RN     04/05/2024 0919 EDT lactulose (CHRONULAC) oral solution 20 g 20 g Oral Given Madison Claudio RN     04/05/2024 0958 EDT furosemide (LASIX) injection 80 mg 80 mg Intravenous Given Madison Claudio RN     04/05/2024 1331 EDT hydrALAZINE (APRESOLINE) tablet 50 mg 50 mg Oral Given Madison Claudio RN          CURRENT MEDICATIONS     Current Facility-Administered Medications   Medication Dose Route Frequency Provider Last Rate    acetaminophen  650 mg Oral Q4H PRN MANOHAR Maurice      albuterol  2.5 mg Nebulization Q4H PRN MANOHAR Maurice      ALPRAZolam  0.5 mg Oral TID PRN MANOHAR Maurice      aluminum-magnesium hydroxide-simethicone  30 mL Oral Q4H PRN MANOHAR Maurice      atorvastatin  40 mg Oral Daily With Dinner MANOHAR Maurice      bisacodyl  10 mg Rectal Daily MANOHAR Maurice      chlorhexidine  15 mL Mouth/Throat Q12H [facility] MANOHAR Maurice      famotidine  10 mg Oral Daily MANOHAR Maurice      hydrALAZINE  50 mg Oral Q8H [facility] Antonino Ernst MD      insulin lispro  1-5 Units Subcutaneous 4x Daily (AC & HS) MANOHAR Maurice      ipratropium  0.5 mg Nebulization TID MANOHAR Maurice      labetalol  10 mg Intravenous Q6H PRN MANOHAR Maurice      levalbuterol  1.25 mg Nebulization TID MANOHAR Maurice      NIFEdipine  60 mg Oral Daily MANOHAR Maurice      nystatin  500,000 Units Swish & Swallow 4x Daily MANOHAR Maurice      ondansetron  4 mg Intravenous Q6H PRN MICHELLE MauriceNP       "polyethylene glycol  17 g Oral BID MANOHAR Maurice      predniSONE  20 mg Oral Daily MANOHAR Maurice      Followed by    [START ON 4/8/2024] predniSONE  10 mg Oral Daily MANOHAR Maurice      senna  1 tablet Oral BID MANOHAR Maurice      sodium chloride  1 spray Each Nare Q1H PRN MANOHAR Maurice          acetaminophen, 650 mg, Q4H PRN  albuterol, 2.5 mg, Q4H PRN  ALPRAZolam, 0.5 mg, TID PRN  aluminum-magnesium hydroxide-simethicone, 30 mL, Q4H PRN  labetalol, 10 mg, Q6H PRN  ondansetron, 4 mg, Q6H PRN  sodium chloride, 1 spray, Q1H PRN        Admission Time  I spent 30 minutes admitting the patient.  This involved direct patient contact where I performed a full history and physical, reviewing previous records, and reviewing laboratory and other diagnostic studies.    Portions of the record may have been created with voice recognition software.  Occasional wrong word or \"sound a like\" substitutions may have occurred due to the inherent limitations of voice recognition software.  Read the chart carefully and recognize, using context, where substitutions have occurred.    ==  Paulino Duarte  [facility]  Internal Medicine Residency PGY-I  &  Paulino Duarte, MS3   "

## 2024-04-05 NOTE — ASSESSMENT & PLAN NOTE
Had bleeding/oozing at sheath site post IR intervention - was reversed with Protamine, sheath pulled, and manual pressure held   Holding Plavix and DVT ppx for now  Frequent vascular checks  Monitor site for hematoma, expanding ecchymosis (evidence of bleeding)

## 2024-04-05 NOTE — H&P
INTERNAL MEDICINE RESIDENCY ADMISSION H&P     Name: Laya Brooks   Age & Sex: 79 y.o. female   MRN: [id number]  [facility]   Encounter: [id number]  Primary Care Provider: Joana Shields DO    Code Status: Level 1 - Full Code  Admission Status: INPATIENT   Disposition: Patient requires Med/Surg    Admit to team: SOD Team C     ASSESSMENT/PLAN     Principal Problem:    Acute renal failure superimposed on stage 3a chronic kidney disease (HCC)  Active Problems:    Severe protein-calorie malnutrition (HCC)    Hypercholesterolemia    Atrophy of right kidney    Renovascular hypertension    CAD (coronary artery disease)    COPD (chronic obstructive pulmonary disease) (HCC)    Bicytopenia    Bleeding at insertion site    Acute respiratory insufficiency      * Acute renal failure superimposed on stage 3a chronic kidney disease (HCC)  Assessment & Plan  Lab Results   Component Value Date    EGFR 33 04/05/2024    EGFR 34 04/04/2024    EGFR 34 04/04/2024    CREATININE 1.45 (H) 04/05/2024    CREATININE 1.44 (H) 04/04/2024    CREATININE 1.44 (H) 04/04/2024     Creatinine peaked at 6.27 at [facility] - now around 1.4 - 1.5  Baseline creatinine appears to be around 0.9 - 1.1  VAS Renal artery (3/29/2024):  severe left renal artery stenosis at the aorto- renal junction.  Moderate disease more proximally  Has received IHD x3 since admission to [facility] - last treatment was on 03/29 4/4: Received IV Lasix 60 mg x1 yesterday and IV Lasix 80 mg x1 PM w/good response  4/5: Received additional IV Lasix 80mg x1 AM    Ddx: Suspect this is 2/2 significant bilateral renal artery stenosis w/atrophic R kidney     Plan:  -Ordered additional Lasix 80mg IV dose d/t volume overload, consider scheduling lasix   -Nephrology following   -IR consulted, appreciate recs   -will pursue further L renal artery stenting next week   -Avoid nephrotoxic agents and fluctuations in BP  -Trend renal indices  -Strict I/O with pure wick  -Daily  weights    Acute respiratory insufficiency  Assessment & Plan  Currently requiring 5L NC   Suspect this is 2/2 acute disease process as well as volume overload in setting of CKD    Plan:   -Continue w/diuresis PRN, ordered Lasix 80mg IV  -Aggressive pulmonary hygiene  -Wean O2 for SpO2 > 88%    Constipation  Assessment & Plan  Pt went 1.5 weeks without bowel movement  Was started on bowel regimen including miralax bid, senna bid, and dulcolax   Pt now reports abdominal cramping and diarrhea    Plan:  -will change senna to qhs starting tomrrow  -hold evening dose of miralax   -monitor electrolytes daily   -encourage good PO intake    Bleeding at insertion site  Assessment & Plan  Had bleeding/oozing at sheath site post IR intervention - was reversed with Protamine, sheath pulled, and manual pressure held   4/5: Mild oozing at site     Plan:  -Holding Plavix and DVT ppx for now  -Monitor CBC and INR    Bicytopenia  Assessment & Plan  With anemia/thrombocytopenia since admission to [facility]  Possibly in setting of nutritional deficiency vs CKD  Iron panel, B12, folate levels unremarkable    Plan:  - Holding Plavix/DVT ppx for now in setting of bleeding post procedure  - Trend Hgb/Plt and transfuse for Hgb < 7 and Plt < 20,000    Renal artery stenosis (HCC)  Assessment & Plan  Imaging from 03/28 revealed severe L renal artery stenosis at aorto-renal junction w/moderate disease   seen more proximally as well  Has known R renal artery stenosis w/subsequent renal atrophy  Now s/p IR L renal artery angiogram (4/4) for revascularization, however was unsuccessful due to large aortic plaque Transferred to [facility] for 2nd attempt at L renal artery revascularization     Plan:  -see above under Acute renal failure superimposed on stage 3a CKD    Dysphagia  Assessment & Plan  Pt presented with pharyngeal dysphagia   Speech was consulted at [email or web address] and [employer] with dysphagia diets ordered    Plan:  -Speech therapy consulted, appreciate  recs   -Dysphagia 3 diet with thin liquids    COPD (chronic obstructive pulmonary disease) (HCC)  Assessment & Plan  PFTs from 02/2024 revealed FEV1 61% - moderate obstruction  Follows with Pulmonology - recently started on Stiolto and PRN Albuterol inhalers as this is newer diagnosis    Plan:  -Continue Xopenex/Atrovent/Pulmicort/Performist nebs  -Continue steroid taper for possible COPD exacerbation that was started at [facility]  -Aggressive pulmonary hygiene    CAD (coronary artery disease)  Assessment & Plan  -Prior STEMI in 2008 s/p RCA stenting    Plan:  -Holding home Plavix given bleeding post IR procedure  -Continue home statin  -Off tele     Renovascular hypertension  Assessment & Plan  Previously taking ACE-I and CCB as outpatient  Now on Procardia and scheduled Hydralazine - Hydralazine increased to 50 mg Q8H    Plan:  -cont above meds  -PRN Labetalol for SBP > 170    Atrophy of right kidney  Assessment & Plan  In setting of R renal artery stenosis   Nephrology following - patient may ultimately require RRT    Hypercholesterolemia  Assessment & Plan  LDL goal <70 due to intracranial stenosis  Most recent lipid panel with     Plan:  -Recommend repeat in 2-3 months. If it still remains elevated would defer to PCP about addition of Repatha versus Zetia  -Continue atorvastatin 40mg at this time.     Severe protein-calorie malnutrition (HCC)  Assessment & Plan  Body mass index is 18.36 kg/m².      Pt reports disliking the same foods she was given at prior hospitalizations.     Plan:  -cont with nutrition recommendations and dysphagia diet         VTE Pharmacologic Prophylaxis: Heparin  VTE Mechanical Prophylaxis: sequential compression device    CHIEF COMPLAINT   SOB x 3 days and lower extremity swelling x 1 week.     HISTORY OF PRESENT ILLNESS   Ema Brooks is an 78 y/o F with PMHx of COPD, CAD prior STEMI s/p RCA stenting in 2008, CVA, CKD stage 3, atrophic R kidney 2/2 severe SALAZAR, and HLD who initially  "presented to [facility] ED on 3/24 for COPD exacerbation, was later found to have left renal artery plaque formation, and then transferred to [facility] for IR procedure. Pt is now being transferred to [facility] for further IR intervention.     At [facility], she was initially admitted to Level 1 stepdown for continuous BiPAP and received IV Solu-Medrol, nebs, and was seen by pulm. At this point, she was noted to have evidence of CHF. Cardiology was consulted, and she was started on Lasix 40 mg BID. Additionally, upon presentation, her creatinine was 2.8. A Perez was inserted but creatinine continued to worsen with diuresis over the next two days, requiring a nephrology consult and HD through temporary port placed by IR. She underwent 3 days of HD with significant improvement in creatinine, producing moderate urine. While at [facility], her home Norvasc and lisinopril were discontinued and transitioned to Procardia 60 mg and hydralazine 25 mg TID. She also completed a course of ceftriaxone. Solu-Medrol was later weaned and then transitioned to a PO prednisone taper.    On 03/28, a renal artery duplex was performed noting: \"severe left renal artery stenosis at the aorto-renal junction. Moderate disease more proximally is seen as well.\" Decision was made to transfer patient to [place] for IR guided angiogram and possible angioplasty.     Upon transfer to [facility] on 04/02, patient was on room air, voiding with issue, mildly dyspneic on exertion. She continued to receive respiratory therapy for her SOB. On 04/04, IR renal angiogram was performed with attempted placement of stent in L renal artery, however, resulted in unsuccessful angioplasty and stenting. IR's recommendation at this time was for pt to be transferred to [facility] for more suitable technical instruments to achieve successful stenting. Following procedure, patient was monitored in the ICU due to acute blood loss. Was given protamine to reverse heparin.     Upon arrival at " "[facility], pt reports that she feels \"exhausted\" and endorses dry cough and some abdominal cramping/diarrhea starting earlier this morning. States that she has not had a bowel movement in 1.5 weeks and was given several laxatives. She does not feel particularly SOB, but she says that she has been struggling to speak in longer sentences. She denies fevers, chills, nausea, vomiting, issues urinating, lightheadedness, chest pain, or palpitations.    REVIEW OF SYSTEMS     Review of Systems   Constitutional:  Positive for activity change and fatigue. Negative for chills and fever.   HENT: Negative.     Eyes: Negative.    Respiratory:  Positive for cough and shortness of breath. Negative for apnea, choking, chest tightness, wheezing and stridor.    Cardiovascular:  Positive for leg swelling. Negative for chest pain and palpitations.   Gastrointestinal:  Positive for abdominal pain and diarrhea.   Endocrine: Negative.    Genitourinary: Negative.    Musculoskeletal: Negative.    Skin:  Positive for wound. Negative for rash.   Allergic/Immunologic: Negative.    Neurological:  Positive for weakness. Negative for dizziness, light-headedness and headaches.   Psychiatric/Behavioral:  Positive for sleep disturbance. Negative for agitation, behavioral problems, confusion, dysphoric mood and suicidal ideas.      OBJECTIVE     Vitals:    24 1450   BP: 156/57   Pulse: 75   Resp: 17   Temp: 97.6 °F (36.4 °C)   SpO2: (!) 88%      Temperature:   Temp (24hrs), Av.2 °F (36.2 °C), Min:96.6 °F (35.9 °C), Max:97.7 °F (36.5 °C)    Temperature: 97.6 °F (36.4 °C)  Intake & Output:  I/O       None          Weights:        There is no height or weight on file to calculate BMI.  Weight (last 2 days)       None          Physical Exam  Constitutional:       Appearance: She is ill-appearing.   HENT:      Head: Normocephalic and atraumatic.      Right Ear: External ear normal.      Left Ear: External ear normal.      Mouth/Throat:      Mouth: " Mucous membranes are moist.   Eyes:      General:         Right eye: No discharge.         Left eye: No discharge.      Conjunctiva/sclera: Conjunctivae normal.   Cardiovascular:      Rate and Rhythm: Normal rate and regular rhythm.      Pulses: Normal pulses.      Heart sounds: Normal heart sounds. No murmur heard.     No friction rub. No gallop.   Pulmonary:      Effort: Respiratory distress present.      Breath sounds: Rhonchi (Faint rhonchi appreciated in all lung fields) present.      Comments: Decreased breath sounds bilaterally  Chest:      Chest wall: No tenderness.   Abdominal:      General: Abdomen is flat. Bowel sounds are normal. There is no distension.      Palpations: Abdomen is soft.      Tenderness: There is abdominal tenderness (Mild tenderness to light palpation in suprapubic area).   Musculoskeletal:         General: No tenderness.      Cervical back: No rigidity.      Right lower leg: Edema (1+ pitting) present.      Left lower leg: Edema (1+ pitting) present.   Skin:     General: Skin is warm and dry.      Findings: Bruising (Patient has bruising on bilateral UEs, mainly on hands, and on abdomen) present. No rash.   Neurological:      General: No focal deficit present.      Mental Status: She is alert.      Motor: Weakness present.   Psychiatric:         Mood and Affect: Mood normal.         Behavior: Behavior normal.         Thought Content: Thought content normal.         Judgment: Judgment normal.       PAST MEDICAL HISTORY     Past Medical History:   Diagnosis Date    CAD (coronary artery disease)     Cardiac disease     Hypercholesteremia     Hyperlipidemia     Hypertension     Hypertension     Renal disorder      PAST SURGICAL HISTORY     Past Surgical History:   Procedure Laterality Date    CORONARY ANGIOPLASTY WITH STENT PLACEMENT      IR RENAL ANGIOGRAM  4/4/2024    IR TEMPORARY DIALYSIS CATHETER PLACEMENT  3/26/2024    WRIST SURGERY       SOCIAL & FAMILY HISTORY     Social History      Substance and Sexual Activity   Alcohol Use Never       Social History     Substance and Sexual Activity   Drug Use No     Social History     Tobacco Use   Smoking Status Former   Smokeless Tobacco Never     No family history on file.  LABORATORY DATA     Labs: I have personally reviewed pertinent reports.    Results from last 7 days   Lab Units 04/05/24  0538 04/04/24  1515 04/04/24  0607 04/03/24  0614 04/02/24  0737   WBC Thousand/uL 4.78 7.93 5.96 3.66* 8.11   HEMOGLOBIN g/dL 9.6* 11.2* 10.1* 9.8* 10.6*   HEMATOCRIT % 29.7* 34.5* 30.5* 30.3* 32.4*   PLATELETS Thousands/uL 78* 99* 81* 66* 75*   NEUTROS PCT %  --   --  79* 77* 81*   MONOS PCT %  --   --  9 9 8   EOS PCT %  --   --  0 0 0      Results from last 7 days   Lab Units 04/05/24  0538 04/04/24  1515 04/04/24  0607   POTASSIUM mmol/L 4.2 4.6 4.3   CHLORIDE mmol/L 107 106 105   CO2 mmol/L 22 19* 22   BUN mg/dL 44* 46* 46*   CREATININE mg/dL 1.45* 1.44* 1.44*   CALCIUM mg/dL 7.6* 7.9* 8.0*   ALK PHOS U/L 40  --   --    ALT U/L 14  --   --    AST U/L 12*  --   --      Results from last 7 days   Lab Units 04/05/24  0538 04/04/24  1515   MAGNESIUM mg/dL 2.3 1.7*     Results from last 7 days   Lab Units 04/05/24  0538 04/04/24  1515   PHOSPHORUS mg/dL 3.3 3.1      Results from last 7 days   Lab Units 04/04/24  1515   INR  1.10   PTT seconds 26             Micro:  Lab Results   Component Value Date    URINECX (A) 03/07/2022     >100,000 cfu/ml Beta Hemolytic Streptococcus Group B    URINECX 30,000-39,000 cfu/ml 03/07/2022     IMAGING & DIAGNOSTIC TESTS     Imaging: I have personally reviewed pertinent reports.    No results found.  EKG, Pathology, and Other Studies: I have personally reviewed pertinent reports.     ALLERGIES     Allergies   Allergen Reactions    Influenza Virus Vaccine      MEDICATIONS PRIOR TO ARRIVAL     Prior to Admission medications    Medication Sig Start Date End Date Taking? Authorizing Provider   albuterol (PROVENTIL HFA,VENTOLIN HFA)  90 mcg/act inhaler Inhale 2 puffs every 6 (six) hours as needed for wheezing    Historical Provider, MD   amLODIPine (NORVASC) 5 mg tablet Take 1 tablet (5 mg total) by mouth daily 1/10/24   Ailyn Aranda PA-C   atorvastatin (LIPITOR) 40 mg tablet TAKE 1 TABLET BY MOUTH ONCE DAILY IN THE EVENING. TAKE IN PLACE OF SIMVASTATIN 10/2/23   Ailyn Aranda PA-C   clopidogrel (PLAVIX) 75 mg tablet Take 1 tablet (75 mg total) by mouth daily To prevent stroke and heart attack 8/8/23   Damian Lynne DO   ergocalciferol (ERGOCALCIFEROL) 1.25 MG (91522 UT) capsule Take 1 capsule (50,000 Units total) by mouth once a week 1/30/24   Damian Lynne DO   lisinopril (ZESTRIL) 10 mg tablet Take 1 tablet (10 mg total) by mouth daily 8/8/23   Damian Lynne DO   tiotropium-olodaterol (Stiolto Respimat) 2.5-2.5 MCG/ACT inhaler Inhale 2 puffs daily 2/22/24   Lisa Kidd MD     MEDICATIONS ADMINISTERED IN LAST 24 HOURS     Medication Administration - last 24 hours from 04/04/2024 1549 to 04/05/2024 1549         Date/Time Order Dose Route Action Action by     04/05/2024 1546 EDT ipratropium (ATROVENT) 0.02 % inhalation solution 0.5 mg 0.5 mg Nebulization Given Lidia Fernandez, RT     04/05/2024 1546 EDT levalbuterol (XOPENEX) inhalation solution 1.25 mg 1.25 mg Nebulization Given Lidia Current, RT          CURRENT MEDICATIONS     Current Facility-Administered Medications   Medication Dose Route Frequency Provider Last Rate    acetaminophen  650 mg Oral Q4H PRN MANOHAR Maurice      albuterol  2.5 mg Nebulization Q4H PRN MANOHAR Maurice      ALPRAZolam  0.5 mg Oral TID PRN MANOHAR Maurice      aluminum-magnesium hydroxide-simethicone  30 mL Oral Q4H PRN MANOHAR Maurice      atorvastatin  40 mg Oral Daily With Dinner MANOHAR Maurice      [START ON 4/6/2024] bisacodyl  10 mg Rectal Daily MANOHAR Maurice      [START ON 4/6/2024] famotidine  10 mg Oral Daily  "MANOHAR Maurice      insulin lispro  1-5 Units Subcutaneous 4x Daily (AC & HS) MANOHAR Maurice      ipratropium  0.5 mg Nebulization TID Melody Hunter DO      labetalol  10 mg Intravenous Q6H PRN MANOHAR Maurice      levalbuterol  1.25 mg Nebulization TID Melody Hunter DO      [START ON 4/6/2024] NIFEdipine  60 mg Oral Daily MANOHAR Maurice      nystatin  500,000 Units Swish & Swallow 4x Daily MANOHAR Maurice      ondansetron  4 mg Intravenous Q6H PRN MANOHAR Maurice      polyethylene glycol  17 g Oral BID MANOHAR Maurice      [START ON 4/6/2024] predniSONE  20 mg Oral Daily MANOHAR Maurice      Followed by    [START ON 4/8/2024] predniSONE  10 mg Oral Daily MANOHAR Maurice      senna  1 tablet Oral BID MANOHAR Maurice      sodium chloride  1 spray Each Nare Q1H PRN MANOHAR Maurice          acetaminophen, 650 mg, Q4H PRN  albuterol, 2.5 mg, Q4H PRN  ALPRAZolam, 0.5 mg, TID PRN  aluminum-magnesium hydroxide-simethicone, 30 mL, Q4H PRN  labetalol, 10 mg, Q6H PRN  ondansetron, 4 mg, Q6H PRN  sodium chloride, 1 spray, Q1H PRN        Admission Time  I spent 30 minutes admitting the patient.  This involved direct patient contact where I performed a full history and physical, reviewing previous records, and reviewing laboratory and other diagnostic studies.    Portions of the record may have been created with voice recognition software.  Occasional wrong word or \"sound a like\" substitutions may have occurred due to the inherent limitations of voice recognition software.  Read the chart carefully and recognize, using context, where substitutions have occurred.    ==  Melody Hunter DO  [facility]  Internal Medicine Residency PGY-I  "

## 2024-04-05 NOTE — QUICK NOTE
Patient updated by myself and Dr. Ernst at bedside. All questions/concerns were answered and addressed.

## 2024-04-05 NOTE — PROGRESS NOTES
[facility]  Progress Note  Name: Laya Brooks I  MRN: [id number]  Unit/Bed#: ICU 03-01 I Date of Admission: 4/2/2024   Date of Service: 4/5/2024 I Hospital Day: 3    Assessment/Plan   * Acute renal failure superimposed on stage 3a chronic kidney disease (HCC)  Assessment & Plan  Lab Results   Component Value Date    EGFR 34 04/04/2024    EGFR 34 04/04/2024    EGFR 30 04/03/2024    CREATININE 1.44 (H) 04/04/2024    CREATININE 1.44 (H) 04/04/2024    CREATININE 1.57 (H) 04/03/2024     Creatinine peaked at 6.27 at [facility] - now around 1.4 - 1.5  Baseline creatinine appears to be around 0.9 - 1.1  Suspect this is 2/2 significant bilateral renal artery stenosis w/atrophic R kidney   Has received IHD x3 since admission to [facility] - last treatment was on 03/29  Is s/p IV Lasix 60 mg x1 this AM prior to renal artery stenting attempt and 80 IV lasix in the afternoon  Nephrology following - may need RRT if revascularization attempt unsuccessful  Avoid nephrotoxic agents and fluctuations in BP  Trend renal indices  Strict I/O with pure wick  Daily weights    Bleeding at insertion site  Assessment & Plan  Had bleeding/oozing at sheath site post IR intervention - was reversed with Protamine, sheath pulled, and manual pressure held   Holding Plavix and DVT ppx for now  Upgraded to ICU for close monitoring of site  Monitor CBC and INR  Frequent vascular checks    Renal artery stenosis (HCC)  Assessment & Plan  Imaging from 03/28 revealed severe L renal artery stenosis at aorto-renal junction w/moderate disease   seen more proximally as well  Has known R renal artery stenosis w/subsequent renal atrophy  Now s/p IR renal artery angiogram for potential stenting, however was unsuccessful due to large aortic plaque - if candidate, may require transfer to [facility] for alternate stenting options  If unsuccessful stent placement, will likely HD dependent    Renovascular hypertension  Assessment & Plan  Previously taking  ACE-I and CCB as outpatient - now on Procardia and scheduled Hydralazine  PRN Labetalol for SBP > 150 in setting of recent renal artery revascularization attempt    Atrophy of right kidney  Assessment & Plan  In setting of R renal artery stenosis   Nephrology following - patient may ultimately require RRT    Bicytopenia  Assessment & Plan  With anemia/thrombocytopenia since admission to [facility]  Possibly in setting of nutritional deficiency vs CKD  iron panel, B12, folate levels unremarkable  Holding Plavix/DVT ppx for now in setting of bleeding post procedure  Trend Hgb/Plt and transfuse for Hgb < 7 and Plt < 20,000    Severe protein-calorie malnutrition (HCC)  Assessment & Plan  Malnutrition Findings:                                 BMI Findings:           Body mass index is 22.56 kg/m².     Continue nutritional supplementation   Nutrition consult    Acute respiratory insufficiency  Assessment & Plan  Currently requiring 2L NC   Suspect this is 2/2 acute disease process as well as volume overload in setting of CKD  Continue w/diuresis PRN as noted above  Aggressive pulmonary hygiene  Wean O2 for SpO2 > 88%    COPD (chronic obstructive pulmonary disease) ([facility])  Assessment & Plan  PFTs from 02/2024 revealed FEV1 61% - moderate obstruction  Follows with Pulmonology - recently started on Stiolto and PRN Albuterol inhalers as this is newer diagnosis  Continue Xopenex/Atrovent/Pulmicort/Performist nebs  Continue steroid taper for ? COPD exacerbation that was started at [facility]  Aggressive pulmonary hygiene    Dysphagia  Assessment & Plan  Continue dysphagia diet per Speech recommendations    CAD (coronary artery disease)  Assessment & Plan  Prior STEMI in 2008 s/p RCA stenting  Holding home Plavix given bleeding post IR procedure  Continue home statin  Continue cardiac monitoring    Hypercholesterolemia  Assessment & Plan  Continue home statin             Disposition: Stepdown Level 1    ICU Core Measures     A: Assess,  Prevent, and Manage Pain Has pain been assessed? Yes  Need for changes to pain regimen? No   B: Both SAT/SAT  N/A   C: Choice of Sedation RASS Goal: N/A patient not on sedation  Need for changes to sedation or analgesia regimen? No   D: Delirium CAM-ICU: Negative   E: Early Mobility  Plan for early mobility? Yes   F: Family Engagement Plan for family engagement today? Yes       Review of Invasive Devices:      Central access plan: HD cath in place.  Plan iHD      Prophylaxis:  VTE Contraindicated secondary to: bleeding risk   Stress Ulcer  covered byfamotidine (PEPCID) tablet 10 mg [[id number]]         Significant 24hr Events     24hr events: Yesterday IR attempted renal stent. Received protamine for plavix and sheath removed. Transferred to CC service for close monitoring of possible bleeding. Plan to transfer to [facility] for IR guided renal stent today.      Subjective   Review of Systems   Constitutional: Negative.    Respiratory:  Positive for shortness of breath.    Cardiovascular: Negative.    Gastrointestinal: Negative.    Genitourinary: Negative.    Musculoskeletal: Negative.    Neurological: Negative.    Psychiatric/Behavioral:  The patient is nervous/anxious.       Objective                            Vitals I/O      Most Recent Min/Max in 24hrs   Temp 97.6 °F (36.4 °C) Temp  Min: 95.3 °F (35.2 °C)  Max: 97.9 °F (36.6 °C)   Pulse 63 Pulse  Min: 60  Max: 90   Resp 16 Resp  Min: 14  Max: 39   /65 BP  Min: 98/44  Max: 174/73   O2 Sat 98 % SpO2  Min: 90 %  Max: 98 %      Intake/Output Summary (Last 24 hours) at 4/5/2024 025  Last data filed at 4/5/2024 0200  Gross per 24 hour   Intake 340 ml   Output 700 ml   Net -360 ml       Diet Cardiovascular; Cardiac; Fluid Restriction 1500 ML, Dysphagia 2-Mechanical Soft; Thin Liquid    Invasive Monitoring           Physical Exam   Physical Exam  Vitals and nursing note reviewed.   Eyes:      Pupils: Pupils are equal, round, and reactive to light.   Skin:     General:  Skin is warm and dry.      Capillary Refill: Capillary refill takes less than 2 seconds.   HENT:      Head: Normocephalic.      Mouth/Throat:      Mouth: Mucous membranes are dry.   Cardiovascular:      Rate and Rhythm: Normal rate and regular rhythm.      Pulses: Normal pulses.      Heart sounds: Normal heart sounds.   Musculoskeletal:         General: Normal range of motion.   Abdominal:      Palpations: Abdomen is soft.   Constitutional:       Appearance: She is ill-appearing.   Pulmonary:      Breath sounds: Normal breath sounds.   Neurological:      Mental Status: She is alert and oriented to person, place and time. Mental status is at baseline.      Sensory: Sensation is intact.      Motor: gross motor function is at baseline for patient. Strength full and intact in all extremities.            Diagnostic Studies      EKG: NSR  Imaging:  I have personally reviewed pertinent reports.       Medications:  Scheduled PRN   atorvastatin, 40 mg, Daily With Dinner  budesonide, 0.5 mg, Q12H  chlorhexidine, 15 mL, Q12H JHON  famotidine, 10 mg, Daily  formoterol, 20 mcg, Q12H  hydrALAZINE, 25 mg, Q8H JHON  insulin lispro, 1-5 Units, TID AC  insulin lispro, 1-5 Units, HS  ipratropium, 0.5 mg, TID  levalbuterol, 1.25 mg, TID  NIFEdipine, 60 mg, Daily  nystatin, 500,000 Units, 4x Daily  polyethylene glycol, 17 g, BID  predniSONE, 20 mg, Daily   Followed by  [START ON 4/8/2024] predniSONE, 10 mg, Daily  senna, 1 tablet, BID      acetaminophen, 650 mg, Q4H PRN  albuterol, 2.5 mg, Q4H PRN  ALPRAZolam, 0.5 mg, TID PRN  aluminum-magnesium hydroxide-simethicone, 30 mL, Q4H PRN  labetalol, 10 mg, Q6H PRN  ondansetron, 4 mg, Q6H PRN  sodium chloride, 1 spray, Q1H PRN       Continuous          Labs:    CBC    Recent Labs     04/04/24  0607 04/04/24  1515   WBC 5.96 7.93   HGB 10.1* 11.2*   HCT 30.5* 34.5*   PLT 81* 99*     BMP    Recent Labs     04/04/24  0607 04/04/24  1515   SODIUM 135 135   K 4.3 4.6    106   CO2 22 19*   AGAP  8 10   BUN 46* 46*   CREATININE 1.44* 1.44*   CALCIUM 8.0* 7.9*       Coags    Recent Labs     04/04/24  1515   INR 1.10   PTT 26        Additional Electrolytes  Recent Labs     04/04/24  1515   MG 1.7*   PHOS 3.1   CAIONIZED 1.11*          Blood Gas    No recent results  No recent results LFTs  No recent results    Infectious  No recent results  Glucose  Recent Labs     04/03/24  0435 04/04/24  0607 04/04/24  1515   GLUC 121 125 178*               MANOHAR Weber

## 2024-04-05 NOTE — ASSESSMENT & PLAN NOTE
With anemia/thrombocytopenia since admission to [facility]  Possibly in setting of nutritional deficiency vs CKD  Iron panel, B12, folate levels unremarkable    Plan:  - Trend Hgb/Plt and transfuse for Hgb < 7 and Plt < 20,000

## 2024-04-05 NOTE — ASSESSMENT & PLAN NOTE
Lab Results   Component Value Date    EGFR 33 04/05/2024    EGFR 34 04/04/2024    EGFR 34 04/04/2024    CREATININE 1.45 (H) 04/05/2024    CREATININE 1.44 (H) 04/04/2024    CREATININE 1.44 (H) 04/04/2024

## 2024-04-05 NOTE — ASSESSMENT & PLAN NOTE
PFTs from 02/2024 revealed FEV1 61% - moderate obstruction  Follows with Pulmonology - recently started on Stiolto and PRN Albuterol inhalers as this is newer diagnosis  Continue Xopenex/Atrovent/Pulmicort/Performist nebs  Continue steroid taper for ? COPD exacerbation that was started at [facility]  Aggressive pulmonary hygiene

## 2024-04-05 NOTE — ASSESSMENT & PLAN NOTE
Lab Results   Component Value Date    EGFR 32 04/06/2024    EGFR 29 04/05/2024    EGFR 33 04/05/2024    CREATININE 1.48 (H) 04/06/2024    CREATININE 1.61 (H) 04/05/2024    CREATININE 1.45 (H) 04/05/2024     Creatinine peaked at 6.27 at [facility] - now around 1.4 - 1.5  Baseline creatinine appears to be around 0.9 - 1.1  VAS Renal artery (3/29/2024):  severe left renal artery stenosis at the aorto- renal junction.  Moderate disease more proximally  Has received IHD x3 since admission to [facility] - last treatment was on 03/29 4/4: Received IV Lasix 60 mg x1 yesterday and IV Lasix 80 mg x1 PM w/good response  4/5: Received additional IV Lasix 80mg x1 AM    Ddx: Suspect this is 2/2 significant bilateral renal artery stenosis w/atrophic R kidney     Plan:  -Torsemide 10mg daiy per nephro recommendations  -Nephrology following   -IR performed L renal artery stenting 4/9/2024 -> will need ASA and plavix for 90 days   -Avoid nephrotoxic agents and fluctuations in BP  -Trend renal indices  -Strict I/O with pure wick  -Daily weights

## 2024-04-05 NOTE — EMTALA/ACUTE CARE TRANSFER
Former[facility]  [address] [place] [address]  Dept: [phone]      ACUTE CARE TRANSFER CONSENT    NAME Laya ARAMBULA 1945                              MRN [id number]    I have been informed of my rights regarding examination, treatment, and transfer   by Dr. Antonino Ernst MD    Benefits: Specialized equipment and/or services available at the receiving facility (Include comment)________________________, Continuity of care (Needs IR renal revascularization)    Risks: Potential for delay in receiving treatment, Potential deterioration of medical condition, Loss of IV, Increased discomfort during transfer, Possible worsening of condition or death during transfer      Consent for Transfer:  I acknowledge that my medical condition has been evaluated and explained to me by the treating physician or other qualified medical person and/or my attending physician, who has recommended that I be transferred to the service of  Accepting Physician: Dr. Cuello at Accepting Facility Name, City & State : [facility], [place], [place]. The above potential benefits of such transfer, the potential risks associated with such transfer, and the probable risks of not being transferred have been explained to me, and I fully understand them.  The doctor has explained that, in my case, the benefits of transfer outweigh the risks.  I agree to be transferred.    I authorize the performance of emergency medical procedures and treatments upon me in both transit and upon arrival at the receiving facility.  Additionally, I authorize the release of any and all medical records to the receiving facility and request they be transported with me, if possible.  I understand that the safest mode of transportation during a medical emergency is an ambulance and that the Hospital advocates the use of this mode of transport. Risks of traveling to the receiving facility  by car, including absence of medical control, life sustaining equipment, such as oxygen, and medical personnel has been explained to me and I fully understand them.    (ROWENA CORRECT BOX BELOW)  [X]  I consent to the stated transfer and to be transported by ambulance/helicopter.  [  ]  I consent to the stated transfer, but refuse transportation by ambulance and accept full responsibility for my transportation by car.  I understand the risks of non-ambulance transfers and I exonerate the Hospital and its staff from any deterioration in my condition that results from this refusal.    X__patient and husband________    DATE  24  TIME_[id number]_______  Signature of patient or legally responsible individual signing on patient behalf           RELATIONSHIP TO PATIENT_________________________          Provider Certification    NAME Laya Brooks                                         1945                              MRN [id number]    A medical screening exam was performed on the above named patient.  Based on the examination:    Condition Necessitating Transfer Needs IR renal revascularization of L renal artery     Patient Condition: The patient has been stabilized such that within reasonable medical probability, no material deterioration of the patient condition or the condition of the unborn child(sami) is likely to result from the transfer    Reason for Transfer: No bed available at level of patient's needs, Level of Care needed not available at this facility (Needs IR renal revascularization)    Transfer Requirements: Facility [facility], [place], [place]   Space available and qualified personnel available for treatment as acknowledged by Jacy Lee  Agreed to accept transfer and to provide appropriate medical treatment as acknowledged by       Dr. Cuello  Appropriate medical records of the examination and treatment of the patient are provided at the time of transfer   STAFF INITIAL WHEN COMPLETED _______  Transfer  will be performed by qualified personnel from [facility]  and appropriate transfer equipment as required, including the use of necessary and appropriate life support measures.    Provider Certification: I have examined the patient and explained the following risks and benefits of being transferred/refusing transfer to the patient/family:  General risk, such as traffic hazards, adverse weather conditions, rough terrain or turbulence, possible failure of equipment (including vehicle or aircraft), or consequences of actions of persons outside the control of the transport personnel, Unanticipated needs of medical equipment and personnel during transport, Risk of worsening condition, The possibility of a transport vehicle being unavailable      Based on these reasonable risks and benefits to the patient and/or the unborn child(sami), and based upon the information available at the time of the patient’s examination, I certify that the medical benefits reasonably to be expected from the provision of appropriate medical treatments at another medical facility outweigh the increasing risks, if any, to the individual’s medical condition, and in the case of labor to the unborn child, from effecting the transfer.    X___MANOHAR Shahid_____________ DATE 04/05/24      TIME_0923__      ORIGINAL - SEND TO MEDICAL RECORDS   COPY - SEND WITH PATIENT DURING TRANSFER

## 2024-04-06 ENCOUNTER — APPOINTMENT (INPATIENT)
Dept: NON INVASIVE DIAGNOSTICS | Facility: HOSPITAL | Age: 79
DRG: 981 | End: 2024-04-06
Payer: MEDICARE

## 2024-04-06 LAB
ANION GAP SERPL CALCULATED.3IONS-SCNC: 9 MMOL/L (ref 4–13)
BUN SERPL-MCNC: 41 MG/DL (ref 5–25)
CA-I BLD-SCNC: 1.08 MMOL/L (ref 1.12–1.32)
CALCIUM SERPL-MCNC: 7.8 MG/DL (ref 8.4–10.2)
CHLORIDE SERPL-SCNC: 105 MMOL/L (ref 96–108)
CO2 SERPL-SCNC: 23 MMOL/L (ref 21–32)
CREAT SERPL-MCNC: 1.48 MG/DL (ref 0.6–1.3)
ERYTHROCYTE [DISTWIDTH] IN BLOOD BY AUTOMATED COUNT: 14.6 % (ref 11.6–15.1)
EST. AVERAGE GLUCOSE BLD GHB EST-MCNC: 111 MG/DL
GFR SERPL CREATININE-BSD FRML MDRD: 32 ML/MIN/1.73SQ M
GLUCOSE SERPL-MCNC: 116 MG/DL (ref 65–140)
GLUCOSE SERPL-MCNC: 230 MG/DL (ref 65–140)
GLUCOSE SERPL-MCNC: 257 MG/DL (ref 65–140)
GLUCOSE SERPL-MCNC: 93 MG/DL (ref 65–140)
HBA1C MFR BLD: 5.5 %
HCT VFR BLD AUTO: 28.1 % (ref 34.8–46.1)
HGB BLD-MCNC: 9.4 G/DL (ref 11.5–15.4)
MAGNESIUM SERPL-MCNC: 1.9 MG/DL (ref 1.9–2.7)
MCH RBC QN AUTO: 29.5 PG (ref 26.8–34.3)
MCHC RBC AUTO-ENTMCNC: 33.5 G/DL (ref 31.4–37.4)
MCV RBC AUTO: 88 FL (ref 82–98)
PHOSPHATE SERPL-MCNC: 3.4 MG/DL (ref 2.3–4.1)
PLATELET # BLD AUTO: 76 THOUSANDS/UL (ref 149–390)
PMV BLD AUTO: 10.6 FL (ref 8.9–12.7)
POTASSIUM SERPL-SCNC: 3.9 MMOL/L (ref 3.5–5.3)
RBC # BLD AUTO: 3.19 MILLION/UL (ref 3.81–5.12)
SODIUM SERPL-SCNC: 137 MMOL/L (ref 135–147)
WBC # BLD AUTO: 7.33 THOUSAND/UL (ref 4.31–10.16)

## 2024-04-06 PROCEDURE — 94760 N-INVAS EAR/PLS OXIMETRY 1: CPT

## 2024-04-06 PROCEDURE — 97110 THERAPEUTIC EXERCISES: CPT

## 2024-04-06 PROCEDURE — 97163 PT EVAL HIGH COMPLEX 45 MIN: CPT

## 2024-04-06 PROCEDURE — 93971 EXTREMITY STUDY: CPT

## 2024-04-06 PROCEDURE — 84100 ASSAY OF PHOSPHORUS: CPT | Performed by: NURSE PRACTITIONER

## 2024-04-06 PROCEDURE — 80048 BASIC METABOLIC PNL TOTAL CA: CPT | Performed by: NURSE PRACTITIONER

## 2024-04-06 PROCEDURE — 82948 REAGENT STRIP/BLOOD GLUCOSE: CPT

## 2024-04-06 PROCEDURE — 99232 SBSQ HOSP IP/OBS MODERATE 35: CPT | Performed by: INTERNAL MEDICINE

## 2024-04-06 PROCEDURE — 94664 DEMO&/EVAL PT USE INHALER: CPT

## 2024-04-06 PROCEDURE — 82330 ASSAY OF CALCIUM: CPT | Performed by: NURSE PRACTITIONER

## 2024-04-06 PROCEDURE — 94640 AIRWAY INHALATION TREATMENT: CPT

## 2024-04-06 PROCEDURE — 99222 1ST HOSP IP/OBS MODERATE 55: CPT | Performed by: INTERNAL MEDICINE

## 2024-04-06 PROCEDURE — 85027 COMPLETE CBC AUTOMATED: CPT | Performed by: NURSE PRACTITIONER

## 2024-04-06 PROCEDURE — 93971 EXTREMITY STUDY: CPT | Performed by: SURGERY

## 2024-04-06 PROCEDURE — 97167 OT EVAL HIGH COMPLEX 60 MIN: CPT

## 2024-04-06 PROCEDURE — 83735 ASSAY OF MAGNESIUM: CPT | Performed by: NURSE PRACTITIONER

## 2024-04-06 PROCEDURE — NC001 PR NO CHARGE: Performed by: INTERNAL MEDICINE

## 2024-04-06 PROCEDURE — 83036 HEMOGLOBIN GLYCOSYLATED A1C: CPT

## 2024-04-06 RX ORDER — ALBUTEROL SULFATE 2.5 MG/3ML
2.5 SOLUTION RESPIRATORY (INHALATION) EVERY 6 HOURS PRN
Status: DISCONTINUED | OUTPATIENT
Start: 2024-04-06 | End: 2024-04-10 | Stop reason: HOSPADM

## 2024-04-06 RX ORDER — FUROSEMIDE 10 MG/ML
80 INJECTION INTRAMUSCULAR; INTRAVENOUS ONCE
Status: COMPLETED | OUTPATIENT
Start: 2024-04-06 | End: 2024-04-06

## 2024-04-06 RX ORDER — LEVALBUTEROL INHALATION SOLUTION 1.25 MG/3ML
1.25 SOLUTION RESPIRATORY (INHALATION) EVERY 6 HOURS PRN
Status: DISCONTINUED | OUTPATIENT
Start: 2024-04-06 | End: 2024-04-06

## 2024-04-06 RX ADMIN — FUROSEMIDE 80 MG: 10 INJECTION, SOLUTION INTRAMUSCULAR; INTRAVENOUS at 15:00

## 2024-04-06 RX ADMIN — LEVALBUTEROL HYDROCHLORIDE 1.25 MG: 1.25 SOLUTION RESPIRATORY (INHALATION) at 07:32

## 2024-04-06 RX ADMIN — INSULIN LISPRO 1 UNITS: 100 INJECTION, SOLUTION INTRAVENOUS; SUBCUTANEOUS at 08:45

## 2024-04-06 RX ADMIN — NYSTATIN 500000 UNITS: 100000 SUSPENSION ORAL at 21:23

## 2024-04-06 RX ADMIN — BISACODYL 10 MG: 10 SUPPOSITORY RECTAL at 08:44

## 2024-04-06 RX ADMIN — FAMOTIDINE 10 MG: 20 TABLET, FILM COATED ORAL at 08:44

## 2024-04-06 RX ADMIN — NIFEDIPINE 60 MG: 30 TABLET, FILM COATED, EXTENDED RELEASE ORAL at 08:44

## 2024-04-06 RX ADMIN — POLYETHYLENE GLYCOL 3350 17 G: 17 POWDER, FOR SOLUTION ORAL at 08:44

## 2024-04-06 RX ADMIN — NYSTATIN 500000 UNITS: 100000 SUSPENSION ORAL at 12:00

## 2024-04-06 RX ADMIN — ATORVASTATIN CALCIUM 40 MG: 40 TABLET, FILM COATED ORAL at 15:57

## 2024-04-06 RX ADMIN — IPRATROPIUM BROMIDE 0.5 MG: 0.5 SOLUTION RESPIRATORY (INHALATION) at 07:32

## 2024-04-06 RX ADMIN — NYSTATIN 500000 UNITS: 100000 SUSPENSION ORAL at 18:03

## 2024-04-06 RX ADMIN — PREDNISONE 20 MG: 20 TABLET ORAL at 08:44

## 2024-04-06 RX ADMIN — NYSTATIN 500000 UNITS: 100000 SUSPENSION ORAL at 08:44

## 2024-04-06 NOTE — PLAN OF CARE
Problem: PAIN - ADULT  Goal: Verbalizes/displays adequate comfort level or baseline comfort level  Description: Interventions:  - Encourage patient to monitor pain and request assistance  - Assess pain using appropriate pain scale  - Administer analgesics based on type and severity of pain and evaluate response  - Implement non-pharmacological measures as appropriate and evaluate response  - Consider cultural and social influences on pain and pain management  - Notify physician/advanced practitioner if interventions unsuccessful or patient reports new pain  Outcome: Progressing     Problem: INFECTION - ADULT  Goal: Absence or prevention of progression during hospitalization  Description: INTERVENTIONS:  - Assess and monitor for signs and symptoms of infection  - Monitor lab/diagnostic results  - Monitor all insertion sites, i.e. indwelling lines, tubes, and drains  - Monitor endotracheal if appropriate and nasal secretions for changes in amount and color  - Nickerson appropriate cooling/warming therapies per order  - Administer medications as ordered  - Instruct and encourage patient and family to use good hand hygiene technique  - Identify and instruct in appropriate isolation precautions for identified infection/condition  Outcome: Progressing  Goal: Absence of fever/infection during neutropenic period  Description: INTERVENTIONS:  - Monitor WBC    Outcome: Progressing     Problem: DISCHARGE PLANNING  Goal: Discharge to home or other facility with appropriate resources  Description: INTERVENTIONS:  - Identify barriers to discharge w/patient and caregiver  - Arrange for needed discharge resources and transportation as appropriate  - Identify discharge learning needs (meds, wound care, etc.)  - Arrange for interpretive services to assist at discharge as needed  - Refer to Case Management Department for coordinating discharge planning if the patient needs post-hospital services based on physician/advanced  practitioner order or complex needs related to functional status, cognitive ability, or social support system  Outcome: Progressing     Problem: Knowledge Deficit  Goal: Patient/family/caregiver demonstrates understanding of disease process, treatment plan, medications, and discharge instructions  Description: Complete learning assessment and assess knowledge base.  Interventions:  - Provide teaching at level of understanding  - Provide teaching via preferred learning methods  Outcome: Progressing

## 2024-04-06 NOTE — ASSESSMENT & PLAN NOTE
She hopes she can get the stent so she can be one step closer to d/c.   We again talked about goals and she can't decide against CPR and intubation and also does not seem opposed to it, but did mention that if it there's no hope, she does not want to prolong the inevitable.   When talking about possible permanent dialysis, she said she does not know yet what to do about that, saying she knows people who undergo dialysis and they do not seem happy and worries she'll be tired all the time.   Her family helps her make decisions including her  and 2 children. All of whom have been telling her to keep fighting but sometimes she thinks she is too tired to go on.   She is made aware that unless she tells us otherwise, she will receive full code and full cares while in the hospital.   We will be available again peripherally. Please reach out if immediate assistance to address goals again is needed.

## 2024-04-06 NOTE — ASSESSMENT & PLAN NOTE
Currently requiring 5L NC   Suspect this is 2/2 acute disease process as well as volume overload in setting of CKD    Plan:   -Continue w/diuresis PRN, nephro recommends torsemide 10mg daily   -Aggressive pulmonary hygiene  -Wean O2 for SpO2 > 88%

## 2024-04-06 NOTE — ASSESSMENT & PLAN NOTE
Imaging from 03/28 revealed severe L renal artery stenosis at aorto-renal junction w/moderate disease   seen more proximally as well  Has known R renal artery stenosis w/subsequent renal atrophy  Now s/p IR L renal artery angiogram (4/4) for revascularization, however was unsuccessful due to large aortic plaque Transferred to B for 2nd attempt at L renal artery revascularization     Plan:  -see above under Acute renal failure superimposed on stage 3a CKD

## 2024-04-06 NOTE — ASSESSMENT & PLAN NOTE
Pt went 1.5 weeks without bowel movement  Was started on bowel regimen including miralax bid, senna bid, and dulcolax   Pt now reports abdominal cramping and diarrhea    Plan:  -will change senna to qhs  -hold evening dose of miralax   -monitor electrolytes daily   -encourage good PO intake

## 2024-04-06 NOTE — PLAN OF CARE
Problem: PHYSICAL THERAPY ADULT  Goal: Performs mobility at highest level of function for planned discharge setting.  See evaluation for individualized goals.  Description: Treatment/Interventions: ADL retraining, Functional transfer training, LE strengthening/ROM, Elevations, Therapeutic exercise, Endurance training, Patient/family training, Equipment eval/education, Bed mobility, Gait training, Compensatory technique education, Spoke to nursing, OT          See flowsheet documentation for full assessment, interventions and recommendations.  Note: Prognosis: Fair  Problem List: Decreased strength, Decreased endurance, Impaired balance, Decreased mobility, Impaired judgement, Decreased safety awareness      Pt is 79 y.o. female seen for PT evaluation s/p admit to [facility] on 4/5/2024  w/ Acute renal failure superimposed on stage 3a chronic kidney disease (HCC). Unsuccessful prior attempt in left renal artery stenting. Transferred to [facility] for re-attempt. Pt w/ past medical hx significant for COPD, CAD prior STEMI s/p RCA stenting in 2008, CVA, CKD stage 3, atrophic R kidney 2/2 severe SALAZAR, and HLD   + as above.    Due to acute medical issues, ongoing medical workup for primary dx; pain, fall risk, increased reliance on more restrictive AD compared to baseline;  decreased activity tolerance compared to baseline, increased assistance needed from caregiver at current time, continuous monitoring, trending labs;  multiple lines, decline in overall functional mobility status; health management issues; note unstable clinical picture (high complexity).  At baseline, pt was lives w/ spouse and reports being indep and active PTA; no falls; I w/ ADL's and IADL's pt reports spouse can A on d/c. Currently,  pt  is requiring Floyd A for bed skills; Floyd for functional transfers and Floyd for ambulation w/ HHA- distnaces limited to ~3' 2* SOB and dizziness. Vitals on 5Lo2 stable (Spo2 95%; /74).   Pt presents  functioning below baseline and currently w/ overall mobility deficits 2* to: decreased LE strength/AROM; limited flexibility;  generalized weakness/ deconditioning; decreased endurance; decreased activity tolerance; decreased coordination; impaired balance; gait deviations; decreased safety awareness;  dizziness SOB/PINZON; fatigue; impaired safety and judgement; limited insight into current deficits; bed/ chair alarms; multiple lines;   Pt currently at risk for falls.  (Please find additional objective findings from PT assessment regarding body systems outlined above.) Pt will continue to benefit from skilled PT interventions to address stated impairments; to maximize functional potential; for ongoing pt/ family training; and DME needs.  PT is recommending PT Resource Intensity Level  2 on d/c.   PT will follow for STG as outlined on eval. Pt/ family agreeable to PT POC and goals as stated.      Rehab Resource Intensity Level, PT: II (Moderate Resource Intensity)    See flowsheet documentation for full assessment.

## 2024-04-06 NOTE — PROGRESS NOTES
INTERNAL MEDICINE RESIDENCY PROGRESS NOTE     Name: Laya Brooks   Age & Sex: 79 y.o. female   MRN: [id number]  Unit/Bed#: [facility] 828-01   Encounter: [id number]  Team: SOD Team C     PATIENT INFORMATION     Name: Laya Brooks   Age & Sex: 79 y.o. female   MRN: [id number]  Hospital Stay Days: 1    ASSESSMENT/PLAN     Principal Problem:    Acute renal failure superimposed on stage 3a chronic kidney disease (HCC)  Active Problems:    Severe protein-calorie malnutrition (HCC)    Hypercholesterolemia    Atrophy of right kidney    Renovascular hypertension    CAD (coronary artery disease)    COPD (chronic obstructive pulmonary disease) (HCC)    Dysphagia    Renal artery stenosis (HCC)    Goals of care, counseling/discussion    Bicytopenia    Bleeding at insertion site    Acute respiratory insufficiency    Constipation      * Acute renal failure superimposed on stage 3a chronic kidney disease (HCC)  Assessment & Plan  Lab Results   Component Value Date    EGFR 32 04/06/2024    EGFR 29 04/05/2024    EGFR 33 04/05/2024    CREATININE 1.48 (H) 04/06/2024    CREATININE 1.61 (H) 04/05/2024    CREATININE 1.45 (H) 04/05/2024     Creatinine peaked at 6.27 at [facility] - now around 1.4 - 1.5  Baseline creatinine appears to be around 0.9 - 1.1  VAS Renal artery (3/29/2024):  severe left renal artery stenosis at the aorto- renal junction.  Moderate disease more proximally  Has received IHD x3 since admission to [facility] - last treatment was on 03/29 4/4: Received IV Lasix 60 mg x1 yesterday and IV Lasix 80 mg x1 PM w/good response  4/5: Received additional IV Lasix 80mg x1 AM    Ddx: Suspect this is 2/2 significant bilateral renal artery stenosis w/atrophic R kidney     Plan:  -To get an addition 80mg IV lasix today  -Nephrology following   -IR consulted, appreciate recs   -will pursue further L renal artery stenting next week   -Avoid nephrotoxic agents and fluctuations in BP  -Trend renal indices  -Strict I/O with pure wick  -Daily  weights    Constipation  Assessment & Plan  Pt went 1.5 weeks without bowel movement  Was started on bowel regimen including miralax bid, senna bid, and dulcolax   Pt now reports abdominal cramping and diarrhea    Plan:  -will change senna to qhs  -hold evening dose of miralax   -monitor electrolytes daily   -encourage good PO intake    Acute respiratory insufficiency  Assessment & Plan  Currently requiring 5L NC   Suspect this is 2/2 acute disease process as well as volume overload in setting of CKD    Plan:   -Continue w/diuresis PRN, ordered Lasix 80mg IV  -Aggressive pulmonary hygiene  -Wean O2 for SpO2 > 88%    Bleeding at insertion site  Assessment & Plan  Had bleeding/oozing at sheath site post IR intervention - was reversed with Protamine, sheath pulled, and manual pressure held   4/5: Mild oozing at site     Plan:  -Holding Plavix and DVT ppx for now  -Monitor CBC and INR    Bicytopenia  Assessment & Plan  With anemia/thrombocytopenia since admission to [facility]  Possibly in setting of nutritional deficiency vs CKD  Iron panel, B12, folate levels unremarkable    Plan:  - Holding Plavix/DVT ppx for now in setting of bleeding post procedure  - consider restarting heparin DVT proph.   - Trend Hgb/Plt and transfuse for Hgb < 7 and Plt < 20,000    Renal artery stenosis (HCC)  Assessment & Plan  Imaging from 03/28 revealed severe L renal artery stenosis at aorto-renal junction w/moderate disease   seen more proximally as well  Has known R renal artery stenosis w/subsequent renal atrophy  Now s/p IR L renal artery angiogram (4/4) for revascularization, however was unsuccessful due to large aortic plaque Transferred to [facility]\"" for 2nd attempt at L renal artery revascularization     Plan:  -see above under Acute renal failure superimposed on stage 3a CKD    Dysphagia  Assessment & Plan  Pt presented with pharyngeal dysphagia   Speech was consulted at [facility] with dysphagia diets ordered    Plan:  -Speech therapy  consulted, appreciate recs   -Dysphagia 3 diet with thin liquids    COPD (chronic obstructive pulmonary disease) (HCC)  Assessment & Plan  PFTs from 02/2024 revealed FEV1 61% - moderate obstruction  Follows with Pulmonology - recently started on Stiolto and PRN Albuterol inhalers as this is newer diagnosis    Plan:  -Continue Xopenex/Atrovent/Pulmicort/Performist nebs  -Continue steroid taper for possible COPD exacerbation that was started at [facility]  -Aggressive pulmonary hygiene    CAD (coronary artery disease)  Assessment & Plan  -Prior STEMI in 2008 s/p RCA stenting    Plan:  -Holding home Plavix given bleeding post IR procedure  -Continue home statin  -Off tele     Renovascular hypertension  Assessment & Plan  Previously taking ACE-I and CCB as outpatient  Now on Procardia and scheduled Hydralazine - Hydralazine increased to 50 mg Q8H    Plan:  -cont above meds  -PRN Labetalol for SBP > 170    Atrophy of right kidney  Assessment & Plan  In setting of R renal artery stenosis   Nephrology following - patient may ultimately require RRT    Hypercholesterolemia  Assessment & Plan  LDL goal <70 due to intracranial stenosis  Most recent lipid panel with     Plan:  -Recommend repeat in 2-3 months. If it still remains elevated would defer to PCP about addition of Repatha versus Zetia  -Continue atorvastatin 40mg at this time.     Severe protein-calorie malnutrition (HCC)  Assessment & Plan  Body mass index is 18.36 kg/m².      Pt reports disliking the same foods she was given at prior hospitalizations.     Plan:  -cont with nutrition recommendations and dysphagia diet         Disposition: Continue inpatient care, being treated for COPD/HF/fluid overload, awaiting potential IR renal artery stent placement    SUBJECTIVE     Patient seen and examined. No acute events overnight. States she is breathing better than last night. Feels tired. Says she is peeing a lot. No other concerns.     Patient denies any fever or  chills, sore throat, shortness of breath cough or wheeze, chest pain or heart palpitations, abdominal pain, nausea vomiting diarrhea or constipation, extremity pain or swelling.     OBJECTIVE     Vitals:    24 2334 24 0043 24 0752 24 0829   BP: 136/55  152/71    Pulse: 76 78 81    Resp: 18 22 16    Temp:  (!) 97.1 °F (36.2 °C) 97.9 °F (36.6 °C)    SpO2: 93% 96% 91%    Weight:    49.5 kg (109 lb 2 oz)      Temperature:   Temp (24hrs), Av.5 °F (36.4 °C), Min:97.1 °F (36.2 °C), Max:97.9 °F (36.6 °C)    Temperature: 97.9 °F (36.6 °C)  Intake & Output:  I/O          07 07 07 07 07 0700    Urine (mL/kg/hr)  250     Stool  0     Total Output  250     Net  -250            Unmeasured Urine Occurrence  1 x     Unmeasured Stool Occurrence  3 x           Weights:        Body mass index is 21.31 kg/m².  Weight (last 2 days)       Date/Time Weight    24 0829 49.5 (109.13)          Physical Exam  Vitals and nursing note reviewed.   Constitutional:       General: She is not in acute distress.     Appearance: Normal appearance. She is well-developed. She is ill-appearing.      Comments: Cachectic    HENT:      Head: Normocephalic and atraumatic.      Right Ear: External ear normal.      Left Ear: External ear normal.      Mouth/Throat:      Mouth: Mucous membranes are moist.   Eyes:      Extraocular Movements: Extraocular movements intact.      Conjunctiva/sclera: Conjunctivae normal.      Pupils: Pupils are equal, round, and reactive to light.   Cardiovascular:      Rate and Rhythm: Normal rate and regular rhythm.      Pulses: Normal pulses.      Heart sounds: Normal heart sounds. No murmur heard.     No friction rub. No gallop.   Pulmonary:      Effort: Pulmonary effort is normal. No respiratory distress.      Breath sounds: Rales present. No wheezing or rhonchi.      Comments: 4L NC  Abdominal:      General: Bowel sounds are normal. There is no  distension.      Palpations: Abdomen is soft.      Tenderness: There is no abdominal tenderness. There is no guarding.      Hernia: No hernia is present.   Musculoskeletal:         General: No swelling or deformity.      Cervical back: Neck supple.      Right lower leg: No edema.      Left lower leg: No edema.   Skin:     General: Skin is warm and dry.      Coloration: Skin is not jaundiced.      Findings: No bruising, lesion or rash.   Neurological:      General: No focal deficit present.      Mental Status: She is alert and oriented to person, place, and time.       LABORATORY DATA     Labs: I have personally reviewed pertinent reports.  Results from last 7 days   Lab Units 04/06/24 0552 04/05/24  0538 04/04/24  1515 04/04/24  0607 04/03/24  0614 04/02/24  0737   WBC Thousand/uL 7.33 4.78 7.93 5.96 3.66* 8.11   HEMOGLOBIN g/dL 9.4* 9.6* 11.2* 10.1* 9.8* 10.6*   HEMATOCRIT % 28.1* 29.7* 34.5* 30.5* 30.3* 32.4*   PLATELETS Thousands/uL 76* 78* 99* 81* 66* 75*   NEUTROS PCT %  --   --   --  79* 77* 81*   MONOS PCT %  --   --   --  9 9 8   EOS PCT %  --   --   --  0 0 0      Results from last 7 days   Lab Units 04/06/24 0552 04/05/24 1838 04/05/24  0538   POTASSIUM mmol/L 3.9 4.2 4.2   CHLORIDE mmol/L 105 102 107   CO2 mmol/L 23 22 22   BUN mg/dL 41* 43* 44*   CREATININE mg/dL 1.48* 1.61* 1.45*   CALCIUM mg/dL 7.8* 8.3* 7.6*   ALK PHOS U/L  --   --  40   ALT U/L  --   --  14   AST U/L  --   --  12*     Results from last 7 days   Lab Units 04/06/24 0552 04/05/24  1838 04/05/24  0538   MAGNESIUM mg/dL 1.9 2.1 2.3     Results from last 7 days   Lab Units 04/06/24 0552 04/05/24  0538 04/04/24  1515   PHOSPHORUS mg/dL 3.4 3.3 3.1      Results from last 7 days   Lab Units 04/04/24  1515   INR  1.10   PTT seconds 26               IMAGING & DIAGNOSTIC TESTING     Radiology Results: I have personally reviewed pertinent reports.  No results found.  Other Diagnostic Testing: I have personally reviewed pertinent  "reports.    ACTIVE MEDICATIONS     Current Facility-Administered Medications   Medication Dose Route Frequency    acetaminophen (TYLENOL) tablet 650 mg  650 mg Oral Q4H PRN    albuterol inhalation solution 2.5 mg  2.5 mg Nebulization Q4H PRN    ALPRAZolam (XANAX) tablet 0.5 mg  0.5 mg Oral TID PRN    aluminum-magnesium hydroxide-simethicone (MAALOX) oral suspension 30 mL  30 mL Oral Q4H PRN    atorvastatin (LIPITOR) tablet 40 mg  40 mg Oral Daily With Dinner    bisacodyl (DULCOLAX) rectal suppository 10 mg  10 mg Rectal Daily    famotidine (PEPCID) tablet 10 mg  10 mg Oral Daily    insulin lispro (HumALOG/ADMELOG) 100 units/mL subcutaneous injection 1-5 Units  1-5 Units Subcutaneous 4x Daily (AC & HS)    ipratropium (ATROVENT) 0.02 % inhalation solution 0.5 mg  0.5 mg Nebulization TID    labetalol (NORMODYNE) injection 10 mg  10 mg Intravenous Q6H PRN    levalbuterol (XOPENEX) inhalation solution 1.25 mg  1.25 mg Nebulization TID    NIFEdipine (PROCARDIA XL) 24 hr tablet 60 mg  60 mg Oral Daily    nystatin (MYCOSTATIN) oral suspension 500,000 Units  500,000 Units Swish & Swallow 4x Daily    ondansetron (ZOFRAN) injection 4 mg  4 mg Intravenous Q6H PRN    polyethylene glycol (MIRALAX) packet 17 g  17 g Oral BID    predniSONE tablet 20 mg  20 mg Oral Daily    Followed by    [START ON 4/8/2024] predniSONE tablet 10 mg  10 mg Oral Daily    senna (SENOKOT) tablet 8.6 mg  1 tablet Oral HS    sodium chloride (OCEAN) 0.65 % nasal spray 1 spray  1 spray Each Nare Q1H PRN       VTE Pharmacologic Prophylaxis: Sequential compression device (Venodyne)  and Heparin  VTE Mechanical Prophylaxis: sequential compression device    Portions of the record may have been created with voice recognition software.  Occasional wrong word or \"sound a like\" substitutions may have occurred due to the inherent limitations of voice recognition software.  Read the chart carefully and recognize, using context, where substitutions have " occurred.  ==  Maximo Nieto MD  [facility]  Internal Medicine Residency PGY-3

## 2024-04-06 NOTE — PHYSICAL THERAPY NOTE
PHYSICAL THERAPY EVALUATION  NAME:  Laya Brooks  DATE: 04/06/24    AGE:   79 y.o.  Mrn:   [id number]  ADMIT DX:  Acute renal failure superimposed on stage 3a chronic kidney disease (HCC) [N17.9, N18.31]    Past Medical History:   Diagnosis Date    CAD (coronary artery disease)     Cardiac disease     Hypercholesteremia     Hyperlipidemia     Hypertension     Hypertension     Renal disorder      Past Surgical History:   Procedure Laterality Date    CORONARY ANGIOPLASTY WITH STENT PLACEMENT      IR RENAL ANGIOGRAM  4/4/2024    IR TEMPORARY DIALYSIS CATHETER PLACEMENT  3/26/2024    WRIST SURGERY         Length Of Stay: 1  PHYSICAL THERAPY EVALUATION :    04/06/24 1350   PT Last Visit   PT Visit Date 04/06/24   Note Type   Note type Evaluation   Pain Assessment   Pain Assessment Tool 0-10   Pain Score No Pain   Restrictions/Precautions   Weight Bearing Precautions Per Order No   Other Precautions Chair Alarm;Bed Alarm;Multiple lines;Telemetry;O2;Fall Risk  (5Lo2)   Home Living   Type of Home House   Home Layout One level;Performs ADLs on one level;Stairs to enter with rails  (1 ADONIS- pt has basement (garage access)- but does not have to access - can stay on FF and have spouse get car from garage)   Bathroom Shower/Tub Tub/shower unit   Bathroom Toilet Standard   Bathroom Accessibility Accessible   Home Equipment Walker  (not using PTA.)   Prior Function   Level of [place] Independent with ADLs;Independent with functional mobility;Independent with IADLS   Lives With Spouse   Receives Help From Family   IADLs Independent with driving;Independent with meal prep;Independent with medication management   Falls in the last 6 months 1 to 4  (2-3, per Pt ( in garden while weeding; slip ))   Vocational Retired   Comments At baseline, pt was lives w/ spouse and reports being indep and active PTA; no falls; I w/ ADL's and IADL's pt reports spouse can A on d/c.   Cognition   Overall Cognitive Status WFL  (further higher  level cog assessment beneficial for safe dispo planning)   Attention Attends with cues to redirect   Orientation Level Oriented X4   Memory Decreased recall of precautions   Following Commands Follows one step commands with increased time or repetition   RUE Assessment   RUE Assessment WFL   LUE Assessment   LUE Assessment WFL   RLE Assessment   RLE Assessment X   Strength RLE   RLE Overall Strength 3+/5  (weakness/ deconditioned)   LLE Assessment   LLE Assessment X   Strength LLE   LLE Overall Strength 3+/5  (weakness/ deconditioned)   Coordination   Sensation WFL   Light Touch   RLE Light Touch Grossly intact   LLE Light Touch Grossly intact   Sharp/Dull   RLE Sharp/Dull Grossly intact   LLE Sharp/Dull Grossly intact   Proprioception   RLE Proprioception Grossly intact   LLE Proprioception Grossly Intact   Bed Mobility   Supine to Sit 4  Minimal assistance   Additional items Assist x 1;Increased time required;Verbal cues;LE management   Transfers   Sit to Stand 4  Minimal assistance   Additional items Assist x 1   Stand to Sit 4  Minimal assistance   Additional items Assist x 1   Stand pivot 4  Minimal assistance   Additional items Assist x 1   Ambulation/Elevation   Gait pattern Improper Weight shift;Narrow ELA;Decreased foot clearance;Shuffling;Foward flexed;Excessively slow   Gait Assistance 4  Minimal assist   Assistive Device None  ([facility] on 5Lo2)   Distance 3'   Balance   Static Sitting Fair +   Dynamic Sitting Fair   Static Standing Fair -   Dynamic Standing Poor +   Ambulatory Poor +   Activity Tolerance   Activity Tolerance Patient limited by fatigue;Treatment limited secondary to medical complications (Comment)   Medical Staff Made Aware OT   Nurse Made Aware RN   Assessment  Pt is 79 y.o. female seen for PT evaluation s/p admit to [facility] on 4/5/2024  w/ Acute renal failure superimposed on stage 3a chronic kidney disease (HCC). Unsuccessful prior attempt in left renal artery stenting.  [facility]. Pt w/ past medical hx significant for COPD, CAD prior STEMI s/p RCA stenting in 2008, CVA, CKD stage 3, atrophic R kidney 2/2 severe SALAZAR, and HLD   + as above.    Due to acute medical issues, ongoing medical workup for primary dx; pain, fall risk, increased reliance on more restrictive AD compared to baseline;  decreased activity tolerance compared to baseline, increased assistance needed from caregiver at current time, continuous monitoring, trending labs;  multiple lines, decline in overall functional mobility status; health management issues; note unstable clinical picture (high complexity).      At baseline, pt was lives w/ spouse and reports being indep and active PTA; no falls; I w/ ADL's and IADL's pt reports spouse can A on d/c. Currently,  pt  is requiring Floyd A for bed skills; Floyd for functional transfers and Floyd for ambulation w/ HHA- distnaces limited to ~3' 2* SOB and dizziness. Vitals on 5Lo2 stable (Spo2 95%; /74).       Pt presents functioning below baseline and currently w/ overall mobility deficits 2* to: decreased LE strength/AROM; limited flexibility;  generalized weakness/ deconditioning; decreased endurance; decreased activity tolerance; decreased coordination; impaired balance; gait deviations; decreased safety awareness;  dizziness SOB/PINZON; fatigue; impaired safety and judgement; limited insight into current deficits; bed/ chair alarms; multiple lines;   Pt currently at risk for falls.  (Please find additional objective findings from PT assessment regarding body systems outlined above.)     Pt will continue to benefit from skilled PT interventions to address stated impairments; to maximize functional potential; for ongoing pt/ family training; and DME needs.  PT is recommending PT Resource Intensity Level  2 on d/c.   PT will follow for STG as outlined on eval. Pt/ family agreeable to PT POC and goals as stated.    Prognosis Fair   Problem List  Decreased strength;Decreased endurance;Impaired balance;Decreased mobility;Impaired judgement;Decreased safety awareness   Goals   Patient Goals to rest and get better / to breathe better   STG Expiration Date 04/20/24   Short Term Goal #1 In 14 days pt will:  Complete bed mobility skills with MI to facilitate safe return to previous living environment and decrease burden on caregivers.  Perform all functional transfers with MI  to facilitate safe return to previous living environment.    Ambulate  with least restrictive AD MI > 250'x2' without LOB and stable vitals for safe ambulation in home/ community environment.   Complete stair training up/ down flight step/s w/  MI for safe access to previous living environment and to increase community access.    Improve balance by 1 grade in order to decrease fall risk.    Improve LE strength grades by 1 to increase independence w/ all functional mobility, transfers and gait.  Actively participate w/ PT for ongoing pt and family education; DME needs and D/C planning to promote highest level of function in least restrictive environment.   PT Treatment Day 5   Plan   Treatment/Interventions ADL retraining;Functional transfer training;LE strengthening/ROM;Elevations;Therapeutic exercise;Endurance training;Patient/family training;Equipment eval/education;Bed mobility;Gait training;Compensatory technique education;Spoke to nursing;OT   PT Frequency 3-5x/wk   Discharge Recommendation   Rehab Resource Intensity Level, PT II (Moderate Resource Intensity)   AM-PAC Basic Mobility Inpatient   Turning in Flat Bed Without Bedrails 3   Lying on Back to Sitting on Edge of Flat Bed Without Bedrails 3   Moving Bed to Chair 3   Standing Up From Chair Using Arms 3   Walk in Room 2   Climb 3-5 Stairs With Railing 1   Basic Mobility Inpatient Raw Score 15   Basic Mobility Standardized Score 36.97   Turning Head Towards Sound 4   Follow Simple Instructions 3   Low Function Basic Mobility Raw  "Score  22   Low Function Basic Mobility Standardized Score  35.85   [facility] Highest Level Of Mobility   -[facility] Goal 4: Move to chair/commode   -[facility] Achieved 4: Move to chair/commode   Additional Treatment Session   Start Time 1341   End Time 1350   Treatment Assessment seen for f/u session per pt request \"what can I do to get stronger\"- PT educated pt in seated HEP/ AROM w/ proper breathing techniques in recliner on 5Lo2- pt tolerated fair w/o drop in Spo2- pt inquiring whether she could get home O2 on d/c - PT explained process to qualify and assured her if she require O2 on d/c team would work w/ her to obtain prior to d/c. Pt anxious re: hospitalization and \"how weak Connie become.\" offered support throughout therex completion. Pt fatiuged following; repositioned in chair and alarm intact/ all needs in reach. Level 2 on d/c   Additional Treatment Day 1   Exercises   Glute Sets Sitting;10 reps   Hip Flexion Sitting;10 reps   Knee AROM Long Arc Quad Sitting;10 reps;Right;Left  (x2)   Ankle Pumps Sitting;25 reps  (x2)   Marching Sitting;25 reps  (x3)   End of Consult   Patient Position at End of Consult Bedside chair;Bed/Chair alarm activated;All needs within reach       The patient's AM-PAC Basic Mobility Inpatient Short Form Raw Score is 15. A Raw score of less than or equal to 16 suggests the patient may benefit from discharge to post-acute rehabilitation services. Please also refer to the recommendation of the Physical Therapist for safe discharge planning.             "

## 2024-04-06 NOTE — OCCUPATIONAL THERAPY NOTE
Occupational Therapy Evaluation      Laya Brooks    4/6/2024    Principal Problem:    Acute renal failure superimposed on stage 3a chronic kidney disease (HCC)  Active Problems:    Severe protein-calorie malnutrition (HCC)    Hypercholesterolemia    Atrophy of right kidney    Renovascular hypertension    CAD (coronary artery disease)    COPD (chronic obstructive pulmonary disease) (HCC)    Dysphagia    Renal artery stenosis (HCC)    Goals of care, counseling/discussion    Bicytopenia    Bleeding at insertion site    Acute respiratory insufficiency    Constipation      Past Medical History:   Diagnosis Date    CAD (coronary artery disease)     Cardiac disease     Hypercholesteremia     Hyperlipidemia     Hypertension     Hypertension     Renal disorder        Past Surgical History:   Procedure Laterality Date    CORONARY ANGIOPLASTY WITH STENT PLACEMENT      IR RENAL ANGIOGRAM  4/4/2024    IR TEMPORARY DIALYSIS CATHETER PLACEMENT  3/26/2024    WRIST SURGERY          04/06/24 1341   OT Last Visit   OT Visit Date 04/06/24   Note Type   Note type Evaluation   Pain Assessment   Pain Assessment Tool 0-10   Pain Score No Pain   Restrictions/Precautions   Weight Bearing Precautions Per Order No   Other Precautions Chair Alarm;Bed Alarm;Multiple lines;Telemetry;O2;Pain;Fall Risk  (5L NC O2)   Home Living   Type of Home House   Home Layout One level;Performs ADLs on one level;Stairs to enter with rails  (1STE)   Bathroom Shower/Tub Tub/shower unit   Bathroom Toilet Standard   Bathroom Equipment   (denies DME)   Bathroom Accessibility Accessible   Home Equipment Walker  (denies use of DME PTA)   Prior Function   Level of [place] Independent with ADLs;Independent with functional mobility;Independent with IADLS   Lives With Spouse   Receives Help From Family   IADLs Independent with driving;Independent with meal prep;Independent with medication management   Falls in the last 6 months 1 to 4  (2-3, per Pt)   Vocational  Retired   Lifestyle   Autonomy Pt lives in  1 story home c  + @ baseline, performs ADLs  I'ly, IADLs with A + fxl mobility I'ly with RW.   Reciprocal Relationships Pt reports having supportive spouse that can A as needed upon D/C   Service to Others Pt is retired   ADL   Where Assessed Edge of bed   Eating Assistance 7  Independent   Grooming Assistance 5  Supervision/Setup   UB Bathing Assistance 4  Minimal Assistance   LB Bathing Assistance 3  Moderate Assistance   UB Dressing Assistance 4  Minimal Assistance   LB Dressing Assistance 3  Moderate Assistance   Toileting Assistance  3  Moderate Assistance   Bed Mobility   Supine to Sit 4  Minimal assistance   Additional items Assist x 1;Increased time required;LE management;Verbal cues;Bedrails;HOB elevated   Sit to Supine Unable to assess   Additional Comments Pt laying supine in bed upon OT arrival. Pt seated OOB in chair w/ chair alarm activated and all needs in reach s/p OT session   Transfers   Sit to Stand 4  Minimal assistance   Additional items Assist x 1;Increased time required;Verbal cues;Armrests   Stand to Sit 4  Minimal assistance   Additional items Assist x 1;Increased time required;Verbal cues;Armrests   Additional Comments HHA   Functional Mobility   Functional Mobility 4  Minimal assistance   Additional Comments Pt demonstrated ability to take few steps EOB>chair w/ HHA. Pt declining further mobility 2' fatigue and weakness.   Additional items Hand hold assistance   Balance   Static Sitting Fair +   Dynamic Sitting Fair   Static Standing Fair -   Dynamic Standing Poor +   Ambulatory Poor +   Activity Tolerance   Activity Tolerance Patient limited by fatigue;Treatment limited secondary to medical complications (Comment)   Medical Staff Made Aware PT Olya; Pt seen as a co-session due to the patient's co-morbidities, clinically unstable presentation, and present impairments which are a regression from the patient's baseline.     Nurse Made Aware  RN cleared Pt for OT session   RUE Assessment   RUE Assessment WFL  (general deconditioning)   LUE Assessment   LUE Assessment WFL  (general deconditioning)   Hand Function   Gross Motor Coordination Functional   Psychosocial   Psychosocial (WDL) X   Patient Behaviors/Mood Flat affect   Cognition   Overall Cognitive Status WFL  (further higher level cog assessment beneficial for safe dispo planning)   Arousal/Participation Alert;Lethargic   Attention Attends with cues to redirect   Orientation Level Oriented X4   Memory Decreased recall of precautions   Following Commands Follows one step commands with increased time or repetition   Comments Pt is pleasant and cooperative for therapy today. Pt w/ decreased safety awareness and decreased insight into deficits. Pt required VC for safety and sequencing t/o session.   Assessment   Limitation Decreased ADL status;Decreased Safe judgement during ADL;Decreased UE strength;Decreased cognition;Decreased endurance;Decreased fine motor control;Decreased high-level ADLs   Prognosis Fair   Assessment Pt is a 80 yo female seen for OT eval s/p adm to [facility] on 4/5/24 dx'd w/ ARF on CKD stage 3. Pt  has a past medical history of CAD (coronary artery disease), Cardiac disease, Hypercholesteremia, Hyperlipidemia, Hypertension, Hypertension, and Renal disorder.  Pt with active OT orders and activity as tolerated orders. Pt is retired and resides w/ spouse in 1SH w/ 1STE. Pt reports spouse can A as needed upon D/c . Pt was I w/ ADLS and IADLS, drove, & required no use of DME PTA. Pt is currently demonstrating the following occupational deficits: Min A UB self care, Mod A LB self care and toileting, Min A bed mobility, Min A transfers and short distance mobility w/ HHA. These deficits that are impacting pt's baseline areas of occupation are a result of the following impairments: pain, endurance, activity tolerance, functional mobility, forward functional reach, balance, functional standing  tolerance, unsupportive home environment, decreased I w/ ADLS/IADLS, strength, cognitive impairments, decreased safety awareness, decreased insight into deficits, and coordination deficits. The following Occupational Performance Areas to address include: grooming, bathing/shower, toilet hygiene, dressing, medication management, health maintenance, functional mobility, and clothing management.  Based on the aforementioned OT evaluation, functional performance deficits, and assessments, pt has been identified as a high complexity evaluation. Recommend  level II Moderate Resource Intensity (rehab)  upon D/C. The patient's raw score on the AM-PAC Daily Activity Inpatient Short Form is 15. A raw score of less than 19 suggests the patient may benefit from discharge to post-acute rehabilitation services. Please refer to the recommendation of the Occupational Therapist for safe discharge planning. Pt to continue to benefit from acute immediate OT services to address the following goals 2-3x/week to  w/in 10-14 days:   Goals   Patient Goals To rest   LTG Time Frame 10-14   Long Term Goal #1 Refer to goals below   Plan   Treatment Interventions ADL retraining;Functional transfer training;UE strengthening/ROM;Endurance training;Cognitive reorientation;Patient/family training;Equipment evaluation/education;Fine motor coordination activities;Compensatory technique education;Energy conservation;Activityengagement   Goal Expiration Date 24   OT Frequency 2-3x/wk   Discharge Recommendation   Rehab Resource Intensity Level, OT II (Moderate Resource Intensity)  (rehab)   AM-PAC Daily Activity Inpatient   Lower Body Dressing 2   Bathing 2   Toileting 2   Upper Body Dressing 3   Grooming 3   Eating 3   Daily Activity Raw Score 15   Daily Activity Standardized Score (Calc for Raw Score >=11) 34.69   AM-PAC Applied Cognition Inpatient   Following a Speech/Presentation 3   Understanding Ordinary Conversation 4   Taking  Medications 3   Remembering Where Things Are Placed or Put Away 3   Remembering List of 4-5 Errands 2   Taking Care of Complicated Tasks 2   Applied Cognition Raw Score 17   Applied Cognition Standardized Score 36.52         GOALS    1) Pt will improve activity tolerance to G for min 30 min txment sessions for increase engagement in functional tasks    2) Pt will complete UB/LB dressing/self care w/ mod I using adaptive device and DME as needed    3) Pt will complete bathing w/ Mod I w/ use of AE and DME as needed    4) Pt will complete toileting w/ mod I w/ G hygiene/thoroughness using DME as needed    5) Pt will improve functional transfers to Mod I on/off all surfaces using DME as needed w/ G balance/safety     6) Pt will improve functional mobility during ADL/IADL/leisure tasks to Mod I using DME as needed w/ G balance/safety     7) Pt will participate in simulated IADL management task to increase independence to Mod I w/ G safety and endurance    8) Pt will be attentive 100% of the time during ongoing cognitive assessment w/ G participation to assist w/ safe d/c planning/recommendations    9) Pt will demonstrate G carryover of pt/caregiver education and training as appropriate w/o cues w/ good tolerance to increase safety during functional tasks    10) Pt will demonstrate 100% carryover of energy conservation techniques t/o functional I/ADL/leisure tasks w/o cues s/p skilled education to increase endurance during functional tasks         Chanda Patel MS, OTR/L

## 2024-04-06 NOTE — PROGRESS NOTES
NEPHROLOGY PROGRESS NOTE   Laya Brooks 79 y.o. female MRN: [id number]  Unit/Bed#: [facility] 828-01 Encounter: [id number]      ASSESSMENT/PLAN:  Acute kidney injury, POA: Possibly ATN/prerenal + artery stenosis  Creatinine 2.86 on admission and peaked at 6.27  S/p HD x 3 with last treatment 3/29 (only 1.5 hours)  Now seems to have renal recovery with creatinine stable at 1.48  S/p lasix 80mg IV x 2 yesterday and creatinine trended down   Hypervolemic on exam with shortness of breath and some edema--will give Lasix 80 mg IV x 1 again today  Access: R IJ temp line placed 3/26   If creatinine remains stable with diuresis consider d/c HD catheter tomorrow   CKD IIIA: baseline creatinine around 0.9-1 with solitary functioning left kidney and right renal atrophy   Renal artery stenosis   Renal artery duplex: Severe left renal artery stenosis at the aorta renal junction, moderate disease more proximally  S/p renal angiogram and IR 4/4 but unsuccessful due to large aortic plaque  Transferred to [facility] for further IR evaluation of left SALAZAR  Volume overload   S/p lasix 80mg IV x 1 yesterday and would give again today   Hypertension: BP acceptable today   Acute hypoxic respiratory failure: Due to volume overload and COPD exacerbation  IV Lasix as above  Taper steroids per primary team  Acute blood loss anemia: hgb trending down to 9.4     Plan Summary:   Lasix 80mg IV x 1   Check am BMP  If creatinine remains stable consider d/c HD catheter   Awaiting IR evaluation for SALAZAR     SUBJECTIVE:  A little bit of shortness of breath today.  Feeling very tired.    OBJECTIVE:  Current Weight: Weight - Scale: 49.5 kg (109 lb 2 oz)  Vitals:    04/06/24 0752   BP: 152/71   Pulse: 81   Resp: 16   Temp: 97.9 °F (36.6 °C)   SpO2: 91%       Intake/Output Summary (Last 24 hours) at 4/6/2024 1253  Last data filed at 4/6/2024 0512  Gross per 24 hour   Intake --   Output 250 ml   Net -250 ml       General:  appears comfortable and in no acute distress    Skin:  No rash  Eyes:  Sclerae anicteric, no periorbital edema   ENT:  Moist mucous membranes  Neck:  Trachea midline, symmetric   Chest:  decreased breath sounds and occasional wheeze   CVS:  Regular rate and rhythm  Abdomen:  Soft, nontender, nondistended  Neuro:  Awake and alert  Psych:  Appropriate affect  Extremities: + lower extremity edema       Medications:  Scheduled Meds:  Current Facility-Administered Medications   Medication Dose Route Frequency Provider Last Rate    acetaminophen  650 mg Oral Q4H PRN MANOHAR Maurice      albuterol  2.5 mg Nebulization Q4H PRN MANOHAR Maurice      ALPRAZolam  0.5 mg Oral TID PRN MANOHAR Maurice      aluminum-magnesium hydroxide-simethicone  30 mL Oral Q4H PRN MANOHAR Maurice      atorvastatin  40 mg Oral Daily With Dinner MANOHAR Maurice      bisacodyl  10 mg Rectal Daily MANOHAR Maurice      famotidine  10 mg Oral Daily MANOHAR Maurice      insulin lispro  1-5 Units Subcutaneous 4x Daily (AC & HS) MANOHAR Maurice      ipratropium  0.5 mg Nebulization TID Melody Hunter DO      labetalol  10 mg Intravenous Q6H PRN MANOHAR Maurice      levalbuterol  1.25 mg Nebulization TID Melody Hunter, DO      NIFEdipine  60 mg Oral Daily MANOHAR Maurice      nystatin  500,000 Units Swish & Swallow 4x Daily MANHOAR Maurice      ondansetron  4 mg Intravenous Q6H PRN MANOHAR Maurice      polyethylene glycol  17 g Oral BID MANOHAR Maurice      predniSONE  20 mg Oral Daily MANOHAR Maurice      Followed by    [START ON 4/8/2024] predniSONE  10 mg Oral Daily MANOHAR Maurice      senna  1 tablet Oral HS Melody Hunter DO      sodium chloride  1 spray Each Nare Q1H PRN MANOHAR Maurice         PRN Meds:.  acetaminophen    albuterol    ALPRAZolam    aluminum-magnesium hydroxide-simethicone    labetalol    ondansetron    sodium  chloride      Laboratory Results:  Results from last 7 days   Lab Units 04/06/24  0552 04/05/24  1838 04/05/24  0538 04/04/24  1515 04/04/24  0607 04/03/24  0614 04/03/24  0435 04/02/24  0737 04/01/24  0619   WBC Thousand/uL 7.33  --  4.78 7.93 5.96 3.66*  --  8.11 4.75   HEMOGLOBIN g/dL 9.4*  --  9.6* 11.2* 10.1* 9.8*  --  10.6* 9.4*   HEMATOCRIT % 28.1*  --  29.7* 34.5* 30.5* 30.3*  --  32.4* 30.1*   PLATELETS Thousands/uL 76*  --  78* 99* 81* 66*  --  75* 64*   SODIUM mmol/L 137 136 136 135 135  --  135 136 136   POTASSIUM mmol/L 3.9 4.2 4.2 4.6 4.3  --  4.9 4.3 4.8   CHLORIDE mmol/L 105 102 107 106 105  --  106 104 105   CO2 mmol/L 23 22 22 19* 22  --  22 24 23   BUN mg/dL 41* 43* 44* 46* 46*  --  50* 52* 51*   CREATININE mg/dL 1.48* 1.61* 1.45* 1.44* 1.44*  --  1.57* 1.60* 1.81*   CALCIUM mg/dL 7.8* 8.3* 7.6* 7.9* 8.0*  --  7.9* 8.5 8.4   MAGNESIUM mg/dL 1.9 2.1 2.3 1.7*  --   --   --   --   --    PHOSPHORUS mg/dL 3.4  --  3.3 3.1  --   --   --   --   --

## 2024-04-06 NOTE — ASSESSMENT & PLAN NOTE
Transferred here for IR to re-attempt placement of left renal artery stent  Tentative plans for next week

## 2024-04-06 NOTE — ASSESSMENT & PLAN NOTE
Previously taking ACE-I and CCB as outpatient  Now on Procardia and torsemide     Plan:  -cont above meds  -PRN Labetalol for SBP > 170

## 2024-04-06 NOTE — ASSESSMENT & PLAN NOTE
Body mass index is 18.36 kg/m². Pt not eating as much, shows signs of muscle wasting. Recently put on dysphagia modified diet      Pt reports disliking the same foods she was given at prior hospitalizations.     Plan:  -cont with nutrition recommendations and dysphagia diet

## 2024-04-06 NOTE — ASSESSMENT & PLAN NOTE
Pt presented with pharyngeal dysphagia   Speech was consulted at [facility] with dysphagia diets ordered    Plan:  -Speech therapy consulted, appreciate recs   -Dysphagia 3 diet with thin liquids

## 2024-04-06 NOTE — CONSULTS
[facility]  Consult  Name: Laya Brooks 79 y.o. female I MRN: [id number]  Unit/Bed#: [facility] 828-01 I Date of Admission: 4/5/2024   Date of Service: 4/6/2024 I Hospital Day: 1    Inpatient consult to Palliative Care  Consult performed by: Shasha Crowe MD  Consult ordered by: MANOHAR Maurice        Assessment/Plan   Goals of care, counseling/discussion  Assessment & Plan  She hopes she can get the stent so she can be one step closer to d/c.   We again talked about goals and she can't decide against CPR and intubation and also does not seem opposed to it, but did mention that if it there's no hope, she does not want to prolong the inevitable.   When talking about possible permanent dialysis, she said she does not know yet what to do about that, saying she knows people who undergo dialysis and they do not seem happy and worries she'll be tired all the time.   Her family helps her make decisions including her  and 2 children. All of whom have been telling her to keep fighting but sometimes she thinks she is too tired to go on.   She is made aware that unless she tells us otherwise, she will receive full code and full cares while in the hospital.   We will be available again peripherally. Please reach out if immediate assistance to address goals again is needed.       Renal artery stenosis (HCC)  Assessment & Plan  Transferred here for IR to re-attempt placement of left renal artery stent  Tentative plans for next week       Follow-up  We appreciate the opportunity to participate in this patient's care.   We will continue to follow. PSC to follow up peripherally.  Please do not hesitate to contact our on-call provider through our clinic answering service at [phone] should there be an acute change or other symptom control concerns.    Code status: Level 1 - Full Code   Decisional apparatus:  Patient does have capacity to make medical decisions on my exam  today. If such capacity is lost, patient's substitute decision maker would default to /[place] Act 169.   Advance Directive / Living Will / POLST:  none on file    I have reviewed the patient's controlled substance dispensing history in the Prescription Drug Monitoring Program in compliance with the [facility] regulations before prescribing any controlled substances.    IDENTIFICATION:  Reason for Consult / Principal Problem: supportive cares    HISTORY OF PRESENT ILLNESS:    Laya Brooks is a 79 y.o. female with  COPD, CAD prior STEMI s/p RCA stenting in 2008, CVA, CKD stage 3, atrophic R kidney 2/2 severe SALAZAR,  who was a transfer from [facility] yesterday. She was initially at [facility] for COPD exacerbation requiring BiPAP, CHF exacerbation and SEEMA. Creatinine worsened subsequently requiring HD through a temporary port. After 3 HD sessions, crea improved. Up[on further evaluation, she was found to have severe L renal artery stenosis and was transferred to [facility] where an angiogram and renal artery stent placement was attempted but was unsuccessful. She was transferred here for another IR attempt. She was seen by Palliative Medicine there for GOC prior to transfer and per consult note -     Met with patient who was resting comfortably in bed. Introduced PSC services and discussed goals of care. She feels that she has lived a long and fulfilling life. She does not desire to prolong her life through aggressive means if it meant significant decrease in her functional status. She would not want to be bedridden. She is open to surgical procedures, temporary feeding tubes or intubation. She would not want these interventions long-term. She is open to dialysis if needed but is unsure whether she would want to continue this long-term.      In regards to CODE status, she had said she would not want her ribs to be cracked or a tube shoved down her throat. She remains full code however because her  has convinced  "her to not change this. She states, \"I feel like he hopes I live forever.\" Her  was not present at today's meeting. Our conversation was unfortunately interrupted by the transport team who will be taking her to [facility]. Patient was appreciative of our discussion today and welcomes further palliative care support.     She was seen at bedside. AAO x 3. She is comfortable. Introduced palliative medicine again. She has no complaints. Except for frustration that she is still in the hospital. She hopes she can get the stent so she can be one step closer to d/c. We again talked about goals and she can't decide against CPR and intubation and also does not seem opposed to it, but did mention that if it there's no hope, she does not want to prolong the inevitable. When talking about possible permanent dialysis, she said she does not know yet what to do about that, saying she knows people who undergo dialysis and they do not seem happy and worries she'll be tired all the time. Her family helps her make decisions including her  and 2 children. All of whom have been telling her to keep fighting but sometimes she thinks she is too tired to go on.     She is made aware that unless she tells us otherwise, she will receive full code and full cares while in the hospital.     Interview and exam limited by: none    Review of Systems   Constitutional:  Positive for activity change and fatigue.   HENT:  Negative for trouble swallowing.    Respiratory:  Negative for shortness of breath.    Cardiovascular:  Negative for chest pain.   Gastrointestinal:  Negative for abdominal pain, constipation, diarrhea, nausea and vomiting.   Psychiatric/Behavioral:  Negative for sleep disturbance. The patient is not nervous/anxious.    All other systems reviewed and are negative.      Past Medical History:   Diagnosis Date    CAD (coronary artery disease)     Cardiac disease     Hypercholesteremia     Hyperlipidemia     Hypertension     " Hypertension     Renal disorder      Past Surgical History:   Procedure Laterality Date    CORONARY ANGIOPLASTY WITH STENT PLACEMENT      IR RENAL ANGIOGRAM  4/4/2024    IR TEMPORARY DIALYSIS CATHETER PLACEMENT  3/26/2024    WRIST SURGERY       Social History     Socioeconomic History    Marital status: /Civil Union     Spouse name: Not on file    Number of children: Not on file    Years of education: Not on file    Highest education level: Not on file   Occupational History    Not on file   Tobacco Use    Smoking status: Former    Smokeless tobacco: Never   Vaping Use    Vaping status: Never Used   Substance and Sexual Activity    Alcohol use: Never    Drug use: No    Sexual activity: Not on file   Other Topics Concern    Not on file   Social History Narrative    Not on file     Social Determinants of Health     Financial Resource Strain: Not on file   Food Insecurity: No Food Insecurity (3/25/2024)    Hunger Vital Sign     Worried About Running Out of Food in the Last Year: Never true     Ran Out of Food in the Last Year: Never true   Transportation Needs: No Transportation Needs (3/25/2024)    PRAPARE - Transportation     Lack of Transportation (Medical): No     Lack of Transportation (Non-Medical): No   Physical Activity: Not on file   Stress: Not on file   Social Connections: Not on file   Intimate Partner Violence: Not on file   Housing Stability: Low Risk  (3/25/2024)    Housing Stability Vital Sign     Unable to Pay for Housing in the Last Year: No     Number of Places Lived in the Last Year: 1     Unstable Housing in the Last Year: No     No family history on file.    MEDICATIONS / ALLERGIES:  all current active meds have been reviewed    Allergies   Allergen Reactions    Influenza Virus Vaccine        OBJECTIVE:  /71   Pulse 81   Temp 97.9 °F (36.6 °C)   Resp 16   Wt 49.5 kg (109 lb 2 oz)   SpO2 91%   BMI 21.31 kg/m²   Nursing notes reviewed.  Physical Exam  Constitutional:        General: She is not in acute distress.     Appearance: She is ill-appearing. She is not toxic-appearing or diaphoretic.      Comments: Chronically ill looking, thin   HENT:      Head: Normocephalic and atraumatic.   Eyes:      General: No scleral icterus.        Right eye: No discharge.         Left eye: No discharge.   Pulmonary:      Effort: Pulmonary effort is normal. No respiratory distress.   Abdominal:      General: Abdomen is flat. There is no distension.   Skin:     General: Skin is warm and dry.      Coloration: Skin is not jaundiced or pale.   Neurological:      General: No focal deficit present.      Mental Status: She is alert and oriented to person, place, and time.   Psychiatric:         Mood and Affect: Mood normal.         Behavior: Behavior normal.         Thought Content: Thought content normal.         Judgment: Judgment normal.         Lab Results: I have personally reviewed pertinent labs.    HEMATOLOGY PROFILE:  Results from last 7 days   Lab Units 04/06/24  0552 04/05/24  0538 04/04/24  1515 04/04/24  0607 04/03/24  0614 04/02/24  0737   WBC Thousand/uL 7.33 4.78 7.93 5.96 3.66* 8.11   HEMOGLOBIN g/dL 9.4* 9.6* 11.2* 10.1* 9.8* 10.6*   HEMATOCRIT % 28.1* 29.7* 34.5* 30.5* 30.3* 32.4*   PLATELETS Thousands/uL 76* 78* 99* 81* 66* 75*   NEUTROS PCT %  --   --   --  79* 77* 81*   MONOS PCT %  --   --   --  9 9 8   EOS PCT %  --   --   --  0 0 0       CHEMISTRY PROFILE:  Results from last 7 days   Lab Units 04/06/24  0552 04/05/24  1838 04/05/24  0538   SODIUM mmol/L 137 136 136   POTASSIUM mmol/L 3.9 4.2 4.2   CHLORIDE mmol/L 105 102 107   CO2 mmol/L 23 22 22   BUN mg/dL 41* 43* 44*   CREATININE mg/dL 1.48* 1.61* 1.45*   ALBUMIN g/dL  --   --  2.8*   CALCIUM mg/dL 7.8* 8.3* 7.6*   ALK PHOS U/L  --   --  40   ALT U/L  --   --  14   AST U/L  --   --  12*       Albumin:  0   Lab Value Date/Time    ALB 2.8 (L) 04/05/2024 0501     Imaging Studies: I have personally reviewed pertinent reports.  EKG,  "Pathology, and Other Studies: I have personally reviewed pertinent reports.    Counseling / Coordination of Care:  Total floor / unit time spent today 30 minutes. Greater than 50% of total time was spent with the patient and / or family counseling and / or coordination of care. A description of the counseling / coordination of care: psychosocial support, chart review, imaging review, lab review, advanced directives, goals of care, supportive listening, and anticipatory guidance.    Shasha Crowe MD  [facility]  [phone]    Portions of this document may have been created using dictation software and as such some \"sound alike\" terms may have been generated by the system. Do not hesitate to contact me with any questions or clarifications.        "

## 2024-04-06 NOTE — PLAN OF CARE
Problem: OCCUPATIONAL THERAPY ADULT  Goal: Performs self-care activities at highest level of function for planned discharge setting.  See evaluation for individualized goals.  Description: Treatment Interventions: ADL retraining, Functional transfer training, UE strengthening/ROM, Endurance training, Cognitive reorientation, Patient/family training, Equipment evaluation/education, Fine motor coordination activities, Compensatory technique education, Energy conservation, Activityengagement          See flowsheet documentation for full assessment, interventions and recommendations.   4/6/2024 1522 by Chanda Patel OT  Note: Limitation: Decreased ADL status, Decreased Safe judgement during ADL, Decreased UE strength, Decreased cognition, Decreased endurance, Decreased fine motor control, Decreased high-level ADLs  Prognosis: Fair  Assessment: Pt is a 80 yo female seen for OT eval s/p adm to [facility] on 4/5/24 dx'd w/ ARF on CKD stage 3. Pt  has a past medical history of CAD (coronary artery disease), Cardiac disease, Hypercholesteremia, Hyperlipidemia, Hypertension, Hypertension, and Renal disorder.  Pt with active OT orders and activity as tolerated orders. Pt is retired and resides w/ spouse in [place] w/ 1STE. Pt reports spouse can A as needed upon D/c . Pt was I w/ ADLS and IADLS, drove, & required no use of DME PTA. Pt is currently demonstrating the following occupational deficits: Min A UB self care, Mod A LB self care and toileting, Min A bed mobility, Min A transfers and short distance mobility w/ HHA. These deficits that are impacting pt's baseline areas of occupation are a result of the following impairments: pain, endurance, activity tolerance, functional mobility, forward functional reach, balance, functional standing tolerance, unsupportive home environment, decreased I w/ ADLS/IADLS, strength, cognitive impairments, decreased safety awareness, decreased insight into deficits, and coordination deficits. The  following Occupational Performance Areas to address include: grooming, bathing/shower, toilet hygiene, dressing, medication management, health maintenance, functional mobility, and clothing management.  Based on the aforementioned OT evaluation, functional performance deficits, and assessments, pt has been identified as a high complexity evaluation. Recommend  level II Moderate Resource Intensity (rehab)  upon D/C. The patient's raw score on the AM-PAC Daily Activity Inpatient Short Form is 15. A raw score of less than 19 suggests the patient may benefit from discharge to post-acute rehabilitation services. Please refer to the recommendation of the Occupational Therapist for safe discharge planning. Pt to continue to benefit from acute immediate OT services to address the following goals 2-3x/week to  w/in 10-14 days:     Rehab Resource Intensity Level, OT: II (Moderate Resource Intensity) (rehab)       2024 1511 by Chanda Patel OT  Note: Limitation: Decreased ADL status, Decreased Safe judgement during ADL, Decreased UE strength, Decreased cognition, Decreased endurance, Decreased fine motor control, Decreased high-level ADLs  Prognosis: Fair  Assessment: Pt is a 80 yo female seen for OT eval s/p adm to [facility] on 24 dx'd w/ ARF on CKD stage 3. Pt  has a past medical history of CAD (coronary artery disease), Cardiac disease, Hypercholesteremia, Hyperlipidemia, Hypertension, Hypertension, and Renal disorder.  Pt with active OT orders and activity as tolerated orders. Pt is retired and resides w/ spouse in [place] w/ 1STE. Pt reports spouse can A as needed upon D/c . Pt was I w/ ADLS and IADLS, drove, & required no use of DME PTA. Pt is currently demonstrating the following occupational deficits: Min A UB self care, Mod A LB self care and toileting, Min A bed mobility, Min A transfers and short distance mobility w/ HHA. These deficits that are impacting pt's baseline areas of occupation are a result of the  following impairments: pain, endurance, activity tolerance, functional mobility, forward functional reach, balance, functional standing tolerance, unsupportive home environment, decreased I w/ ADLS/IADLS, strength, cognitive impairments, decreased safety awareness, decreased insight into deficits, and coordination deficits. The following Occupational Performance Areas to address include: grooming, bathing/shower, toilet hygiene, dressing, medication management, health maintenance, functional mobility, and clothing management.  Based on the aforementioned OT evaluation, functional performance deficits, and assessments, pt has been identified as a high complexity evaluation. Recommend  level II Moderate Resource Intensity (rehab)  upon D/C. The patient's raw score on the AM-PAC Daily Activity Inpatient Short Form is 15. A raw score of less than 19 suggests the patient may benefit from discharge to post-acute rehabilitation services. Please refer to the recommendation of the Occupational Therapist for safe discharge planning. Pt to continue to benefit from acute immediate OT services to address the following goals 2-3x/week to  w/in 10-14 days:     Rehab Resource Intensity Level, OT: II (Moderate Resource Intensity) (rehab)

## 2024-04-06 NOTE — Clinical Note
Pt is 79 y.o. female seen for PT evaluation s/p admit to [facility] on 4/5/2024  w/ Acute renal failure superimposed on stage 3a chronic kidney disease (HCC). Unsuccessful prior attempt in left renal artery stenting.   Transferred to [facility] for re-attempt. Pt w/ past medical hx significant for f COPD, CAD prior STEMI s/p RCA stenting in 2008, CVA, CKD stage 3, atrophic R kidney 2/2 severe SALAZAR, and HLD   + as above.  Pt presenting w/ ***.  Current dx/ problem list includes: ***.   Comorbidities affecting pt's physical performance at time of assessment listed above and significant for: ***. Personal factors affecting pt at time of IE include: steps to enter environment, limited home support, advanced age, past experience, inability to perform IADLs, inability to perform ADLs, inability to ambulate household distances, limited insight into impairments and recent fall(s).  Due to acute medical issues, ongoing medical workup for primary dx; pain, fall risk, increased reliance on more restrictive AD compared to baseline;  decreased activity tolerance compared to baseline, increased assistance needed from caregiver at current time, continuous monitoring, trending labs;  multiple lines, decline in overall functional mobility status; health management issues; note unstable clinical picture (high complexity).  At baseline, pt was ***. Currently,  pt  is requiring *** A for bed skills; *** for functional transfers and *** for ambulation w/ ***.   Pt presents functioning below baseline and currently w/ overall mobility deficits 2* to: decreased LE strength/AROM; limited flexibility;  generalized weakness/ deconditioning; decreased endurance; decreased activity tolerance; decreased coordination; impaired balance; gait deviations; decreased safety awareness; SOB/PINZON; fatigue; impaired safety and judgement; limited insight into current deficits; bed/ chair alarms; multiple lines; hearing impairment/ visual impairments;    ***.   [id number]  Pt currently at risk for falls.  (Please find additional objective findings from PT assessment regarding body systems outlined above.) Pt will continue to benefit from skilled PT interventions to address stated impairments; to maximize functional potential; for ongoing pt/ family training; and DME needs.  PT is recommending PT Resource Intensity Level  *** on d/c.      PT will follow for STG as outlined on eval. Pt/ family agreeable to PT POC and goals as stated.

## 2024-04-07 PROBLEM — M79.89 LEFT UPPER EXTREMITY SWELLING: Status: ACTIVE | Noted: 2024-04-07

## 2024-04-07 LAB
ANION GAP SERPL CALCULATED.3IONS-SCNC: 10 MMOL/L (ref 4–13)
BUN SERPL-MCNC: 39 MG/DL (ref 5–25)
CALCIUM SERPL-MCNC: 7.4 MG/DL (ref 8.4–10.2)
CHLORIDE SERPL-SCNC: 105 MMOL/L (ref 96–108)
CO2 SERPL-SCNC: 23 MMOL/L (ref 21–32)
CREAT SERPL-MCNC: 1.52 MG/DL (ref 0.6–1.3)
GFR SERPL CREATININE-BSD FRML MDRD: 31 ML/MIN/1.73SQ M
GLUCOSE SERPL-MCNC: 109 MG/DL (ref 65–140)
GLUCOSE SERPL-MCNC: 113 MG/DL (ref 65–140)
GLUCOSE SERPL-MCNC: 127 MG/DL (ref 65–140)
GLUCOSE SERPL-MCNC: 173 MG/DL (ref 65–140)
GLUCOSE SERPL-MCNC: 91 MG/DL (ref 65–140)
MAGNESIUM SERPL-MCNC: 1.7 MG/DL (ref 1.9–2.7)
POTASSIUM SERPL-SCNC: 3.6 MMOL/L (ref 3.5–5.3)
SODIUM SERPL-SCNC: 138 MMOL/L (ref 135–147)

## 2024-04-07 PROCEDURE — 83735 ASSAY OF MAGNESIUM: CPT

## 2024-04-07 PROCEDURE — 94668 MNPJ CHEST WALL SBSQ: CPT

## 2024-04-07 PROCEDURE — 99232 SBSQ HOSP IP/OBS MODERATE 35: CPT | Performed by: INTERNAL MEDICINE

## 2024-04-07 PROCEDURE — 80048 BASIC METABOLIC PNL TOTAL CA: CPT

## 2024-04-07 PROCEDURE — 94760 N-INVAS EAR/PLS OXIMETRY 1: CPT

## 2024-04-07 PROCEDURE — 82948 REAGENT STRIP/BLOOD GLUCOSE: CPT

## 2024-04-07 PROCEDURE — 94664 DEMO&/EVAL PT USE INHALER: CPT

## 2024-04-07 RX ORDER — FUROSEMIDE 10 MG/ML
40 INJECTION INTRAMUSCULAR; INTRAVENOUS ONCE
Status: COMPLETED | OUTPATIENT
Start: 2024-04-07 | End: 2024-04-07

## 2024-04-07 RX ADMIN — FAMOTIDINE 10 MG: 20 TABLET, FILM COATED ORAL at 08:39

## 2024-04-07 RX ADMIN — ATORVASTATIN CALCIUM 40 MG: 40 TABLET, FILM COATED ORAL at 17:07

## 2024-04-07 RX ADMIN — NIFEDIPINE 60 MG: 30 TABLET, FILM COATED, EXTENDED RELEASE ORAL at 08:39

## 2024-04-07 RX ADMIN — NYSTATIN 500000 UNITS: 100000 SUSPENSION ORAL at 17:07

## 2024-04-07 RX ADMIN — FUROSEMIDE 40 MG: 10 INJECTION, SOLUTION INTRAMUSCULAR; INTRAVENOUS at 14:13

## 2024-04-07 RX ADMIN — INSULIN LISPRO 1 UNITS: 100 INJECTION, SOLUTION INTRAVENOUS; SUBCUTANEOUS at 17:07

## 2024-04-07 RX ADMIN — NYSTATIN 500000 UNITS: 100000 SUSPENSION ORAL at 08:39

## 2024-04-07 RX ADMIN — PREDNISONE 20 MG: 20 TABLET ORAL at 08:39

## 2024-04-07 NOTE — PROGRESS NOTES
NEPHROLOGY PROGRESS NOTE   Laya Brooks 79 y.o. female MRN: [id number]  Unit/Bed#: [facility] 828-01 Encounter: [id number]      ASSESSMENT/PLAN:  Acute kidney injury, POA: Possibly ATN/prerenal + artery stenosis  Creatinine 2.86 on admission and peaked at 6.27  S/p HD x 3 with last treatment 3/29 (only 1.5 hours)  Now seems to have renal recovery with creatinine stable at 1.5 to  Has been on IV Lasix the past few days and had Lasix 80 mg IV x 1 yesterday  High urine output per nurse and patient but was not recorded + had incontinence with leaking around purewick   Strict I/O and daily standing weights   Access: R IJ temp line placed 3/26 -- d/c temp HD catheter given stable creatinine   CKD IIIA: baseline creatinine around 0.9-1 with solitary functioning left kidney and right renal atrophy   Renal artery stenosis   Renal artery duplex: Severe left renal artery stenosis at the aorta renal junction, moderate disease more proximally  S/p renal angiogram and IR 4/4 but unsuccessful due to large aortic plaque  Transferred to [facility] for further IR evaluation of left SALAZAR and awaiting IR evaluation   Volume overload   S/p lasix 80mg IV x 1 yesterday   Consider lasix 40mg IV x 1 today --- will d/w attending   Hypertension: BP acceptable today   Acute hypoxic respiratory failure: Due to volume overload and COPD exacerbation  IV Lasix as above  Taper steroids per primary team  Acute blood loss anemia: hgb trending down to 9.4   Goals of care: palliative care following.     Plan Summary:   Consider further IV diuretics   Strict I/O   Check am BMP  D/c HD catheter   Awaiting IR evaluation for SALAZAR     SUBJECTIVE:  Still reports shortness of breath but visibly appears less short of breath than yesterday.  Frustrated that her PureWick was leaking and she was frequently incontinent overnight.    OBJECTIVE:  Current Weight: Weight - Scale: 49.5 kg (109 lb 2 oz)  Vitals:    04/07/24 0734   BP: 161/67   Pulse: 77   Resp: 18   Temp: 98.5 °F  (36.9 °C)   SpO2: 93%       Intake/Output Summary (Last 24 hours) at 4/7/2024 1255  Last data filed at 4/7/2024 0900  Gross per 24 hour   Intake 300 ml   Output --   Net 300 ml       General:  appears comfortable and in no acute distress   Skin:  No rash  Eyes:  Sclerae anicteric, no periorbital edema   ENT:  Moist mucous membranes  Neck:  Trachea midline, symmetric   Chest:  decreased breath sounds   CVS:  Regular rate and rhythm  Abdomen:  Soft, nontender, nondistended  Neuro:  Awake and alert  Psych:  Appropriate affect  Extremities: + lower extremity edema   : purewick in place with clear urine output       Medications:  Scheduled Meds:  Current Facility-Administered Medications   Medication Dose Route Frequency Provider Last Rate    acetaminophen  650 mg Oral Q4H PRN MANOHAR Maurice      albuterol  2.5 mg Nebulization Q6H PRN Marci Cuello, DO      ALPRAZolam  0.5 mg Oral TID PRN MANOHAR Maurice      aluminum-magnesium hydroxide-simethicone  30 mL Oral Q4H PRN MANOHAR Maurice      atorvastatin  40 mg Oral Daily With Dinner MANOHAR Maurice      bisacodyl  10 mg Rectal Daily Laya Howard, MANOHAR      famotidine  10 mg Oral Daily MANOHAR Maurice      insulin lispro  1-5 Units Subcutaneous 4x Daily (AC & HS) MANOHAR Maurice      ipratropium  0.5 mg Nebulization Q6H PRN Marci Cuello DO      labetalol  10 mg Intravenous Q6H PRN MANOHAR Maurice      NIFEdipine  60 mg Oral Daily MANOHAR Maurice      nystatin  500,000 Units Swish & Swallow 4x Daily MANOHAR Maurice      ondansetron  4 mg Intravenous Q6H PRN MANOHAR Maurice      polyethylene glycol  17 g Oral BID MANOHAR Maurice      [START ON 4/8/2024] predniSONE  10 mg Oral Daily MANOHAR Maurice      senna  1 tablet Oral HS Melody Hunter, DO      sodium chloride  1 spray Each Nare Q1H PRN MANOHAR Maurice         PRN Meds:.  acetaminophen     albuterol    ALPRAZolam    aluminum-magnesium hydroxide-simethicone    ipratropium    labetalol    ondansetron    sodium chloride      Laboratory Results:  Results from last 7 days   Lab Units 04/07/24  0438 04/06/24  0552 04/05/24  1838 04/05/24  0538 04/04/24  1515 04/04/24  0607 04/03/24  0614 04/03/24  0435 04/02/24  0737 04/01/24  0619   WBC Thousand/uL  --  7.33  --  4.78 7.93 5.96 3.66*  --  8.11 4.75   HEMOGLOBIN g/dL  --  9.4*  --  9.6* 11.2* 10.1* 9.8*  --  10.6* 9.4*   HEMATOCRIT %  --  28.1*  --  29.7* 34.5* 30.5* 30.3*  --  32.4* 30.1*   PLATELETS Thousands/uL  --  76*  --  78* 99* 81* 66*  --  75* 64*   SODIUM mmol/L 138 137 136 136 135 135  --  135 136 136   POTASSIUM mmol/L 3.6 3.9 4.2 4.2 4.6 4.3  --  4.9 4.3 4.8   CHLORIDE mmol/L 105 105 102 107 106 105  --  106 104 105   CO2 mmol/L 23 23 22 22 19* 22  --  22 24 23   BUN mg/dL 39* 41* 43* 44* 46* 46*  --  50* 52* 51*   CREATININE mg/dL 1.52* 1.48* 1.61* 1.45* 1.44* 1.44*  --  1.57* 1.60* 1.81*   CALCIUM mg/dL 7.4* 7.8* 8.3* 7.6* 7.9* 8.0*  --  7.9* 8.5 8.4   MAGNESIUM mg/dL 1.7* 1.9 2.1 2.3 1.7*  --   --   --   --   --    PHOSPHORUS mg/dL  --  3.4  --  3.3 3.1  --   --   --   --   --

## 2024-04-07 NOTE — ASSESSMENT & PLAN NOTE
Started on 4/6 with fairly significant erythema/edema  Got venous duplex which was unremarkable  As of today, 4/7, L UE edema/erythema has improved and is near resolved

## 2024-04-07 NOTE — PROGRESS NOTES
INTERNAL MEDICINE RESIDENCY PROGRESS NOTE     Name: Laya Brooks   Age & Sex: 79 y.o. female   MRN: [id number]  Unit/Bed#: [facility] 828-01   Encounter: [id number]  Team: SOD Team C     PATIENT INFORMATION     Name: Laya Brooks   Age & Sex: 79 y.o. female   MRN: [id number]  Hospital Stay Days: 2    ASSESSMENT/PLAN     Principal Problem:    Acute renal failure superimposed on stage 3a chronic kidney disease (HCC)  Active Problems:    Severe protein-calorie malnutrition (HCC)    Hypercholesterolemia    Atrophy of right kidney    Renovascular hypertension    CAD (coronary artery disease)    COPD (chronic obstructive pulmonary disease) (HCC)    Dysphagia    Renal artery stenosis (HCC)    Goals of care, counseling/discussion    Bicytopenia    Bleeding at insertion site    Acute respiratory insufficiency    Constipation    Left upper extremity swelling      Left upper extremity swelling  Assessment & Plan  Started on 4/6 with fairly significant erythema/edema  Got venous duplex which was unremarkable  As of today, 4/7, L UE edema/erythema has improved and is near resolved    Constipation  Assessment & Plan  Pt went 1.5 weeks without bowel movement  Was started on bowel regimen including miralax bid, senna bid, and dulcolax   Pt now reports abdominal cramping and diarrhea    Plan:  -will change senna to qhs  -hold evening dose of miralax   -monitor electrolytes daily   -encourage good PO intake    Acute respiratory insufficiency  Assessment & Plan  Currently requiring 5L NC   Suspect this is 2/2 acute disease process as well as volume overload in setting of CKD    Plan:   -Continue w/diuresis PRN, ordered Lasix 80mg IV  -Aggressive pulmonary hygiene  -Wean O2 for SpO2 > 88%    Bleeding at insertion site  Assessment & Plan  Had bleeding/oozing at sheath site post IR intervention - was reversed with Protamine, sheath pulled, and manual pressure held   4/5: Mild oozing at site     Plan:  -Holding Plavix and DVT ppx for  now  -Monitor CBC and INR    Bicytopenia  Assessment & Plan  With anemia/thrombocytopenia since admission to [facility]  Possibly in setting of nutritional deficiency vs CKD  Iron panel, B12, folate levels unremarkable    Plan:  - Holding Plavix/DVT ppx for now in setting of bleeding post procedure  - consider restarting heparin DVT proph.   - Trend Hgb/Plt and transfuse for Hgb < 7 and Plt < 20,000    Renal artery stenosis (HCC)  Assessment & Plan  Imaging from 03/28 revealed severe L renal artery stenosis at aorto-renal junction w/moderate disease   seen more proximally as well  Has known R renal artery stenosis w/subsequent renal atrophy  Now s/p IR L renal artery angiogram (4/4) for revascularization, however was unsuccessful due to large aortic plaque Transferred to [facility] for 2nd attempt at L renal artery revascularization     Plan:  -see above under Acute renal failure superimposed on stage 3a CKD    Dysphagia  Assessment & Plan  Pt presented with pharyngeal dysphagia   Speech was consulted at [facility] with dysphagia diets ordered    Plan:  -Speech therapy consulted, appreciate recs   -Dysphagia 3 diet with thin liquids    COPD (chronic obstructive pulmonary disease) ([facility])  Assessment & Plan  PFTs from 02/2024 revealed FEV1 61% - moderate obstruction  Follows with Pulmonology - recently started on Stiolto and PRN Albuterol inhalers as this is newer diagnosis    Plan:  -Continue Xopenex/Atrovent/Pulmicort/Performist nebs  -Continue steroid taper for possible COPD exacerbation that was started at [facility]  -Aggressive pulmonary hygiene    CAD (coronary artery disease)  Assessment & Plan  -Prior STEMI in 2008 s/p RCA stenting    Plan:  -Holding home Plavix given bleeding post IR procedure  -Continue home statin  -Off tele     Renovascular hypertension  Assessment & Plan  Previously taking ACE-I and CCB as outpatient  Now on Procardia and scheduled Hydralazine - Hydralazine increased to 50 mg Q8H    Plan:  -cont  above meds  -PRN Labetalol for SBP > 170    Atrophy of right kidney  Assessment & Plan  In setting of R renal artery stenosis   Nephrology following - patient may ultimately require RRT    Hypercholesterolemia  Assessment & Plan  LDL goal <70 due to intracranial stenosis  Most recent lipid panel with     Plan:  -Recommend repeat in 2-3 months. If it still remains elevated would defer to PCP about addition of Repatha versus Zetia  -Continue atorvastatin 40mg at this time.     Severe protein-calorie malnutrition (HCC)  Assessment & Plan  Body mass index is 18.36 kg/m².      Pt reports disliking the same foods she was given at prior hospitalizations.     Plan:  -cont with nutrition recommendations and dysphagia diet     * Acute renal failure superimposed on stage 3a chronic kidney disease (HCC)  Assessment & Plan  Lab Results   Component Value Date    EGFR 32 04/06/2024    EGFR 29 04/05/2024    EGFR 33 04/05/2024    CREATININE 1.48 (H) 04/06/2024    CREATININE 1.61 (H) 04/05/2024    CREATININE 1.45 (H) 04/05/2024     Creatinine peaked at 6.27 at [facility] - now around 1.4 - 1.5  Baseline creatinine appears to be around 0.9 - 1.1  VAS Renal artery (3/29/2024):  severe left renal artery stenosis at the aorto- renal junction.  Moderate disease more proximally  Has received IHD x3 since admission to [facility] - last treatment was on 03/29  4/4: Received IV Lasix 60 mg x1 yesterday and IV Lasix 80 mg x1 PM w/good response  4/5: Received additional IV Lasix 80mg x1 AM    Ddx: Suspect this is 2/2 significant bilateral renal artery stenosis w/atrophic R kidney     Plan:  -To get an addition 80mg IV lasix today  -Nephrology following   -IR consulted, appreciate recs   -will pursue further L renal artery stenting next week   -Avoid nephrotoxic agents and fluctuations in BP  -Trend renal indices  -Strict I/O with pure wick  -Daily weights        Disposition: For IR procedure sometime this week.  Will require inpatient stay until as  this procedure done.    SUBJECTIVE / INTERVAL HISTORY     SHAYLA, Miss Brooks was seen earlier this morning resting comfortably in a chair at bedside.  She believes that her left upper extremity swelling has improved somewhat, and is not complaining of any shortness of breath or any other new/recurrent complaints.    Review of Systems   Constitutional:  Negative for chills and fever.   HENT:  Negative for ear pain and sore throat.    Eyes:  Negative for pain and visual disturbance.   Respiratory:  Negative for cough and shortness of breath.    Cardiovascular:  Negative for chest pain and palpitations.   Gastrointestinal:  Negative for abdominal pain and vomiting.   Genitourinary:  Negative for dysuria and hematuria.   Musculoskeletal:  Negative for arthralgias and back pain.   Skin:  Negative for color change and rash.   Neurological:  Negative for seizures and syncope.   All other systems reviewed and are negative.      OBJECTIVE     Vitals:    24 0734 24 1332 24 1413 24 1519   BP: 161/67  163/67 163/86   Pulse: 77  75 70   Resp: 18   18   Temp: 98.5 °F (36.9 °C)   98.5 °F (36.9 °C)   SpO2: 93%  94% 94%   Weight: 49.5 kg (109 lb 2 oz)      Height:  5' (1.524 m)          Temperature:   Temp (24hrs), Av.4 °F (36.9 °C), Min:98.1 °F (36.7 °C), Max:98.5 °F (36.9 °C)    Temperature: 98.5 °F (36.9 °C)    Intake & Output:  I/O          07 07    P.O.  120 400    Total Intake(mL/kg)  120 (2.4) 400 (8.1)    Urine (mL/kg/hr) 250      Stool 0      Total Output 250      Net -250 +120 +400           Unmeasured Urine Occurrence 1 x 2 x 4 x    Unmeasured Stool Occurrence 3 x 1 x             Weights:   IBW (Ideal Body Weight): 45.5 kg    Body mass index is 21.31 kg/m².  Weight (last 2 days)       Date/Time Weight    24 07:34:50 49.5 (109.13)    24 0829 49.5 (109.13)              Physical Exam:     Physical Exam  Vitals and nursing note  reviewed.   Constitutional:       General: She is not in acute distress.     Appearance: She is well-developed.   HENT:      Head: Normocephalic and atraumatic.   Eyes:      Conjunctiva/sclera: Conjunctivae normal.   Cardiovascular:      Rate and Rhythm: Normal rate and regular rhythm.      Heart sounds: No murmur heard.  Pulmonary:      Effort: Pulmonary effort is normal. No respiratory distress.      Breath sounds: Rales present.      Comments: Still with some bilateral crackles, however is improving compared to previous days  Abdominal:      Palpations: Abdomen is soft.      Tenderness: There is no abdominal tenderness.   Musculoskeletal:         General: Swelling present.      Cervical back: Neck supple.      Comments: LUE edema/erythema has improved greatly from yesterday   Skin:     General: Skin is warm.      Capillary Refill: Capillary refill takes less than 2 seconds.   Neurological:      Mental Status: She is alert.   Psychiatric:         Mood and Affect: Mood normal.         LABORATORY DATA     Labs: I have personally reviewed pertinent reports.  Results from last 7 days   Lab Units 04/06/24  0552 04/05/24  0538 04/04/24  1515 04/04/24  0607 04/03/24  0614 04/02/24  0737   WBC Thousand/uL 7.33 4.78 7.93 5.96 3.66* 8.11   HEMOGLOBIN g/dL 9.4* 9.6* 11.2* 10.1* 9.8* 10.6*   HEMATOCRIT % 28.1* 29.7* 34.5* 30.5* 30.3* 32.4*   PLATELETS Thousands/uL 76* 78* 99* 81* 66* 75*   NEUTROS PCT %  --   --   --  79* 77* 81*   MONOS PCT %  --   --   --  9 9 8   EOS PCT %  --   --   --  0 0 0      Results from last 7 days   Lab Units 04/07/24  0438 04/06/24  0552 04/05/24  1838 04/05/24  0538   POTASSIUM mmol/L 3.6 3.9 4.2 4.2   CHLORIDE mmol/L 105 105 102 107   CO2 mmol/L 23 23 22 22   BUN mg/dL 39* 41* 43* 44*   CREATININE mg/dL 1.52* 1.48* 1.61* 1.45*   CALCIUM mg/dL 7.4* 7.8* 8.3* 7.6*   ALK PHOS U/L  --   --   --  40   ALT U/L  --   --   --  14   AST U/L  --   --   --  12*     Results from last 7 days   Lab Units  04/07/24  0438 04/06/24  0552 04/05/24  1838   MAGNESIUM mg/dL 1.7* 1.9 2.1     Results from last 7 days   Lab Units 04/06/24  0552 04/05/24  0538 04/04/24  1515   PHOSPHORUS mg/dL 3.4 3.3 3.1      Results from last 7 days   Lab Units 04/04/24  1515   INR  1.10   PTT seconds 26               IMAGING & DIAGNOSTIC TESTING     Radiology Results: I have personally reviewed pertinent reports.  No results found.  Other Diagnostic Testing: I have personally reviewed pertinent reports.    ACTIVE MEDICATIONS     Current Facility-Administered Medications   Medication Dose Route Frequency    acetaminophen (TYLENOL) tablet 650 mg  650 mg Oral Q4H PRN    albuterol inhalation solution 2.5 mg  2.5 mg Nebulization Q6H PRN    ALPRAZolam (XANAX) tablet 0.5 mg  0.5 mg Oral TID PRN    aluminum-magnesium hydroxide-simethicone (MAALOX) oral suspension 30 mL  30 mL Oral Q4H PRN    atorvastatin (LIPITOR) tablet 40 mg  40 mg Oral Daily With Dinner    bisacodyl (DULCOLAX) rectal suppository 10 mg  10 mg Rectal Daily    famotidine (PEPCID) tablet 10 mg  10 mg Oral Daily    insulin lispro (HumALOG/ADMELOG) 100 units/mL subcutaneous injection 1-5 Units  1-5 Units Subcutaneous 4x Daily (AC & HS)    ipratropium (ATROVENT) 0.02 % inhalation solution 0.5 mg  0.5 mg Nebulization Q6H PRN    labetalol (NORMODYNE) injection 10 mg  10 mg Intravenous Q6H PRN    NIFEdipine (PROCARDIA XL) 24 hr tablet 60 mg  60 mg Oral Daily    nystatin (MYCOSTATIN) oral suspension 500,000 Units  500,000 Units Swish & Swallow 4x Daily    ondansetron (ZOFRAN) injection 4 mg  4 mg Intravenous Q6H PRN    polyethylene glycol (MIRALAX) packet 17 g  17 g Oral BID    [START ON 4/8/2024] predniSONE tablet 10 mg  10 mg Oral Daily    senna (SENOKOT) tablet 8.6 mg  1 tablet Oral HS    sodium chloride (OCEAN) 0.65 % nasal spray 1 spray  1 spray Each Nare Q1H PRN       VTE Pharmacologic Prophylaxis: Reason for no pharmacologic prophylaxis currently being held in context of procedural  "site bleeding  VTE Mechanical Prophylaxis: sequential compression device    Portions of the record may have been created with voice recognition software.  Occasional wrong word or \"sound a like\" substitutions may have occurred due to the inherent limitations of voice recognition software.  Read the chart carefully and recognize, using context, where substitutions have occurred.    == == == == == == == == == == == == == == == == == == == == ==     Neville Edmonds MD  [facility], [place]  Internal Medicine Resident, PGY-2      "

## 2024-04-08 LAB
ANION GAP SERPL CALCULATED.3IONS-SCNC: 8 MMOL/L (ref 4–13)
BUN SERPL-MCNC: 33 MG/DL (ref 5–25)
CALCIUM SERPL-MCNC: 7.1 MG/DL (ref 8.4–10.2)
CHLORIDE SERPL-SCNC: 104 MMOL/L (ref 96–108)
CO2 SERPL-SCNC: 25 MMOL/L (ref 21–32)
CREAT SERPL-MCNC: 1.42 MG/DL (ref 0.6–1.3)
GFR SERPL CREATININE-BSD FRML MDRD: 34 ML/MIN/1.73SQ M
GLUCOSE SERPL-MCNC: 171 MG/DL (ref 65–140)
GLUCOSE SERPL-MCNC: 188 MG/DL (ref 65–140)
GLUCOSE SERPL-MCNC: 248 MG/DL (ref 65–140)
GLUCOSE SERPL-MCNC: 86 MG/DL (ref 65–140)
POTASSIUM SERPL-SCNC: 3.7 MMOL/L (ref 3.5–5.3)
SODIUM SERPL-SCNC: 137 MMOL/L (ref 135–147)

## 2024-04-08 PROCEDURE — 99232 SBSQ HOSP IP/OBS MODERATE 35: CPT | Performed by: INTERNAL MEDICINE

## 2024-04-08 PROCEDURE — 82948 REAGENT STRIP/BLOOD GLUCOSE: CPT

## 2024-04-08 PROCEDURE — 94664 DEMO&/EVAL PT USE INHALER: CPT

## 2024-04-08 PROCEDURE — 80048 BASIC METABOLIC PNL TOTAL CA: CPT

## 2024-04-08 RX ORDER — TORSEMIDE 20 MG/1
20 TABLET ORAL DAILY
Status: DISCONTINUED | OUTPATIENT
Start: 2024-04-08 | End: 2024-04-10

## 2024-04-08 RX ADMIN — NYSTATIN 500000 UNITS: 100000 SUSPENSION ORAL at 11:49

## 2024-04-08 RX ADMIN — NIFEDIPINE 60 MG: 30 TABLET, FILM COATED, EXTENDED RELEASE ORAL at 08:15

## 2024-04-08 RX ADMIN — INSULIN LISPRO 1 UNITS: 100 INJECTION, SOLUTION INTRAVENOUS; SUBCUTANEOUS at 16:55

## 2024-04-08 RX ADMIN — TORSEMIDE 20 MG: 20 TABLET ORAL at 11:49

## 2024-04-08 RX ADMIN — INSULIN LISPRO 2 UNITS: 100 INJECTION, SOLUTION INTRAVENOUS; SUBCUTANEOUS at 11:49

## 2024-04-08 RX ADMIN — NYSTATIN 500000 UNITS: 100000 SUSPENSION ORAL at 08:15

## 2024-04-08 RX ADMIN — PREDNISONE 10 MG: 10 TABLET ORAL at 08:15

## 2024-04-08 RX ADMIN — FAMOTIDINE 10 MG: 20 TABLET, FILM COATED ORAL at 08:15

## 2024-04-08 RX ADMIN — INSULIN LISPRO 1 UNITS: 100 INJECTION, SOLUTION INTRAVENOUS; SUBCUTANEOUS at 21:46

## 2024-04-08 RX ADMIN — NYSTATIN 500000 UNITS: 100000 SUSPENSION ORAL at 16:55

## 2024-04-08 RX ADMIN — NYSTATIN 500000 UNITS: 100000 SUSPENSION ORAL at 21:46

## 2024-04-08 RX ADMIN — ATORVASTATIN CALCIUM 40 MG: 40 TABLET, FILM COATED ORAL at 16:55

## 2024-04-08 NOTE — PROGRESS NOTES
NEPHROLOGY PROGRESS NOTE   Laya Brooks 79 y.o. female MRN: [id number]  Unit/Bed#: [facility] 828-01 Encounter: [id number]      ASSESSMENT & PLAN    Acute kidney injury  Peak creatinine was 6.27  Did require HD for 3 treatments last treatment on March 29  Now with renal recovery and creatinine is stable currently 1.4  Medications reviewed did receive furosemide 40 mg IV x 1 yesterday    Renal artery stenosis  With a history of renal artery stenosis  Blood pressures are labile most recent blood pressure 116/67 but previous blood pressures are 160/88  On nifedipine 60 mg XL daily  If blood pressures consistently elevated can consider up titration of the nifedipine    Volume overload  Has some lower extremity edema was given 40 mg of IV Lasix yesterday  No urine output documented but did go to the bathroom 5 times  Will add torsemide 20 mg daily    Hypertension  As above regarding management    Acute hypoxic respiratory failure due to volume overload and COPD exacerbation  On a prednisone taper  Continue blood pressure control  Monitor COPD symptoms    Acute blood loss anemia  Most recent hemoglobins are stable        SUBJECTIVE:    Patient was seen today she is sitting denies any acute chest pain or shortness of breath states that with the Lasix she pees a lot, I's and O's have not been checked, her creatinine is now stable at 1.4    12 point review of systems was otherwise negative besides what is mentioned above.    Medications:    Current Facility-Administered Medications:     acetaminophen (TYLENOL) tablet 650 mg, 650 mg, Oral, Q4H PRN, MANOHAR Maurice    albuterol inhalation solution 2.5 mg, 2.5 mg, Nebulization, Q6H PRN, Marci Cuello DO    ALPRAZolam (XANAX) tablet 0.5 mg, 0.5 mg, Oral, TID PRN, MANOHAR Maurice    aluminum-magnesium hydroxide-simethicone (MAALOX) oral suspension 30 mL, 30 mL, Oral, Q4H PRN, MANOHAR Maurice    atorvastatin (LIPITOR) tablet 40 mg, 40 mg, Oral, Daily With  Dinner, MANOHAR Maurice, 40 mg at 04/07/24 1707    bisacodyl (DULCOLAX) rectal suppository 10 mg, 10 mg, Rectal, Daily, MANOHAR Maurice, 10 mg at 04/06/24 0844    famotidine (PEPCID) tablet 10 mg, 10 mg, Oral, Daily, MANOHAR Maurice, 10 mg at 04/08/24 0815    insulin lispro (HumALOG/ADMELOG) 100 units/mL subcutaneous injection 1-5 Units, 1-5 Units, Subcutaneous, 4x Daily (AC & HS), 1 Units at 04/07/24 1707 **AND** Fingerstick Glucose (POCT), , , 4x Daily AC and at bedtime, MANOHAR Maurice    ipratropium (ATROVENT) 0.02 % inhalation solution 0.5 mg, 0.5 mg, Nebulization, Q6H PRN, Marci Cuello DO    labetalol (NORMODYNE) injection 10 mg, 10 mg, Intravenous, Q6H PRN, MANOHAR Maurice    NIFEdipine (PROCARDIA XL) 24 hr tablet 60 mg, 60 mg, Oral, Daily, MANOHAR Maurice, 60 mg at 04/08/24 0815    nystatin (MYCOSTATIN) oral suspension 500,000 Units, 500,000 Units, Swish & Swallow, 4x Daily, MANOHAR Maurice, 500,000 Units at 04/08/24 0815    ondansetron (ZOFRAN) injection 4 mg, 4 mg, Intravenous, Q6H PRN, MANOHAR Maurice    polyethylene glycol (MIRALAX) packet 17 g, 17 g, Oral, BID, MANOHAR Maurice, 17 g at 04/06/24 0844    [COMPLETED] predniSONE tablet 20 mg, 20 mg, Oral, Daily, 20 mg at 04/07/24 0839 **FOLLOWED BY** predniSONE tablet 10 mg, 10 mg, Oral, Daily, MANOHAR Maurice, 10 mg at 04/08/24 0815    senna (SENOKOT) tablet 8.6 mg, 1 tablet, Oral, HS, Melody Hunter, DO    sodium chloride (OCEAN) 0.65 % nasal spray 1 spray, 1 spray, Each Nare, Q1H PRN, MANOHAR Maurice    OBJECTIVE:    Vitals:    04/07/24 1519 04/07/24 2247 04/08/24 0728 04/08/24 0818   BP: 163/86 162/88 116/67    Pulse: 70 77 78 75   Resp: 18 18 20 18   Temp: 98.5 °F (36.9 °C)  98 °F (36.7 °C)    SpO2: 94% 95% 93% 94%   Weight:       Height:            Temp:  [98 °F (36.7 °C)-98.5 °F (36.9 °C)] 98 °F (36.7 °C)  HR:  [70-78] 75  Resp:  [18-20] 18  BP:  "(116-163)/(67-88) 116/67  SpO2:  [93 %-95 %] 94 %     Body mass index is 21.31 kg/m².    Weight (last 2 days)       Date/Time Weight    04/07/24 07:34:50 49.5 (109.13)    04/06/24 0829 49.5 (109.13)            I/O last 3 completed shifts:  In: 636 [P.O.:636]  Out: -     No intake/output data recorded.      Physical exam:    General: no acute distress, cooperative  Eyes: conjunctivae pink, anicteric sclerae  ENT: lips and mucous membranes moist, no exudates, normal external ears  Neck: ROM intact, no JVD  Chest: No respiratory distress, no accessory muscle use  CVS: normal rate, non pericardial friction rub  Abdomen: soft, non-tender, non-distended, normoactive bowel sounds  Extremities: 2+ pitting edema  Skin: no rash  Neuro: awake, alert, oriented, grossly intact  Psych:  Pleasant affect    Invasive Devices:      Lab Results:   Results from last 7 days   Lab Units 04/08/24  0444 04/07/24  0438 04/06/24  0552 04/05/24  1838 04/05/24  0538 04/04/24  1515 04/04/24  0607 04/03/24  0614   WBC Thousand/uL  --   --  7.33  --  4.78 7.93 5.96 3.66*   HEMOGLOBIN g/dL  --   --  9.4*  --  9.6* 11.2* 10.1* 9.8*   HEMATOCRIT %  --   --  28.1*  --  29.7* 34.5* 30.5* 30.3*   PLATELETS Thousands/uL  --   --  76*  --  78* 99* 81* 66*   POTASSIUM mmol/L 3.7 3.6 3.9 4.2 4.2 4.6 4.3  --    CHLORIDE mmol/L 104 105 105 102 107 106 105  --    CO2 mmol/L 25 23 23 22 22 19* 22  --    BUN mg/dL 33* 39* 41* 43* 44* 46* 46*  --    CREATININE mg/dL 1.42* 1.52* 1.48* 1.61* 1.45* 1.44* 1.44*  --    CALCIUM mg/dL 7.1* 7.4* 7.8* 8.3* 7.6* 7.9* 8.0*  --    MAGNESIUM mg/dL  --  1.7* 1.9 2.1 2.3 1.7*  --   --    PHOSPHORUS mg/dL  --   --  3.4  --  3.3 3.1  --   --    ALK PHOS U/L  --   --   --   --  40  --   --   --    ALT U/L  --   --   --   --  14  --   --   --    AST U/L  --   --   --   --  12*  --   --   --          Portions of the record may have been created with voice recognition software. Occasional wrong word or \"sound a like\" substitutions " may have occurred due to the inherent limitations of voice recognition software. Read the chart carefully and recognize, using context, where substitutions have occurred.If you have any questions, please contact the dictating provider.

## 2024-04-08 NOTE — PROGRESS NOTES
INTERNAL MEDICINE RESIDENCY PROGRESS NOTE     Name: Laya Brooks   Age & Sex: 79 y.o. female   MRN: [id number]  Unit/Bed#: [facility] 828-01   Encounter: [id number]  Team: SOD Team C     PATIENT INFORMATION     Name: Laya Brooks   Age & Sex: 79 y.o. female   MRN: [id number]  Hospital Stay Days: 3    ASSESSMENT/PLAN     Principal Problem:    Acute renal failure superimposed on stage 3a chronic kidney disease (HCC)  Active Problems:    Renal artery stenosis (HCC)    Bleeding at insertion site    CAD (coronary artery disease)    Severe protein-calorie malnutrition (HCC)    Hypercholesterolemia    Atrophy of right kidney    Renovascular hypertension    COPD (chronic obstructive pulmonary disease) ([facility])    Dysphagia    Goals of care, counseling/discussion    Bicytopenia    Acute respiratory insufficiency    Constipation    Left upper extremity swelling      * Acute renal failure superimposed on stage 3a chronic kidney disease (HCC)  Assessment & Plan  Lab Results   Component Value Date    EGFR 32 04/06/2024    EGFR 29 04/05/2024    EGFR 33 04/05/2024    CREATININE 1.48 (H) 04/06/2024    CREATININE 1.61 (H) 04/05/2024    CREATININE 1.45 (H) 04/05/2024     Creatinine peaked at 6.27 at [facility] - now around 1.4 - 1.5  Baseline creatinine appears to be around 0.9 - 1.1  VAS Renal artery (3/29/2024):  severe left renal artery stenosis at the aorto- renal junction.  Moderate disease more proximally  Has received IHD x3 since admission to [facility] - last treatment was on 03/29  4/4: Received IV Lasix 60 mg x1 yesterday and IV Lasix 80 mg x1 PM w/good response  4/5: Received additional IV Lasix 80mg x1 AM    Ddx: Suspect this is 2/2 significant bilateral renal artery stenosis w/atrophic R kidney     Plan:  -Torsemide 20mg daiy per nephro recommendations  -Nephrology following   -IR consulted, appreciate recs   -will pursue further L renal artery stenting 4/9/2024   -NPO at 0000 4/9/2024  -Avoid nephrotoxic agents and fluctuations in  BP  -Trend renal indices  -Strict I/O with pure wick  -Daily weights    Renal artery stenosis (HCC)  Assessment & Plan  Imaging from 03/28 revealed severe L renal artery stenosis at aorto-renal junction w/moderate disease   seen more proximally as well  Has known R renal artery stenosis w/subsequent renal atrophy  Now s/p IR L renal artery angiogram (4/4) for revascularization, however was unsuccessful due to large aortic plaque [facility] for 2nd attempt at L renal artery revascularization     Plan:  -see above under Acute renal failure superimposed on stage 3a CKD    Bleeding at insertion site  Assessment & Plan  Had bleeding/oozing at sheath site post IR intervention - was reversed with Protamine, sheath pulled, and manual pressure held   4/5: Mild oozing at site     Plan:  -Holding Plavix and DVT ppx for now  -Monitor CBC and INR    CAD (coronary artery disease)  Assessment & Plan  -Prior STEMI in 2008 s/p RCA stenting    Plan:  -Holding home Plavix given bleeding post IR procedure  -Continue home statin  -Off tele     Left upper extremity swelling  Assessment & Plan  Started on 4/6 with fairly significant erythema/edema  Got venous duplex which was unremarkable  As of today, 4/7, L UE edema/erythema has improved and is near resolved    Constipation  Assessment & Plan  Pt went 1.5 weeks without bowel movement  Was started on bowel regimen including miralax bid, senna bid, and dulcolax   Pt now reports abdominal cramping and diarrhea    Plan:  -will change senna to qhs  -hold evening dose of miralax   -monitor electrolytes daily   -encourage good PO intake    Acute respiratory insufficiency  Assessment & Plan  Currently requiring 5L NC   Suspect this is 2/2 acute disease process as well as volume overload in setting of CKD    Plan:   -Continue w/diuresis PRN, nephro recommends torsemide 20mg daily   -Aggressive pulmonary hygiene  -Wean O2 for SpO2 > 88%    Bicytopenia  Assessment & Plan  With  anemia/thrombocytopenia since admission to [facility]  Possibly in setting of nutritional deficiency vs CKD  Iron panel, B12, folate levels unremarkable    Plan:  - Holding Plavix/DVT ppx for now in setting of bleeding post procedure  - consider restarting heparin DVT proph.   - Trend Hgb/Plt and transfuse for Hgb < 7 and Plt < 20,000    Dysphagia  Assessment & Plan  Pt presented with pharyngeal dysphagia   Speech was consulted at [place] [facility] [place] with dysphagia diets ordered    Plan:  -Speech therapy consulted, appreciate recs   -Dysphagia 3 diet with thin liquids    COPD (chronic obstructive pulmonary disease) (HCC)  Assessment & Plan  PFTs from 02/2024 revealed FEV1 61% - moderate obstruction  Follows with Pulmonology - recently started on Stiolto and PRN Albuterol inhalers as this is newer diagnosis    Plan:  -Continue Xopenex/Atrovent/Pulmicort/Performist nebs  -Continue steroid taper for possible COPD exacerbation that was started at [facility]  -Aggressive pulmonary hygiene    Renovascular hypertension  Assessment & Plan  Previously taking ACE-I and CCB as outpatient  Now on Procardia and scheduled Hydralazine - Hydralazine increased to 50 mg Q8H    Plan:  -cont above meds  -PRN Labetalol for SBP > 170    Atrophy of right kidney  Assessment & Plan  In setting of R renal artery stenosis   Nephrology following - patient may ultimately require RRT    Hypercholesterolemia  Assessment & Plan  LDL goal <70 due to intracranial stenosis  Most recent lipid panel with     Plan:  -Recommend repeat in 2-3 months. If it still remains elevated would defer to PCP about addition of Repatha versus Zetia  -Continue atorvastatin 40mg at this time.     Severe protein-calorie malnutrition (HCC)  Assessment & Plan  Body mass index is 18.36 kg/m².      Pt reports disliking the same foods she was given at prior hospitalizations.     Plan:  -cont with nutrition recommendations and dysphagia diet         Disposition:      SUBJECTIVE      Patient seen and examined. No acute events overnight. Pt beginning to be frustrated with length of hospital stay. Understands and agreeable for IR procedure on . Pt denies any chest pain, sob, abdominal pain, n/v, dysuria, frequency, urgency     OBJECTIVE     Vitals:    24 1519 24 2247 24 0728 24 0818   BP: 163/86 162/88 116/67    Pulse: 70 77 78 75   Resp: 18 18 20 18   Temp: 98.5 °F (36.9 °C)  98 °F (36.7 °C)    SpO2: 94% 95% 93% 94%   Weight:       Height:          Temperature:   Temp (24hrs), Av.3 °F (36.8 °C), Min:98 °F (36.7 °C), Max:98.5 °F (36.9 °C)    Temperature: 98 °F (36.7 °C)  Intake & Output:  I/O          07 07 07 07 0701   0700    P.O. 120 636     Total Intake(mL/kg) 120 (2.4) 636 (12.8)     Urine (mL/kg/hr)       Stool       Total Output       Net +120 +636            Unmeasured Urine Occurrence 2 x 5 x     Unmeasured Stool Occurrence 1 x 1 x           Weights:   IBW (Ideal Body Weight): 45.5 kg    Body mass index is 21.31 kg/m².  Weight (last 2 days)       Date/Time Weight    24 07:34:50 49.5 (109.13)    24 0829 49.5 (109.13)          Physical Exam  Vitals and nursing note reviewed.   Constitutional:       General: She is not in acute distress.     Appearance: She is well-developed.   HENT:      Head: Normocephalic and atraumatic.   Eyes:      Conjunctiva/sclera: Conjunctivae normal.   Cardiovascular:      Rate and Rhythm: Normal rate and regular rhythm.      Heart sounds: No murmur heard.  Pulmonary:      Effort: Pulmonary effort is normal. No respiratory distress.      Breath sounds: Normal breath sounds.   Abdominal:      Palpations: Abdomen is soft.      Tenderness: There is no abdominal tenderness.   Musculoskeletal:         General: No swelling.      Cervical back: Neck supple.      Right lower leg: Edema present.      Left lower leg: Edema present.   Skin:     General: Skin is warm and dry.       Capillary Refill: Capillary refill takes less than 2 seconds.   Neurological:      Mental Status: She is alert.   Psychiatric:         Mood and Affect: Mood normal.       LABORATORY DATA     Labs: I have personally reviewed pertinent reports.  Results from last 7 days   Lab Units 04/06/24 0552 04/05/24 0538 04/04/24  1515 04/04/24  0607 04/03/24  0614 04/02/24  0737   WBC Thousand/uL 7.33 4.78 7.93 5.96 3.66* 8.11   HEMOGLOBIN g/dL 9.4* 9.6* 11.2* 10.1* 9.8* 10.6*   HEMATOCRIT % 28.1* 29.7* 34.5* 30.5* 30.3* 32.4*   PLATELETS Thousands/uL 76* 78* 99* 81* 66* 75*   NEUTROS PCT %  --   --   --  79* 77* 81*   MONOS PCT %  --   --   --  9 9 8   EOS PCT %  --   --   --  0 0 0      Results from last 7 days   Lab Units 04/08/24  0444 04/07/24 0438 04/06/24 0552 04/05/24  1838 04/05/24  0538   POTASSIUM mmol/L 3.7 3.6 3.9   < > 4.2   CHLORIDE mmol/L 104 105 105   < > 107   CO2 mmol/L 25 23 23   < > 22   BUN mg/dL 33* 39* 41*   < > 44*   CREATININE mg/dL 1.42* 1.52* 1.48*   < > 1.45*   CALCIUM mg/dL 7.1* 7.4* 7.8*   < > 7.6*   ALK PHOS U/L  --   --   --   --  40   ALT U/L  --   --   --   --  14   AST U/L  --   --   --   --  12*    < > = values in this interval not displayed.     Results from last 7 days   Lab Units 04/07/24 0438 04/06/24 0552 04/05/24  1838   MAGNESIUM mg/dL 1.7* 1.9 2.1     Results from last 7 days   Lab Units 04/06/24 0552 04/05/24  0538 04/04/24  1515   PHOSPHORUS mg/dL 3.4 3.3 3.1      Results from last 7 days   Lab Units 04/04/24  1515   INR  1.10   PTT seconds 26               IMAGING & DIAGNOSTIC TESTING     Radiology Results: I have personally reviewed pertinent reports.  No results found.  Other Diagnostic Testing: I have personally reviewed pertinent reports.    ACTIVE MEDICATIONS     Current Facility-Administered Medications   Medication Dose Route Frequency    acetaminophen (TYLENOL) tablet 650 mg  650 mg Oral Q4H PRN    albuterol inhalation solution 2.5 mg  2.5 mg Nebulization Q6H PRN     "ALPRAZolam (XANAX) tablet 0.5 mg  0.5 mg Oral TID PRN    aluminum-magnesium hydroxide-simethicone (MAALOX) oral suspension 30 mL  30 mL Oral Q4H PRN    atorvastatin (LIPITOR) tablet 40 mg  40 mg Oral Daily With Dinner    bisacodyl (DULCOLAX) rectal suppository 10 mg  10 mg Rectal Daily    famotidine (PEPCID) tablet 10 mg  10 mg Oral Daily    insulin lispro (HumALOG/ADMELOG) 100 units/mL subcutaneous injection 1-5 Units  1-5 Units Subcutaneous 4x Daily (AC & HS)    ipratropium (ATROVENT) 0.02 % inhalation solution 0.5 mg  0.5 mg Nebulization Q6H PRN    labetalol (NORMODYNE) injection 10 mg  10 mg Intravenous Q6H PRN    NIFEdipine (PROCARDIA XL) 24 hr tablet 60 mg  60 mg Oral Daily    nystatin (MYCOSTATIN) oral suspension 500,000 Units  500,000 Units Swish & Swallow 4x Daily    ondansetron (ZOFRAN) injection 4 mg  4 mg Intravenous Q6H PRN    polyethylene glycol (MIRALAX) packet 17 g  17 g Oral BID    predniSONE tablet 10 mg  10 mg Oral Daily    senna (SENOKOT) tablet 8.6 mg  1 tablet Oral HS    sodium chloride (OCEAN) 0.65 % nasal spray 1 spray  1 spray Each Nare Q1H PRN    torsemide (DEMADEX) tablet 20 mg  20 mg Oral Daily       VTE Pharmacologic Prophylaxis: Reason for no pharmacologic prophylaxis currently held due to procedural site bleeding  VTE Mechanical Prophylaxis: sequential compression device    Portions of the record may have been created with voice recognition software.  Occasional wrong word or \"sound a like\" substitutions may have occurred due to the inherent limitations of voice recognition software.  Read the chart carefully and recognize, using context, where substitutions have occurred.  ==  Ivan Kelly MD  [facility]  Internal Medicine Residency PGY-1      "

## 2024-04-08 NOTE — CASE MANAGEMENT
Case Management Discharge Planning Note    Patient name Laya Brooks  Location [facility] 828/[facility] 828-01 MRN [id number]  : 1945 Date 2024       Current Admission Date: 2024  Current Admission Diagnosis:Acute renal failure superimposed on stage 3a chronic kidney disease (HCC)   Patient Active Problem List    Diagnosis Date Noted    Left upper extremity swelling 2024    Constipation 2024    Bleeding at insertion site 2024    Acute respiratory insufficiency 2024    Bicytopenia 2024    Goals of care, counseling/discussion 2024    Oral candidiasis 2024    Dysphagia 2024    Renal artery stenosis (HCC) 2024    Elevated d-dimer 2024    Acute respiratory failure with hypoxia ([facility]) 2024    Toxic metabolic encephalopathy 2024    Acute renal failure superimposed on stage 3a chronic kidney disease (HCC) 2024    Acute diastolic congestive heart failure ([facility]) 2024    Hyponatremia 2024    COPD (chronic obstructive pulmonary disease) ([facility]) 2024    Tobacco use 2024    Wheezing 2024    S/P right coronary artery (RCA) stent placement 2024    Celiac artery stenosis ([facility]) 2023    CAD (coronary artery disease) 08/10/2022    Weight loss, non-intentional 2022    Hypervitaminosis D 2022    Renovascular hypertension 2022    Intracranial atherosclerosis 2022    Atrophy of right kidney 2022    Stage 3a chronic kidney disease (HCC) 2022    Renal vascular disease 2022    Shortness of breath 2022    Abnormal echocardiogram 2022    CVA (cerebral vascular accident) ([facility]) 2022    Thrombocytopenia ([facility]) 2022    Leukopenia 2022    Neutropenia ([facility]) 2022    Hyperphosphatemia 2022    Hypercholesterolemia 2022    Carotid artery stenosis 2022    Nocturnal hypoxia 2022    History of CVA (cerebrovascular accident) 2022     Hypertensive emergency 03/07/2022    Severe protein-calorie malnutrition (HCC) 03/07/2022    Premature atrial complexes 03/07/2022    Dyslipidemia 09/21/2015      LOS (days): 3  Geometric Mean LOS (GMLOS) (days): 3.1  Days to GMLOS:0.3     OBJECTIVE:  Risk of Unplanned Readmission Score: 24.86         Current admission status: Inpatient   Preferred Pharmacy:   [id number]  [address] [address]NAL - [address]  [address]  [facility] [address]  Phone: [phone] Fax: [phone]    [id number] - [place], [place] - [address]  [address] [address]  Phone: [phone] Fax: [phone]    [id number] - [place] - [address]  [address] [address]  Phone: [phone] Fax: [phone]    Primary Care Provider: Joana Shields DO    Primary Insurance: MEDICARE  Secondary Insurance: [facility]    DISCHARGE DETAILS:        Pt transfer from [facility]. CM introduced self and role to patient at bedside.  Reviewed previous CM open from [facility] and d/c plan for pt to go to [facility] for [facility]. Patient reports she is unsure at this time if she wants to go to [facility], as she has been in the hospital for three weeks, and at this time would prefer to return home.  Pending IR procedure.   Will follow for d/c recs.

## 2024-04-08 NOTE — PLAN OF CARE
Problem: Prexisting or High Potential for Compromised Skin Integrity  Goal: Skin integrity is maintained or improved  Description: INTERVENTIONS:  - Identify patients at risk for skin breakdown  - Assess and monitor skin integrity  - Assess and monitor nutrition and hydration status  - Monitor labs   - Assess for incontinence   - Turn and reposition patient  - Assist with mobility/ambulation  - Relieve pressure over bony prominences  - Avoid friction and shearing  - Provide appropriate hygiene as needed including keeping skin clean and dry  - Evaluate need for skin moisturizer/barrier cream  - Collaborate with interdisciplinary team   - Patient/family teaching  - Consider wound care consult   Outcome: Progressing     Problem: PAIN - ADULT  Goal: Verbalizes/displays adequate comfort level or baseline comfort level  Description: Interventions:  - Encourage patient to monitor pain and request assistance  - Assess pain using appropriate pain scale  - Administer analgesics based on type and severity of pain and evaluate response  - Implement non-pharmacological measures as appropriate and evaluate response  - Consider cultural and social influences on pain and pain management  - Notify physician/advanced practitioner if interventions unsuccessful or patient reports new pain  Outcome: Progressing     Problem: INFECTION - ADULT  Goal: Absence or prevention of progression during hospitalization  Description: INTERVENTIONS:  - Assess and monitor for signs and symptoms of infection  - Monitor lab/diagnostic results  - Monitor all insertion sites, i.e. indwelling lines, tubes, and drains  - Monitor endotracheal if appropriate and nasal secretions for changes in amount and color  - Palmer appropriate cooling/warming therapies per order  - Administer medications as ordered  - Instruct and encourage patient and family to use good hand hygiene technique  - Identify and instruct in appropriate isolation precautions for  identified infection/condition  Outcome: Progressing  Goal: Absence of fever/infection during neutropenic period  Description: INTERVENTIONS:  - Monitor WBC    Outcome: Progressing

## 2024-04-08 NOTE — RESPIRATORY THERAPY NOTE
04/08/24 0818   Respiratory Assessment   Assessment Type Assess only   General Appearance Alert;Awake   Respiratory Pattern Normal   Chest Assessment Chest expansion symmetrical   Bilateral Breath Sounds Coarse   Resp Comments Bilateral BS coarse,no distress ntoed. 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

## 2024-04-08 NOTE — QUICK NOTE
Progress Note - Palliative & Supportive Care       4/8/2024 8:53 AM -  Laya Brooks's chart and case were reviewed by Leigh Brown DO.  Mode of review included electronic chart check.    Per review, symptoms remain controlled on current regimen and no changes are made at this time.  Please continue the regimen in place, and review our last note for details.  For dispo plan, please review Case Management notes.       Palliative care will return on 4/9/24.         For urgent issues or any questions/concerns, please notify on-call provider via [facility].  You may also call our answering service 24/7 at [phone].    Leigh Brown DO  Palliative and Supportive Care  Clinic/Answering Service: [phone]  You can find me on [facility]!

## 2024-04-09 ENCOUNTER — APPOINTMENT (INPATIENT)
Dept: RADIOLOGY | Facility: HOSPITAL | Age: 79
DRG: 981 | End: 2024-04-09
Attending: RADIOLOGY
Payer: MEDICARE

## 2024-04-09 LAB
ANION GAP SERPL CALCULATED.3IONS-SCNC: 10 MMOL/L (ref 4–13)
BUN SERPL-MCNC: 30 MG/DL (ref 5–25)
CALCIUM SERPL-MCNC: 7.2 MG/DL (ref 8.4–10.2)
CHLORIDE SERPL-SCNC: 105 MMOL/L (ref 96–108)
CO2 SERPL-SCNC: 23 MMOL/L (ref 21–32)
CREAT SERPL-MCNC: 1.45 MG/DL (ref 0.6–1.3)
ERYTHROCYTE [DISTWIDTH] IN BLOOD BY AUTOMATED COUNT: 14.4 % (ref 11.6–15.1)
GFR SERPL CREATININE-BSD FRML MDRD: 33 ML/MIN/1.73SQ M
GLUCOSE SERPL-MCNC: 105 MG/DL (ref 65–140)
GLUCOSE SERPL-MCNC: 125 MG/DL (ref 65–140)
GLUCOSE SERPL-MCNC: 172 MG/DL (ref 65–140)
GLUCOSE SERPL-MCNC: 91 MG/DL (ref 65–140)
GLUCOSE SERPL-MCNC: 95 MG/DL (ref 65–140)
HCT VFR BLD AUTO: 26.3 % (ref 34.8–46.1)
HGB BLD-MCNC: 8.8 G/DL (ref 11.5–15.4)
MCH RBC QN AUTO: 30 PG (ref 26.8–34.3)
MCHC RBC AUTO-ENTMCNC: 33.5 G/DL (ref 31.4–37.4)
MCV RBC AUTO: 90 FL (ref 82–98)
PLATELET # BLD AUTO: 52 THOUSANDS/UL (ref 149–390)
PMV BLD AUTO: 11 FL (ref 8.9–12.7)
POTASSIUM SERPL-SCNC: 3.8 MMOL/L (ref 3.5–5.3)
RBC # BLD AUTO: 2.93 MILLION/UL (ref 3.81–5.12)
SODIUM SERPL-SCNC: 138 MMOL/L (ref 135–147)
WBC # BLD AUTO: 4.2 THOUSAND/UL (ref 4.31–10.16)

## 2024-04-09 PROCEDURE — 99232 SBSQ HOSP IP/OBS MODERATE 35: CPT | Performed by: STUDENT IN AN ORGANIZED HEALTH CARE EDUCATION/TRAINING PROGRAM

## 2024-04-09 PROCEDURE — 99152 MOD SED SAME PHYS/QHP 5/>YRS: CPT

## 2024-04-09 PROCEDURE — 76937 US GUIDE VASCULAR ACCESS: CPT

## 2024-04-09 PROCEDURE — C1769 GUIDE WIRE: HCPCS

## 2024-04-09 PROCEDURE — 97535 SELF CARE MNGMENT TRAINING: CPT

## 2024-04-09 PROCEDURE — 99153 MOD SED SAME PHYS/QHP EA: CPT

## 2024-04-09 PROCEDURE — NC001 PR NO CHARGE: Performed by: RADIOLOGY

## 2024-04-09 PROCEDURE — 04HA3DZ INSERTION OF INTRALUMINAL DEVICE INTO LEFT RENAL ARTERY, PERCUTANEOUS APPROACH: ICD-10-PCS | Performed by: RADIOLOGY

## 2024-04-09 PROCEDURE — C1887 CATHETER, GUIDING: HCPCS

## 2024-04-09 PROCEDURE — 37236 OPEN/PERQ PLACE STENT 1ST: CPT | Performed by: RADIOLOGY

## 2024-04-09 PROCEDURE — C1725 CATH, TRANSLUMIN NON-LASER: HCPCS

## 2024-04-09 PROCEDURE — 76937 US GUIDE VASCULAR ACCESS: CPT | Performed by: RADIOLOGY

## 2024-04-09 PROCEDURE — C1894 INTRO/SHEATH, NON-LASER: HCPCS

## 2024-04-09 PROCEDURE — 36245 INS CATH ABD/L-EXT ART 1ST: CPT | Performed by: RADIOLOGY

## 2024-04-09 PROCEDURE — C1876 STENT, NON-COA/NON-COV W/DEL: HCPCS

## 2024-04-09 PROCEDURE — 94664 DEMO&/EVAL PT USE INHALER: CPT

## 2024-04-09 PROCEDURE — 82948 REAGENT STRIP/BLOOD GLUCOSE: CPT

## 2024-04-09 PROCEDURE — 85027 COMPLETE CBC AUTOMATED: CPT

## 2024-04-09 PROCEDURE — 99232 SBSQ HOSP IP/OBS MODERATE 35: CPT | Performed by: INTERNAL MEDICINE

## 2024-04-09 PROCEDURE — 80048 BASIC METABOLIC PNL TOTAL CA: CPT

## 2024-04-09 RX ORDER — IODIXANOL 320 MG/ML
200 INJECTION, SOLUTION INTRAVASCULAR
Status: COMPLETED | OUTPATIENT
Start: 2024-04-09 | End: 2024-04-09

## 2024-04-09 RX ORDER — CLOPIDOGREL BISULFATE 75 MG/1
75 TABLET ORAL DAILY
Status: DISCONTINUED | OUTPATIENT
Start: 2024-04-10 | End: 2024-04-10 | Stop reason: HOSPADM

## 2024-04-09 RX ORDER — POLYETHYLENE GLYCOL 3350 17 G/17G
17 POWDER, FOR SOLUTION ORAL DAILY PRN
Status: DISCONTINUED | OUTPATIENT
Start: 2024-04-09 | End: 2024-04-10 | Stop reason: HOSPADM

## 2024-04-09 RX ORDER — CLOPIDOGREL BISULFATE 75 MG/1
75 TABLET ORAL DAILY
Qty: 90 TABLET | Refills: 0 | Status: SHIPPED | OUTPATIENT
Start: 2024-04-09

## 2024-04-09 RX ORDER — NIFEDIPINE 30 MG/1
90 TABLET, EXTENDED RELEASE ORAL DAILY
Status: DISCONTINUED | OUTPATIENT
Start: 2024-04-10 | End: 2024-04-10 | Stop reason: HOSPADM

## 2024-04-09 RX ORDER — ASPIRIN 81 MG/1
81 TABLET, CHEWABLE ORAL DAILY
Status: DISCONTINUED | OUTPATIENT
Start: 2024-04-10 | End: 2024-04-10 | Stop reason: HOSPADM

## 2024-04-09 RX ORDER — LIDOCAINE WITH 8.4% SOD BICARB 0.9%(10ML)
SYRINGE (ML) INJECTION AS NEEDED
Status: COMPLETED | OUTPATIENT
Start: 2024-04-09 | End: 2024-04-09

## 2024-04-09 RX ORDER — ASPIRIN 81 MG/1
81 TABLET, CHEWABLE ORAL DAILY
Qty: 90 TABLET | Refills: 3 | Status: SHIPPED | OUTPATIENT
Start: 2024-04-09

## 2024-04-09 RX ORDER — HEPARIN SODIUM 1000 [USP'U]/ML
INJECTION, SOLUTION INTRAVENOUS; SUBCUTANEOUS AS NEEDED
Status: COMPLETED | OUTPATIENT
Start: 2024-04-09 | End: 2024-04-09

## 2024-04-09 RX ORDER — BISACODYL 10 MG
10 SUPPOSITORY, RECTAL RECTAL DAILY PRN
Status: DISCONTINUED | OUTPATIENT
Start: 2024-04-09 | End: 2024-04-10 | Stop reason: HOSPADM

## 2024-04-09 RX ORDER — NITROGLYCERIN 20 MG/100ML
INJECTION INTRAVENOUS AS NEEDED
Status: COMPLETED | OUTPATIENT
Start: 2024-04-09 | End: 2024-04-09

## 2024-04-09 RX ORDER — FENTANYL CITRATE 50 UG/ML
INJECTION, SOLUTION INTRAMUSCULAR; INTRAVENOUS AS NEEDED
Status: COMPLETED | OUTPATIENT
Start: 2024-04-09 | End: 2024-04-09

## 2024-04-09 RX ORDER — VERAPAMIL HYDROCHLORIDE 2.5 MG/ML
INJECTION, SOLUTION INTRAVENOUS AS NEEDED
Status: COMPLETED | OUTPATIENT
Start: 2024-04-09 | End: 2024-04-09

## 2024-04-09 RX ORDER — MIDAZOLAM HYDROCHLORIDE 2 MG/2ML
INJECTION, SOLUTION INTRAMUSCULAR; INTRAVENOUS AS NEEDED
Status: COMPLETED | OUTPATIENT
Start: 2024-04-09 | End: 2024-04-09

## 2024-04-09 RX ADMIN — NYSTATIN 500000 UNITS: 100000 SUSPENSION ORAL at 21:30

## 2024-04-09 RX ADMIN — IODIXANOL 150 ML: 320 INJECTION, SOLUTION INTRAVASCULAR at 16:33

## 2024-04-09 RX ADMIN — Medication 200 MCG: at 16:06

## 2024-04-09 RX ADMIN — MIDAZOLAM 0.5 MG: 1 INJECTION INTRAMUSCULAR; INTRAVENOUS at 14:40

## 2024-04-09 RX ADMIN — FAMOTIDINE 10 MG: 20 TABLET, FILM COATED ORAL at 08:11

## 2024-04-09 RX ADMIN — Medication 10 ML: at 14:42

## 2024-04-09 RX ADMIN — LABETALOL HYDROCHLORIDE 10 MG: 5 INJECTION, SOLUTION INTRAVENOUS at 08:11

## 2024-04-09 RX ADMIN — INSULIN LISPRO 1 UNITS: 100 INJECTION, SOLUTION INTRAVENOUS; SUBCUTANEOUS at 21:30

## 2024-04-09 RX ADMIN — TORSEMIDE 20 MG: 20 TABLET ORAL at 08:11

## 2024-04-09 RX ADMIN — FENTANYL CITRATE 25 MCG: 50 INJECTION INTRAMUSCULAR; INTRAVENOUS at 16:00

## 2024-04-09 RX ADMIN — FENTANYL CITRATE 25 MCG: 50 INJECTION INTRAMUSCULAR; INTRAVENOUS at 15:43

## 2024-04-09 RX ADMIN — NIFEDIPINE 60 MG: 30 TABLET, FILM COATED, EXTENDED RELEASE ORAL at 08:11

## 2024-04-09 RX ADMIN — MIDAZOLAM 0.5 MG: 1 INJECTION INTRAMUSCULAR; INTRAVENOUS at 15:30

## 2024-04-09 RX ADMIN — VERAPAMIL HYDROCHLORIDE 2.5 MG: 2.5 INJECTION INTRAVENOUS at 14:44

## 2024-04-09 RX ADMIN — NYSTATIN 500000 UNITS: 100000 SUSPENSION ORAL at 11:44

## 2024-04-09 RX ADMIN — FENTANYL CITRATE 25 MCG: 50 INJECTION INTRAMUSCULAR; INTRAVENOUS at 14:31

## 2024-04-09 RX ADMIN — NYSTATIN 500000 UNITS: 100000 SUSPENSION ORAL at 08:11

## 2024-04-09 RX ADMIN — HEPARIN SODIUM 2000 UNITS: 1000 INJECTION INTRAVENOUS; SUBCUTANEOUS at 15:23

## 2024-04-09 RX ADMIN — NYSTATIN 500000 UNITS: 100000 SUSPENSION ORAL at 17:23

## 2024-04-09 RX ADMIN — MIDAZOLAM 0.5 MG: 1 INJECTION INTRAMUSCULAR; INTRAVENOUS at 15:54

## 2024-04-09 RX ADMIN — FENTANYL CITRATE 25 MCG: 50 INJECTION INTRAMUSCULAR; INTRAVENOUS at 14:40

## 2024-04-09 RX ADMIN — ATORVASTATIN CALCIUM 40 MG: 40 TABLET, FILM COATED ORAL at 17:34

## 2024-04-09 RX ADMIN — MIDAZOLAM 0.5 MG: 1 INJECTION INTRAMUSCULAR; INTRAVENOUS at 14:31

## 2024-04-09 RX ADMIN — Medication 200 MCG: at 14:44

## 2024-04-09 RX ADMIN — HEPARIN SODIUM 2500 UNITS: 1000 INJECTION INTRAVENOUS; SUBCUTANEOUS at 14:44

## 2024-04-09 NOTE — OCCUPATIONAL THERAPY NOTE
Occupational Therapy Progress Note     Patient Name: Laya Brooks  Today's Date: 4/9/2024  Problem List  Principal Problem:    Acute renal failure superimposed on stage 3a chronic kidney disease (HCC)  Active Problems:    Severe protein-calorie malnutrition (HCC)    Hypercholesterolemia    Atrophy of right kidney    Renovascular hypertension    CAD (coronary artery disease)    COPD (chronic obstructive pulmonary disease) (HCC)    Dysphagia    Renal artery stenosis (HCC)    Goals of care, counseling/discussion    Bicytopenia    Bleeding at insertion site    Acute respiratory insufficiency    Constipation    Left upper extremity swelling            04/09/24 1208   OT Last Visit   OT Visit Date 04/09/24   Note Type   Note Type Treatment   Pain Assessment   Pain Assessment Tool 0-10   Pain Score No Pain   Pain Location/Orientation Orientation: Left;Location: Back;Location: Buttocks;Location: Hip   Pain Onset/Description Descriptor: Discomfort   Patient's Stated Pain Goal No pain   Hospital Pain Intervention(s) Repositioned;Ambulation/increased activity;Emotional support   Restrictions/Precautions   Weight Bearing Precautions Per Order No   Other Precautions Cognitive;Chair Alarm;Bed Alarm;Multiple lines;Fall Risk;Pain   Lifestyle   Autonomy Pt lives in  1 story home c  + @ baseline, performs ADLs  I'ly, IADLs with A + fxl mobility I'ly with RW.   Reciprocal Relationships Pt reports having supportive spouse that can A as needed upon D/C   Service to Others Pt is retired   Intrinsic Gratification Desperately wants to take a shower   ADL   Where Assessed Chair   Grooming Assistance 5  Supervision/Setup   Grooming Deficit Setup;Steadying;Supervision/safety;Brushing hair   Grooming Comments brushing hair standing at sink   UB Dressing Assistance 4  Minimal Assistance   UB Dressing Deficit Pull around back   UB Dressing Comments gown management   LB Dressing Assistance 4  Minimal Assistance   LB Dressing Deficit  "Don/doff R sock;Don/doff L sock   Toileting Assistance  5  Supervision/Setup   Toileting Deficit Clothing management up;Clothing management down;Perineal hygiene   Bed Mobility   Supine to Sit Unable to assess   Sit to Supine Unable to assess   Additional Comments Pt OOB in recliner pre/post session, all needs met, call bell within reach.   Transfers   Sit to Stand 4  Minimal assistance   Additional items Assist x 1;Increased time required;Verbal cues   Stand to Sit 4  Minimal assistance   Additional items Assist x 1;Increased time required;Verbal cues   Additional Comments RW in stance   Functional Mobility   Functional Mobility 5  Supervision   Additional Comments short household distance, RW   Additional items Rolling walker   Subjective   Subjective \"I just want to take a hot shower\"   Cognition   Overall Cognitive Status WFL   Arousal/Participation Alert;Cooperative   Attention Attends with cues to redirect   Orientation Level Oriented X4   Memory Decreased recall of precautions;Decreased recall of recent events   Following Commands Follows one step commands without difficulty   Comments Pt pleasant and cooperative, motivated to participate. Pt with mildly limited insight to her deficits, requring VCs for safety awareness. With discussion of deficits and decreased strength, pt more agreeable to rehab.   Activity Tolerance   Activity Tolerance Patient limited by fatigue   Medical Staff Made Aware RN cleared for therapy   Assessment   Assessment Patient participated in Skilled OT session this date with interventions consisting of ADL re training with the use of correct body mechnaics, Energy Conservation techniques, safety awareness and fall prevention techniques,  therapeutic activities to: increase activity tolerance, and increase OOB/ sitting tolerance . Patient agreeable to OT treatment session, upon arrival patient was found seated OOB to Chair, alert, responsive , and sleepy.  In comparison to previous " session, patient with improvements in ADL completion, functional mobility. Patient requiring verbal cues for safety, frequent rest periods, and ocassional safety reminders. Pt with mildly limited insight to deficits, with VCs for safety awareness. Pt completed functional STS txfs and mobility with min Ax1, RW in stance. SUP for grooming to brush hair standing at sink. SUP for toileting and john hygiene. Min A for UB dressing and gown management. Min A for LB dressing to don/doff socks. Patient continues to be functioning below baseline level, occupational performance remains limited secondary to factors listed above and increased risk for falls and injury. The patient's raw score on the AM-PAC Daily Activity Inpatient Short Form is 18. A raw score of less than 19 suggests the patient may benefit from discharge to post-acute rehabilitation services. Please refer to the recommendation of the Occupational Therapist for safe discharge planning. From OT standpoint, recommendation at time of d/c would be Level 2 resources.  Patient to benefit from continued Occupational Therapy treatment while in the hospital to address deficits as defined above and maximize level of functional independence with ADLs and functional mobility.   Plan   Treatment Interventions ADL retraining;Functional transfer training;Endurance training;Cognitive reorientation;Patient/family training;Equipment evaluation/education;Compensatory technique education;Continued evaluation;Energy conservation;Activityengagement   Goal Expiration Date 04/16/24   OT Treatment Day 1   OT Frequency 2-3x/wk   Discharge Recommendation   Rehab Resource Intensity Level, OT II (Moderate Resource Intensity)   AM-PAC Daily Activity Inpatient   Lower Body Dressing 3   Bathing 3   Toileting 3   Upper Body Dressing 3   Grooming 3   Eating 3   Daily Activity Raw Score 18   Daily Activity Standardized Score (Calc for Raw Score >=11) 38.66   AM-PAC Applied Cognition Inpatient    Following a Speech/Presentation 3   Understanding Ordinary Conversation 4   Taking Medications 3   Remembering Where Things Are Placed or Put Away 3   Remembering List of 4-5 Errands 2   Taking Care of Complicated Tasks 2   Applied Cognition Raw Score 17   Applied Cognition Standardized Score 36.52   End of Consult   Education Provided Yes   Patient Position at End of Consult Bedside chair;Bed/Chair alarm activated;All needs within reach   Nurse Communication Nurse aware of consult       Farooq Velasquez MS, OTR/L

## 2024-04-09 NOTE — PROGRESS NOTES
NEPHROLOGY PROGRESS NOTE   Laya Brooks 79 y.o. female MRN: [id number]  Unit/Bed#: [facility] 828-01 Encounter: [id number]  Reason for Consult: SEEMA    ASSESSMENT AND PLAN:  78 yo woman with PMH of COPD, CAD, STEMI status post RCA stenting 2008, CVA, CKD G3, hyperlipidemia p/w shortness of breath.  Patient required hemodialysis on this admission.  Nephrology is consulted for management of SEEMA    PLAN:    #Non-Oliguric KDIGO SEEMA stage 3 required dialysis, with evidence of kidney recovery  Etiology: Likely secondary to ATN in the settings of contrast associated nephropathy, and hemodynamic changes in the settings of renal artery stenosis  Baseline creatinine: 0.91 -1 mg/L  Current creatinine: Plateauing at 1.4 mg/dL  Peak creatinine: 6.27 mg/dL  Patient required dialysis  Off Hd since 03/29  UA: No hematuria, no leukocyturia  Renal imaging : Right kidney atrophy with multiple cortical cysts.  Left kidney with no hydronephrosis  Treatment:  No indication of dialysis at this time  Maintain MAP:  Over 65 mmHg if possible/avoid hypoperfusion:  Hold parameters on blood pressure medications  Avoid nephrotoxic agents such as NSAIDs, and IV contrast if possible. Avoid opioids   Adjust medications to GFR      #Acid-base Disorder  serum HCO3 23 mmol/L  At goal    #Volume status/hypertension:  Volume: Hypervolemic  Blood pressure: Hypertensive, /79, goal less than 140/90  Recommend:  Increase nifedipine to 90 mg daily   Continue with torsemide 20 mg daily  Low-sodium diet  Monitor urinary output      #Anemia:  Current hemoglobin:8.8  Treatment:  Transfuse for hemoglobin less than 7.0 per primary service      #SALAZAR  No ACE inhibitors or ARB at this time due to recent SEEMA require dialysis  Nifedipine as above  Torsemide as above  Previous unsuccessful intervention due to large aortic plaque.  Patient is scheduled for left       SUBJECTIVE:  Patient seen and examined at bedside. No chest pain, shortness of breath, nausea, vomiting,  abdominal pain or diarrhea.     OBJECTIVE:  Current Weight: Weight - Scale: 49.9 kg (110 lb)  Vitals:    04/09/24 0751   BP:    Pulse: 75   Resp: 20   Temp:    SpO2: 95%     No intake or output data in the 24 hours ending 04/09/24 1230  Wt Readings from Last 3 Encounters:   04/09/24 49.9 kg (110 lb)   04/05/24 52.6 kg (115 lb 15.4 oz)   04/02/24 44.1 kg (97 lb 3.6 oz)     Temp Readings from Last 3 Encounters:   04/09/24 97.9 °F (36.6 °C)   04/05/24 (!) 97 °F (36.1 °C) (Tympanic)   04/02/24 98.1 °F (36.7 °C) (Tympanic)     BP Readings from Last 3 Encounters:   04/09/24 (!) 182/79   04/05/24 (!) 182/87   04/02/24 167/72     Pulse Readings from Last 3 Encounters:   04/09/24 75   04/05/24 82   04/02/24 83        General:  no acute distress at this time  Skin:  No acute rash  Eyes:  No scleral icterus and noninjected  ENT:  mucous membranes moist  Neck:  no carotid bruits  Chest:  Clear to auscultation percussion, good respiratory effort, no use of accessory respiratory muscles  CVS:  Regular rate and rhythm without rub   Abdomen:  soft and nontender   Extremities: lower extremity edema  Neuro:  No gross focality  Psych:  Alert , cooperative       Medications:    Current Facility-Administered Medications:     acetaminophen (TYLENOL) tablet 650 mg, 650 mg, Oral, Q4H PRN, MANOHAR Maurice    albuterol inhalation solution 2.5 mg, 2.5 mg, Nebulization, Q6H PRN, Marci Khushboo,     ALPRAZolam (XANAX) tablet 0.5 mg, 0.5 mg, Oral, TID PRN, MANOHAR Maurice    aluminum-magnesium hydroxide-simethicone (MAALOX) oral suspension 30 mL, 30 mL, Oral, Q4H PRN, MANOHAR Maurice    atorvastatin (LIPITOR) tablet 40 mg, 40 mg, Oral, Daily With Dinner, MANOHAR Maurice, 40 mg at 04/08/24 1655    bisacodyl (DULCOLAX) rectal suppository 10 mg, 10 mg, Rectal, Daily PRN, Narciso Maguire MD    famotidine (PEPCID) tablet 10 mg, 10 mg, Oral, Daily, MANOHAR Maurice, 10 mg at 04/09/24 0811    insulin  lispro (HumALOG/ADMELOG) 100 units/mL subcutaneous injection 1-5 Units, 1-5 Units, Subcutaneous, 4x Daily (AC & HS), 1 Units at 04/08/24 2146 **AND** Fingerstick Glucose (POCT), , , 4x Daily AC and at bedtime, MANOHAR Maurice    ipratropium (ATROVENT) 0.02 % inhalation solution 0.5 mg, 0.5 mg, Nebulization, Q6H PRN, Marci Cuello DO    labetalol (NORMODYNE) injection 10 mg, 10 mg, Intravenous, Q6H PRN, MANOHAR Maurice, 10 mg at 04/09/24 0811    [START ON 4/10/2024] NIFEdipine (PROCARDIA XL) 24 hr tablet 90 mg, 90 mg, Oral, Daily, Joselyn Reyes Bahamonde, MD    nystatin (MYCOSTATIN) oral suspension 500,000 Units, 500,000 Units, Swish & Swallow, 4x Daily, MANOHAR Maurice, 500,000 Units at 04/09/24 1144    ondansetron (ZOFRAN) injection 4 mg, 4 mg, Intravenous, Q6H PRN, MANOHAR Maurice    polyethylene glycol (MIRALAX) packet 17 g, 17 g, Oral, Daily PRN, Narciso Maguire MD    [COMPLETED] predniSONE tablet 20 mg, 20 mg, Oral, Daily, 20 mg at 04/07/24 0839 **FOLLOWED BY** predniSONE tablet 10 mg, 10 mg, Oral, Daily, MANOHAR Maurice, 10 mg at 04/08/24 0815    senna (SENOKOT) tablet 8.6 mg, 1 tablet, Oral, HS, Melody Hunter, DO    sodium chloride (OCEAN) 0.65 % nasal spray 1 spray, 1 spray, Each Nare, Q1H PRN, MANOHAR Maurice    torsemide (DEMADEX) tablet 20 mg, 20 mg, Oral, Daily, Bong Yusuf DO, 20 mg at 04/09/24 0811    Laboratory Results:  Results from last 7 days   Lab Units 04/09/24  0455 04/08/24  0444 04/07/24  0438 04/06/24  0552 04/05/24  1838 04/05/24  0538 04/04/24  1515 04/04/24  0607 04/03/24  0614   WBC Thousand/uL 4.20*  --   --  7.33  --  4.78 7.93 5.96 3.66*   HEMOGLOBIN g/dL 8.8*  --   --  9.4*  --  9.6* 11.2* 10.1* 9.8*   HEMATOCRIT % 26.3*  --   --  28.1*  --  29.7* 34.5* 30.5* 30.3*   PLATELETS Thousands/uL 52*  --   --  76*  --  78* 99* 81* 66*   SODIUM mmol/L 138 137 138 137 136 136 135 135  --    POTASSIUM mmol/L 3.8 3.7 3.6 3.9 4.2 4.2  "4.6 4.3  --    CHLORIDE mmol/L 105 104 105 105 102 107 106 105  --    CO2 mmol/L 23 25 23 23 22 22 19* 22  --    BUN mg/dL 30* 33* 39* 41* 43* 44* 46* 46*  --    CREATININE mg/dL 1.45* 1.42* 1.52* 1.48* 1.61* 1.45* 1.44* 1.44*  --    CALCIUM mg/dL 7.2* 7.1* 7.4* 7.8* 8.3* 7.6* 7.9* 8.0*  --    MAGNESIUM mg/dL  --   --  1.7* 1.9 2.1 2.3 1.7*  --   --    PHOSPHORUS mg/dL  --   --   --  3.4  --  3.3 3.1  --   --        VAS upper limb venous duplex scan, unilateral/limited   Final Result by Devon Gill MD (04/06 1932)      IR renal angiogram    (Results Pending)       Portions of the record may have been created with voice recognition software. Occasional wrong word or \"sound a like\" substitutions may have occurred due to the inherent limitations of voice recognition software. Read the chart carefully and recognize, using context, where substitutions have occurred.    "

## 2024-04-09 NOTE — PROGRESS NOTES
INTERNAL MEDICINE RESIDENCY PROGRESS NOTE     Name: Laya Brooks   Age & Sex: 79 y.o. female   MRN: [id number]  Unit/Bed#: [facility] 828-01   Encounter: [id number]  Team: SOD Team C     PATIENT INFORMATION     Name: Laya Brooks   Age & Sex: 79 y.o. female   MRN: [id number]  Hospital Stay Days: 4    ASSESSMENT/PLAN     Principal Problem:    Acute renal failure superimposed on stage 3a chronic kidney disease (HCC)  Active Problems:    Renal artery stenosis (HCC)    Bleeding at insertion site    CAD (coronary artery disease)    Severe protein-calorie malnutrition (HCC)    Hypercholesterolemia    Atrophy of right kidney    Renovascular hypertension    COPD (chronic obstructive pulmonary disease) ([facility])    Dysphagia    Goals of care, counseling/discussion    Bicytopenia    Acute respiratory insufficiency    Constipation    Left upper extremity swelling      * Acute renal failure superimposed on stage 3a chronic kidney disease (HCC)  Assessment & Plan  Lab Results   Component Value Date    EGFR 32 04/06/2024    EGFR 29 04/05/2024    EGFR 33 04/05/2024    CREATININE 1.48 (H) 04/06/2024    CREATININE 1.61 (H) 04/05/2024    CREATININE 1.45 (H) 04/05/2024     Creatinine peaked at 6.27 at [facility] - now around 1.4 - 1.5  Baseline creatinine appears to be around 0.9 - 1.1  VAS Renal artery (3/29/2024):  severe left renal artery stenosis at the aorto- renal junction.  Moderate disease more proximally  Has received IHD x3 since admission to [facility] - last treatment was on 03/29 4/4: Received IV Lasix 60 mg x1 yesterday and IV Lasix 80 mg x1 PM w/good response  4/5: Received additional IV Lasix 80mg x1 AM    Ddx: Suspect this is 2/2 significant bilateral renal artery stenosis w/atrophic R kidney     Plan:  -Torsemide 20mg daiy per nephro recommendations  -Nephrology following   -IR consulted, appreciate recs   -will pursue further L renal artery stenting 4/9/2024   - will need PT/OT eval prior to discharge   -Avoid nephrotoxic agents  and fluctuations in BP  -Trend renal indices  -Strict I/O with pure wick  -Daily weights    Renal artery stenosis (HCC)  Assessment & Plan  Imaging from 03/28 revealed severe L renal artery stenosis at aorto-renal junction w/moderate disease   seen more proximally as well  Has known R renal artery stenosis w/subsequent renal atrophy  Now s/p IR L renal artery angiogram (4/4) for revascularization, however was unsuccessful due to large aortic plaque Transferred to [facility] [place] for 2nd attempt at L renal artery revascularization     Plan:  -see above under Acute renal failure superimposed on stage 3a CKD    Bleeding at insertion site  Assessment & Plan  Had bleeding/oozing at sheath site post IR intervention - was reversed with Protamine, sheath pulled, and manual pressure held   4/5: Mild oozing at site     Plan:  -Holding Plavix and DVT ppx for now  -Monitor CBC and INR    CAD (coronary artery disease)  Assessment & Plan  -Prior STEMI in 2008 s/p RCA stenting    Plan:  -Holding home Plavix given bleeding post IR procedure  -Continue home statin  -Off tele     Left upper extremity swelling  Assessment & Plan  Started on 4/6 with fairly significant erythema/edema  Got venous duplex which was unremarkable  As of today, 4/7, L UE edema/erythema has improved and is near resolved    Constipation  Assessment & Plan  Pt went 1.5 weeks without bowel movement  Was started on bowel regimen including miralax bid, senna bid, and dulcolax   Pt now reports abdominal cramping and diarrhea    Plan:  -will change senna to qhs  -hold evening dose of miralax   -monitor electrolytes daily   -encourage good PO intake    Acute respiratory insufficiency  Assessment & Plan  Currently requiring 5L NC   Suspect this is 2/2 acute disease process as well as volume overload in setting of CKD    Plan:   -Continue w/diuresis PRN, nephro recommends torsemide 20mg daily   -Aggressive pulmonary hygiene  -Wean O2 for SpO2 > 88%    Bicytopenia  Assessment &  Plan  With anemia/thrombocytopenia since admission to [facility]  Possibly in setting of nutritional deficiency vs CKD  Iron panel, B12, folate levels unremarkable    Plan:  - Holding Plavix/DVT ppx for now in setting of bleeding post procedure  - consider restarting heparin DVT proph.   - Trend Hgb/Plt and transfuse for Hgb < 7 and Plt < 20,000    Dysphagia  Assessment & Plan  Pt presented with pharyngeal dysphagia   Speech was consulted at [facility] with dysphagia diets ordered    Plan:  -Speech therapy consulted, appreciate recs   -Dysphagia 3 diet with thin liquids    COPD (chronic obstructive pulmonary disease) (HCC)  Assessment & Plan  PFTs from 02/2024 revealed FEV1 61% - moderate obstruction  Follows with Pulmonology - recently started on Stiolto and PRN Albuterol inhalers as this is newer diagnosis    Plan:  -Continue Xopenex/Atrovent/Pulmicort/Performist nebs  -Continue steroid taper for possible COPD exacerbation that was started at [facility]  -Aggressive pulmonary hygiene    Renovascular hypertension  Assessment & Plan  Previously taking ACE-I and CCB as outpatient  Now on Procardia and scheduled Hydralazine - Hydralazine increased to 50 mg Q8H    Plan:  -cont above meds  -PRN Labetalol for SBP > 170    Atrophy of right kidney  Assessment & Plan  In setting of R renal artery stenosis   Nephrology following - patient may ultimately require RRT    Hypercholesterolemia  Assessment & Plan  LDL goal <70 due to intracranial stenosis  Most recent lipid panel with     Plan:  -Recommend repeat in 2-3 months. If it still remains elevated would defer to PCP about addition of Repatha versus Zetia  -Continue atorvastatin 40mg at this time.     Severe protein-calorie malnutrition (HCC)  Assessment & Plan  Body mass index is 18.36 kg/m².      Pt reports disliking the same foods she was given at prior hospitalizations.     Plan:  -cont with nutrition recommendations and dysphagia diet         Disposition: remain  inpatient for IR procedure     SUBJECTIVE     Patient seen and examined. No acute events overnight. Pt is tired, feels like she is decompensating. Denies any chest pain, sob, headaches, dizziness, abdominal pain, n/v, urinating ok     OBJECTIVE     Vitals:    24 0403 24 0600 24 0733 24 0751   BP: 169/64  (!) 182/79    Pulse: 64  72 75   Resp: 18  18 20   Temp: 98.7 °F (37.1 °C)  97.9 °F (36.6 °C)    SpO2: 93%  93% 95%   Weight:  49.9 kg (110 lb)     Height:          Temperature:   Temp (24hrs), Av.4 °F (36.9 °C), Min:97.9 °F (36.6 °C), Max:98.7 °F (37.1 °C)    Temperature: 97.9 °F (36.6 °C)  Intake & Output:  I/O          07 07 07 07 0701  04/10 0700    P.O. 636 240     Total Intake(mL/kg) 636 (12.8) 240 (4.8)     Net +636 +240            Unmeasured Urine Occurrence 5 x 6 x     Unmeasured Stool Occurrence 1 x            Weights:   IBW (Ideal Body Weight): 45.5 kg    Body mass index is 21.48 kg/m².  Weight (last 2 days)       Date/Time Weight    24 0600 49.9 (110)    24 07:34:50 49.5 (109.13)          Physical Exam  LABORATORY DATA     Labs: I have personally reviewed pertinent reports.  Results from last 7 days   Lab Units 24  0455 24  0552 24  0538 24  1515 24  0607 24  0614   WBC Thousand/uL 4.20* 7.33 4.78   < > 5.96 3.66*   HEMOGLOBIN g/dL 8.8* 9.4* 9.6*   < > 10.1* 9.8*   HEMATOCRIT % 26.3* 28.1* 29.7*   < > 30.5* 30.3*   PLATELETS Thousands/uL 52* 76* 78*   < > 81* 66*   SEGS PCT %  --   --   --   --  79* 77*   MONO PCT %  --   --   --   --  9 9   EOS PCT %  --   --   --   --  0 0    < > = values in this interval not displayed.      Results from last 7 days   Lab Units 24  0455 24  0444 24  0438 24  1838 24  0538   POTASSIUM mmol/L 3.8 3.7 3.6   < > 4.2   CHLORIDE mmol/L 105 104 105   < > 107   CO2 mmol/L 23 25 23   < > 22   BUN mg/dL 30* 33* 39*   < > 44*    CREATININE mg/dL 1.45* 1.42* 1.52*   < > 1.45*   CALCIUM mg/dL 7.2* 7.1* 7.4*   < > 7.6*   ALK PHOS U/L  --   --   --   --  40   ALT U/L  --   --   --   --  14   AST U/L  --   --   --   --  12*    < > = values in this interval not displayed.     Results from last 7 days   Lab Units 04/07/24  0438 04/06/24  0552 04/05/24  1838   MAGNESIUM mg/dL 1.7* 1.9 2.1     Results from last 7 days   Lab Units 04/06/24  0552 04/05/24  0538 04/04/24  1515   PHOSPHORUS mg/dL 3.4 3.3 3.1      Results from last 7 days   Lab Units 04/04/24  1515   INR  1.10   PTT seconds 26               IMAGING & DIAGNOSTIC TESTING     Radiology Results: I have personally reviewed pertinent reports.  No results found.  Other Diagnostic Testing: I have personally reviewed pertinent reports.    ACTIVE MEDICATIONS     Current Facility-Administered Medications   Medication Dose Route Frequency    acetaminophen (TYLENOL) tablet 650 mg  650 mg Oral Q4H PRN    albuterol inhalation solution 2.5 mg  2.5 mg Nebulization Q6H PRN    ALPRAZolam (XANAX) tablet 0.5 mg  0.5 mg Oral TID PRN    aluminum-magnesium hydroxide-simethicone (MAALOX) oral suspension 30 mL  30 mL Oral Q4H PRN    atorvastatin (LIPITOR) tablet 40 mg  40 mg Oral Daily With Dinner    bisacodyl (DULCOLAX) rectal suppository 10 mg  10 mg Rectal Daily PRN    famotidine (PEPCID) tablet 10 mg  10 mg Oral Daily    insulin lispro (HumALOG/ADMELOG) 100 units/mL subcutaneous injection 1-5 Units  1-5 Units Subcutaneous 4x Daily (AC & HS)    ipratropium (ATROVENT) 0.02 % inhalation solution 0.5 mg  0.5 mg Nebulization Q6H PRN    labetalol (NORMODYNE) injection 10 mg  10 mg Intravenous Q6H PRN    [START ON 4/10/2024] NIFEdipine (PROCARDIA XL) 24 hr tablet 90 mg  90 mg Oral Daily    nystatin (MYCOSTATIN) oral suspension 500,000 Units  500,000 Units Swish & Swallow 4x Daily    ondansetron (ZOFRAN) injection 4 mg  4 mg Intravenous Q6H PRN    polyethylene glycol (MIRALAX) packet 17 g  17 g Oral Daily PRN     "predniSONE tablet 10 mg  10 mg Oral Daily    senna (SENOKOT) tablet 8.6 mg  1 tablet Oral HS    sodium chloride (OCEAN) 0.65 % nasal spray 1 spray  1 spray Each Nare Q1H PRN    torsemide (DEMADEX) tablet 20 mg  20 mg Oral Daily       VTE Pharmacologic Prophylaxis: Reason for no pharmacologic prophylaxis currently being held for post procedure bleeding   VTE Mechanical Prophylaxis: sequential compression device    Portions of the record may have been created with voice recognition software.  Occasional wrong word or \"sound a like\" substitutions may have occurred due to the inherent limitations of voice recognition software.  Read the chart carefully and recognize, using context, where substitutions have occurred.  ==  Ivan Kelly MD  [facility]  Internal Medicine Residency PGY-1      "

## 2024-04-09 NOTE — RESTORATIVE TECHNICIAN NOTE
Restorative Technician Note      Patient Name: Laya Brooks     Restorative Tech Visit Date: 04/09/24  Note Type: Mobility  Patient Position Upon Consult: Supine  Activity Performed: Ambulated  Assistive Device: Standard walker  Education Provided: Yes  Patient Position at End of Consult: Bedside chair; All needs within reach; Bed/Chair alarm activated    Suzan HERNANDEZT, Restorative Technician

## 2024-04-09 NOTE — SEDATION DOCUMENTATION
Renal angiogram completed by Dr. Jackson, patient tolerated procedure well. TR band over left radial access site. 1 hour bedrest start time 1615. Report given to Joe LIANG.

## 2024-04-09 NOTE — DISCHARGE INSTRUCTIONS
ARTERIOGRAM    WHAT YOU SHOULD KNOW:   An angiogram is a procedure to look at arteries in your body. Arteries are the blood vessels that carry blood from your heart to your body.     AFTER YOU LEAVE:     Self-care:   Limit activity: Rest for the remainder of the day of your procedure.Have some one with you until the next morning. Keep your arm or leg straight as much as possible.Rest as much as possible, sitting lying or reclining. Walk only to go to the bathroom, to bed or to eat. If the angiogram catheter was put in your leg, use the stairs as little as possible. No driving for 24-48 hours. No heavy lifting, >10 lbs. Or strenuous activity for 48 hours.    Keep your wound clean and dry.  Remove band aid/ dressing tomorrow. You may shower 24 hours after your procedure. Shower and wash groin area or wrist area gently with soap and water: beginning tomorrow. Rinse and pat Dry. Apply new water seal band aid. Repeat this process for 5 days.  If there is any drainage from the puncture site, you should put on a clean bandage. No Powders, creams, lotions or antibiotic ointments for 5 days.  No tub baths, hot tubs or swimming for 5 days.    Watch for bleeding and bruising: It is normal to have a bruise and soreness where the angiogram catheter went in.  Medication: If your angiogram was performed to treat blockages in your leg arteries, it is strongly recommended that you take both an antiplatelet medication (like aspirin or Plavix) to prevent clotting AND a statin drug (like Lipitor or Crestor), even if you have normal cholesterol. If these drugs are not ordered for you please contact either your Vascular Surgery office or the Interventional Radiology Dept during normal daytime working hours. See Interventional Radiology telephone numbers below.  You Should Have Follow up with the vascular surgeon   call [phone] with questions  Diet:   You may resume your regular diet, Sips of flat soda will help with mild  nausea.  Drink more liquids than usual for the next 24 hours        IMMEDIATELY Contact Interventional Radiology at [phone] if any of the following occur:  If your bruise gets larger or if you notice any active bleeding. APPLY DIRECT PRESSURE TO THE BLEEDING SITE.   If you notice increased swelling or have increased pain at the puncture site   If you have any numbness or pain in the extremity of the puncture site   If that extremity seems cold or pale.    You have fever greater than 101  Persistent nausea or vomitting    Follow up with your primary healthcare provider  as directed: Write down your questions so you remember to ask them during your visits.

## 2024-04-09 NOTE — PLAN OF CARE
Problem: OCCUPATIONAL THERAPY ADULT  Goal: Performs self-care activities at highest level of function for planned discharge setting.  See evaluation for individualized goals.  Description: Treatment Interventions: ADL retraining, Functional transfer training, UE strengthening/ROM, Endurance training, Cognitive reorientation, Patient/family training, Equipment evaluation/education, Fine motor coordination activities, Compensatory technique education, Energy conservation, Activityengagement          See flowsheet documentation for full assessment, interventions and recommendations.   Note: Limitation: Decreased ADL status, Decreased Safe judgement during ADL, Decreased UE strength, Decreased cognition, Decreased endurance, Decreased fine motor control, Decreased high-level ADLs  Prognosis: Fair  Assessment: Patient participated in Skilled OT session this date with interventions consisting of ADL re training with the use of correct body mechnaics, Energy Conservation techniques, safety awareness and fall prevention techniques,  therapeutic activities to: increase activity tolerance, and increase OOB/ sitting tolerance . Patient agreeable to OT treatment session, upon arrival patient was found seated OOB to Chair, alert, responsive , and sleepy.  In comparison to previous session, patient with improvements in ADL completion, functional mobility. Patient requiring verbal cues for safety, frequent rest periods, and ocassional safety reminders. Pt with mildly limited insight to deficits, with VCs for safety awareness. Pt completed functional STS txfs and mobility with min Ax1, RW in stance. SUP for grooming to brush hair standing at sink. SUP for toileting and john hygiene. Min A for UB dressing and gown management. Min A for LB dressing to don/doff socks. Patient continues to be functioning below baseline level, occupational performance remains limited secondary to factors listed above and increased risk for falls and  injury. The patient's raw score on the AM-PAC Daily Activity Inpatient Short Form is 18. A raw score of less than 19 suggests the patient may benefit from discharge to post-acute rehabilitation services. Please refer to the recommendation of the Occupational Therapist for safe discharge planning. From OT standpoint, recommendation at time of d/c would be Level 2 resources.  Patient to benefit from continued Occupational Therapy treatment while in the hospital to address deficits as defined above and maximize level of functional independence with ADLs and functional mobility.     Rehab Resource Intensity Level, OT: II (Moderate Resource Intensity)

## 2024-04-09 NOTE — BRIEF OP NOTE (RAD/CATH)
INTERVENTIONAL RADIOLOGY PROCEDURE NOTE    Date: 4/9/2024    Procedure: IR RENAL ANGIOGRAM     Preoperative diagnosis:   1. Atrophy of right kidney    2. Acute renal failure superimposed on stage 3a chronic kidney disease, unspecified acute renal failure type (HCC)       Postoperative diagnosis: Same.    Surgeon: David Jackson MD     Assistant: None. No qualified resident was available.    Blood loss: minimal    Specimens: none    Findings: Successful left renal artery stenting due to severe proximal stenosis.  Plan:  ASA, plavix for 90 days.    Complications: None immediate.    Anesthesia: conscious sedation

## 2024-04-09 NOTE — PROGRESS NOTES
Progress note - Palliative and Supportive Care   Laya Brooks 79 y.o. female [id number]    Patient Active Problem List   Diagnosis    History of CVA (cerebrovascular accident)    Hypertensive emergency    Severe protein-calorie malnutrition (HCC)    Premature atrial complexes    Thrombocytopenia (HCC)    Leukopenia    Neutropenia (HCC)    Hyperphosphatemia    Hypercholesterolemia    Carotid artery stenosis    Nocturnal hypoxia    Shortness of breath    Abnormal echocardiogram    CVA (cerebral vascular accident) (HCC)    Atrophy of right kidney    Stage 3a chronic kidney disease (HCC)    Renal vascular disease    Renovascular hypertension    Intracranial atherosclerosis    Weight loss, non-intentional    Hypervitaminosis D    CAD (coronary artery disease)    Celiac artery stenosis (HCC)    S/P right coronary artery (RCA) stent placement    Tobacco use    Wheezing    Dyslipidemia    Acute renal failure superimposed on stage 3a chronic kidney disease (HCC)    Acute diastolic congestive heart failure (HCC)    Hyponatremia    COPD (chronic obstructive pulmonary disease) (HCC)    Acute respiratory failure with hypoxia (HCC)    Toxic metabolic encephalopathy    Elevated d-dimer    Dysphagia    Renal artery stenosis (HCC)    Goals of care, counseling/discussion    Oral candidiasis    Bicytopenia    Bleeding at insertion site    Acute respiratory insufficiency    Constipation    Left upper extremity swelling     Active issues specifically addressed today include:   COPD  Renal artery stenosis  CKDIII  Constipation  Palliative Care Patient  Goals      Plan:  1. Symptom management -    - change miralax and bisacodyl UT to PRN.   - Otherwise contineu current care plan    2. Goals - Hopeful to get out of the hospital soon.    NARRATIVE AND INTERVAL HISTORY:       Patient seen OOB in chair. Feels tired today and states she was up and walking which made her feel more fatigued.  Requests to be allowed to sleep.     MEDICATIONS /  ALLERGIES:     all current active meds have been reviewed and current meds:   Current Facility-Administered Medications   Medication Dose Route Frequency    acetaminophen (TYLENOL) tablet 650 mg  650 mg Oral Q4H PRN    albuterol inhalation solution 2.5 mg  2.5 mg Nebulization Q6H PRN    ALPRAZolam (XANAX) tablet 0.5 mg  0.5 mg Oral TID PRN    aluminum-magnesium hydroxide-simethicone (MAALOX) oral suspension 30 mL  30 mL Oral Q4H PRN    atorvastatin (LIPITOR) tablet 40 mg  40 mg Oral Daily With Dinner    bisacodyl (DULCOLAX) rectal suppository 10 mg  10 mg Rectal Daily PRN    famotidine (PEPCID) tablet 10 mg  10 mg Oral Daily    insulin lispro (HumALOG/ADMELOG) 100 units/mL subcutaneous injection 1-5 Units  1-5 Units Subcutaneous 4x Daily (AC & HS)    ipratropium (ATROVENT) 0.02 % inhalation solution 0.5 mg  0.5 mg Nebulization Q6H PRN    labetalol (NORMODYNE) injection 10 mg  10 mg Intravenous Q6H PRN    NIFEdipine (PROCARDIA XL) 24 hr tablet 60 mg  60 mg Oral Daily    nystatin (MYCOSTATIN) oral suspension 500,000 Units  500,000 Units Swish & Swallow 4x Daily    ondansetron (ZOFRAN) injection 4 mg  4 mg Intravenous Q6H PRN    polyethylene glycol (MIRALAX) packet 17 g  17 g Oral Daily PRN    predniSONE tablet 10 mg  10 mg Oral Daily    senna (SENOKOT) tablet 8.6 mg  1 tablet Oral HS    sodium chloride (OCEAN) 0.65 % nasal spray 1 spray  1 spray Each Nare Q1H PRN    torsemide (DEMADEX) tablet 20 mg  20 mg Oral Daily       Allergies   Allergen Reactions    Influenza Virus Vaccine        OBJECTIVE:    Physical Exam  Physical Exam  Vitals and nursing note reviewed.   Constitutional:       General: She is not in acute distress.     Appearance: She is ill-appearing. She is not toxic-appearing or diaphoretic.   HENT:      Head: Atraumatic.      Right Ear: External ear normal.      Left Ear: External ear normal.      Nose: Nose normal.      Mouth/Throat:      Mouth: Mucous membranes are dry.   Eyes:      General:          Right eye: No discharge.         Left eye: No discharge.   Cardiovascular:      Rate and Rhythm: Normal rate.   Pulmonary:      Comments: Tachypneic  Abdominal:      General: There is no distension.   Musculoskeletal:         General: Deformity (significant muscle wasting) present. No swelling.   Skin:     General: Skin is warm and dry.      Coloration: Skin is not jaundiced or pale.   Neurological:      General: No focal deficit present.      Mental Status: She is alert. Mental status is at baseline.   Psychiatric:         Mood and Affect: Mood normal.         Behavior: Behavior normal.         Lab Results: I have personally reviewed pertinent labs., CBC:   Lab Results   Component Value Date    WBC 4.20 (L) 04/09/2024    HGB 8.8 (L) 04/09/2024    HCT 26.3 (L) 04/09/2024    MCV 90 04/09/2024    PLT 52 (L) 04/09/2024    RBC 2.93 (L) 04/09/2024    MCH 30.0 04/09/2024    MCHC 33.5 04/09/2024    RDW 14.4 04/09/2024    MPV 11.0 04/09/2024   , CMP:   Lab Results   Component Value Date    SODIUM 138 04/09/2024    K 3.8 04/09/2024     04/09/2024    CO2 23 04/09/2024    BUN 30 (H) 04/09/2024    CREATININE 1.45 (H) 04/09/2024    CALCIUM 7.2 (L) 04/09/2024    EGFR 33 04/09/2024   , BMP:  Lab Results   Component Value Date    SODIUM 138 04/09/2024    K 3.8 04/09/2024     04/09/2024    CO2 23 04/09/2024    BUN 30 (H) 04/09/2024    CREATININE 1.45 (H) 04/09/2024    GLUC 91 04/09/2024    CALCIUM 7.2 (L) 04/09/2024    AGAP 10 04/09/2024    EGFR 33 04/09/2024         Counseling / Coordination of Care    Total floor / unit time spent today 20 minutes. Greater than 50% of total time was spent with the patient and / or family counseling and / or coordination of care. A description of the counseling / coordination of care: patient assessment, med review, med changes, support

## 2024-04-10 VITALS
TEMPERATURE: 98.3 F | BODY MASS INDEX: 18.7 KG/M2 | DIASTOLIC BLOOD PRESSURE: 69 MMHG | HEIGHT: 60 IN | HEART RATE: 73 BPM | RESPIRATION RATE: 18 BRPM | SYSTOLIC BLOOD PRESSURE: 135 MMHG | WEIGHT: 95.24 LBS | OXYGEN SATURATION: 95 %

## 2024-04-10 LAB
ANION GAP SERPL CALCULATED.3IONS-SCNC: 9 MMOL/L (ref 4–13)
BUN SERPL-MCNC: 25 MG/DL (ref 5–25)
CALCIUM SERPL-MCNC: 7.2 MG/DL (ref 8.4–10.2)
CHLORIDE SERPL-SCNC: 103 MMOL/L (ref 96–108)
CO2 SERPL-SCNC: 27 MMOL/L (ref 21–32)
CREAT SERPL-MCNC: 1.22 MG/DL (ref 0.6–1.3)
ERYTHROCYTE [DISTWIDTH] IN BLOOD BY AUTOMATED COUNT: 14.4 % (ref 11.6–15.1)
GFR SERPL CREATININE-BSD FRML MDRD: 41 ML/MIN/1.73SQ M
GLUCOSE SERPL-MCNC: 104 MG/DL (ref 65–140)
GLUCOSE SERPL-MCNC: 114 MG/DL (ref 65–140)
GLUCOSE SERPL-MCNC: 147 MG/DL (ref 65–140)
HCT VFR BLD AUTO: 27.7 % (ref 34.8–46.1)
HGB BLD-MCNC: 9.5 G/DL (ref 11.5–15.4)
MCH RBC QN AUTO: 30.1 PG (ref 26.8–34.3)
MCHC RBC AUTO-ENTMCNC: 34.3 G/DL (ref 31.4–37.4)
MCV RBC AUTO: 88 FL (ref 82–98)
PLATELET # BLD AUTO: 73 THOUSANDS/UL (ref 149–390)
PMV BLD AUTO: 11.9 FL (ref 8.9–12.7)
POTASSIUM SERPL-SCNC: 3.3 MMOL/L (ref 3.5–5.3)
RBC # BLD AUTO: 3.16 MILLION/UL (ref 3.81–5.12)
SARS-COV-2 RNA RESP QL NAA+PROBE: NEGATIVE
SODIUM SERPL-SCNC: 139 MMOL/L (ref 135–147)
WBC # BLD AUTO: 5.43 THOUSAND/UL (ref 4.31–10.16)

## 2024-04-10 PROCEDURE — 87635 SARS-COV-2 COVID-19 AMP PRB: CPT

## 2024-04-10 PROCEDURE — 80048 BASIC METABOLIC PNL TOTAL CA: CPT

## 2024-04-10 PROCEDURE — 92526 ORAL FUNCTION THERAPY: CPT

## 2024-04-10 PROCEDURE — 82948 REAGENT STRIP/BLOOD GLUCOSE: CPT

## 2024-04-10 PROCEDURE — 85027 COMPLETE CBC AUTOMATED: CPT

## 2024-04-10 PROCEDURE — 97116 GAIT TRAINING THERAPY: CPT

## 2024-04-10 PROCEDURE — 97110 THERAPEUTIC EXERCISES: CPT

## 2024-04-10 PROCEDURE — 99239 HOSP IP/OBS DSCHRG MGMT >30: CPT | Performed by: INTERNAL MEDICINE

## 2024-04-10 RX ORDER — NIFEDIPINE 30 MG/1
90 TABLET, EXTENDED RELEASE ORAL DAILY
Start: 2024-04-11 | End: 2024-05-11

## 2024-04-10 RX ORDER — ASPIRIN 81 MG/1
81 TABLET, CHEWABLE ORAL DAILY
Start: 2024-04-11 | End: 2024-07-10

## 2024-04-10 RX ORDER — CLOPIDOGREL BISULFATE 75 MG/1
75 TABLET ORAL DAILY
Start: 2024-04-11 | End: 2024-07-10

## 2024-04-10 RX ORDER — POTASSIUM CHLORIDE 20 MEQ/1
40 TABLET, EXTENDED RELEASE ORAL ONCE
Status: COMPLETED | OUTPATIENT
Start: 2024-04-10 | End: 2024-04-10

## 2024-04-10 RX ORDER — TORSEMIDE 10 MG/1
10 TABLET ORAL DAILY
Start: 2024-04-11 | End: 2024-05-11

## 2024-04-10 RX ORDER — TORSEMIDE 10 MG/1
10 TABLET ORAL DAILY
Status: DISCONTINUED | OUTPATIENT
Start: 2024-04-11 | End: 2024-04-10 | Stop reason: HOSPADM

## 2024-04-10 RX ORDER — ASPIRIN 81 MG/1
81 TABLET, CHEWABLE ORAL DAILY
Qty: 30 TABLET | Refills: 0 | Status: CANCELLED | OUTPATIENT
Start: 2024-04-11 | End: 2024-05-11

## 2024-04-10 RX ADMIN — FAMOTIDINE 10 MG: 20 TABLET, FILM COATED ORAL at 09:34

## 2024-04-10 RX ADMIN — NYSTATIN 500000 UNITS: 100000 SUSPENSION ORAL at 11:35

## 2024-04-10 RX ADMIN — TORSEMIDE 20 MG: 20 TABLET ORAL at 09:35

## 2024-04-10 RX ADMIN — CLOPIDOGREL BISULFATE 75 MG: 75 TABLET ORAL at 09:34

## 2024-04-10 RX ADMIN — NYSTATIN 500000 UNITS: 100000 SUSPENSION ORAL at 09:35

## 2024-04-10 RX ADMIN — PREDNISONE 10 MG: 10 TABLET ORAL at 09:34

## 2024-04-10 RX ADMIN — ASPIRIN 81 MG CHEWABLE TABLET 81 MG: 81 TABLET CHEWABLE at 09:34

## 2024-04-10 RX ADMIN — POTASSIUM CHLORIDE 40 MEQ: 1500 TABLET, EXTENDED RELEASE ORAL at 09:35

## 2024-04-10 RX ADMIN — NIFEDIPINE 90 MG: 30 TABLET, FILM COATED, EXTENDED RELEASE ORAL at 09:34

## 2024-04-10 NOTE — PROGRESS NOTES
NEPHROLOGY PROGRESS NOTE   Laya Brooks 79 y.o. female MRN: [id number]  Unit/Bed#: [facility] 828-01 Encounter: [id number]  Reason for Consult: SEEMA    ASSESSMENT AND PLAN:  78 yo woman with PMH of COPD, CAD, STEMI status post RCA stenting 2008, CVA, CKD G3, hyperlipidemia p/w shortness of breath.  Patient required hemodialysis on this admission.  Nephrology is consulted for management of SEEMA     PLAN:     #Non-Oliguric KDIGO SEEMA stage 3 required dialysis, with evidence of kidney recovery  Etiology: Likely secondary to ATN in the settings of contrast associated nephropathy, and hemodynamic changes in the settings of renal artery stenosis  Baseline creatinine: 0.91 -1 mg/L  peak creatinine: 6.27 mg/dL  Current creatinine: 1.2 mg/dL, improved after revascularization   Off Hd since 03/29  UA: No hematuria, no leukocyturia  Renal imaging : Right kidney atrophy with multiple cortical cysts.  Left kidney with no hydronephrosis  Treatment:  No indication of dialysis at this time.  Kidney function improved   maintain MAP:  Over 65 mmHg if possible/avoid hypoperfusion:  Hold parameters on blood pressure medications  Avoid nephrotoxic agents such as NSAIDs, and IV contrast if possible. Avoid opioids   Adjust medications to GFR  Patient will require follow-up with nephrology on discharge        #Acid-base Disorder  serum HCO3 27 mmol/L  Metabolic alkalosis likely secondary to vascular contraction in the settings of diuretics        #Volume status/hypertension:  Volume: Hypervolemia proving  Blood pressure: Normotensive, /69, goal less than 140/90  Recommend:  Continue nifedipine 90 mg   Decrease torsemide to 10 mg daily  Low-sodium diet  Monitor urinary output        #Anemia:  Current hemoglobin:9.5mg/dL  Treatment:  Transfuse for hemoglobin less than 7.0 per primary service       #SALAZAR  No ACE inhibitors or ARB at this time due to recent SEEMA that required dialysis  Nifedipine as above  Decrease torsemide as above  Status post  revascularization with improvement of creatinine  Previous unsuccessful intervention due to large aortic plaque.  Patient is scheduled for left      The highlighted and/or bolded points in my assessment, plan, and disposition were discussed with the primary team and they agree with those points and the plan.  Previous records were personally reviewed by me to obtain a baseline creatinine.   The images (CXR) were personally reviewed by me in PACS      SUBJECTIVE:  Patient seen and examined at bedside. No chest pain, shortness of breath, nausea, vomiting, abdominal pain or diarrhea.      OBJECTIVE:  Current Weight: Weight - Scale: 49.9 kg (110 lb)  Vitals:    04/10/24 0727   BP: 135/69   Pulse: 73   Resp: 18   Temp: 98.3 °F (36.8 °C)   SpO2: 95%       Intake/Output Summary (Last 24 hours) at 4/10/2024 0945  Last data filed at 4/9/2024 1901  Gross per 24 hour   Intake --   Output 8 ml   Net -8 ml     Wt Readings from Last 3 Encounters:   04/09/24 49.9 kg (110 lb)   04/05/24 52.6 kg (115 lb 15.4 oz)   04/02/24 44.1 kg (97 lb 3.6 oz)     Temp Readings from Last 3 Encounters:   04/10/24 98.3 °F (36.8 °C) (Oral)   04/05/24 (!) 97 °F (36.1 °C) (Tympanic)   04/02/24 98.1 °F (36.7 °C) (Tympanic)     BP Readings from Last 3 Encounters:   04/10/24 135/69   04/05/24 (!) 182/87   04/02/24 167/72     Pulse Readings from Last 3 Encounters:   04/10/24 73   04/05/24 82   04/02/24 83        General:  no acute distress at this time  Skin:  No acute rash  Eyes:  No scleral icterus and noninjected  ENT:  mucous membranes moist  Neck:  no carotid bruits  Chest:  Clear to auscultation percussion, good respiratory effort, no use of accessory respiratory muscles  CVS:  Regular rate and rhythm without rub   Abdomen:  soft and nontender   Extremities: lower extremity edema, improving  Neuro:  No gross focality  Psych:  Alert , cooperative       Medications:    Current Facility-Administered Medications:     acetaminophen (TYLENOL) tablet 650  mg, 650 mg, Oral, Q4H PRN, MANOHAR Maurice    albuterol inhalation solution 2.5 mg, 2.5 mg, Nebulization, Q6H PRN, Marci Cuello DO    ALPRAZolam (XANAX) tablet 0.5 mg, 0.5 mg, Oral, TID PRN, MANOHAR Maurice    aluminum-magnesium hydroxide-simethicone (MAALOX) oral suspension 30 mL, 30 mL, Oral, Q4H PRN, MANOHAR Maurice    aspirin chewable tablet 81 mg, 81 mg, Oral, Daily, David Jackson MD, 81 mg at 04/10/24 0934    atorvastatin (LIPITOR) tablet 40 mg, 40 mg, Oral, Daily With Dinner, MANOHAR Maurice, 40 mg at 04/09/24 1734    bisacodyl (DULCOLAX) rectal suppository 10 mg, 10 mg, Rectal, Daily PRN, Narciso Maguire MD    clopidogrel (PLAVIX) tablet 75 mg, 75 mg, Oral, Daily, David Jackson MD, 75 mg at 04/10/24 0934    famotidine (PEPCID) tablet 10 mg, 10 mg, Oral, Daily, MANOHAR Maurice, 10 mg at 04/10/24 0934    insulin lispro (HumALOG/ADMELOG) 100 units/mL subcutaneous injection 1-5 Units, 1-5 Units, Subcutaneous, 4x Daily (AC & HS), 1 Units at 04/09/24 2130 **AND** Fingerstick Glucose (POCT), , , 4x Daily AC and at bedtime, MANOHAR Maurice    ipratropium (ATROVENT) 0.02 % inhalation solution 0.5 mg, 0.5 mg, Nebulization, Q6H PRN, Marci Cuello DO    labetalol (NORMODYNE) injection 10 mg, 10 mg, Intravenous, Q6H PRN, MANOHAR Maurice, 10 mg at 04/09/24 0811    NIFEdipine (PROCARDIA XL) 24 hr tablet 90 mg, 90 mg, Oral, Daily, Joselyn Reyes Bahamonde, MD, 90 mg at 04/10/24 0934    nystatin (MYCOSTATIN) oral suspension 500,000 Units, 500,000 Units, Swish & Swallow, 4x Daily, MANOHAR Maurice, 500,000 Units at 04/10/24 0935    ondansetron (ZOFRAN) injection 4 mg, 4 mg, Intravenous, Q6H PRN, MANOHAR Maurice    polyethylene glycol (MIRALAX) packet 17 g, 17 g, Oral, Daily PRN, Narciso Maguire MD    [COMPLETED] predniSONE tablet 20 mg, 20 mg, Oral, Daily, 20 mg at 04/07/24 0839 **FOLLOWED BY** predniSONE tablet 10 mg, 10 mg,  "Oral, Daily, MANOHAR Maurice, 10 mg at 04/10/24 0934    senna (SENOKOT) tablet 8.6 mg, 1 tablet, Oral, HS, Melody Hunter,     sodium chloride (OCEAN) 0.65 % nasal spray 1 spray, 1 spray, Each Nare, Q1H PRN, MANOHAR Maurice    [START ON 4/11/2024] torsemide (DEMADEX) tablet 10 mg, 10 mg, Oral, Daily, Joselyn Reyes Bahamonde, MD    Laboratory Results:  Results from last 7 days   Lab Units 04/10/24  0457 04/09/24  0455 04/08/24  0444 04/07/24  0438 04/06/24  0552 04/05/24  1838 04/05/24  0538 04/04/24  1515 04/04/24  0607   WBC Thousand/uL 5.43 4.20*  --   --  7.33  --  4.78 7.93 5.96   HEMOGLOBIN g/dL 9.5* 8.8*  --   --  9.4*  --  9.6* 11.2* 10.1*   HEMATOCRIT % 27.7* 26.3*  --   --  28.1*  --  29.7* 34.5* 30.5*   PLATELETS Thousands/uL 73* 52*  --   --  76*  --  78* 99* 81*   SODIUM mmol/L 139 138 137 138 137 136 136 135 135   POTASSIUM mmol/L 3.3* 3.8 3.7 3.6 3.9 4.2 4.2 4.6 4.3   CHLORIDE mmol/L 103 105 104 105 105 102 107 106 105   CO2 mmol/L 27 23 25 23 23 22 22 19* 22   BUN mg/dL 25 30* 33* 39* 41* 43* 44* 46* 46*   CREATININE mg/dL 1.22 1.45* 1.42* 1.52* 1.48* 1.61* 1.45* 1.44* 1.44*   CALCIUM mg/dL 7.2* 7.2* 7.1* 7.4* 7.8* 8.3* 7.6* 7.9* 8.0*   MAGNESIUM mg/dL  --   --   --  1.7* 1.9 2.1 2.3 1.7*  --    PHOSPHORUS mg/dL  --   --   --   --  3.4  --  3.3 3.1  --        VAS upper limb venous duplex scan, unilateral/limited   Final Result by Devon Gill MD (04/06 1932)      IR renal angiogram    (Results Pending)       Portions of the record may have been created with voice recognition software. Occasional wrong word or \"sound a like\" substitutions may have occurred due to the inherent limitations of voice recognition software. Read the chart carefully and recognize, using context, where substitutions have occurred.    "

## 2024-04-10 NOTE — MALNUTRITION/BMI
This medical record reflects one or more clinical indicators suggestive of malnutrition    Malnutrition Findings:   Adult Malnutrition type: Acute illness  Adult Degree of Malnutrition: Other severe protein calorie malnutrition  Malnutrition Characteristics: Weight loss, Muscle loss          360 Statement: Related to chronic medical condition as evidenced by 9% weight loss over the past week and moderate depletion of muscle mass (temples) treated with diet and oral nutrition supplements        See Nutrition note dated 4/10/24 for additional details.  Completed nutrition assessment is viewable in the nutrition documentation.

## 2024-04-10 NOTE — CASE MANAGEMENT
Case Management Discharge Planning Note    Patient name Laya Brooks  [facility] MRN [id number]  : 1945 Date 4/10/2024       Current Admission Date: 2024  Current Admission Diagnosis:Acute renal failure superimposed on stage 3a chronic kidney disease (HCC)   Patient Active Problem List    Diagnosis Date Noted    Left upper extremity swelling 2024    Constipation 2024    Bleeding at insertion site 2024    Acute respiratory insufficiency 2024    Bicytopenia 2024    Goals of care, counseling/discussion 2024    Oral candidiasis 2024    Dysphagia 2024    Renal artery stenosis (HCC) 2024    Elevated d-dimer 2024    Acute respiratory failure with hypoxia ([facility]) 2024    Toxic metabolic encephalopathy 2024    Acute renal failure superimposed on stage 3a chronic kidney disease (HCC) 2024    Acute diastolic congestive heart failure ([facility]) 2024    Hyponatremia 2024    COPD (chronic obstructive pulmonary disease) ([facility]) 2024    Tobacco use 2024    Wheezing 2024    S/P right coronary artery (RCA) stent placement 2024    Celiac artery stenosis ([facility]) 2023    CAD (coronary artery disease) 08/10/2022    Weight loss, non-intentional 2022    Hypervitaminosis D 2022    Renovascular hypertension 2022    Intracranial atherosclerosis 2022    Atrophy of right kidney 2022    Stage 3a chronic kidney disease (HCC) 2022    Renal vascular disease 2022    Shortness of breath 2022    Abnormal echocardiogram 2022    CVA (cerebral vascular accident) ([facility]) 2022    Thrombocytopenia ([facility]) 2022    Leukopenia 2022    Neutropenia ([facility]) 2022    Hyperphosphatemia 2022    Hypercholesterolemia 2022    Carotid artery stenosis 2022    Nocturnal hypoxia 2022    History of CVA (cerebrovascular accident)  03/07/2022    Hypertensive emergency 03/07/2022    Severe protein-calorie malnutrition (HCC) 03/07/2022    Premature atrial complexes 03/07/2022    Dyslipidemia 09/21/2015      LOS (days): 5  Geometric Mean LOS (GMLOS) (days): 4.9  Days to GMLOS:0     OBJECTIVE:  Risk of Unplanned Readmission Score: 23         Current admission status: Inpatient   Preferred Pharmacy:   [id number] - [place] - [address], [address]  [address]  [facility] [address]  Phone: [phone] Fax: [phone]    [id number] - [place], [place] - [address]  [address] [address]  Phone: [phone] Fax: [phone]    [id number] - [place] - [address]  [address] [address]  Phone: [phone] Fax: [phone]    Primary Care Provider: Joana Shields DO    Primary Insurance: MEDICARE  Secondary Insurance: [facility]    DISCHARGE DETAILS:              IMM Given (Date):: 04/10/24  IMM Given to:: Family          Accepting Facility Name, City & State : [facility]  Receiving Facility/Agency Phone Number: Phone: [phone]  Facility/Agency Fax Number: Fax: [phone]       Pt clear for d/c to rehab. IMM reviewed with , patient aware as well.   BLS request placed for 13:00  Provider, family and team aware.

## 2024-04-10 NOTE — SPEECH THERAPY NOTE
"Speech Language/Pathology  Speech-Language Pathology Progress Note      Patient Name: Laya Brooks    Today's Date: 4/10/2024      Subjective:  Pt was awake and alert. She was sitting upright in bed. Patient stated \"Can I eat even if I am leaving \".    Objective:  Pt was seen today for dysphagia therapy. Current diet is dysphagia 3 dental soft with thin liquids. Pt was on room air. Oral care had already been completed. Focus of today's session was to maximize PO intake safety and determine potential for diet texture advancement. Textures offered today included soft solid, mech soft soft solid, and thin liquid via cup and via straw.  Pt was eating cookies at the bedside as well.    Swallow function:   Bolus retrieval, manipulation, and mastication were WFL.  AP transfer was timely. Pharyngeal swallow initiation was present. Hyolaryngeal excursion was adequate. No s/s aspiration occurred during session today.  Education provided to pt on diet levels- pt states she at this time prefers the level 3 and does not want an upgrade.  She did however do well w/ a trial of hard cookies at her bedside.     Assessment:  Pt is tolerating her current diet - does not wish to upgrade to regular at this time.     Plan:  Continue dysphagia 3 dental soft and thin liquids. Continue ST follow up. Subsequent sessions to focus on  upgrading to regular at her facility .  "

## 2024-04-10 NOTE — PHYSICAL THERAPY NOTE
PHYSICAL THERAPY NOTE          Patient Name: Laya Brooks  Today's Date: 4/10/2024       04/10/24 0925   PT Last Visit   PT Visit Date 04/10/24   Note Type   Note Type Treatment   Pain Assessment   Pain Assessment Tool 0-10   Pain Score No Pain   Restrictions/Precautions   Weight Bearing Precautions Per Order No   Other Precautions Chair Alarm;Bed Alarm;Multiple lines;Fall Risk;Pain   General   Chart Reviewed Yes   Family/Caregiver Present No   Cognition   Overall Cognitive Status WFL   Arousal/Participation Alert;Cooperative   Attention Attends with cues to redirect   Orientation Level Oriented X4   Memory Decreased recall of precautions;Decreased recall of recent events   Following Commands Follows one step commands without difficulty   Comments Patient is pleasant and cooperative, though reporting signfiicant fatigue.   Subjective   Subjective Patient agreeable to PT tx   Bed Mobility   Supine to Sit 4  Minimal assistance   Additional items Assist x 1;Increased time required;Verbal cues;LE management   Sit to Supine 4  Minimal assistance   Additional items Assist x 1;Increased time required;Verbal cues;LE management   Transfers   Sit to Stand 4  Minimal assistance   Additional items Assist x 1;Increased time required;Verbal cues   Stand to Sit 4  Minimal assistance   Additional items Assist x 1;Increased time required;Verbal cues   Additional Comments RW   Ambulation/Elevation   Gait pattern Improper Weight shift;Narrow ELA;Decreased foot clearance;Shuffling;Foward flexed;Excessively slow   Gait Assistance 4  Minimal assist   Additional items Assist x 1;Verbal cues   Assistive Device Rolling walker   Distance 15'   Balance   Static Sitting Fair +   Dynamic Sitting Fair   Static Standing Fair -   Dynamic Standing Poor +   Ambulatory Poor +   Endurance Deficit   Endurance Deficit Yes   Endurance Deficit Description fatigue,  weakness   Activity Tolerance   Activity Tolerance Patient limited by fatigue   Nurse Made Aware RN cleared   Exercises   Quad Sets Supine;5 reps;AROM;Bilateral   Glute Sets Supine;5 reps;AROM;Bilateral   Hip Flexion Supine;5 reps;AROM;Bilateral   Ankle Pumps Supine;10 reps;AROM;Bilateral   Assessment   Prognosis Fair   Problem List Decreased strength;Decreased endurance;Impaired balance;Decreased mobility;Impaired judgement;Decreased safety awareness   Assessment Patient received in bed. Patient agreeable to therapy, but states significant fatigue. Patient performs bed mobility with minimal assistance x1. Patient performs functional transfers with minimal assistance x1 using rolling walker. Patient performs ambulation with minimal assistance x1 using rolling walker, 15'. Following gait trial, patient performs therapeutic exercises as noted in flowsheet. fair tolerance to activity. Low repetitions used due to patient fatigue.  Educated to perform 10-20 repetitions 3x/day for continued strengthening and endurance.  Patient left in bed with all needs met and call bell/personal items within reach. The patient's AM-PAC Basic Mobility Inpatient Short Form Raw Score is 17 showing further need for skilled PT services in order to improve functional mobility, decrease need for assistance, and return to PLOF. PT is recommending Level 2 - Moderate Resource Intensity on d/c from hospital.  Will continue to follow as able.   Barriers to Discharge Inaccessible home environment;Decreased caregiver support   Goals   Patient Goals to rest   PT Treatment Day 6   Plan   Treatment/Interventions ADL retraining;Functional transfer training;LE strengthening/ROM;Elevations;Therapeutic exercise;Endurance training;Patient/family training;Equipment eval/education;Bed mobility;Gait training;Compensatory technique education;Spoke to nursing;OT   Progress Slow progress, decreased activity tolerance   PT Frequency 3-5x/wk   Discharge  Recommendation   Rehab Resource Intensity Level, PT II (Moderate Resource Intensity)   Equipment Recommended Walker   Walker Package Recommended Wheeled walker   AM-PAC Basic Mobility Inpatient   Turning in Flat Bed Without Bedrails 3   Lying on Back to Sitting on Edge of Flat Bed Without Bedrails 3   Moving Bed to Chair 3   Standing Up From Chair Using Arms 3   Walk in Room 3   Climb 3-5 Stairs With Railing 2   Basic Mobility Inpatient Raw Score 17   Basic Mobility Standardized Score 39.67   [facility] Highest Level Of Mobility   -[facility] Goal 5: Stand one or more mins   -HL Achieved 6: Walk 10 steps or more   End of Consult   Patient Position at End of Consult All needs within reach;Supine;Bed/Chair alarm activated     Andree Josue, PT, DPT

## 2024-04-10 NOTE — DISCHARGE INSTR - AVS FIRST PAGE
Follow up with your PCP within 1 week of discharge. Some of your medications have changed. Your home lisinopril and amlodipine are currently not being continued at this time until you follow up with your PCP and nephrology. In the mean time continue to take procardia and torsemide. You will also need to take aspirin and plavix for 90 days after your renal artery angiogram and stenting.

## 2024-04-10 NOTE — PROGRESS NOTES
Progress Note -Interventional Radiology  Laya Brooks 79 y.o. female MRN: [id number]  Unit/Bed#: [facility] 828-01 Encounter: [id number]    Assessment/Plan:    S/P Left Renal Artery Stent Placement  -Patient appears well and offers no complaints  -GFR improved from 33 to 41 overnight as well as creatinine from 1.45 to 1.22.  -No access site well-appearing.  Pulses present.  Patient denies any abnormal sensation in the hand.  -Please reach out to IR with any questions or concerns.    Medical Problems       Problem List       * (Principal) Acute renal failure superimposed on stage 3a chronic kidney disease (HCC)    Lab Results   Component Value Date    EGFR 41 04/10/2024    EGFR 33 04/09/2024    EGFR 34 04/08/2024    CREATININE 1.22 04/10/2024    CREATININE 1.45 (H) 04/09/2024    CREATININE 1.42 (H) 04/08/2024         History of CVA (cerebrovascular accident)    Hypertensive emergency    Severe protein-calorie malnutrition (HCC)    Malnutrition Findings:                                 BMI Findings:           Body mass index is 21.48 kg/m².         Premature atrial complexes    Thrombocytopenia (HCC)    Leukopenia    Neutropenia (HCC)    Hyperphosphatemia    Hypercholesterolemia    Carotid artery stenosis    Nocturnal hypoxia    Shortness of breath    Abnormal echocardiogram    CVA (cerebral vascular accident) (HCC)    Atrophy of right kidney    Stage 3a chronic kidney disease (HCC)    Lab Results   Component Value Date    EGFR 41 04/10/2024    EGFR 33 04/09/2024    EGFR 34 04/08/2024    CREATININE 1.22 04/10/2024    CREATININE 1.45 (H) 04/09/2024    CREATININE 1.42 (H) 04/08/2024         Renal vascular disease    Renovascular hypertension    Intracranial atherosclerosis    Weight loss, non-intentional    Hypervitaminosis D    CAD (coronary artery disease)    Celiac artery stenosis (HCC)    S/P right coronary artery (RCA) stent placement    Tobacco use    Wheezing    Dyslipidemia    Overview Signed 2/22/2024  2:02 PM by  Lisa Kidd MD     Formatting of this note might be different from the original.   On statin.         Acute diastolic congestive heart failure (HCC)    Wt Readings from Last 3 Encounters:   04/09/24 49.9 kg (110 lb)   04/05/24 52.6 kg (115 lb 15.4 oz)   04/02/24 44.1 kg (97 lb 3.6 oz)                 Hyponatremia    COPD (chronic obstructive pulmonary disease) (HCC)    Acute respiratory failure with hypoxia (HCC)    Toxic metabolic encephalopathy    Elevated d-dimer    Dysphagia    Renal artery stenosis (HCC)    Overview Deleted 3/30/2024  1:10 PM by Malena Hearn MD            Goals of care, counseling/discussion    Oral candidiasis    Bicytopenia    Bleeding at insertion site    Acute respiratory insufficiency    Constipation    Left upper extremity swelling             Subjective: Laya Brooks is a 79 y.o. female who presented with renal artery stenosis.  Patient had renal angiogram with stenting yesterday.  Overnight has had subsequent improvement in renal function.  Today the patient feels well and is excited to be discharged from the hospital..    Objective:    Vitals:  /69 (BP Location: Left arm)   Pulse 73   Temp 98.3 °F (36.8 °C) (Oral)   Resp 18   Ht 5' (1.524 m)   Wt 49.9 kg (110 lb)   SpO2 95%   BMI 21.48 kg/m²   Body mass index is 21.48 kg/m².  Weight (last 2 days)       Date/Time Weight    04/09/24 0600 49.9 (110)            I/Os:    Intake/Output Summary (Last 24 hours) at 4/10/2024 1000  Last data filed at 4/9/2024 1901  Gross per 24 hour   Intake --   Output 8 ml   Net -8 ml       Invasive Devices       Peripheral Intravenous Line  Duration             Peripheral IV 04/07/24 Left Antecubital 2 days                    Physical Exam  Vitals and nursing note reviewed.   Constitutional:       General: She is not in acute distress.     Appearance: Normal appearance. She is well-developed, well-groomed and underweight.   HENT:      Head: Normocephalic and atraumatic.      Right  "Ear: External ear normal.      Left Ear: External ear normal.      Nose: Nose normal.      Mouth/Throat:      Lips: Pink.   Eyes:      General: Lids are normal. Gaze aligned appropriately.   Cardiovascular:      Rate and Rhythm: Normal rate.      Pulses: Normal pulses.   Pulmonary:      Effort: Pulmonary effort is normal. No respiratory distress.   Abdominal:      Palpations: Abdomen is soft.      Tenderness: There is no abdominal tenderness.   Skin:     General: Skin is warm.      Capillary Refill: Capillary refill takes less than 2 seconds.      Comments: Left radial access site is well-appearing.  Normal pulses to the hand.  No decreased strength or sensation.   Neurological:      General: No focal deficit present.      Mental Status: She is alert and oriented to person, place, and time. Mental status is at baseline.   Psychiatric:         Behavior: Behavior is cooperative.                     Lab Results and Cultures:   CBC with diff:   Lab Results   Component Value Date    WBC 5.43 04/10/2024    HGB 9.5 (L) 04/10/2024    HCT 27.7 (L) 04/10/2024    MCV 88 04/10/2024    PLT 73 (L) 04/10/2024    RBC 3.16 (L) 04/10/2024    MCH 30.1 04/10/2024    MCHC 34.3 04/10/2024    RDW 14.4 04/10/2024    MPV 11.9 04/10/2024    NRBC 0 04/04/2024      BMP/CMP:  Lab Results   Component Value Date    K 3.3 (L) 04/10/2024     04/10/2024    CO2 27 04/10/2024    BUN 25 04/10/2024    CREATININE 1.22 04/10/2024    CALCIUM 7.2 (L) 04/10/2024    AST 12 (L) 04/05/2024    ALT 14 04/05/2024    ALKPHOS 40 04/05/2024    EGFR 41 04/10/2024   ,     Coags:   Lab Results   Component Value Date    PTT 26 04/04/2024    INR 1.10 04/04/2024   ,   Results from last 7 days   Lab Units 04/04/24  1515   PTT seconds 26   INR  1.10        Lipid Panel: No results found for: \"CHOL\"  Lab Results   Component Value Date    HDL 43 (L) 01/29/2024     Lab Results   Component Value Date    HDL 43 (L) 01/29/2024     Lab Results   Component Value Date    LDLCALC " "62 01/29/2024     Lab Results   Component Value Date    TRIG 90 01/29/2024       HgbA1c:   Lab Results   Component Value Date    HGBA1C 5.5 04/06/2024    HGBA1C 6.0 (H) 11/14/2022    HGBA1C 6.2 (H) 03/08/2022       Blood Culture: No results found for: \"BLOODCX\",   Urinalysis:   Lab Results   Component Value Date    COLORU Yellow 03/29/2024    CLARITYU Clear 03/29/2024    SPECGRAV >=1.030 03/29/2024    PHUR 5.5 03/29/2024    PHUR 5.0 03/03/2016    LEUKOCYTESUR Trace (A) 03/29/2024    NITRITE Negative 03/29/2024    GLUCOSEU Negative 03/29/2024    KETONESU Negative 03/29/2024    BILIRUBINUR Negative 03/29/2024    BLOODU Negative 03/29/2024   ,   Urine Culture:   Lab Results   Component Value Date    URINECX (A) 03/07/2022     >100,000 cfu/ml Beta Hemolytic Streptococcus Group B    URINECX 30,000-39,000 cfu/ml 03/07/2022   ,   Wound Culure:  No results found for: \"WOUNDCULT\"    VTE Pharmacologic Prophylaxis: Sequential compression device (Venodyne)       Thank you for allowing me to participate in the care of Laya Brooks. Please don't hesitate to call, text, email, or TigerText with any questions.   "

## 2024-04-15 ENCOUNTER — TELEPHONE (OUTPATIENT)
Age: 79
End: 2024-04-15

## 2024-04-15 NOTE — TELEPHONE ENCOUNTER
[facility] called in to confirm pt's appt on 05/03/2024. I relayed the information and they were aware and confirmed

## 2024-04-15 NOTE — TELEPHONE ENCOUNTER
Patients  called and said that the patient is currently on a diuretic and she has lost significant weight since she has been on it. She is down to 83lbs. The rehab facility she is in can't stop it without s permission. Could someone look into this and advise the rehab facility what to do? [facility] Phone: ([phone]). Thank you.

## 2024-04-17 NOTE — TELEPHONE ENCOUNTER
"I called and spoke with nursing home nurse. She states Ema had labs done on 04/11 CBC and on 04/16 CMP. She will fax BP log and weight log to office. She states the doctor at the home manages Ema's diuretic. She states she will \"run it past him\" regarding discontinuing diuretic.   "

## 2024-04-19 DIAGNOSIS — N28.89 RENAL VASCULAR DISEASE: Primary | ICD-10-CM

## 2024-04-19 NOTE — TELEPHONE ENCOUNTER
I called and spoke with a nurse at the nursing home. She is aware to have them send us weekly weight logs and frequent labs. She is aware to have Ema repeat BMP on Monday.

## 2024-04-19 NOTE — TELEPHONE ENCOUNTER
I called and spoke with Sasha at the nursing home again today. She states the doctor is out of the office until Tuesday. She states the PCP on site is only there on Tues and Thurs during the week. She states the dr would need a written order to d/c diuretic sent to the facility to review. I asked for her to fax me again the BP log. Once the BP log is received- I will get it uploaded to the chart.

## 2024-04-19 NOTE — TELEPHONE ENCOUNTER
Care nurse at facility aware to have patient hold diuretic at this time if experiencing hypotension, decreased oral intake, nausea/vomiting/diarrhea, worsening renal function.

## 2024-05-01 ENCOUNTER — APPOINTMENT (OUTPATIENT)
Dept: LAB | Facility: MEDICAL CENTER | Age: 79
End: 2024-05-01
Payer: MEDICARE

## 2024-05-01 DIAGNOSIS — N17.9 ACUTE RENAL FAILURE, UNSPECIFIED ACUTE RENAL FAILURE TYPE (HCC): ICD-10-CM

## 2024-05-01 DIAGNOSIS — N28.89 RENAL VASCULAR DISEASE: ICD-10-CM

## 2024-05-01 LAB
ERYTHROCYTE [DISTWIDTH] IN BLOOD BY AUTOMATED COUNT: 14.9 % (ref 11.6–15.1)
HCT VFR BLD AUTO: 34.1 % (ref 34.8–46.1)
HGB BLD-MCNC: 11.3 G/DL (ref 11.5–15.4)
MCH RBC QN AUTO: 29.8 PG (ref 26.8–34.3)
MCHC RBC AUTO-ENTMCNC: 33.1 G/DL (ref 31.4–37.4)
MCV RBC AUTO: 90 FL (ref 82–98)
PLATELET # BLD AUTO: 161 THOUSANDS/UL (ref 149–390)
PMV BLD AUTO: 10.7 FL (ref 8.9–12.7)
RBC # BLD AUTO: 3.79 MILLION/UL (ref 3.81–5.12)
WBC # BLD AUTO: 7.94 THOUSAND/UL (ref 4.31–10.16)

## 2024-05-01 PROCEDURE — 85027 COMPLETE CBC AUTOMATED: CPT

## 2024-05-01 PROCEDURE — 36415 COLL VENOUS BLD VENIPUNCTURE: CPT

## 2024-05-01 PROCEDURE — 80053 COMPREHEN METABOLIC PANEL: CPT

## 2024-05-01 NOTE — PROGRESS NOTES
Cardiology Follow Up    Laya Brooks  1945  [id number]  [facility] [address] [id number] [phone]    1. Cerebrovascular accident (CVA), unspecified mechanism (HCC)        2. Renal vascular disease        3. Coronary artery disease involving native coronary artery of native heart without angina pectoris        4. Stage 3a chronic kidney disease (HCC)        5. Tobacco use        6. Dyslipidemia        7. Hypertension, unspecified type        8. Other fatigue        9. Dyspnea, unspecified type        10. CARINE (obstructive sleep apnea)        11. Hypervolemia, unspecified hypervolemia type            Discussion/Summary:    Fatigue, dyspnea:  Patient has undergone extensive evaluation. Prior Holter monitor was notable for frequent PACs-however, these extended throughout the entire day even during the evening hours which is the period of the day patient states she feels the best. Exercise stress test did not show any chronotropic incompetence.  She does have a longstanding smoking history and has a daily cough consistent with COPD/emphysema. PFTs from January with moderate air trapping, reduced diffusion capacity and moderate obstruction noted  Likely multifactorial     Volume overload  Patient with evidence of volume overload during recent admit likely in the setting of severe renal dysfunction  Did receive IV diuretics  Echo from 3/25/2024: EF 55%; diastolic function normal; mild TR  She was discharged on torsemide 10 mg QD but this was recently d/c'd by Nephrology in the setting of significant weight loss since discharge and hypotension  She appears euvolemic on physical exam today  I did caution her with regards to her diet as she states she eats out on a daily basis eating at Italian/Mexican restaurants and diners  Low-sodium diet was reinforced    CAD  Prior hx of STEMI in 2008 with  stent to RCA  NM stress test from 2022 with no evidence of ischemia; EST ordered by Dr Rivas in October 2023 due to patient c/o extreme fatigue mostly in the AM---poor exercise capacity noted only achieving 2.6 METS with c/o dyspnea/lightheadedness, no signs of ischemia, adequate chronotropic response noted  Today she denies any chest pain  Continue aspirin/statin; she is not on beta blocker    CKD/renal artery stenosis  Known CKD and SALAZAR  During recent admit in March 2024 did have SEEMA with progressively worsening renal function requiring brief HD  Now s/p left RA stent on 4-9-2024  Create at discharge on 4-2-2024 was 1.2 and on re-check was just 0.86 on 5-1-2024  She continues to follow with nephrology    CVA  Prior hx of right pontine infarct 2022; loop recorder had been discussed but patient did not wish to pursue  She continues to follow with Neurology; she is on aspirin/Plavix/statin    Essential HTN  Previously maintained on Nifedipine 90 mg QD but she felt that is was causing weakness so it was discontinued by Nephrology and they started her on lisinopril 20 mg QD as of a few days ago on 5-6-2024  BP elevated on arrival at 165/74 and was 122/84 on recheck  She admits she eats out almost everyday going to Italian/mexican restaurants and diners and eating a diet high in salt   Low sodium diet reinforced    Hyperlipidemia  Lipid panel from January 2024  Triglycerides 90  HDL 43  LDL 62  Continue Lipitor 40 mg QD    Tobacco abuse  Patient states she still smokes occasionally   Complete smoking cessation was advised    Probable CARINE  Consideration for probable CARINE given her prior reports of extreme fatigue with frequent napping; patient has refused in the past and continues to refuse workup    She will return to the office in 6 months to follow up with Dr Rivas    Interval History:   Laya Brooks is a 79 y.o. female With PMH of CAD, CVA, HTN, HLD, prior tobacco abuse, CKD, renal artery stenosis now s/p left RA  stent during recent admit, probable CARINE, and COPD who returns to the office today for follow up visit.   Of note, she was admitted on 3- with complaints of shortness of breath and wheezing for the prior 3 days before admission as well as increased lower extremity edema and orthopnea.  Her symptoms were felt to be secondary to acute COPD exacerbation as well as volume overload from severe renal dysfunction on admit.  SEEMA was noted on admission and patient was found to have severe left renal artery stenosis.  SEEMA continued to worsen with creatinine rising to >6.0 and patient was subsequently transferred to [facility] for [facility].  She did undergo short-term HD at which time creatinine began to improve.  She did undergo IV diuresis as well.  Subsequently had left renal artery stenting on 4/9/2024.  Stabilized by 4/2/2024 and felt to be stable for discharge.    Today, the patient continues with several complaints which include chronic fatigue and dyspnea on exertion.  She has no complaints of chest discomfort or palpitations.  She has no lower extremity edema orthopnea and no complaints of dizziness/lightheadedness or near syncope/syncope.  She continues to note overall generalized weakness which she states is largely unchanged from her prior past history.  She has followed with both nephrology and vascular in the recent future secondary to her recent history of left renal artery stenting and known CKD.  She did mention to the nephrology team that she felt that the nifedipine medication she was on was contributing to her weakness and they discontinued that medication and placed her on lisinopril 20 mg once daily as of 5/6/2024.  Her blood pressure today is mildly elevated at 165/74 on arrival but did reduce to 128/84 on recheck.  Nephrology has ordered repeat lab work to continue to evaluate renal function after starting lisinopril.  Apparently the patient had previously been discharged on a low-dose  of torsemide 10 mg once daily but this was discontinued a few weeks ago by the nephrology team secondary to the patient losing a substantial amount of weight and having some hypotension.  She appears euvolemic on physical exam today.  I did discuss with the patient the need to follow a low-sodium diet given her hypertension as well as previous volume overload.  She currently admits to me that she eats out almost every day eating at both Italian and [place]ian restaurants as well as diners frequently.  She states she almost never cooks at home and constantly eats out.  I did review with her the amount of sodium in this type of diet and I stated that this was detrimental to her blood pressure control.  Her most recent LDL from January 2024 is at goal.  Overall, the patient appears to be stable from a cardiac standpoint and none of her symptoms appear to be any worse than her previous baseline from last cardiology office visit.  No updated cardiac testing is needed.  She will follow-up in our office in 6 months.      Medical Problems       Problem List       History of CVA (cerebrovascular accident)    Hypertensive emergency    Severe protein-calorie malnutrition (HCC)    Malnutrition Findings:                                 BMI Findings:           Body mass index is 16.6 kg/m².         Premature atrial complexes    Thrombocytopenia (HCC)    Leukopenia    Neutropenia (HCC)    Hyperphosphatemia    Hypercholesterolemia    Carotid artery stenosis    Nocturnal hypoxia    Shortness of breath    Abnormal echocardiogram    CVA (cerebral vascular accident) (HCC)    Atrophy of right kidney    Stage 3a chronic kidney disease (HCC)    Lab Results   Component Value Date    EGFR 63 05/01/2024    EGFR 41 04/10/2024    EGFR 33 04/09/2024    CREATININE 0.86 05/01/2024    CREATININE 1.22 04/10/2024    CREATININE 1.45 (H) 04/09/2024         Renal vascular disease    Renovascular hypertension    Intracranial atherosclerosis    Weight loss,  non-intentional    Hypervitaminosis D    CAD (coronary artery disease)    Celiac artery stenosis (HCC)    S/P right coronary artery (RCA) stent placement    Tobacco use    Wheezing    Dyslipidemia    Overview Signed 2/22/2024  2:02 PM by Lisa Kidd MD     Formatting of this note might be different from the original.   On statin.         Acute renal failure superimposed on stage 3a chronic kidney disease (HCC)    Lab Results   Component Value Date    EGFR 63 05/01/2024    EGFR 41 04/10/2024    EGFR 33 04/09/2024    CREATININE 0.86 05/01/2024    CREATININE 1.22 04/10/2024    CREATININE 1.45 (H) 04/09/2024         Acute diastolic congestive heart failure (HCC)    Wt Readings from Last 3 Encounters:   05/09/24 38.6 kg (85 lb)   05/06/24 38.8 kg (85 lb 9.6 oz)   05/03/24 37.9 kg (83 lb 9.6 oz)                 Hyponatremia    COPD (chronic obstructive pulmonary disease) (HCC)    Acute respiratory failure with hypoxia (HCC)    Toxic metabolic encephalopathy    Elevated d-dimer    Dysphagia    Renal artery stenosis (HCC)    Overview Deleted 3/30/2024  1:10 PM by Malena Hearn MD            Goals of care, counseling/discussion    Oral candidiasis    Bicytopenia    Bleeding at insertion site    Acute respiratory insufficiency    Constipation    Left upper extremity swelling        Past Medical History:   Diagnosis Date    CAD (coronary artery disease)     Cardiac disease     Hypercholesteremia     Hyperlipidemia     Hypertension     Hypertension     Renal disorder      Social History     Socioeconomic History    Marital status: /Civil Union     Spouse name: Not on file    Number of children: Not on file    Years of education: Not on file    Highest education level: Not on file   Occupational History    Not on file   Tobacco Use    Smoking status: Former    Smokeless tobacco: Never   Vaping Use    Vaping status: Never Used   Substance and Sexual Activity    Alcohol use: Never    Drug use: No    Sexual  activity: Not on file   Other Topics Concern    Not on file   Social History Narrative    Not on file     Social Determinants of Health     Financial Resource Strain: Not on file   Food Insecurity: No Food Insecurity (4/10/2024)    Hunger Vital Sign     Worried About Running Out of Food in the Last Year: Never true     Ran Out of Food in the Last Year: Never true   Transportation Needs: No Transportation Needs (4/10/2024)    PRAPARE - Transportation     Lack of Transportation (Medical): No     Lack of Transportation (Non-Medical): No   Physical Activity: Not on file   Stress: Not on file   Social Connections: Not on file   Intimate Partner Violence: Not on file   Housing Stability: Low Risk  (4/10/2024)    Housing Stability Vital Sign     Unable to Pay for Housing in the Last Year: No     Number of Places Lived in the Last Year: 1     Unstable Housing in the Last Year: No      History reviewed. No pertinent family history.  Past Surgical History:   Procedure Laterality Date    CORONARY ANGIOPLASTY WITH STENT PLACEMENT      IR RENAL ANGIOGRAM  4/4/2024    IR RENAL ANGIOGRAM  4/9/2024    IR TEMPORARY DIALYSIS CATHETER PLACEMENT  3/26/2024    WRIST SURGERY         Current Outpatient Medications:     albuterol (PROVENTIL HFA,VENTOLIN HFA) 90 mcg/act inhaler, Inhale 2 puffs every 6 (six) hours as needed for wheezing, Disp: , Rfl:     aspirin 81 mg chewable tablet, Chew 1 tablet (81 mg total) daily, Disp: 90 tablet, Rfl: 3    atorvastatin (LIPITOR) 40 mg tablet, TAKE 1 TABLET BY MOUTH ONCE DAILY IN THE EVENING. TAKE IN PLACE OF SIMVASTATIN, Disp: 90 tablet, Rfl: 3    clopidogrel (PLAVIX) 75 mg tablet, Take 1 tablet (75 mg total) by mouth daily, Disp: 90 tablet, Rfl: 0    ergocalciferol (ERGOCALCIFEROL) 1.25 MG (21471 UT) capsule, Take 1 capsule (50,000 Units total) by mouth once a week, Disp: 12 capsule, Rfl: 1    lisinopril (ZESTRIL) 20 mg tablet, , Disp: , Rfl:     tiotropium-olodaterol (Stiolto Respimat) 2.5-2.5  "MCG/ACT inhaler, Inhale 2 puffs daily, Disp: 4 g, Rfl: 2  Allergies   Allergen Reactions    Influenza Virus Vaccine        Labs:     Chemistry        Component Value Date/Time    K 3.3 (L) 05/01/2024 1054     05/01/2024 1054    CO2 25 05/01/2024 1054    BUN 10 05/01/2024 1054    CREATININE 0.86 05/01/2024 1054        Component Value Date/Time    CALCIUM 9.1 05/01/2024 1054    ALKPHOS 91 05/01/2024 1054    AST 13 05/01/2024 1054    ALT 8 05/01/2024 1054            No results found for: \"CHOL\"  Lab Results   Component Value Date    HDL 43 (L) 01/29/2024    HDL 40 (L) 06/20/2022    HDL 45 (L) 03/08/2022     Lab Results   Component Value Date    LDLCALC 62 01/29/2024    LDLCALC 61 06/20/2022    LDLCALC 144 (H) 03/08/2022     Lab Results   Component Value Date    TRIG 90 01/29/2024    TRIG 113 06/20/2022    TRIG 109 03/08/2022     No results found for: \"CHOLHDL\"    Imaging: IR renal angiogram    Result Date: 4/10/2024  Narrative: Left radial angiogram Abdominal aortogram Left renal angiogram Angioplasty and stenting of the left renal artery History: Left renal artery stenosis with a single renal artery Contrast: 150 mL of iodixanol (VISIPAQUE) Fluoro time: 23.6 MINUTES Number of Images: 194 Radiation Dose: 167 mGy Conscious sedation time:  45 MINUTES Technique: The patient was brought to the interventional radiology suite and identified verbally and by wrist band. The patient was placed supine on the table and the left wrist and left groin were prepped and draped in the usual sterile fashion. Lidocaine was administered to the skin and a small skin incision was made. The left radial artery was then punctured percutaneously utilizing Seldinger technique. The needle was seen in the patent artery by ultrasound. A 5 Swiss slender sheath was placed. A [place] wire was advanced into the abdominal aorta over which a 5 Swiss omni flush catheter was then advanced to the level of T12 and a digital subtraction abdominal " aortogram was performed. Next, the catheter was exchanged out for a 90 cm 5 Armenian sheath which was placed into the abdominal aorta through which a 5 Armenian MPA catheter was advanced over a Bentson wire into the left renal artery. A left renal angiogram was performed confirming the catheter in the lumen and then an 018 SV 5 wire  was advanced into the left renal artery and the MPA catheter was removed. 4 mm angioplasty was performed of the proximal left renal artery stenosis. A left renal angiogram was performed. Next, a 6 mm x 18 mm express SD balloon expandable stent was then placed into the proximal left renal artery and deployed. The stent was post angioplastied using a 5 mm balloon. A post procedure left renal angiogram was then performed through the sheath. The sheath within the was then removed and hemostasis was achieved via a TR band. The patient tolerated the procedure well, including conscious sedation with no immediate complication seen.     Impression: Impression: Successful 6 mm stenting of the proximal left renal artery severe 95 to 99% focal stenosis with no residual stenosis seen. The right renal artery is atretic. The SMA and celiac axis are patent with a replaced right hepatic artery off of the superior mesenteric artery. There is mild atherosclerosis of the mid superior mesenteric artery. The aorta is heavily calcified with mild fusiform ectasia in the infrarenal abdominal aorta. The iliac arteries are severely calcified but patent. There is severe calcification of the bilateral right greater than left common iliac arteries into the distal aorta. The left external iliac artery appears to have a dissection. This does not appear flow-limiting. PLAN: IR clinic visit in 2 weeks with a baseline renal artery duplex and then repeat at 6 months. Patient was placed on aspirin forever and Plavix for 90 days. Workstation performed: [id number]     VAS upper limb venous duplex scan,  unilateral/limited    Result Date: 4/6/2024  Narrative:  [facility] REPORT CLINICAL: Indications: Patient presents with left upper extremity edema x 1 day. Operative History: 2024-04-04 Renal angioplasty Coronary Angioplasty w/ Stent Placement Risk Factors The patient has history of HTN, COPD, Hyperlipidemia, CKD and CAD.  FINDINGS:  Left             Impression      Bas Mid Arm      Not visualized  Bas AC           Not visualized  Bas Mid Forearm  Not visualized     CONCLUSION:  Impression  RIGHT UPPER LIMB LIMITED: Unable to evaluate due to dialysis catheter and bandage.  LEFT UPPER LIMB: No evidence of acute or chronic deep vein thrombosis. No evidence of superficial thrombophlebitis noted. Basilic vein not visualized. Doppler evaluation shows a normal response to augmentation maneuvers.  SIGNATURE: Electronically Signed by: JUANCARLOS MONTAÑO on 2024-04-06 07:32:30 PM    IR renal angiogram    Result Date: 4/5/2024  Narrative: Examination: Renal angiogram and stent placement, not performed HISTORY: True vasculopath with ischemic insults in multiple vascular territories. She has a previously documented left renal artery stenosis. This is functionally a solitary kidney. She recently went into acute renal failure. She has been dialyzed several times. There is still some renal function. Trying to hold off on dialysis. She is not tolerating fluid boluses well TECHNIQUE: The patient was brought to radiology.  Informed consent was obtained.  The left groin was prepped and draped in the usual sterile fashion.  Timeout was performed.  Lidocaine was given as local anesthesia. Using sonographic guidance, an 18-gauge needle was advanced into the common femoral artery.  Images were obtained. Using fluoroscopic guidance, a wire was advanced into the external iliac artery. The common femoral was quite calcified. Roadmap guidance was used to navigate the common femoral. The external iliac was diffusely calcified.  Roadmap guidance was used here as well A 6 Martiniquais frazier sheath was placed from the left groin into the infrarenal abdominal aorta. A 5 Martiniquais VS 2 catheter was used to select the left renal artery. A tattoo wire was advanced through the renal artery. The VS 2 catheter would not seat into the renal artery. The patient was systemically heparinized. I then tried 5 Martiniquais Cobra 2 catheter, Gant catheter, and Berenstein. They would not pass the lesion. 4 Martiniquais glide catheter would not cross the lesion. A [place] crossing catheter would not cross the lesion. Attempts to advance the dilator of the 6 Martiniquais sheath across the lesion failed. The catheter was removed. The sheath was exchanged for a Perclose device. The Perclose failed. She was given protamine, and reverse. The sheath was removed. Prompt hemostasis was obtained. I want to see her that evening. The groin has some bruising from the Perclose exchange. There was no hematoma. COMPARISON: Previous MRI FINDINGS: There is a single left renal artery. This is downward angled, at 51 degrees. Once the stiff part of the wire got into the renal artery, it was much closer to 90 degrees. The dominant division had a prominent knuckle. The tattoo wire would not advance through this knuckle. This can be decreased purchase. I couldn't get anything in an 035 platform to cross over the high-grade stenosis at the ostium. This is part of a large aortic plaque. I abandon the procedure in favor of an approach from above the diaphragm. This could be brachial or radial. I would favor a small diameter wire, and low-profile system, at least 4 predilation.     Impression: Technically successful renal angiogram using sonographic and fluoroscopic guidance. Technically unsuccessful angioplasty and stenting. I recommended she go to about the more they have a better variety of long devices for upper extremity approach This is the end of the clinically relevant portion of this report.   "Subsequent information is for compliance, documentation, and coding purposes. In the process of informed consent, information was communicated, verbally, to the patient regarding the procedure.  The patient was informed of; the name of the procedure, nature of the procedure, nature of the condition, risks, benefits, alternatives, and potential complications, as well as the consequences of not having the procedure.  All the patient's questions were answered.  Informed consent was signed. Automated exposure control was utilized, and dose was minimized, in accordance with the application of the ALARA technique. Chlorhexidine and alcohol was used for cleansing and sterile preparation of the skin.  This was allowed to dry prior to the application of sterile draping. Timeout was performed, with all team members present, and in agreement.  Confirmation of patient, procedure, laterally, allergies, and all needed equipment was performed. The patient was examined, and is in satisfactory condition for planned moderate sedation. Mallampati score: 2 Intravenous conscious sedation was provided.  There was continuous monitoring of blood pressure, heart rate, respiratory rate, and oxygen saturation by an independent trained observer. Conscious sedation time: 105 minutes Versed dose: 1 milligrams Fentanyl dose: 75 micrograms After the procedure, the patient was recovered, and return to their baseline status, and was deemed fit for discharge from IR Fluoroscopy time: 18.4 Minutes Radiation dose: 347 milliGray CONTRAST DOSE: 6 cc Visipaque 320 PROCEDURE: See report title Preprocedure diagnosis: Please see \"history \", above Postprocedure diagnosis: Same Antibiotics: None Specimen: None Estimated blood loss: Less than 5 mL Anesthesia: Local and monitored intravenous conscious sedation Ultrasound was used to evaluate the above-named target as a potential access.  Static and real-time images of needle entry were obtained, and archived. " Workstation performed: [id number]     XR chest portable    Result Date: 4/4/2024  Narrative: XR CHEST PORTABLE INDICATION: SOB. COPD, dyspnea. COMPARISON: Chest radiograph 3/29/2024. FINDINGS: Dialysis catheter with tip projecting over the cavoatrial junction. Bibasilar subsegmental atelectasis. Small bilateral left greater than right pleural effusions. Unremarkable cardiomediastinal silhouette.  Atherosclerotic vascular calcifications noted. Bones are unremarkable for age. Normal upper abdomen.     Impression: Bilateral small pleural effusions with adjacent subsegmental atelectasis. Resident: FLAVIO ROSS I, the attending radiologist, have reviewed the images and agree with the final report above. Workstation performed: [id number]     CT abdomen wo contrast    Result Date: 4/2/2024  Narrative: CT - ABDOMEN WITHOUT IV CONTRAST INDICATION: evaluate renal artery for diameter and calcification; planning for L renal artery intervention. COMPARISON: 3/25/2024 ultrasound, 5/23/2022 MRA. : TECHNIQUE: CT examination of the abdomen was performed without intravenous contrast. Multiplanar 2D reformatted images were created from the source data. This examination, like all CT scans performed in the [facility], was performed utilizing techniques to minimize radiation dose exposure, including the use of iterative reconstruction and automated exposure control. Radiation dose length product (DLP) for this visit: 373.99 mGy-cm Enteric Contrast: Not administered. FINDINGS: LOWER CHEST: Large bilateral dependent pleural effusions, right greater than left. Associated atelectatic changes in both lower lobes. LIVER/BILIARY TREE: 8 mm rounded well-defined hypodensity right posterior segment image 2/35. Stable compared to MRI. Mild relative hypertrophy of the left liver and relative dilatation of the main portal vein. Correlate with clinical and liver disease. GALLBLADDER: Cholelithiasis without findings of acute  cholecystitis. SPLEEN: Unremarkable. PANCREAS: Unremarkable. ADRENAL GLANDS: Unremarkable. KIDNEYS/VISUALIZED URETERS: Marked atrophy of the right kidney, cortical thickness of approximately 6 mm. Multiple renal cortical cysts present. Left kidney within normal limits for age. Right renal artery poorly visualized at its origin as it abuts the IVC at this level and cannot be differentiated in the absence of contrast. Left renal artery is approximately 5 mm in the axial plane at the origin, image 2/47 and 5 mm on the coronal reformat image 601/67. There is no calcified plaque seen within the proximal and mid segments of the main left renal artery but there is heavy calcified plaque throughout the aorta including at the level of the origin. STOMACH AND VISUALIZED BOWEL: Unremarkable. ABDOMINAL CAVITY: Mild ascites. No pneumoperitoneum. No lymphadenopathy. VESSELS: Unremarkable for patient's age. ABDOMINAL WALL: Diffuse significant flank edema suggesting third spacing of fluid. BONES: No acute fracture or suspicious osseous lesion.     Impression: Large bilateral pleural effusions, ascites and extensive flank edema. Findings suggesting fluid overload/third spacing. Cholelithiasis without evidence for cholecystitis. Significant renal cortical atrophy and cysts as as above. Left renal artery measurements provided as per history. Heavy calcified plaque within the aorta including at the level of the origin but not extending within the renal artery. Asymmetric hypertrophy of the left liver and dilatation of the portal vein. Correlate for evidence of underlying liver disease and portal hypertension. Workstation performed: [id number]         Review of Systems   Constitutional: Positive for malaise/fatigue. Negative for chills and fever.   HENT:  Negative for congestion.    Cardiovascular:  Positive for dyspnea on exertion (chronic; currently at baseline). Negative for chest pain, leg swelling, orthopnea and palpitations.    Respiratory:  Negative for cough, shortness of breath (no SOB at rest) and wheezing.    Endocrine: Negative.    Hematologic/Lymphatic: Negative.    Skin: Negative.    Musculoskeletal: Negative.    Gastrointestinal:  Negative for bloating, abdominal pain, nausea and vomiting.   Genitourinary: Negative.    Neurological:  Positive for weakness. Negative for dizziness and light-headedness.   Psychiatric/Behavioral: Negative.     All other systems reviewed and are negative.      Vitals:    05/09/24 1428   BP: 165/74   Pulse: 60     Vitals:    05/09/24 1428   Weight: 38.6 kg (85 lb)     Height: 5' (152.4 cm)   Body mass index is 16.6 kg/m².    Physical Exam:  Physical Exam  Vitals reviewed.   Constitutional:       General: She is in acute distress.      Comments: Thin, frail   HENT:      Head: Normocephalic and atraumatic.      Mouth/Throat:      Mouth: Mucous membranes are moist.   Cardiovascular:      Rate and Rhythm: Normal rate and regular rhythm.      Heart sounds: Normal heart sounds, S1 normal and S2 normal. No murmur heard.  Pulmonary:      Effort: Pulmonary effort is normal. No respiratory distress.      Breath sounds: Normal breath sounds.   Abdominal:      General: Bowel sounds are normal. There is no distension.      Palpations: Abdomen is soft.   Musculoskeletal:         General: Normal range of motion.      Cervical back: Normal range of motion and neck supple.      Right lower leg: No edema.      Left lower leg: No edema.   Skin:     General: Skin is warm and dry.   Neurological:      Mental Status: She is alert and oriented to person, place, and time.   Psychiatric:         Mood and Affect: Mood normal.

## 2024-05-02 PROBLEM — E55.9 VITAMIN D DEFICIENCY: Status: ACTIVE | Noted: 2024-05-02

## 2024-05-02 PROBLEM — N25.81 SECONDARY HYPERPARATHYROIDISM OF RENAL ORIGIN (HCC): Status: ACTIVE | Noted: 2024-05-02

## 2024-05-03 ENCOUNTER — OFFICE VISIT (OUTPATIENT)
Dept: NEPHROLOGY | Facility: CLINIC | Age: 79
End: 2024-05-03
Payer: MEDICARE

## 2024-05-03 VITALS
HEIGHT: 60 IN | SYSTOLIC BLOOD PRESSURE: 102 MMHG | OXYGEN SATURATION: 99 % | HEART RATE: 88 BPM | DIASTOLIC BLOOD PRESSURE: 58 MMHG | BODY MASS INDEX: 16.41 KG/M2 | WEIGHT: 83.6 LBS

## 2024-05-03 DIAGNOSIS — I15.0 RENOVASCULAR HYPERTENSION: ICD-10-CM

## 2024-05-03 DIAGNOSIS — E83.39 HYPERPHOSPHATEMIA: ICD-10-CM

## 2024-05-03 DIAGNOSIS — N17.9 ACUTE RENAL FAILURE SUPERIMPOSED ON STAGE 3A CHRONIC KIDNEY DISEASE, UNSPECIFIED ACUTE RENAL FAILURE TYPE (HCC): Primary | ICD-10-CM

## 2024-05-03 DIAGNOSIS — R80.9 PROTEINURIA, UNSPECIFIED TYPE: ICD-10-CM

## 2024-05-03 DIAGNOSIS — N26.1 ATROPHY OF RIGHT KIDNEY: ICD-10-CM

## 2024-05-03 DIAGNOSIS — E55.9 VITAMIN D DEFICIENCY: ICD-10-CM

## 2024-05-03 DIAGNOSIS — N18.31 STAGE 3A CHRONIC KIDNEY DISEASE (HCC): ICD-10-CM

## 2024-05-03 DIAGNOSIS — E87.1 HYPONATREMIA: ICD-10-CM

## 2024-05-03 DIAGNOSIS — N25.81 SECONDARY HYPERPARATHYROIDISM OF RENAL ORIGIN (HCC): ICD-10-CM

## 2024-05-03 DIAGNOSIS — E83.42 HYPOMAGNESEMIA: ICD-10-CM

## 2024-05-03 DIAGNOSIS — N18.31 ACUTE RENAL FAILURE SUPERIMPOSED ON STAGE 3A CHRONIC KIDNEY DISEASE, UNSPECIFIED ACUTE RENAL FAILURE TYPE (HCC): Primary | ICD-10-CM

## 2024-05-03 PROCEDURE — 99214 OFFICE O/P EST MOD 30 MIN: CPT

## 2024-05-03 RX ORDER — LISINOPRIL 20 MG/1
TABLET ORAL
COMMUNITY
Start: 2024-05-02

## 2024-05-03 RX ORDER — NIFEDIPINE 30 MG
TABLET, EXTENDED RELEASE ORAL
COMMUNITY
Start: 2024-04-24 | End: 2024-05-03 | Stop reason: SINTOL

## 2024-05-03 NOTE — PATIENT INSTRUCTIONS
Patient is seeing you today.    Your kidney function has improved significantly your most recent creatinine is 0.86 mg/dL, GFR 63 mL/min  Please continue to stay well-hydrated, aim for 2 quarts of water per day  Please continue to follow a low-sodium diet, aim for less than 2 g/day    Continue monitoring your blood pressure at home 3-4 times per week and keep a record.  If blood pressure upper number is consistently greater than 100 or less than 150 please call the office.  Also call the office if you are experiencing frequent headaches, dizziness, lightheadedness or chest pain.    Please have lab work completed prior to next visit in 2 months.

## 2024-05-03 NOTE — PROGRESS NOTES
OFFICE FOLLOW UP - Nephrology   Laya Brooks 79 y.o. female MRN: [id number]       ASSESSMENT/PLAN:    Ema was seen today for hospital follow-up.    Diagnoses and all orders for this visit:    Acute renal failure superimposed on stage 3a chronic kidney disease, unspecified acute renal failure type (HCC)  -     Ambulatory referral to Nephrology  -     Basic metabolic panel; Future    Stage 3a chronic kidney disease (HCC)  -     Basic metabolic panel; Future    Atrophy of right kidney    Hyperphosphatemia  -     Phosphorus; Future  -     Phosphorus; Future    Hyponatremia    Renovascular hypertension    Secondary hyperparathyroidism of renal origin (HCC)  -     Phosphorus; Future    Vitamin D deficiency    Hypomagnesemia  -     Magnesium; Future  -     Magnesium; Future    Proteinuria, unspecified type  -     Albumin / creatinine urine ratio; Future  -     Urinalysis with microscopic; Future    Acute renal failure superimposed on stage IIIa chronic kidney disease requiring temporary hemodialysis  Recent admission for acute renal failure initially admitted to [facility] 3/23, transferred to [facility] 4/2 and transferred to [facility] [place] 4/5.  Peak creatinine 6.27 mg/dL 3/26.  Current creatinine 0.86 mg/dL, below baseline, 5/1  Etiology: Likely secondary to ATN in the settings of contrast associated nephropathy and hemodynamic changes in the setting of renal artery stenosis.  Patient required dialysis for 3 sessions between March 26-29.    Stage 3 chronic kidney disease, baseline creatinine 0.9-1.0 mg/dL  Creatinine 0.86 mg/dL, appropriate, GFR 63 mL/min 5/1  Volume status examines euvolemic to mildly hypovolemic.   CT A/P: Right renal atrophy with multiple cortical cysts.  Left kidney with no hydronephrosis.  4/1  History of elevated UACR, most recent UACR 100 mg/g, 1/29  Continue to avoid NSAIDs and nephrotoxins, stay well-hydrated.  Torsemide previously discontinued due to hypotension and volume depletion.   No evidence of hypervolemia at this time.    Patient has been taking nifedipine but believes nifedipine is causing her weakness and fatigue, see below.  Nifedipine discontinued and advised to restart lisinopril on Monday 5/6.    Repeat urine studies next labs in 2 months  Repeat labs in 2 weeks and again in 2 months prior to next visit.    Atrophy of right kidney  Atrophic right kidney due to renal vascular disease.  Renal artery Doppler with potentially hemodynamically significant stenosis in the left kidney. S/P IR renal angiogram with successful stenting of proximal left renal artery, 4/9    Hyperphosphatemia  Phosphorus 3.4 mg/dL, appropriate, 4/6    Hyponatremia  Sodium 140 mmol/L, appropriate, 5/1    Renovascular hypertension  S/p successful stenting of left renal artery  Office blood pressure 102/58 mmHg  Home blood pressure has not been checking at home, irregular readings. Will rest arm on table when using wrist BP cuff as she has been holding her arm in front of her chest when taking pressures.   Patient and spouse state they will try monitoring blood pressure at home and hopefully will be more accurate with arm resting.    Secondary hyperparathyroidism  Repeat PTH and phosphorus with next labs    Vitamin D deficiency  Repeat vitamin D levels with next labs    PATIENT INSTRUCTIONS:  Patient Instructions   Patient is seeing you today.    Your kidney function has improved significantly your most recent creatinine is 0.86 mg/dL, GFR 63 mL/min  Please continue to stay well-hydrated, aim for 2 quarts of water per day  Please continue to follow a low-sodium diet, aim for less than 2 g/day    Continue monitoring your blood pressure at home 3-4 times per week and keep a record.  If blood pressure upper number is consistently greater than 100 or less than 150 please call the office.  Also call the office if you are experiencing frequent headaches, dizziness, lightheadedness or chest pain.    Please have lab work  completed prior to next visit in 2 months.    HPI: Laya Brooks is a 79 y.o. female who is seen in [place] office follow-up of acute renal failure, stage IIIa chronic kidney disease, atrophy of right kidney, hyperphosphatemia and hyponatremia.  PMH includes hypertensive emergency, PACs, CVA, carotid artery stenosis, COPD, dysphagia, thrombocytopenia, neutropenia, leukopenia, HLD and unintentional weight loss.    Denies recent visits to emergency department, hospitalizations or illness.  States overall she has been feeling well.     ROS:   Patient states she has been very weak since 2 days after coming home.  Patient feels as though she has been more active at home leading to more fatigue.  Patient believes fatigue, mild lightheadedness and nausea are due to nifedipine as she read these are adverse reactions to nifedipine.  Recommended patient go to ED, patient declined.  She was originally discharged on nifedipine 60 mg daily, which was then decreased to 40 mg daily but states she does not feel any better with decreased dose and states she would like to stop taking nifedipine.  Blood pressure hypotensive 102/58 mmHg in office today.  Advised to discontinue nifedipine at this time.  Wait until Monday 5/6 to restart lisinopril and check blood pressure daily over the weekend.      Advised patient and  to recheck blood pressure at least once daily and go to emergency room if blood pressure is less than 100 or if she continues not feeling well by Sunday with not taking nifedipine.  Also advised to go to ED if continued weakness, again patient declined at this time, reiterated to them of importance of evaluation if continuing weakness and hypotensive. Patient experiencing dyspnea with conversation; however, both patient and spouse state dyspnea is at her baseline. Patient states appetite her has been good.    Denies headache, dizziness, blurred vision, increased shortness of breath, chest pain, abdominal pain,  back/flank pain, dysuria, hematuria, changes in urinary frequency or volume, difficulty or inability to urinate, edema or cramping.     A complete review of systems was done. Pertinent positives and negatives as noted in the HPI, otherwise the review of systems is negative.    Allergies: Influenza virus vaccine    Medications:   Current Outpatient Medications:     albuterol (PROVENTIL HFA,VENTOLIN HFA) 90 mcg/act inhaler, Inhale 2 puffs every 6 (six) hours as needed for wheezing, Disp: , Rfl:     aspirin 81 mg chewable tablet, Chew 1 tablet (81 mg total) daily, Disp: 90 tablet, Rfl: 3    atorvastatin (LIPITOR) 40 mg tablet, TAKE 1 TABLET BY MOUTH ONCE DAILY IN THE EVENING. TAKE IN PLACE OF SIMVASTATIN, Disp: 90 tablet, Rfl: 3    clopidogrel (PLAVIX) 75 mg tablet, Take 1 tablet (75 mg total) by mouth daily, Disp: 90 tablet, Rfl: 0    ergocalciferol (ERGOCALCIFEROL) 1.25 MG (64101 UT) capsule, Take 1 capsule (50,000 Units total) by mouth once a week, Disp: 12 capsule, Rfl: 1    lisinopril (ZESTRIL) 20 mg tablet, , Disp: , Rfl:     tiotropium-olodaterol (Stiolto Respimat) 2.5-2.5 MCG/ACT inhaler, Inhale 2 puffs daily, Disp: 4 g, Rfl: 2    Past Medical History:   Diagnosis Date    CAD (coronary artery disease)     Cardiac disease     Hypercholesteremia     Hyperlipidemia     Hypertension     Hypertension     Renal disorder      Past Surgical History:   Procedure Laterality Date    CORONARY ANGIOPLASTY WITH STENT PLACEMENT      IR RENAL ANGIOGRAM  4/4/2024    IR RENAL ANGIOGRAM  4/9/2024    IR TEMPORARY DIALYSIS CATHETER PLACEMENT  3/26/2024    WRIST SURGERY       History reviewed. No pertinent family history.   reports that she has quit smoking. She has never used smokeless tobacco. She reports that she does not drink alcohol and does not use drugs.      Physical Exam:   Vitals:    05/03/24 1449   BP: 102/58   BP Location: Left arm   Patient Position: Sitting   Pulse: 88   SpO2: 99%   Weight: 37.9 kg (83 lb 9.6 oz)  "  Height: 5' (1.524 m)     Body mass index is 16.33 kg/m².    General: no acute distress, ill-appearing, cachectic, dyspneic with mild exertion  Eyes: conjunctivae pink, anicteric sclerae  ENT: mucous membranes moist  Neck: supple, no JVD  Chest: clear to auscultation bilaterally with no wheezes, rale or rhochi  CVS: Irregularly irregular rate and rhythm, history of premature atrial complexes  Abdomen: soft, non-tender, non-distended  Extremities: no lower extremity edema   Skin: no rash  Neuro: awake and alert       Lab Results:  Results for orders placed or performed in visit on 05/01/24   CBC and Platelet   Result Value Ref Range    WBC 7.94 4.31 - 10.16 Thousand/uL    RBC 3.79 (L) 3.81 - 5.12 Million/uL    Hemoglobin 11.3 (L) 11.5 - 15.4 g/dL    Hematocrit 34.1 (L) 34.8 - 46.1 %    MCV 90 82 - 98 fL    MCH 29.8 26.8 - 34.3 pg    MCHC 33.1 31.4 - 37.4 g/dL    RDW 14.9 11.6 - 15.1 %    Platelets 161 149 - 390 Thousands/uL    MPV 10.7 8.9 - 12.7 fL   Comprehensive metabolic panel   Result Value Ref Range    Sodium 140 135 - 147 mmol/L    Potassium 3.3 (L) 3.5 - 5.3 mmol/L    Chloride 104 96 - 108 mmol/L    CO2 25 21 - 32 mmol/L    ANION GAP 11 4 - 13 mmol/L    BUN 10 5 - 25 mg/dL    Creatinine 0.86 0.60 - 1.30 mg/dL    Glucose 172 (H) 65 - 140 mg/dL    Calcium 9.1 8.4 - 10.2 mg/dL    AST 13 13 - 39 U/L    ALT 8 7 - 52 U/L    Alkaline Phosphatase 91 34 - 104 U/L    Total Protein 6.2 (L) 6.4 - 8.4 g/dL    Albumin 4.0 3.5 - 5.0 g/dL    Total Bilirubin 0.47 mg/dL    eGFR 63 ml/min/1.73sq m       Results from last 7 days   Lab Units 05/01/24  1054   WBC Thousand/uL 7.94   HEMOGLOBIN g/dL 11.3*   HEMATOCRIT % 34.1*   PLATELETS Thousands/uL 161   SODIUM mmol/L 140   POTASSIUM mmol/L 3.3*   CHLORIDE mmol/L 104   CO2 mmol/L 25   BUN mg/dL 10   CREATININE mg/dL 0.86   CALCIUM mg/dL 9.1         Portions of the record may have been created with voice recognition software. Occasional wrong word or \"sound a like\" substitutions " may have occurred due to the inherent limitations of voice recognition software. Read the chart carefully and recognize, using context, where substitutions have occurred.If you have any questions, please contact the dictating provider.

## 2024-05-06 ENCOUNTER — OFFICE VISIT (OUTPATIENT)
Dept: VASCULAR SURGERY | Facility: HOSPITAL | Age: 79
End: 2024-05-06
Payer: MEDICARE

## 2024-05-06 VITALS
DIASTOLIC BLOOD PRESSURE: 96 MMHG | HEIGHT: 60 IN | TEMPERATURE: 97.6 F | SYSTOLIC BLOOD PRESSURE: 141 MMHG | WEIGHT: 85.6 LBS | BODY MASS INDEX: 16.81 KG/M2 | HEART RATE: 74 BPM | OXYGEN SATURATION: 96 %

## 2024-05-06 DIAGNOSIS — N28.89 RENAL VASCULAR DISEASE: Primary | ICD-10-CM

## 2024-05-06 PROCEDURE — 99214 OFFICE O/P EST MOD 30 MIN: CPT | Performed by: SURGERY

## 2024-05-06 NOTE — ASSESSMENT & PLAN NOTE
79-year-old female with recent hospitalization due to CHF and acute on chronic renal failure requiring dialysis status post left renal artery stenting on 4/9/2024 via a left upper extremity access.    She had assessable stent placement and since procedure her renal function has recovered essentially back to baseline with a GFR 63 and a creatinine of 0.85.    Patient states she is not currently taking aspirin I am recommending she begin aspirin 81 along with Plavix for her recent stent placement.  Patient to stop aspirin July 1, 2024.  Patient should continue her statin as prescribed.  Will plan for a renal duplex in 3 months and follow-up at that time to review the results

## 2024-05-06 NOTE — PROGRESS NOTES
Assessment/Plan:    Renal vascular disease  79-year-old female with recent hospitalization due to CHF and acute on chronic renal failure requiring dialysis status post left renal artery stenting on 4/9/2024 via a left upper extremity access.    She had assessable stent placement and since procedure her renal function has recovered essentially back to baseline with a GFR 63 and a creatinine of 0.85.    Patient states she is not currently taking aspirin I am recommending she begin aspirin 81 along with Plavix for her recent stent placement.  Patient to stop aspirin July 1, 2024.  Patient should continue her statin as prescribed.  Will plan for a renal duplex in 3 months and follow-up at that time to review the results       Diagnoses and all orders for this visit:    Renal vascular disease  -     VAS renal artery complete; Future          Subjective:      Patient ID: Laya Brooks is a 79 y.o. female.      Patient presents today to review upper limb duplex and discuss surgical options and IR enal angiogram done 4/9/24. Pt reports swelling to left wrist area but now is fine and reports feeling very tried and weak.  Pt is taking ASA 81 mg, Atorvastatin and Plavix. Pt is a former smoker.     HPI    The following portions of the patient's history were reviewed and updated as appropriate: allergies, current medications, past family history, past medical history, past social history, past surgical history, and problem list.    I have spent a total time of 30 minutes on 05/06/24 in caring for this patient including Diagnostic results, Prognosis, Risks and benefits of tx options, Instructions for management, Patient and family education, Importance of tx compliance, Risk factor reductions, Impressions, Counseling / Coordination of care, Documenting in the medical record, Reviewing / ordering tests, medicine, procedures  , and Obtaining or reviewing history  .    Review of Systems   Constitutional: Negative.    HENT:  Negative.     Eyes: Negative.    Respiratory: Negative.     Cardiovascular: Negative.    Gastrointestinal: Negative.    Endocrine: Negative.    Genitourinary: Negative.    Musculoskeletal: Negative.    Skin: Negative.    Allergic/Immunologic: Negative.    Neurological:  Positive for light-headedness.   Hematological:  Bruises/bleeds easily.   Psychiatric/Behavioral: Negative.           Objective:      /96 (BP Location: Left arm, Patient Position: Sitting, Cuff Size: Standard)   Pulse 74   Temp 97.6 °F (36.4 °C) (Temporal)   Ht 5' (1.524 m)   Wt 38.8 kg (85 lb 9.6 oz)   SpO2 96%   BMI 16.72 kg/m²          Physical Exam  Constitutional:       Appearance: Normal appearance.   HENT:      Head: Normocephalic.      Mouth/Throat:      Mouth: Mucous membranes are moist.      Pharynx: Oropharynx is clear.   Eyes:      Extraocular Movements: Extraocular movements intact.      Pupils: Pupils are equal, round, and reactive to light.   Cardiovascular:      Rate and Rhythm: Normal rate and regular rhythm.      Pulses:           Radial pulses are 2+ on the right side and 2+ on the left side.   Pulmonary:      Effort: Pulmonary effort is normal.   Abdominal:      General: Abdomen is flat.      Tenderness: There is no abdominal tenderness.   Musculoskeletal:      Cervical back: Normal range of motion.      Right lower leg: No edema.      Left lower leg: No edema.   Skin:     General: Skin is warm and dry.   Neurological:      General: No focal deficit present.      Mental Status: She is alert and oriented to person, place, and time.   Psychiatric:         Mood and Affect: Mood normal.         Behavior: Behavior normal.

## 2024-05-08 LAB
ALBUMIN SERPL BCP-MCNC: 4 G/DL (ref 3.5–5)
ALP SERPL-CCNC: 91 U/L (ref 34–104)
ALT SERPL W P-5'-P-CCNC: 8 U/L (ref 7–52)
ANION GAP SERPL CALCULATED.3IONS-SCNC: 11 MMOL/L (ref 4–13)
AST SERPL W P-5'-P-CCNC: 13 U/L (ref 13–39)
BILIRUB SERPL-MCNC: 0.47 MG/DL (ref 0.2–1)
BUN SERPL-MCNC: 10 MG/DL (ref 5–25)
CALCIUM SERPL-MCNC: 9.1 MG/DL (ref 8.4–10.2)
CHLORIDE SERPL-SCNC: 104 MMOL/L (ref 96–108)
CO2 SERPL-SCNC: 25 MMOL/L (ref 21–32)
CREAT SERPL-MCNC: 0.86 MG/DL (ref 0.6–1.3)
GFR SERPL CREATININE-BSD FRML MDRD: 63 ML/MIN/1.73SQ M
GLUCOSE SERPL-MCNC: 172 MG/DL (ref 65–140)
POTASSIUM SERPL-SCNC: 3.3 MMOL/L (ref 3.5–5.3)
PROT SERPL-MCNC: 6.2 G/DL (ref 6.4–8.4)
SODIUM SERPL-SCNC: 140 MMOL/L (ref 135–147)

## 2024-05-09 ENCOUNTER — OFFICE VISIT (OUTPATIENT)
Dept: CARDIOLOGY CLINIC | Facility: HOSPITAL | Age: 79
End: 2024-05-09
Payer: MEDICARE

## 2024-05-09 VITALS
HEART RATE: 60 BPM | HEIGHT: 60 IN | DIASTOLIC BLOOD PRESSURE: 84 MMHG | SYSTOLIC BLOOD PRESSURE: 128 MMHG | WEIGHT: 85 LBS | BODY MASS INDEX: 16.69 KG/M2

## 2024-05-09 DIAGNOSIS — I10 HYPERTENSION, UNSPECIFIED TYPE: ICD-10-CM

## 2024-05-09 DIAGNOSIS — Z72.0 TOBACCO USE: ICD-10-CM

## 2024-05-09 DIAGNOSIS — E87.70 HYPERVOLEMIA, UNSPECIFIED HYPERVOLEMIA TYPE: ICD-10-CM

## 2024-05-09 DIAGNOSIS — E78.5 DYSLIPIDEMIA: ICD-10-CM

## 2024-05-09 DIAGNOSIS — G47.33 OSA (OBSTRUCTIVE SLEEP APNEA): ICD-10-CM

## 2024-05-09 DIAGNOSIS — N18.31 STAGE 3A CHRONIC KIDNEY DISEASE (HCC): ICD-10-CM

## 2024-05-09 DIAGNOSIS — I25.10 CORONARY ARTERY DISEASE INVOLVING NATIVE CORONARY ARTERY OF NATIVE HEART WITHOUT ANGINA PECTORIS: ICD-10-CM

## 2024-05-09 DIAGNOSIS — N28.89 RENAL VASCULAR DISEASE: ICD-10-CM

## 2024-05-09 DIAGNOSIS — R53.83 OTHER FATIGUE: ICD-10-CM

## 2024-05-09 DIAGNOSIS — I63.9 CEREBROVASCULAR ACCIDENT (CVA), UNSPECIFIED MECHANISM (HCC): Primary | ICD-10-CM

## 2024-05-09 DIAGNOSIS — R06.00 DYSPNEA, UNSPECIFIED TYPE: ICD-10-CM

## 2024-05-09 PROCEDURE — 99214 OFFICE O/P EST MOD 30 MIN: CPT | Performed by: NURSE PRACTITIONER

## 2024-05-15 ENCOUNTER — OFFICE VISIT (OUTPATIENT)
Dept: PULMONOLOGY | Facility: CLINIC | Age: 79
End: 2024-05-15
Payer: MEDICARE

## 2024-05-15 VITALS
TEMPERATURE: 98 F | DIASTOLIC BLOOD PRESSURE: 76 MMHG | HEIGHT: 60 IN | BODY MASS INDEX: 16.88 KG/M2 | OXYGEN SATURATION: 97 % | HEART RATE: 76 BPM | WEIGHT: 86 LBS | SYSTOLIC BLOOD PRESSURE: 122 MMHG

## 2024-05-15 DIAGNOSIS — J44.9 CHRONIC OBSTRUCTIVE PULMONARY DISEASE, UNSPECIFIED COPD TYPE (HCC): Primary | ICD-10-CM

## 2024-05-15 DIAGNOSIS — F17.210 CIGARETTE NICOTINE DEPENDENCE WITHOUT COMPLICATION: ICD-10-CM

## 2024-05-15 PROCEDURE — 99214 OFFICE O/P EST MOD 30 MIN: CPT

## 2024-05-15 RX ORDER — UMECLIDINIUM BROMIDE AND VILANTEROL TRIFENATATE 62.5; 25 UG/1; UG/1
1 POWDER RESPIRATORY (INHALATION) DAILY
Qty: 180 BLISTER | Refills: 0 | Status: SHIPPED | OUTPATIENT
Start: 2024-05-15 | End: 2024-05-16

## 2024-05-15 NOTE — PROGRESS NOTES
Progress note - Pulmonary Medicine   Laya Brooks 79 y.o. female MRN: [id number]       Impression & Plan:   Laya Brooks is a 79 y.o. female with PMHx of COPD, HTN, CAD, CVA, CKD 3 , HLD and tobacco use disorder who presents for follow-up.    Chronic Obstructive Pulmonary Disease  - The patient has a history of moderate obstructive deficit on PFTs with moderate DLCO reduction and evidence of hyperinflation and air trapping without significant BD response.  She is currently on Anoro once daily and reports feeling stable from a respiratory standpoint.  - Will prescribe Anoro and discontinue Stiolto as she finds the Respimat mechanism difficult to utilize.  - Continue using albuterol HFA as needed  - Recommended PCV 20 and updated COVID vaccinations, the patient declines at this time  - Discussed the importance of daily activity and maintenance of exercise tolerance.  Could consider referral to pulmonary rehab in the future although she just finished general rehab prior to discharge from [facility] and feels she is doing fairly well.  - Follow-up in 6 months.  -     umeclidinium-vilanterol (Anoro Ellipta) 62.5-25 mcg/actuation inhaler; Inhale 1 puff daily    Cigarette Nicotine Dependence  - The patient has significant cut down on how much she is smoking, but still smokes 1 to 2 cigarettes in social situations.  Recommend complete cessation.  - At this time she is above the age limit for LDCT.    Return in about 6 months (around 11/15/2024).  ______________________________________________________________________    HPI:    Laya Brooks is a 79 y.o. female with PMHx of COPD, HTN, CAD, CVA, CKD 3 , HLD and tobacco use disorder who presents for follow-up.  The patient was last seen in the office on 2/22/2024 at which time plan was for initiation of Stiolto with albuterol HFA as needed, CXR and continued attempts at smoking cessation.  CXR showed resolution of prior infiltrate, she notes she had difficulty using the  Respimat inhaler so she had mostly been using albuterol as needed.  Since her last office visit she had 3 hospitalizations related to renal failure/renal artery stenosis and was eventually transferred to [facility] for renal artery stenting.  After her hospitalization she was sent to a SNF for rehab and she reports there she was given an Anoro inhaler which she found much easier to utilize in the Stiolto Respimat.  She cannot recall the last time she used her albuterol HFA and reports overall her breathing has been stable recently.  She estimates she can walk 1-2 blocks on flat ground or 1 flight of steps for needing to rest, but denies any daily structured activity/exercise.  She does not have any wheezing or cough at this time and states she is down to 1 to 2 cigarettes in social situations but is not smoking on a daily basis any longer.    Current Medications:    Current Outpatient Medications:     albuterol (PROVENTIL HFA,VENTOLIN HFA) 90 mcg/act inhaler, Inhale 2 puffs every 6 (six) hours as needed for wheezing, Disp: , Rfl:     aspirin 81 mg chewable tablet, Chew 1 tablet (81 mg total) daily, Disp: 90 tablet, Rfl: 3    atorvastatin (LIPITOR) 40 mg tablet, TAKE 1 TABLET BY MOUTH ONCE DAILY IN THE EVENING. TAKE IN PLACE OF SIMVASTATIN, Disp: 90 tablet, Rfl: 3    clopidogrel (PLAVIX) 75 mg tablet, Take 1 tablet (75 mg total) by mouth daily, Disp: 90 tablet, Rfl: 0    ergocalciferol (ERGOCALCIFEROL) 1.25 MG (86525 UT) capsule, Take 1 capsule (50,000 Units total) by mouth once a week, Disp: 12 capsule, Rfl: 1    glycopyrrolate-formoterol (Bevespi Aerosphere) 9-4.8 MCG/ACT inhaler, Inhale 2 puffs 2 (two) times a day, Disp: 10.7 g, Rfl: 2    lisinopril (ZESTRIL) 20 mg tablet, , Disp: , Rfl:     Review of Systems:  Review of Systems  10-point system review completed, all of which are negative except as mentioned above.    Past medical history, surgical history, and family history were reviewed and updated as  appropriate    Social history updates:  Social History     Tobacco Use   Smoking Status Former   Smokeless Tobacco Never     PhysicalExamination:  Vitals:   /76 (BP Location: Left arm, Patient Position: Sitting, Cuff Size: Standard)   Pulse 76   Temp 98 °F (36.7 °C) (Temporal)   Ht 5' (1.524 m)   Wt 39 kg (86 lb)   SpO2 97%   BMI 16.80 kg/m²   Body mass index is 16.8 kg/m².    Constitutional: NAD, on room air, no conversational dyspnea   Skin: Warm, dry, no rashes noted   Eyes: PERRL, normal conjunctiva  ENT: Nasal congestion absent, moist mucus membranes.  Neck: No JVD, trachea is midline, no adenopathy.  Resp: distant breath sounds, no W/R/R   Cardiac: RRR, +S1/S2, no M/R/G  Abdomen: Soft, NT/ND  Extremities: No digital clubbing or pedal edema  Neuro: AAOx3    Diagnostic Data:  Labs:  I personally reviewed the most recent laboratory data pertinent to today's visit    Lab Results   Component Value Date    WBC 7.94 05/01/2024    HGB 11.3 (L) 05/01/2024    HCT 34.1 (L) 05/01/2024    MCV 90 05/01/2024     05/01/2024     Lab Results   Component Value Date    SODIUM 140 05/01/2024    K 3.3 (L) 05/01/2024    CO2 25 05/01/2024     05/01/2024    BUN 10 05/01/2024    CREATININE 0.86 05/01/2024    CALCIUM 9.1 05/01/2024     PFT results:  The most recent pulmonary function tests were reviewed.  PFTs -- 1/10/2024  FEV1/FVC Ratio: 56 %  FEV1: 1.02 L   61 % predicted  Forced Vital Capacity: 1.81 L           83 % predicted  After administration of bronchodilator: No change  Lung volumes: Total Lung Capacity 121 % predicted Residual volume 167 % predicted  DLCO corrected for patients hemoglobin level: 54 % predicted  Flow volume loop: Consistent with obstruction     Imaging:  I personally reviewed the images in PACS pertinent to today's visit:  CT Chest WO -- 3/24/2024  Small bilateral water density pleural effusions with compressive atelectasis of left lower lobe.  Moderate emphysema. No evidence of  "pneumonia.     Other studies:  2D Echo Complete -- 3/25/2024    Left Ventricle: Left ventricular cavity size is normal. Wall thickness is increased. There is borderline concentric hypertrophy. The left ventricular ejection fraction is 55%. Systolic function is normal. Wall motion is normal. Diastolic function is normal.    Left Atrium: The atrium is mildly dilated.    Aortic Valve: There is aortic valve sclerosis.    Mitral Valve: There is moderate annular calcification.    Tricuspid Valve: There is mild regurgitation.    I have spent a total time of 30 minutes on 05/23/24 in caring for this patient including Instructions for management, Patient and family education, Importance of tx compliance, Counseling / Coordination of care, Documenting in the medical record, Reviewing / ordering tests, medicine, procedures  , and Obtaining or reviewing history  .    Lisa Kidd MD  Pulmonary-Critical Care and Sleep Medicine  05/23/24    Portions of the record may have been created with voice recognition software. Occasional wrong word or \"sound a like\" substitutions may have occurred due to the inherent limitations of voice recognition software. Please read the chart carefully and recognize, using context, where substitutions have occurred.  "

## 2024-05-15 NOTE — PATIENT INSTRUCTIONS
- Start taking anoro inhaler 1 puff once daily  - Continue with albuterol inhaler as needed  - Recommend daily exercise  - Follow up in 6 months

## 2024-05-16 ENCOUNTER — TELEPHONE (OUTPATIENT)
Dept: PULMONOLOGY | Facility: CLINIC | Age: 79
End: 2024-05-16

## 2024-05-16 DIAGNOSIS — J44.9 CHRONIC OBSTRUCTIVE PULMONARY DISEASE, UNSPECIFIED COPD TYPE (HCC): Primary | ICD-10-CM

## 2024-05-16 RX ORDER — GLYCOPYRROLATE AND FORMOTEROL FUMARATE 9; 4.8 UG/1; UG/1
2 AEROSOL, METERED RESPIRATORY (INHALATION) 2 TIMES DAILY
Qty: 10.7 G | Refills: 2 | Status: SHIPPED | OUTPATIENT
Start: 2024-05-16

## 2024-05-16 NOTE — TELEPHONE ENCOUNTER
Pt returning Melina's call.     Informed pt that the Anoro was denied by insurance and that Dr. Kidd sent script for Bevespi to the [facility] in [place].    Explained that Bevespi is a puffer style inhaler similar in appearance to albuterol and is to be taken 2 puffs twice daily.     Informed her that Dr. Kidd would like her to try and if not improving then will retry PA for Anoro.     Pt acknowledged.

## 2024-05-16 NOTE — TELEPHONE ENCOUNTER
PA for ANORO    Submitted via    [x][facility]  [id number]) -   [][employer]-Case ID #   []Faxed to plan   []Other website   []Phone call Case ID #     Office notes sent, clinical questions answered. Awaiting determination    Turnaround time for your insurance to make a decision on your Prior Authorization can take 7-21 business days.

## 2024-05-16 NOTE — TELEPHONE ENCOUNTER
PA for ANORO Denied    Reason: PLAN WANTS TRIAL AND FAILURE OF BEVESPI            Message sent to office clinical pool Yes    Denial letter scanned into Media Yes        **Please follow up with your patient regarding denial and next steps**

## 2024-07-15 ENCOUNTER — APPOINTMENT (OUTPATIENT)
Dept: LAB | Facility: MEDICAL CENTER | Age: 79
End: 2024-07-15
Payer: MEDICARE

## 2024-07-15 DIAGNOSIS — E83.39 HYPERPHOSPHATEMIA: ICD-10-CM

## 2024-07-15 DIAGNOSIS — E55.9 VITAMIN D DEFICIENCY: ICD-10-CM

## 2024-07-15 DIAGNOSIS — R80.9 PROTEINURIA, UNSPECIFIED TYPE: ICD-10-CM

## 2024-07-15 DIAGNOSIS — E83.42 HYPOMAGNESEMIA: ICD-10-CM

## 2024-07-15 DIAGNOSIS — N18.31 STAGE 3A CHRONIC KIDNEY DISEASE (HCC): ICD-10-CM

## 2024-07-15 DIAGNOSIS — N25.81 SECONDARY HYPERPARATHYROIDISM (HCC): ICD-10-CM

## 2024-07-15 DIAGNOSIS — N18.31 ACUTE RENAL FAILURE SUPERIMPOSED ON STAGE 3A CHRONIC KIDNEY DISEASE, UNSPECIFIED ACUTE RENAL FAILURE TYPE (HCC): ICD-10-CM

## 2024-07-15 DIAGNOSIS — N25.81 SECONDARY HYPERPARATHYROIDISM OF RENAL ORIGIN (HCC): ICD-10-CM

## 2024-07-15 DIAGNOSIS — N17.9 ACUTE RENAL FAILURE SUPERIMPOSED ON STAGE 3A CHRONIC KIDNEY DISEASE, UNSPECIFIED ACUTE RENAL FAILURE TYPE (HCC): ICD-10-CM

## 2024-07-15 LAB
25(OH)D3 SERPL-MCNC: 29.6 NG/ML (ref 30–100)
ALBUMIN SERPL BCG-MCNC: 4.1 G/DL (ref 3.5–5)
ALP SERPL-CCNC: 95 U/L (ref 34–104)
ALT SERPL W P-5'-P-CCNC: 6 U/L (ref 7–52)
ANION GAP SERPL CALCULATED.3IONS-SCNC: 9 MMOL/L (ref 4–13)
AST SERPL W P-5'-P-CCNC: 13 U/L (ref 13–39)
BACTERIA UR QL AUTO: ABNORMAL /HPF
BASOPHILS # BLD AUTO: 0.03 THOUSANDS/ÂΜL (ref 0–0.1)
BASOPHILS NFR BLD AUTO: 1 % (ref 0–1)
BILIRUB SERPL-MCNC: 0.52 MG/DL (ref 0.2–1)
BILIRUB UR QL STRIP: NEGATIVE
BUN SERPL-MCNC: 17 MG/DL (ref 5–25)
CALCIUM SERPL-MCNC: 9.1 MG/DL (ref 8.4–10.2)
CHLORIDE SERPL-SCNC: 107 MMOL/L (ref 96–108)
CLARITY UR: CLEAR
CO2 SERPL-SCNC: 25 MMOL/L (ref 21–32)
COLOR UR: ABNORMAL
CREAT SERPL-MCNC: 0.9 MG/DL (ref 0.6–1.3)
CREAT UR-MCNC: 46.8 MG/DL
EOSINOPHIL # BLD AUTO: 0.13 THOUSAND/ÂΜL (ref 0–0.61)
EOSINOPHIL NFR BLD AUTO: 3 % (ref 0–6)
ERYTHROCYTE [DISTWIDTH] IN BLOOD BY AUTOMATED COUNT: 12.8 % (ref 11.6–15.1)
GFR SERPL CREATININE-BSD FRML MDRD: 60 ML/MIN/1.73SQ M
GLUCOSE P FAST SERPL-MCNC: 109 MG/DL (ref 65–99)
GLUCOSE UR STRIP-MCNC: NEGATIVE MG/DL
HCT VFR BLD AUTO: 37 % (ref 34.8–46.1)
HGB BLD-MCNC: 12.3 G/DL (ref 11.5–15.4)
HGB UR QL STRIP.AUTO: NEGATIVE
IMM GRANULOCYTES # BLD AUTO: 0.02 THOUSAND/UL (ref 0–0.2)
IMM GRANULOCYTES NFR BLD AUTO: 0 % (ref 0–2)
KETONES UR STRIP-MCNC: NEGATIVE MG/DL
LEUKOCYTE ESTERASE UR QL STRIP: NEGATIVE
LYMPHOCYTES # BLD AUTO: 0.97 THOUSANDS/ÂΜL (ref 0.6–4.47)
LYMPHOCYTES NFR BLD AUTO: 20 % (ref 14–44)
MAGNESIUM SERPL-MCNC: 2 MG/DL (ref 1.9–2.7)
MCH RBC QN AUTO: 30.3 PG (ref 26.8–34.3)
MCHC RBC AUTO-ENTMCNC: 33.2 G/DL (ref 31.4–37.4)
MCV RBC AUTO: 91 FL (ref 82–98)
MICROALBUMIN UR-MCNC: 86.8 MG/L
MICROALBUMIN/CREAT 24H UR: 185 MG/G CREATININE (ref 0–30)
MONOCYTES # BLD AUTO: 0.47 THOUSAND/ÂΜL (ref 0.17–1.22)
MONOCYTES NFR BLD AUTO: 10 % (ref 4–12)
NEUTROPHILS # BLD AUTO: 3.24 THOUSANDS/ÂΜL (ref 1.85–7.62)
NEUTS SEG NFR BLD AUTO: 66 % (ref 43–75)
NITRITE UR QL STRIP: NEGATIVE
NON-SQ EPI CELLS URNS QL MICRO: ABNORMAL /HPF
NRBC BLD AUTO-RTO: 0 /100 WBCS
PH UR STRIP.AUTO: 6 [PH]
PHOSPHATE SERPL-MCNC: 4.2 MG/DL (ref 2.3–4.1)
PLATELET # BLD AUTO: 119 THOUSANDS/UL (ref 149–390)
PMV BLD AUTO: 12 FL (ref 8.9–12.7)
POTASSIUM SERPL-SCNC: 4.2 MMOL/L (ref 3.5–5.3)
PROT SERPL-MCNC: 6.3 G/DL (ref 6.4–8.4)
PROT UR STRIP-MCNC: ABNORMAL MG/DL
PTH-INTACT SERPL-MCNC: 91.6 PG/ML (ref 12–88)
RBC # BLD AUTO: 4.06 MILLION/UL (ref 3.81–5.12)
RBC #/AREA URNS AUTO: ABNORMAL /HPF
SODIUM SERPL-SCNC: 141 MMOL/L (ref 135–147)
SP GR UR STRIP.AUTO: 1.01 (ref 1–1.03)
UROBILINOGEN UR STRIP-ACNC: <2 MG/DL
WBC # BLD AUTO: 4.86 THOUSAND/UL (ref 4.31–10.16)
WBC #/AREA URNS AUTO: ABNORMAL /HPF

## 2024-07-15 PROCEDURE — 81001 URINALYSIS AUTO W/SCOPE: CPT

## 2024-07-15 PROCEDURE — 36415 COLL VENOUS BLD VENIPUNCTURE: CPT

## 2024-07-15 PROCEDURE — 84100 ASSAY OF PHOSPHORUS: CPT

## 2024-07-15 PROCEDURE — 82043 UR ALBUMIN QUANTITATIVE: CPT

## 2024-07-15 PROCEDURE — 80053 COMPREHEN METABOLIC PANEL: CPT

## 2024-07-15 PROCEDURE — 82570 ASSAY OF URINE CREATININE: CPT

## 2024-07-15 PROCEDURE — 85025 COMPLETE CBC W/AUTO DIFF WBC: CPT

## 2024-07-15 PROCEDURE — 82306 VITAMIN D 25 HYDROXY: CPT

## 2024-07-15 PROCEDURE — 83970 ASSAY OF PARATHORMONE: CPT

## 2024-07-15 PROCEDURE — 83735 ASSAY OF MAGNESIUM: CPT

## 2024-07-29 ENCOUNTER — OFFICE VISIT (OUTPATIENT)
Dept: NEPHROLOGY | Facility: CLINIC | Age: 79
End: 2024-07-29
Payer: MEDICARE

## 2024-07-29 VITALS
HEIGHT: 60 IN | WEIGHT: 90 LBS | BODY MASS INDEX: 17.67 KG/M2 | HEART RATE: 96 BPM | DIASTOLIC BLOOD PRESSURE: 80 MMHG | SYSTOLIC BLOOD PRESSURE: 120 MMHG | OXYGEN SATURATION: 98 %

## 2024-07-29 DIAGNOSIS — I70.1 RENAL ARTERY STENOSIS (HCC): ICD-10-CM

## 2024-07-29 DIAGNOSIS — E55.9 VITAMIN D DEFICIENCY: ICD-10-CM

## 2024-07-29 DIAGNOSIS — N18.31 STAGE 3A CHRONIC KIDNEY DISEASE (HCC): ICD-10-CM

## 2024-07-29 DIAGNOSIS — N25.81 SECONDARY HYPERPARATHYROIDISM OF RENAL ORIGIN (HCC): ICD-10-CM

## 2024-07-29 DIAGNOSIS — I15.0 RENOVASCULAR HYPERTENSION: Primary | ICD-10-CM

## 2024-07-29 DIAGNOSIS — N26.1 ATROPHY OF RIGHT KIDNEY: ICD-10-CM

## 2024-07-29 PROBLEM — N17.9 ACUTE RENAL FAILURE SUPERIMPOSED ON STAGE 3A CHRONIC KIDNEY DISEASE (HCC): Status: RESOLVED | Noted: 2024-03-24 | Resolved: 2024-07-29

## 2024-07-29 PROCEDURE — G2211 COMPLEX E/M VISIT ADD ON: HCPCS | Performed by: INTERNAL MEDICINE

## 2024-07-29 PROCEDURE — 99214 OFFICE O/P EST MOD 30 MIN: CPT | Performed by: INTERNAL MEDICINE

## 2024-07-29 RX ORDER — ERGOCALCIFEROL 1.25 MG/1
50000 CAPSULE ORAL WEEKLY
Qty: 12 CAPSULE | Refills: 3 | Status: SHIPPED | OUTPATIENT
Start: 2024-07-29

## 2024-07-29 NOTE — ASSESSMENT & PLAN NOTE
Refill sent in for once weekly 50,000 units ergocalciferol.  Recommended for the patient to continue taking this dosage of vitamin D supplementation for at least 12 additional months.

## 2024-07-29 NOTE — ASSESSMENT & PLAN NOTE
Lab Results   Component Value Date    EGFR 60 07/15/2024    EGFR 63 05/01/2024    EGFR 41 04/10/2024    CREATININE 0.90 07/15/2024    CREATININE 0.86 05/01/2024    CREATININE 1.22 04/10/2024     Although the patient's kidney function from estimated GFR is at 60 or above, given the patient's small body stature, and loss of muscle mass, she most likely has at least stage IIIa chronic kidney disease, potentially lower.

## 2024-07-29 NOTE — ASSESSMENT & PLAN NOTE
Blood pressure is controlled at this time, continue with current medications.  Encourage low sodium diet.

## 2024-07-29 NOTE — PROGRESS NOTES
[facility]  Damian Lynne DO    Name: Laya Brooks  YOB: 1945      Assessment/Plan:    Renovascular hypertension  Blood pressure is controlled at this time, continue with current medications.  Encourage low sodium diet.    Stage 3a chronic kidney disease (HCC)  Lab Results   Component Value Date    EGFR 60 07/15/2024    EGFR 63 05/01/2024    EGFR 41 04/10/2024    CREATININE 0.90 07/15/2024    CREATININE 0.86 05/01/2024    CREATININE 1.22 04/10/2024     Although the patient's kidney function from estimated GFR is at 60 or above, given the patient's small body stature, and loss of muscle mass, she most likely has at least stage IIIa chronic kidney disease, potentially lower.    Secondary hyperparathyroidism of renal origin (HCC)  Continue with vitamin D supplementation, follow-up PTH levels with next of labs.  No activated vitamin D treatment indicated at this time.    Vitamin D deficiency  Refill sent in for once weekly 50,000 units ergocalciferol.  Recommended for the patient to continue taking this dosage of vitamin D supplementation for at least 12 additional months.         Problem List Items Addressed This Visit          Cardiovascular and Mediastinum    Renovascular hypertension - Primary     Blood pressure is controlled at this time, continue with current medications.  Encourage low sodium diet.         Renal artery stenosis (HCC)       Endocrine    Secondary hyperparathyroidism of renal origin (HCC)     Continue with vitamin D supplementation, follow-up PTH levels with next of labs.  No activated vitamin D treatment indicated at this time.         Relevant Orders    PTH, intact       Genitourinary    Atrophy of right kidney    Stage 3a chronic kidney disease (HCC)     Lab Results   Component Value Date    EGFR 60 07/15/2024    EGFR 63 05/01/2024    EGFR 41 04/10/2024    CREATININE 0.90 07/15/2024    CREATININE 0.86 05/01/2024    CREATININE 1.22  04/10/2024     Although the patient's kidney function from estimated GFR is at 60 or above, given the patient's small body stature, and loss of muscle mass, she most likely has at least stage IIIa chronic kidney disease, potentially lower.         Relevant Orders    Comprehensive metabolic panel    Albumin / creatinine urine ratio    Urinalysis with microscopic    Magnesium       Other    Vitamin D deficiency     Refill sent in for once weekly 50,000 units ergocalciferol.  Recommended for the patient to continue taking this dosage of vitamin D supplementation for at least 12 additional months.         Relevant Medications    ergocalciferol (ERGOCALCIFEROL) 1.25 MG (31420 UT) capsule       Patient remained stable from renal standpoint.  Continue current medications, refill sent in for ergocalciferol 50,000 units weekly.  Will see her back for regular appointment about 6 months.    Of note, patient had significant sputum, with no apparent exacerbation of her COPD.  Recommended for the patient to initiate Mucinex 600 mg twice daily, and also take a nondrowsy antihistamine such as Allegra, which she has used in the past, in order to improve and reduce mucus load.  Asked her to follow-up with her pulmonary physician as well as primary care provider for additional recommendations if indicated and needed.    Subjective:      Patient ID: Laya Brooks is a 79 y.o. female.    Patient presents for follow up.    We reviewed labs in detail, most recent creatinine noted to be 0.9 mg/dL, with an estimated GFR of 60 mL/min.  However, the patient is also 40.8 kg stands at 5 foot with significant reduction of muscle mass.    There were no significant electrolyte abnormalities noted.  Patient is taking all medications as prescribed with no specific side effects and denies use of nonsteroid anti-inflammatory medications.              The following portions of the patient's history were reviewed and updated as appropriate: allergies,  current medications, past family history, past medical history, past social history, past surgical history and problem list.    Review of Systems   All other systems reviewed and are negative.        Social History     Socioeconomic History    Marital status: /Civil Union     Spouse name: None    Number of children: None    Years of education: None    Highest education level: None   Occupational History    None   Tobacco Use    Smoking status: Former    Smokeless tobacco: Never   Vaping Use    Vaping status: Never Used   Substance and Sexual Activity    Alcohol use: Never    Drug use: No    Sexual activity: None   Other Topics Concern    None   Social History Narrative    None     Social Determinants of Health     Financial Resource Strain: Not on file   Food Insecurity: No Food Insecurity (4/10/2024)    Hunger Vital Sign     Worried About Running Out of Food in the Last Year: Never true     Ran Out of Food in the Last Year: Never true   Transportation Needs: No Transportation Needs (4/10/2024)    PRAPARE - Transportation     Lack of Transportation (Medical): No     Lack of Transportation (Non-Medical): No   Physical Activity: Not on file   Stress: Not on file   Social Connections: Not on file   Intimate Partner Violence: Not on file   Housing Stability: Low Risk  (4/10/2024)    Housing Stability Vital Sign     Unable to Pay for Housing in the Last Year: No     Number of Times Moved in the Last Year: 1     Homeless in the Last Year: No     Past Medical History:   Diagnosis Date    Acute renal failure superimposed on stage 3a chronic kidney disease (HCC) 03/24/2024    CAD (coronary artery disease)     Cardiac disease     Hypercholesteremia     Hyperlipidemia     Hypertension     Hypertension     Renal disorder      Past Surgical History:   Procedure Laterality Date    CORONARY ANGIOPLASTY WITH STENT PLACEMENT      IR RENAL ANGIOGRAM  4/4/2024    IR RENAL ANGIOGRAM  4/9/2024    IR TEMPORARY DIALYSIS CATHETER  "PLACEMENT  3/26/2024    WRIST SURGERY         Current Outpatient Medications:     aspirin 81 mg chewable tablet, Chew 1 tablet (81 mg total) daily, Disp: 90 tablet, Rfl: 3    atorvastatin (LIPITOR) 40 mg tablet, TAKE 1 TABLET BY MOUTH ONCE DAILY IN THE EVENING. TAKE IN PLACE OF SIMVASTATIN, Disp: 90 tablet, Rfl: 3    clopidogrel (PLAVIX) 75 mg tablet, Take 1 tablet (75 mg total) by mouth daily, Disp: 90 tablet, Rfl: 0    ergocalciferol (ERGOCALCIFEROL) 1.25 MG (80319 UT) capsule, Take 1 capsule (50,000 Units total) by mouth once a week, Disp: 12 capsule, Rfl: 3    lisinopril (ZESTRIL) 20 mg tablet, , Disp: , Rfl:     albuterol (PROVENTIL HFA,VENTOLIN HFA) 90 mcg/act inhaler, Inhale 2 puffs every 6 (six) hours as needed for wheezing (Patient not taking: Reported on 7/29/2024), Disp: , Rfl:     Lab Results   Component Value Date    SODIUM 141 07/15/2024    K 4.2 07/15/2024     07/15/2024    CO2 25 07/15/2024    AGAP 9 07/15/2024    BUN 17 07/15/2024    CREATININE 0.90 07/15/2024    GLUC 172 (H) 05/01/2024    GLUF 109 (H) 07/15/2024    CALCIUM 9.1 07/15/2024    AST 13 07/15/2024    ALT 6 (L) 07/15/2024    ALKPHOS 95 07/15/2024    TP 6.3 (L) 07/15/2024    TBILI 0.52 07/15/2024    EGFR 60 07/15/2024     Lab Results   Component Value Date    WBC 4.86 07/15/2024    HGB 12.3 07/15/2024    HCT 37.0 07/15/2024    MCV 91 07/15/2024     (L) 07/15/2024     Lab Results   Component Value Date    CHOLESTEROL 123 01/29/2024    CHOLESTEROL 124 06/20/2022    CHOLESTEROL 211 (H) 03/08/2022     Lab Results   Component Value Date    HDL 43 (L) 01/29/2024    HDL 40 (L) 06/20/2022    HDL 45 (L) 03/08/2022     Lab Results   Component Value Date    LDLCALC 62 01/29/2024    LDLCALC 61 06/20/2022    LDLCALC 144 (H) 03/08/2022     Lab Results   Component Value Date    TRIG 90 01/29/2024    TRIG 113 06/20/2022    TRIG 109 03/08/2022     No results found for: \"CHOLHDL\"  Lab Results   Component Value Date    [id number] 2.869 " "11/14/2022     Lab Results   Component Value Date    PTH 91.6 (H) 07/15/2024    CALCIUM 9.1 07/15/2024    PHOS 4.2 (H) 07/15/2024     Lab Results   Component Value Date    SPEP See Comment 07/12/2022    UPEP See Comment 07/12/2022     No results found for: \"MICROALBUR\", \"[id number]\"        Objective:      /80 (BP Location: Left arm, Patient Position: Sitting, Cuff Size: Standard)   Pulse 96   Ht 5' (1.524 m)   Wt 40.8 kg (90 lb)   SpO2 98%   BMI 17.58 kg/m²          Physical Exam  Vitals reviewed.   Constitutional:       General: She is not in acute distress.     Appearance: Normal appearance.   HENT:      Head: Normocephalic and atraumatic.      Right Ear: External ear normal.      Left Ear: External ear normal.   Eyes:      Conjunctiva/sclera: Conjunctivae normal.   Cardiovascular:      Rate and Rhythm: Normal rate and regular rhythm.      Heart sounds: Normal heart sounds.   Pulmonary:      Effort: No respiratory distress.      Breath sounds: No wheezing.   Abdominal:      Palpations: Abdomen is soft.   Skin:     General: Skin is warm and dry.   Neurological:      General: No focal deficit present.      Mental Status: She is alert and oriented to person, place, and time.   Psychiatric:         Mood and Affect: Mood normal.         Behavior: Behavior normal.         "

## 2024-07-29 NOTE — ASSESSMENT & PLAN NOTE
Continue with vitamin D supplementation, follow-up PTH levels with next of labs.  No activated vitamin D treatment indicated at this time.

## 2024-08-07 ENCOUNTER — HOSPITAL ENCOUNTER (OUTPATIENT)
Dept: NON INVASIVE DIAGNOSTICS | Facility: HOSPITAL | Age: 79
Discharge: HOME | End: 2024-08-07
Attending: SURGERY
Payer: MEDICARE

## 2024-08-07 DIAGNOSIS — N28.89 RENAL VASCULAR DISEASE: ICD-10-CM

## 2024-08-07 PROCEDURE — 93975 VASCULAR STUDY: CPT | Performed by: SURGERY

## 2024-08-07 PROCEDURE — 93975 VASCULAR STUDY: CPT

## 2024-08-16 PROBLEM — I15.0 RENOVASCULAR HYPERTENSION: Status: ACTIVE | Noted: 2022-03-23

## 2024-08-16 PROBLEM — G92.8 TOXIC METABOLIC ENCEPHALOPATHY: Status: RESOLVED | Noted: 2024-03-25 | Resolved: 2024-08-16

## 2024-08-16 PROBLEM — J96.01 ACUTE RESPIRATORY FAILURE WITH HYPOXIA (HCC): Status: RESOLVED | Noted: 2024-03-25 | Resolved: 2024-08-16

## 2024-08-16 PROBLEM — I12.9 HYPERTENSIVE CHRONIC KIDNEY DISEASE: Status: ACTIVE | Noted: 2024-08-16

## 2024-08-16 PROBLEM — D63.1 ANEMIA IN CHRONIC KIDNEY DISEASE (CODE): Status: ACTIVE | Noted: 2024-08-16

## 2024-08-16 PROBLEM — I50.31 ACUTE DIASTOLIC CONGESTIVE HEART FAILURE (HCC): Status: RESOLVED | Noted: 2024-03-24 | Resolved: 2024-08-16

## 2024-08-16 PROBLEM — R06.89 ACUTE RESPIRATORY INSUFFICIENCY: Status: RESOLVED | Noted: 2024-04-04 | Resolved: 2024-08-16

## 2024-08-16 PROBLEM — B37.0 ORAL CANDIDIASIS: Status: RESOLVED | Noted: 2024-03-30 | Resolved: 2024-08-16

## 2024-08-16 PROBLEM — I16.1 HYPERTENSIVE EMERGENCY: Status: RESOLVED | Noted: 2022-03-07 | Resolved: 2024-08-16

## 2024-08-16 PROBLEM — L76.82 BLEEDING AT INSERTION SITE: Status: RESOLVED | Noted: 2024-04-04 | Resolved: 2024-08-16

## 2024-08-16 PROBLEM — I63.9 CVA (CEREBRAL VASCULAR ACCIDENT) (HCC): Status: RESOLVED | Noted: 2022-03-18 | Resolved: 2024-08-16

## 2024-08-16 PROBLEM — R63.4 WEIGHT LOSS, NON-INTENTIONAL: Status: RESOLVED | Noted: 2022-07-19 | Resolved: 2024-08-16

## 2024-11-13 ENCOUNTER — HOSPITAL ENCOUNTER (EMERGENCY)
Facility: HOSPITAL | Age: 79
Discharge: HOME | End: 2024-11-13
Attending: EMERGENCY MEDICINE
Payer: MEDICARE

## 2024-11-13 ENCOUNTER — OFFICE VISIT (OUTPATIENT)
Dept: PULMONOLOGY | Facility: CLINIC | Age: 79
End: 2024-11-13
Payer: MEDICARE

## 2024-11-13 VITALS
HEIGHT: 60 IN | DIASTOLIC BLOOD PRESSURE: 98 MMHG | HEART RATE: 87 BPM | BODY MASS INDEX: 18.06 KG/M2 | WEIGHT: 92 LBS | OXYGEN SATURATION: 95 % | TEMPERATURE: 97.6 F | SYSTOLIC BLOOD PRESSURE: 187 MMHG

## 2024-11-13 VITALS
BODY MASS INDEX: 17.95 KG/M2 | SYSTOLIC BLOOD PRESSURE: 186 MMHG | HEART RATE: 72 BPM | DIASTOLIC BLOOD PRESSURE: 85 MMHG | TEMPERATURE: 97.6 F | RESPIRATION RATE: 22 BRPM | WEIGHT: 91.93 LBS | OXYGEN SATURATION: 94 %

## 2024-11-13 DIAGNOSIS — F17.210 CIGARETTE NICOTINE DEPENDENCE WITHOUT COMPLICATION: ICD-10-CM

## 2024-11-13 DIAGNOSIS — R03.0 ELEVATED BLOOD PRESSURE READING: Primary | ICD-10-CM

## 2024-11-13 DIAGNOSIS — J44.9 MODERATE COPD (CHRONIC OBSTRUCTIVE PULMONARY DISEASE) (HCC): ICD-10-CM

## 2024-11-13 DIAGNOSIS — I15.0 RENOVASCULAR HYPERTENSION: Primary | ICD-10-CM

## 2024-11-13 LAB
ANION GAP SERPL CALCULATED.3IONS-SCNC: 10 MMOL/L (ref 4–13)
BUN SERPL-MCNC: 15 MG/DL (ref 5–25)
CALCIUM SERPL-MCNC: 9.3 MG/DL (ref 8.4–10.2)
CHLORIDE SERPL-SCNC: 102 MMOL/L (ref 96–108)
CO2 SERPL-SCNC: 26 MMOL/L (ref 21–32)
CREAT SERPL-MCNC: 0.96 MG/DL (ref 0.6–1.3)
GFR SERPL CREATININE-BSD FRML MDRD: 55 ML/MIN/1.73SQ M
GLUCOSE SERPL-MCNC: 103 MG/DL (ref 65–140)
POTASSIUM SERPL-SCNC: 4.1 MMOL/L (ref 3.5–5.3)
SODIUM SERPL-SCNC: 138 MMOL/L (ref 135–147)

## 2024-11-13 PROCEDURE — 99284 EMERGENCY DEPT VISIT MOD MDM: CPT | Performed by: EMERGENCY MEDICINE

## 2024-11-13 PROCEDURE — 99283 EMERGENCY DEPT VISIT LOW MDM: CPT

## 2024-11-13 PROCEDURE — 99214 OFFICE O/P EST MOD 30 MIN: CPT

## 2024-11-13 PROCEDURE — 93005 ELECTROCARDIOGRAM TRACING: CPT

## 2024-11-13 PROCEDURE — 80048 BASIC METABOLIC PNL TOTAL CA: CPT | Performed by: EMERGENCY MEDICINE

## 2024-11-13 PROCEDURE — 36415 COLL VENOUS BLD VENIPUNCTURE: CPT | Performed by: EMERGENCY MEDICINE

## 2024-11-13 RX ORDER — UMECLIDINIUM BROMIDE AND VILANTEROL TRIFENATATE 62.5; 25 UG/1; UG/1
1 POWDER RESPIRATORY (INHALATION) DAILY
Qty: 180 BLISTER | Refills: 0 | Status: SHIPPED | OUTPATIENT
Start: 2024-11-13 | End: 2025-02-11

## 2024-11-13 NOTE — ED PROVIDER NOTES
Time reflects when diagnosis was documented in both MDM as applicable and the Disposition within this note       Time User Action Codes Description Comment    11/13/2024  3:04 PM Mark Jacobo Add [R03.0] Elevated blood pressure reading           ED Disposition       ED Disposition   Discharge    Condition   Stable    Date/Time   Wed Nov 13, 2024  3:04 PM    Comment   Laya Brooks discharge to home/self care.                   Assessment & Plan       Medical Decision Making    MDM/DDx: Asymptomatic hypertension -progressive essential hypertension, worsening renal stenosis, SEEMA.     I independently reviewed and interpreted ordered labs from this encounter.    A/P: Will check BMP, EKG, treat symptoms, reevaluate for further work up and disposition.    Amount and/or Complexity of Data Reviewed  Labs: ordered. Decision-making details documented in ED Course.  ECG/medicine tests: ordered and independent interpretation performed. Decision-making details documented in ED Course.             Medications - No data to display    ED Risk Strat Scores                           SBIRT 22yo+      Flowsheet Row Most Recent Value   Initial Alcohol Screen: US AUDIT-C     3a. Male UNDER 65: How often do you have five or more drinks on one occasion? 0 Filed at: 11/13/2024 1407   Audit-C Score 0 Filed at: 11/13/2024 1407   JIA: How many times in the past year have you...    Used an illegal drug or used a prescription medication for non-medical reasons? Never Filed at: 11/13/2024 1407                            History of Present Illness       Chief Complaint   Patient presents with    Hypertension     Pt was at pulmanology today and her bp was high so was sent down for an eval       Past Medical History:   Diagnosis Date    Acute diastolic congestive heart failure (HCC) 03/24/2024    From note 05/09/2024:  Patient with evidence of volume overload during recent admit likely in the setting of severe renal dysfunction  Did receive IV  diuretics  Echo from 3/25/2024: EF 55%; diastolic function normal; mild TR  She was discharged on torsemide 10 mg QD but this was recently d/c'd by Nephrology in the setting of significant weight loss since discharge and hypotension  She appears euvo    Acute renal failure superimposed on stage 3a chronic kidney disease ([facility]) 03/24/2024    Acute respiratory failure with hypoxia ([facility]) 03/25/2024    Acute respiratory insufficiency 04/04/2024    Bleeding at insertion site 04/04/2024    CAD (coronary artery disease)     Cardiac disease     CVA (cerebral vascular accident) ([facility]) 03/18/2022    Hypercholesteremia     Hyperlipidemia     Hypertension     Hypertension     Hypertensive emergency 03/07/2022    Oral candidiasis 03/30/2024    Renal disorder     Toxic metabolic encephalopathy 03/25/2024    Weight loss, non-intentional 07/19/2022      Past Surgical History:   Procedure Laterality Date    CORONARY ANGIOPLASTY WITH STENT PLACEMENT      IR RENAL ANGIOGRAM  4/4/2024    IR RENAL ANGIOGRAM  4/9/2024    IR TEMPORARY DIALYSIS CATHETER PLACEMENT  3/26/2024    WRIST SURGERY        History reviewed. No pertinent family history.   Social History     Tobacco Use    Smoking status: Former    Smokeless tobacco: Never   Vaping Use    Vaping status: Never Used   Substance Use Topics    Alcohol use: Never    Drug use: No      E-Cigarette/Vaping    E-Cigarette Use Never User       E-Cigarette/Vaping Substances    Nicotine No     THC No     CBD No     Flavoring No       I have reviewed and agree with the history as documented.     79-year-old female sent by her pulmonologist for asymptomatic hypertension with a history of hypertension, right-sided on a nephrectomy and left-sided renal stenosis for which she had a stent placed approximately 7 months ago.  She takes lisinopril daily, including today.  She has been taking the same dose for the past 7 months since her antihypertensive was changed.  She has had no vision changes, chest  pain or changes in urination.  She sees her nephrologist again in January.    No recent travel or sick contacts. Denies f/c, HA, CP, SOB, abdominal pain, n/v/d. 12 system ROS o/w negative.          History provided by:  Patient and medical records  Hypertension  Severity:  Mild  Onset quality:  Unable to specify  Timing:  Unable to specify  Progression:  Unable to specify  Context: not caffeine, not drug abuse, not medication change, not noncompliance and not stress    Relieved by:  None tried  Ineffective treatments:  ACE inhibitors  Associated symptoms: shortness of breath (Chronic) and weakness (Chronic)    Associated symptoms: no abdominal pain, no anxiety, no blurred vision, no chest pain, no confusion, no dizziness, no epistaxis, no fever, no headaches, no hematuria, no loss of consciousness, no nausea, no neck pain, no palpitations, no peripheral edema, no syncope and not vomiting    Risk factors: cardiac disease, kidney disease and prior stroke    Risk factors: no alcohol use, no cocaine use, no decongestant use, no diabetes, no obesity and no tobacco use (Former)        Review of Systems   Constitutional:  Negative for chills, diaphoresis and fever.   HENT:  Negative for nosebleeds, rhinorrhea, sore throat and trouble swallowing.    Eyes:  Negative for blurred vision.   Respiratory:  Positive for shortness of breath (Chronic). Negative for cough, chest tightness and wheezing.    Cardiovascular:  Negative for chest pain, palpitations, leg swelling and syncope.   Gastrointestinal:  Negative for abdominal distention, abdominal pain, constipation, diarrhea, nausea and vomiting.   Genitourinary:  Negative for difficulty urinating, dysuria, flank pain, frequency, hematuria and urgency.   Musculoskeletal:  Negative for arthralgias, back pain, myalgias, neck pain and neck stiffness.   Skin:  Negative for pallor and rash.   Neurological:  Positive for weakness (Chronic). Negative for dizziness, loss of  consciousness, light-headedness and headaches.   Hematological:  Negative for adenopathy.   Psychiatric/Behavioral:  Negative for confusion. The patient is not nervous/anxious.    All other systems reviewed and are negative.          Objective       ED Triage Vitals [11/13/24 1404]   Temperature Pulse Blood Pressure Respirations SpO2 Patient Position - Orthostatic VS   97.6 °F (36.4 °C) 72 (!) 186/85 22 95 % Sitting      Temp Source Heart Rate Source BP Location FiO2 (%) Pain Score    Temporal Monitor Right arm -- No Pain      Vitals      Date and Time Temp Pulse SpO2 Resp BP Pain Score FACES Pain Rating User   11/13/24 1408 -- 72 94 % 22 186/85 -- -- LD   11/13/24 1404 97.6 °F (36.4 °C) 72 95 % 22 186/85 No Pain -- KTR            Physical Exam  Vitals reviewed.   Constitutional:       General: She is not in acute distress.     Appearance: She is well-developed. She is not ill-appearing, toxic-appearing or diaphoretic.   HENT:      Head: Normocephalic and atraumatic.      Right Ear: External ear normal.      Left Ear: External ear normal.      Nose: Nose normal.      Mouth/Throat:      Mouth: Mucous membranes are moist.      Pharynx: Oropharynx is clear. No oropharyngeal exudate.   Eyes:      General: No scleral icterus.     Conjunctiva/sclera: Conjunctivae normal.      Pupils: Pupils are equal, round, and reactive to light.      Comments: Normal funduscopic exam OU   Cardiovascular:      Rate and Rhythm: Normal rate and regular rhythm.      Heart sounds: No murmur heard.  Pulmonary:      Effort: Pulmonary effort is normal.      Breath sounds: Normal breath sounds.   Abdominal:      General: Bowel sounds are normal. There is no distension.      Palpations: Abdomen is soft.      Tenderness: There is no abdominal tenderness.   Musculoskeletal:         General: No tenderness. Normal range of motion.      Cervical back: Normal range of motion and neck supple.      Right lower leg: No edema.      Left lower leg: No  edema.   Lymphadenopathy:      Cervical: No cervical adenopathy.   Skin:     General: Skin is warm and dry.      Coloration: Skin is not pale.      Findings: No erythema or rash.   Neurological:      General: No focal deficit present.      Mental Status: She is alert and oriented to person, place, and time.      Motor: No abnormal muscle tone.      Deep Tendon Reflexes: Reflexes are normal and symmetric.   Psychiatric:         Behavior: Behavior normal.         Thought Content: Thought content normal.         Results Reviewed       Procedure Component Value Units Date/Time    Basic metabolic panel [[id number]] Collected: 11/13/24 1440    Lab Status: Final result Specimen: Blood from Arm, Left Updated: 11/13/24 1504     Sodium 138 mmol/L      Potassium 4.1 mmol/L      Chloride 102 mmol/L      CO2 26 mmol/L      ANION GAP 10 mmol/L      BUN 15 mg/dL      Creatinine 0.96 mg/dL      Glucose 103 mg/dL      Calcium 9.3 mg/dL      eGFR 55 ml/min/1.73sq m     Narrative:      National Kid[employer] Disease Foundation guidelines for Chronic Kidney Disease (CKD):     Stage 1 with normal or high GFR (GFR > 90 mL/min/1.73 square meters)    Stage 2 Mild CKD (GFR = 60-89 mL/min/1.73 square meters)    Stage 3A Moderate CKD (GFR = 45-59 mL/min/1.73 square meters)    Stage 3B Moderate CKD (GFR = 30-44 mL/min/1.73 square meters)    Stage 4 Severe CKD (GFR = 15-29 mL/min/1.73 square meters)    Stage 5 End Stage CKD (GFR <15 mL/min/1.73 square meters)  Note: GFR calculation is accurate only with a steady state creatinine            No orders to display       ECG 12 Lead Documentation Only    Date/Time: 11/13/2024 2:31 PM    Performed by: Mark Jacobo DO  Authorized by: Mark Jacobo DO    Indications / Diagnosis:  Asymptomatic hypertension  ECG reviewed by me, the ED Provider: yes    Patient location:  ED  Interpretation:     Interpretation: non-specific    Quality:     Tracing quality:  Limited by artifact  Rate:     ECG rate:  66    ECG rate  assessment: normal    Rhythm:     Rhythm: sinus rhythm    Ectopy:     Ectopy: none    Conduction:     Conduction: normal    ST segments:     ST segments:  Non-specific  T waves:     T waves: normal        ED Medication and Procedure Management   Prior to Admission Medications   Prescriptions Last Dose Informant Patient Reported? Taking?   albuterol (PROVENTIL HFA,VENTOLIN HFA) 90 mcg/act inhaler  Self Yes No   Sig: Inhale 2 puffs every 6 (six) hours as needed for wheezing   Patient not taking: Reported on 7/29/2024   aspirin 81 mg chewable tablet  Self No No   Sig: Chew 1 tablet (81 mg total) daily   atorvastatin (LIPITOR) 40 mg tablet  Self No No   Sig: TAKE 1 TABLET BY MOUTH ONCE DAILY IN THE EVENING. TAKE IN PLACE OF SIMVASTATIN   clopidogrel (PLAVIX) 75 mg tablet  Self No No   Sig: Take 1 tablet (75 mg total) by mouth daily   ergocalciferol (ERGOCALCIFEROL) 1.25 MG (22343 UT) capsule   No No   Sig: Take 1 capsule (50,000 Units total) by mouth once a week   lisinopril (ZESTRIL) 20 mg tablet  Self Yes No      Facility-Administered Medications: None     Current Discharge Medication List        CONTINUE these medications which have NOT CHANGED    Details   albuterol (PROVENTIL HFA,VENTOLIN HFA) 90 mcg/act inhaler Inhale 2 puffs every 6 (six) hours as needed for wheezing      aspirin 81 mg chewable tablet Chew 1 tablet (81 mg total) daily  Qty: 90 tablet, Refills: 3    Associated Diagnoses: Acute renal failure superimposed on stage 3a chronic kidney disease, unspecified acute renal failure type (HCC)      atorvastatin (LIPITOR) 40 mg tablet TAKE 1 TABLET BY MOUTH ONCE DAILY IN THE EVENING. TAKE IN PLACE OF SIMVASTATIN  Qty: 90 tablet, Refills: 3    Associated Diagnoses: CVA (cerebral vascular accident) (HCC); Intracranial atherosclerosis      clopidogrel (PLAVIX) 75 mg tablet Take 1 tablet (75 mg total) by mouth daily  Qty: 90 tablet, Refills: 0    Associated Diagnoses: Acute renal failure superimposed on stage 3a  chronic kidney disease, unspecified acute renal failure type (HCC)      ergocalciferol (ERGOCALCIFEROL) 1.25 MG (07670 UT) capsule Take 1 capsule (50,000 Units total) by mouth once a week  Qty: 12 capsule, Refills: 3    Associated Diagnoses: Vitamin D deficiency      lisinopril (ZESTRIL) 20 mg tablet            No discharge procedures on file.  ED SEPSIS DOCUMENTATION   Time reflects when diagnosis was documented in both MDM as applicable and the Disposition within this note       Time User Action Codes Description Comment    11/13/2024  3:04 PM Mark Jacobo Add [R03.0] Elevated blood pressure reading                  Mark Jacobo DO  11/13/24 0560

## 2024-11-13 NOTE — PATIENT INSTRUCTIONS
- Script for Anoro inhaler sent to your pharmacy, take 1 puff once daily  - Continue using albuterol inhaler as needed  - Recommend going to the emergency room due to your blood pressure and pain  - Follow up in 3 months

## 2024-11-13 NOTE — PROGRESS NOTES
Progress note - Pulmonary Medicine   Laya Brooks 79 y.o. female MRN: [id number]       Impression & Plan:   Laya Brooks is a 79 y.o. female with PMHx of COPD, HTN, CAD, CVA, CKD 3 , HLD and tobacco use disorder who presents for follow-up.    Hypertension  - The patient's blood pressure is elevated in the office today despite taking her medications, she does note feeling worse over the last 3 weeks with development of upper abdominal and back pain.  She is also having conversational dyspnea in the office today.  - Given her history recommend proceeding to the ED for further evaluation, called and discussed case with ED staff.    Moderate COPD (Chronic Obstructive Pulmonary Disease)  - The patient has a history of moderate obstructive deficit on PFTs and is a current smoker, she has not had any recent exacerbations, but has struggled with her inhaler regimen.  - Recommend restarting Anoro 1 puff once daily, will send refill to her pharmacy.  -Continue with albuterol HFA as needed.  - Patient declines vaccines.  - Follow-up in 3 months.    Cigarette Nicotine Dependence  - The patient reports she is still smoking socially and is not interested in complete cessation at this time.  Cessation is recommended.  - She is above the age for LDCT.    Return in about 3 months (around 2/13/2025).  ______________________________________________________________________    HPI:    Laya Brooks is a 79 y.o. female with PMHx of COPD, HTN, CAD, CVA, CKD 3 , HLD and tobacco use disorder who presents for follow-up.  The patient was last seen in the office on 5/15/2024 at which time plan was for Anoro in place of Stiolto due to difficulty of the Respimat mechanism and continuation of albuterol HFA.  The patient reports that she is only been taking her albuterol recently as there was some confusion about her inhalers.  She estimates she is using the albuterol a few times a week, and is unsure if she has any other inhalers at home  currently.  She does have a productive cough which is stable from prior and worse first thing in the morning, and denies any change in her baseline dyspnea or wheezing.  She does not believe she has had any courses of steroids or antibiotics for respiratory purposes since her last office visit.    Her blood pressure is elevated in the office today with manual repeat of 198/92 mmHg.  She does report that over the last 3 weeks she has noticed back pain which has now progressed to upper abdominal pain.  She states she has been taking Tylenol fairly routinely.  Generally she will start the day off feeling more poorly with generalized weakness and then have an improvement in the evening.  She denies any jovanny substernal chest pain or palpitations currently and states that she did take her blood pressure medications this morning.  She does also note conversational dyspnea, but feels this is unchanged from prior.  She denies any recent F/C, sick contacts or change in sputum production.    Current Medications:    Current Outpatient Medications:     albuterol (PROVENTIL HFA,VENTOLIN HFA) 90 mcg/act inhaler, Inhale 2 puffs every 6 (six) hours as needed for wheezing (Patient not taking: Reported on 7/29/2024), Disp: , Rfl:     aspirin 81 mg chewable tablet, Chew 1 tablet (81 mg total) daily, Disp: 90 tablet, Rfl: 3    atorvastatin (LIPITOR) 40 mg tablet, TAKE 1 TABLET BY MOUTH ONCE DAILY IN THE EVENING. TAKE IN PLACE OF SIMVASTATIN, Disp: 90 tablet, Rfl: 3    clopidogrel (PLAVIX) 75 mg tablet, Take 1 tablet (75 mg total) by mouth daily, Disp: 90 tablet, Rfl: 0    ergocalciferol (ERGOCALCIFEROL) 1.25 MG (86036 UT) capsule, Take 1 capsule (50,000 Units total) by mouth once a week, Disp: 12 capsule, Rfl: 3    lisinopril (ZESTRIL) 20 mg tablet, , Disp: , Rfl:     Review of Systems:  Review of Systems  10-point system review completed, all of which are negative except as mentioned above.    Past medical history, surgical history,  and family history were reviewed and updated as appropriate    Social history updates:  Social History     Tobacco Use   Smoking Status Former   Smokeless Tobacco Never     PhysicalExamination:  Vitals:   BP (!) 187/98   Pulse 87   Temp 97.6 °F (36.4 °C) (Temporal)   Ht 5' (1.524 m)   Wt 41.7 kg (92 lb)   SpO2 95%   BMI 17.97 kg/m²   Body mass index is 17.97 kg/m².    Constitutional: conversational dyspnea intermittently, appears fatigued  Skin: Warm, dry, no rashes noted   Eyes: PERRL, normal conjunctiva  ENT: Nasal congestion present, moist mucus membranes.  Neck: No JVD, trachea is midline, no adenopathy.  Resp: distant breath sounds, but no W/R/R   Cardiac: RRR, +S1/S2, no M/R/G  Extremities: No digital clubbing or pedal edema  Neuro: AAOx3    Diagnostic Data:  Labs:  I personally reviewed the most recent laboratory data pertinent to today's visit    Lab Results   Component Value Date    WBC 4.86 07/15/2024    HGB 12.3 07/15/2024    HCT 37.0 07/15/2024    MCV 91 07/15/2024     (L) 07/15/2024     Lab Results   Component Value Date    SODIUM 138 11/13/2024    K 4.1 11/13/2024    CO2 26 11/13/2024     11/13/2024    BUN 15 11/13/2024    CREATININE 0.96 11/13/2024    CALCIUM 9.3 11/13/2024     PFT results:  The most recent pulmonary function tests were reviewed.  PFTs -- 1/10/2024  FEV1/FVC Ratio: 56 %  FEV1: 1.02 L   61 % predicted  Forced Vital Capacity: 1.81 L           83 % predicted  After administration of bronchodilator: No change  Lung volumes: Total Lung Capacity 121 % predicted Residual volume 167 % predicted  DLCO corrected for patients hemoglobin level: 54 % predicted  Flow volume loop: Consistent with obstruction     Imaging:  I personally reviewed the images in PACS pertinent to today's visit:  CT Chest WO -- 3/24/2024  Small bilateral water density pleural effusions with compressive atelectasis of left lower lobe.  Moderate emphysema. No evidence of pneumonia.     Other studies:  2D  "Echo Complete -- 3/25/2024    Left Ventricle: Left ventricular cavity size is normal. Wall thickness is increased. There is borderline concentric hypertrophy. The left ventricular ejection fraction is 55%. Systolic function is normal. Wall motion is normal. Diastolic function is normal.    Left Atrium: The atrium is mildly dilated.    Aortic Valve: There is aortic valve sclerosis.    Mitral Valve: There is moderate annular calcification.    Tricuspid Valve: There is mild regurgitation.      Lisa Kidd MD  Pulmonary-Critical Care and Sleep Medicine  11/13/24    Portions of the record may have been created with voice recognition software. Occasional wrong word or \"sound a like\" substitutions may have occurred due to the inherent limitations of voice recognition software. Please read the chart carefully and recognize, using context, where substitutions have occurred.  "

## 2024-11-14 LAB
ATRIAL RATE: 66 BPM
P AXIS: 67 DEGREES
PR INTERVAL: 108 MS
QRS AXIS: 65 DEGREES
QRSD INTERVAL: 84 MS
QT INTERVAL: 436 MS
QTC INTERVAL: 457 MS
T WAVE AXIS: 69 DEGREES
VENTRICULAR RATE: 66 BPM

## 2024-11-14 PROCEDURE — 93010 ELECTROCARDIOGRAM REPORT: CPT | Performed by: INTERNAL MEDICINE

## 2024-12-07 PROBLEM — I61.9 INTRAPARENCHYMAL HEMORRHAGE OF BRAIN (HCC): Status: ACTIVE | Noted: 2024-01-01

## 2024-12-07 NOTE — PROGRESS NOTES
Pastoral Care Progress Note          Chaplaincy Interventions Utilized:      12/07/24 1600   Clinical Encounter Type   Visited With Patient and family together   Crisis Visit Critical Care     Provided spiritual and emotional support to patient and family members. Also provided prayer.

## 2024-12-07 NOTE — ED NOTES
Pt having epsiodes of bradycardia  Dr. Alexander ER Physician notified and will speak wit admitting team     Julisa Tanner, RN  12/07/24 9922

## 2024-12-07 NOTE — RESPIRATORY THERAPY NOTE
RT Protocol Note  Nayeli Burr 79 y.o. female MRN: [id number]  Unit/Bed#: ED 22 Encounter: [id number]    Assessment    Active Problems:  There are no active Hospital Problems.      Home Pulmonary Medications:  N/a       No past medical history on file.  Social History     Socioeconomic History    Marital status: /Civil Union     Spouse name: Not on file    Number of children: Not on file    Years of education: Not on file    Highest education level: Not on file   Occupational History    Not on file   Tobacco Use    Smoking status: Not on file    Smokeless tobacco: Not on file   Substance and Sexual Activity    Alcohol use: Not on file    Drug use: Not on file    Sexual activity: Not on file   Other Topics Concern    Not on file   Social History Narrative    Not on file     Social Drivers of Health     Financial Resource Strain: Not on file   Food Insecurity: Not on file   Transportation Needs: Not on file   Physical Activity: Not on file   Stress: Not on file   Social Connections: Not on file   Intimate Partner Violence: Not on file   Housing Stability: Not on file       Subjective         Objective    Physical Exam:   Assessment Type: Assess only  General Appearance: Sedated  Respiratory Pattern: Normal  Chest Assessment: Chest expansion symmetrical  Bilateral Breath Sounds: Diminished  Cough: Strong  Suction: ET Tube    Vitals:  Blood pressure (!) 186/78, pulse (!) 46, temperature (!) 93.4 °F (34.1 °C), resp. rate 14, weight 46 kg (101 lb 6.6 oz), SpO2 99%.          Imaging and other studies:           Plan    Respiratory Plan: Vent/NIV/HFNC        Resp Comments: Pt arrived via flight team directly to CT scan and to room 22 w.o complication. placed on [place] vent

## 2024-12-07 NOTE — LETTER
[facility] INTENSIVE CARE UN[facility]  [address]  [place] [address]  Dept: [phone]    December 8, 2024   To Whom it May Concern:    Wilberto Burr mother is under my professional care. Please excuse him from work 12/7/2024.     If you have any questions or concerns, please don't hesitate to call.         Sincerely,          Claudio Davison PA-C

## 2024-12-07 NOTE — ED ATTENDING ATTESTATION
12/7/2024  IEarnest MD, saw and evaluated the patient. I have discussed the patient with the resident/non-physician practitioner and agree with the resident's/non-physician practitioner's findings, Plan of Care, and MDM as documented in the resident's/non-physician practitioner's note, except where noted. All available labs and Radiology studies were reviewed.  I was present for key portions of any procedure(s) performed by the resident/non-physician practitioner and I was immediately available to provide assistance.       At this point I agree with the current assessment done in the Emergency Department.  I have conducted an independent evaluation of this patient a history and physical is as follows:    ED Course  ED Course as of 12/07/24 1318   Sat Dec 07, 2024   1309 Per radiology intracerebral hemorrhage with intraventricular extension; SAH involvement + shift; associated hydrocephalus ; basilar cisterns effaced indicating uncal herniation     Emergency Department Note- Nayeli Burr 79 y.o. female MRN: [id number]    Unit/Bed#: ED 22 Encounter: [id number]    Nayeli Burr is a 79 y.o. female who presents with No chief complaint on file.        History of Present Illness   HPI:  Nayeli Burr is a 79 y.o. female who presents for evaluation of:  Abrupt onset of headache with decreased mental status at 1130 that progressed rapidly to obtundation.  Patient's  noted that the patient was complaining of headache at 11:30 AM and then became unresponsive.  He called EMS; on EMS arrival, the patient was noted to be obtunded and unresponsive.  Helicopter was called to transport the patient; paramedics intubated the patient and transported the patient via helicopter to [facility].  On arrival, patient was made a stroke alert and went straight to CT scan.  Further history and review of systems is unobtainable secondary to patient's acute encephalopathy and endotracheal intubation.    Review of  Systems   Unable to perform ROS: Mental status change (And endotracheal intubation)       Historical Information   No past medical history on file.  No past surgical history on file.  Social History   Social History     Substance and Sexual Activity   Alcohol Use Not on file     Social History     Substance and Sexual Activity   Drug Use Not on file     Social History     Tobacco Use   Smoking Status Not on file   Smokeless Tobacco Not on file     Family History: No family history on file.    Meds/Allergies   PTA meds:   None     Not on File    Objective   First Vitals:   SpO2: 100 % (12/07/24 1311)    Current Vitals:   SpO2: 100 % (12/07/24 1311)    No intake or output data in the 24 hours ending 12/07/24 1318    Invasive Devices       None                   Physical Exam  Vitals and nursing note reviewed.   Constitutional:       General: She is in acute distress.      Appearance: She is normal weight. She is ill-appearing.      Interventions: She is sedated and intubated.   HENT:      Head: Normocephalic and atraumatic.      Right Ear: External ear normal.      Left Ear: External ear normal.      Nose: Nose normal.      Mouth/Throat:      Pharynx: No oropharyngeal exudate.   Eyes:      Conjunctiva/sclera: Conjunctivae normal.      Pupils: Pupils are equal, round, and reactive to light.     Cardiovascular:      Rate and Rhythm: Normal rate and regular rhythm.   Pulmonary:      Effort: Pulmonary effort is normal. No respiratory distress. She is intubated.      Comments: Patient is endotracheally intubated with bilateral breath sounds  Abdominal:      General: Abdomen is flat. There is no distension.      Palpations: Abdomen is soft.   Musculoskeletal:         General: No deformity. Normal range of motion.      Cervical back: Normal range of motion and neck supple.   Skin:     General: Skin is warm and dry.      Capillary Refill: Capillary refill takes less than 2 seconds.   Neurological:      Mental Status: She is  "disoriented.      Cranial Nerves: Cranial nerve deficit present.      Sensory: Sensory deficit present.      Motor: Weakness present.      Coordination: Coordination abnormal.      Gait: Gait abnormal.      Deep Tendon Reflexes: Reflexes abnormal.   Psychiatric:      Comments: Thought content, judgment, and decision making capacity is impaired secondary to acute encephalopathy           Medical Decision Makin.  Acute encephalopathy status post endotracheal intubation in the field: Large intercerebral hemorrhage with intraventricular extension, subarachnoid involvement, and uncal herniation is noted on CT scan and discussion with the on-call radiologist and on-call neurologist; the on-call neurologist is at the bedside.    No results found for this or any previous visit (from the past 36 hours).  CT stroke alert brain    (Results Pending)   XR chest 1 view portable    (Results Pending)         Portions of the record may have been created with voice recognition software. Occasional wrong word or \"sound a like\" substitutions may have occurred due to the inherent limitations of voice recognition software.  Read the chart carefully and recognize, using context, where substitutions have occurred.        Critical Care Time  CriticalCare Time    Date/Time: 2024 2:06 PM    Performed by: Earnest Alexander MD  Authorized by: Earnest Alexander MD    Critical care provider statement:     Critical care time (minutes):  32    Critical care time was exclusive of:  Separately billable procedures and treating other patients and teaching time    Critical care was necessary to treat or prevent imminent or life-threatening deterioration of the following conditions:  CNS failure or compromise    Critical care was time spent personally by me on the following activities:  Obtaining history from patient or surrogate, development of treatment plan with patient or surrogate, discussions with consultants, evaluation of patient's " response to treatment, examination of patient, ordering and performing treatments and interventions, ordering and review of laboratory studies, ordering and review of radiographic studies, re-evaluation of patient's condition, review of old charts and ventilator management  Comments:      79-year-old female presents obtunded after being transported via helicopter; patient endotracheally intubated prior to arrival in the ED; CT scan demonstrates large intercerebral hemorrhage with intraventricular extension, subarachnoid hemorrhage, and uncal herniation.  Care discussed with on-call neurology who is at the bedside, on-call radiology via telephone, critical care who is at the bedside, and neurosurgery via epic chat; I have spoken to her  regarding the severe nature of the intracerebral hemorrhage.  Comfort care measures will be instituted.  Patient initially administered labetalol for blood pressure control then a nicardipine drip again for blood pressure control.

## 2024-12-07 NOTE — ASSESSMENT & PLAN NOTE
"Neurology consulted for stroke alert.  Patient presented to ER intubated and on various sedatives shortly following abrupt onset of severe headache late a.m. followed by unresponsiveness.  Patient was immediately found to have significant ICH on CT head.    Per the CTH report \"Large intraparenchymal hematoma centered within the right basal ganglia with extensive intraventricular and subarachnoid hemorrhage as described above. 1.3 cm leftward midline shift with associated dilatation of the left temporal horn consistent with entrapment. Basilar cisterns are effaced consistent with uncal herniation and there is also evidence of tonsillar herniation.\"   No TNK administered for bleeding risk and determined not to be candidate for thrombectomy in the setting of no target identified,  CTA was entirely not warranted at this time.  EKG identified A-fib.  BP varies dramatically, but max of 240/120 minimum of 105/64.  Patient transferred to ICU, awaiting goals of care discussions with family.  At this time holding all AP/AC, apparently may have been on dual antiplatelet therapy for CAD diagnosis previously.  ICU primary team addressing bradycardia to 40s and significant hypothermia to 90.7.  "

## 2024-12-07 NOTE — PROGRESS NOTES
Pastoral Care Progress Note          Chaplaincy Interventions Utilized:      12/07/24 1300   Clinical Encounter Type   Visited With Patient   Crisis Visit Critical Care  (Stroke Alert)     Provided support to the Medical team and paramedics.  is [place] and phone number [phone]. According to the medical team, the Scan shows hermerage in the brain.

## 2024-12-07 NOTE — CONSULTS
"Consultation - Neurology   Name: Nayeli Burr 79 y.o. female I MRN: [id number]  Unit/Bed#: ICU 05 I Date of Admission: 12/7/2024   Date of Service: 12/7/2024 I Hospital Day: 0   Consult to Neurology  Consult performed by: Stalin Scales DO  Consult ordered by: Earnest Alexander MD        Physician Requesting Evaluation: Nayan Brown MD   Reason for Evaluation / Principal Problem: stroke alert    Assessment & Plan  Intraparenchymal hemorrhage of brain (HCC)  Neurology consulted for stroke alert.  Patient presented to ER intubated and on various sedatives shortly following abrupt onset of severe headache late a.m. followed by unresponsiveness.  Patient was immediately found to have significant ICH on CT head.    Per the CTH report \"Large intraparenchymal hematoma centered within the right basal ganglia with extensive intraventricular and subarachnoid hemorrhage as described above. 1.3 cm leftward midline shift with associated dilatation of the left temporal horn consistent with entrapment. Basilar cisterns are effaced consistent with uncal herniation and there is also evidence of tonsillar herniation.\"   No TNK administered for bleeding risk and determined not to be candidate for thrombectomy in the setting of no target identified,  CTA was entirely not warranted at this time.  EKG identified A-fib.  BP varies dramatically, but max of 240/120 minimum of 105/64.  Patient transferred to ICU, awaiting goals of care discussions with family.  At this time holding all AP/AC, apparently may have been on dual antiplatelet therapy for CAD diagnosis previously.  ICU primary team addressing bradycardia to 40s and significant hypothermia to 90.7.    I have discussed with Dr. Oliver the above plan to treat pt. He agrees with the plan.  Please contact the SecureChat role for the Neurology service with any questions/concerns.    Thrombolytic Decision: Patient not a candidate. Bleeding risk.    Recommendations for outpatient " neurological follow up have yet to be determined.    History of Present Illness   Hx and PE limited by: intubation  Patient last known well: 11:30 today  Stroke alert called: 12:37, ~10 min prior to arrival  Neurology time of arrival: immediate  HPI: Nayeli Burr is a 79 y.o. female who presents with unresponsiveness.  Patient reportedly in normal state of health upon awakening this morning per EMS, but began to have severe headache before becoming completely unresponsive.  Patient presented to ER intubated and with multiple sedatives already administered, NIH stroke scale score obviously high under these conditions.  Patient unable to participate in any portion of the exam or in adding to history.  Significant imaging results as noted above in the ER.  Patient subsequently decreased in body temperature to 90.7 requiring Kori hugger, bradycardic to 40s.    Review of Systems   Unable to perform ROS: Intubated     I have reviewed the patient's PMH, PSH, Social History, Family History, Meds, and Allergies  Historical Information   No past medical history on file.  No past surgical history on file.  Social History     Tobacco Use    Smoking status: Not on file    Smokeless tobacco: Not on file   Substance and Sexual Activity    Alcohol use: Not on file    Drug use: Not on file    Sexual activity: Not on file     No existing history information found.  No existing history information found.  No family history on file.  Social History     Tobacco Use    Smoking status: Not on file    Smokeless tobacco: Not on file   Substance and Sexual Activity    Alcohol use: Not on file    Drug use: Not on file    Sexual activity: Not on file       Current Facility-Administered Medications:     chlorhexidine (PERIDEX) 0.12 % oral rinse 15 mL, Q12H ELVIA    fentaNYL injection 25 mcg, Q1H PRN    niCARdipine (CARDENE) 25 mg (STANDARD CONCENTRATION) in sodium chloride 0.9% 250 mL, Titrated, Last Rate: 10 mg/hr (12/07/24 1530)    propofol  (DIPRIVAN) 1000 mg in 100 mL infusion (premix), Titrated, Last Rate: Stopped (24 1357)  None       Objective :  Temp:  [90.7 °F (32.6 °C)-94.3 °F (34.6 °C)] 94.3 °F (34.6 °C)  HR:  [46-78] 78  BP: (105-240)/() 183/97  Resp:  [13-37] 18  SpO2:  [99 %-100 %] 100 %  O2 Device: Ventilator    Physical Exam  Vitals and nursing note reviewed.   Constitutional:       Appearance: She is toxic-appearing. She is not diaphoretic.   HENT:      Head: Normocephalic.      Right Ear: External ear normal.      Left Ear: External ear normal.   Eyes:      General: No scleral icterus.        Right eye: No discharge.         Left eye: No discharge.      Conjunctiva/sclera: Conjunctivae normal.   Neurological:      Mental Status: She is disoriented.     Neurological Exam  Mental Status  Unresponsive to painful stimuli. Unable to follow commands.  Intubated on various sedatives.    Cranial Nerves  No blink to threat, sluggish to nonreactive pupils with asymmetry.    Motor    Unresponsive to pain in any extremity on this exam.    Sensory  Unable to participate.    Reflexes  Equivocal plantar.    Coordination    Did not participate.    Gait    Deferred for safety.      NIHSS:  1a.Level of Consciousness: 3 = Coma   1b. LOC Questions: 2 = Answers neither correctly   1c. LOC Commands: 2 = Obeys neither correctly   2. Best Gaze: 2 = Forced Deviation   3. Visual: 3 = Bilateral hemianopia   4. Facial Palsy: 3=Complete paralysis of one or both sides (absence of facial movement in the upper and lower face)   5a. Motor Right Arm: 4=No movement   5b. Motor Left Arm: 4=No movement   6a. Motor Right Le=No movement   6b. Motor Left Le=No movement   7. Limb Ataxia:  0=Absent   8. Sensory: 2=Severe to total sensory loss; patient is not aware of being touched in face, arm, leg   9. Best Language:  3=Mute, global aphasia; no usable speech or auditory comprehension   10. Dysarthria: UN = Intubated or other physical barrier   11. Extinction  and Inattention (formerly Neglect): 0=No abnormality   Total Score: 36     Time NIHSS was completed: 13:01        Lab Results: I have reviewed the following results:CBC/BMP:   .     12/07/24  1304   WBC 15.31*   HGB 12.6   HCT 37.5      SODIUM 138   K 3.9      CO2 22   BUN 17   CREATININE 0.92   GLUC 265*    , Troponin,BNP:  .     12/07/24  1304   HSTNI0 68*        Imaging Results Review: I personally reviewed the following image studies in PACS and associated radiology reports: CT head. My interpretation of the radiology images/reports is: As noted above unfortunately.  Other Study Results Review: EKG was reviewed.     VTE Prophylaxis: VTE covered by:    None        Code Status: Level 1 - Full Code  Advance Directive and Living Will:      Power of :    POLST:

## 2024-12-07 NOTE — H&P
H&P - Critical Care/ICU   Name: Nayeli Burr 79 y.o. female I MRN: [id number]  Unit/Bed#: ICU 05 I Date of Admission: 12/7/2024   Date of Service: 12/7/2024 I Hospital Day: 0       Assessment & Plan   Neuro:   Diagnosis: ICH  CTH 12/7/24: Large intraparenchymal hematoma centered within the right basal ganglia with extensive intraventricular and subarachnoid hemorrhage as described above. 1.3 cm leftward midline shift with associated dilatation of the left temporal horn consistent with entrapment. Basilar cisterns are effaced consistent with uncal herniation and there is also evidence of tonsillar herniation  ICH 5  Plan:   No intervention as per neurosurgery  Neurology following, appreciate recommendations  Hold all AP/AC  Sedation: Propofol infusion held at this time and fentanyl 25 mcg every hour as needed  Will need goals of care conversation with family    CV:   Diagnosis: CAD  Patient on aspirin 81 mg daily and Plavix 75 mg daily  Plan:   Hold at this time    Diagnosis: Hypertension  Home medications: Lisinopril 20 mg daily  Plan:   Hold at this time  SBP goal less than 140  Patient initiated on Cardene drip, wean as appropriate    Diagnosis: Hyperlipidemia  Home medications: Atorvastatin 40 mg daily  Plan:   Hold at this time given bleed    Pulm:  Diagnosis: Acute respiratory failure  (S)CMV: 399/60/6  Plan:   Continue mechanical ventilation  Respiratory protocol  Airway precautions  PT as appropriate    Diagnosis: COPD  Home medications: Albuterol as needed  Plan:   No need due to mechanical ventilation at this time  Order albuterol when needed    GI:   Diagnosis: No issues  Plan: No issues    :   Diagnosis: Gerber catheter in place  Plan:   D/c when appropriate    F/E/N:     F: No fluids  E: Replete as appropriate  N: NPO    Heme/Onc:   Diagnosis: DVT prophylaxis  Plan:   Hold due to ICH    Endo:   Diagnosis: No issues  Plan: No issues    ID:   Diagnosis: Leukocytosis, likely reactive in the setting of  significant ICH  Plan:   Trend fever curve and monitor WBCs    MSK/Skin:   Diagnosis: Ambulatory dysfunction secondary to ICH  Plan:   PT/OT/speech when appropriate  Out of bed as appropriate    Disposition: Critical care    History of Present Illness     Nayeli Burr is a 79 y.o. who presented to [facility] as a result of altered mental status and severe headache who was found to be unresponsive.  Patient has a past medical history of stroke, CAD, hypertension, hyperlipidemia, and COPD.    As per chart review, patient was complaining about and abrupt onset of headache around 11:30 AM.  Patient's  noticed that she was complaining about the headache and then suddenly became unresponsive.  After that, he called EMS and upon arrival of EMS, patient was unresponsive.  Patient was intubated and transported via helicopter to [facility].      Given patient's symptoms, a stroke alert was initiated.  CT head showed a large intraparenchymal hematoma centered within the right basal ganglia with extensive intraventricular and subarachnoid hemorrhage with a 1.3 cm leftward midline shift with associated dilatation of the left temporal horn consistent with entrapment.  There were signs of uncal herniation and tonsillar herniation.    As a result of patient's CTH scan, the neurocritical care was asked to evaluate the patient.  At this time, patient is intubated and sedated.    History obtained from chart review.    Review of Systems: Review of Systems not obtainable due to Clinical Condition    Historical Information   No past medical history on file. No past surgical history on file.   No current outpatient medications Not on File     No family history on file.       Objective :                   Vitals I/O      Most Recent Min/Max in 24hrs   Temp (!) 92.8 °F (33.8 °C) Temp  Min: 92.8 °F (33.8 °C)  Max: 93.8 °F (34.3 °C)   Pulse (!) 50 Pulse  Min: 46  Max: 68   Resp 13 Resp  Min: 13  Max:  37   /66 BP  Min: 105/64  Max: 240/120   O2 Sat 100 % SpO2  Min: 99 %  Max: 100 %      Intake/Output Summary (Last 24 hours) at 12/7/2024 1453  Last data filed at 12/7/2024 1410  Gross per 24 hour   Intake --   Output 100 ml   Net -100 ml       Diet NPO    Invasive Monitoring   Arterial Line  Heather BP    No data recorded   MAP    No data recorded           Physical Exam   Physical Exam  Vitals reviewed.   Skin:     General: Skin is warm.   Cardiovascular:      Rate and Rhythm: Normal rate and regular rhythm.   Abdominal: General: Bowel sounds are normal.      Palpations: Abdomen is soft.      Tenderness: There is no abdominal tenderness.   Constitutional:       Interventions: She is intubated. She is not sedated.  Pulmonary:      Effort: She is intubated.      Comments: Patient is intubated  Neurological:      Comments: Pupils fixed and dilated with L eye bigger than R eye, R corneal reflex weak, no L corneal reflex, no facial asymmetry appreciated given the limitation of assistive devices, cough and gag intact. No movement appreciated in any of the extremities with pain stimulus.           Diagnostic Studies        Lab Results: I have reviewed the following results:     Medications:  Scheduled PRN   chlorhexidine, 15 mL, Q12H ELVIA      fentaNYL, 25 mcg, Q1H PRN       Continuous    niCARdipine, 1-15 mg/hr, Last Rate: Stopped (12/07/24 1430)  propofol, 5-50 mcg/kg/min, Last Rate: Stopped (12/07/24 1357)         Labs:   CBC    Recent Labs     12/07/24  1304   WBC 15.31*   HGB 12.6   HCT 37.5        BMP    Recent Labs     12/07/24  1304   SODIUM 138   K 3.9      CO2 22   AGAP 10   BUN 17   CREATININE 0.92   CALCIUM 8.6       Coags    Recent Labs     12/07/24  1304   INR 1.04   PTT 23        Additional Electrolytes  No recent results       Blood Gas    No recent results  No recent results LFTs  No recent results    Infectious  No recent results  Glucose  Recent Labs     12/07/24  1304   GLUC 265*

## 2024-12-07 NOTE — ED PROVIDER NOTES
Time reflects when diagnosis was documented in both MDM as applicable and the Disposition within this note       Time User Action Codes Description Comment    12/7/2024 12:44 PM Franci Quintana Add [R29.90] Stroke-like symptoms     12/7/2024  1:56 PM Gera Edwards Add [I61.9] Intracerebral hemorrhage (HCC)     12/7/2024  2:08 PM Chandu Cleary Modify [R29.90] Stroke-like symptoms     12/7/2024  2:08 PM Chandu Cleary Modify [I61.9] Intracerebral hemorrhage (HCC)           ED Disposition       ED Disposition   Admit    Condition   Stable    Date/Time   Sat Dec 7, 2024  1:55 PM    Comment                  Assessment & Plan       Medical Decision Making  Patient is an 79-year-old female presenting for headache and unresponsiveness.    Differential includes but not limited to ischemic versus hemorrhagic stroke, seizure with following paralysis, less likely toxic ingestion, doubt trauma.  Patient immediately went to CT scan and a large intracerebral hemorrhage was seen.  Patient was given continued sedation and pain control.  Patient given blood pressure control as well.     Patient admitted to the neurocritical care further management.    Amount and/or Complexity of Data Reviewed  Labs: ordered.  Radiology: ordered.    Risk  Prescription drug management.  Decision regarding hospitalization.             Medications   labetalol (NORMODYNE) injection 10 mg (10 mg Intravenous Given 12/7/24 1354)       ED Risk Strat Scores                                               History of Present Illness       Chief Complaint   Patient presents with    CVA/TIA-like Symptoms     Per ems  stated that around 1130 am pt started c/o pain behind rt eye and then had slurred speech and looking toward the right. Pt then went unresponsive       No past medical history on file.   No past surgical history on file.   No family history on file.       No existing history information found.   No existing history information found.   I have  reviewed and agree with the history as documented.     Patient is an 79-year-old female presenting for headache and altered mental status that progressed to unresponsiveness.  Patient's  states that around 11:30 AM, the patient complained of sudden onset headache.  At that time, she started to become altered and not making sense.  He called EMS, and she progressed to becoming unresponsive.  Upon arrival by EMS, it was reported that she was GCS of 3, so she was intubated and flown to [facility].  Upon arrival, patient continued to have minimal response and was immediately taken to CT scan.        Review of Systems        Objective       ED Triage Vitals   Temperature Pulse Blood Pressure Respirations SpO2 Patient Position - Orthostatic VS   12/07/24 1248 12/07/24 1248 12/07/24 1248 12/07/24 1248 12/07/24 1248 12/07/24 1248   (!) 93.8 °F (34.3 °C) 55 (!) 221/103 14 100 % Lying      Temp Source Heart Rate Source BP Location FiO2 (%) Pain Score    12/07/24 1248 12/07/24 1440 12/07/24 1248 -- 12/07/24 1248    Rectal Monitor Right arm  No Pain      Vitals      Date and Time Temp Pulse SpO2 Resp BP Pain Score FACES Pain Rating User   12/08/24 1310 102.6 °F (39.2 °C) 96 99 % 0 -- -- -- CV   12/08/24 1304 102.9 °F (39.4 °C) 96 -- 0 -- -- -- CV   12/08/24 1300 102.9 °F (39.4 °C) 98 100 % 0 -- -- -- CV   12/08/24 1255 102.9 °F (39.4 °C) 102 99 % 24 -- -- -- CV   12/08/24 1225 102.6 °F (39.2 °C) 92 99 % 11 -- -- -- CV   12/08/24 1200 102.6 °F (39.2 °C) 80 99 % 11 136/64 -- -- CV   12/08/24 1130 -- 80 99 % 11 131/54 -- -- CV   12/08/24 1100 -- 78 99 % 11 134/54 -- -- CV   12/08/24 1030 -- 76 99 % 12 140/57 -- -- CV   12/08/24 1000 -- 76 99 % 11 118/58 -- -- CV   12/08/24 0900 99.3 °F (37.4 °C) 76 99 % 11 118/59 -- -- CV   12/08/24 0800 97.5 °F (36.4 °C) 86 98 % 12 102/64 -- -- CV   12/08/24 0700 96.4 °F (35.8 °C) 84 99 % 13 105/55 -- -- CV   12/08/24 0600 95.4 °F (35.2 °C) 74 99 % 13 96/56 -- -- HL   12/08/24  0500 94.6 °F (34.8 °C) 70 99 % 13 74/45 -- -- HL   12/08/24 0452 94.6 °F (34.8 °C) -- -- -- -- -- -- HL   12/08/24 0400 97.3 °F (36.3 °C) 70 99 % 13 105/58 -- -- HL   12/08/24 0300 -- 82 99 % 13 97/54 -- -- HL   12/08/24 0200 97.5 °F (36.4 °C) 74 99 % 13 93/56 -- -- HL   12/08/24 0100 97.5 °F (36.4 °C) 78 99 % 13 108/61 -- -- HL   12/08/24 0000 -- 62 99 % 13 128/54 -- -- HL   12/07/24 2330 -- 66 99 % 13 95/51 -- -- HL   12/07/24 2300 -- 70 99 % 13 79/47 -- -- HL   12/07/24 2230 -- 66 99 % 13 87/54 -- -- HL   12/07/24 2200 98.6 °F (37 °C) 82 98 % 15 76/50 -- -- HL   12/07/24 2100 100.4 °F (38 °C) 102 97 % 24 109/64 -- -- HL   12/07/24 2000 99.7 °F (37.6 °C) 104 98 % 24 134/66 -- -- HL   12/07/24 1900 -- 98 98 % 25 125/62 -- -- HL   12/07/24 1800 99 °F (37.2 °C) bear hugger off 96 98 % 21 104/54 -- -- MJC   12/07/24 1741 98.6 °F (37 °C) 90 98 % 19 106/53 -- -- MJC   12/07/24 1726 98.2 °F (36.8 °C) 86 98 % 20 104/47 -- -- MJC   12/07/24 1700 97.2 °F (36.2 °C) 82 98 % 19 117/54 -- -- MJC   12/07/24 1656 97.2 °F (36.2 °C) 80 98 % 18 117/54 -- -- MJC   12/07/24 1645 96.4 °F (35.8 °C) 78 98 % 17 119/55 -- -- MJ   12/07/24 1626 96.1 °F (35.6 °C) 76 98 % 15 118/53 -- -- MJ   12/07/24 1612 95.7 °F (35.4 °C) 70 98 % 15 140/56 -- -- MJ   12/07/24 1600 95.4 °F (35.2 °C) 90 98 % 20 145/59 -- -- MJ   12/07/24 1540 93.9 °F (34.4 °C) 68 99 % 17 147/65 -- -- MJ   12/07/24 1537 94.3 °F (34.6 °C) 64 100 % 19 174/67 Med Not Given for Pain - for MAR use only -- [facility]   12/07/24 1530 94.3 °F (34.6 °C) 78 100 % 18 183/97 -- -- [facility]   12/07/24 1517 -- 56 100 % 27 166/71 -- -- [facility]   12/07/24 1514 -- 54 100 % 20 204/95 -- -- [facility]   12/07/24 1511 -- 52 100 % 18 199/78 -- -- [facility]   12/07/24 1505 90.7 °F (32.6 °C) 54 100 % 19 187/78 -- -- [facility]   12/07/24 1447 92.8 °F (33.8 °C) bear hugger applied on high 50 100 % 13 166/66 -- -- [facility]   12/07/24 1443 -- -- 99 % -- -- -- --    12/07/24 1440 93.4 °F (34.1 °C) 56 100 % 14 105/64 -- -- [facility]   12/07/24  1415 93.6 °F (34.2 °C) 52 99 % 14 139/65 -- -- BG   12/07/24 1403 93.4 °F (34.1 °C) 46 99 % 14 186/78 -- -- BG   12/07/24 1400 93.2 °F (34 °C) 66 99 % 37 186/78 -- -- BG   12/07/24 1347 -- 68 -- -- 240/120 -- -- BG   12/07/24 1345 93.2 °F (34 °C) 68 100 % 26 240/120 -- -- BG   12/07/24 1311 -- -- 100 % -- -- -- -- JM   12/07/24 1248 93.8 °F (34.3 °C) 55 100 % 14 221/103 No Pain -- BG            Physical Exam  Vitals and nursing note reviewed.   Constitutional:       Comments: Intubated, not responsive   HENT:      Head: Normocephalic and atraumatic.   Eyes:      Comments: Right pupil 3 mm, left pupil 1 mm   Cardiovascular:      Rate and Rhythm: Normal rate and regular rhythm.   Pulmonary:      Comments: Bilateral breath sounds while intubated  Musculoskeletal:      Comments: No obvious signs of trauma   Skin:     General: Skin is warm and dry.   Neurological:      Comments: Gag intact, patient not opening eyes to any stimuli, patient not withdrawing to pain                      in any extremities.         Results Reviewed       Procedure Component Value Units Date/Time    HS Troponin I 4hr [[id number]]  (Abnormal) Collected: 12/07/24 1651    Lab Status: Final result Specimen: Blood from Arm, Right Updated: 12/07/24 1741     hs TnI 4hr 543 ng/L      Delta 4hr hsTnI 475 ng/L     HS Troponin I 2hr [[id number]]  (Abnormal) Collected: 12/07/24 1554    Lab Status: Final result Specimen: Blood from Arm, Left Updated: 12/07/24 1640     hs TnI 2hr 378 ng/L      Delta 2hr hsTnI 310 ng/L     Fingerstick Glucose (POCT) [[id number]]  (Abnormal) Collected: 12/07/24 1304    Lab Status: Final result Specimen: Blood Updated: 12/07/24 1446     POC Glucose 257 mg/dl     HS Troponin 0hr (reflex protocol) [[id number]]  (Abnormal) Collected: 12/07/24 1304    Lab Status: Final result Specimen: Blood from Arm, Right Updated: 12/07/24 1407     hs TnI 0hr 68 ng/L     Protime-INR [[id number]]  (Normal) Collected: 12/07/24 1304    Lab Status: Final  result Specimen: Blood from Arm, Right Updated: 12/07/24 1348     Protime 13.9 seconds      INR 1.04    Narrative:      INR Therapeutic Range    Indication                                             INR Range      Atrial Fibrillation                                               2.0-3.0  Hypercoagulable State                                    2.0.2.3  Left Ventricular Asist Device                            2.0-3.0  Mechanical Heart Valve                                  -    Aortic(with afib, MI, embolism, HF, LA enlargement,    and/or coagulopathy)                                     2.0-3.0 (2.5-3.5)     Mitral                                                             2.5-3.5  Prosthetic/Bioprosthetic Heart Valve               2.0-3.0  Venous thromboembolism (VTE: VT, PE        2.0-3.0    APTT [[id number]]  (Normal) Collected: 12/07/24 1304    Lab Status: Final result Specimen: Blood from Arm, Right Updated: 12/07/24 1348     PTT 23 seconds     Basic metabolic panel [[id number]]  (Abnormal) Collected: 12/07/24 1304    Lab Status: Final result Specimen: Blood from Arm, Left Updated: 12/07/24 1335     Sodium 138 mmol/L      Potassium 3.9 mmol/L      Chloride 106 mmol/L      CO2 22 mmol/L      ANION GAP 10 mmol/L      BUN 17 mg/dL      Creatinine 0.92 mg/dL      Glucose 265 mg/dL      Calcium 8.6 mg/dL      eGFR 58 ml/min/1.73sq m     Narrative:      [employer] guidelines for Chronic Kidney Disease (CKD):     Stage 1 with normal or high GFR (GFR > 90 mL/min/1.73 square meters)    Stage 2 Mild CKD (GFR = 60-89 mL/min/1.73 square meters)    Stage 3A Moderate CKD (GFR = 45-59 mL/min/1.73 square meters)    Stage 3B Moderate CKD (GFR = 30-44 mL/min/1.73 square meters)    Stage 4 Severe CKD (GFR = 15-29 mL/min/1.73 square meters)    Stage 5 End Stage CKD (GFR <15 mL/min/1.73 square meters)  Note: GFR calculation is accurate only with a steady state creatinine    CBC and Platelet [[id number]]   (Abnormal) Collected: 12/07/24 1304    Lab Status: Final result Specimen: Blood from Arm, Right Updated: 12/07/24 1321     WBC 15.31 Thousand/uL      RBC 4.22 Million/uL      Hemoglobin 12.6 g/dL      Hematocrit 37.5 %      MCV 89 fL      MCH 29.9 pg      MCHC 33.6 g/dL      RDW 13.2 %      Platelets 200 Thousands/uL      MPV 11.3 fL             XR chest portable   Final Interpretation by Usha Cook MD (12/08 0713)      No acute cardiopulmonary disease.      Right jugular catheter in upper SVC with no pneumothorax.      Workstation performed: [id number]         XR chest 1 view portable   Final Interpretation by Usha Cook MD (12/08 0712)      No acute cardiopulmonary disease.      ETT 5 cm above the pawan.      Workstation performed: [id number]         CT stroke alert brain   Final Interpretation by Miguel French MD (12/07 1325)      Large intraparenchymal hematoma centered within the right basal ganglia with extensive intraventricular and subarachnoid hemorrhage as described above.      1.3 cm leftward midline shift with associated dilatation of the left temporal horn consistent with entrapment. Basilar cisterns are effaced consistent with uncal herniation and there is also evidence of tonsillar herniation.         Findings were directly discussed with Dr. Alexander at approximately 1:10 p.m.      Workstation performed: [id number]             Procedures    ED Medication and Procedure Management   None     There are no discharge medications for this patient.    No discharge procedures on file.  ED SEPSIS DOCUMENTATION   Time reflects when diagnosis was documented in both MDM as applicable and the Disposition within this note       Time User Action Codes Description Comment    12/7/2024 12:44 PM Franci Quintana Add [R29.90] Stroke-like symptoms     12/7/2024  1:56 PM Gera Edwards Add [I61.9] Intracerebral hemorrhage (HCC)     12/7/2024  2:08 PM Chandu Cleary Modify [R29.90] Stroke-like symptoms      12/7/2024  2:08 PM Chandu Cleary Modify [I61.9] Intracerebral hemorrhage (HCC)                  Gera Edwards MD  12/10/24 1607

## 2024-12-07 NOTE — ED NOTES
1248 : pt arrived via helicopter from home in [facility]  Pt arrived intubated with 7.5 et tune and 22 at the lip by ems   Pt was given rsi meds for intuibation.  Pt also has 2 iv lines present on arrival  Ems reports that rt pupil is fixed and dilated  Blood sugar  for ems was 240.  1307: Dr. Alexander spoke with the patient  on the phone and will be coming to the hospital  1315: temp probe deal inserted  1325: bear hugger placed on the patient due to temp  1340: [facility] resident states to hold the dipravan. Dipravan stopped at this time       Julisa Tanner, RN  12/07/24 3081

## 2024-12-08 NOTE — DISCHARGE SUMMARY
"Admission Date: 2024   Admitting Diagnosis: Intracerebral hemorrhage (HCC) [I61.9]  CVA (cerebral vascular accident) (HCC) [I63.9]  Stroke-like symptoms [R29.90]  Discharge Date: 24    HPI/Summary of Hospital Course: 79 y.o female PMHx stroke, CAD, HTN, HLD, COPD presented yesterday to [facility] with altered mental status. Patient was at home and complained of an acute onset headache before becoming unresponsive.  called EMS. Patient was intubated by EMS and transferred to [facility]. CTH with large IPH, intraventricular involvement, midline shift and signs of uncal/tonsillar herniation. ICH score of 5. Patient was admitted to [facility] for further care. Ongoing GOC discuss was had with family who were between comfort care or GOL. Overnight, patient with hemodynamic instability, yani and central line placed. Levophed initiated. Worsening neuro exam with absent pupillary/cough and gag reflexes. This AM, Vasopressin added. Patient progressed to brain death. Patient was declared brain dead at 1310 by Dr. Brown. Patient was compassionately extubated. ICU team offered condolences to the family.     Procedures Performed:   Orders Placed This Encounter   Procedures    Central Line    Critical Care     Significant Findings, Care, Treatment and Services Provided:   CTH  - \"Large intraparenchymal hematoma centered within the right basal ganglia with extensive intraventricular and subarachnoid hemorrhage as described above.  1.3 cm leftward midline shift with associated dilatation of the left temporal horn consistent with entrapment. Basilar cisterns are effaced consistent with uncal herniation and there is also evidence of tonsillar herniation.\"    Complications: none    Disposition:      Final Diagnosis: Brain death    Medical Problems        Condition at Time of Death: Brain death, cardiac arrest  Date, Time and Cause of Death    Date of Death: 24  Time of Death:  1:10 PM  Preliminary Cause of Death: Brain " death  Entered by: Claudio Davison PA-C[JD1.1]       Attribution       JD1.1 Claudio Davison PA-C 12/08/24 15:45            Death Note:  INPATIENT DEATH NOTE  Nayeli Burr 79 y.o. female MRN: [id number]  Unit/Bed#: ICU 05 Encounter: [id number]             PHYSICAL EXAM:  Brain death exam performed:  No response to noxious stimuli with TMJ joint, supraorbital notch, sternal rub. No response in extremities to pain. Absent pupillary, occocephalic, corneal, gag and cough reflex    Apnea test perforemed prior to initiation pH 7.35 and PaCO2 42.5, passive oxygenation supplied.   After 10 minutes of apnea pH 7.10 and PaCO2 85.9    Brain death declared at 1:10 pm      Unresponsive to noxious stimuli, Spontaneous respirations absent, Pupillary light reflex absent, Corneal blink reflex absent, and            Autopsy Options:  Post-mortem examination declined by next of kin    Primary Service Attending Physician notified?:  yes - Attending:  Nayan Brown MD

## 2024-12-08 NOTE — DEATH NOTE
INPATIENT DEATH NOTE  Nayeli Burr 79 y.o. female MRN: [id number]  Unit/Bed#: ICU 05 Encounter: [id number]             PHYSICAL EXAM:  Brain death exam performed:  No response to noxious stimuli with TMJ joint, supraorbital notch, sternal rub. No response in extremities to pain. Absent pupillary, occocephalic, corneal, gag and cough reflex    Apnea test perforemed prior to initiation pH 7.35 and PaCO2 42.5, passive oxygenation supplied.   After 10 minutes of apnea pH 7.10 and PaCO2 85.9    Brain death declared at 1:10 pm      Unresponsive to noxious stimuli, Spontaneous respirations absent, Pupillary light reflex absent, Corneal blink reflex absent, and            Autopsy Options:  Post-mortem examination declined by next of kin    Primary Service Attending Physician notified?:  yes - Attending:  Nayan Brown MD

## 2024-12-08 NOTE — PROCEDURES
Central Line Insertion    Date/Time: 12/8/2024 12:02 AM    Performed by: Rosibel Gomez PA-C  Authorized by: Rosibel Gomez PA-C    Patient location:  ICU  Consent:     Consent obtained:  Verbal    Consent given by:  Mayelin  Universal protocol:     Patient identity confirmed:  Verbally with patient and hospital-assigned identification number  Pre-procedure details:     Hand hygiene: Hand hygiene performed prior to insertion      Sterile barrier technique: All elements of maximal sterile technique followed      Skin preparation:  2% chlorhexidine    Skin preparation agent: Skin preparation agent completely dried prior to procedure    Indications:     Central line indications: medications requiring central line and hemodynamic monitoring    Anesthesia (see MAR for exact dosages):     Anesthesia method:  None  Procedure details:     Location:  Right internal jugular    Vessel type: vein      Laterality:  Right    Approach: percutaneous technique used      Patient position:  Trendelenburg    Catheter type:  Triple lumen 16cm    Catheter size:  7 Fr    Landmarks identified: yes      Ultrasound guidance: yes      Ultrasound image availability:  Images available in PACS    Sterile ultrasound techniques: Sterile gel and sterile probe covers were used      Manometry confirmation: yes      Number of attempts:  1    Successful placement: yes      Catheter tip vessel location: atriocaval junction    Post-procedure details:     Post-procedure:  Dressing applied and line sutured    Assessment:  Blood return through all ports, no pneumothorax on x-ray, placement verified by x-ray and free fluid flow    Post-procedure complications: none      Patient tolerance of procedure:  Tolerated well, no immediate complications

## 2024-12-08 NOTE — PLAN OF CARE
Problem: PAIN - ADULT  Goal: Verbalizes/displays adequate comfort level or baseline comfort level  Description: Interventions:  - Encourage patient to monitor pain and request assistance  - Assess pain using appropriate pain scale  - Administer analgesics based on type and severity of pain and evaluate response  - Implement non-pharmacological measures as appropriate and evaluate response  - Consider cultural and social influences on pain and pain management  - Notify physician/advanced practitioner if interventions unsuccessful or patient reports new pain  12/7/2024 1900 by Gretta Whittaker RN  Outcome: Progressing  12/7/2024 1900 by Gretta Whittaker RN  Outcome: Progressing     Problem: INFECTION - ADULT  Goal: Absence or prevention of progression during hospitalization  Description: INTERVENTIONS:  - Assess and monitor for signs and symptoms of infection  - Monitor lab/diagnostic results  - Monitor all insertion sites, i.e. indwelling lines, tubes, and drains  - Monitor endotracheal if appropriate and nasal secretions for changes in amount and color  - [place] appropriate cooling/warming therapies per order  - Administer medications as ordered  - Instruct and encourage patient and family to use good hand hygiene technique  - Identify and instruct in appropriate isolation precautions for identified infection/condition  12/7/2024 1900 by Gretta Whittaker RN  Outcome: Progressing  12/7/2024 1900 by Gretta Whittaker RN  Outcome: Progressing  Goal: Absence of fever/infection during neutropenic period  Description: INTERVENTIONS:  - Monitor WBC    12/7/2024 1900 by Gretta Whittaker RN  Outcome: Progressing  12/7/2024 1900 by Gretta Whittaker RN  Outcome: Progressing     Problem: SAFETY ADULT  Goal: Patient will remain free of falls  Description: INTERVENTIONS:  - Educate patient/family on patient safety including physical limitations  - Instruct patient to call for assistance with activity   - Consult OT/PT  to assist with strengthening/mobility   - Keep Call bell within reach  - Keep bed low and locked with side rails adjusted as appropriate  - Keep care items and personal belongings within reach  - Initiate and maintain comfort rounds  - Make Fall Risk Sign visible to staff  - Offer Toileting every    Hours, in advance of need  - Initiate/Maintain   alarm  - Obtain necessary fall risk management equipment:     - Apply yellow socks and bracelet for high fall risk patients  - Consider moving patient to room near nurses station  12/7/2024 1900 by Gretta Whittaker RN  Outcome: Progressing  12/7/2024 1900 by Gretta Whittaker RN  Outcome: Progressing  Goal: Maintain or return to baseline ADL function  Description: INTERVENTIONS:  -  Assess patient's ability to carry out ADLs; assess patient's baseline for ADL function and identify physical deficits which impact ability to perform ADLs (bathing, care of mouth/teeth, toileting, grooming, dressing, etc.)  - Assess/evaluate cause of self-care deficits   - Assess range of motion  - Assess patient's mobility; develop plan if impaired  - Assess patient's need for assistive devices and provide as appropriate  - Encourage maximum independence but intervene and supervise when necessary  - Involve family in performance of ADLs  - Assess for home care needs following discharge   - Consider OT consult to assist with ADL evaluation and planning for discharge  - Provide patient education as appropriate  12/7/2024 1900 by Gretta Whittaker RN  Outcome: Progressing  12/7/2024 1900 by Gretta Whittaker RN  Outcome: Progressing  Goal: Maintains/Returns to pre admission functional level  Description: INTERVENTIONS:  - Perform AM-PAC 6 Click Basic Mobility/ Daily Activity assessment daily.  - Set and communicate daily mobility goal to care team and patient/family/caregiver.   - Collaborate with rehabilitation services on mobility goals if consulted  - Perform Range of Motion    times a  day.  - Reposition patient every    hours.  - Dangle patient    times a day  - Stand patient    times a day  - Ambulate patient    times a day  - Out of bed to chair    times a day   - Out of bed for meals        times a day  - Out of bed for toileting  - Record patient progress and toleration of activity level   12/7/2024 1900 by Gretta Whittaker RN  Outcome: Progressing  12/7/2024 1900 by Gretta Whittaker RN  Outcome: Progressing     Problem: DISCHARGE PLANNING  Goal: Discharge to home or other facility with appropriate resources  Description: INTERVENTIONS:  - Identify barriers to discharge w/patient and caregiver  - Arrange for needed discharge resources and transportation as appropriate  - Identify discharge learning needs (meds, wound care, etc.)  - Arrange for interpretive services to assist at discharge as needed  - Refer to Case Management Department for coordinating discharge planning if the patient needs post-hospital services based on physician/advanced practitioner order or complex needs related to functional status, cognitive ability, or social support system  12/7/2024 1900 by Gretta Whittaker RN  Outcome: Progressing  12/7/2024 1900 by Gretta Whittaker RN  Outcome: Progressing     Problem: Knowledge Deficit  Goal: Patient/family/caregiver demonstrates understanding of disease process, treatment plan, medications, and discharge instructions  Description: Complete learning assessment and assess knowledge base.  Interventions:  - Provide teaching at level of understanding  - Provide teaching via preferred learning methods  12/7/2024 1900 by Gretta Whittaker RN  Outcome: Progressing  12/7/2024 1900 by Gretta Whittaker RN  Outcome: Progressing     Problem: NEUROSENSORY - ADULT  Goal: Achieves stable or improved neurological status  Description: INTERVENTIONS  - Monitor and report changes in neurological status  - Monitor vital signs such as temperature, blood pressure, glucose, and any other labs  ordered   - Initiate measures to prevent increased intracranial pressure  - Monitor for seizure activity and implement precautions if appropriate      12/7/2024 1900 by Gretta Whittaker RN  Outcome: Progressing  12/7/2024 1900 by Gretta Whittaker RN  Outcome: Progressing  Goal: Remains free of injury related to seizures activity  Description: INTERVENTIONS  - Maintain airway, patient safety  and administer oxygen as ordered  - Monitor patient for seizure activity, document and report duration and description of seizure to physician/advanced practitioner  - If seizure occurs,  ensure patient safety during seizure  - Reorient patient post seizure  - Seizure pads on all 4 side rails  - Instruct patient/family to notify RN of any seizure activity including if an aura is experienced  - Instruct patient/family to call for assistance with activity based on nursing assessment  - Administer anti-seizure medications if ordered    12/7/2024 1900 by Gretta Whittaker RN  Outcome: Progressing  12/7/2024 1900 by Gretta Whittaker RN  Outcome: Progressing  Goal: Achieves maximal functionality and self care  Description: INTERVENTIONS  - Monitor swallowing and airway patency with patient fatigue and changes in neurological status  - Encourage and assist patient to increase activity and self care.   - Encourage visually impaired, hearing impaired and aphasic patients to use assistive/communication devices  12/7/2024 1900 by Gretta Whittaker RN  Outcome: Progressing  12/7/2024 1900 by Gretta Whittaker RN  Outcome: Progressing     Problem: RESPIRATORY - ADULT  Goal: Achieves optimal ventilation and oxygenation  Description: INTERVENTIONS:  - Assess for changes in respiratory status  - Assess for changes in mentation and behavior  - Position to facilitate oxygenation and minimize respiratory effort  - Oxygen administered by appropriate delivery if ordered  - Initiate smoking cessation education as indicated  - Encourage  broncho-pulmonary hygiene including cough, deep breathe, Incentive Spirometry  - Assess the need for suctioning and aspirate as needed  - Assess and instruct to report SOB or any respiratory difficulty  - Respiratory Therapy support as indicated  12/7/2024 1900 by Gretta Whittaker RN  Outcome: Progressing  12/7/2024 1900 by Gretta Whittaker RN  Outcome: Progressing     Problem: METABOLIC, FLUID AND ELECTROLYTES - ADULT  Goal: Electrolytes maintained within normal limits  Description: INTERVENTIONS:  - Monitor labs and assess patient for signs and symptoms of electrolyte imbalances  - Administer electrolyte replacement as ordered  - Monitor response to electrolyte replacements, including repeat lab results as appropriate  - Instruct patient on fluid and nutrition as appropriate  12/7/2024 1900 by Gretta Whittaker RN  Outcome: Progressing  12/7/2024 1900 by Gretta Whittaker RN  Outcome: Progressing  Goal: Fluid balance maintained  Description: INTERVENTIONS:  - Monitor labs   - Monitor I/O and WT  - Instruct patient on fluid and nutrition as appropriate  - Assess for signs & symptoms of volume excess or deficit  12/7/2024 1900 by Gretta Whittaker RN  Outcome: Progressing  12/7/2024 1900 by Gretta Whittaker RN  Outcome: Progressing  Goal: Glucose maintained within target range  Description: INTERVENTIONS:  - Monitor Blood Glucose as ordered  - Assess for signs and symptoms of hyperglycemia and hypoglycemia  - Administer ordered medications to maintain glucose within target range  - Assess nutritional intake and initiate nutrition service referral as needed  12/7/2024 1900 by Gretta Whittaker RN  Outcome: Progressing  12/7/2024 1900 by Gretta Whittaker RN  Outcome: Progressing     Problem: Neurological Deficit  Goal: Neurological status is stable or improving  Description: Interventions:  - Monitor and assess patient's level of consciousness, motor function, sensory function, and level of assistance needed for  ADLs.   - Monitor and report changes from baseline. Collaborate with interdisciplinary team to initiate plan and implement interventions as ordered.   - Provide and maintain a safe environment.  - Consider seizure precautions.  - Consider fall precautions.  - Consider aspiration precautions.  - Consider bleeding precautions.  12/7/2024 1900 by Gretta Whittaker RN  Outcome: Progressing  12/7/2024 1900 by Gretta Whittaker RN  Outcome: Progressing     Problem: Activity Intolerance/Impaired Mobility  Goal: Mobility/activity is maintained at optimum level for patient  Description: Interventions:  - Assess and monitor patient  barriers to mobility and need for assistive/adaptive devices.  - Assess patient's emotional response to limitations.  - Collaborate with interdisciplinary team and initiate plans and interventions as ordered.  - Encourage independent activity per ability.  - Maintain proper body alignment.  - Perform active/passive rom as tolerated/ordered.  - Plan activities to conserve energy.  - Turn patient as appropriate  12/7/2024 1900 by Gretta Whittaker RN  Outcome: Progressing  12/7/2024 1900 by Gretta Whittaker RN  Outcome: Progressing     Problem: Potential for Aspiration  Goal: Ventilated patient's risk of aspiration is minimized  Description: Assess and monitor vital signs, respiratory status, airway cuff pressure, and labs (WBC).  Monitor for signs of aspiration (tachypnea, cough, rales, wheezing, cyanosis, fever).    - Elevate head of bed 30 degrees if patient has tube feeding.  - Monitor tube feeding.  12/7/2024 1900 by Gretta Whittaker RN  Outcome: Progressing  12/7/2024 1900 by Gretta Whittaker RN  Outcome: Progressing

## 2024-12-08 NOTE — PROGRESS NOTES
Patient had episode of hypotension. IVF administered. Transient response. NE initiated for HD support. Patient  contacted and informed of low BP and need for medications to support her through the night. Patient  agreeable to arterial line and CVL.     Exam concerning, no pupil light reflex, no corneal, no cough, no gag, no withdrawal to pain x4 extremities, no spontaneous respirations.     Patient  was informed of the concern that patient worsened neurologically. He will be in  tomorrow around 11 am with his granddaughter and youngest son with plan to discuss plan of care.     CC time does not include procedures or family updated. CC time 34 min.

## 2024-12-08 NOTE — RESPIRATORY THERAPY NOTE
Resp Care   12/08/24 0851   Respiratory Assessment   Assessment Type Assess only   General Appearance Sedated   Respiratory Pattern Assisted   Chest Assessment Chest expansion symmetrical   Bilateral Breath Sounds Diminished   Suction ET Tube   Resp Comments pt recieved on documented vent settings, no changes made, will cont to monitor per resp protocol.   Vent Information   Vent ID [id number]   Vent type Berry C3   Berry Vent Mode (S)CMV   $ Pulse Oximetry Spot Check Charge Completed   (S)CMV Settings   Resp Rate (BPM) 12 BPM   VT (mL) 350 mL   FIO2 (%) 60 %   PEEP (cmH2O) 6 cmH2O   I:E Ratio 1/4   Insp Time (%) 1 %   Flow Trigger (LPM) 3   Humidification Heater   Heater Temperature (Set) 95 °F (35 °C)   (S)CMV Actuals   Resp Rate (BPM) 12 BPM   VT (mL) 378   MV 4.3   MAP (cmH2O) 8.1 cmH2O   Peak Pressure (cmH2O) 17 cmH2O   I:E Ratio (Obs) 1/4   Insp Resistance 20   Heater Temperature (Obs) 98.6 °F (37 °C)   Static Compliance (mL/cmH20) 54.7 mL/cmH2O   Plateau Pressure (cm H2O) 20.6 cm H2O   (S)CMV Alarms   High Peak Pressure (cmH2O) 40   Low Pressure (cmH2O) 5 cm H2O   High Resp Rate (BPM) 40 BPM   Low Resp Rate (BPM) 8 BPM   High MV (L/min) 20 L/min   Low MV (L/min) 4 L/min   High VT (mL) 1100 mL   Low VT (mL) 250 mL   Maintenance   Alarm (pink) cable attached No   Resuscitation bag with peep valve at bedside Yes   Water bag changed No   Circuit changed No   Daily Screen   Patient safety screen outcome: Failed   Not Ready for Weaning due to: Underline problem not resolved   IHI Ventilator Associated Pneumonia Bundle   Daily Awakening Trials Performed Yes   Daily Assessment of Readiness to Extubate Yes   Head of Bed Elevated HOB 30   ETT  Oral 7.5 mm   Placement Date/Time: 12/07/24 1945   Type: Oral  Tube Size: 7.5 mm  Secured at (cm): 22 @ lip   Secured at (cm) 22   Measured from Lips   Secured Location Right   Secured by Commercial tube pineda   Site Condition Dry   HI-LO Suction  Intermittent suction    HI-LO Secretions Scant   HI-LO Intervention Patent

## 2024-12-08 NOTE — PROCEDURES
Arterial Line Insertion    Date/Time: 12/8/2024 2:35 AM    Performed by: Rosibel Gomez PA-C  Authorized by: Rosibel Gomez PA-C    Patient location:  ICU  Other Assisting Provider: Yes (comment) (Phoebe VINES)    Consent:     Consent obtained:  Verbal    Consent given by:  Spouse  Universal protocol:     Patient identity confirmed:  Verbally with patient  Indications:     Indications: hemodynamic monitoring, multiple ABGs, continuous blood pressure monitoring and frequent labs / infusion    Pre-procedure details:     Skin preparation:  Chlorhexidine    Preparation: Patient was prepped and draped in sterile fashion    Anesthesia (see MAR for exact dosages):     Anesthesia method:  None  Procedure details:     Location / Tip of Catheter:  Radial    Laterality:  Left    Needle gauge:  20 G    Placement technique:  Percutaneous    Number of attempts:  5 or more (3 attempts on right, 2 attempts on left)    Successful placement: yes      Transducer: waveform confirmed    Post-procedure details:     Post-procedure:  Biopatch applied    CMS:  Unable to assess    Patient tolerance of procedure:  Tolerated well, no immediate complications

## 2024-12-08 NOTE — PROGRESS NOTES
Progress Note - Critical Care/ICU   Name: Nayeli Burr 79 y.o. female I MRN: [id number]  Unit/Bed#: ICU 05 I Date of Admission: 12/7/2024   Date of Service: 12/8/2024 I Hospital Day: 1     Assessment & Plan   Neuro:   Diagnosis: ICH, ICH score 5  No intervention per neurosurgery  Hold all AP/AC   Sedation:  none  Regulate sleep/wake cycle   Delirium precautions  CAM-ICU daily  Trend neuro exam   Ongoing GOC discussion with family   CV:   Diagnosis: CAD, Hx of HTN, HLD, Hypotension in the setting of above  Holding home ASA/Plavix, Lisinopril and Atorvastatin   Cardiac infusion: Levophed   Currently at 26  Added Vaso 0.04  Wean for MAP goal >65  Monitor on telemetry     Pulm:  Diagnosis: Acute respiratory failure, Hx of COPD  AC/VC; 14/400/6/60%  Wean FIO2 for goal SpO2 >92%  Trend ABG as needed   VAP bundle ordered   Pulmonary toileting    GI:   Diagnosis: No acute issues    :   Diagnosis: no acute issues  Gerber in place for strict I/O   Trend BMP     F/E/N:   F: Isolyte 75mL/hr  E: Replete electrolytes for goal K >4, Phos >3, Mg >2  N: NPO    Heme/Onc:   Diagnosis: no acute issues  VTE ppx:  SCDs only in the setting of ICH    Endo:   Diagnosis: no acute issues  BG goal 140-180     ID:   Diagnosis: Leukocytosis   Trend fever curve and WBC count     MSK/Skin:   Q2h turning and repositioning     Lines/Tubes/Drains:   ETT day 2  A line day 1  TLC day 1  Gerber day 2          Disposition: Critical care    ICU Core Measures     Vented Patient  VAP Bundle  VAP bundle ordered     A: Assess, Prevent, and Manage Pain Has pain been assessed? Yes  Need for changes to pain regimen? No   B: Both Spontaneous Awakening Trials (SATs) and Spontaneous Breathing Trials (SBTs) Plan to perform spontaneous awakening trial today? Yes   Plan to perform spontaneous breathing trial today? Yes   Obvious barriers to extubation? Yes   C: Choice of Sedation RASS Goal: 0 Alert and Calm  Need for changes to sedation or analgesia regimen? No   D:  Delirium CAM-ICU: Unable to perform secondary to Acute cognitive dysfunction   E: Early Mobility  Plan for early mobility? Yes   F: Family Engagement Plan for family engagement today? Yes       Review of Invasive Devices:  Gerber Plan: Continue for accurate I/O monitoring for 48 hours  Central access plan: Medications requiring central line  [place] Plan: Keep arterial line for hemodynamic monitoring, frequent ABGs, and frequent labs    Prophylaxis:  VTE Contraindicated secondary to: IPH   Stress Ulcer  not ordered       24 Hour Events : Overnight, patient with episode of hypotension, given NaCl 0.9% 1L bolus with transient response. Levophed started. [place]els[place] and central line placed. Exam change to no pupil reflex/cough or gag.  informed by night team.   Subjective   Review of Systems: Review of Systems not obtainable due to Clinical Condition, Altered mental status    Objective :                   Vitals I/O      Most Recent Min/Max in 24hrs   Temp (!) 95.4 °F (35.2 °C) Temp  Min: 90.7 °F (32.6 °C)  Max: 100.4 °F (38 °C)   Pulse 74 Pulse  Min: 46  Max: 104   Resp 13 Resp  Min: 13  Max: 37   BP 96/56 BP  Min: 74/45  Max: 240/120   O2 Sat 99 % SpO2  Min: 97 %  Max: 100 %      Intake/Output Summary (Last 24 hours) at 12/8/2024 0648  Last data filed at 12/8/2024 0600  Gross per 24 hour   Intake 2406.68 ml   Output 910 ml   Net 1496.68 ml       Diet NPO    Invasive Monitoring   Arterial Line  [place]els[place] /46  Arterial Line BP  Min: 98/44  Max: 114/48   MAP (!) 64 mmHg  Arterial Line MAP (mmHg)  Min: 56 mmHg  Max: 66 mmHg           Physical Exam   Physical Exam  Vitals and nursing note reviewed.   Eyes:      Comments: Fixed and dilated, non reactive to light   Skin:     General: Skin is warm and dry.   HENT:      Head: Normocephalic and atraumatic.   Neck:      Vascular: Central line present.   Cardiovascular:      Rate and Rhythm: Normal rate and regular rhythm.      Heart sounds: Normal heart sounds.    Musculoskeletal:      Right lower leg: No edema.      Left lower leg: No edema.   Abdominal:      Palpations: Abdomen is soft.   Constitutional:       General: She is not in acute distress.     Interventions: She is intubated.   Pulmonary:      Effort: She is intubated.      Breath sounds: Normal breath sounds.   Neurological:      Mental Status: She is unresponsive.        Corneal reflex absent, no cough reflex and gag reflex not intact.   Genitourinary/Anorectal:  Gerber present.        Diagnostic Studies    Lab Results: I have reviewed the following results:     Medications:  Scheduled PRN   chlorhexidine, 15 mL, Q12H ELVIA      fentaNYL, 25 mcg, Q1H PRN       Continuous    multi-electrolyte, 75 mL/hr, Last Rate: 75 mL/hr (12/08/24 0311)  norepinephrine, 1-30 mcg/min, Last Rate: 24 mcg/min (12/08/24 0645)         Labs:   CBC    Recent Labs     12/07/24 2236 12/08/24  0423   WBC 12.96* 14.63*   HGB 10.6* 10.4*   HCT 31.0* 30.8*    167     BMP    Recent Labs     12/07/24 2236 12/08/24  0423   SODIUM 143 143   K 3.7 4.2   * 109*   CO2 24 25   AGAP 9 9   BUN 23 24   CREATININE 1.57* 1.48*   CALCIUM 7.9* 7.9*       Coags    Recent Labs     12/07/24  1304 12/07/24 2236   INR 1.04 1.24*   PTT 23  --         Additional Electrolytes  Recent Labs     12/07/24  2236 12/08/24  0423   MG 1.6* 4.6*   PHOS 3.6 4.5*   CAIONIZED 1.03*  --           Blood Gas    Recent Labs     12/08/24  0552   PHART 7.402   [id number] 32.9*   PO2ART 162.5*   [id number] 20.0*   BEART -4.0   SOURCE Line, Arterial     Recent Labs     12/08/24  0552   SOURCE Line, Arterial    LFTs  Recent Labs     12/07/24  1651 12/08/24  0423   ALT 7 7   AST 23 29   ALKPHOS 79 72   ALB 3.6 3.4*   TBILI 1.04* 0.84       Infectious  No recent results  Glucose  Recent Labs     12/07/24  1304 12/07/24  2236 12/08/24  0423   GLUC 265* 157* 190*

## 2024-12-08 NOTE — PROGRESS NOTES
This is an attestation to AP note    24 hour events:  Hemodynamic instability overnight, yani and central line placed, levophed started escalated to 26 starting vasopressin  Concern for progression to brain death  Will talk with family when they arrive today   PaCO2 32 this am will slightly decrease ventilation     Physical exam  On exam she is absent response to stimulus and no pupillary response, no cough, no gag and apneic on pressure support.      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 They are deciding between comfort measures and organ donation, I will touch base with them late morning, they are planning to arrive at 11.      Continue to hold sedation  MAP goal >65 continue levophed and vasopressin   Checking interval labs to provide support and guide resuscitation   Cont ventilation   Maintain enteral access    Afternoon update:  Brain death declared at 1:10 pm, I performed exam and documented in death note  Discussion with family with [facility] they have declined organ donation and we will proceed with post mortem care    I spent a total of 31 minutes of critical care time excluding education, family discussion and procedures